# Patient Record
Sex: FEMALE | Race: WHITE | Employment: UNEMPLOYED | URBAN - METROPOLITAN AREA
[De-identification: names, ages, dates, MRNs, and addresses within clinical notes are randomized per-mention and may not be internally consistent; named-entity substitution may affect disease eponyms.]

---

## 2018-02-13 PROBLEM — R65.10 SIRS (SYSTEMIC INFLAMMATORY RESPONSE SYNDROME) (HCC): Status: ACTIVE | Noted: 2018-02-13

## 2018-02-13 PROBLEM — E87.20 METABOLIC ACIDOSIS WITH INCREASED ANION GAP AND ACCUMULATION OF ORGANIC ACIDS: Status: ACTIVE | Noted: 2018-02-13

## 2018-02-13 PROBLEM — E10.10 DKA, TYPE 1, NOT AT GOAL (HCC): Status: ACTIVE | Noted: 2018-02-13

## 2018-02-13 PROBLEM — G40.909 SEIZURE DISORDER (HCC): Status: ACTIVE | Noted: 2018-02-13

## 2018-02-14 PROBLEM — E78.1 HYPERTRIGLYCERIDEMIA: Status: ACTIVE | Noted: 2018-02-14

## 2018-10-22 ENCOUNTER — OFFICE VISIT (OUTPATIENT)
Dept: INTERNAL MEDICINE CLINIC | Age: 28
End: 2018-10-22
Payer: COMMERCIAL

## 2018-10-22 VITALS
BODY MASS INDEX: 23.59 KG/M2 | WEIGHT: 117 LBS | SYSTOLIC BLOOD PRESSURE: 122 MMHG | HEIGHT: 59 IN | DIASTOLIC BLOOD PRESSURE: 64 MMHG

## 2018-10-22 DIAGNOSIS — E10.8 TYPE 1 DIABETES MELLITUS WITH COMPLICATION (HCC): Primary | ICD-10-CM

## 2018-10-22 DIAGNOSIS — F41.9 ANXIETY AND DEPRESSION: ICD-10-CM

## 2018-10-22 DIAGNOSIS — G40.909 SEIZURE DISORDER (HCC): ICD-10-CM

## 2018-10-22 DIAGNOSIS — Z23 NEED FOR INFLUENZA VACCINATION: ICD-10-CM

## 2018-10-22 DIAGNOSIS — F32.A ANXIETY AND DEPRESSION: ICD-10-CM

## 2018-10-22 PROBLEM — E87.20 METABOLIC ACIDOSIS WITH INCREASED ANION GAP AND ACCUMULATION OF ORGANIC ACIDS: Status: RESOLVED | Noted: 2018-02-13 | Resolved: 2018-10-22

## 2018-10-22 PROBLEM — E78.1 HYPERTRIGLYCERIDEMIA: Status: RESOLVED | Noted: 2018-02-14 | Resolved: 2018-10-22

## 2018-10-22 PROBLEM — R65.10 SIRS (SYSTEMIC INFLAMMATORY RESPONSE SYNDROME) (HCC): Status: RESOLVED | Noted: 2018-02-13 | Resolved: 2018-10-22

## 2018-10-22 PROBLEM — E10.10 DKA, TYPE 1, NOT AT GOAL (HCC): Status: RESOLVED | Noted: 2018-02-13 | Resolved: 2018-10-22

## 2018-10-22 PROCEDURE — 99204 OFFICE O/P NEW MOD 45 MIN: CPT | Performed by: INTERNAL MEDICINE

## 2018-10-22 PROCEDURE — G8420 CALC BMI NORM PARAMETERS: HCPCS | Performed by: INTERNAL MEDICINE

## 2018-10-22 PROCEDURE — 90471 IMMUNIZATION ADMIN: CPT | Performed by: INTERNAL MEDICINE

## 2018-10-22 PROCEDURE — 90682 RIV4 VACC RECOMBINANT DNA IM: CPT | Performed by: INTERNAL MEDICINE

## 2018-10-22 PROCEDURE — 1036F TOBACCO NON-USER: CPT | Performed by: INTERNAL MEDICINE

## 2018-10-22 PROCEDURE — G8482 FLU IMMUNIZE ORDER/ADMIN: HCPCS | Performed by: INTERNAL MEDICINE

## 2018-10-22 PROCEDURE — 2022F DILAT RTA XM EVC RTNOPTHY: CPT | Performed by: INTERNAL MEDICINE

## 2018-10-22 PROCEDURE — G8427 DOCREV CUR MEDS BY ELIG CLIN: HCPCS | Performed by: INTERNAL MEDICINE

## 2018-10-22 PROCEDURE — 3046F HEMOGLOBIN A1C LEVEL >9.0%: CPT | Performed by: INTERNAL MEDICINE

## 2018-10-22 RX ORDER — GLUCOSAMINE HCL/CHONDROITIN SU 500-400 MG
CAPSULE ORAL
Qty: 120 STRIP | Refills: 5 | Status: SHIPPED | OUTPATIENT
Start: 2018-10-22 | End: 2018-10-25 | Stop reason: DRUGHIGH

## 2018-10-22 RX ORDER — PHENYTOIN SODIUM 100 MG/1
100 CAPSULE, EXTENDED RELEASE ORAL 3 TIMES DAILY
Qty: 90 CAPSULE | Refills: 5 | Status: SHIPPED | OUTPATIENT
Start: 2018-10-22 | End: 2019-01-29 | Stop reason: SDUPTHER

## 2018-10-22 RX ORDER — PHENYTOIN SODIUM 100 MG/1
CAPSULE, EXTENDED RELEASE ORAL 4 TIMES DAILY
COMMUNITY
End: 2018-10-22 | Stop reason: SDUPTHER

## 2018-10-22 ASSESSMENT — PATIENT HEALTH QUESTIONNAIRE - PHQ9
1. LITTLE INTEREST OR PLEASURE IN DOING THINGS: 0
SUM OF ALL RESPONSES TO PHQ9 QUESTIONS 1 & 2: 0
SUM OF ALL RESPONSES TO PHQ QUESTIONS 1-9: 0
2. FEELING DOWN, DEPRESSED OR HOPELESS: 0
SUM OF ALL RESPONSES TO PHQ QUESTIONS 1-9: 0

## 2018-10-22 ASSESSMENT — ENCOUNTER SYMPTOMS
RESPIRATORY NEGATIVE: 1
GASTROINTESTINAL NEGATIVE: 1

## 2018-10-22 NOTE — ASSESSMENT & PLAN NOTE
Long-standing for several years. She has not multiple medical problems, single mother and working near full time. She's been on Zoloft in the past.  Restart Zoloft 50 mg. May refer to therapy.

## 2018-10-22 NOTE — PROGRESS NOTES
SUBJECTIVE:  Patient ID: Santi Venegas is an 33 y.o. female. HPI: Patient here today for the f/u of chronic problems-- see Problem List and associated comments. New issues or complaints include (alsosee Assessment for more details):  New patient. Single mother. 2 children. Works full time. Long history of diabetes and seizure disorder. Poor control. Formerly on insulin pump but has been off for years. Now gets by with sliding scale coverage but admits she has very poor control. Last known A1c was 13.3. She also has seizures and has been off her Dilantin for several months. Last reported seizure by her mother-in-law was approximately 2 months ago. He does not smoke. No alcohol. She has had a tubal ligation. Review of Systems   Constitutional: Negative for activity change, appetite change and unexpected weight change. HENT: Negative. Eyes: Negative for visual disturbance. Respiratory: Negative. Cardiovascular: Negative. Gastrointestinal: Negative. Genitourinary: Negative. Musculoskeletal: Negative. Skin: Negative for rash and wound. Neurological: Positive for seizures and numbness. Hematological: Negative. Psychiatric/Behavioral: The patient is nervous/anxious. OBJECTIVE:    /64 (Site: Right Upper Arm)   Ht 4' 11\" (1.499 m)   Wt 117 lb (53.1 kg)   BMI 23.63 kg/m²      Physical Exam   Constitutional: She is oriented to person, place, and time. She appears well-developed and well-nourished. No distress. HENT:   Head: Normocephalic and atraumatic. Right Ear: External ear normal.   Left Ear: External ear normal.   Eyes: Pupils are equal, round, and reactive to light. EOM are normal. Right eye exhibits no discharge. Left eye exhibits no discharge. No scleral icterus. Fundi okay   Neck: Normal range of motion. Neck supple. No JVD present. Carotid bruit is not present. No thyromegaly present.    Cardiovascular: Normal rate, regular rhythm and normal heart sounds. Exam reveals no gallop. No murmur heard. Pulses:       Carotid pulses are 2+ on the right side, and 2+ on the left side. Radial pulses are 2+ on the right side, and 2+ on the left side. Pulmonary/Chest: Effort normal and breath sounds normal. No respiratory distress. She has no wheezes. She has no rales. Abdominal: Soft. Bowel sounds are normal. She exhibits no distension, no abdominal bruit and no mass. There is no hepatosplenomegaly. Musculoskeletal: She exhibits no edema. Lymphadenopathy:     She has no cervical adenopathy. Neurological: She is alert and oriented to person, place, and time. She displays no atrophy and no tremor. No cranial nerve deficit. She displays no seizure activity. Coordination and gait normal.   Reflex Scores:       Bicep reflexes are 1+ on the right side and 1+ on the left side. Patellar reflexes are 1+ on the right side and 1+ on the left side. Skin: She is not diaphoretic. No pallor. Psychiatric: She has a normal mood and affect. Her speech is normal and behavior is normal. Judgment and thought content normal. Cognition and memory are normal.       ASSESSMENT:       Encounter Diagnoses   Name Primary?  Type 1 diabetes mellitus with complication (HCC) Yes    Anxiety and depression     Seizure disorder ([facility] [address]     Need for influenza vaccination        Anxiety and depression  Long-standing for several years. She has not multiple medical problems, single mother and working near full time. She's been on Zoloft in the past.  Restart Zoloft 50 mg. May refer to therapy. Seizure disorder [facility])  Seizure disorder probably dating back to at least adolescence. Often confused with either hypoglycemia or hyperglycemia. Eventually diagnosis of seizure. Tonic-clonic. She has not been on Dilantin for at least 6 months. Her last seizure was 2 months ago.   Restart Dilantin 100 mg t.i.d. and may refer to neurology once her metabolic issues are more

## 2018-10-25 ENCOUNTER — OFFICE VISIT (OUTPATIENT)
Dept: ENDOCRINOLOGY | Age: 28
End: 2018-10-25
Payer: COMMERCIAL

## 2018-10-25 VITALS
BODY MASS INDEX: 23.59 KG/M2 | DIASTOLIC BLOOD PRESSURE: 81 MMHG | HEART RATE: 116 BPM | WEIGHT: 117 LBS | HEIGHT: 59 IN | OXYGEN SATURATION: 96 % | SYSTOLIC BLOOD PRESSURE: 109 MMHG

## 2018-10-25 DIAGNOSIS — E55.9 VITAMIN D DEFICIENCY: ICD-10-CM

## 2018-10-25 DIAGNOSIS — E10.8 TYPE 1 DIABETES MELLITUS WITH COMPLICATION (HCC): Primary | ICD-10-CM

## 2018-10-25 DIAGNOSIS — F32.A ANXIETY AND DEPRESSION: ICD-10-CM

## 2018-10-25 DIAGNOSIS — F41.9 ANXIETY AND DEPRESSION: ICD-10-CM

## 2018-10-25 PROCEDURE — 2022F DILAT RTA XM EVC RTNOPTHY: CPT | Performed by: NURSE PRACTITIONER

## 2018-10-25 PROCEDURE — 3046F HEMOGLOBIN A1C LEVEL >9.0%: CPT | Performed by: NURSE PRACTITIONER

## 2018-10-25 PROCEDURE — 99204 OFFICE O/P NEW MOD 45 MIN: CPT | Performed by: NURSE PRACTITIONER

## 2018-10-25 PROCEDURE — G8427 DOCREV CUR MEDS BY ELIG CLIN: HCPCS | Performed by: NURSE PRACTITIONER

## 2018-10-25 PROCEDURE — 1036F TOBACCO NON-USER: CPT | Performed by: NURSE PRACTITIONER

## 2018-10-25 PROCEDURE — G8420 CALC BMI NORM PARAMETERS: HCPCS | Performed by: NURSE PRACTITIONER

## 2018-10-25 PROCEDURE — G8482 FLU IMMUNIZE ORDER/ADMIN: HCPCS | Performed by: NURSE PRACTITIONER

## 2018-10-25 RX ORDER — DIAZEPAM 5 MG/1
5 TABLET ORAL EVERY 6 HOURS PRN
Status: CANCELLED | OUTPATIENT
Start: 2018-10-25

## 2018-10-25 RX ORDER — DIAZEPAM 5 MG/1
5 TABLET ORAL EVERY 6 HOURS PRN
COMMUNITY
End: 2018-10-29 | Stop reason: SDUPTHER

## 2018-10-26 DIAGNOSIS — E10.8 TYPE 1 DIABETES MELLITUS WITH COMPLICATION (HCC): Primary | ICD-10-CM

## 2018-10-28 PROBLEM — E55.9 VITAMIN D DEFICIENCY: Status: ACTIVE | Noted: 2018-10-28

## 2018-10-28 ASSESSMENT — ENCOUNTER SYMPTOMS
EYE PAIN: 0
NAUSEA: 0
COLOR CHANGE: 0
CONSTIPATION: 0
SHORTNESS OF BREATH: 0
DIARRHEA: 0

## 2018-10-29 ENCOUNTER — TELEPHONE (OUTPATIENT)
Dept: INTERNAL MEDICINE CLINIC | Age: 28
End: 2018-10-29

## 2018-10-29 DIAGNOSIS — F32.A ANXIETY AND DEPRESSION: Primary | ICD-10-CM

## 2018-10-29 DIAGNOSIS — F41.9 ANXIETY AND DEPRESSION: Primary | ICD-10-CM

## 2018-10-30 RX ORDER — DIAZEPAM 5 MG/1
5 TABLET ORAL EVERY 6 HOURS PRN
Qty: 30 TABLET | Refills: 0 | OUTPATIENT
Start: 2018-10-30 | End: 2018-11-07 | Stop reason: ALTCHOICE

## 2018-10-30 RX ORDER — DIAZEPAM 5 MG/1
5 TABLET ORAL 2 TIMES DAILY
Qty: 14 TABLET | Refills: 0 | OUTPATIENT
Start: 2018-10-30 | End: 2018-11-07 | Stop reason: DRUGHIGH

## 2018-11-06 ENCOUNTER — OFFICE VISIT (OUTPATIENT)
Dept: PSYCHOLOGY | Age: 28
End: 2018-11-06
Payer: COMMERCIAL

## 2018-11-06 DIAGNOSIS — F40.01 PANIC DISORDER WITH AGORAPHOBIA: ICD-10-CM

## 2018-11-06 PROCEDURE — 90791 PSYCH DIAGNOSTIC EVALUATION: CPT | Performed by: PSYCHOLOGIST

## 2018-11-06 NOTE — PROGRESS NOTES
Behavioral Health Consultation  [address] Marla Seay PsyD  Psychologist  11/6/2018  2:01 PM      Time spent with Patient: 30 minutes  This is patient's first [facility] appointment. Reason for Consult:  Depression, anxiety  Referring Provider: Christiana Kasper MD  [address], [address] [facility] [place]    Pt provided informed consent for the behavioral health program. Discussed with patient model of service to include the limits of confidentiality (i.e. abuse reporting, suicide intervention, etc.) and short-term intervention focused approach. Pt indicated understanding. Feedback given to PCP. S:  Pt reports history of anxiety and panic attacks. Was recently restarted on Zoloft. Used to be on 100 mg. Hasn't been to doctors in two years. Moved here from [place]. Has had a hard time managing her health. Works a lot and has 2 kids - ages 3 and 6. Was on valium in the past for anxiety and panic attacks. Anxiety has gotten worse lately. Works as a manager at [employer]. When around other people or in public gets bad panic attacks. Also has epilepsy. Stress brings on seizures. Was on Zoloft ever since age 14 and Valium started about 2 years ago. Psychiatrist started those. Was in therapy when she was younger - found it helpful but this was before epilepsy was diagnosed. Has panic attacks a lot at work. Several times a day. Feels everyone is looking at her, starts to shake, blurry vision, feels overwhelmed, heart rate increased. When this happens go to the office and tries to take deep breaths.       O:  MSE:    Appearance    alert, cooperative  Sleep disturbance Yes  Fatigue Yes  Loss of pleasure Yes  Impulsive behavior No  Speech    normal rate and normal volume  Mood    Anxious  Affect  Congruent to thought content and mood  Thought Content    intact  Thought Process    linear and goal directed  Associations    logical connections  Insight    Fair  Judgment    Intact  Orientation    oriented to

## 2018-11-06 NOTE — PATIENT INSTRUCTIONS
mind.  Your mind and body are connected. The mind influences the body and the body influences the mind. What you do with your mind when you are trying to relax is very important. The key is to avoid thinking about stressful things. You can think about      Neutral things (e.g., counting, saying a word like calm or relax)   Pleasant things (e.g., imagining a pleasant place)    5. It is recommended that you practice 2 times per day, 10 minutes each time.

## 2018-11-07 ENCOUNTER — TELEPHONE (OUTPATIENT)
Dept: ENDOCRINOLOGY | Age: 28
End: 2018-11-07

## 2018-11-07 DIAGNOSIS — F41.9 ANXIETY AND DEPRESSION: ICD-10-CM

## 2018-11-07 DIAGNOSIS — F32.A ANXIETY AND DEPRESSION: ICD-10-CM

## 2018-11-07 PROBLEM — F40.01 PANIC DISORDER WITH AGORAPHOBIA: Status: ACTIVE | Noted: 2018-11-07

## 2018-11-07 RX ORDER — SERTRALINE HYDROCHLORIDE 100 MG/1
100 TABLET, FILM COATED ORAL DAILY
Qty: 30 TABLET | Refills: 2 | OUTPATIENT
Start: 2018-11-07 | End: 2018-12-11 | Stop reason: SDUPTHER

## 2018-11-07 RX ORDER — DIAZEPAM 5 MG/1
5 TABLET ORAL 2 TIMES DAILY PRN
Qty: 68 TABLET | Refills: 0 | OUTPATIENT
Start: 2018-11-07 | End: 2018-12-07 | Stop reason: SDUPTHER

## 2018-11-07 RX ORDER — DIAZEPAM 5 MG/1
5 TABLET ORAL 2 TIMES DAILY PRN
Qty: 68 TABLET | Refills: 0 | OUTPATIENT
Start: 2018-11-07 | End: 2018-12-07

## 2018-11-07 RX ORDER — SERTRALINE HYDROCHLORIDE 100 MG/1
100 TABLET, FILM COATED ORAL DAILY
Qty: 30 TABLET | Refills: 2 | OUTPATIENT
Start: 2018-11-07

## 2018-11-08 ENCOUNTER — OFFICE VISIT (OUTPATIENT)
Dept: ENDOCRINOLOGY | Age: 28
End: 2018-11-08
Payer: COMMERCIAL

## 2018-11-08 VITALS
HEIGHT: 59 IN | BODY MASS INDEX: 23.91 KG/M2 | OXYGEN SATURATION: 99 % | HEART RATE: 113 BPM | SYSTOLIC BLOOD PRESSURE: 116 MMHG | WEIGHT: 118.6 LBS | DIASTOLIC BLOOD PRESSURE: 84 MMHG

## 2018-11-08 DIAGNOSIS — E10.8 TYPE 1 DIABETES MELLITUS WITH COMPLICATION (HCC): Primary | ICD-10-CM

## 2018-11-08 PROCEDURE — G8482 FLU IMMUNIZE ORDER/ADMIN: HCPCS | Performed by: NURSE PRACTITIONER

## 2018-11-08 PROCEDURE — 2022F DILAT RTA XM EVC RTNOPTHY: CPT | Performed by: NURSE PRACTITIONER

## 2018-11-08 PROCEDURE — 3046F HEMOGLOBIN A1C LEVEL >9.0%: CPT | Performed by: NURSE PRACTITIONER

## 2018-11-08 PROCEDURE — G8427 DOCREV CUR MEDS BY ELIG CLIN: HCPCS | Performed by: NURSE PRACTITIONER

## 2018-11-08 PROCEDURE — 99214 OFFICE O/P EST MOD 30 MIN: CPT | Performed by: NURSE PRACTITIONER

## 2018-11-08 PROCEDURE — G8420 CALC BMI NORM PARAMETERS: HCPCS | Performed by: NURSE PRACTITIONER

## 2018-11-08 PROCEDURE — 1036F TOBACCO NON-USER: CPT | Performed by: NURSE PRACTITIONER

## 2018-11-08 ASSESSMENT — ENCOUNTER SYMPTOMS
SHORTNESS OF BREATH: 0
EYE PAIN: 0
NAUSEA: 0
COLOR CHANGE: 0
CONSTIPATION: 0
DIARRHEA: 0

## 2018-11-08 NOTE — PROGRESS NOTES
CREATININE <0.5 (L) 10/22/2018    GLUCOSE 362 (H) 10/22/2018    CALCIUM 8.9 10/22/2018    PROT 6.8 10/22/2018    LABALBU 3.9 10/22/2018    BILITOT <0.2 10/22/2018    ALKPHOS 95 10/22/2018    AST 68 (H) 10/22/2018    ALT 41 (H) 10/22/2018    LABGLOM >60 10/22/2018    GFRAA >60 10/22/2018    AGRATIO 1.3 10/22/2018    GLOB 2.9 10/22/2018       The ASCVD Risk score (Perla Hartman, et al., 2013) failed to calculate for the following reasons: The 2013 ASCVD risk score is only valid for ages 36 to 78    Past Medical History:   Diagnosis Date    Diabetes mellitus ([facility] [address]     Seizures ([facility] [address]      History reviewed. No pertinent family history. Review of Systems   Constitutional: Negative for activity change, appetite change, diaphoresis, fever and unexpected weight change. HENT: Negative for dental problem. Eyes: Negative for pain and visual disturbance. Respiratory: Negative for shortness of breath. Cardiovascular: Negative for chest pain, palpitations and leg swelling. Gastrointestinal: Negative for constipation, diarrhea and nausea. Endocrine: Negative for cold intolerance, heat intolerance, polydipsia, polyphagia and polyuria. Genitourinary: Negative for frequency and urgency. Musculoskeletal: Negative for arthralgias, joint swelling and myalgias. Skin: Negative for color change and pallor. Neurological: Negative for weakness, numbness and headaches. Psychiatric/Behavioral: Negative for dysphoric mood and sleep disturbance. The patient is not nervous/anxious. Vitals:    11/08/18 1526   BP: 116/84   Site: Left Upper Arm   Position: Sitting   Cuff Size: Medium Adult   Pulse: 113   SpO2: 99%   Weight: 118 lb 9.6 oz (53.8 kg)   Height: 4' 11\" (1.499 m)     Physical Exam   Constitutional: She is oriented to person, place, and time. She appears well-developed and well-nourished. Eyes: Pupils are equal, round, and reactive to light. Neck: Normal range of motion. No thyromegaly present.

## 2018-11-09 ENCOUNTER — TELEPHONE (OUTPATIENT)
Dept: PSYCHOLOGY | Age: 28
End: 2018-11-09

## 2018-11-09 DIAGNOSIS — E10.8 TYPE 1 DIABETES MELLITUS WITH COMPLICATION (HCC): ICD-10-CM

## 2018-11-09 DIAGNOSIS — E55.9 VITAMIN D DEFICIENCY: ICD-10-CM

## 2018-11-09 LAB
GLUCOSE BLD-MCNC: 261 MG/DL (ref 70–99)
VITAMIN D 25-HYDROXY: 10.9 NG/ML

## 2018-11-09 NOTE — ASSESSMENT & PLAN NOTE
Medication          lantus 20 units at night  Adjust lantus: target goal in the fasting AM number is   Increase by 1 unit for every three consecutive  days that fasting blood sugars are > 150  Decrease by 1 unit for any given day that fasting blood sugars are < 100     Novalog  Start at 1: 50 > 115  Carb ratio: 1: 15 grams of carb     Diet  Target for 30 grams of carbs or less per meal.   Ensure atleast equal amounts of proteins and good fats to stabilize sugars  Decrease refined carbs in diet, moderate protein and good fats are ok. Increase fiber via vegetables. Limit fruit intake. Minimize artificial sweeteners.   Ensure good hydration :  Electrolyte replacement : gatorade zero daily     Diet: limit to 45 grams of carbs per meal ; 15-20 grams for snacks     Ensure proper proportion of protein:fats:carbs per meal as discussed/below  · Carbohydrate 30-40%   · Protein 15-30%   · Fat 25-40%  Log book provided : to bring log and meter at next visit    Goal is to be pump eligible at the earliest : 670 G with sensor  Discussed the following issues that impact weight loss for T1Ds -   · Basal and bolusamounts and timing   · Insulin on board helps to decrease BG and help with weight gain   · Nutrient shifting and timing (planning out of meals) :avoid frequent dosing to prevent lows  · Total daily fat and protein intake ; ensure hydration and electrolyte intake  · Total Activity Level  · Sleep quality and duration   · Stress levels

## 2018-11-12 DIAGNOSIS — E55.9 VITAMIN D DEFICIENCY: Primary | ICD-10-CM

## 2018-11-12 RX ORDER — ERGOCALCIFEROL (VITAMIN D2) 1250 MCG
50000 CAPSULE ORAL
Qty: 24 CAPSULE | Refills: 1 | Status: SHIPPED | OUTPATIENT
Start: 2018-11-12 | End: 2019-01-29

## 2018-11-13 ENCOUNTER — OFFICE VISIT (OUTPATIENT)
Dept: PSYCHOLOGY | Age: 28
End: 2018-11-13
Payer: COMMERCIAL

## 2018-11-13 DIAGNOSIS — F33.1 MODERATE EPISODE OF RECURRENT MAJOR DEPRESSIVE DISORDER (HCC): ICD-10-CM

## 2018-11-13 DIAGNOSIS — F41.1 GAD (GENERALIZED ANXIETY DISORDER): ICD-10-CM

## 2018-11-13 DIAGNOSIS — F40.01 PANIC DISORDER WITH AGORAPHOBIA: Primary | ICD-10-CM

## 2018-11-13 LAB — C-PEPTIDE: <0.1 NG/ML (ref 1.1–4.4)

## 2018-11-13 PROCEDURE — 90832 PSYTX W PT 30 MINUTES: CPT | Performed by: PSYCHOLOGIST

## 2018-11-13 ASSESSMENT — PATIENT HEALTH QUESTIONNAIRE - PHQ9
10. IF YOU CHECKED OFF ANY PROBLEMS, HOW DIFFICULT HAVE THESE PROBLEMS MADE IT FOR YOU TO DO YOUR WORK, TAKE CARE OF THINGS AT HOME, OR GET ALONG WITH OTHER PEOPLE: 2
6. FEELING BAD ABOUT YOURSELF - OR THAT YOU ARE A FAILURE OR HAVE LET YOURSELF OR YOUR FAMILY DOWN: 3
5. POOR APPETITE OR OVEREATING: 1
7. TROUBLE CONCENTRATING ON THINGS, SUCH AS READING THE NEWSPAPER OR WATCHING TELEVISION: 3
1. LITTLE INTEREST OR PLEASURE IN DOING THINGS: 2
SUM OF ALL RESPONSES TO PHQ QUESTIONS 1-9: 16
2. FEELING DOWN, DEPRESSED OR HOPELESS: 2
3. TROUBLE FALLING OR STAYING ASLEEP: 2
8. MOVING OR SPEAKING SO SLOWLY THAT OTHER PEOPLE COULD HAVE NOTICED. OR THE OPPOSITE, BEING SO FIGETY OR RESTLESS THAT YOU HAVE BEEN MOVING AROUND A LOT MORE THAN USUAL: 1
SUM OF ALL RESPONSES TO PHQ QUESTIONS 1-9: 16
SUM OF ALL RESPONSES TO PHQ9 QUESTIONS 1 & 2: 4
4. FEELING TIRED OR HAVING LITTLE ENERGY: 2
9. THOUGHTS THAT YOU WOULD BE BETTER OFF DEAD, OR OF HURTING YOURSELF: 0

## 2018-11-13 ASSESSMENT — ANXIETY QUESTIONNAIRES
2. NOT BEING ABLE TO STOP OR CONTROL WORRYING: 2-OVER HALF THE DAYS
GAD7 TOTAL SCORE: 15
1. FEELING NERVOUS, ANXIOUS, OR ON EDGE: 2-OVER HALF THE DAYS
3. WORRYING TOO MUCH ABOUT DIFFERENT THINGS: 3-NEARLY EVERY DAY
4. TROUBLE RELAXING: 2-OVER HALF THE DAYS
5. BEING SO RESTLESS THAT IT IS HARD TO SIT STILL: 1-SEVERAL DAYS
6. BECOMING EASILY ANNOYED OR IRRITABLE: 3-NEARLY EVERY DAY
7. FEELING AFRAID AS IF SOMETHING AWFUL MIGHT HAPPEN: 2-OVER HALF THE DAYS

## 2018-11-13 NOTE — PROGRESS NOTES
Behavioral Health Consultation  [address] Marla Seay PsyD  Psychologist  2018  11:23 AM      Time spent with Patient: 30 minutes  This is patient's second [facility] appointment. Reason for Consult:  Depression, anxiety  Referring Provider: Rafi Glez MD  [address], [address]      Feedback given to PCP. S:  During the last visit pt set goals to 1) practice diaphragmatic breathing 2x/day for 10 min when not anxious to work on skill mastery before skill generalization (download the free gardenia, [id number], to practice if needed)  2) attend NP visit with Dr. Arciniega on  3) return for f/u in one week    Pt reports she is \"doing a little better\" with anxiety but feeling more down. [place] very recently her father has cancer then learned a week later it had spread quickly and he has stage 4 cancer. Not expected to live past [place]. Trying to process this news. Struggling as father and mother are in [place] and trying to get time away from work to spend time with him. Lost her sister this past year too. Feeling badly about this and struggling with feeling guilty that she couldn't help her more - sister  of an OD. Continues to experience panic attacks at work. Work is very busy and around a lot of people. Sometimes cannot step away. By the time she gets off work and returns home, finds she snaps at kids and fiance. Is easily irritable. Tries to take time to decompress by watching tv. Has practiced diaphragmatic breathing at home and finds it helpful. Has also tried at work but finds its harder to do there. Has been taking higher dose of Zoloft and thinks its helping.          O:  MSE:    Appearance    alert, cooperative  Sleep disturbance Yes  Fatigue Yes  Loss of pleasure Yes  Impulsive behavior No  Speech    normal rate and normal volume  Mood    \"sad and anxious\"  Affect  Congruent to thought content and mood  Thought Content    intact  Thought Process    linear

## 2018-11-16 RX ORDER — FLASH GLUCOSE SENSOR
1 KIT MISCELLANEOUS PRN
Qty: 2 EACH | Refills: 5 | Status: SHIPPED | OUTPATIENT
Start: 2018-11-16 | End: 2021-09-13

## 2018-11-21 ENCOUNTER — TELEPHONE (OUTPATIENT)
Dept: ENDOCRINOLOGY | Age: 28
End: 2018-11-21

## 2018-11-26 ENCOUNTER — TELEPHONE (OUTPATIENT)
Dept: ENDOCRINOLOGY | Age: 28
End: 2018-11-26

## 2018-12-04 ENCOUNTER — TELEPHONE (OUTPATIENT)
Dept: ENDOCRINOLOGY | Age: 28
End: 2018-12-04

## 2018-12-07 ENCOUNTER — TELEPHONE (OUTPATIENT)
Dept: INTERNAL MEDICINE CLINIC | Age: 28
End: 2018-12-07

## 2018-12-07 DIAGNOSIS — F32.A ANXIETY AND DEPRESSION: ICD-10-CM

## 2018-12-07 DIAGNOSIS — F41.9 ANXIETY AND DEPRESSION: ICD-10-CM

## 2018-12-07 RX ORDER — DIAZEPAM 5 MG/1
5 TABLET ORAL 2 TIMES DAILY PRN
Qty: 60 TABLET | Refills: 1 | OUTPATIENT
Start: 2018-12-07 | End: 2019-01-15 | Stop reason: SDUPTHER

## 2018-12-10 NOTE — PROGRESS NOTES
h/o AVH, paranoia, cady, PTSD, OCD. context: office visit  severity: moderate to severe  location: AMS / mood disturbance  associated symptoms: see above  modifiers: course of illness, stressors  duration: chronic    History obtained from patient and chart (confirmed by patient today). Past Psychiatric History:    Prior hospitalizations: Denies   Prior diagnoses: MDD, ROME, panic attacks, anorexia   Prior medication trials/reactions to meds: Valium, Zoloft   Outpatient Treatment:  Dr. Seamus Juarez   Suicide Attempts: Denies      Substance Use History:   Nicotine:   History   Smoking Status    Never Smoker   Smokeless Tobacco    Never Used      Alcohol: Very rarely   Illicits: Denies   Caffeine: 6 cans diet soda/day    Rehabs/Complicated W/D: Denies, no DUIs     Past Medical/Surgical History:   Past Medical History:   Diagnosis Date    Diabetes mellitus ([facility] [address]     Seizures ([facility] [address]      Past Surgical History:   Procedure Laterality Date     SECTION      TUBAL LIGATION           PCP: Juliaette Epley, MD      Social/Developmental History:    Marital: Fiancee   Children: 2    Family:    Housing: with [place] and kids   Occupation/Income: manager at [employer]   Education: HS diploma              Cheondoism:    Legal hx: Denies   Abuse hx:   Violence hx:   Access to firearms: No    Family History:    Medical:  No family history on file. Psychiatric: Denies   History of completed suicide: Denies    Allergies: No Known Allergies      Current Medications:   Current Outpatient Prescriptions   Medication Sig Dispense Refill    diazepam (VALIUM) 5 MG tablet Take 1 tablet by mouth 2 times daily as needed for Anxiety for up to 30 days. . 60 tablet 1    insulin aspart (NOVOLOG) 100 UNIT/ML injection vial Inject 80 units daily via Insulin Pump. 3 vial 3    Continuous Blood Gluc Sensor (FREESTYLE NANCY 14 DAY SENSOR) MISC 1 Units by Does not apply route as needed (use one sensor for 14 days) 2 each 5    meaningfully Yes  SI:   no suicidal ideation  HI: Denies HI    Labs:     Lab Results   Component Value Date     10/22/2018    K 4.6 10/22/2018    K 3.9 02/15/2018    CL 96 10/22/2018    CO2 24 10/22/2018    BUN 8 10/22/2018    CREATININE <0.5 10/22/2018    GLUCOSE 261 11/09/2018    CALCIUM 8.9 10/22/2018      Lab Results   Component Value Date    WBC 7.0 10/22/2018    HGB 12.7 10/22/2018    HCT 39.1 10/22/2018    MCV 85.9 10/22/2018     10/22/2018     Lab Results   Component Value Date    COLORU YELLOW 10/22/2018    NITRU Negative 10/22/2018    GLUCOSEU >=1000 10/22/2018    GLUCOSEU >=1000 01/17/2011    KETUA 15 10/22/2018    UROBILINOGEN 0.2 10/22/2018    BILIRUBINUR Negative 10/22/2018    BILIRUBINUR NEGATIVE 01/17/2011     Lab Results   Component Value Date    LABA1C 14.0 10/22/2018     Lab Results   Component Value Date    .1 10/22/2018     Lab Results   Component Value Date    CHOL 221 (H) 10/22/2018    CHOL 138 02/14/2018    CHOL 132 01/18/2011     Lab Results   Component Value Date    TRIG 170 (H) 10/22/2018    TRIG 266 (H) 02/14/2018    TRIG 73 01/18/2011     Lab Results   Component Value Date    HDL 38 (L) 10/22/2018    HDL 19 (L) 02/14/2018    HDL 50 01/18/2011     Lab Results   Component Value Date    LDLCALC 149 (H) 10/22/2018    LDLCALC 66 02/14/2018    LDLCALC 68 01/18/2011     Lab Results   Component Value Date    LABVLDL 34 10/22/2018    LABVLDL 53 02/14/2018    LABVLDL 15 01/18/2011     No results found for: CHOLHDLRATIO  Lab Results   Component Value Date    TSH 0.77 01/18/2011     No results found for: YHEMPYD1I2  No results found for: [id number]  No results found for: FOLATE        Imaging: [facility] 1/17/11 NAICP. [place] I  MDD recurrent moderate, ROME, panic attacks, anorexia nervosa    Axis II: No diagnosis    Axis III       Diagnosis Date    Diabetes mellitus ([facility] [address]     Seizures ([address]       Active Problems:    * No active hospital problems.  *  Resolved Problems:    * No

## 2018-12-11 ENCOUNTER — OFFICE VISIT (OUTPATIENT)
Dept: PSYCHIATRY | Age: 28
End: 2018-12-11
Payer: COMMERCIAL

## 2018-12-11 VITALS
HEART RATE: 129 BPM | BODY MASS INDEX: 23.99 KG/M2 | DIASTOLIC BLOOD PRESSURE: 72 MMHG | HEIGHT: 59 IN | WEIGHT: 119 LBS | SYSTOLIC BLOOD PRESSURE: 110 MMHG

## 2018-12-11 DIAGNOSIS — F33.1 MODERATE EPISODE OF RECURRENT MAJOR DEPRESSIVE DISORDER (HCC): Primary | ICD-10-CM

## 2018-12-11 DIAGNOSIS — F50.00 ANOREXIA NERVOSA: ICD-10-CM

## 2018-12-11 DIAGNOSIS — F40.01 PANIC DISORDER WITH AGORAPHOBIA: ICD-10-CM

## 2018-12-11 DIAGNOSIS — F41.1 GAD (GENERALIZED ANXIETY DISORDER): ICD-10-CM

## 2018-12-11 PROCEDURE — 99204 OFFICE O/P NEW MOD 45 MIN: CPT | Performed by: PSYCHIATRY & NEUROLOGY

## 2018-12-11 RX ORDER — SERTRALINE HYDROCHLORIDE 100 MG/1
150 TABLET, FILM COATED ORAL DAILY
Qty: 45 TABLET | Refills: 1 | Status: SHIPPED | OUTPATIENT
Start: 2018-12-11 | End: 2019-01-15 | Stop reason: SDUPTHER

## 2019-01-15 ENCOUNTER — OFFICE VISIT (OUTPATIENT)
Dept: PSYCHIATRY | Age: 29
End: 2019-01-15
Payer: COMMERCIAL

## 2019-01-15 VITALS
BODY MASS INDEX: 24.39 KG/M2 | WEIGHT: 121 LBS | HEART RATE: 98 BPM | HEIGHT: 59 IN | SYSTOLIC BLOOD PRESSURE: 102 MMHG | DIASTOLIC BLOOD PRESSURE: 68 MMHG

## 2019-01-15 DIAGNOSIS — G40.909 NONINTRACTABLE EPILEPSY WITHOUT STATUS EPILEPTICUS, UNSPECIFIED EPILEPSY TYPE (HCC): ICD-10-CM

## 2019-01-15 DIAGNOSIS — F50.00 ANOREXIA NERVOSA: ICD-10-CM

## 2019-01-15 DIAGNOSIS — F43.21 GRIEF: ICD-10-CM

## 2019-01-15 DIAGNOSIS — F41.1 GAD (GENERALIZED ANXIETY DISORDER): ICD-10-CM

## 2019-01-15 DIAGNOSIS — F40.01 PANIC DISORDER WITH AGORAPHOBIA: ICD-10-CM

## 2019-01-15 DIAGNOSIS — F41.9 ANXIETY AND DEPRESSION: ICD-10-CM

## 2019-01-15 DIAGNOSIS — F33.1 MODERATE EPISODE OF RECURRENT MAJOR DEPRESSIVE DISORDER (HCC): Primary | ICD-10-CM

## 2019-01-15 DIAGNOSIS — F32.A ANXIETY AND DEPRESSION: ICD-10-CM

## 2019-01-15 PROCEDURE — 99214 OFFICE O/P EST MOD 30 MIN: CPT | Performed by: PSYCHIATRY & NEUROLOGY

## 2019-01-15 RX ORDER — DIAZEPAM 5 MG/1
5 TABLET ORAL 2 TIMES DAILY PRN
Qty: 60 TABLET | Refills: 1 | Status: SHIPPED | OUTPATIENT
Start: 2019-01-15 | End: 2019-01-29 | Stop reason: DRUGHIGH

## 2019-01-15 RX ORDER — SERTRALINE HYDROCHLORIDE 100 MG/1
200 TABLET, FILM COATED ORAL DAILY
Qty: 60 TABLET | Refills: 1 | Status: SHIPPED | OUTPATIENT
Start: 2019-01-15 | End: 2019-04-22 | Stop reason: SDUPTHER

## 2019-01-29 ENCOUNTER — OFFICE VISIT (OUTPATIENT)
Dept: INTERNAL MEDICINE CLINIC | Age: 29
End: 2019-01-29
Payer: COMMERCIAL

## 2019-01-29 VITALS
TEMPERATURE: 98 F | SYSTOLIC BLOOD PRESSURE: 90 MMHG | WEIGHT: 124 LBS | DIASTOLIC BLOOD PRESSURE: 60 MMHG | BODY MASS INDEX: 25 KG/M2 | HEIGHT: 59 IN

## 2019-01-29 DIAGNOSIS — F41.1 GAD (GENERALIZED ANXIETY DISORDER): ICD-10-CM

## 2019-01-29 DIAGNOSIS — E10.8 TYPE 1 DIABETES MELLITUS WITH COMPLICATION (HCC): ICD-10-CM

## 2019-01-29 DIAGNOSIS — S01.512A LACERATION OF TONGUE, INITIAL ENCOUNTER: ICD-10-CM

## 2019-01-29 DIAGNOSIS — F32.A ANXIETY AND DEPRESSION: ICD-10-CM

## 2019-01-29 DIAGNOSIS — G40.909 SEIZURE DISORDER (HCC): Primary | ICD-10-CM

## 2019-01-29 DIAGNOSIS — G40.909 SEIZURE DISORDER (HCC): ICD-10-CM

## 2019-01-29 DIAGNOSIS — L03.011 PARONYCHIA OF RIGHT MIDDLE FINGER: ICD-10-CM

## 2019-01-29 DIAGNOSIS — F41.9 ANXIETY AND DEPRESSION: ICD-10-CM

## 2019-01-29 LAB
A/G RATIO: 1.3 (ref 1.1–2.2)
ALBUMIN SERPL-MCNC: 4.3 G/DL (ref 3.4–5)
ALP BLD-CCNC: 147 U/L (ref 40–129)
ALT SERPL-CCNC: 41 U/L (ref 10–40)
ANION GAP SERPL CALCULATED.3IONS-SCNC: 17 MMOL/L (ref 3–16)
AST SERPL-CCNC: 50 U/L (ref 15–37)
BILIRUB SERPL-MCNC: <0.2 MG/DL (ref 0–1)
BUN BLDV-MCNC: 5 MG/DL (ref 7–20)
CALCIUM SERPL-MCNC: 9.7 MG/DL (ref 8.3–10.6)
CHLORIDE BLD-SCNC: 98 MMOL/L (ref 99–110)
CO2: 26 MMOL/L (ref 21–32)
CREAT SERPL-MCNC: <0.5 MG/DL (ref 0.6–1.1)
GFR AFRICAN AMERICAN: >60
GFR NON-AFRICAN AMERICAN: >60
GLOBULIN: 3.3 G/DL
GLUCOSE BLD-MCNC: 56 MG/DL (ref 70–99)
PHENYTOIN DOSE AMOUNT: ABNORMAL
PHENYTOIN LEVEL: 1.5 UG/ML (ref 10–20)
POTASSIUM SERPL-SCNC: 3.8 MMOL/L (ref 3.5–5.1)
SODIUM BLD-SCNC: 141 MMOL/L (ref 136–145)
TOTAL PROTEIN: 7.6 G/DL (ref 6.4–8.2)

## 2019-01-29 PROCEDURE — G8419 CALC BMI OUT NRM PARAM NOF/U: HCPCS | Performed by: INTERNAL MEDICINE

## 2019-01-29 PROCEDURE — G8427 DOCREV CUR MEDS BY ELIG CLIN: HCPCS | Performed by: INTERNAL MEDICINE

## 2019-01-29 PROCEDURE — 1036F TOBACCO NON-USER: CPT | Performed by: INTERNAL MEDICINE

## 2019-01-29 PROCEDURE — 2022F DILAT RTA XM EVC RTNOPTHY: CPT | Performed by: INTERNAL MEDICINE

## 2019-01-29 PROCEDURE — 3046F HEMOGLOBIN A1C LEVEL >9.0%: CPT | Performed by: INTERNAL MEDICINE

## 2019-01-29 PROCEDURE — G8482 FLU IMMUNIZE ORDER/ADMIN: HCPCS | Performed by: INTERNAL MEDICINE

## 2019-01-29 PROCEDURE — 99215 OFFICE O/P EST HI 40 MIN: CPT | Performed by: INTERNAL MEDICINE

## 2019-01-29 RX ORDER — SULFAMETHOXAZOLE AND TRIMETHOPRIM 800; 160 MG/1; MG/1
1 TABLET ORAL 2 TIMES DAILY
Qty: 20 TABLET | Refills: 0 | Status: SHIPPED | OUTPATIENT
Start: 2019-01-29 | End: 2019-06-19

## 2019-01-29 RX ORDER — PHENYTOIN SODIUM 100 MG/1
200 CAPSULE, EXTENDED RELEASE ORAL 2 TIMES DAILY
Qty: 120 CAPSULE | Refills: 5 | Status: SHIPPED | OUTPATIENT
Start: 2019-01-29 | End: 2020-01-20

## 2019-01-29 RX ORDER — DIAZEPAM 10 MG/1
10 TABLET ORAL 2 TIMES DAILY
Qty: 60 TABLET | Refills: 1 | Status: SHIPPED | OUTPATIENT
Start: 2019-01-29 | End: 2019-03-28 | Stop reason: SDUPTHER

## 2019-01-29 ASSESSMENT — ENCOUNTER SYMPTOMS
TROUBLE SWALLOWING: 0
GASTROINTESTINAL NEGATIVE: 1
SHORTNESS OF BREATH: 0

## 2019-01-30 LAB
ESTIMATED AVERAGE GLUCOSE: 292 MG/DL
HBA1C MFR BLD: 11.8 %

## 2019-02-06 ENCOUNTER — TELEPHONE (OUTPATIENT)
Dept: INTERNAL MEDICINE CLINIC | Age: 29
End: 2019-02-06

## 2019-02-12 ENCOUNTER — CLINICAL DOCUMENTATION (OUTPATIENT)
Dept: PSYCHIATRY | Age: 29
End: 2019-02-12

## 2019-02-14 ENCOUNTER — OFFICE VISIT (OUTPATIENT)
Dept: ENDOCRINOLOGY | Age: 29
End: 2019-02-14
Payer: COMMERCIAL

## 2019-02-14 VITALS
BODY MASS INDEX: 25.32 KG/M2 | WEIGHT: 125.6 LBS | HEIGHT: 59 IN | DIASTOLIC BLOOD PRESSURE: 72 MMHG | OXYGEN SATURATION: 97 % | SYSTOLIC BLOOD PRESSURE: 104 MMHG | HEART RATE: 101 BPM

## 2019-02-14 DIAGNOSIS — E10.8 TYPE 1 DIABETES MELLITUS WITH COMPLICATION (HCC): ICD-10-CM

## 2019-02-14 DIAGNOSIS — G40.909 SEIZURE DISORDER (HCC): Primary | ICD-10-CM

## 2019-02-14 DIAGNOSIS — E10.42 DIABETIC POLYNEUROPATHY ASSOCIATED WITH TYPE 1 DIABETES MELLITUS (HCC): ICD-10-CM

## 2019-02-14 DIAGNOSIS — K31.84 GASTROPARESIS: ICD-10-CM

## 2019-02-14 DIAGNOSIS — E55.9 VITAMIN D DEFICIENCY: ICD-10-CM

## 2019-02-14 PROCEDURE — 2022F DILAT RTA XM EVC RTNOPTHY: CPT | Performed by: NURSE PRACTITIONER

## 2019-02-14 PROCEDURE — 3046F HEMOGLOBIN A1C LEVEL >9.0%: CPT | Performed by: NURSE PRACTITIONER

## 2019-02-14 PROCEDURE — 1036F TOBACCO NON-USER: CPT | Performed by: NURSE PRACTITIONER

## 2019-02-14 PROCEDURE — G8427 DOCREV CUR MEDS BY ELIG CLIN: HCPCS | Performed by: NURSE PRACTITIONER

## 2019-02-14 PROCEDURE — G8482 FLU IMMUNIZE ORDER/ADMIN: HCPCS | Performed by: NURSE PRACTITIONER

## 2019-02-14 PROCEDURE — 99214 OFFICE O/P EST MOD 30 MIN: CPT | Performed by: NURSE PRACTITIONER

## 2019-02-14 PROCEDURE — G8419 CALC BMI OUT NRM PARAM NOF/U: HCPCS | Performed by: NURSE PRACTITIONER

## 2019-02-14 RX ORDER — ERGOCALCIFEROL (VITAMIN D2) 1250 MCG
50000 CAPSULE ORAL WEEKLY
Qty: 12 CAPSULE | Refills: 5 | Status: SHIPPED | OUTPATIENT
Start: 2019-02-14 | End: 2020-01-20

## 2019-02-14 RX ORDER — FEEDER CONTAINER WITH PUMP SET
1 EACH MISCELLANEOUS DAILY
Qty: 30 CAN | Refills: 5 | Status: SHIPPED | OUTPATIENT
Start: 2019-02-14 | End: 2020-04-22

## 2019-02-14 ASSESSMENT — ENCOUNTER SYMPTOMS
COLOR CHANGE: 0
SHORTNESS OF BREATH: 0
EYE PAIN: 0
NAUSEA: 0
CONSTIPATION: 0
DIARRHEA: 0

## 2019-03-14 ENCOUNTER — TELEPHONE (OUTPATIENT)
Dept: ENDOCRINOLOGY | Age: 29
End: 2019-03-14

## 2019-03-14 DIAGNOSIS — E10.8 TYPE 1 DIABETES MELLITUS WITH COMPLICATION (HCC): Primary | ICD-10-CM

## 2019-03-27 DIAGNOSIS — E10.8 TYPE 1 DIABETES MELLITUS WITH COMPLICATION (HCC): Primary | ICD-10-CM

## 2019-03-28 DIAGNOSIS — F41.9 ANXIETY AND DEPRESSION: ICD-10-CM

## 2019-03-28 DIAGNOSIS — G40.909 SEIZURE DISORDER (HCC): ICD-10-CM

## 2019-03-28 DIAGNOSIS — F32.A ANXIETY AND DEPRESSION: ICD-10-CM

## 2019-03-28 RX ORDER — DIAZEPAM 10 MG/1
10 TABLET ORAL 2 TIMES DAILY
Qty: 60 TABLET | Refills: 0 | OUTPATIENT
Start: 2019-03-28 | End: 2019-05-09 | Stop reason: SDUPTHER

## 2019-04-19 ENCOUNTER — OFFICE VISIT (OUTPATIENT)
Dept: ENDOCRINOLOGY | Age: 29
End: 2019-04-19
Payer: COMMERCIAL

## 2019-04-19 VITALS
DIASTOLIC BLOOD PRESSURE: 80 MMHG | HEART RATE: 96 BPM | SYSTOLIC BLOOD PRESSURE: 111 MMHG | OXYGEN SATURATION: 100 % | WEIGHT: 133.8 LBS | HEIGHT: 59 IN | BODY MASS INDEX: 26.97 KG/M2

## 2019-04-19 DIAGNOSIS — E55.9 VITAMIN D DEFICIENCY: ICD-10-CM

## 2019-04-19 DIAGNOSIS — E10.8 TYPE 1 DIABETES MELLITUS WITH COMPLICATION (HCC): Primary | ICD-10-CM

## 2019-04-19 DIAGNOSIS — G61.9 INFLAMMATORY NEUROPATHY (HCC): ICD-10-CM

## 2019-04-19 DIAGNOSIS — E10.42 DIABETIC POLYNEUROPATHY ASSOCIATED WITH TYPE 1 DIABETES MELLITUS (HCC): ICD-10-CM

## 2019-04-19 DIAGNOSIS — Z46.81 INSULIN PUMP TITRATION: ICD-10-CM

## 2019-04-19 LAB — HBA1C MFR BLD: 10.7 %

## 2019-04-19 PROCEDURE — G8427 DOCREV CUR MEDS BY ELIG CLIN: HCPCS | Performed by: NURSE PRACTITIONER

## 2019-04-19 PROCEDURE — 2022F DILAT RTA XM EVC RTNOPTHY: CPT | Performed by: NURSE PRACTITIONER

## 2019-04-19 PROCEDURE — 3046F HEMOGLOBIN A1C LEVEL >9.0%: CPT | Performed by: NURSE PRACTITIONER

## 2019-04-19 PROCEDURE — 1036F TOBACCO NON-USER: CPT | Performed by: NURSE PRACTITIONER

## 2019-04-19 PROCEDURE — G8419 CALC BMI OUT NRM PARAM NOF/U: HCPCS | Performed by: NURSE PRACTITIONER

## 2019-04-19 PROCEDURE — 83036 HEMOGLOBIN GLYCOSYLATED A1C: CPT | Performed by: NURSE PRACTITIONER

## 2019-04-19 PROCEDURE — 99214 OFFICE O/P EST MOD 30 MIN: CPT | Performed by: NURSE PRACTITIONER

## 2019-04-19 RX ORDER — GABAPENTIN 100 MG/1
100 CAPSULE ORAL 3 TIMES DAILY
Qty: 90 CAPSULE | Refills: 3 | Status: SHIPPED | OUTPATIENT
Start: 2019-04-19 | End: 2019-09-02 | Stop reason: SDUPTHER

## 2019-04-19 ASSESSMENT — ENCOUNTER SYMPTOMS
SHORTNESS OF BREATH: 0
DIARRHEA: 0
CONSTIPATION: 0
COLOR CHANGE: 0
NAUSEA: 0
EYE PAIN: 0

## 2019-04-19 NOTE — ASSESSMENT & PLAN NOTE
Basal rates (total daily basal  units):   MN  1.00    6:00AM 1.15   6:00PM 1.20   10:00PM 1.00     Bolus Settings   Carb ratio 1:8   Correction Factor 1:30    Target 110-130 ( was 110)   Active insulin time: 3 hours

## 2019-04-19 NOTE — PROGRESS NOTES
PCP: Ernesto Gutiérrez MD      Social/Developmental History:    Marital: Fiancee   Children: 2    Family:    Housing: with rj and kids   Occupation/Income: manager at [employer]   Education: HS diploma              Jain:    Legal hx: Denies   Abuse hx:   Violence hx:   Access to firearms: No    Family History:    Medical:  No family history on file. Psychiatric: Denies   History of completed suicide: Denies    Allergies: No Known Allergies      Current Medications:   Current Outpatient Medications   Medication Sig Dispense Refill    blood glucose test strips (CONTOUR NEXT TEST) strip 1 each by In Vitro route 5 times daily As needed. 200 each 3    gabapentin (NEURONTIN) 100 MG capsule Take 1 capsule by mouth 3 times daily for 30 days. 90 capsule 3    diazepam (VALIUM) 10 MG tablet Take 1 tablet by mouth 2 times daily for 30 days. 60 tablet 0    insulin aspart (NOVOLOG) 100 UNIT/ML injection vial Inject 70 Units into the skin daily 65-70 units daily via insulin pump. 2 vial 3    insulin aspart (NOVOLOG FLEXPEN) 100 UNIT/ML injection pen Inject 12 Units into the skin 3 times daily (before meals) Or per sliding scale 5 pen 5    insulin glargine (LANTUS SOLOSTAR) 100 UNIT/ML injection pen Inject 35 Units into the skin nightly 5 pen 5    blood glucose test strips (FREESTYLE LITE) strip 5 times daily.  150 strip 6    Nutritional Supplements (ENSURE HIGH PROTEIN) LIQD Take 1 Can by mouth daily 30 Can 5    phenytoin (DILANTIN) 100 MG ER capsule Take 2 capsules by mouth 2 times daily 120 capsule 5    sulfamethoxazole-trimethoprim (BACTRIM DS) 800-160 MG per tablet Take 1 tablet by mouth 2 times daily 20 tablet 0    sertraline (ZOLOFT) 100 MG tablet Take 2 tablets by mouth daily 60 tablet 1    Continuous Blood Gluc Sensor (FREESTYLE NANCY 14 DAY SENSOR) MISC 1 Units by Does not apply route as needed (use one sensor for 14 days) 2 each 5    ergocalciferol (DRISDOL) 44501 units capsule Take 1 capsule by mouth once a week 12 capsule 5     No current facility-administered medications for this visit. OBJECTIVE:  Vitals:   Vitals:    04/22/19 0849   BP: 122/68   Pulse: 112     Wt Readings from Last 3 Encounters:   04/22/19 133 lb (60.3 kg)   04/19/19 133 lb 12.8 oz (60.7 kg)   02/14/19 125 lb 9.6 oz (57 kg)     Body mass index is 26.86 kg/m². Waist Circumference: There were no vitals filed for this visit. ROS: Denies trouble with fever, rash, headache, vision changes, chest pain, shortness of breath, nausea, extremity pain, weakness, dysuria.      Mental Status Exam:     Appearance    alert, cooperative  Muscle strength/tone: no atrophy or abnormal movements, anti-gravity  Gait/station: normal, anti-gravity  Speech    spontaneous, normal rate, normal volume and well articulated  Mood    Anxious  Affect    euthymicCongruent to thought content and mood  Thought Content    intact, no delusions voiced  Thought Process    linear, goal directed and coherent   Associations    logical connections  Perceptions: denies AH/VH, does not appear preoccupied with the internal environment  Insight    Fair  Judgment    Intact  Orientation    oriented to person, place, time, and general circumstances  Memory    recent and remote memory intact  Attention/Concentration    intact  Ability to understand instructions Yes  Ability to respond meaningfully Yes  SI:   no suicidal ideation  HI: Denies HI    Labs:     Lab Results   Component Value Date     01/29/2019    K 3.8 01/29/2019    K 3.9 02/15/2018    CL 98 01/29/2019    CO2 26 01/29/2019    BUN 5 01/29/2019    CREATININE <0.5 01/29/2019    GLUCOSE 56 01/29/2019    CALCIUM 9.7 01/29/2019      Lab Results   Component Value Date    WBC 7.0 10/22/2018    HGB 12.7 10/22/2018    HCT 39.1 10/22/2018    MCV 85.9 10/22/2018     10/22/2018     Lab Results   Component Value Date    COLORU YELLOW 10/22/2018    NITRU Negative 10/22/2018    GLUCOSEU >=1000 10/22/2018    GLUCOSEU >=1000 01/17/2011    KETUA 15 10/22/2018    UROBILINOGEN 0.2 10/22/2018    BILIRUBINUR Negative 10/22/2018    BILIRUBINUR NEGATIVE 01/17/2011     Lab Results   Component Value Date    LABA1C 10.7 04/19/2019     Lab Results   Component Value Date    .0 01/29/2019     Lab Results   Component Value Date    CHOL 221 (H) 10/22/2018    CHOL 138 02/14/2018    CHOL 132 01/18/2011     Lab Results   Component Value Date    TRIG 170 (H) 10/22/2018    TRIG 266 (H) 02/14/2018    TRIG 73 01/18/2011     Lab Results   Component Value Date    HDL 38 (L) 10/22/2018    HDL 19 (L) 02/14/2018    HDL 50 01/18/2011     Lab Results   Component Value Date    LDLCALC 149 (H) 10/22/2018    LDLCALC 66 02/14/2018    LDLCALC 68 01/18/2011     Lab Results   Component Value Date    LABVLDL 34 10/22/2018    LABVLDL 53 02/14/2018    LABVLDL 15 01/18/2011     No results found for: CHOLHDLRATIO  Lab Results   Component Value Date    TSH 0.77 01/18/2011     No results found for: PBYLYHM2C4  No results found for: [id number]  No results found for: FOLATE        Imaging: [facility] 1/17/11 NAICP. [place] I  MDD recurrent moderate, ROME, panic attacks, anorexia nervosa, grief    Axis II: No diagnosis    Axis III       Diagnosis Date    Diabetes mellitus ([facility] [address]     Seizures ([facility] [address]       Active Problems:    * No active hospital problems. *  Resolved Problems:    * No resolved hospital problems. *       Axis IV  Problems with primary support group and Problems related to the social environment    ASSESSMENT AND PLAN      1. Safety: NO Imminent risk of danger to/self/others based on the factors considered below. Appropriate for outpatient level of care. Safety plan includes: 911, PES, hotlines, and interventions discussed today.    Risk factors: mood disorder, chronic medical illness  Protective factors: Age >24 and <55, female gender, denies suicidal ideation, does not have lethal plan, does not have access to guns or weapons, patient is leonel for safety, no prior suicide attempts, no family h/o suicide, no substance abuse, patient has social or family support, no active psychosis or cognitive dysfunction, already has outpatient services in place, compliant with recommended medications, and patient is future oriented. 2. Psychiatric  -Encouraged caffeine cessation  -Referral to neurology for seizures. Has  neurologist but wants [facility]  -Continue Zoloft 200mg qAM   -On Valium from neurology/PCP for seizures. She knows I will not refill this.   -Avoid SGA given DM I. Now on insulin pump. She is lowering her a1c, may consider SGA if it gets to normal range.   -Avoid Wellbutrin given anorexia   -Labs: reviewed in Epic, up to date  -Continue therapy with Dr. Diamond Montenegro reviewed, c/w history   -R/b/se/a d/w pt who consents. 3. Medical   -Following with Kain Avila MD    4. Substance   -See above    5. RTC - 2 months    Suzanne Hewitt M.D.   Psychiatrist

## 2019-04-19 NOTE — PROGRESS NOTES
Endocrinology  [place], [place]  Phone: [phone]   FAX: [phone]    Vladimir Pineda is a 26 y.o. female who is following up for management of Diabetes Mellitus Type1. Last A1C:   Lab Results   Component Value Date    LABA1C 10.7 04/19/2019    LABA1C 11.8 01/29/2019    LABA1C 14.0 10/22/2018     Last BP Readings:   BP Readings from Last 3 Encounters:   04/19/19 111/80   02/14/19 104/72   01/29/19 90/60     Last LDL:   Lab Results   Component Value Date    LDLCALC 149 (H) 10/22/2018     Aspirin Use: No    Tobacco History:   Tobacco Use    Smoking status: Never Smoker    Smokeless tobacco: Never Used    Alcohol use: No     Diabetes:  Diabetes type: Type 1  Diagnosed: age 2  Previous endocrinologist/s: Epi, last follow up almost 6 years ago  Previous oral treatments: none  Previous injectable treatments: ongoing with Lantus and humalog; was on a P  Insulin pump started at age: ~ 14, and then at 31 patient not sure ( when in [place]). - Current treatment: Injectable long and short acting. Insulin injection sites: Abdomen  Blood sugar meter: one touch. Blood sugar monitoring: irregular  Hypoglycemia: Occasional - can occur at various times of day , lowest 41 per new log  Hyperglygemia: 381 per log. Does not have glucagon at home -  has been trained to use it in past.  Other symptoms: none  Complications: peripheral neuropathy  Comorbid Conditons: Depression, h/o of \"diabulemia\"  Recently having increased frequency of seizures: reports increased stress, father passed away 2 months ago  Managed by PCP while she waits to get in to see the neurologist    Schedule: Works at : [employer]--> not working as high risk of seizures for 4 months--> now back at working at same place.    Diet: high in carbs, eats a lot of candy \"emotional eating\", does not count carbs well ~ 40-60 per  meal. --> has cut down on deserts etc   Alcohol: Negligible   Exercise: None; occasional walks, reports drop in 40 s at 1-2 occasions       Diabetic Health Maintenance  Is patient on ACE or ARB: none  Last Eye: No recent eye exam since 2016--> has not scheduled  Last Foot: 10/28/2017   Diabetes education: yes    There have been no recent hospital or ED admissions for hypoglycemia, hyperglycemia or DKA    Lab Results   Component Value Date    .0 01/29/2019    .1 10/22/2018    .4 02/13/2018     Current Medication regimen:   Pump model: minimed 12 G started last month and BG has been sig improved  Sensor: dexcom  Pump settings:   Basal rates (total daily basal  units):   MN to MN 1.20     Bolus Settings   Carb ratio 1:8   Correction Factor 1:25   Target 110-110   Active insulin time: 3 hours      Manual bolusing: states when not in auto mode  Over riding pump: no  TDD (average)  Basal: bolus  Sensor: yes. Was on regular insulin before as no insurance. lantus 30 units QHS  novolog  10 units AC TID    Complications:  · Neuropathy tingling and hypersensitive  · Retinopathy no concerns with vision, field of vision  · Nephropathy no recent MACR    Diabetic Health Maintenance   · Last Eye Exam:   · Last Foot exam:   · Has patient seen a dietitian? Yes  · Current Exercise: No structured exercise  · On ACEI or ARB: no  · On statin: no    Hyperlipidemia: Currently is on no medications/statins  Lab Results   Component Value Date    CHOL 221 10/22/2018    CHOL 138 02/14/2018    CHOL 132 01/18/2011     Lab Results   Component Value Date    TRIG 170 10/22/2018    TRIG 266 02/14/2018    TRIG 73 01/18/2011     Lab Results   Component Value Date    HDL 38 10/22/2018    HDL 19 02/14/2018    HDL 50 01/18/2011     Lab Results   Component Value Date    LDLCALC 149 10/22/2018    LDLCALC 66 02/14/2018    LDLCALC 68 01/18/2011     No results found for: LDLDIRECT  No results found for: CHOLHDLRATIO    Vitamin D deficiency: Currently is on no supplementation. Current complaints includefatigue on daily basis.  Last vitamin D level is:  Lab Results   Component Value Date    VITD25 10.9 11/09/2018       Hypertension  Currently on no antihypertensive. Controlled , denies symptoms of dizziness,light headedness. Occasional dependent edema. Tries to Dose not follow a salt restricted diet. Lab Results   Component Value Date     01/29/2019    K 3.8 01/29/2019    CL 98 (L) 01/29/2019    CO2 26 01/29/2019    BUN 5 (L) 01/29/2019    CREATININE <0.5 (L) 01/29/2019    GLUCOSE 56 (L) 01/29/2019    CALCIUM 9.7 01/29/2019    PROT 7.6 01/29/2019    LABALBU 4.3 01/29/2019    BILITOT <0.2 01/29/2019    ALKPHOS 147 (H) 01/29/2019    AST 50 (H) 01/29/2019    ALT 41 (H) 01/29/2019    LABGLOM >60 01/29/2019    GFRAA >60 01/29/2019    AGRATIO 1.3 01/29/2019    GLOB 3.3 01/29/2019       The ASCVD Risk score (Xavier Adhikari., et al., 2013) failed to calculate for the following reasons: The 2013 ASCVD risk score is only valid for ages 36 to 78    Past Medical History:   Diagnosis Date    Diabetes mellitus ([facility] [address]     Seizures ([facility] [address]      History reviewed. No pertinent family history. Review of Systems   Constitutional: Negative for activity change, appetite change, diaphoresis, fever and unexpected weight change. HENT: Negative for dental problem. Eyes: Negative for pain and visual disturbance. Respiratory: Negative for shortness of breath. Cardiovascular: Negative for chest pain, palpitations and leg swelling. Gastrointestinal: Negative for constipation, diarrhea and nausea. Endocrine: Negative for cold intolerance, heat intolerance, polydipsia, polyphagia and polyuria. Genitourinary: Negative for frequency and urgency. Musculoskeletal: Negative for arthralgias, joint swelling and myalgias. Skin: Negative for color change and pallor. Neurological: Negative for weakness, numbness and headaches. Psychiatric/Behavioral: Negative for dysphoric mood and sleep disturbance. The patient is not nervous/anxious.       Vitals: 04/19/19 1134   BP: 111/80   Site: Right Upper Arm   Position: Sitting   Cuff Size: Medium Adult   Pulse: 96   SpO2: 100%   Weight: 133 lb 12.8 oz (60.7 kg)   Height: 4' 11\" (1.499 m)     Physical Exam   Constitutional: She is oriented to person, place, and time. She appears well-developed and well-nourished. Eyes: Pupils are equal, round, and reactive to light. Neck: Normal range of motion. No thyromegaly present. Cardiovascular: Normal rate, regular rhythm and normal heart sounds. Pulmonary/Chest: Effort normal and breath sounds normal.   Abdominal: She exhibits no distension. Musculoskeletal: Normal range of motion. She exhibits no edema. Neurological: She is alert and oriented to person, place, and time. No sensory deficit. Skin: Skin is warm and dry. No skin changes or evidence of trauma. Psychiatric: She has a normal mood and affect. Her behavior is normal. Thought content normal.   Vitals reviewed. Skeletal foot exam: No skin lesions are noted. Skin is normal. Signs of onychomycosis are not noted on theskin. Calluses are not noted. Ingrown toenails are not noted. Toenails are normal. Pulses are +2 DP and +1 PT bilaterally. Capillary refill is <3 seconds. Skeletal structures are intact upon visual examination. No deformityis noted. Sensory: 10 g monofilament is 7/10 on the right and 7/10 on the left, 128 Hz vibration sense is decreased bilaterally. Susy Donohue is a 33year old female with highly uncontrolled Diabetes Mellitus type 1 complicated by Peripheral neuropathy and hypoglycemia associated seizures and associated with depression.  Will return with sensor pro tomorrow to evaluate recurrent hypoglycemia     Plan  Problem List Items Addressed This Visit     Type 1 diabetes mellitus with complication ([facility] [address] - Primary    Relevant Medications    blood glucose test strips (CONTOUR NEXT TEST) strip    gabapentin (NEURONTIN) 100 MG capsule    Other Relevant Orders    POCT glycosylated hemoglobin (Hb A1C) (Completed)    External Referral To Ophthalmology    Vitamin D deficiency    Relevant Orders    Vitamin D 25 Hydroxy    Diabetic polyneuropathy associated with type 1 diabetes mellitus (HCC)     Basal rates (total daily basal  units):   MN  1.00    6:00AM 1.15   6:00PM 1.20   10:00PM 1.00     Bolus Settings   Carb ratio 1:8   Correction Factor 1:30    Target 110-130 ( was 110)   Active insulin time: 3 hours          Relevant Medications    gabapentin (NEURONTIN) 100 MG capsule    Inflammatory neuropathy (HCC)    Relevant Medications    gabapentin (NEURONTIN) 100 MG capsule    Insulin pump titration      Greater than 30 minutes spent directly counseling patient about topics listed above (such as lifestyle modifications, preventative screenings and/or disease related processes). Return in about 1 month (around 5/19/2019).

## 2019-04-22 ENCOUNTER — OFFICE VISIT (OUTPATIENT)
Dept: PSYCHIATRY | Age: 29
End: 2019-04-22
Payer: COMMERCIAL

## 2019-04-22 VITALS
WEIGHT: 133 LBS | SYSTOLIC BLOOD PRESSURE: 122 MMHG | DIASTOLIC BLOOD PRESSURE: 68 MMHG | BODY MASS INDEX: 26.81 KG/M2 | HEART RATE: 112 BPM | HEIGHT: 59 IN

## 2019-04-22 DIAGNOSIS — G40.909 SEIZURE DISORDER (HCC): ICD-10-CM

## 2019-04-22 DIAGNOSIS — F41.1 GAD (GENERALIZED ANXIETY DISORDER): ICD-10-CM

## 2019-04-22 DIAGNOSIS — F40.01 PANIC DISORDER WITH AGORAPHOBIA: ICD-10-CM

## 2019-04-22 DIAGNOSIS — F33.42 RECURRENT MAJOR DEPRESSIVE DISORDER, IN FULL REMISSION (HCC): Primary | ICD-10-CM

## 2019-04-22 DIAGNOSIS — F50.00 ANOREXIA NERVOSA: ICD-10-CM

## 2019-04-22 PROCEDURE — 99214 OFFICE O/P EST MOD 30 MIN: CPT | Performed by: PSYCHIATRY & NEUROLOGY

## 2019-04-22 RX ORDER — SERTRALINE HYDROCHLORIDE 100 MG/1
200 TABLET, FILM COATED ORAL DAILY
Qty: 60 TABLET | Refills: 1 | Status: SHIPPED | OUTPATIENT
Start: 2019-04-22 | End: 2019-07-08 | Stop reason: SDUPTHER

## 2019-05-09 ENCOUNTER — TELEPHONE (OUTPATIENT)
Dept: INTERNAL MEDICINE CLINIC | Age: 29
End: 2019-05-09

## 2019-05-09 DIAGNOSIS — F41.9 ANXIETY AND DEPRESSION: ICD-10-CM

## 2019-05-09 DIAGNOSIS — F32.A ANXIETY AND DEPRESSION: ICD-10-CM

## 2019-05-09 DIAGNOSIS — G40.909 SEIZURE DISORDER (HCC): ICD-10-CM

## 2019-05-09 RX ORDER — DIAZEPAM 10 MG/1
10 TABLET ORAL 2 TIMES DAILY
Qty: 60 TABLET | Refills: 1 | OUTPATIENT
Start: 2019-05-09 | End: 2019-06-08

## 2019-05-09 NOTE — TELEPHONE ENCOUNTER
Valium refill okayed ×1. Did she have her see the neurologist for her seizures? She needs an appointment with me.

## 2019-06-11 DIAGNOSIS — F32.A ANXIETY AND DEPRESSION: ICD-10-CM

## 2019-06-11 DIAGNOSIS — F41.9 ANXIETY AND DEPRESSION: ICD-10-CM

## 2019-06-11 DIAGNOSIS — G40.909 SEIZURE DISORDER (HCC): ICD-10-CM

## 2019-06-11 RX ORDER — DIAZEPAM 10 MG/1
10 TABLET ORAL 2 TIMES DAILY
Qty: 20 TABLET | Refills: 0 | OUTPATIENT
Start: 2019-06-11 | End: 2019-06-19 | Stop reason: SDUPTHER

## 2019-06-11 NOTE — TELEPHONE ENCOUNTER
diazepam (VALIUM) 10 MG tablet- pt requesting a refill      [address], [address] - [place] [phone]      pls advise

## 2019-06-11 NOTE — TELEPHONE ENCOUNTER
Okay Valium for 10 days. If she does not follow-up and she misses 1 more appointment I will dismiss her.

## 2019-06-19 ENCOUNTER — OFFICE VISIT (OUTPATIENT)
Dept: INTERNAL MEDICINE CLINIC | Age: 29
End: 2019-06-19
Payer: COMMERCIAL

## 2019-06-19 VITALS
BODY MASS INDEX: 25.2 KG/M2 | HEIGHT: 59 IN | WEIGHT: 125 LBS | OXYGEN SATURATION: 96 % | SYSTOLIC BLOOD PRESSURE: 110 MMHG | HEART RATE: 100 BPM | DIASTOLIC BLOOD PRESSURE: 78 MMHG

## 2019-06-19 DIAGNOSIS — F32.A ANXIETY AND DEPRESSION: ICD-10-CM

## 2019-06-19 DIAGNOSIS — G40.909 SEIZURE DISORDER (HCC): ICD-10-CM

## 2019-06-19 DIAGNOSIS — F41.9 ANXIETY AND DEPRESSION: ICD-10-CM

## 2019-06-19 DIAGNOSIS — E10.8 TYPE 1 DIABETES MELLITUS WITH COMPLICATION (HCC): ICD-10-CM

## 2019-06-19 PROBLEM — S01.512A LACERATION OF TONGUE: Status: RESOLVED | Noted: 2019-01-29 | Resolved: 2019-06-19

## 2019-06-19 PROBLEM — L03.011 PARONYCHIA OF RIGHT MIDDLE FINGER: Status: RESOLVED | Noted: 2019-01-29 | Resolved: 2019-06-19

## 2019-06-19 PROCEDURE — G8419 CALC BMI OUT NRM PARAM NOF/U: HCPCS | Performed by: INTERNAL MEDICINE

## 2019-06-19 PROCEDURE — 99213 OFFICE O/P EST LOW 20 MIN: CPT | Performed by: INTERNAL MEDICINE

## 2019-06-19 PROCEDURE — 2022F DILAT RTA XM EVC RTNOPTHY: CPT | Performed by: INTERNAL MEDICINE

## 2019-06-19 PROCEDURE — G8427 DOCREV CUR MEDS BY ELIG CLIN: HCPCS | Performed by: INTERNAL MEDICINE

## 2019-06-19 PROCEDURE — 1036F TOBACCO NON-USER: CPT | Performed by: INTERNAL MEDICINE

## 2019-06-19 PROCEDURE — 3046F HEMOGLOBIN A1C LEVEL >9.0%: CPT | Performed by: INTERNAL MEDICINE

## 2019-06-19 RX ORDER — DIAZEPAM 10 MG/1
10 TABLET ORAL 2 TIMES DAILY
Qty: 60 TABLET | Refills: 0 | Status: SHIPPED | OUTPATIENT
Start: 2019-06-19 | End: 2019-09-11 | Stop reason: SDUPTHER

## 2019-06-19 ASSESSMENT — PATIENT HEALTH QUESTIONNAIRE - PHQ9
SUM OF ALL RESPONSES TO PHQ9 QUESTIONS 1 & 2: 0
SUM OF ALL RESPONSES TO PHQ QUESTIONS 1-9: 0
SUM OF ALL RESPONSES TO PHQ QUESTIONS 1-9: 0
1. LITTLE INTEREST OR PLEASURE IN DOING THINGS: 0
1. LITTLE INTEREST OR PLEASURE IN DOING THINGS: 0
SUM OF ALL RESPONSES TO PHQ QUESTIONS 1-9: 0
2. FEELING DOWN, DEPRESSED OR HOPELESS: 0
SUM OF ALL RESPONSES TO PHQ QUESTIONS 1-9: 0
2. FEELING DOWN, DEPRESSED OR HOPELESS: 0
SUM OF ALL RESPONSES TO PHQ9 QUESTIONS 1 & 2: 0

## 2019-06-19 ASSESSMENT — ENCOUNTER SYMPTOMS: RESPIRATORY NEGATIVE: 1

## 2019-06-19 NOTE — ASSESSMENT & PLAN NOTE
No seizures on higher dose Dilantin and Valium. Been seizure-free for several months now. She has been unable to find a neurologist with her insurance. We will have to try to assist.  Monitor report done for her Valium Rx.

## 2019-06-19 NOTE — PROGRESS NOTES
and Valium. Been seizure-free for several months now. She has been unable to find a neurologist with her insurance. We will have to try to assist.  Monitor report done for her Valium Rx. PLAN:See ASSESSMENT for evaluation & PLAN     No orders of the defined types were placed in this encounter. PSH, PMH, SH and FH reviewed and noted. Recent and past labs, tests and consultsalso reviewed. Recent or new meds also reviewed.

## 2019-07-05 ENCOUNTER — TELEPHONE (OUTPATIENT)
Dept: FAMILY MEDICINE CLINIC | Age: 29
End: 2019-07-05

## 2019-07-05 NOTE — TELEPHONE ENCOUNTER
[address] called and stated they need the following prescription filled:     sertraline (ZOLOFT) 100 MG tablet     [facility] [address] [place] [address], [facility] Ursula Lawrence [phone]

## 2019-07-08 RX ORDER — SERTRALINE HYDROCHLORIDE 100 MG/1
200 TABLET, FILM COATED ORAL DAILY
Qty: 60 TABLET | Refills: 1 | Status: SHIPPED | OUTPATIENT
Start: 2019-07-08 | End: 2019-09-10 | Stop reason: SDUPTHER

## 2019-07-21 ENCOUNTER — PATIENT MESSAGE (OUTPATIENT)
Dept: INTERNAL MEDICINE CLINIC | Age: 29
End: 2019-07-21

## 2019-07-22 ENCOUNTER — TELEPHONE (OUTPATIENT)
Dept: INTERNAL MEDICINE CLINIC | Age: 29
End: 2019-07-22

## 2019-07-22 RX ORDER — CIPROFLOXACIN 500 MG/1
500 TABLET, FILM COATED ORAL 2 TIMES DAILY
Qty: 14 TABLET | Refills: 0 | Status: SHIPPED | OUTPATIENT
Start: 2019-07-22 | End: 2019-07-29

## 2019-09-10 ENCOUNTER — TELEPHONE (OUTPATIENT)
Dept: ENDOCRINOLOGY | Age: 29
End: 2019-09-10

## 2019-09-10 ENCOUNTER — TELEPHONE (OUTPATIENT)
Dept: INTERNAL MEDICINE CLINIC | Age: 29
End: 2019-09-10

## 2019-09-10 ENCOUNTER — PATIENT MESSAGE (OUTPATIENT)
Dept: INTERNAL MEDICINE CLINIC | Age: 29
End: 2019-09-10

## 2019-09-10 DIAGNOSIS — G40.909 SEIZURE DISORDER (HCC): Primary | ICD-10-CM

## 2019-09-10 DIAGNOSIS — E10.8 TYPE 1 DIABETES MELLITUS WITH COMPLICATION (HCC): ICD-10-CM

## 2019-09-10 NOTE — TELEPHONE ENCOUNTER
To  [facility] Practice Support Sent  9/10/2019  2:05 PM   Hi, I actually tried calling the first referral Elaine Anna. They do not take my health insurance. .. and the second one I called the number you put in the last message and the said they need a referral from you. And they cant get me in until late November.  I'm running out of my dilatin and I'm out of the valium. .. And I'm starting to have seizures or feeling like I'm going to have one more often now.

## 2019-09-11 DIAGNOSIS — F32.A ANXIETY AND DEPRESSION: ICD-10-CM

## 2019-09-11 DIAGNOSIS — F41.9 ANXIETY AND DEPRESSION: ICD-10-CM

## 2019-09-11 DIAGNOSIS — G40.909 SEIZURE DISORDER (HCC): ICD-10-CM

## 2019-09-11 RX ORDER — DIAZEPAM 10 MG/1
10 TABLET ORAL 2 TIMES DAILY
Qty: 60 TABLET | Refills: 0 | OUTPATIENT
Start: 2019-09-11 | End: 2019-10-17 | Stop reason: SDUPTHER

## 2019-09-13 ENCOUNTER — OFFICE VISIT (OUTPATIENT)
Dept: ENDOCRINOLOGY | Age: 29
End: 2019-09-13
Payer: COMMERCIAL

## 2019-09-13 VITALS
WEIGHT: 118.4 LBS | SYSTOLIC BLOOD PRESSURE: 106 MMHG | HEART RATE: 114 BPM | BODY MASS INDEX: 23.87 KG/M2 | DIASTOLIC BLOOD PRESSURE: 71 MMHG | OXYGEN SATURATION: 99 % | HEIGHT: 59 IN

## 2019-09-13 DIAGNOSIS — E10.8 TYPE 1 DIABETES MELLITUS WITH COMPLICATION (HCC): Primary | ICD-10-CM

## 2019-09-13 DIAGNOSIS — G61.9 INFLAMMATORY NEUROPATHY (HCC): ICD-10-CM

## 2019-09-13 DIAGNOSIS — E10.42 DIABETIC POLYNEUROPATHY ASSOCIATED WITH TYPE 1 DIABETES MELLITUS (HCC): ICD-10-CM

## 2019-09-13 DIAGNOSIS — E55.9 VITAMIN D DEFICIENCY: ICD-10-CM

## 2019-09-13 DIAGNOSIS — Z46.81 INSULIN PUMP TITRATION: ICD-10-CM

## 2019-09-13 LAB — HBA1C MFR BLD: 9.5 %

## 2019-09-13 PROCEDURE — 2022F DILAT RTA XM EVC RTNOPTHY: CPT | Performed by: NURSE PRACTITIONER

## 2019-09-13 PROCEDURE — 99214 OFFICE O/P EST MOD 30 MIN: CPT | Performed by: NURSE PRACTITIONER

## 2019-09-13 PROCEDURE — 3046F HEMOGLOBIN A1C LEVEL >9.0%: CPT | Performed by: NURSE PRACTITIONER

## 2019-09-13 PROCEDURE — G8420 CALC BMI NORM PARAMETERS: HCPCS | Performed by: NURSE PRACTITIONER

## 2019-09-13 PROCEDURE — 83036 HEMOGLOBIN GLYCOSYLATED A1C: CPT | Performed by: NURSE PRACTITIONER

## 2019-09-13 PROCEDURE — 1036F TOBACCO NON-USER: CPT | Performed by: NURSE PRACTITIONER

## 2019-09-13 PROCEDURE — G8427 DOCREV CUR MEDS BY ELIG CLIN: HCPCS | Performed by: NURSE PRACTITIONER

## 2019-09-13 RX ORDER — GABAPENTIN 100 MG/1
100 CAPSULE ORAL 4 TIMES DAILY
Qty: 120 CAPSULE | Refills: 5 | Status: SHIPPED | OUTPATIENT
Start: 2019-09-13 | End: 2020-01-15 | Stop reason: SDUPTHER

## 2019-09-13 ASSESSMENT — ENCOUNTER SYMPTOMS
EYE PAIN: 0
DIARRHEA: 0
NAUSEA: 0
CONSTIPATION: 0
SHORTNESS OF BREATH: 0
COLOR CHANGE: 0

## 2019-09-18 DIAGNOSIS — E10.8 TYPE 1 DIABETES MELLITUS WITH COMPLICATION (HCC): ICD-10-CM

## 2019-10-08 ENCOUNTER — CLINICAL DOCUMENTATION (OUTPATIENT)
Dept: PSYCHIATRY | Age: 29
End: 2019-10-08

## 2019-10-15 ENCOUNTER — TELEPHONE (OUTPATIENT)
Dept: ENDOCRINOLOGY | Age: 29
End: 2019-10-15

## 2019-10-17 ENCOUNTER — TELEPHONE (OUTPATIENT)
Dept: INTERNAL MEDICINE CLINIC | Age: 29
End: 2019-10-17

## 2019-10-17 DIAGNOSIS — F41.9 ANXIETY AND DEPRESSION: ICD-10-CM

## 2019-10-17 DIAGNOSIS — G40.909 SEIZURE DISORDER (HCC): ICD-10-CM

## 2019-10-17 DIAGNOSIS — F32.A ANXIETY AND DEPRESSION: ICD-10-CM

## 2019-10-17 RX ORDER — DIAZEPAM 10 MG/1
10 TABLET ORAL 2 TIMES DAILY
Qty: 60 TABLET | Refills: 1 | OUTPATIENT
Start: 2019-10-17 | End: 2019-11-16

## 2019-10-24 DIAGNOSIS — E10.8 TYPE 1 DIABETES MELLITUS WITH COMPLICATION (HCC): ICD-10-CM

## 2019-10-24 RX ORDER — BLOOD SUGAR DIAGNOSTIC
STRIP MISCELLANEOUS
Qty: 100 EACH | Refills: 7 | Status: SHIPPED | OUTPATIENT
Start: 2019-10-24 | End: 2020-06-02

## 2019-10-29 ENCOUNTER — TELEPHONE (OUTPATIENT)
Dept: ENDOCRINOLOGY | Age: 29
End: 2019-10-29

## 2019-11-08 ENCOUNTER — TELEPHONE (OUTPATIENT)
Dept: INTERNAL MEDICINE CLINIC | Age: 29
End: 2019-11-08

## 2019-11-13 ENCOUNTER — OFFICE VISIT (OUTPATIENT)
Dept: NEUROLOGY | Age: 29
End: 2019-11-13
Payer: COMMERCIAL

## 2019-11-13 ENCOUNTER — TELEPHONE (OUTPATIENT)
Dept: INTERNAL MEDICINE CLINIC | Age: 29
End: 2019-11-13

## 2019-11-13 VITALS
HEIGHT: 59 IN | WEIGHT: 118 LBS | DIASTOLIC BLOOD PRESSURE: 71 MMHG | HEART RATE: 128 BPM | BODY MASS INDEX: 23.79 KG/M2 | SYSTOLIC BLOOD PRESSURE: 103 MMHG

## 2019-11-13 DIAGNOSIS — F40.01 PANIC DISORDER WITH AGORAPHOBIA: ICD-10-CM

## 2019-11-13 DIAGNOSIS — G40.919 INTRACTABLE SEIZURE DISORDER (HCC): Primary | ICD-10-CM

## 2019-11-13 DIAGNOSIS — G40.909 SEIZURE DISORDER (HCC): Primary | ICD-10-CM

## 2019-11-13 DIAGNOSIS — E11.42 DIABETIC PERIPHERAL NEUROPATHY (HCC): ICD-10-CM

## 2019-11-13 PROCEDURE — 99245 OFF/OP CONSLTJ NEW/EST HI 55: CPT | Performed by: PSYCHIATRY & NEUROLOGY

## 2019-11-13 PROCEDURE — 2022F DILAT RTA XM EVC RTNOPTHY: CPT | Performed by: PSYCHIATRY & NEUROLOGY

## 2019-11-13 PROCEDURE — G8484 FLU IMMUNIZE NO ADMIN: HCPCS | Performed by: PSYCHIATRY & NEUROLOGY

## 2019-11-13 PROCEDURE — G8420 CALC BMI NORM PARAMETERS: HCPCS | Performed by: PSYCHIATRY & NEUROLOGY

## 2019-11-13 PROCEDURE — G8427 DOCREV CUR MEDS BY ELIG CLIN: HCPCS | Performed by: PSYCHIATRY & NEUROLOGY

## 2019-11-13 RX ORDER — DIVALPROEX SODIUM 500 MG/1
500 TABLET, EXTENDED RELEASE ORAL DAILY
Qty: 30 TABLET | Refills: 0 | Status: SHIPPED | OUTPATIENT
Start: 2019-11-13 | End: 2019-11-22

## 2019-11-14 ENCOUNTER — TELEPHONE (OUTPATIENT)
Dept: INTERNAL MEDICINE CLINIC | Age: 29
End: 2019-11-14

## 2019-11-22 ENCOUNTER — OFFICE VISIT (OUTPATIENT)
Dept: INTERNAL MEDICINE CLINIC | Age: 29
End: 2019-11-22
Payer: COMMERCIAL

## 2019-11-22 VITALS
HEART RATE: 107 BPM | DIASTOLIC BLOOD PRESSURE: 60 MMHG | HEIGHT: 59 IN | SYSTOLIC BLOOD PRESSURE: 90 MMHG | OXYGEN SATURATION: 97 % | BODY MASS INDEX: 23.59 KG/M2 | WEIGHT: 117 LBS

## 2019-11-22 DIAGNOSIS — G40.909 SEIZURE DISORDER (HCC): Primary | ICD-10-CM

## 2019-11-22 DIAGNOSIS — E10.8 TYPE 1 DIABETES MELLITUS WITH COMPLICATION (HCC): ICD-10-CM

## 2019-11-22 DIAGNOSIS — E11.42 DIABETIC PERIPHERAL NEUROPATHY (HCC): ICD-10-CM

## 2019-11-22 DIAGNOSIS — F41.1 GAD (GENERALIZED ANXIETY DISORDER): ICD-10-CM

## 2019-11-22 PROCEDURE — 99215 OFFICE O/P EST HI 40 MIN: CPT | Performed by: INTERNAL MEDICINE

## 2019-11-22 PROCEDURE — G8427 DOCREV CUR MEDS BY ELIG CLIN: HCPCS | Performed by: INTERNAL MEDICINE

## 2019-11-22 PROCEDURE — 3046F HEMOGLOBIN A1C LEVEL >9.0%: CPT | Performed by: INTERNAL MEDICINE

## 2019-11-22 PROCEDURE — G8484 FLU IMMUNIZE NO ADMIN: HCPCS | Performed by: INTERNAL MEDICINE

## 2019-11-22 PROCEDURE — 1036F TOBACCO NON-USER: CPT | Performed by: INTERNAL MEDICINE

## 2019-11-22 PROCEDURE — 2022F DILAT RTA XM EVC RTNOPTHY: CPT | Performed by: INTERNAL MEDICINE

## 2019-11-22 PROCEDURE — G8420 CALC BMI NORM PARAMETERS: HCPCS | Performed by: INTERNAL MEDICINE

## 2019-11-22 RX ORDER — CLONAZEPAM 2 MG/1
2 TABLET ORAL 2 TIMES DAILY
Qty: 60 TABLET | Refills: 2 | Status: SHIPPED | OUTPATIENT
Start: 2019-11-22 | End: 2020-02-17 | Stop reason: SDUPTHER

## 2019-11-22 ASSESSMENT — PATIENT HEALTH QUESTIONNAIRE - PHQ9
1. LITTLE INTEREST OR PLEASURE IN DOING THINGS: 0
SUM OF ALL RESPONSES TO PHQ QUESTIONS 1-9: 0
SUM OF ALL RESPONSES TO PHQ9 QUESTIONS 1 & 2: 0
SUM OF ALL RESPONSES TO PHQ QUESTIONS 1-9: 0
2. FEELING DOWN, DEPRESSED OR HOPELESS: 0

## 2019-11-22 ASSESSMENT — ENCOUNTER SYMPTOMS
SHORTNESS OF BREATH: 0
TROUBLE SWALLOWING: 0

## 2020-01-15 ENCOUNTER — TELEPHONE (OUTPATIENT)
Dept: INTERNAL MEDICINE CLINIC | Age: 30
End: 2020-01-15

## 2020-01-15 RX ORDER — GABAPENTIN 100 MG/1
CAPSULE ORAL
Qty: 42 CAPSULE | Refills: 0 | OUTPATIENT
Start: 2020-01-15 | End: 2020-01-20 | Stop reason: DRUGHIGH

## 2020-01-20 ENCOUNTER — OFFICE VISIT (OUTPATIENT)
Dept: INTERNAL MEDICINE CLINIC | Age: 30
End: 2020-01-20
Payer: COMMERCIAL

## 2020-01-20 VITALS
HEIGHT: 59 IN | DIASTOLIC BLOOD PRESSURE: 60 MMHG | HEART RATE: 120 BPM | SYSTOLIC BLOOD PRESSURE: 90 MMHG | OXYGEN SATURATION: 98 % | BODY MASS INDEX: 24.8 KG/M2 | WEIGHT: 123 LBS

## 2020-01-20 PROBLEM — S01.552A OPEN WOUND OF TONGUE DUE TO BITE: Status: ACTIVE | Noted: 2020-01-20

## 2020-01-20 PROCEDURE — 1036F TOBACCO NON-USER: CPT | Performed by: INTERNAL MEDICINE

## 2020-01-20 PROCEDURE — 99214 OFFICE O/P EST MOD 30 MIN: CPT | Performed by: INTERNAL MEDICINE

## 2020-01-20 PROCEDURE — G8427 DOCREV CUR MEDS BY ELIG CLIN: HCPCS | Performed by: INTERNAL MEDICINE

## 2020-01-20 PROCEDURE — G8484 FLU IMMUNIZE NO ADMIN: HCPCS | Performed by: INTERNAL MEDICINE

## 2020-01-20 PROCEDURE — G8420 CALC BMI NORM PARAMETERS: HCPCS | Performed by: INTERNAL MEDICINE

## 2020-01-20 PROCEDURE — 3046F HEMOGLOBIN A1C LEVEL >9.0%: CPT | Performed by: INTERNAL MEDICINE

## 2020-01-20 PROCEDURE — 2022F DILAT RTA XM EVC RTNOPTHY: CPT | Performed by: INTERNAL MEDICINE

## 2020-01-20 RX ORDER — GABAPENTIN 300 MG/1
CAPSULE ORAL
Qty: 60 CAPSULE | Refills: 2 | Status: SHIPPED | OUTPATIENT
Start: 2020-01-20 | End: 2020-04-15 | Stop reason: SDUPTHER

## 2020-01-20 RX ORDER — ZONISAMIDE 100 MG/1
200 CAPSULE ORAL
COMMUNITY
Start: 2020-01-30 | End: 2020-02-29

## 2020-01-20 ASSESSMENT — PATIENT HEALTH QUESTIONNAIRE - PHQ9
SUM OF ALL RESPONSES TO PHQ9 QUESTIONS 1 & 2: 0
SUM OF ALL RESPONSES TO PHQ QUESTIONS 1-9: 0
2. FEELING DOWN, DEPRESSED OR HOPELESS: 0
SUM OF ALL RESPONSES TO PHQ QUESTIONS 1-9: 0
1. LITTLE INTEREST OR PLEASURE IN DOING THINGS: 0

## 2020-01-20 ASSESSMENT — ENCOUNTER SYMPTOMS
SHORTNESS OF BREATH: 0
NAUSEA: 1

## 2020-01-20 NOTE — ASSESSMENT & PLAN NOTE
Needs to stay on a consistent dose of gabapentin. Nighttime symptoms worse. Will increase gabapentin to 600 mg nightly. Could start a morning dose if necessary in the future. Stressed importance of staying on a consistent dose due to gabapentin being an antiseizure medication as well.

## 2020-01-20 NOTE — PROGRESS NOTES
Extraocular Movements: Extraocular movements intact. Cardiovascular:      Rate and Rhythm: Normal rate and regular rhythm. Pulmonary:      Effort: Pulmonary effort is normal. No respiratory distress. Skin:     Coloration: Skin is not jaundiced or pale. Neurological:      Mental Status: She is alert. Cranial Nerves: No cranial nerve deficit. Motor: No tremor or seizure activity. Gait: Gait normal.   Psychiatric:         Attention and Perception: Attention normal.         Mood and Affect: Mood normal.         Speech: Speech normal.         Behavior: Behavior normal.         Thought Content: Thought content normal.         Cognition and Memory: Cognition normal.         Judgment: Judgment normal.         ASSESSMENT:       Encounter Diagnoses   Name Primary?  Diabetic peripheral neuropathy (HCC)     Seizure disorder ([address]     Open wound of tongue due to bite     Type 1 diabetes mellitus with complication ([address]        Diabetic peripheral neuropathy ([address]  Needs to stay on a consistent dose of gabapentin. Nighttime symptoms worse. Will increase gabapentin to 600 mg nightly. Could start a morning dose if necessary in the future. Stressed importance of staying on a consistent dose due to gabapentin being an antiseizure medication as well. Seizure disorder ([address]  Status post hospitalization. Now on Zonegran 100 mg and will be increasing to 200 mg. No reported seizures in the last 48 hours of starting Rx. Per neurology. Open wound of tongue due to bite  During seizure last week. If does not heal properly will refer to ENT. Type 1 diabetes mellitus with complication (HCC)  Sugar control slowly improving. Per endocrinology. PLAN:See ASSESSMENT for evaluation & PLAN     No orders of the defined types were placed in this encounter. PSH, PMH, SH and FH reviewed and noted. Recent and past labs, tests and consultsalso reviewed. Recent or new meds also reviewed.

## 2020-01-27 ENCOUNTER — TELEPHONE (OUTPATIENT)
Dept: INTERNAL MEDICINE CLINIC | Age: 30
End: 2020-01-27

## 2020-01-27 NOTE — TELEPHONE ENCOUNTER
Firstly I would not even consider prescribing Adderall until she has been seizure-free for couple of months. Secondly because of the nature of Adderall and its inclusion in the state narcotic prescribing laws she would need to see Dr. Jame Olson for evaluation to document the need for this type Rx.

## 2020-02-14 ENCOUNTER — TELEPHONE (OUTPATIENT)
Dept: INTERNAL MEDICINE CLINIC | Age: 30
End: 2020-02-14

## 2020-02-14 NOTE — TELEPHONE ENCOUNTER
Pt  would like to talk to Dr. Scott Andrew regarding pt neurology appt. Pt thought appt was at 1 but when they got there the office said it was at noon. Pt  states the medication is messing with her diabetes and having side effects from medication.  Pt  would like to discuss this matter with Dr. Scott Andrew

## 2020-02-14 NOTE — TELEPHONE ENCOUNTER
I am not going to change her seizure medication. Not sure why she believes she has DKA but this could be life-threatening-and if she really believes so then she should go to the ER.

## 2020-02-17 ENCOUNTER — TELEPHONE (OUTPATIENT)
Dept: INTERNAL MEDICINE CLINIC | Age: 30
End: 2020-02-17

## 2020-02-17 RX ORDER — CLONAZEPAM 2 MG/1
2 TABLET ORAL 2 TIMES DAILY
Qty: 60 TABLET | Refills: 0 | OUTPATIENT
Start: 2020-02-17 | End: 2020-03-18 | Stop reason: SDUPTHER

## 2020-02-18 ENCOUNTER — TELEPHONE (OUTPATIENT)
Dept: INTERNAL MEDICINE CLINIC | Age: 30
End: 2020-02-18

## 2020-02-18 NOTE — TELEPHONE ENCOUNTER
Patient  called stating that the patient is @ [id number] in the ER but just told them she will be going to ICU. They went there because patient was unresponsive, non-coherent and can not talk to anyone. Please call to advise.

## 2020-02-21 ENCOUNTER — TELEPHONE (OUTPATIENT)
Dept: INTERNAL MEDICINE CLINIC | Age: 30
End: 2020-02-21

## 2020-02-21 NOTE — TELEPHONE ENCOUNTER
We need to call the patient to verify her dose. The dose of her insulin is not noted in her discharge from .

## 2020-03-10 ENCOUNTER — TELEPHONE (OUTPATIENT)
Dept: INTERNAL MEDICINE CLINIC | Age: 30
End: 2020-03-10

## 2020-03-10 ENCOUNTER — CLINICAL DOCUMENTATION (OUTPATIENT)
Dept: PSYCHIATRY | Age: 30
End: 2020-03-10
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
## 2020-03-19 RX ORDER — CLONAZEPAM 2 MG/1
2 TABLET ORAL 2 TIMES DAILY
Qty: 60 TABLET | Refills: 0 | OUTPATIENT
Start: 2020-03-19 | End: 2020-04-15 | Stop reason: SDUPTHER

## 2020-03-20 RX ORDER — CLONAZEPAM 2 MG/1
2 TABLET ORAL 2 TIMES DAILY
Qty: 60 TABLET | Refills: 0 | OUTPATIENT
Start: 2020-03-20 | End: 2021-03-21

## 2020-04-08 ENCOUNTER — TELEPHONE (OUTPATIENT)
Dept: INTERNAL MEDICINE CLINIC | Age: 30
End: 2020-04-08

## 2020-04-08 NOTE — TELEPHONE ENCOUNTER
Pt legs states her legs are in excruciating pain, retaining water, swollen and unable to get into her Diabetic physician until July. Pt states she has been taking Gabapentin but its not helping.

## 2020-04-09 RX ORDER — FUROSEMIDE 20 MG/1
20 TABLET ORAL DAILY
Qty: 30 TABLET | Refills: 3 | OUTPATIENT
Start: 2020-04-09 | End: 2020-04-22

## 2020-04-10 ENCOUNTER — TELEPHONE (OUTPATIENT)
Dept: INTERNAL MEDICINE CLINIC | Age: 30
End: 2020-04-10

## 2020-04-15 ENCOUNTER — TELEPHONE (OUTPATIENT)
Dept: INTERNAL MEDICINE CLINIC | Age: 30
End: 2020-04-15

## 2020-04-15 RX ORDER — CLONAZEPAM 2 MG/1
2 TABLET ORAL 2 TIMES DAILY
Qty: 60 TABLET | Refills: 0 | Status: SHIPPED | OUTPATIENT
Start: 2020-04-15 | End: 2020-05-14 | Stop reason: SDUPTHER

## 2020-04-15 RX ORDER — GABAPENTIN 300 MG/1
CAPSULE ORAL
Qty: 60 CAPSULE | Refills: 2 | Status: SHIPPED | OUTPATIENT
Start: 2020-04-15 | End: 2020-04-22 | Stop reason: SDUPTHER

## 2020-04-15 NOTE — TELEPHONE ENCOUNTER
These are a lot of issues. First of all no one told me what pharmacy to send the prescriptions to. Second of all if her heart rate is over 100 and not slowing down she probably needs to go back to the emergency room. This cannot be handled by phone. Lastly, I am not sure what to do with the insulin not working, or even what this means.

## 2020-04-15 NOTE — TELEPHONE ENCOUNTER
Pt  requesting refill on gabapentin (NEURONTIN) 300 MG capsule       clonazePAM (KLONOPIN) 2 MG tablet       Pt  states pt just got out of hospital for seizures. Pt  states pt heart is beating real fast and been throwing up since she has been home. Pt  states he has nothing to take her temp with but body is warm. Pt  requesting something to be called in for her fast heart beat. Pt states she does not think her Medication   insulin aspart (NOVOLOG) 100 UNIT/ML injection vial [[id number]]   insulin aspart (NOVOLOG) 100 UNIT/ML injection vial is working.       Please be advise

## 2020-04-22 ENCOUNTER — OFFICE VISIT (OUTPATIENT)
Dept: INTERNAL MEDICINE CLINIC | Age: 30
End: 2020-04-22
Payer: COMMERCIAL

## 2020-04-22 VITALS
BODY MASS INDEX: 21.57 KG/M2 | WEIGHT: 107 LBS | HEIGHT: 59 IN | HEART RATE: 96 BPM | DIASTOLIC BLOOD PRESSURE: 80 MMHG | OXYGEN SATURATION: 98 % | SYSTOLIC BLOOD PRESSURE: 118 MMHG | TEMPERATURE: 97.7 F

## 2020-04-22 PROBLEM — Z09 HOSPITAL DISCHARGE FOLLOW-UP: Status: ACTIVE | Noted: 2020-04-22

## 2020-04-22 PROBLEM — Z46.81 INSULIN PUMP TITRATION: Status: RESOLVED | Noted: 2019-04-19 | Resolved: 2020-04-22

## 2020-04-22 PROBLEM — F40.01 PANIC DISORDER WITH AGORAPHOBIA: Status: RESOLVED | Noted: 2018-11-07 | Resolved: 2020-04-22

## 2020-04-22 PROBLEM — S01.552A OPEN WOUND OF TONGUE DUE TO BITE: Status: RESOLVED | Noted: 2020-01-20 | Resolved: 2020-04-22

## 2020-04-22 PROBLEM — F33.1 MODERATE EPISODE OF RECURRENT MAJOR DEPRESSIVE DISORDER (HCC): Status: RESOLVED | Noted: 2018-11-13 | Resolved: 2020-04-22

## 2020-04-22 PROBLEM — G61.9 INFLAMMATORY NEUROPATHY (HCC): Status: RESOLVED | Noted: 2019-04-19 | Resolved: 2020-04-22

## 2020-04-22 PROCEDURE — 99215 OFFICE O/P EST HI 40 MIN: CPT | Performed by: INTERNAL MEDICINE

## 2020-04-22 PROCEDURE — 1111F DSCHRG MED/CURRENT MED MERGE: CPT | Performed by: INTERNAL MEDICINE

## 2020-04-22 RX ORDER — GABAPENTIN 300 MG/1
CAPSULE ORAL
Qty: 150 CAPSULE | Refills: 2 | Status: SHIPPED | OUTPATIENT
Start: 2020-04-22 | End: 2020-06-15 | Stop reason: SDUPTHER

## 2020-04-22 RX ORDER — LEVETIRACETAM 500 MG/1
500 TABLET ORAL 2 TIMES DAILY
Status: ON HOLD | COMMUNITY
End: 2021-11-01 | Stop reason: SDUPTHER

## 2020-04-22 RX ORDER — INSULIN GLARGINE 100 [IU]/ML
35 INJECTION, SOLUTION SUBCUTANEOUS NIGHTLY
Qty: 5 PEN | Refills: 5 | COMMUNITY
Start: 2020-04-22 | End: 2021-09-13 | Stop reason: ALTCHOICE

## 2020-04-22 RX ORDER — SERTRALINE HYDROCHLORIDE 100 MG/1
100 TABLET, FILM COATED ORAL DAILY
Qty: 30 TABLET | Refills: 5 | Status: ON HOLD | OUTPATIENT
Start: 2020-04-22 | End: 2021-10-30

## 2020-04-22 RX ORDER — LAMOTRIGINE 100 MG/1
100 TABLET ORAL DAILY
COMMUNITY
End: 2020-07-19

## 2020-04-22 RX ORDER — FAMOTIDINE 20 MG/1
20 TABLET, FILM COATED ORAL 2 TIMES DAILY
COMMUNITY

## 2020-04-22 ASSESSMENT — ENCOUNTER SYMPTOMS
ABDOMINAL DISTENTION: 0
TROUBLE SWALLOWING: 0
SHORTNESS OF BREATH: 0

## 2020-04-22 NOTE — ASSESSMENT & PLAN NOTE
Worsening neuropathy in her feet, especially the last few months. Increase gabapentin to 300 mg 3 times daily and 600 mg at bedtime. She has diclofenac gel she will also try 3 times a day. Consider compound pharmacy with ketamine.

## 2020-04-22 NOTE — ASSESSMENT & PLAN NOTE
She has run out of Zoloft and has not been able to get Rx for a month. She was on 200 mg. Restart at 100 mg for a month and then increase back to 200 if necessary.   Continue clonazepam.

## 2020-04-22 NOTE — PROGRESS NOTES
SUBJECTIVE:  Patient ID: Megan Doherty is an 31 y.o. female. HPI: Patient here today for the f/u of chronic problems-- see Problem List and associated comments. New issues or complaints include (also see Assessment for more details): Patient here for follow-up after hospitalization with hyperglycemia, sugars over 500, and DKA. She also had another seizure and severely bit her tongue. Her seizures have been doing pretty well until the Easter weekend when she had her first seizure in a couple months. Her medications have been adjusted by neurology and she is now on Keppra and Lamictal.  Her biggest problems continue to be her sugars. She formally was on an insulin pump but for various reasons is no longer doing this. She now is a combination of NovoLog and Lantus. She states over the last week since discharge her sugars have been in the 100s. Her appetite is changed and she has lost considerable weight. Over the last several months her peripheral neuropathy has worsened-she has slowly increase the gabapentin with only minimal benefit. She also tried 1 dose of diclofenac gel which did not help much on her feet. Also her depression is gotten worse. Most of this is reactive to her medical problem. She has been out of Zoloft for 1 month since her psychiatrist has moved. She is hoping to get back on the Rx. Review of Systems   Constitutional: Positive for appetite change and unexpected weight change. Negative for fever. HENT: Positive for mouth sores (Bit her tongue during last seizure). Negative for trouble swallowing. Respiratory: Negative for shortness of breath. Cardiovascular: Negative for chest pain. She tends to get fast heart rate if she goes into DKA   Gastrointestinal: Negative for abdominal distention. Genitourinary: Negative for difficulty urinating. Musculoskeletal: Positive for gait problem (From numb feet). Skin: Negative for wound.    Neurological: Positive for seizures and numbness. Psychiatric/Behavioral: Positive for dysphoric mood. Negative for suicidal ideas. The patient is nervous/anxious. OBJECTIVE:    /80 (Site: Right Upper Arm, Position: Sitting, Cuff Size: Medium Adult)   Pulse 96   Temp 97.7 °F (36.5 °C) (Oral)   Ht 4' 11\" (1.499 m)   Wt 107 lb (48.5 kg)   SpO2 98%   BMI 21.61 kg/m²      Physical Exam  Constitutional:       Appearance: She is not diaphoretic. Comments: Using walker   Eyes:      Extraocular Movements: Extraocular movements intact. Neck:      Musculoskeletal: Neck supple. Cardiovascular:      Rate and Rhythm: Normal rate and regular rhythm. Pulses:           Posterior tibial pulses are 2+ on the right side and 2+ on the left side. Pulmonary:      Effort: Pulmonary effort is normal. No respiratory distress. Abdominal:      General: Bowel sounds are normal.   Musculoskeletal:      Right lower leg: No edema. Left lower leg: No edema. Skin:     Coloration: Skin is not pale. Neurological:      Mental Status: She is alert. Cranial Nerves: No cranial nerve deficit. Motor: No tremor or seizure activity. Psychiatric:         Attention and Perception: Attention normal.         Mood and Affect: Mood is depressed. Speech: Speech normal.         Behavior: Behavior is cooperative. Thought Content: Thought content normal.         Cognition and Memory: Cognition normal.         Judgment: Judgment normal.         ASSESSMENT:       Encounter Diagnoses   Name Primary?  Diabetic peripheral neuropathy ([facility] [address] Yes    Anxiety and depression    [facility] discharge follow-up     ROME (generalized anxiety disorder)     Seizure disorder ([facility] [address]     Type 1 diabetes mellitus with complication [facility])        Hospital discharge follow-up  Hospital records reviewed-hospitalized for poor sugar control with DKA.   See problem list.    Anxiety and depression  She has run out of Zoloft and has not been able

## 2020-04-22 NOTE — ASSESSMENT & PLAN NOTE
Recent DKA. She is now on NovoLog 20 units 3 times daily with meals and 35 units of Lantus in the evening. She will be seeing an endocrinologist in the next few weeks and she hopes to transition back to an insulin pump. She had another episode of DKA and was hospitalized.

## 2020-04-27 ENCOUNTER — TELEPHONE (OUTPATIENT)
Dept: INTERNAL MEDICINE CLINIC | Age: 30
End: 2020-04-27

## 2020-04-27 RX ORDER — CARVEDILOL 25 MG/1
TABLET, FILM COATED ORAL
Qty: 100 EACH | Refills: 3 | Status: SHIPPED | OUTPATIENT
Start: 2020-04-27 | End: 2020-06-01 | Stop reason: SDUPTHER

## 2020-04-27 RX ORDER — CARVEDILOL 25 MG/1
TABLET, FILM COATED ORAL
Qty: 100 EACH | Refills: 3 | Status: SHIPPED | OUTPATIENT
Start: 2020-04-27 | End: 2020-04-27 | Stop reason: SDUPTHER

## 2020-04-28 ENCOUNTER — TELEPHONE (OUTPATIENT)
Dept: ENDOCRINOLOGY | Age: 30
End: 2020-04-28

## 2020-04-28 NOTE — TELEPHONE ENCOUNTER
[address] requested call back regarding the pts test strips. The patient states she tests 10x a day, but the script is written for less testing daily.  They need an updated script for 10x a day

## 2020-05-04 ENCOUNTER — TELEPHONE (OUTPATIENT)
Dept: INTERNAL MEDICINE CLINIC | Age: 30
End: 2020-05-04

## 2020-05-04 NOTE — TELEPHONE ENCOUNTER
Pt spouse stated pt has been feeling nausea for the last week, vomiting, no fevers, pt is a diabetic, requesting a call back. Also when pt was in the hospital she was tested for covid and it was negative. Pt is Epilepsy as well he stated.  pls advise        [address], [facility] - P [phone] - F [phone]

## 2020-05-14 RX ORDER — CLONAZEPAM 2 MG/1
2 TABLET ORAL 2 TIMES DAILY
Qty: 60 TABLET | Refills: 0 | Status: SHIPPED | OUTPATIENT
Start: 2020-05-14 | End: 2020-06-15 | Stop reason: SDUPTHER

## 2020-05-14 NOTE — TELEPHONE ENCOUNTER
clonazePAM (KLONOPIN) 2 MG tablet [[id number]]- in need of a refill pls advise        [address]resa [address], [address] [facility]

## 2020-05-22 PROBLEM — Z09 HOSPITAL DISCHARGE FOLLOW-UP: Status: RESOLVED | Noted: 2020-04-22 | Resolved: 2020-05-22

## 2020-06-02 RX ORDER — CARVEDILOL 25 MG/1
TABLET, FILM COATED ORAL
Qty: 300 EACH | Refills: 3 | Status: SHIPPED | OUTPATIENT
Start: 2020-06-02 | End: 2020-08-03

## 2020-06-08 ENCOUNTER — TELEPHONE (OUTPATIENT)
Dept: INTERNAL MEDICINE CLINIC | Age: 30
End: 2020-06-08

## 2020-06-15 ENCOUNTER — TELEPHONE (OUTPATIENT)
Dept: INTERNAL MEDICINE CLINIC | Age: 30
End: 2020-06-15

## 2020-06-15 RX ORDER — CLONAZEPAM 2 MG/1
2 TABLET ORAL 2 TIMES DAILY
Qty: 60 TABLET | Refills: 0 | Status: SHIPPED | OUTPATIENT
Start: 2020-06-15 | End: 2020-07-13 | Stop reason: SDUPTHER

## 2020-06-15 RX ORDER — GABAPENTIN 300 MG/1
CAPSULE ORAL
Qty: 150 CAPSULE | Refills: 2 | Status: SHIPPED | OUTPATIENT
Start: 2020-06-15 | End: 2020-06-16 | Stop reason: SDUPTHER

## 2020-06-15 NOTE — TELEPHONE ENCOUNTER
The patient is requesting med refill    clonazePAM (KLONOPIN) 2 MG tablet     gabapentin (NEURONTIN) 300 MG capsule. The patient is reporting that her Neurologist Nik Quintanilla is asking for and increase in the gabapentin to take x6 per day.         [address], [address] [facility]

## 2020-06-15 NOTE — TELEPHONE ENCOUNTER
Pt requesting additional pills on gabapentin per Dr Leslie Kaminski pt should take as prescribed if she still requires additional medication she should schedule a VV to discuss

## 2020-06-16 ENCOUNTER — TELEPHONE (OUTPATIENT)
Dept: INTERNAL MEDICINE CLINIC | Age: 30
End: 2020-06-16

## 2020-06-16 RX ORDER — GABAPENTIN 300 MG/1
CAPSULE ORAL
Qty: 180 CAPSULE | Refills: 0 | Status: SHIPPED | OUTPATIENT
Start: 2020-06-16 | End: 2020-07-13 | Stop reason: SDUPTHER

## 2020-06-16 NOTE — TELEPHONE ENCOUNTER
Called Dr. Everardo Alegria office to discuss this situation with them assistant was unavail at the time gave message and number for them to return the call.  Spoke with Cristian

## 2020-06-16 NOTE — TELEPHONE ENCOUNTER
Sulema Yuan called back stated if Dr. Brittany Nogueira wants to refill and take over that is fine because Dr. Marcia Avila is only doing seizures. Dr. Marcia Avila did not increase or mention increase in gabapentin. Dr. Marcia Avila is only treating her for seizures not her neuropathy.  Understood     He is going to call pharmacy to cancel Dr. Marcia Avila script will send all info to Dr. Brittany Nogueira

## 2020-07-13 ENCOUNTER — TELEPHONE (OUTPATIENT)
Dept: INTERNAL MEDICINE CLINIC | Age: 30
End: 2020-07-13

## 2020-07-13 RX ORDER — CLONAZEPAM 2 MG/1
2 TABLET ORAL 2 TIMES DAILY
Qty: 60 TABLET | Refills: 0 | Status: SHIPPED | OUTPATIENT
Start: 2020-07-13 | End: 2020-08-13 | Stop reason: SDUPTHER

## 2020-07-13 RX ORDER — GABAPENTIN 300 MG/1
CAPSULE ORAL
Qty: 180 CAPSULE | Refills: 0 | Status: SHIPPED | OUTPATIENT
Start: 2020-07-13 | End: 2020-08-13 | Stop reason: SDUPTHER

## 2020-07-13 NOTE — TELEPHONE ENCOUNTER
Pt  requesting refills on clonazePAM (KLONOPIN) 2 MG tablet     gabapentin (NEURONTIN) 300 MG capsule     furosemide (LASIX) 20 MG tablet [[id number]]

## 2020-07-19 ENCOUNTER — HOSPITAL ENCOUNTER (EMERGENCY)
Age: 30
Discharge: HOME | End: 2020-07-20
Attending: EMERGENCY MEDICINE
Payer: COMMERCIAL

## 2020-07-19 LAB
A/G RATIO: 1.5 (ref 1.1–2.2)
ALBUMIN SERPL-MCNC: 4.3 G/DL (ref 3.4–5)
ALP BLD-CCNC: 52 U/L (ref 40–129)
ALT SERPL-CCNC: 14 U/L (ref 10–40)
ANION GAP SERPL CALCULATED.3IONS-SCNC: 13 MMOL/L (ref 3–16)
AST SERPL-CCNC: 19 U/L (ref 15–37)
BILIRUB SERPL-MCNC: <0.2 MG/DL (ref 0–1)
BILIRUBIN URINE: NEGATIVE
BLOOD, URINE: ABNORMAL
BUN BLDV-MCNC: 9 MG/DL (ref 7–20)
CALCIUM SERPL-MCNC: 9.3 MG/DL (ref 8.3–10.6)
CHLORIDE BLD-SCNC: 100 MMOL/L (ref 99–110)
CHP ED QC CHECK: NORMAL
CLARITY: ABNORMAL
CO2: 23 MMOL/L (ref 21–32)
COLOR: YELLOW
CREAT SERPL-MCNC: <0.5 MG/DL (ref 0.6–1.1)
EPITHELIAL CELLS, UA: 3 /HPF (ref 0–5)
GFR AFRICAN AMERICAN: >60
GFR NON-AFRICAN AMERICAN: >60
GLOBULIN: 2.8 G/DL
GLUCOSE BLD-MCNC: 105 MG/DL
GLUCOSE BLD-MCNC: 105 MG/DL (ref 70–99)
GLUCOSE BLD-MCNC: 90 MG/DL (ref 70–99)
GLUCOSE URINE: 100 MG/DL
HCG(URINE) PREGNANCY TEST: NEGATIVE
HCT VFR BLD CALC: 32.9 % (ref 36–48)
HEMOGLOBIN: 10.4 G/DL (ref 12–16)
HYALINE CASTS: 1 /LPF (ref 0–8)
KEPPRA DOSE AMT: ABNORMAL
KEPPRA: <2 UG/ML (ref 6–46)
KETONES, URINE: NEGATIVE MG/DL
LEUKOCYTE ESTERASE, URINE: NEGATIVE
MAGNESIUM: 2 MG/DL (ref 1.8–2.4)
MCH RBC QN AUTO: 23.3 PG (ref 26–34)
MCHC RBC AUTO-ENTMCNC: 31.4 G/DL (ref 31–36)
MCV RBC AUTO: 74.2 FL (ref 80–100)
MICROSCOPIC EXAMINATION: YES
NITRITE, URINE: NEGATIVE
PDW BLD-RTO: 18.4 % (ref 12.4–15.4)
PERFORMED ON: ABNORMAL
PH UA: 6 (ref 5–8)
PLATELET # BLD: 298 K/UL (ref 135–450)
PMV BLD AUTO: 8 FL (ref 5–10.5)
POTASSIUM REFLEX MAGNESIUM: 4.2 MMOL/L (ref 3.5–5.1)
PROTEIN UA: NEGATIVE MG/DL
RBC # BLD: 4.44 M/UL (ref 4–5.2)
RBC UA: 1 /HPF (ref 0–4)
SODIUM BLD-SCNC: 136 MMOL/L (ref 136–145)
SPECIFIC GRAVITY UA: 1.02 (ref 1–1.03)
TOTAL PROTEIN: 7.1 G/DL (ref 6.4–8.2)
URINE TYPE: ABNORMAL
UROBILINOGEN, URINE: 0.2 E.U./DL
WBC # BLD: 14.8 K/UL (ref 4–11)
WBC UA: 2 /HPF (ref 0–5)

## 2020-07-19 PROCEDURE — 6360000002 HC RX W HCPCS: Performed by: EMERGENCY MEDICINE

## 2020-07-19 PROCEDURE — 85027 COMPLETE CBC AUTOMATED: CPT

## 2020-07-19 PROCEDURE — 80175 DRUG SCREEN QUAN LAMOTRIGINE: CPT

## 2020-07-19 PROCEDURE — 80177 DRUG SCRN QUAN LEVETIRACETAM: CPT

## 2020-07-19 PROCEDURE — 2580000003 HC RX 258: Performed by: EMERGENCY MEDICINE

## 2020-07-19 PROCEDURE — 96365 THER/PROPH/DIAG IV INF INIT: CPT

## 2020-07-19 PROCEDURE — 96375 TX/PRO/DX INJ NEW DRUG ADDON: CPT

## 2020-07-19 PROCEDURE — 83735 ASSAY OF MAGNESIUM: CPT

## 2020-07-19 PROCEDURE — 6370000000 HC RX 637 (ALT 250 FOR IP): Performed by: EMERGENCY MEDICINE

## 2020-07-19 PROCEDURE — 84703 CHORIONIC GONADOTROPIN ASSAY: CPT

## 2020-07-19 PROCEDURE — 81001 URINALYSIS AUTO W/SCOPE: CPT

## 2020-07-19 PROCEDURE — 99285 EMERGENCY DEPT VISIT HI MDM: CPT

## 2020-07-19 PROCEDURE — 80053 COMPREHEN METABOLIC PANEL: CPT

## 2020-07-19 RX ORDER — LEVETIRACETAM 10 MG/ML
1000 INJECTION INTRAVASCULAR ONCE
Status: COMPLETED | OUTPATIENT
Start: 2020-07-19 | End: 2020-07-19

## 2020-07-19 RX ORDER — ONDANSETRON 2 MG/ML
4 INJECTION INTRAMUSCULAR; INTRAVENOUS ONCE
Status: COMPLETED | OUTPATIENT
Start: 2020-07-19 | End: 2020-07-19

## 2020-07-19 RX ORDER — 0.9 % SODIUM CHLORIDE 0.9 %
1000 INTRAVENOUS SOLUTION INTRAVENOUS ONCE
Status: COMPLETED | OUTPATIENT
Start: 2020-07-19 | End: 2020-07-20

## 2020-07-19 RX ORDER — ACETAMINOPHEN 325 MG/1
650 TABLET ORAL ONCE
Status: COMPLETED | OUTPATIENT
Start: 2020-07-19 | End: 2020-07-19

## 2020-07-19 RX ORDER — LORAZEPAM 2 MG/ML
INJECTION INTRAMUSCULAR
Status: DISCONTINUED
Start: 2020-07-19 | End: 2020-07-20 | Stop reason: HOSPADM

## 2020-07-19 RX ORDER — LORAZEPAM 2 MG/ML
2 INJECTION INTRAMUSCULAR ONCE
Status: COMPLETED | OUTPATIENT
Start: 2020-07-19 | End: 2020-07-19

## 2020-07-19 RX ADMIN — SODIUM CHLORIDE 1000 ML: 9 INJECTION, SOLUTION INTRAVENOUS at 23:18

## 2020-07-19 RX ADMIN — ONDANSETRON 4 MG: 2 INJECTION INTRAMUSCULAR; INTRAVENOUS at 20:24

## 2020-07-19 RX ADMIN — LORAZEPAM 2 MG: 2 INJECTION INTRAMUSCULAR; INTRAVENOUS at 21:35

## 2020-07-19 RX ADMIN — LEVETIRACETAM 1000 MG: 10 INJECTION INTRAVENOUS at 22:08

## 2020-07-19 RX ADMIN — ACETAMINOPHEN 650 MG: 325 TABLET ORAL at 22:18

## 2020-07-19 ASSESSMENT — PAIN SCALES - GENERAL
PAINLEVEL_OUTOF10: 10
PAINLEVEL_OUTOF10: 8

## 2020-07-19 ASSESSMENT — PAIN DESCRIPTION - DESCRIPTORS: DESCRIPTORS: ACHING

## 2020-07-19 ASSESSMENT — PAIN DESCRIPTION - LOCATION: LOCATION: HEAD

## 2020-07-19 NOTE — ED NOTES
Bed: B-07  Expected date:   Expected time:   Means of arrival: [facility]  Comments:  seizure     Terrance Nathan RN  07/19/20 1946

## 2020-07-20 VITALS
DIASTOLIC BLOOD PRESSURE: 68 MMHG | RESPIRATION RATE: 16 BRPM | BODY MASS INDEX: 23.85 KG/M2 | SYSTOLIC BLOOD PRESSURE: 99 MMHG | TEMPERATURE: 98.3 F | HEART RATE: 102 BPM | OXYGEN SATURATION: 95 % | HEIGHT: 59 IN | WEIGHT: 118.3 LBS

## 2020-07-20 NOTE — DISCHARGE INSTR - COC
Continuity of Care Form    Patient Name: Irma Simmons   :  1990  MRN:  [id number]    Admit date:  2020  Discharge date:  ***    Code Status Order: Prior   Advance Directives:     Admitting Physician:  No admitting provider for patient encounter.   PCP: Yash Durant MD    Discharging Nurse: [facility] Unit/Room#: 007/B-07  Discharging Unit Phone Number: ***    Emergency Contact:   Extended Emergency Contact Information  Primary Emergency Contact: [address] Phone: [phone]  Relation: Grandparent  Secondary Emergency Contact: Arturo [address] Phone: [phone]  Mobile Phone: [phone]  Relation: Other    Past Surgical History:  Past Surgical History:   Procedure Laterality Date     SECTION      TUBAL LIGATION         Immunization History:   Immunization History   Administered Date(s) Administered    Influenza, Quadv, Recombinant, IM PF (Flublok 18 yrs and older) 10/22/2018       Active Problems:  Patient Active Problem List   Diagnosis Code    [facility]izure [facility] ([facility] G40.909    Type 1 diabetes mellitus with complication ([facility] O56.4    Anxiety and depression F41.9, F32.9    Vitamin D deficiency E55.9    ROME (generalized anxiety disorder) F41.1    Anorexia nervosa F50.00    Diabetic peripheral neuropathy ([facility] E11.42    Gastroparesis K31.84       Isolation/Infection:   Isolation          No Isolation        Patient Infection Status     None to display          Nurse Assessment:  Last Vital Signs: BP 95/68   Pulse 92   Temp 98.3 °F (36.8 °C) (Oral)   Resp 14   Ht 4' 11\" (1.499 m)   Wt 118 lb 4.8 oz (53.7 kg)   LMP 2020   SpO2 100%   BMI 23.89 kg/m²     Last documented pain score (0-10 scale):    Last Weight:   Wt Readings from Last 1 Encounters:   20 118 lb 4.8 oz (53.7 kg)     Mental Status:  {IP PT MENTAL STATUS:}    IV Access:  [facility] IV ACCESS:956199699}    Nursing Mobility/ADLs:  Walking   {[facility] DME RMXD:734216717}  Transfer  {CHP DME HGEN:973051892}  Bathing  {CHP DME SZXZ:452419764}  Dressing  {CHP DME QLXY:876287320}  Toileting  {CHP DME WKPP:272608989}  Feeding  {CHP DME BASE:724921288}  Med Admin  {CHP DME QUXT:307212959}  Med Delivery   { TAMMY MED Delivery:575196968}    Wound Care Documentation and Therapy:        Elimination:  Continence:   · Bowel: {YES / KL:58582}  · Bladder: {YES / LV:19797}  Urinary Catheter: {Urinary Catheter:218195714}   Colostomy/Ileostomy/Ileal Conduit: {YES / LI:11273}       Date of Last BM: ***  No intake or output data in the 24 hours ending 20  No intake/output data recorded.     Safety Concerns:     [address] [employer] Safety Concerns:113003354}    Impairments/Disabilities:      [address] [employer] Impairments/Disabilities:948460345}    Nutrition Therapy:  Current Nutrition Therapy:   [address] [employer] Diet List:587287851}    Routes of Feeding: {CHP DME Other Feedings:451435891}  Liquids: {Slp liquid thickness:95587}  Daily Fluid Restriction: {CHP DME Yes amt example:637050845}  Last Modified Barium Swallow with Video (Video Swallowing Test): {Done Not Done CQQU:369070583}    Treatments at the Time of Hospital Discharge:   Respiratory Treatments: ***  Oxygen Therapy:  {Therapy; copd oxygen:14379}  Ventilator:    { CC Vent JUOM:389947394}    Rehab Therapies: {THERAPEUTIC INTERVENTION:3750712333}  Weight Bearing Status/Restrictions: [facility] Weight Bearin}  Other Medical Equipment (for information only, NOT a DME order):  {EQUIPMENT:101423469}  Other Treatments: ***    Patient's personal belongings (please select all that are sent with patient):  {CHP DME Belongings:779507375}    RN SIGNATURE:  {Esignature:220045816}    CASE MANAGEMENT/SOCIAL WORK SECTION    Inpatient Status Date: ***    Readmission Risk Assessment Score:  Readmission Risk              Risk of Unplanned Readmission:        0           Discharging to Facility/ Agency   · Name:   · Address:  · Phone:  · Fax:    Dialysis Facility (if applicable)   · Name:  · Address:  · Dialysis Schedule:  · Phone:  · Fax:    / signature: {Esignature:562022567}    PHYSICIAN SECTION    Prognosis: {Prognosis:0348176917}    Condition at Discharge: Xuan Anthony Patient Condition:342262584}    Rehab Potential (if transferring to Rehab): {Prognosis:3233753098}    Recommended Labs or Other Treatments After Discharge: ***    Physician Certification: I certify the above information and transfer of Lisbet Chin  is necessary for the continuing treatment of the diagnosis listed and that she requires {Admit to Appropriate Level of Care:83349} for {GREATER/LESS:019229252} 30 days.      Update Admission H&P: {CHP DME Changes in PNTRI:108220703}    PHYSICIAN SIGNATURE:  {Esignature:718691117}

## 2020-07-20 NOTE — ED PROVIDER NOTES
[address]      Pt Name: Sean Silvestre  MRN: [id number]  [place] 1990  Date of evaluation: 7/19/2020  Provider: Ninoska Daigle MD    CHIEF COMPLAINT       Chief Complaint   Patient presents with    Seizures     x 3 today, hx of seizures last one an hour ago          HISTORY OF PRESENT ILLNESS   (Location/Symptom, Timing/Onset,Context/Setting, Quality, Duration, Modifying Factors, Severity)  Note limiting factors. Sean Silvestre is a 31 y.o. female who presents to the emergency department with reports of 3 seizures this evening. Patient has a history of type 1 diabetes on insulin, as well as epilepsy on Lamictal and Keppra. She reports compliance with her medications, no recent missed doses. Patient states she had 3 seizures this evening, the first 1 being approximately 2 hours ago, the last one being approximately 1 hour prior to arrival to the emergency department. Patient reports that she felt \"off\" throughout the day today, like she may have a seizure. She reports breakthrough seizures every couple of months, the most recently being 2 to 3 months ago. She states she often has 2 seizures in a row. She reports that she did return to her mental status baseline between seizures today. She denies any recent fever or chills. She otherwise has been feeling well denies chest pain, shortness of breath, cough, abdominal pain, vomiting. She does endorse mild nausea currently. Patient reports that she did bite her tongue during 1 of the seizure episodes today. NursingNotes were reviewed. REVIEW OF SYSTEMS    (2-9 systems for level 4, 10 or more for level 5)       Constitutional: No fever or chills. Eye: No visual disturbances. No eye pain. Ear/Nose/Mouth/Throat: No nasal congestion. No sore throat. Respiratory: No cough, No shortness of breath, No sputum production. Cardiovascular: No chest pain.  No palpitations. Gastrointestinal: No abdominal pain. Nausea, no vomiting  Genitourinary: No dysuria. No hematuria. Hematology/Lymphatics: No bleeding or bruising tendency. Immunologic: No malaise. No swollen glands. Musculoskeletal: No back pain. No joint pain. Integumentary: No rash. No abrasions. Neurologic: No headache. No focal numbness or weakness. Seizures. PAST MEDICAL HISTORY     Past Medical History:   Diagnosis Date    Depression     Diabetes mellitus ([facility]     Seizures ([facility]          SURGICALHISTORY       Past Surgical History:   Procedure Laterality Date     SECTION      TUBAL LIGATION           CURRENT MEDICATIONS       Previous Medications    BLOOD GLUCOSE TEST STRIPS (CONTOUR TEST) STRIP    Test 10  times daily as has fluctuating sugars & brittle diabetic    CLONAZEPAM (KLONOPIN) 2 MG TABLET    Take 1 tablet by mouth 2 times daily. CONTINUOUS BLOOD GLUC SENSOR (SmartBIMSTYLE NANCY 14 DAY SENSOR) MISC    1 Units by Does not apply route as needed (use one sensor for 14 days)    FAMOTIDINE (PEPCID) 20 MG TABLET    Take 20 mg by mouth 2 times daily    GABAPENTIN (NEURONTIN) 300 MG CAPSULE    Take 1 Capsule 3 times daily during the day and 3 at bedtime    GLUCAGON 1 MG INJECTION    Inject 1 mg into the muscle See Admin Instructions Follow package directions for low blood sugar. INSULIN ASPART (NOVOLOG) 100 UNIT/ML INJECTION VIAL    INJECT 80 UNITS INTO THE SKIN DAILY VIA INSULIN PUMP    INSULIN GLARGINE (LANTUS SOLOSTAR) 100 UNIT/ML INJECTION PEN    Inject 35 Units into the skin nightly    LEVETIRACETAM (KEPPRA) 500 MG TABLET    Take 500 mg by mouth 2 times daily    SERTRALINE (ZOLOFT) 100 MG TABLET    Take 1 tablet by mouth daily       ALLERGIES     Patient has no known allergies. FAMILY HISTORY     History reviewed. No pertinent family history.        SOCIAL HISTORY       Social History     Socioeconomic History    Marital status: Single     Spouse name: None    Number of children: None    Years of education: None    Highest education level: None   Occupational History    None   Social Needs    Financial resource strain: None    Food insecurity     Worry: None     Inability: None    Transportation needs     Medical: None     Non-medical: None   Tobacco Use    Smoking status: Never Smoker    Smokeless tobacco: Never Used   Substance and Sexual Activity    Alcohol use: No    Drug use: No    Sexual activity: Yes     Partners: Male   Lifestyle    Physical activity     Days per week: None     Minutes per session: None    Stress: None   Relationships    Social connections     Talks on phone: None     Gets together: None     Attends Latter day service: None     Active member of club or organization: None     Attends meetings of clubs or organizations: None     Relationship status: None    Intimate partner violence     Fear of current or ex partner: None     Emotionally abused: None     Physically abused: None     Forced sexual activity: None   Other Topics Concern    None   Social History Narrative    None       SCREENINGS             PHYSICAL EXAM    (up to 7 for level 4, 8 or more for level 5)     ED Triage Vitals [07/19/20 1951]   BP Temp Temp Source Pulse Resp SpO2 Height Weight   95/68 98.3 °F (36.8 °C) Oral 92 14 100 % 4' 11\" (1.499 m) 118 lb 4.8 oz (53.7 kg)       General: Alert and oriented, No acute distress. Eye: Normal conjunctiva. Pupils equal and reactive. HENT: Oral mucosa is moist.    Respiratory: Lungs are clear to auscultation, Respirations are non-labored. Cardiovascular: Normal rate, Regular rhythm. Gastrointestinal: Soft, Non-tender, Non-distended. Musculoskeletal: No swelling. Integumentary: Warm, Dry. Neurologic: Alert, Oriented, No focal defects. Normal speech and cranial nerve exam.  Motor and sensation intact in all extremities. Psychiatric: Cooperative.     DIAGNOSTIC RESULTS     EKG: All EKG's are interpreted by the Emergency Department Physician who either signs or Co-signsthis chart in the absence of a cardiologist.      RADIOLOGY:   Dolph Duel such as CT, Ultrasound and MRI are read by the radiologist. Plain radiographic images are visualized and preliminarily interpreted by the emergency physician with the below findings:      Interpretation per the Radiologist below, if available at the time ofthis note:    No orders to display         ED BEDSIDE ULTRASOUND:   Performed by ED Physician - none    LABS:  Labs Reviewed   URINALYSIS - Abnormal; Notable for the following components:       Result Value    Clarity, UA CLOUDY (*)     Glucose, Ur 100 (*)     Blood, Urine TRACE (*)     All other components within normal limits    Narrative:     Performed at:  [address] [employer]   Phone [phone]   LEVETIRACETAM LEVEL - Abnormal; Notable for the following components:    Levetiracetam Lvl <2.0 (*)     All other components within normal limits    Narrative:     Performed at:  [address] [employer]   Phone [phone]   CBC - Abnormal; Notable for the following components:    WBC 14.8 (*)     Hemoglobin 10.4 (*)     Hematocrit 32.9 (*)     MCV 74.2 (*)     MCH 23.3 (*)     RDW 18.4 (*)     All other components within normal limits    Narrative:     Performed at:  [address] [employer] [address]   Phone [phone]   COMPREHENSIVE METABOLIC PANEL W/ REFLEX TO MG FOR LOW K - Abnormal; Notable for the following components:    CREATININE <0.5 (*)     All other components within normal limits    Narrative:     Performed at:  [address] [employer]   Phone [phone]   POCT GLUCOSE - Abnormal; Notable for the following components:    POC Glucose 105 (*)     All other components within normal limits    Narrative:     Performed at:  [facility]  [address] [place] [address]   Phone [phone]   POCT GLUCOSE - Normal   PREGNANCY, URINE    Narrative:     Performed at:  [facility]  [address] [place] [address]   Phone [phone]   MAGNESIUM    Narrative:     Performed at:  [facility]  [address]  [place] [address]   Phone [phone]   MICROSCOPIC URINALYSIS    Narrative:     Performed at:  [facility]  [address]  [place] [address]   Phone [phone]   LAMOTRIGINE LEVEL       All other labs were within normal range or not returned as of this dictation. EMERGENCY DEPARTMENT COURSE and DIFFERENTIAL DIAGNOSIS/MDM:   Vitals:    Vitals:    07/19/20 2216 07/19/20 2231 07/19/20 2246 07/19/20 2317   BP: 125/86 110/79 93/61 98/73   Pulse: 110 106 105 106   Resp: 22 18  16   Temp:       TempSrc:       SpO2: 99%   99%   Weight:       Height:             Medical decision making: This is a 41-year-old female with history of epilepsy, diabetes, who presents for evaluation after multiple seizures today. On exam, patient is well-appearing, afebrile, with reassuring vital signs. She is initially postictal, but returned to her mental status baseline while in the emergency department. Patient and her  denies any head trauma associated with the seizure episodes today. Patient does have a history of breakthrough seizures every 2 to 3 weeks, but typically does not have this many in a row. Urinalysis obtained, negative for acute infection. Urine pregnancy test is negative. Point-of-care glucose 105. CBC with a mild leukocytosis of 14, likely related to seizure activity today. CMP is within normal limits. magnesium normal.  Levels for patient's antiepileptics including Keppra and Lamictal were sent. Lamictal level is pending, however Keppra level is undetectable. While awaiting laboratory studies results, patient did have a witnessed generalized tonic-clonic seizure in the emergency department which lasted approximately 1 minute and resolved spontaneously. She was treated with 2 mg of Ativan, and following Keppra results, 1 g of IV Keppra. Given frequency of seizures today, discussed with neurology, Dr. Flor Desai who recommends discharge home with close outpatient follow-up and encourage event of the patient to take her Keppra as prescribed. Patient and her  are agreeable with this plan as well. She was monitored in the emergency department till she returned her mental status baseline and then discharged in stable condition. She will return with any new or worsening symptoms. CRITICAL CARE TIME   Total Critical Care time was 32 minutes, excluding separately reportable procedures. There was a high probability of clinically significant/life threatening deterioration in the patient's condition which required my urgent intervention. CONSULTS:  IP CONSULT TO NEUROLOGY    PROCEDURES:  Unless otherwise noted below, none         FINAL IMPRESSION      1.  Breakthrough seizure [facility])          DISPOSITION/PLAN   DISPOSITION Decision To Discharge 07/19/2020 11:48:07 PM      PATIENT REFERRED TO:  Adam Shell MD  [address] [place] [address] Ave.  [phone]    Schedule an appointment as soon as possible for a visit in 2 days      Jennifer nKox MD  [address]  [phone]    Schedule an appointment as soon as possible for a visit in 3 days        DISCHARGE MEDICATIONS:  New Prescriptions    No medications on file          (Please note that portions of this note were completed with a voice recognition program.Efforts were made to edit the dictations but occasionally words are mis-transcribed.)    Lucretia Hopper MD (electronically

## 2020-07-20 NOTE — ED TRIAGE NOTES
Eb Paniagua is a 31 y.o. female came by [facility] for seizures. The patient states that she has had three seizures today. The patient believes they started around 7. The last seizure being around [address]. The patient has a hx of seizures and states that it is not uncommon for her to have break through seizures about every three months but she has never had three of them she usually only has two. The patient is alert and oriented with an open and patent airway with a normal respiratory effort. The patient states she bite her tongue and only complains of nausea. ED provider at bedside. IV access was obtained along with blood work. BG-105. The patient was given seizure pads.

## 2020-07-21 LAB — LAMOTRIGINE LEVEL: <0.9 UG/ML (ref 2.5–15)

## 2020-08-03 ENCOUNTER — OFFICE VISIT (OUTPATIENT)
Dept: INTERNAL MEDICINE CLINIC | Age: 30
End: 2020-08-03
Payer: COMMERCIAL

## 2020-08-03 VITALS
DIASTOLIC BLOOD PRESSURE: 62 MMHG | SYSTOLIC BLOOD PRESSURE: 128 MMHG | HEIGHT: 59 IN | BODY MASS INDEX: 22.62 KG/M2 | WEIGHT: 112.2 LBS | TEMPERATURE: 97.8 F

## 2020-08-03 PROBLEM — T50.905A DRUG-RELATED HAIR LOSS: Status: ACTIVE | Noted: 2020-08-03

## 2020-08-03 PROBLEM — L65.8 DRUG-RELATED HAIR LOSS: Status: ACTIVE | Noted: 2020-08-03

## 2020-08-03 PROCEDURE — 3046F HEMOGLOBIN A1C LEVEL >9.0%: CPT | Performed by: INTERNAL MEDICINE

## 2020-08-03 PROCEDURE — 1036F TOBACCO NON-USER: CPT | Performed by: INTERNAL MEDICINE

## 2020-08-03 PROCEDURE — G8427 DOCREV CUR MEDS BY ELIG CLIN: HCPCS | Performed by: INTERNAL MEDICINE

## 2020-08-03 PROCEDURE — G8420 CALC BMI NORM PARAMETERS: HCPCS | Performed by: INTERNAL MEDICINE

## 2020-08-03 PROCEDURE — 99214 OFFICE O/P EST MOD 30 MIN: CPT | Performed by: INTERNAL MEDICINE

## 2020-08-03 PROCEDURE — 2022F DILAT RTA XM EVC RTNOPTHY: CPT | Performed by: INTERNAL MEDICINE

## 2020-08-03 RX ORDER — GLUCOSAMINE HCL/CHONDROITIN SU 500-400 MG
CAPSULE ORAL
Qty: 200 STRIP | Refills: 11 | Status: SHIPPED | OUTPATIENT
Start: 2020-08-03 | End: 2020-10-14 | Stop reason: SDUPTHER

## 2020-08-03 ASSESSMENT — ENCOUNTER SYMPTOMS
ROS SKIN COMMENTS: SCALP HAIR LOSS
GASTROINTESTINAL NEGATIVE: 1
RESPIRATORY NEGATIVE: 1

## 2020-08-03 NOTE — ASSESSMENT & PLAN NOTE
Per neurology. Recently added Onfi at night. She had a recent bout of intractable seizures several days ago and was seen in the emergency room again. She is doing better now. She is recently had some stuttering after the last episodes of seizures-it is improving- she states neurology told her this is normal and should get better.

## 2020-08-03 NOTE — ASSESSMENT & PLAN NOTE
She believes it is doing Lamictal.  She also has some dandruff. We will try selenium sulfide shampoo, if this does not help consider dermatology opinion, and also consider spironolactone if necessary. However I am reluctant to add another medication at this time.

## 2020-08-03 NOTE — ASSESSMENT & PLAN NOTE
Doing okay. Monitor report done. Continue clonazepam and stressed need to maintain twice daily compliance.

## 2020-08-03 NOTE — PROGRESS NOTES
SUBJECTIVE:  Patient ID: Sergey Monique is an 31 y.o. female. HPI: Patient here today for the f/u of chronic problems-- see Problem List and associated comments. New issues or complaints include (also see Assessment for more details): Here for follow-up. She is seen endocrinology and neurology. Neurology recently added onfi to take at night. She also takes clonazepam earlier in the day. She had a series of seizures several days ago but none in the last 5 days. Her sugars are also better controlled now that she is on an insulin pump. She occasionally has some low sugars but this is not been a problem. She is also having problem with hair loss lately-he believes this is due to Lamictal.    Review of Systems   Constitutional: Negative for fever. Eyes: Negative for visual disturbance. Respiratory: Negative. Cardiovascular: Negative. Gastrointestinal: Negative. Skin:        Scalp hair loss   Neurological: Positive for seizures. Psychiatric/Behavioral: Negative for dysphoric mood and suicidal ideas. The patient is nervous/anxious. OBJECTIVE:    /62 (Site: Right Upper Arm)   Temp 97.8 °F (36.6 °C)   Ht 4' 11\" (1.499 m)   Wt 112 lb 3.2 oz (50.9 kg)   LMP 07/17/2020   BMI 22.66 kg/m²      Physical Exam  Constitutional:       Appearance: Normal appearance. She is not ill-appearing. HENT:      Head:      Comments: Scalp- no bald spots. Eczema/dandruff noted  Cardiovascular:      Rate and Rhythm: Normal rate and regular rhythm. Pulmonary:      Effort: Pulmonary effort is normal. No respiratory distress. Musculoskeletal:      Right lower leg: No edema. Left lower leg: No edema. Skin:     Coloration: Skin is not pale. Neurological:      General: No focal deficit present. Mental Status: She is alert and oriented to person, place, and time. Cranial Nerves: No cranial nerve deficit. Motor: Motor function is intact.  No weakness, tremor, abnormal muscle tone or

## 2020-08-13 ENCOUNTER — TELEPHONE (OUTPATIENT)
Dept: INTERNAL MEDICINE CLINIC | Age: 30
End: 2020-08-13

## 2020-08-13 RX ORDER — GABAPENTIN 300 MG/1
CAPSULE ORAL
Qty: 180 CAPSULE | Refills: 1 | Status: SHIPPED | OUTPATIENT
Start: 2020-08-13 | End: 2020-10-09 | Stop reason: SDUPTHER

## 2020-08-13 RX ORDER — CLONAZEPAM 2 MG/1
2 TABLET ORAL 2 TIMES DAILY
Qty: 60 TABLET | Refills: 1 | Status: SHIPPED | OUTPATIENT
Start: 2020-08-13 | End: 2020-09-11 | Stop reason: SDUPTHER

## 2020-08-13 NOTE — TELEPHONE ENCOUNTER
Patient  called back and advise the patient still needs something for nausea. Please advise.        Theresa Boss31 May), [address]

## 2020-08-13 NOTE — TELEPHONE ENCOUNTER
Alfredos ran  clozepam Last filled 8/6 but Dr. Angela Silverman  Gabapentin 7/15/20  Last seen 8/3/20

## 2020-08-13 NOTE — TELEPHONE ENCOUNTER
Patients  Jorge A Orozco is requesting to have the patients medication clonazePAM (KLONOPIN) 2 MG tablet and gabapentin (NEURONTIN) 300 MG capsule refilled. Patient has been feeling nausea from taking all of the medications, for about 4 or 5 days.  would like to know if there something she can do. Theresa [place] [address] [phone] Patt Tierney [phone]    Please call and advise.

## 2020-08-14 RX ORDER — ONDANSETRON 4 MG/1
4 TABLET, ORALLY DISINTEGRATING ORAL EVERY 8 HOURS PRN
Qty: 20 TABLET | Refills: 1 | Status: SHIPPED | OUTPATIENT
Start: 2020-08-14 | End: 2020-12-16 | Stop reason: SDUPTHER

## 2020-08-27 LAB
AVERAGE GLUCOSE: NORMAL
HBA1C MFR BLD: 8.3 %

## 2020-08-28 NOTE — PROGRESS NOTES
Normal lab results given to ERIK Benitez at [facility].    Gastrointestinal: Negative for constipation, diarrhea and nausea. Endocrine: Negative for cold intolerance, heat intolerance, polydipsia, polyphagia and polyuria. Genitourinary: Negative for frequency and urgency. Musculoskeletal: Negative for arthralgias, joint swelling and myalgias. Skin: Negative for color change and pallor. Neurological: Negative for weakness, numbness and headaches. Psychiatric/Behavioral: Negative for dysphoric mood and sleep disturbance. The patient is not nervous/anxious. Vitals:    09/13/19 0802   BP: 106/71   Site: Left Upper Arm   Position: Sitting   Cuff Size: Medium Adult   Pulse: 114   SpO2: 99%   Weight: 118 lb 6.4 oz (53.7 kg)   Height: 4' 11\" (1.499 m)     Physical Exam   Constitutional: She is oriented to person, place, and time. She appears well-developed and well-nourished. Eyes: Pupils are equal, round, and reactive to light. Neck: Normal range of motion. No thyromegaly present. Cardiovascular: Normal rate, regular rhythm and normal heart sounds. Pulmonary/Chest: Effort normal and breath sounds normal.   Abdominal: She exhibits no distension. Musculoskeletal: Normal range of motion. She exhibits no edema. Neurological: She is alert and oriented to person, place, and time. No sensory deficit. Skin: Skin is warm and dry. No skin changes or evidence of trauma. Psychiatric: She has a normal mood and affect. Her behavior is normal. Thought content normal.   Vitals reviewed. Skeletal foot exam: No skin lesions are noted. Skin is normal. Signs of onychomycosis are not noted on theskin. Calluses are not noted. Ingrown toenails are not noted. Toenails are normal. Pulses are +2 DP and +1 PT bilaterally. Capillary refill is <3 seconds. Skeletal structures are intact upon visual examination. No deformityis noted. Sensory: 10 g monofilament is 7/10 on the right and 7/10 on the left, 128 Hz vibration sense is decreased bilaterally.     Assessment  Rafael Pederson

## 2020-09-10 ENCOUNTER — TELEPHONE (OUTPATIENT)
Dept: INTERNAL MEDICINE CLINIC | Age: 30
End: 2020-09-10

## 2020-09-10 NOTE — TELEPHONE ENCOUNTER
Patient spouse called to request a refill of     clonazePAM Sofy Landin) 2 MG tablet    Theresa [address] [place] [address] [phone] - F [phone]     Patient spouse would also like to know if there is something else the patient can do for her hair loss since the shampoo is not working. Please advise.

## 2020-09-11 RX ORDER — CLONAZEPAM 2 MG/1
2 TABLET ORAL 2 TIMES DAILY
Qty: 60 TABLET | Refills: 1 | Status: SHIPPED | OUTPATIENT
Start: 2020-09-11 | End: 2020-10-09 | Stop reason: SDUPTHER

## 2020-09-11 RX ORDER — CLONAZEPAM 2 MG/1
2 TABLET ORAL 2 TIMES DAILY
Qty: 60 TABLET | Refills: 1 | Status: CANCELLED | OUTPATIENT
Start: 2020-09-11 | End: 2021-09-12

## 2020-09-18 ENCOUNTER — TELEPHONE (OUTPATIENT)
Dept: INTERNAL MEDICINE CLINIC | Age: 30
End: 2020-09-18

## 2020-09-18 NOTE — TELEPHONE ENCOUNTER
There are lots of things that can probably cause hair loss in women. It could even be a side effect of her medication-none of which we are willing to stop. There are no good medications for hair regrowth. For simplicity and safety reasons I suggest she just try over-the-counter Rogaine and see if this helps.

## 2020-10-08 ENCOUNTER — TELEPHONE (OUTPATIENT)
Dept: INTERNAL MEDICINE CLINIC | Age: 30
End: 2020-10-08

## 2020-10-08 NOTE — TELEPHONE ENCOUNTER
Patients  requesting refill for Klonopin 2 mg and Neurontin 300 mg sent to [facility] [address]. Please Advise.

## 2020-10-09 ENCOUNTER — TELEPHONE (OUTPATIENT)
Dept: INTERNAL MEDICINE CLINIC | Age: 30
End: 2020-10-09

## 2020-10-09 RX ORDER — CLONAZEPAM 2 MG/1
2 TABLET ORAL 2 TIMES DAILY
Qty: 60 TABLET | Refills: 2 | Status: SHIPPED | OUTPATIENT
Start: 2020-10-09 | End: 2020-11-13 | Stop reason: SDUPTHER

## 2020-10-09 RX ORDER — GABAPENTIN 300 MG/1
CAPSULE ORAL
Qty: 180 CAPSULE | Refills: 2 | Status: SHIPPED | OUTPATIENT
Start: 2020-10-09 | End: 2020-11-13 | Stop reason: SDUPTHER

## 2020-10-14 ENCOUNTER — TELEPHONE (OUTPATIENT)
Dept: INTERNAL MEDICINE CLINIC | Age: 30
End: 2020-10-14

## 2020-10-14 RX ORDER — GLUCOSAMINE HCL/CHONDROITIN SU 500-400 MG
CAPSULE ORAL
Qty: 300 STRIP | Refills: 3 | Status: SHIPPED | OUTPATIENT
Start: 2020-10-14 | End: 2021-09-13 | Stop reason: ALTCHOICE

## 2020-10-14 NOTE — TELEPHONE ENCOUNTER
Patient requesting refill for Contour Next Test Strips quantity: 300 sent to [facility]. FYI: Patient is currently in the hospital for epilepsy. Please Advise.

## 2020-10-20 ENCOUNTER — TELEPHONE (OUTPATIENT)
Dept: ENDOCRINOLOGY | Age: 30
End: 2020-10-20

## 2020-10-20 NOTE — TELEPHONE ENCOUNTER
[facility] medical said they sent to forms on 10/16/20 and mailed one as well for diabetic supplies they want to know status.

## 2020-10-21 ENCOUNTER — TELEPHONE (OUTPATIENT)
Dept: INTERNAL MEDICINE CLINIC | Age: 30
End: 2020-10-21

## 2020-10-21 NOTE — TELEPHONE ENCOUNTER
Spoke with [facility] representative and let them know we have not seen this patient since 09/2019. She said to disregard the paperwork and she would have patient services reach out to the patient to see who her endocrinologist is now.

## 2020-10-21 NOTE — TELEPHONE ENCOUNTER
Tracey with [facility] called stating she has been unable to get a hold of the patient, so they can not open her home care services. RN, PPT & Ot Services were ordered.      Thanks

## 2020-10-30 ENCOUNTER — CARE COORDINATION (OUTPATIENT)
Dept: CARE COORDINATION | Age: 30
End: 2020-10-30

## 2020-10-30 NOTE — CARE COORDINATION
First attempt to reach the pt by the RN, CHALO. The RNCHALO left a brief message offering assistance through Care Coordination. Introduction letter sent through [address]

## 2020-10-30 NOTE — LETTER
10/30/2020    [address] [place] [address]      Dear Felix Torre,    My name is Larry Torres and I am a registered nurse who partners with Roselyn Coates MD to improve patients' health. Roselyn Coates MD believes you would benefit from working with me. As a member of your health care team, I would work with other providers involved in your care, offer education for your specific health conditions, and connect you with additional resources as needed. I will collaborate with Roselyn Coates MD to support you in following your treatment plan. The additional support I provide is no additional cost to you. My primary focus is to help you achieve specific goals and improve your health. We are committed to walk with you on this journey and look forward to working with you. Please call me to further discuss your healthcare needs. I am available by phone or for appointments at the office. You can reach me at [phone].         In good health,         Larry Torres RN [facility]  Ambulatory Care Manager  Cell # [phone]

## 2020-11-05 ENCOUNTER — CARE COORDINATION (OUTPATIENT)
Dept: CARE COORDINATION | Age: 30
End: 2020-11-05

## 2020-11-05 NOTE — CARE COORDINATION
Second phone attempt to reach the pt by the RNCHALO. The RN, ACM called to offer Care Coordination to the pt.

## 2020-11-12 ENCOUNTER — CARE COORDINATION (OUTPATIENT)
Dept: CARE COORDINATION | Age: 30
End: 2020-11-12

## 2020-12-16 RX ORDER — ONDANSETRON 4 MG/1
4 TABLET, ORALLY DISINTEGRATING ORAL EVERY 8 HOURS PRN
Qty: 20 TABLET | Refills: 1 | Status: SHIPPED | OUTPATIENT
Start: 2020-12-16 | End: 2021-09-13

## 2020-12-16 RX ORDER — CLONAZEPAM 2 MG/1
2 TABLET ORAL 2 TIMES DAILY
Qty: 60 TABLET | Refills: 0 | Status: SHIPPED | OUTPATIENT
Start: 2020-12-16 | End: 2021-01-14 | Stop reason: SDUPTHER

## 2020-12-16 NOTE — TELEPHONE ENCOUNTER
clonazePAM (KLONOPIN) 2 MG tablet [[id number]- pt in need of a refill and would like it to be sent to pharmacy pls advise    ondansetron (ZOFRAN ODT) 4 MG disintegrating tablet- also requesting this to be refill as well    [address] [address] [address] [id number] - F [phone]   [address] [facility] [address]   Phone:  [phone]  Fax:  [phone]      ALSO- pt has Rx's at the [facility] here in [address] and would like to know if those rx's could be transferred to the new pharmacy on file pls advise      + stated they are stuck in [place] because 5 yo  Daughter caught covid so this is why they are requesting it to be sent to [address] [place] [address] in [place]

## 2021-01-14 ENCOUNTER — TELEPHONE (OUTPATIENT)
Dept: INTERNAL MEDICINE CLINIC | Age: 31
End: 2021-01-14

## 2021-01-14 DIAGNOSIS — E11.42 DIABETIC PERIPHERAL NEUROPATHY (HCC): ICD-10-CM

## 2021-01-14 DIAGNOSIS — G40.909 SEIZURE DISORDER (HCC): ICD-10-CM

## 2021-01-14 RX ORDER — CLONAZEPAM 2 MG/1
2 TABLET ORAL 2 TIMES DAILY
Qty: 60 TABLET | Refills: 0 | Status: SHIPPED | OUTPATIENT
Start: 2021-01-14 | End: 2021-02-17 | Stop reason: SDUPTHER

## 2021-01-14 RX ORDER — GABAPENTIN 300 MG/1
CAPSULE ORAL
Qty: 180 CAPSULE | Refills: 0 | Status: CANCELLED | OUTPATIENT
Start: 2021-01-14 | End: 2022-01-15

## 2021-01-14 RX ORDER — CLONAZEPAM 2 MG/1
2 TABLET ORAL 2 TIMES DAILY
Qty: 60 TABLET | Refills: 0 | Status: CANCELLED | OUTPATIENT
Start: 2021-01-14 | End: 2022-01-15

## 2021-01-14 RX ORDER — GABAPENTIN 300 MG/1
CAPSULE ORAL
Qty: 180 CAPSULE | Refills: 0 | Status: SHIPPED | OUTPATIENT
Start: 2021-01-14 | End: 2021-01-15 | Stop reason: SDUPTHER

## 2021-01-14 NOTE — TELEPHONE ENCOUNTER
???????? Pharmacy  She is overdue for visit or vv -- controlled substances. And she cannot run out d/t seizures. Get me a pharmacy. She needs visit. May not get done until tomorrow.

## 2021-01-14 NOTE — TELEPHONE ENCOUNTER
Patients spouse called to request refills of clonazePAM (KLONOPIN) 2 MG tablet and gabapentin (NEURONTIN) 300 MG capsule sent to gabapentin (NEURONTIN) 300 MG capsule. Please advise.

## 2021-01-15 ENCOUNTER — TELEPHONE (OUTPATIENT)
Dept: INTERNAL MEDICINE CLINIC | Age: 31
End: 2021-01-15

## 2021-01-15 DIAGNOSIS — E11.42 DIABETIC PERIPHERAL NEUROPATHY (HCC): ICD-10-CM

## 2021-01-15 RX ORDER — GABAPENTIN 300 MG/1
CAPSULE ORAL
Qty: 180 CAPSULE | Refills: 0 | Status: SHIPPED | OUTPATIENT
Start: 2021-01-15 | End: 2021-02-17 | Stop reason: SDUPTHER

## 2021-01-18 NOTE — TELEPHONE ENCOUNTER
Patient  called and can you please resend the gabapentin (NEURONTIN) 300 MG capsule [[id number]. It looks like it failed when it was sent to the pharmacy.      Please call to advise

## 2021-01-27 ENCOUNTER — TELEPHONE (OUTPATIENT)
Dept: INTERNAL MEDICINE CLINIC | Age: 31
End: 2021-01-27

## 2021-01-27 ENCOUNTER — VIRTUAL VISIT (OUTPATIENT)
Dept: INTERNAL MEDICINE CLINIC | Age: 31
End: 2021-01-27
Payer: COMMERCIAL

## 2021-01-27 DIAGNOSIS — E11.42 DIABETIC PERIPHERAL NEUROPATHY (HCC): ICD-10-CM

## 2021-01-27 DIAGNOSIS — E10.8 TYPE 1 DIABETES MELLITUS WITH COMPLICATION (HCC): ICD-10-CM

## 2021-01-27 DIAGNOSIS — G40.909 SEIZURE DISORDER (HCC): ICD-10-CM

## 2021-01-27 DIAGNOSIS — R63.4 WEIGHT LOSS, UNINTENTIONAL: ICD-10-CM

## 2021-01-27 DIAGNOSIS — R07.9 CHEST PAIN, UNSPECIFIED TYPE: ICD-10-CM

## 2021-01-27 DIAGNOSIS — F32.A ANXIETY AND DEPRESSION: ICD-10-CM

## 2021-01-27 DIAGNOSIS — F41.9 ANXIETY AND DEPRESSION: ICD-10-CM

## 2021-01-27 PROCEDURE — 2022F DILAT RTA XM EVC RTNOPTHY: CPT | Performed by: INTERNAL MEDICINE

## 2021-01-27 PROCEDURE — 3046F HEMOGLOBIN A1C LEVEL >9.0%: CPT | Performed by: INTERNAL MEDICINE

## 2021-01-27 PROCEDURE — G8427 DOCREV CUR MEDS BY ELIG CLIN: HCPCS | Performed by: INTERNAL MEDICINE

## 2021-01-27 PROCEDURE — 99214 OFFICE O/P EST MOD 30 MIN: CPT | Performed by: INTERNAL MEDICINE

## 2021-01-27 ASSESSMENT — ENCOUNTER SYMPTOMS
SHORTNESS OF BREATH: 0
NAUSEA: 0

## 2021-01-27 NOTE — PROGRESS NOTES
Pulmonary:      Effort: Pulmonary effort is normal. No respiratory distress. Skin:     Coloration: Skin is not pale. Neurological:      General: No focal deficit present. Mental Status: She is alert and oriented to person, place, and time. Psychiatric:         Mood and Affect: Mood normal.         Thought Content: Thought content normal.         Judgment: Judgment normal.         ASSESSMENT:       Encounter Diagnoses   Name Primary?  Diabetic peripheral neuropathy (HCC)     Seizure disorder (HCC)     Anxiety and depression     Type 1 diabetes mellitus with complication (HCC)     Weight loss, unintentional     Chest pain, unspecified type        Diabetic peripheral neuropathy ([address]  Continue gabapentin    Seizure disorder ([facility] [address]  Better control? Still having some seizures every couple weeks. She still sees neurology at [facility] and is currently taking only clonazepam and Keppra. It appears that she has been given a prescription for nayzilam by her neurologist at [facility], but she has no knowledge or recollection of getting this medication. I asked her to check with her neurologist.    Nerissa Rangel did speak with her neurologist, Dr. Jayne Mathew at [facility], and the patient did miss her last appointment it is not had follow-up. I informed her that the medication was prescribed but the patient is unaware of ever taking it or getting it. Anxiety and depression  Continue clonazepam.  Monitor report done. There is some discrepancy with getting nasal zolpidem (nayzilam) and the prescription that was apparently sent in for her by her neurologist.  I will try to contact neurology to clear this up. Type 1 diabetes mellitus with complication [facility])  She believes her sugars are doing better. Her last A1c was 8.2. No hypoglycemic episodes. Weight loss, unintentional  Approximately 10 pounds over the last couple of months. Patient's appetite is okay. She needs to follow-up here when back in [facility].     Chest pain Often at rest.  Occasionally associated with some tightness but no diaphoresis or breathing issues. Doubt cardiac but given her diabetes history she needs evaluation. Unfortunately she is in [place] right now. She is to go to the ER if the pain persists or causes any breathing problems. Especially if associated with diaphoresis. Follow-up here when she is back in town. PLAN:See ASSESSMENT for evaluation & PLAN     No orders of the defined types were placed in this encounter.    , PMH, SH and FH reviewed and noted. Recent and past labs, tests and consultsalso reviewed. Recent or new meds also reviewed.

## 2021-01-27 NOTE — TELEPHONE ENCOUNTER
Need last fills and who picked up nayzilam     Called [facility] in [place] pharmacy and they stated pt last fill there of anything was 6/11/20. Everything has been transferred to [facility] in [place]. That phone number is [phone]. Called [facility] in [place] they claimed that it was never filled there only in [place]. Called [place] back and she stated that it was filled on 10/16/20 and 11/10/20 but they are unable to see who picked it up.

## 2021-01-27 NOTE — ASSESSMENT & PLAN NOTE
Often at rest.  Occasionally associated with some tightness but no diaphoresis or breathing issues. Doubt cardiac but given her diabetes history she needs evaluation. Unfortunately she is in [place] right now. She is to go to the ER if the pain persists or causes any breathing problems. Especially if associated with diaphoresis. Follow-up here when she is back in town.

## 2021-01-27 NOTE — ASSESSMENT & PLAN NOTE
Better control? Still having some seizures every couple weeks. She still sees neurology at [facility] and is currently taking only clonazepam and Keppra. It appears that she has been given a prescription for nayzilam by her neurologist at [facility], but she has no knowledge or recollection of getting this medication. I asked her to check with her neurologist.    Hedda Skiff did speak with her neurologist, Dr. Diana Rivas at [facility], and the patient did miss her last appointment it is not had follow-up. I informed her that the medication was prescribed but the patient is unaware of ever taking it or getting it.

## 2021-01-27 NOTE — ASSESSMENT & PLAN NOTE
Continue clonazepam.  Monitor report done. There is some discrepancy with getting nasal zolpidem (nayzilam) and the prescription that was apparently sent in for her by her neurologist.  I will try to contact neurology to clear this up.

## 2021-01-27 NOTE — ASSESSMENT & PLAN NOTE
Approximately 10 pounds over the last couple of months. Patient's appetite is okay. She needs to follow-up here when back in [place].

## 2021-02-17 ENCOUNTER — TELEPHONE (OUTPATIENT)
Dept: INTERNAL MEDICINE CLINIC | Age: 31
End: 2021-02-17

## 2021-02-17 DIAGNOSIS — G40.909 SEIZURE DISORDER (HCC): ICD-10-CM

## 2021-02-17 DIAGNOSIS — E11.42 DIABETIC PERIPHERAL NEUROPATHY (HCC): ICD-10-CM

## 2021-02-17 RX ORDER — GABAPENTIN 300 MG/1
CAPSULE ORAL
Qty: 180 CAPSULE | Refills: 1 | Status: SHIPPED | OUTPATIENT
Start: 2021-02-17 | End: 2021-09-13 | Stop reason: SDUPTHER

## 2021-02-17 RX ORDER — GABAPENTIN 300 MG/1
CAPSULE ORAL
Qty: 180 CAPSULE | Refills: 0 | Status: SHIPPED | OUTPATIENT
Start: 2021-02-17 | End: 2021-02-17 | Stop reason: SDUPTHER

## 2021-02-17 RX ORDER — CLONAZEPAM 2 MG/1
2 TABLET ORAL 2 TIMES DAILY
Qty: 60 TABLET | Refills: 0 | Status: SHIPPED | OUTPATIENT
Start: 2021-02-17 | End: 2021-09-13 | Stop reason: SDUPTHER

## 2021-02-17 RX ORDER — GABAPENTIN 300 MG/1
CAPSULE ORAL
Qty: 180 CAPSULE | Refills: 0 | Status: SHIPPED | OUTPATIENT
Start: 2021-02-17 | End: 2021-09-13 | Stop reason: SDUPTHER

## 2021-02-17 RX ORDER — CLONAZEPAM 2 MG/1
2 TABLET ORAL 2 TIMES DAILY
Qty: 60 TABLET | Refills: 1 | Status: SHIPPED | OUTPATIENT
Start: 2021-02-17 | End: 2021-09-13 | Stop reason: ALTCHOICE

## 2021-02-17 NOTE — TELEPHONE ENCOUNTER
Spoke to the patient's , Bessie Tello and advised him that after this refill, she will not get anymore until she comes in the office for a face to face. Thanks.

## 2021-02-17 NOTE — TELEPHONE ENCOUNTER
Patient living in [place], needs her meds sent to [facility]. Meds sent. Orders Placed This Encounter   Medications    gabapentin (NEURONTIN) 300 MG capsule     Sig: Take 1 Capsule 3 times daily during the day and 3 at bedtime     Dispense:  180 capsule     Refill:  0    clonazePAM (KLONOPIN) 2 MG tablet     Sig: Take 1 tablet by mouth 2 times daily. Dispense:  60 tablet     Refill:  0     Controlled Substance Monitoring:    Acute and Chronic Pain Monitoring:   RX Monitoring 2/17/2021   Attestation -   Periodic Controlled Substance Monitoring No signs of potential drug abuse or diversion identified.

## 2021-02-17 NOTE — TELEPHONE ENCOUNTER
Patients  Cookie Hardy is calling requesting refills for Perry Dills, she is needing the following medications refilled.     -gabapentin (NEURONTIN) 300 MG capsule  -clonazePAM (KLONOPIN) 2 MG tablet    Last OV:1/27/21  Next OV: Not scheduled     Pharmacy is confirmed correct in patients chart.

## 2021-02-18 ENCOUNTER — TELEPHONE (OUTPATIENT)
Dept: INTERNAL MEDICINE CLINIC | Age: 31
End: 2021-02-18

## 2021-02-18 NOTE — TELEPHONE ENCOUNTER
Spoke to MD about this he stated to go ahead and call in a 30 day of gabapentin but pt needs to have a face to face before anymore refills

## 2021-02-18 NOTE — TELEPHONE ENCOUNTER
Needs gabapentin sent to [facility], which I did electronically but they are saying they do not have it. When I called it says the pharmacy is closed. Will send electronically again.      Orders Placed This Encounter   Medications    gabapentin (NEURONTIN) 300 MG capsule     Sig: Take 1 Capsule 3 times daily during the day and 3 at bedtime     Dispense:  180 capsule     Refill:  0

## 2021-02-18 NOTE — TELEPHONE ENCOUNTER
, Eliel Cowan called stating that he called twice yesterday regarding refills on his wife's prescriptions, they were sent to the wrong pharmacy . He spoke to the on call last night, Dr. Joe Tran, who was able to send the prescription, clonazePAM (KLONOPIN) 2 MG tablet [[id number] to the right pharmacy in [place], [place] but he was not able to get the gabapentin (NEURONTIN) 300 MG capsule [[id number] to go through. Please call to advise.

## 2021-09-13 ENCOUNTER — OFFICE VISIT (OUTPATIENT)
Dept: INTERNAL MEDICINE CLINIC | Age: 31
End: 2021-09-13
Payer: COMMERCIAL

## 2021-09-13 VITALS
HEIGHT: 59 IN | WEIGHT: 101 LBS | BODY MASS INDEX: 20.36 KG/M2 | SYSTOLIC BLOOD PRESSURE: 100 MMHG | DIASTOLIC BLOOD PRESSURE: 64 MMHG

## 2021-09-13 DIAGNOSIS — R63.4 WEIGHT LOSS, UNINTENTIONAL: ICD-10-CM

## 2021-09-13 DIAGNOSIS — G40.909 SEIZURE DISORDER (HCC): ICD-10-CM

## 2021-09-13 DIAGNOSIS — R00.0 TACHYCARDIA: ICD-10-CM

## 2021-09-13 DIAGNOSIS — E11.42 DIABETIC PERIPHERAL NEUROPATHY (HCC): ICD-10-CM

## 2021-09-13 DIAGNOSIS — E10.8 TYPE 1 DIABETES MELLITUS WITH COMPLICATION (HCC): Primary | ICD-10-CM

## 2021-09-13 DIAGNOSIS — F41.1 GAD (GENERALIZED ANXIETY DISORDER): ICD-10-CM

## 2021-09-13 PROBLEM — R07.9 CHEST PAIN: Status: RESOLVED | Noted: 2021-01-27 | Resolved: 2021-09-13

## 2021-09-13 PROCEDURE — 99214 OFFICE O/P EST MOD 30 MIN: CPT | Performed by: INTERNAL MEDICINE

## 2021-09-13 PROCEDURE — G8427 DOCREV CUR MEDS BY ELIG CLIN: HCPCS | Performed by: INTERNAL MEDICINE

## 2021-09-13 PROCEDURE — G8420 CALC BMI NORM PARAMETERS: HCPCS | Performed by: INTERNAL MEDICINE

## 2021-09-13 PROCEDURE — 3046F HEMOGLOBIN A1C LEVEL >9.0%: CPT | Performed by: INTERNAL MEDICINE

## 2021-09-13 PROCEDURE — 2022F DILAT RTA XM EVC RTNOPTHY: CPT | Performed by: INTERNAL MEDICINE

## 2021-09-13 PROCEDURE — 1036F TOBACCO NON-USER: CPT | Performed by: INTERNAL MEDICINE

## 2021-09-13 RX ORDER — GABAPENTIN 300 MG/1
CAPSULE ORAL
Qty: 180 CAPSULE | Refills: 1 | Status: SHIPPED | OUTPATIENT
Start: 2021-09-13 | End: 2021-11-08 | Stop reason: SDUPTHER

## 2021-09-13 RX ORDER — CLONAZEPAM 2 MG/1
2 TABLET ORAL 2 TIMES DAILY
Qty: 60 TABLET | Refills: 1 | Status: SHIPPED | OUTPATIENT
Start: 2021-09-13 | End: 2021-11-16 | Stop reason: SDUPTHER

## 2021-09-13 SDOH — ECONOMIC STABILITY: FOOD INSECURITY: WITHIN THE PAST 12 MONTHS, YOU WORRIED THAT YOUR FOOD WOULD RUN OUT BEFORE YOU GOT MONEY TO BUY MORE.: NEVER TRUE

## 2021-09-13 SDOH — ECONOMIC STABILITY: FOOD INSECURITY: WITHIN THE PAST 12 MONTHS, THE FOOD YOU BOUGHT JUST DIDN'T LAST AND YOU DIDN'T HAVE MONEY TO GET MORE.: NEVER TRUE

## 2021-09-13 ASSESSMENT — SOCIAL DETERMINANTS OF HEALTH (SDOH): HOW HARD IS IT FOR YOU TO PAY FOR THE VERY BASICS LIKE FOOD, HOUSING, MEDICAL CARE, AND HEATING?: NOT HARD AT ALL

## 2021-09-13 ASSESSMENT — ENCOUNTER SYMPTOMS
GASTROINTESTINAL NEGATIVE: 1
SHORTNESS OF BREATH: 0

## 2021-09-13 NOTE — ASSESSMENT & PLAN NOTE
restarting gabapentinI have asked her to start only a 2 a day and work-up slowly by 1/day to her usual dose.

## 2021-09-13 NOTE — ASSESSMENT & PLAN NOTE
she has lost considerable weight over the last several months. She is actually down 10 pounds from her previous visit here. Considerations include poor diabetes control, poor nutritional support, and a history of anorexia. Depending upon labs and response to treatment she may need reestablish with psychiatric care if necessary.

## 2021-09-13 NOTE — ASSESSMENT & PLAN NOTE
she is off of her insulin pump. Using NovoLog with a sliding scale. We will continue to do this for now. I recommend she go back to endocrinologyI also recommend Dr. Falcon Molly if she can get into see him. Labs today including A1c.

## 2021-09-13 NOTE — ASSESSMENT & PLAN NOTE
she has moved back to [place]. She still is taking her Keppra but she has not been taking clonazepam for a month. She has had a few seizures over the last week. Her seizures have been generally refractory except when she has been on maximal medication over the years. Continue Keppra and restart her clonazepam.  Follow-up in 2 weeks. She previously was seen by several neurologists here in the [place] area.

## 2021-09-13 NOTE — ASSESSMENT & PLAN NOTE
placing her back on her clonazepam.  This had a dual action of also significantly helping with her seizures.

## 2021-09-13 NOTE — PROGRESS NOTES
SUBJECTIVE:  Patient ID: Komal Molina is an 27 y.o. female. HPI: Patient here today for the f/u of chronic problems-- see Problem List and associated comments. New issues or complaints include (also see Assessment for more details): Patient is here after living in [place] for several months. For family reasons that did not work out there and she has been back in [place] for approximately a month. She is still taking some of her medication but not all of it. She is no longer on the insulin pump since she has not seen an endocrinologist.  She has been using NovoLog sliding scale several times during the day. She is also having some seizures again, and she has been off clonazepam for approximately a month. She has had a poor appetite and noted weight loss. She did receive one Covid vaccination several months ago. She believes it was the maternal.  I advised her to get her second vaccination here in [place]. Review of Systems   Constitutional: Positive for unexpected weight change. Negative for fever. Respiratory: Negative for shortness of breath. Cardiovascular: Negative for chest pain and palpitations. Gastrointestinal: Negative. Genitourinary: Negative for decreased urine volume and dysuria. Neurological: Positive for seizures. Psychiatric/Behavioral: Negative for dysphoric mood. The patient is nervous/anxious. OBJECTIVE:    /64 (Site: Right Upper Arm)   Ht 4' 11\" (1.499 m)   Wt 101 lb (45.8 kg)   BMI 20.40 kg/m²      Physical Exam  Constitutional:       General: She is not in acute distress. Appearance: She is not toxic-appearing or diaphoretic. HENT:      Head: Normocephalic and atraumatic. Mouth/Throat:      Mouth: Mucous membranes are moist.      Pharynx: No oropharyngeal exudate or posterior oropharyngeal erythema. Eyes:      Extraocular Movements: Extraocular movements intact. Cardiovascular:      Rate and Rhythm: Regular rhythm.  Tachycardia present. Heart sounds: No murmur heard. Pulmonary:      Effort: Pulmonary effort is normal. No respiratory distress. Breath sounds: Normal breath sounds. Abdominal:      General: Bowel sounds are normal.      Palpations: Abdomen is soft. Tenderness: There is no abdominal tenderness. Musculoskeletal:      Cervical back: Normal range of motion and neck supple. Right lower leg: No edema. Left lower leg: No edema. Comments: Soft tissue lipomatous-like mass over left tricep   Lymphadenopathy:      Cervical: No cervical adenopathy. Skin:     Coloration: Skin is not jaundiced or pale. Neurological:      General: No focal deficit present. Mental Status: She is oriented to person, place, and time. Psychiatric:         Mood and Affect: Mood is anxious. Mood is not depressed. Speech: Speech normal.         Behavior: Behavior is not aggressive, withdrawn or hyperactive. Thought Content: Thought content is not delusional.         Cognition and Memory: Cognition normal.         ASSESSMENT:       Encounter Diagnoses   Name Primary?  Type 1 diabetes mellitus with complication (HCC) Yes    Diabetic peripheral neuropathy (HCC)     Seizure disorder (HCC)     Weight loss, unintentional     ROME (generalized anxiety disorder)     Tachycardia        Seizure disorder ([address]    she has moved back to [place]. She still is taking her Keppra but she has not been taking clonazepam for a month. She has had a few seizures over the last week. Her seizures have been generally refractory except when she has been on maximal medication over the years. Continue Keppra and restart her clonazepam.  Follow-up in 2 weeks. She previously was seen by several neurologists here in the [place] area. Type 1 diabetes mellitus with complication (HCC)    she is off of her insulin pump. Using NovoLog with a sliding scale. We will continue to do this for now.   I recommend she go back to endocrinologyI also recommend Dr. Levy Patience if she can get into see him. Labs today including A1c. Weight loss, unintentional    she has lost considerable weight over the last several months. She is actually down 10 pounds from her previous visit here. Considerations include poor diabetes control, poor nutritional support, and a history of anorexia. Depending upon labs and response to treatment she may need reestablish with psychiatric care if necessary. ROME (generalized anxiety disorder)    placing her back on her clonazepam.  This had a dual action of also significantly helping with her seizures. Tachycardia    heart rate of 120-130, regular. Patient states that her heart rate is usually normal but she is extremely anxious upon coming to the visit. Discussed referring her to the ER. At this point we will check some labs and have her come back in 1 week. Diabetic peripheral neuropathy ([address]    restarting gabapentinI have asked her to start only a 2 a day and work-up slowly by 1/day to her usual dose. PLAN:See ASSESSMENT for evaluation & PLAN     Orders Placed This Encounter   Procedures    CBC Auto Differential     Standing Status:   Future     Number of Occurrences:   1     Standing Expiration Date:   9/13/2022    Comprehensive Metabolic Panel     Standing Status:   Future     Number of Occurrences:   1     Standing Expiration Date:   9/13/2022    Hemoglobin A1C     Standing Status:   Future     Number of Occurrences:   1     Standing Expiration Date:   9/13/2022    TSH WITH REFLEX TO FT4     Standing Status:   Future     Number of Occurrences:   1     Standing Expiration Date:   9/13/2022    Lipid Panel     Standing Status:   Future     Number of Occurrences:   1     Standing Expiration Date:   9/13/2022     Order Specific Question:   Is Patient Fasting?/# of Hours     Answer:   yes - 8 hours     , PMH, SH and FH reviewed and noted.   Recent and past labs, tests and consultsalso reviewed. Recent or new meds also reviewed.

## 2021-09-22 ENCOUNTER — OFFICE VISIT (OUTPATIENT)
Dept: INTERNAL MEDICINE CLINIC | Age: 31
End: 2021-09-22
Payer: COMMERCIAL

## 2021-09-22 VITALS
DIASTOLIC BLOOD PRESSURE: 58 MMHG | SYSTOLIC BLOOD PRESSURE: 99 MMHG | BODY MASS INDEX: 21.17 KG/M2 | HEART RATE: 90 BPM | HEIGHT: 59 IN | WEIGHT: 105 LBS

## 2021-09-22 DIAGNOSIS — F41.9 ANXIETY AND DEPRESSION: ICD-10-CM

## 2021-09-22 DIAGNOSIS — R74.01 ELEVATED TRANSAMINASE LEVEL: ICD-10-CM

## 2021-09-22 DIAGNOSIS — R00.0 TACHYCARDIA: ICD-10-CM

## 2021-09-22 DIAGNOSIS — E10.8 TYPE 1 DIABETES MELLITUS WITH COMPLICATION (HCC): Primary | ICD-10-CM

## 2021-09-22 DIAGNOSIS — F41.1 GAD (GENERALIZED ANXIETY DISORDER): ICD-10-CM

## 2021-09-22 DIAGNOSIS — G40.909 SEIZURE DISORDER (HCC): ICD-10-CM

## 2021-09-22 DIAGNOSIS — R63.4 WEIGHT LOSS, UNINTENTIONAL: ICD-10-CM

## 2021-09-22 DIAGNOSIS — D72.825 BANDEMIA: ICD-10-CM

## 2021-09-22 DIAGNOSIS — F32.A ANXIETY AND DEPRESSION: ICD-10-CM

## 2021-09-22 PROBLEM — T50.905A DRUG-RELATED HAIR LOSS: Status: RESOLVED | Noted: 2020-08-03 | Resolved: 2021-09-22

## 2021-09-22 PROBLEM — L65.8 DRUG-RELATED HAIR LOSS: Status: RESOLVED | Noted: 2020-08-03 | Resolved: 2021-09-22

## 2021-09-22 PROBLEM — E55.9 VITAMIN D DEFICIENCY: Status: RESOLVED | Noted: 2018-10-28 | Resolved: 2021-09-22

## 2021-09-22 PROCEDURE — 2022F DILAT RTA XM EVC RTNOPTHY: CPT | Performed by: INTERNAL MEDICINE

## 2021-09-22 PROCEDURE — 1036F TOBACCO NON-USER: CPT | Performed by: INTERNAL MEDICINE

## 2021-09-22 PROCEDURE — 3046F HEMOGLOBIN A1C LEVEL >9.0%: CPT | Performed by: INTERNAL MEDICINE

## 2021-09-22 PROCEDURE — G8420 CALC BMI NORM PARAMETERS: HCPCS | Performed by: INTERNAL MEDICINE

## 2021-09-22 PROCEDURE — 99214 OFFICE O/P EST MOD 30 MIN: CPT | Performed by: INTERNAL MEDICINE

## 2021-09-22 PROCEDURE — G8427 DOCREV CUR MEDS BY ELIG CLIN: HCPCS | Performed by: INTERNAL MEDICINE

## 2021-09-22 RX ORDER — ATORVASTATIN CALCIUM 20 MG/1
20 TABLET, FILM COATED ORAL DAILY
Qty: 90 TABLET | Refills: 3 | Status: ON HOLD | OUTPATIENT
Start: 2021-09-22 | End: 2021-11-23

## 2021-09-22 ASSESSMENT — ENCOUNTER SYMPTOMS: SHORTNESS OF BREATH: 0

## 2021-09-22 NOTE — ASSESSMENT & PLAN NOTE
doing much better now that she has back in [place]. Seizures are improved. Sugars doing better. Been much better back on clonazepam.  Continue Zoloft.

## 2021-09-22 NOTE — ASSESSMENT & PLAN NOTE
sugars are now averaging in the upper 180s. She is back on her Dexcom 6. Samples today. She needs to follow-up with endocrinology.

## 2021-09-22 NOTE — ASSESSMENT & PLAN NOTE
mild transaminase elevation. Etiology unknown but it could be from her uncontrolled diabetes and medication. She is clinically doing better-we will recheck labs in 6 weeks in follow-up. If still elevated she may need further evaluation laboratory wise.

## 2021-09-22 NOTE — PROGRESS NOTES
focal deficit present. Mental Status: She is alert and oriented to person, place, and time. Motor: No tremor, abnormal muscle tone or seizure activity. Coordination: Coordination is intact. Psychiatric:         Mood and Affect: Mood normal.         Thought Content: Thought content normal.         Judgment: Judgment normal.         ASSESSMENT:       Encounter Diagnoses   Name Primary?  Type 1 diabetes mellitus with complication (HCC) Yes    Elevated transaminase level     Bandemia     Seizure disorder (HCC)     Anxiety and depression     ROME (generalized anxiety disorder)     Weight loss, unintentional     Tachycardia        Seizure disorder (HCC)    much improved. Continue Keppra and clonazepam.    Type 1 diabetes mellitus with complication (HCC)    sugars are now averaging in the upper 180s. She is back on her Dexcom 6. Samples today. She needs to follow-up with endocrinology. Anxiety and depression    doing much better now that she has back in [place]. Seizures are improved. Sugars doing better. Been much better back on clonazepam.  Continue Zoloft. ROME (generalized anxiety disorder)    improved. See anxiety and depression    Weight loss, unintentional    she has regained some weight-approximately 4 pounds in the last couple weeks. Appetite better. Better sugar control and clonazepam have helped. Tachycardia    significantly improved. Rehydrated. Back on her medications including clonazepam.    Elevated transaminase level    mild transaminase elevation. Etiology unknown but it could be from her uncontrolled diabetes and medication. She is clinically doing better-we will recheck labs in 6 weeks in follow-up. If still elevated she may need further evaluation laboratory wise. Bandemia    elevated white count. No obvious source of any infection. Recheck in 6 weeks.         PLAN:See ASSESSMENT for evaluation & PLAN     Orders Placed This Encounter   Procedures  CBC Auto Differential     Standing Status:   Future     Standing Expiration Date:   9/22/2022    Comprehensive Metabolic Panel     Standing Status:   Future     Standing Expiration Date:   9/22/2022    Hemoglobin A1C     Standing Status:   Future     Standing Expiration Date:   9/22/2022     , PMH, SH and FH reviewed and noted. Recent and past labs, tests and consultsalso reviewed. Recent or new meds also reviewed.

## 2021-09-22 NOTE — ASSESSMENT & PLAN NOTE
she has regained some weight-approximately 4 pounds in the last couple weeks. Appetite better. Better sugar control and clonazepam have helped.

## 2021-09-27 ENCOUNTER — TELEPHONE (OUTPATIENT)
Dept: INTERNAL MEDICINE CLINIC | Age: 31
End: 2021-09-27

## 2021-09-27 NOTE — TELEPHONE ENCOUNTER
stated that she has been working and by the time she gets home her ankles and feet are swollen.  She just started working 4 days ago and on her feet all day

## 2021-09-27 NOTE — TELEPHONE ENCOUNTER
She is to frail and thin to be given diuretics without a diagnosis of heart failure, which she does not have. There could be several other reasons for her swelling, and I do not believe diuretics are the answer. She may want to try support hose instead.

## 2021-09-30 ENCOUNTER — TELEPHONE (OUTPATIENT)
Dept: INTERNAL MEDICINE CLINIC | Age: 31
End: 2021-09-30

## 2021-10-27 ENCOUNTER — OFFICE VISIT (OUTPATIENT)
Dept: INTERNAL MEDICINE CLINIC | Age: 31
End: 2021-10-27
Payer: COMMERCIAL

## 2021-10-27 VITALS
BODY MASS INDEX: 20.12 KG/M2 | DIASTOLIC BLOOD PRESSURE: 58 MMHG | WEIGHT: 99.6 LBS | SYSTOLIC BLOOD PRESSURE: 94 MMHG | OXYGEN SATURATION: 99 % | HEART RATE: 118 BPM

## 2021-10-27 DIAGNOSIS — E10.8 TYPE 1 DIABETES MELLITUS WITH COMPLICATION (HCC): ICD-10-CM

## 2021-10-27 DIAGNOSIS — D72.825 BANDEMIA: ICD-10-CM

## 2021-10-27 DIAGNOSIS — R74.01 ELEVATED TRANSAMINASE LEVEL: ICD-10-CM

## 2021-10-27 DIAGNOSIS — G40.909 SEIZURE DISORDER (HCC): ICD-10-CM

## 2021-10-27 DIAGNOSIS — R00.0 TACHYCARDIA: ICD-10-CM

## 2021-10-27 PROCEDURE — 3046F HEMOGLOBIN A1C LEVEL >9.0%: CPT | Performed by: INTERNAL MEDICINE

## 2021-10-27 PROCEDURE — 2022F DILAT RTA XM EVC RTNOPTHY: CPT | Performed by: INTERNAL MEDICINE

## 2021-10-27 PROCEDURE — G8484 FLU IMMUNIZE NO ADMIN: HCPCS | Performed by: INTERNAL MEDICINE

## 2021-10-27 PROCEDURE — G8420 CALC BMI NORM PARAMETERS: HCPCS | Performed by: INTERNAL MEDICINE

## 2021-10-27 PROCEDURE — 1036F TOBACCO NON-USER: CPT | Performed by: INTERNAL MEDICINE

## 2021-10-27 PROCEDURE — 99214 OFFICE O/P EST MOD 30 MIN: CPT | Performed by: INTERNAL MEDICINE

## 2021-10-27 PROCEDURE — G8427 DOCREV CUR MEDS BY ELIG CLIN: HCPCS | Performed by: INTERNAL MEDICINE

## 2021-10-27 RX ORDER — NADOLOL 20 MG/1
20 TABLET ORAL DAILY
Qty: 30 TABLET | Refills: 5 | Status: SHIPPED | OUTPATIENT
Start: 2021-10-27 | End: 2022-05-24

## 2021-10-27 ASSESSMENT — ENCOUNTER SYMPTOMS: SHORTNESS OF BREATH: 0

## 2021-10-27 NOTE — PROGRESS NOTES
mellitus with complication (HCC)     Tachycardia     Bandemia        Seizure disorder ([facility] [address]    2 seizures recentlyin her sleep. Seizures in a long time. She endorses compliance with her therapy. She believes these were due to stress both at work and at home. Type 1 diabetes mellitus with complication [facility])    she believes her sugars are doing better. Gave Dexcom samples. She will see endocrinology in approximately 3 weeks. Check labs and A1c today. Tachycardia    still tachycardic. Gave prescription for nadolol 20 mgshe will only take one half daily for now until she follows up with her new PCP. Bandemia    recheck CBC. If it continues to be elevated, would then consider hematology consult. PLAN:See ASSESSMENT for evaluation & PLAN     No orders of the defined types were placed in this encounter.    , PMH, SH and FH reviewed and noted. Recent and past labs, tests and consultsalso reviewed. Recent or new meds also reviewed.

## 2021-10-27 NOTE — ASSESSMENT & PLAN NOTE
2 seizures recentlyin her sleep. Seizures in a long time. She endorses compliance with her therapy. She believes these were due to stress both at work and at home.

## 2021-10-27 NOTE — ASSESSMENT & PLAN NOTE
still tachycardic. Gave prescription for nadolol 20 mgshe will only take one half daily for now until she follows up with her new PCP.

## 2021-10-27 NOTE — ASSESSMENT & PLAN NOTE
she believes her sugars are doing better. Gave Dexcom samples. She will see endocrinology in approximately 3 weeks. Check labs and A1c today.

## 2021-10-28 DIAGNOSIS — R74.01 ELEVATED TRANSAMINASE LEVEL: Primary | ICD-10-CM

## 2021-10-28 LAB
A/G RATIO: 1.4 (ref 1.1–2.2)
ALBUMIN SERPL-MCNC: 4.1 G/DL (ref 3.4–5)
ALP BLD-CCNC: 115 U/L (ref 40–129)
ALT SERPL-CCNC: 124 U/L (ref 10–40)
ANION GAP SERPL CALCULATED.3IONS-SCNC: 17 MMOL/L (ref 3–16)
AST SERPL-CCNC: 176 U/L (ref 15–37)
BASOPHILS ABSOLUTE: 0.1 K/UL (ref 0–0.2)
BASOPHILS RELATIVE PERCENT: 1.4 %
BILIRUB SERPL-MCNC: 0.3 MG/DL (ref 0–1)
BUN BLDV-MCNC: 8 MG/DL (ref 7–20)
CALCIUM SERPL-MCNC: 9.1 MG/DL (ref 8.3–10.6)
CHLORIDE BLD-SCNC: 95 MMOL/L (ref 99–110)
CO2: 20 MMOL/L (ref 21–32)
CREAT SERPL-MCNC: <0.5 MG/DL (ref 0.6–1.1)
EOSINOPHILS ABSOLUTE: 0 K/UL (ref 0–0.6)
EOSINOPHILS RELATIVE PERCENT: 0.8 %
ESTIMATED AVERAGE GLUCOSE: 280.5 MG/DL
GFR AFRICAN AMERICAN: >60
GFR NON-AFRICAN AMERICAN: >60
GLOBULIN: 2.9 G/DL
GLUCOSE BLD-MCNC: 427 MG/DL (ref 70–99)
HBA1C MFR BLD: 11.4 %
HCT VFR BLD CALC: 41 % (ref 36–48)
HEMOGLOBIN: 13.3 G/DL (ref 12–16)
LYMPHOCYTES ABSOLUTE: 1.5 K/UL (ref 1–5.1)
LYMPHOCYTES RELATIVE PERCENT: 35.5 %
MCH RBC QN AUTO: 29.1 PG (ref 26–34)
MCHC RBC AUTO-ENTMCNC: 32.5 G/DL (ref 31–36)
MCV RBC AUTO: 89.5 FL (ref 80–100)
MONOCYTES ABSOLUTE: 0.4 K/UL (ref 0–1.3)
MONOCYTES RELATIVE PERCENT: 10.1 %
NEUTROPHILS ABSOLUTE: 2.2 K/UL (ref 1.7–7.7)
NEUTROPHILS RELATIVE PERCENT: 52.2 %
PDW BLD-RTO: 14.2 % (ref 12.4–15.4)
PLATELET # BLD: 209 K/UL (ref 135–450)
PMV BLD AUTO: 10.1 FL (ref 5–10.5)
POTASSIUM SERPL-SCNC: 4.5 MMOL/L (ref 3.5–5.1)
RBC # BLD: 4.58 M/UL (ref 4–5.2)
SODIUM BLD-SCNC: 132 MMOL/L (ref 136–145)
TOTAL PROTEIN: 7 G/DL (ref 6.4–8.2)
WBC # BLD: 4.1 K/UL (ref 4–11)

## 2021-10-30 ENCOUNTER — HOSPITAL ENCOUNTER (INPATIENT)
Age: 31
LOS: 2 days | Discharge: HOME | DRG: 420 | End: 2021-11-01
Attending: STUDENT IN AN ORGANIZED HEALTH CARE EDUCATION/TRAINING PROGRAM | Admitting: INTERNAL MEDICINE
Payer: COMMERCIAL

## 2021-10-30 DIAGNOSIS — E10.10 DIABETIC KETOACIDOSIS WITHOUT COMA ASSOCIATED WITH TYPE 1 DIABETES MELLITUS (HCC): Primary | ICD-10-CM

## 2021-10-30 DIAGNOSIS — R56.9 SEIZURE (HCC): ICD-10-CM

## 2021-10-30 LAB
ANION GAP SERPL CALCULATED.3IONS-SCNC: 11 MMOL/L (ref 3–16)
ANION GAP SERPL CALCULATED.3IONS-SCNC: 16 MMOL/L (ref 3–16)
ANION GAP SERPL CALCULATED.3IONS-SCNC: 19 MMOL/L (ref 3–16)
ANION GAP SERPL CALCULATED.3IONS-SCNC: 24 MMOL/L (ref 3–16)
ANION GAP SERPL CALCULATED.3IONS-SCNC: 28 MMOL/L (ref 3–16)
ANION GAP SERPL CALCULATED.3IONS-SCNC: 9 MMOL/L (ref 3–16)
BACTERIA: ABNORMAL /HPF
BASE EXCESS VENOUS: -3.4 MMOL/L
BASOPHILS ABSOLUTE: 0 K/UL (ref 0–0.2)
BASOPHILS RELATIVE PERCENT: 1 %
BETA-HYDROXYBUTYRATE: 2.58 MMOL/L (ref 0–0.27)
BILIRUBIN URINE: NEGATIVE
BLOOD, URINE: NEGATIVE
BUN BLDV-MCNC: 4 MG/DL (ref 7–20)
BUN BLDV-MCNC: 4 MG/DL (ref 7–20)
BUN BLDV-MCNC: 5 MG/DL (ref 7–20)
BUN BLDV-MCNC: 5 MG/DL (ref 7–20)
BUN BLDV-MCNC: 6 MG/DL (ref 7–20)
BUN BLDV-MCNC: 7 MG/DL (ref 7–20)
CALCIUM SERPL-MCNC: 8 MG/DL (ref 8.3–10.6)
CALCIUM SERPL-MCNC: 8.2 MG/DL (ref 8.3–10.6)
CALCIUM SERPL-MCNC: 8.4 MG/DL (ref 8.3–10.6)
CALCIUM SERPL-MCNC: 8.4 MG/DL (ref 8.3–10.6)
CALCIUM SERPL-MCNC: 8.8 MG/DL (ref 8.3–10.6)
CALCIUM SERPL-MCNC: 8.8 MG/DL (ref 8.3–10.6)
CARBOXYHEMOGLOBIN: 1.3 %
CHLORIDE BLD-SCNC: 100 MMOL/L (ref 99–110)
CHLORIDE BLD-SCNC: 101 MMOL/L (ref 99–110)
CHLORIDE BLD-SCNC: 103 MMOL/L (ref 99–110)
CHLORIDE BLD-SCNC: 105 MMOL/L (ref 99–110)
CHLORIDE BLD-SCNC: 84 MMOL/L (ref 99–110)
CHLORIDE BLD-SCNC: 93 MMOL/L (ref 99–110)
CLARITY: ABNORMAL
CO2: 12 MMOL/L (ref 21–32)
CO2: 16 MMOL/L (ref 21–32)
CO2: 16 MMOL/L (ref 21–32)
CO2: 18 MMOL/L (ref 21–32)
CO2: 21 MMOL/L (ref 21–32)
CO2: 24 MMOL/L (ref 21–32)
COLOR: YELLOW
COMMENT UA: ABNORMAL
CREAT SERPL-MCNC: 0.6 MG/DL (ref 0.6–1.1)
CREAT SERPL-MCNC: 0.8 MG/DL (ref 0.6–1.1)
CREAT SERPL-MCNC: 0.9 MG/DL (ref 0.6–1.1)
CREAT SERPL-MCNC: <0.5 MG/DL (ref 0.6–1.1)
EOSINOPHILS ABSOLUTE: 0 K/UL (ref 0–0.6)
EOSINOPHILS RELATIVE PERCENT: 0.5 %
EPITHELIAL CELLS, UA: 36 /HPF (ref 0–5)
GFR AFRICAN AMERICAN: >60
GFR NON-AFRICAN AMERICAN: >60
GLUCOSE BLD-MCNC: 1020 MG/DL (ref 70–99)
GLUCOSE BLD-MCNC: 119 MG/DL (ref 70–99)
GLUCOSE BLD-MCNC: 123 MG/DL (ref 70–99)
GLUCOSE BLD-MCNC: 144 MG/DL (ref 70–99)
GLUCOSE BLD-MCNC: 145 MG/DL (ref 70–99)
GLUCOSE BLD-MCNC: 148 MG/DL (ref 70–99)
GLUCOSE BLD-MCNC: 151 MG/DL (ref 70–99)
GLUCOSE BLD-MCNC: 154 MG/DL (ref 70–99)
GLUCOSE BLD-MCNC: 155 MG/DL (ref 70–99)
GLUCOSE BLD-MCNC: 210 MG/DL (ref 70–99)
GLUCOSE BLD-MCNC: 212 MG/DL (ref 70–99)
GLUCOSE BLD-MCNC: 214 MG/DL (ref 70–99)
GLUCOSE BLD-MCNC: 234 MG/DL (ref 70–99)
GLUCOSE BLD-MCNC: 256 MG/DL (ref 70–99)
GLUCOSE BLD-MCNC: 257 MG/DL (ref 70–99)
GLUCOSE BLD-MCNC: 260 MG/DL (ref 70–99)
GLUCOSE BLD-MCNC: 276 MG/DL (ref 70–99)
GLUCOSE BLD-MCNC: 283 MG/DL (ref 70–99)
GLUCOSE BLD-MCNC: 331 MG/DL (ref 70–99)
GLUCOSE BLD-MCNC: 372 MG/DL (ref 70–99)
GLUCOSE BLD-MCNC: 378 MG/DL (ref 70–99)
GLUCOSE BLD-MCNC: 436 MG/DL (ref 70–99)
GLUCOSE BLD-MCNC: 450 MG/DL (ref 70–99)
GLUCOSE BLD-MCNC: 541 MG/DL (ref 70–99)
GLUCOSE BLD-MCNC: 796 MG/DL (ref 70–99)
GLUCOSE BLD-MCNC: 87 MG/DL (ref 70–99)
GLUCOSE BLD-MCNC: 89 MG/DL (ref 70–99)
GLUCOSE BLD-MCNC: 92 MG/DL (ref 70–99)
GLUCOSE BLD-MCNC: >600 MG/DL (ref 70–99)
GLUCOSE BLD-MCNC: >600 MG/DL (ref 70–99)
GLUCOSE URINE: >=1000 MG/DL
HCG QUALITATIVE: NEGATIVE
HCO3 VENOUS: 23 MMOL/L (ref 23–29)
HCT VFR BLD CALC: 41.9 % (ref 36–48)
HEMOGLOBIN: 12.8 G/DL (ref 12–16)
HYALINE CASTS: 1 /LPF (ref 0–8)
KEPPRA DOSE AMT: ABNORMAL
KEPPRA: <2 UG/ML (ref 6–46)
KETONES, URINE: 40 MG/DL
LACTIC ACID: 6.2 MMOL/L (ref 0.4–2)
LEUKOCYTE ESTERASE, URINE: NEGATIVE
LYMPHOCYTES ABSOLUTE: 1.3 K/UL (ref 1–5.1)
LYMPHOCYTES RELATIVE PERCENT: 27.7 %
MAGNESIUM: 1.7 MG/DL (ref 1.8–2.4)
MAGNESIUM: 1.8 MG/DL (ref 1.8–2.4)
MAGNESIUM: 1.9 MG/DL (ref 1.8–2.4)
MCH RBC QN AUTO: 28.5 PG (ref 26–34)
MCHC RBC AUTO-ENTMCNC: 30.5 G/DL (ref 31–36)
MCV RBC AUTO: 93.6 FL (ref 80–100)
METHEMOGLOBIN VENOUS: 0.6 %
MICROSCOPIC EXAMINATION: YES
MONOCYTES ABSOLUTE: 0.5 K/UL (ref 0–1.3)
MONOCYTES RELATIVE PERCENT: 9.6 %
NEUTROPHILS ABSOLUTE: 3 K/UL (ref 1.7–7.7)
NEUTROPHILS RELATIVE PERCENT: 61.2 %
NITRITE, URINE: NEGATIVE
O2 SAT, VEN: 45 %
O2 THERAPY: ABNORMAL
PCO2, VEN: 44.2 MMHG (ref 40–50)
PDW BLD-RTO: 14.7 % (ref 12.4–15.4)
PERFORMED ON: ABNORMAL
PERFORMED ON: NORMAL
PERFORMED ON: NORMAL
PH UA: 5.5 (ref 5–8)
PH VENOUS: 7.32 (ref 7.35–7.45)
PHOSPHORUS: 2.3 MG/DL (ref 2.5–4.9)
PHOSPHORUS: 2.3 MG/DL (ref 2.5–4.9)
PHOSPHORUS: 2.7 MG/DL (ref 2.5–4.9)
PHOSPHORUS: 2.9 MG/DL (ref 2.5–4.9)
PHOSPHORUS: 3.2 MG/DL (ref 2.5–4.9)
PLATELET # BLD: 224 K/UL (ref 135–450)
PMV BLD AUTO: 10 FL (ref 5–10.5)
PO2, VEN: <30 MMHG
POTASSIUM REFLEX MAGNESIUM: 5.7 MMOL/L (ref 3.5–5.1)
POTASSIUM SERPL-SCNC: 3.7 MMOL/L (ref 3.5–5.1)
POTASSIUM SERPL-SCNC: 3.9 MMOL/L (ref 3.5–5.1)
POTASSIUM SERPL-SCNC: 4.4 MMOL/L (ref 3.5–5.1)
POTASSIUM SERPL-SCNC: 4.6 MMOL/L (ref 3.5–5.1)
POTASSIUM SERPL-SCNC: 4.7 MMOL/L (ref 3.5–5.1)
PROTEIN UA: NEGATIVE MG/DL
RBC # BLD: 4.48 M/UL (ref 4–5.2)
RBC UA: ABNORMAL /HPF (ref 0–4)
SODIUM BLD-SCNC: 124 MMOL/L (ref 136–145)
SODIUM BLD-SCNC: 133 MMOL/L (ref 136–145)
SODIUM BLD-SCNC: 135 MMOL/L (ref 136–145)
SODIUM BLD-SCNC: 138 MMOL/L (ref 136–145)
SPECIFIC GRAVITY UA: 1.03 (ref 1–1.03)
TCO2 CALC VENOUS: 24 MMOL/L
URINE REFLEX TO CULTURE: YES
URINE TYPE: ABNORMAL
UROBILINOGEN, URINE: 0.2 E.U./DL
WBC # BLD: 4.9 K/UL (ref 4–11)
WBC UA: 30 /HPF (ref 0–5)
YEAST: PRESENT /HPF

## 2021-10-30 PROCEDURE — 87086 URINE CULTURE/COLONY COUNT: CPT

## 2021-10-30 PROCEDURE — 87077 CULTURE AEROBIC IDENTIFY: CPT

## 2021-10-30 PROCEDURE — 80177 DRUG SCRN QUAN LEVETIRACETAM: CPT

## 2021-10-30 PROCEDURE — 83605 ASSAY OF LACTIC ACID: CPT

## 2021-10-30 PROCEDURE — 6370000000 HC RX 637 (ALT 250 FOR IP): Performed by: INTERNAL MEDICINE

## 2021-10-30 PROCEDURE — 2580000003 HC RX 258: Performed by: STUDENT IN AN ORGANIZED HEALTH CARE EDUCATION/TRAINING PROGRAM

## 2021-10-30 PROCEDURE — 82947 ASSAY GLUCOSE BLOOD QUANT: CPT

## 2021-10-30 PROCEDURE — 2500000003 HC RX 250 WO HCPCS: Performed by: STUDENT IN AN ORGANIZED HEALTH CARE EDUCATION/TRAINING PROGRAM

## 2021-10-30 PROCEDURE — 96365 THER/PROPH/DIAG IV INF INIT: CPT

## 2021-10-30 PROCEDURE — 82803 BLOOD GASES ANY COMBINATION: CPT

## 2021-10-30 PROCEDURE — 36415 COLL VENOUS BLD VENIPUNCTURE: CPT

## 2021-10-30 PROCEDURE — 6370000000 HC RX 637 (ALT 250 FOR IP): Performed by: STUDENT IN AN ORGANIZED HEALTH CARE EDUCATION/TRAINING PROGRAM

## 2021-10-30 PROCEDURE — 84703 CHORIONIC GONADOTROPIN ASSAY: CPT

## 2021-10-30 PROCEDURE — 2000000000 HC ICU R&B

## 2021-10-30 PROCEDURE — 94761 N-INVAS EAR/PLS OXIMETRY MLT: CPT

## 2021-10-30 PROCEDURE — 6360000002 HC RX W HCPCS: Performed by: STUDENT IN AN ORGANIZED HEALTH CARE EDUCATION/TRAINING PROGRAM

## 2021-10-30 PROCEDURE — 84100 ASSAY OF PHOSPHORUS: CPT

## 2021-10-30 PROCEDURE — 80048 BASIC METABOLIC PNL TOTAL CA: CPT

## 2021-10-30 PROCEDURE — 99285 EMERGENCY DEPT VISIT HI MDM: CPT

## 2021-10-30 PROCEDURE — 81001 URINALYSIS AUTO W/SCOPE: CPT

## 2021-10-30 PROCEDURE — 6360000002 HC RX W HCPCS: Performed by: INTERNAL MEDICINE

## 2021-10-30 PROCEDURE — 96366 THER/PROPH/DIAG IV INF ADDON: CPT

## 2021-10-30 PROCEDURE — 96367 TX/PROPH/DG ADDL SEQ IV INF: CPT

## 2021-10-30 PROCEDURE — 83735 ASSAY OF MAGNESIUM: CPT

## 2021-10-30 PROCEDURE — 85025 COMPLETE CBC W/AUTO DIFF WBC: CPT

## 2021-10-30 PROCEDURE — 82010 KETONE BODYS QUAN: CPT

## 2021-10-30 PROCEDURE — 2580000003 HC RX 258: Performed by: INTERNAL MEDICINE

## 2021-10-30 RX ORDER — SODIUM CHLORIDE, SODIUM LACTATE, POTASSIUM CHLORIDE, AND CALCIUM CHLORIDE .6; .31; .03; .02 G/100ML; G/100ML; G/100ML; G/100ML
1000 INJECTION, SOLUTION INTRAVENOUS ONCE
Status: COMPLETED | OUTPATIENT
Start: 2021-10-30 | End: 2021-10-30

## 2021-10-30 RX ORDER — SODIUM CHLORIDE, SODIUM LACTATE, POTASSIUM CHLORIDE, CALCIUM CHLORIDE 600; 310; 30; 20 MG/100ML; MG/100ML; MG/100ML; MG/100ML
INJECTION, SOLUTION INTRAVENOUS CONTINUOUS
Status: DISCONTINUED | OUTPATIENT
Start: 2021-10-30 | End: 2021-10-30

## 2021-10-30 RX ORDER — LEVETIRACETAM 500 MG/1
500 TABLET ORAL 2 TIMES DAILY
Status: DISCONTINUED | OUTPATIENT
Start: 2021-10-30 | End: 2021-10-30

## 2021-10-30 RX ORDER — NICOTINE POLACRILEX 4 MG
15 LOZENGE BUCCAL PRN
Status: DISCONTINUED | OUTPATIENT
Start: 2021-10-30 | End: 2021-11-01 | Stop reason: HOSPADM

## 2021-10-30 RX ORDER — FLUCONAZOLE 100 MG/1
200 TABLET ORAL ONCE
Status: COMPLETED | OUTPATIENT
Start: 2021-10-30 | End: 2021-10-30

## 2021-10-30 RX ORDER — GABAPENTIN 300 MG/1
300 CAPSULE ORAL 3 TIMES DAILY
Status: DISCONTINUED | OUTPATIENT
Start: 2021-10-30 | End: 2021-10-30

## 2021-10-30 RX ORDER — CLONAZEPAM 0.5 MG/1
2 TABLET ORAL ONCE
Status: DISCONTINUED | OUTPATIENT
Start: 2021-10-30 | End: 2021-10-31

## 2021-10-30 RX ORDER — DEXTROSE MONOHYDRATE 50 MG/ML
100 INJECTION, SOLUTION INTRAVENOUS PRN
Status: DISCONTINUED | OUTPATIENT
Start: 2021-10-30 | End: 2021-11-01 | Stop reason: HOSPADM

## 2021-10-30 RX ORDER — SERTRALINE HYDROCHLORIDE 100 MG/1
100 TABLET, FILM COATED ORAL DAILY
Status: DISCONTINUED | OUTPATIENT
Start: 2021-10-30 | End: 2021-10-30

## 2021-10-30 RX ORDER — LORAZEPAM 2 MG/ML
1 INJECTION INTRAMUSCULAR EVERY 6 HOURS PRN
Status: DISCONTINUED | OUTPATIENT
Start: 2021-10-30 | End: 2021-11-01 | Stop reason: HOSPADM

## 2021-10-30 RX ORDER — NADOLOL 20 MG/1
10 TABLET ORAL DAILY
Status: DISCONTINUED | OUTPATIENT
Start: 2021-10-31 | End: 2021-11-01 | Stop reason: HOSPADM

## 2021-10-30 RX ORDER — POTASSIUM CHLORIDE 7.45 MG/ML
10 INJECTION INTRAVENOUS PRN
Status: DISCONTINUED | OUTPATIENT
Start: 2021-10-30 | End: 2021-11-01 | Stop reason: HOSPADM

## 2021-10-30 RX ORDER — DEXTROSE, SODIUM CHLORIDE, AND POTASSIUM CHLORIDE 5; .45; .3 G/100ML; G/100ML; G/100ML
INJECTION INTRAVENOUS CONTINUOUS
Status: DISCONTINUED | OUTPATIENT
Start: 2021-10-30 | End: 2021-10-31

## 2021-10-30 RX ORDER — POTASSIUM CHLORIDE AND SODIUM CHLORIDE 900; 300 MG/100ML; MG/100ML
INJECTION, SOLUTION INTRAVENOUS CONTINUOUS
Status: DISCONTINUED | OUTPATIENT
Start: 2021-10-30 | End: 2021-10-30

## 2021-10-30 RX ORDER — CLONAZEPAM 1 MG/1
2 TABLET ORAL 2 TIMES DAILY
Status: DISCONTINUED | OUTPATIENT
Start: 2021-10-30 | End: 2021-11-01 | Stop reason: HOSPADM

## 2021-10-30 RX ORDER — LEVETIRACETAM 500 MG/1
1000 TABLET ORAL 2 TIMES DAILY
Status: DISCONTINUED | OUTPATIENT
Start: 2021-10-30 | End: 2021-11-01 | Stop reason: HOSPADM

## 2021-10-30 RX ORDER — ATORVASTATIN CALCIUM 20 MG/1
20 TABLET, FILM COATED ORAL DAILY
Status: DISCONTINUED | OUTPATIENT
Start: 2021-10-30 | End: 2021-11-01 | Stop reason: HOSPADM

## 2021-10-30 RX ORDER — GABAPENTIN 100 MG/1
100 CAPSULE ORAL 3 TIMES DAILY
Status: DISCONTINUED | OUTPATIENT
Start: 2021-10-30 | End: 2021-11-01 | Stop reason: HOSPADM

## 2021-10-30 RX ORDER — NADOLOL 40 MG/1
20 TABLET ORAL DAILY
Status: DISCONTINUED | OUTPATIENT
Start: 2021-10-30 | End: 2021-10-30

## 2021-10-30 RX ORDER — SODIUM CHLORIDE 9 MG/ML
INJECTION, SOLUTION INTRAVENOUS CONTINUOUS
Status: DISCONTINUED | OUTPATIENT
Start: 2021-10-30 | End: 2021-10-31

## 2021-10-30 RX ORDER — MAGNESIUM SULFATE 1 G/100ML
1000 INJECTION INTRAVENOUS PRN
Status: DISCONTINUED | OUTPATIENT
Start: 2021-10-30 | End: 2021-11-01 | Stop reason: HOSPADM

## 2021-10-30 RX ORDER — DEXTROSE MONOHYDRATE 25 G/50ML
12.5 INJECTION, SOLUTION INTRAVENOUS PRN
Status: DISCONTINUED | OUTPATIENT
Start: 2021-10-30 | End: 2021-11-01 | Stop reason: HOSPADM

## 2021-10-30 RX ORDER — LEVETIRACETAM 10 MG/ML
1000 INJECTION INTRAVASCULAR ONCE
Status: COMPLETED | OUTPATIENT
Start: 2021-10-30 | End: 2021-10-30

## 2021-10-30 RX ORDER — DEXTROSE AND SODIUM CHLORIDE 5; .45 G/100ML; G/100ML
INJECTION, SOLUTION INTRAVENOUS CONTINUOUS PRN
Status: DISCONTINUED | OUTPATIENT
Start: 2021-10-30 | End: 2021-11-01 | Stop reason: HOSPADM

## 2021-10-30 RX ADMIN — POTASSIUM PHOSPHATE, MONOBASIC POTASSIUM PHOSPHATE, DIBASIC 10 MMOL: 224; 236 INJECTION, SOLUTION, CONCENTRATE INTRAVENOUS at 23:21

## 2021-10-30 RX ADMIN — CLONAZEPAM 2 MG: 1 TABLET ORAL at 20:50

## 2021-10-30 RX ADMIN — ATORVASTATIN CALCIUM 20 MG: 20 TABLET, FILM COATED ORAL at 09:47

## 2021-10-30 RX ADMIN — SODIUM CHLORIDE, POTASSIUM CHLORIDE, SODIUM LACTATE AND CALCIUM CHLORIDE 1000 ML: 600; 310; 30; 20 INJECTION, SOLUTION INTRAVENOUS at 02:14

## 2021-10-30 RX ADMIN — POTASSIUM CHLORIDE, DEXTROSE MONOHYDRATE AND SODIUM CHLORIDE: 300; 5; 450 INJECTION, SOLUTION INTRAVENOUS at 18:16

## 2021-10-30 RX ADMIN — SODIUM CHLORIDE: 9 INJECTION, SOLUTION INTRAVENOUS at 05:20

## 2021-10-30 RX ADMIN — ENOXAPARIN SODIUM 40 MG: 100 INJECTION SUBCUTANEOUS at 09:50

## 2021-10-30 RX ADMIN — POTASSIUM CHLORIDE AND SODIUM CHLORIDE: 900; 300 INJECTION, SOLUTION INTRAVENOUS at 09:32

## 2021-10-30 RX ADMIN — POTASSIUM CHLORIDE 10 MEQ: 10 INJECTION, SOLUTION INTRAVENOUS at 06:18

## 2021-10-30 RX ADMIN — POTASSIUM CHLORIDE 10 MEQ: 10 INJECTION, SOLUTION INTRAVENOUS at 11:13

## 2021-10-30 RX ADMIN — INSULIN HUMAN 0.14 UNITS/KG/HR: 1 INJECTION, SOLUTION INTRAVENOUS at 02:13

## 2021-10-30 RX ADMIN — POTASSIUM CHLORIDE 10 MEQ: 10 INJECTION, SOLUTION INTRAVENOUS at 14:04

## 2021-10-30 RX ADMIN — LEVETIRACETAM 500 MG: 500 TABLET, FILM COATED ORAL at 09:54

## 2021-10-30 RX ADMIN — SODIUM CHLORIDE, POTASSIUM CHLORIDE, SODIUM LACTATE AND CALCIUM CHLORIDE: 600; 310; 30; 20 INJECTION, SOLUTION INTRAVENOUS at 04:25

## 2021-10-30 RX ADMIN — GABAPENTIN 100 MG: 100 CAPSULE ORAL at 20:50

## 2021-10-30 RX ADMIN — DEXTROSE AND SODIUM CHLORIDE: 5; 450 INJECTION, SOLUTION INTRAVENOUS at 10:43

## 2021-10-30 RX ADMIN — GABAPENTIN 100 MG: 100 CAPSULE ORAL at 14:58

## 2021-10-30 RX ADMIN — SERTRALINE 100 MG: 100 TABLET, FILM COATED ORAL at 11:10

## 2021-10-30 RX ADMIN — POTASSIUM CHLORIDE, DEXTROSE MONOHYDRATE AND SODIUM CHLORIDE: 300; 5; 450 INJECTION, SOLUTION INTRAVENOUS at 11:07

## 2021-10-30 RX ADMIN — POTASSIUM CHLORIDE 10 MEQ: 10 INJECTION, SOLUTION INTRAVENOUS at 08:17

## 2021-10-30 RX ADMIN — LEVETIRACETAM 1000 MG: 500 TABLET, FILM COATED ORAL at 20:50

## 2021-10-30 RX ADMIN — CLONAZEPAM 2 MG: 1 TABLET ORAL at 11:10

## 2021-10-30 RX ADMIN — INSULIN HUMAN 0.1 UNITS/HR: 1 INJECTION, SOLUTION INTRAVENOUS at 05:26

## 2021-10-30 RX ADMIN — FLUCONAZOLE 200 MG: 100 TABLET ORAL at 03:04

## 2021-10-30 RX ADMIN — GABAPENTIN 300 MG: 300 CAPSULE ORAL at 09:50

## 2021-10-30 RX ADMIN — INSULIN HUMAN 0.1 UNITS/HR: 1 INJECTION, SOLUTION INTRAVENOUS at 06:13

## 2021-10-30 RX ADMIN — POTASSIUM CHLORIDE 10 MEQ: 10 INJECTION, SOLUTION INTRAVENOUS at 18:19

## 2021-10-30 RX ADMIN — LEVETIRACETAM 1000 MG: 10 INJECTION INTRAVENOUS at 01:16

## 2021-10-30 ASSESSMENT — PAIN SCALES - GENERAL
PAINLEVEL_OUTOF10: 0

## 2021-10-30 NOTE — PROGRESS NOTES
Medication Reconciliation    List of medications patient is currently taking is complete. Source of information: 1. Conversation with patient                                      2. EPIC records      Allergies  Patient has no known allergies. Notes regarding home medications:   1. Patient no longer taking sertraline due to side effects. Removed from list.  2. Patient taking gabapentin 100 mg TID then 300 mg QHS  3. Nadolol dose changed to 10 mg daily.

## 2021-10-30 NOTE — PROGRESS NOTES
4 Eyes Skin Assessment     NAME:  Mayi Mack  YOB: 1990  MEDICAL RECORD NUMBER:  [id number]    The patient is being assess for  Admission    I agree that 2 RN's have performed a thorough Head to Toe Skin Assessment on the patient. ALL assessment sites listed below have been assessed. Areas assessed by both nurses:    Head, Face, Ears, Shoulders, Back, Chest, Arms, Elbows, Hands, Sacrum. Buttock, Coccyx, Ischium and Legs. Feet and Heels        Does the Patient have a Wound?  No noted wound(s)       Luis Daniel Prevention initiated:  Yes   Wound Care Orders initiated:  NA    Pressure Injury (Stage 3,4, Unstageable, DTI, NWPT, and Complex wounds) if present place consult order under [de-identified] NA    New and Established Ostomies if present place consult order under : NA      Nurse 1 eSignature: {Esignature:[id number]}    **SHARE this note so that the co-signing nurse is able to place an eSignature**    Nurse 2 eSignature: Electronically signed by Louise Harp RN on 10/30/21 at 6:27 AM EDT

## 2021-10-30 NOTE — PLAN OF CARE
Problem: Nutrition  Goal: Optimal nutrition therapy  10/30/2021 0959 by Corry Ann RD, LD  Outcome: Ongoing     Problem: Serum Glucose Level - Abnormal:  Goal: Ability to maintain appropriate glucose levels has stabilized  Description: Ability to maintain appropriate glucose levels has stabilized  Outcome: Ongoing  Note: Insulin gtt infusing throughout this shift. GAP trending appropriately. Pt tolerating DKA resuscitation well. Intervention: Monitor blood glucose measurement  Note: BG checked at least every hour.

## 2021-10-30 NOTE — ED NOTES
Took POCT glucose, it was too high for the machine to read. This RN maura a green top to check glucose levels.       Tracie Barbosa RN  10/30/21 6890

## 2021-10-30 NOTE — PROGRESS NOTES
4 Eyes Skin Assessment     NAME:  Amish Nick  YOB: 1990  MEDICAL RECORD NUMBER:  [id number]    The patient is being assess for  Shift Handoff    I agree that 2 RN's have performed a thorough Head to Toe Skin Assessment on the patient. ALL assessment sites listed below have been assessed. Areas assessed by both nurses:    Head, Face, Ears, Shoulders, Back, Chest, Arms, Elbows, Hands, Sacrum. Buttock, Coccyx, Ischium and Legs. Feet and Heels        Does the Patient have a Wound?  No noted wound(s)       Luis Daniel Prevention initiated:  Yes   Wound Care Orders initiated:  NA    Pressure Injury (Stage 3,4, Unstageable, DTI, NWPT, and Complex wounds) if present place consult order under [de-identified] NA    New and Established Ostomies if present place consult order under : NA      Nurse 1 eSignature: Electronically signed by Shankar Martinez RN on 10/30/21 at 7:00 AM EDT    **SHARE this note so that the co-signing nurse is able to place an eSignature**    Nurse 2 eSignature: Electronically signed by Haywood Gowers, RN on 10/30/21 at 9:46 AM EDT

## 2021-10-30 NOTE — PROGRESS NOTES
4 Eyes Skin Assessment     NAME:  Yael Cheema  YOB: 1990  MEDICAL RECORD NUMBER:  [id number]    The patient is being assess for  Shift Handoff    I agree that 2 RN's have performed a thorough Head to Toe Skin Assessment on the patient. ALL assessment sites listed below have been assessed. Areas assessed by both nurses:    Head, Face, Ears, Shoulders, Back, Chest, Arms, Elbows, Hands, Sacrum. Buttock, Coccyx, Ischium and Legs. Feet and Heels        Does the Patient have a Wound?  No noted wound(s)       Luis Daniel Prevention initiated:  Yes   Wound Care Orders initiated:  No    Pressure Injury (Stage 3,4, Unstageable, DTI, NWPT, and Complex wounds) if present place consult order under [de-identified] No    New and Established Ostomies if present place consult order under : No      Nurse 1 eSignature: Electronically signed by Ligia Shabazz RN on 10/30/21 at 7:27 PM EDT    **SHARE this note so that the co-signing nurse is able to place an eSignature**    Nurse 2 eSignature: Electronically signed by Deja Harmon RN on 10/30/21 at 8:58 PM EDT

## 2021-10-30 NOTE — PROGRESS NOTES
Comprehensive Nutrition Assessment    Type and Reason for Visit:  Initial, Positive Nutrition Screen    Nutrition Recommendations/Plan:   Will continue to monitor for needs when po resumes   Will monitor nutritional adequacy, nutrition-related labs, weights, BMs, and clinical progress     Nutrition Assessment:  + Screen for MST 2. Pt admitted for seizures, DKA. Currently NPO. Pt denied any intake issues and weight actually trending up per our records. Will monitor for supplement needs when diet advances. Malnutrition Assessment:  Malnutrition Status:  No malnutrition      Estimated Daily Nutrient Needs:  Energy (kcal):  1935-8233 kcal (25-30 kcal/kg ABW); Weight Used for Energy Requirements:  Current     Protein (g):  50-60 gm (1-1.2 gm/kg ABW); Weight Used for Protein Requirements:  Current        Fluid (ml/day):   ; Method Used for Fluid Requirements:  1 ml/kcal      Nutrition Related Findings:  No edema      Wounds:  None       Current Nutrition Therapies:    Diet NPO    Anthropometric Measures:  · Height: 4' 11\" (149.9 cm)  · Current Body Weight: 110 lb (49.9 kg)   · Admission Body Weight: 111 lb (50.3 kg)    · Usual Body Weight: 100 lb (45.4 kg)     · Ideal Body Weight: 95 lbs; % Ideal Body Weight 115.8 %   · BMI: 22.2  · Adjusted Body Weight:  ; No Adjustment   · BMI Categories: Normal Weight (BMI 18.5-24. 9)       Nutrition Diagnosis:   · Inadequate oral intake related to inadequate protein-energy intake as evidenced by NPO or clear liquid status due to medical condition      Nutrition Interventions:   Food and/or Nutrient Delivery:  Start Oral Diet (when appropriate)  Nutrition Education/Counseling:  No recommendation at this time   Coordination of Nutrition Care:  Continue to monitor while inpatient    Goals:   Tolerate diet and consume >50% of meals when po resumes       Nutrition Monitoring and Evaluation:   Behavioral-Environmental Outcomes:  None Identified   Food/Nutrient Intake Outcomes:  Food and Nutrient Intake, Supplement Intake  Physical Signs/Symptoms Outcomes:  Biochemical Data, Nausea or Vomiting, Nutrition Focused Physical Findings, Skin, Weight, Hemodynamic Status     Discharge Planning:    No discharge needs at this time     Electronically signed by Jaylen Najera RD, LD on 10/30/21 at 9:58 AM EDT    Contact: [phone]

## 2021-10-30 NOTE — LETTER
[address]  Phone: [phone]             November 1, 2021    Patient: Deni Bonilla   YOB: 1990   Date of Visit: 10/30/2021       To Whom It May Concern:    Dewayne Degroot was seen and treated in our facility  beginning 10/30/2021 until 11/1/2021. She may return to work on 11/8/2021.       Sincerely,     MD Wes Shaffer RN         Signature:__________________________________
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
## 2021-10-30 NOTE — PROGRESS NOTES
0530: Received report from [id number] [facility]. Report given at bedside. Pt ambulated from stretcher to ICU bed. Assessment and screening completed. 4 eyes assessment completed and documented. Seizure precautions initiated. Pt's blood glucose was taken on glucometer - 378mg/dL. Insulin drip titrated per DKA protocol. 7538: Blood glucose taken - 276md/dL. Insulin gtt rate kept the same because if titrated per order, drip would have been turned off. Hospitalist notified and okay with keeping the rate the same. Will determine if pt should stay on DKA protocol based off of the next BMP results. 0700: Shift handoff with Matt NEELY.

## 2021-10-30 NOTE — PROGRESS NOTES
Patient seen and examined . HPI:  79-year-old female with known  history of tonic-clonic seizures, currently on Keppra and Klonopin,  presents to the hospital because her  was found her seizing in  bed and she had two more seizures on the way. Was found to be in DKA. Assessment :  - DKA. - uncontrolled seizure disorder. Plan :  - continue insulin infusion as per protocol.   - continue to monitor and his electrolytes. -Patient was asked to bring her insulin pump to be resumed once patient is ready for transition.  -Anion gap not closed, will hope for transition at dinnertime.  -Consult neurology as patient had 2 breakthrough seizures earlier this week aside from the one before admission despite medication compliance.

## 2021-10-30 NOTE — ED NOTES
Bedside report given to CHIN Mendez. Pt transported to unit with this RN.       [id number] Delma Fabian, RN  10/30/21 4605

## 2021-10-30 NOTE — H&P
dioxide  16, anion gap 24, glucose 1020, BUN 5, creatinine 0.8. CBC showed a  white count of 4.9. The patient's venous blood gas showed pH of 7.32.    CONSULTATIONS REQUESTED:  Currently none. ASSESSMENT:  1.  Diabetic ketoacidosis in a patient with uncontrolled type 1  diabetes. 2.  Tonic-clonic seizure. 3.  Depression with anxiety. PLAN OF CARE:  The patient is admitted to Internal Medicine Service to  the intensive care unit. DKA protocol initiated with infusion of  insulin and frequent measurements of electrolytes. The patient's  chemistries may partly be related to the tonic-clonic seizures  especially with lactic acid elevation and anticipate the patient's  metabolic abnormalities correcting rapidly with IV hydration and IV  insulin to a point where patient I do not think will need to remain in  the ICU for a long period of time at all. Antiseizure medications will  be continued. CODE STATUS:  Full. EXPECTED LENGTH OF STAY:  More than two midnights based on the plan of  care above. TOTAL CRITICAL CARE TIME:  35 minutes. RISK:  Very high due to the patient's presentation with the seizure and  chemistries suggestive of early diabetic ketoacidosis. DISPOSITION:  Admit to Internal Medicine Service.         Katarina Tran MD    D: 10/30/2021 5:11:54       T: 10/30/2021 6:42:14     [email or web address]  Job#: [id number]     Doc#: [id number]    CC:

## 2021-10-30 NOTE — PROGRESS NOTES
1045: Verified PO med admin with Dr Licha Rodriguez related to BP. Okay to give klonopin and zoloft at this time. Hold Nadolol. 1046: PRN D5 and 0.45 given with Nacl with 40 of K until D5 and 0.45 with 40 of K is delivered from pharmacy. Dr Tessa Clinton aware and okay with concurrent use of PRN Potassium replacement. . Multiplier is calling for tripling of insulin gtt. Per orders I will not triple current rate. Will monitor BG closely as hypoglycemia will increase pt risk for seizure. 1226: , will defferr titration of insulin gtt at this time to observe as rate tripled thirty minutes prior to this BG collect. 1416: Tish Hussein at bedside and updated on pt condition all questions answered at this time. 1510: Dr Melita Pearce update on pt seizure hx and increased seizures in past month. 1542: Neurology recommendations including increased Keppra dosage, Routine MRI of the head, and routine EEG Monday shared with the attending, Dr Karmen Kinney: RN handoff with Nicole Anderson RN. Pt resting comfortably in bed. Insulin gtt verified.      Electronically signed by Jennifer Day RN on 10/30/2021 at 7:28 PM

## 2021-10-30 NOTE — PLAN OF CARE
Problem: Nutrition  Goal: Optimal nutrition therapy  Outcome: Ongoing     Nutrition Problem #1: Inadequate oral intake  Intervention: Food and/or Nutrient Delivery: Start Oral Diet (when appropriate)  Nutritional Goals:  Tolerate diet and consume >50% of meals when po resumes

## 2021-10-31 LAB
ANION GAP SERPL CALCULATED.3IONS-SCNC: 12 MMOL/L (ref 3–16)
BUN BLDV-MCNC: 3 MG/DL (ref 7–20)
CALCIUM SERPL-MCNC: 8.1 MG/DL (ref 8.3–10.6)
CHLORIDE BLD-SCNC: 104 MMOL/L (ref 99–110)
CO2: 20 MMOL/L (ref 21–32)
CREAT SERPL-MCNC: <0.5 MG/DL (ref 0.6–1.1)
GFR AFRICAN AMERICAN: >60
GFR NON-AFRICAN AMERICAN: >60
GLUCOSE BLD-MCNC: 112 MG/DL (ref 70–99)
GLUCOSE BLD-MCNC: 160 MG/DL (ref 70–99)
GLUCOSE BLD-MCNC: 165 MG/DL (ref 70–99)
GLUCOSE BLD-MCNC: 208 MG/DL (ref 70–99)
GLUCOSE BLD-MCNC: 254 MG/DL (ref 70–99)
GLUCOSE BLD-MCNC: 52 MG/DL (ref 70–99)
GLUCOSE BLD-MCNC: 71 MG/DL (ref 70–99)
MAGNESIUM: 1.7 MG/DL (ref 1.8–2.4)
MAGNESIUM: 1.7 MG/DL (ref 1.8–2.4)
PERFORMED ON: ABNORMAL
PERFORMED ON: NORMAL
PHOSPHORUS: 2.4 MG/DL (ref 2.5–4.9)
PHOSPHORUS: 3.4 MG/DL (ref 2.5–4.9)
POTASSIUM SERPL-SCNC: 4.5 MMOL/L (ref 3.5–5.1)
SODIUM BLD-SCNC: 136 MMOL/L (ref 136–145)
URINE CULTURE, ROUTINE: NORMAL

## 2021-10-31 PROCEDURE — 2500000003 HC RX 250 WO HCPCS: Performed by: STUDENT IN AN ORGANIZED HEALTH CARE EDUCATION/TRAINING PROGRAM

## 2021-10-31 PROCEDURE — 83735 ASSAY OF MAGNESIUM: CPT

## 2021-10-31 PROCEDURE — 80048 BASIC METABOLIC PNL TOTAL CA: CPT

## 2021-10-31 PROCEDURE — 6370000000 HC RX 637 (ALT 250 FOR IP): Performed by: INTERNAL MEDICINE

## 2021-10-31 PROCEDURE — 6370000000 HC RX 637 (ALT 250 FOR IP): Performed by: STUDENT IN AN ORGANIZED HEALTH CARE EDUCATION/TRAINING PROGRAM

## 2021-10-31 PROCEDURE — 1200000000 HC SEMI PRIVATE

## 2021-10-31 PROCEDURE — 6360000002 HC RX W HCPCS: Performed by: INTERNAL MEDICINE

## 2021-10-31 PROCEDURE — 94761 N-INVAS EAR/PLS OXIMETRY MLT: CPT

## 2021-10-31 PROCEDURE — 36415 COLL VENOUS BLD VENIPUNCTURE: CPT

## 2021-10-31 PROCEDURE — 84100 ASSAY OF PHOSPHORUS: CPT

## 2021-10-31 RX ORDER — INSULIN GLARGINE 100 [IU]/ML
12 INJECTION, SOLUTION SUBCUTANEOUS ONCE
Status: DISCONTINUED | OUTPATIENT
Start: 2021-10-31 | End: 2021-11-01 | Stop reason: HOSPADM

## 2021-10-31 RX ORDER — INSULIN GLARGINE 100 [IU]/ML
12 INJECTION, SOLUTION SUBCUTANEOUS NIGHTLY
Status: DISCONTINUED | OUTPATIENT
Start: 2021-10-31 | End: 2021-11-01 | Stop reason: HOSPADM

## 2021-10-31 RX ADMIN — INSULIN GLARGINE 12 UNITS: 100 INJECTION, SOLUTION SUBCUTANEOUS at 21:08

## 2021-10-31 RX ADMIN — CLONAZEPAM 2 MG: 1 TABLET ORAL at 09:13

## 2021-10-31 RX ADMIN — LEVETIRACETAM 1000 MG: 500 TABLET, FILM COATED ORAL at 20:59

## 2021-10-31 RX ADMIN — NADOLOL 10 MG: 20 TABLET ORAL at 09:28

## 2021-10-31 RX ADMIN — ATORVASTATIN CALCIUM 20 MG: 20 TABLET, FILM COATED ORAL at 09:13

## 2021-10-31 RX ADMIN — POTASSIUM CHLORIDE, DEXTROSE MONOHYDRATE AND SODIUM CHLORIDE: 300; 5; 450 INJECTION, SOLUTION INTRAVENOUS at 01:31

## 2021-10-31 RX ADMIN — INSULIN LISPRO 2 UNITS: 100 INJECTION, SOLUTION INTRAVENOUS; SUBCUTANEOUS at 21:07

## 2021-10-31 RX ADMIN — INSULIN LISPRO 6 UNITS: 100 INJECTION, SOLUTION INTRAVENOUS; SUBCUTANEOUS at 17:32

## 2021-10-31 RX ADMIN — INSULIN LISPRO 4 UNITS: 100 INJECTION, SOLUTION INTRAVENOUS; SUBCUTANEOUS at 09:13

## 2021-10-31 RX ADMIN — ENOXAPARIN SODIUM 40 MG: 100 INJECTION SUBCUTANEOUS at 09:13

## 2021-10-31 RX ADMIN — LEVETIRACETAM 1000 MG: 500 TABLET, FILM COATED ORAL at 09:14

## 2021-10-31 RX ADMIN — GABAPENTIN 100 MG: 100 CAPSULE ORAL at 21:01

## 2021-10-31 RX ADMIN — GABAPENTIN 100 MG: 100 CAPSULE ORAL at 09:13

## 2021-10-31 RX ADMIN — INSULIN GLARGINE 12 UNITS: 100 INJECTION, SOLUTION SUBCUTANEOUS at 01:52

## 2021-10-31 RX ADMIN — GABAPENTIN 100 MG: 100 CAPSULE ORAL at 14:05

## 2021-10-31 RX ADMIN — CLONAZEPAM 2 MG: 1 TABLET ORAL at 21:00

## 2021-10-31 ASSESSMENT — PAIN SCALES - GENERAL
PAINLEVEL_OUTOF10: 0

## 2021-10-31 NOTE — PROGRESS NOTES
Afternoon BG check at 52. Pt given 240 ml of apple juice and edinson crackers with peanut butter. BG re-check 15 minutes later was 71. Pt was given a regular sprite. Dr. Kimberlee Martinez [place] [facility] informed of above and will look into it. 2 AM BG check added on per protocol. Pt remains quite in bed with no needs at this time. Report given to 4W RN for pt to transfer to [address] [facility] to resume care. All questions answered.        Electronically signed by Severiano Iverson RN on 10/31/2021 at 12:54 PM

## 2021-10-31 NOTE — PLAN OF CARE
Problem: Nutrition  Goal: Optimal nutrition therapy  Outcome: Ongoing     Problem: Skin Integrity:  Goal: Absence of new skin breakdown  Description: Absence of new skin breakdown  Outcome: Ongoing     Problem: Falls - Risk of:  Goal: Absence of physical injury  Description: Absence of physical injury  Outcome: Ongoing

## 2021-10-31 NOTE — PROGRESS NOTES
4 Eyes Skin Assessment     NAME:  Amish Nick  YOB: 1990  MEDICAL RECORD NUMBER:  [id number]    The patient is being assess for  Transfer to New Unit    I agree that 2 RN's have performed a thorough Head to Toe Skin Assessment on the patient. ALL assessment sites listed below have been assessed. Areas assessed by both nurses:    Head, Face, Ears, Shoulders, Back, Chest, Arms, Elbows, Hands, Sacrum. Buttock, Coccyx, Ischium and Legs. Feet and Heels        Does the Patient have a Wound?  No noted wound(s)       Luis Daniel Prevention initiated:  No   Wound Care Orders initiated:  No    Pressure Injury (Stage 3,4, Unstageable, DTI, NWPT, and Complex wounds) if present place consult order under [de-identified] No    New and Established Ostomies if present place consult order under : No      Nurse 1 eSignature: Electronically signed by Nba Curran RN on 10/31/21 at 3:09 PM EDT    **SHARE this note so that the co-signing nurse is able to place an eSignature**    Nurse 2 eSignature: Electronically signed by Nora Kaur RN on 10/31/21 at 3:09 PM EDT

## 2021-10-31 NOTE — PROGRESS NOTES
Hospitalist Progress Note      PCP: Yousif Live MD    Date of Admission: 10/30/2021    Chief Complaint: seizure. Hospital Course:    58-year-old female with known  history of tonic-clonic seizures, currently on Keppra and Klonopin,  presents to the hospital because her  was found her seizing in  bed and she had two more seizures on the way. Admitted for management of DKA. Subjective:   Denies any new complaints this morning. Reports extreme fatigue. Medications:  Reviewed    Infusion Medications    dextrose      dextrose 5 % and 0.45 % NaCl Stopped (10/30/21 1109)     Scheduled Medications    insulin glargine  12 Units SubCUTAneous Nightly    insulin lispro  0-12 Units SubCUTAneous TID WC    insulin lispro  0-6 Units SubCUTAneous Nightly    insulin lispro  4 Units SubCUTAneous TID WC    insulin glargine  12 Units SubCUTAneous Once    atorvastatin  20 mg Oral Daily    enoxaparin  40 mg SubCUTAneous Daily    clonazePAM  2 mg Oral BID    gabapentin  100 mg Oral TID    nadolol  10 mg Oral Daily    levETIRAcetam  1,000 mg Oral BID     PRN Meds: glucose, dextrose, glucagon (rDNA), dextrose, dextrose, potassium chloride, magnesium sulfate, dextrose 5 % and 0.45 % NaCl, LORazepam, sodium phosphate IVPB **OR** sodium phosphate IVPB **OR** sodium phosphate IVPB      Intake/Output Summary (Last 24 hours) at 10/31/2021 1411  Last data filed at 10/31/2021 1347  Gross per 24 hour   Intake 5575 ml   Output 700 ml   Net 4875 ml       Physical Exam Performed:    BP 91/62   Pulse 88   Temp 97.4 °F (36.3 °C) (Oral)   Resp 16   Ht 4' 11\" (1.499 m)   Wt 110 lb 7.2 oz (50.1 kg)   LMP 10/18/2021   SpO2 99%   BMI 22.31 kg/m²     General appearance: No apparent distress, appears stated age and cooperative. HEENT: Pupils equal, round, and reactive to light. Conjunctivae/corneas clear. Neck: Supple, with full range of motion. No jugular venous distention. Trachea midline.   Respiratory: Normal respiratory effort. Clear to auscultation, bilaterally without Rales/Wheezes/Rhonchi. Cardiovascular: Regular rate and rhythm with normal S1/S2 without murmurs, rubs or gallops. Abdomen: Soft, non-tender, non-distended with normal bowel sounds. Musculoskeletal: No clubbing, cyanosis or edema bilaterally. Full range of motion without deformity. Skin: Skin color, texture, turgor normal.  No rashes or lesions. Neurologic:  Neurovascularly intact without any focal sensory/motor deficits. Cranial nerves: II-XII intact, grossly non-focal.  Psychiatric: Alert and oriented, thought content appropriate, normal insight  Capillary Refill: Brisk,3 seconds, normal   Peripheral Pulses: +2 palpable, equal bilaterally       Labs:   Recent Labs     10/30/21  0105   WBC 4.9   HGB 12.8   HCT 41.9        Recent Labs     10/30/21  1638 10/30/21  1638 10/30/21  2035 10/31/21  0008 10/31/21  0826     --  135* 136  --    K 4.4  --  4.7 4.5  --      --  103 104  --    CO2 24  --  21 20*  --    BUN 4*  --  4* 3*  --    CREATININE <0.5*  --  <0.5* <0.5*  --    CALCIUM 8.4  --  8.0* 8.1*  --    PHOS 2.3*   < > 2.3* 2.4* 3.4    < > = values in this interval not displayed. No results for input(s): AST, ALT, BILIDIR, BILITOT, ALKPHOS in the last 72 hours. No results for input(s): INR in the last 72 hours. No results for input(s): Prentis Revels in the last 72 hours. Urinalysis:      Lab Results   Component Value Date    NITRU Negative 10/30/2021    WBCUA 30 10/30/2021    BACTERIA 2+ 10/30/2021    RBCUA None seen 10/30/2021    BLOODU Negative 10/30/2021    SPECGRAV 1.027 10/30/2021    GLUCOSEU >=1000 10/30/2021    GLUCOSEU >=1000 01/17/2011       Radiology:  MRI BRAIN W WO CONTRAST    (Results Pending)           Assessment/Plan:    Active Hospital Problems    Diagnosis     DKA, type 1, not at goal [facility]) [E10.10]      DKA. Type 1 DM.    Patient presented to ED following a seizure, was found to be in DKA with an anion gap, was on insulin infusion, gap closed, Bg improved. Patient transitioned yesterday evening. Plan:  - continue glargine 12 units. - sliding scale insulin. - 4 units mealtime, will hold for hypoglycemia. - patient can go back on her home insulin pump after MRI ( patient does not have pump with her). uncontrolled seizure disorder. Multiple seizures over the last week. Seen by neurology, was on Keppra 500 mg twice daily as well as Klonopin 2 mg twice daily. Some about medication compliance as per PCP note. Plan :  -Continue on Keppra 1000 mg twice daily.  -EEG. - Appreciate neurology input    Depression with anxiety  Continue sertraline. tachycardia ( chronic). Given nadolol by PCP. Will resume. DVT Prophylaxis: lovenox  Diet: ADULT DIET; Regular; 4 carb choices (60 gm/meal)  Code Status: Full Code    PT/OT Eval Status: ambulatory . Dispo - likely tomorrow ( after MRI and EEG).      Apollo Parrish MD

## 2021-10-31 NOTE — CONSULTS
added in 2020. At the end of 2020, it appears that Onfi dose was increased to 10 mg HS. In 2020, patient's father passed away. Patient traveled to [place] for that reason where she had multiple seizures and was admitted to a hospital in [place]. She tells me that when she was discharged from that hospital, she was only prescribed Keppra 500 mg BID. She notes that she continued to have seizure events about once a month since that time. She tells me that her and her  recently moved back to [place] about a month ago. She stayed in [place] following the death of her father until about a month ago. She is on Keppra  500 mg BID for seizure management. She states that she has been compliant with her medication. When I discussed the patient's Keppra level with her, she tells me that she has been sick recently and going to the bathroom a lot. She attributes her low level to this. She again tells me that she is compliant with her Keppra and does not miss doses. She states that last Tuesday she had 2 seizures in her sleep. She does not recall the events from yesterday or what brought her to the hospital. She is aware that she had a seizure. No further seizure events since admission. Glucose is under much better control currently. Seizure seminology: Describes generalized convulsions and staring events. Denies biting her tongue with most recent event, however she tells me that she has bitten her tongue during past events. She did bite her lip with recent event however. Also tells me that she has had urinary incontinence with past events, but is unsure if she did with most recent event.          Medical History:  Past Medical History:   Diagnosis Date    Depression     Diabetes mellitus ([facility]     Seizures ([facility]      Past Surgical History:   Procedure Laterality Date     SECTION      TUBAL LIGATION       Medications Prior to Admission: nadolol (CORGARD) 20 MG tablet, Take 1 tablet by mouth daily (Patient taking differently: Take 10 mg by mouth daily )  insulin aspart (NOVOLOG) 100 UNIT/ML injection vial, INJECT 80 UNITS INTO THE SKIN DAILY VIA INSULIN PUMP or as directed per sliding scale if pump not available. gabapentin (NEURONTIN) 300 MG capsule, Take 1 Capsule 3 times daily during the day and 3 at bedtime  clonazePAM (KLONOPIN) 2 MG tablet, Take 1 tablet by mouth 2 times daily. levETIRAcetam (KEPPRA) 500 MG tablet, Take 500 mg by mouth 2 times daily  famotidine (PEPCID) 20 MG tablet, Take 20 mg by mouth 2 times daily  atorvastatin (LIPITOR) 20 MG tablet, Take 1 tablet by mouth daily (Patient not taking: Reported on 10/27/2021)  [DISCONTINUED] sertraline (ZOLOFT) 100 MG tablet, Take 1 tablet by mouth daily  glucagon 1 MG injection, Inject 1 mg into the muscle See Admin Instructions Follow package directions for low blood sugar. No Known Allergies  History reviewed. No pertinent family history. Social History     Tobacco Use   Smoking Status Never Smoker   Smokeless Tobacco Never Used     Social History     Substance and Sexual Activity   Drug Use No     Social History     Substance and Sexual Activity   Alcohol Use No       ROS:  Constitutional- No weight loss or fevers  Eyes- No diplopia. No photophobia. Ears/nose/throat- No dysphagia. No Dysarthria  Cardiovascular- No palpitations. No chest pain  Respiratory- No dyspnea. No Cough  Gastrointestinal- No Abdominal pain. No Vomiting. Genitourinary- No incontinence. No urinary retention  Musculoskeletal- No myalgia. No arthralgia  Skin- No rash. No easy bruising. Psychiatric- No depression. No anxiety  Endocrine- + diabetes. No thyroid issues. Hematologic- No bleeding difficulty. No fatigue  Neurologic- No weakness. No Headache. Exam:  Blood pressure 87/66, pulse 77, temperature 98.2 °F (36.8 °C), temperature source Oral, resp.  rate 13, height 4' 11\" (1.499 m), weight 110 lb 7.2 oz (50.1 kg), last menstrual period 10/18/2021, SpO2 97 %, not currently breastfeeding. Constitutional    Vital signs: BP, HR, and RR reviewed   General Alert, no distress, well-nourished  Eyes: Unable to visualize the fundi  Cardiovascular: pulses symmetric in all 4 extremities. No peripheral edema. Psychiatric: cooperative with examination, no  psychotic behavior noted. Neurologic  Mental status: Eyes open spontaneously   orientation to person, place, month, year   General fund of knowledge grossly intact   Memory Poor   Attention intact as able to attend well to the exam, able to read the clock   Language fluent in conversation   Comprehension intact; follows simple commands  Cranial nerves:   CN2: Visual Fields full w/o extinction on confrontational testing  CN 3,4,6: extraocular muscles intact, PERRL  CN5: facial sensation symmetric   CN7:face symmetric without dysarthria  CN8: hearing grossly intact  CN9: palate elevated symmetrically  CN11: trap full strength on shoulder shrug  CN12: tongue midline with protrusion  Strength: No  drift. Good strength in all 4 extremities   Deep tendon reflexes: 2+ BUEs, areflexic BLEs  Sensory: light touch intact in all 4 extremities. No sensory extinction on double simultaneous stimulation  Cerebellar/coordination: finger nose finger normal without ataxia  Tone: normal in all 4 extremities  Gait: Deferred for safety       Labs  BUN: 3  Creatinine: <0.5  Glucose: 1020 --> 165  AST: 176  ALT: 124  A1C: 11.4  WBC: 4.9    Keppra level: < 2.0     UA:   Ref. Range 10/30/2021 01:50   Nitrite, Urine Latest Ref Range: Negative  Negative   Leukocyte Esterase, Urine Latest Ref Range: Negative  Negative         Past serum studies:   Ref. Range 7/19/2020 20:11   Lamotrigine Lvl Latest Ref Range: 2.5 - 15.0 ug/mL <0.9 (L)   Levetiracetam Latest Ref Range: 6.0 - 46.0 ug/mL <2.0 (L)        Ref. Range 2/13/2018 20:43   Levetiracetam Latest Ref Range: 6.0 - 46.0 ug/mL <2.0 (L)        Ref.  Range 11/8/2017 08:46   Levetiracetam Latest Ref Range: 6.0 - 46.0 ug/mL <2.0 (L)       Studies  No neuro imaging to review    Impression:  1. Breakthrough seizure  2. DKA  3. Epilepsy  4. Type 1 diabetes  5. Medication noncompliance, possible       Gerson Rajan is a 27 y.o. female with a history of type 1 diabetes and epilepsy who presented with 3 breakthrough seizures the day of admission in the setting of DKA. Keppra level was checked in the ED and was < 2.0 and I do suspect that medication compliance may be contributing. She has had multiple subtherapeutic levels over the past few years as well. Clinically improved and now very close back to her baseline. Recommendations:  - Recommend CT head given she has not had any neuroimaging this admission  - Agree with increasing Keppra dose to 1000 mg BID   - Good glucose control. She follows with Endocrinology as an outpatient   - Did discuss the importance of medication compliance in regards to her epilepsy as well as diabetes  - Discussed with pt that she cannot engage in any activity where loss of consciousness would be dangerous to herself or others (e.g. driving, climbing, swimming alone, etc.) until approved by her physician (seizure-free for at least 3-6 months). She expressed understanding. She does not drive. - She needs to follow up with her established Epileptologist, Dr. Melly turk following discharge .  Patient expressed understanding       A copy of this note was provided for MD Nahum Teran [address] Neurology  [phone]

## 2021-10-31 NOTE — PROGRESS NOTES
Review of 2030 labs reveal anion gap closed now x 2, CO2 WDL. Perfect serve message sent to Dr Sandra Louise to notify. Awaiting reply. @0140 New orders received to administer Lantus and stop insulin drip in 1 hour, then downgrade to Med/Surg. @1553 Lantus given. @0300 Insulin gtt & D5 & 1/2 NS w KCL discontinued per orders.

## 2021-10-31 NOTE — PROGRESS NOTES
Patient transferred to room 4129 from ICU with seizures. VS stable (see flowsheet). Patient's breathing regular and unlabored, denies pain. Oriented to room, call light, TV, phone, patient rights and responsibilities. Bed in lowest position and locked, exit alarm in place. Non-slip socks on. ID bracelet on and correct per pt verbally reporting name and date of birth. Call light within reach. Needed items within reach.

## 2021-11-01 ENCOUNTER — APPOINTMENT (OUTPATIENT)
Dept: MRI IMAGING | Age: 31
DRG: 420 | End: 2021-11-01
Payer: COMMERCIAL

## 2021-11-01 VITALS
TEMPERATURE: 98 F | BODY MASS INDEX: 22.27 KG/M2 | WEIGHT: 110.45 LBS | DIASTOLIC BLOOD PRESSURE: 70 MMHG | RESPIRATION RATE: 16 BRPM | HEART RATE: 77 BPM | SYSTOLIC BLOOD PRESSURE: 102 MMHG | HEIGHT: 59 IN | OXYGEN SATURATION: 96 %

## 2021-11-01 LAB
ANION GAP SERPL CALCULATED.3IONS-SCNC: 12 MMOL/L (ref 3–16)
BASOPHILS ABSOLUTE: 0.1 K/UL (ref 0–0.2)
BASOPHILS RELATIVE PERCENT: 1.2 %
BUN BLDV-MCNC: 3 MG/DL (ref 7–20)
CALCIUM SERPL-MCNC: 8.4 MG/DL (ref 8.3–10.6)
CHLORIDE BLD-SCNC: 102 MMOL/L (ref 99–110)
CO2: 27 MMOL/L (ref 21–32)
CREAT SERPL-MCNC: <0.5 MG/DL (ref 0.6–1.1)
EOSINOPHILS ABSOLUTE: 0.1 K/UL (ref 0–0.6)
EOSINOPHILS RELATIVE PERCENT: 1.5 %
GFR AFRICAN AMERICAN: >60
GFR NON-AFRICAN AMERICAN: >60
GLUCOSE BLD-MCNC: 103 MG/DL (ref 70–99)
GLUCOSE BLD-MCNC: 118 MG/DL (ref 70–99)
GLUCOSE BLD-MCNC: 91 MG/DL (ref 70–99)
HCT VFR BLD CALC: 33 % (ref 36–48)
HEMOGLOBIN: 10.9 G/DL (ref 12–16)
LYMPHOCYTES ABSOLUTE: 2.6 K/UL (ref 1–5.1)
LYMPHOCYTES RELATIVE PERCENT: 61.3 %
MCH RBC QN AUTO: 28.8 PG (ref 26–34)
MCHC RBC AUTO-ENTMCNC: 32.9 G/DL (ref 31–36)
MCV RBC AUTO: 87.5 FL (ref 80–100)
MONOCYTES ABSOLUTE: 0.4 K/UL (ref 0–1.3)
MONOCYTES RELATIVE PERCENT: 10.6 %
NEUTROPHILS ABSOLUTE: 1.1 K/UL (ref 1.7–7.7)
NEUTROPHILS RELATIVE PERCENT: 25.4 %
PDW BLD-RTO: 13.8 % (ref 12.4–15.4)
PERFORMED ON: ABNORMAL
PERFORMED ON: NORMAL
PLATELET # BLD: 199 K/UL (ref 135–450)
PMV BLD AUTO: 9.4 FL (ref 5–10.5)
POTASSIUM SERPL-SCNC: 3.9 MMOL/L (ref 3.5–5.1)
RBC # BLD: 3.78 M/UL (ref 4–5.2)
SODIUM BLD-SCNC: 141 MMOL/L (ref 136–145)
WBC # BLD: 4.2 K/UL (ref 4–11)

## 2021-11-01 PROCEDURE — 36415 COLL VENOUS BLD VENIPUNCTURE: CPT

## 2021-11-01 PROCEDURE — 6370000000 HC RX 637 (ALT 250 FOR IP): Performed by: STUDENT IN AN ORGANIZED HEALTH CARE EDUCATION/TRAINING PROGRAM

## 2021-11-01 PROCEDURE — 6360000004 HC RX CONTRAST MEDICATION: Performed by: INTERNAL MEDICINE

## 2021-11-01 PROCEDURE — 70553 MRI BRAIN STEM W/O & W/DYE: CPT

## 2021-11-01 PROCEDURE — 95819 EEG AWAKE AND ASLEEP: CPT

## 2021-11-01 PROCEDURE — A9577 INJ MULTIHANCE: HCPCS | Performed by: INTERNAL MEDICINE

## 2021-11-01 PROCEDURE — 6360000002 HC RX W HCPCS: Performed by: INTERNAL MEDICINE

## 2021-11-01 PROCEDURE — 6370000000 HC RX 637 (ALT 250 FOR IP): Performed by: INTERNAL MEDICINE

## 2021-11-01 PROCEDURE — 85025 COMPLETE CBC W/AUTO DIFF WBC: CPT

## 2021-11-01 PROCEDURE — 80048 BASIC METABOLIC PNL TOTAL CA: CPT

## 2021-11-01 RX ORDER — LEVETIRACETAM 500 MG/1
1000 TABLET ORAL 2 TIMES DAILY
Qty: 60 TABLET | Refills: 3 | Status: SHIPPED | OUTPATIENT
Start: 2021-11-01 | End: 2022-01-25 | Stop reason: SDUPTHER

## 2021-11-01 RX ORDER — LANCETS 30 GAUGE
1 EACH MISCELLANEOUS 3 TIMES DAILY
Qty: 100 EACH | Refills: 3 | Status: SHIPPED | OUTPATIENT
Start: 2021-11-01 | End: 2022-01-25 | Stop reason: ALTCHOICE

## 2021-11-01 RX ORDER — INSULIN LISPRO 100 [IU]/ML
2-12 INJECTION, SOLUTION INTRAVENOUS; SUBCUTANEOUS
Qty: 10.8 ML | Refills: 0 | Status: ON HOLD | OUTPATIENT
Start: 2021-11-01 | End: 2021-11-26 | Stop reason: SDUPTHER

## 2021-11-01 RX ORDER — PEN NEEDLE, DIABETIC 30 GX3/16"
NEEDLE, DISPOSABLE MISCELLANEOUS
Qty: 30 EACH | Refills: 1 | Status: SHIPPED | OUTPATIENT
Start: 2021-11-01 | End: 2022-01-25 | Stop reason: ALTCHOICE

## 2021-11-01 RX ORDER — BLOOD-GLUCOSE METER
1 KIT MISCELLANEOUS 4 TIMES DAILY
Qty: 1 KIT | Refills: 0 | Status: SHIPPED | OUTPATIENT
Start: 2021-11-01 | End: 2022-01-25 | Stop reason: ALTCHOICE

## 2021-11-01 RX ORDER — INSULIN GLARGINE 100 [IU]/ML
12 INJECTION, SOLUTION SUBCUTANEOUS NIGHTLY
Qty: 5 PEN | Refills: 0 | Status: ON HOLD | OUTPATIENT
Start: 2021-11-01 | End: 2021-11-26 | Stop reason: HOSPADM

## 2021-11-01 RX ORDER — GLUCOSAMINE HCL/CHONDROITIN SU 500-400 MG
CAPSULE ORAL
Qty: 50 STRIP | Refills: 2 | Status: SHIPPED | OUTPATIENT
Start: 2021-11-01 | End: 2021-11-16 | Stop reason: SDUPTHER

## 2021-11-01 RX ORDER — INSULIN LISPRO 100 [IU]/ML
1-5 INJECTION, SOLUTION INTRAVENOUS; SUBCUTANEOUS
Qty: 4.5 ML | Refills: 0 | Status: SHIPPED | OUTPATIENT
Start: 2021-11-01 | End: 2021-11-01 | Stop reason: SDUPTHER

## 2021-11-01 RX ORDER — INSULIN GLARGINE 100 [IU]/ML
12 INJECTION, SOLUTION SUBCUTANEOUS NIGHTLY
Qty: 5 PEN | Refills: 0 | Status: SHIPPED | OUTPATIENT
Start: 2021-11-01 | End: 2021-11-01 | Stop reason: SDUPTHER

## 2021-11-01 RX ADMIN — ATORVASTATIN CALCIUM 20 MG: 20 TABLET, FILM COATED ORAL at 09:29

## 2021-11-01 RX ADMIN — GABAPENTIN 100 MG: 100 CAPSULE ORAL at 13:22

## 2021-11-01 RX ADMIN — GADOBENATE DIMEGLUMINE 9 ML: 529 INJECTION, SOLUTION INTRAVENOUS at 18:17

## 2021-11-01 RX ADMIN — ENOXAPARIN SODIUM 40 MG: 100 INJECTION SUBCUTANEOUS at 09:28

## 2021-11-01 RX ADMIN — LEVETIRACETAM 1000 MG: 500 TABLET, FILM COATED ORAL at 09:28

## 2021-11-01 RX ADMIN — CLONAZEPAM 2 MG: 1 TABLET ORAL at 09:29

## 2021-11-01 RX ADMIN — GABAPENTIN 100 MG: 100 CAPSULE ORAL at 09:29

## 2021-11-01 RX ADMIN — NADOLOL 10 MG: 20 TABLET ORAL at 09:29

## 2021-11-01 NOTE — CARE COORDINATION
[facility]  Diabetes Education   Progress Note       NAME:  José [facility] RECORD NUMBER:  [id number]  AGE: 27 y.o. GENDER: female  : 1990  TODAY'S DATE:  2021    Subjective   Reason for Diabetic Education Evaluation and Assessment: insulin pump, DKA    Elias Wahl states that she could not believe that 24 hours without her Omni Pod pump would make are \"so sick\". She discribes rationing her insulin supplies and using samples. She is vague about the status of insulin pump supplies and glucose monitoring supplies. She reports that her former endocrinologist has retired. She is receptive to follow up with  or [facility] ([facility]). Visit Type: evaluation      Tanya Ornelas is a 27 y.o. female referred by:   [] Physician  [] Nursing  [x] Chart Review   [] Other:     PAST MEDICAL HISTORY        Diagnosis Date    Depression     Diabetes mellitus ([address]     Seizures ([facility] [address]        PAST SURGICAL HISTORY    Past Surgical History:   Procedure Laterality Date     SECTION      TUBAL LIGATION         FAMILY HISTORY    History reviewed. No pertinent family history.     SOCIAL HISTORY    Social History     Tobacco Use    Smoking status: Never Smoker    Smokeless tobacco: Never Used   Vaping Use    Vaping Use: Never used   Substance Use Topics    Alcohol use: No    Drug use: No       ALLERGIES    No Known Allergies    MEDICATIONS     insulin glargine  12 Units SubCUTAneous Nightly    insulin lispro  0-12 Units SubCUTAneous TID WC    insulin lispro  0-6 Units SubCUTAneous Nightly    [Held by provider] insulin lispro  4 Units SubCUTAneous TID WC    insulin glargine  12 Units SubCUTAneous Once    atorvastatin  20 mg Oral Daily    enoxaparin  40 mg SubCUTAneous Daily    clonazePAM  2 mg Oral BID    gabapentin  100 mg Oral TID    nadolol  10 mg Oral Daily    levETIRAcetam  1,000 mg Oral BID       Objective        Patient Active Problem List   Diagnosis Code    Seizure disorder ([facility] G40.909    Type 1 diabetes mellitus with complication (HCC) Q82.2    Anxiety and depression F41.9, F32. A    ROME (generalized anxiety disorder) F41.1    Anorexia nervosa F50.00    Diabetic peripheral neuropathy (HCC) E11.42    Gastroparesis K31.84    Weight loss, unintentional R63.4    Tachycardia R00.0    Elevated transaminase level R74.01    Bandemia D72.825    DKA, type 1, not at goal ([facility] E10.10        BP 94/64   Pulse 86   Temp 97.8 °F (36.6 °C) (Oral)   Resp 16   Ht 4' 11\" (1.499 m)   Wt 110 lb 7.2 oz (50.1 kg)   LMP 10/18/2021   SpO2 96%   BMI 22.31 kg/m²     HgBA1c:    Lab Results   Component Value Date    LABA1C 11.4 10/27/2021       Recent Labs     10/31/21  1640 10/31/21  1940 11/01/21  0752 11/01/21  1221   POCGLU 254* 208* 103* 91       BUN/Creatinine:    Lab Results   Component Value Date    BUN 3 11/01/2021    CREATININE <0.5 11/01/2021       Assessment        Diabetes Management and Education    Does the patient have a Primary Care Physician? Yes, Tawny Jansen MD  Prior endocrinology support with Dr. Kristal Saucedo at [facility] but she believes he has retired. Does the patient require new medication instruction? Yes Pervious experience with Omni pod pump and insulin pens. Reviewed Lantus and Humalog/Novolog insulin therapy concepts. Person responsible for administration of Insulin/Medication:        [x] Self     [] Caregiver       [] Spouse       [] Other Family Member   []  Other    Level of patient/caregiver understanding able to:       [x] Verbalized Understanding   [] Demonstrated Understanding       [] Teach Back       [] Needs Reinforcement     []  Other:        Does the patient/caregiver monitor Blood Glucoses? No: Using Dexcom samples. Does not have glucometer/strips/lancets. Reviewed glycemic control targets, testing frequency and when to call PCP.      Level of patient/caregiver understanding able to:        [x] Verbalized Understanding   [] Demonstrated Understanding       [] Teach Back       [] Needs Reinforcement     []  Other:        Does the patient/caregiver follow a Meal Plan? Yes - eats mostly low carb. Does the patient/caregiver understand S/S of Hypoglycemia? Yes  Reviewed symptoms, prevention and treatment. Level of patient/caregiver understanding able to:      [x] Verbalized Understanding   [] Demonstrated Understanding       [] Teach Back       [] Needs Reinforcement     []  Other:                    Does the patient/caregiver understand S/S of Hyperglycemia? Yes    Reviewed symptoms, prevention and treatment. Discussed impact of missed insulin. Level of patient/caregiver understanding able to:        [x] Verbalized Understanding   [] Demonstrated Understanding       [] Teach Back       [] Needs Reinforcement     []  Other:           Plan        Ongoing diabetes education and blood glucose monitoring. Recommend meds to beds. Recommend basal bolus insulin strategy until pump supplies can be secured through endocrinology.                                              Discharge Plan:  Discharge needs: insulin pens and 4mm pen needles and glucometer/strips/lancets  Placement for patient upon discharge: independent living     Endocrinology follow up scheduled:    Nov4 Follow up with MARI Espinosa CNP  Thursday Nov 4, 2021  Specialty: Nurse Practitioner  at 12:00 for , Diabetes follow up       Teaching Time Diabetes Education:  30 minutes     Electronically signed by Anastacio Arrieta on 11/1/2021 at 12:43 PM

## 2021-11-01 NOTE — PLAN OF CARE
Problem: Nutrition  Goal: Optimal nutrition therapy  10/31/2021 2345 by Ryan Pedro RN  Outcome: Ongoing  10/31/2021 1504 by Waleska Funes RN  Outcome: Ongoing  10/31/2021 1007 by Savanah Nevarez RN  Outcome: Ongoing   Nutrition encouraged. Problem: Serum Glucose Level - Abnormal:  Goal: Ability to maintain appropriate glucose levels has stabilized  Description: Ability to maintain appropriate glucose levels has stabilized  10/31/2021 2345 by Ryan Pedro RN  Outcome: Ongoing  10/31/2021 1504 by Waleska Funes RN  Outcome: Ongoing  10/31/2021 1007 by Savanah Nevarez RN  Outcome: Ongoing   Monitored before meals and at bedtime. Coverage given per orders.     Problem: Skin Integrity:  Goal: Will show no infection signs and symptoms  Description: Will show no infection signs and symptoms  10/31/2021 2345 by Ryan Pedro RN  Outcome: Ongoing  10/31/2021 1504 by Waleska Funes RN  Outcome: Ongoing  10/31/2021 1007 by Savanah Nevarez RN  Outcome: Ongoing  Goal: Absence of new skin breakdown  Description: Absence of new skin breakdown  10/31/2021 2345 by Ryan Pedro RN  Outcome: Ongoing  10/31/2021 1504 by Waleska Funes RN  Outcome: Ongoing  10/31/2021 1007 by Savanah Nevarez RN  Outcome: Ongoing

## 2021-11-01 NOTE — DISCHARGE SUMMARY
Hospital Medicine Discharge Summary    Patient ID: Azalia Mcdonough      Patient's PCP: Caitlyn Baldwin MD    Admit Date: 10/30/2021     Discharge Date: 11/1/2021      Admitting Physician: Vashti Lin MD     Discharge Physician: Tom Lamar MD     Discharge Diagnoses: Active Hospital Problems    Diagnosis     DKA, type 1, not at goal [facility]) [E10.10]        The patient was seen and examined on day of discharge and this discharge summary is in conjunction with any daily progress note from day of discharge. Hospital Course:   79-year-old female with known  history of tonic-clonic seizures, currently on Keppra and Klonopin,  presents to the hospital because her  was found her seizing in  bed and she had two more seizures on the way.     Admitted for management of DKA.        DKA. Type 1 DM. Patient presented to ED following a seizure, was found to be in DKA with an anion gap, was on insulin infusion, gap closed, Bg improved. Transitioned to subcut insulin. Will discharge on subcut insulin for now, patient is scheduled to see her endocrinologist later this week.       uncontrolled seizure disorder. Multiple seizures over the last week. Seen by neurology, was on Keppra 500 mg twice daily as well as Klonopin 2 mg twice daily. Some about medication compliance as per PCP note. Needs follow up with neurology as outpatient.      Depression with anxiety  Continue sertraline.      tachycardia ( chronic). Given nadolol by PCP. Will resume.            Physical Exam Performed:     /70   Pulse 77   Temp 98 °F (36.7 °C) (Oral)   Resp 16   Ht 4' 11\" (1.499 m)   Wt 110 lb 7.2 oz (50.1 kg)   LMP 10/18/2021   SpO2 96%   BMI 22.31 kg/m²       General appearance:  No apparent distress, appears stated age and cooperative. HEENT:  Normal cephalic, atraumatic without obvious deformity. Pupils equal, round, and reactive to light. Extra ocular muscles intact. Conjunctivae/corneas clear. Neck: Supple, with full range of motion. No jugular venous distention. Trachea midline. Respiratory:  Normal respiratory effort. Clear to auscultation, bilaterally without Rales/Wheezes/Rhonchi. Cardiovascular:  Regular rate and rhythm with normal S1/S2 without murmurs, rubs or gallops. Abdomen: Soft, non-tender, non-distended with normal bowel sounds. Musculoskeletal:  No clubbing, cyanosis or edema bilaterally. Full range of motion without deformity. Skin: Skin color, texture, turgor normal.  No rashes or lesions. Neurologic:  Neurovascularly intact without any focal sensory/motor deficits. Cranial nerves: II-XII intact, grossly non-focal.  Psychiatric:  Alert and oriented, thought content appropriate, normal insight  Capillary Refill: Brisk,< 3 seconds   Peripheral Pulses: +2 palpable, equal bilaterally       Labs: For convenience and continuity at follow-up the following most recent labs are provided:      CBC:    Lab Results   Component Value Date    WBC 4.2 11/01/2021    HGB 10.9 11/01/2021    HCT 33.0 11/01/2021     11/01/2021       Renal:    Lab Results   Component Value Date     11/01/2021    K 3.9 11/01/2021    K 5.7 10/30/2021     11/01/2021    CO2 27 11/01/2021    BUN 3 11/01/2021    CREATININE <0.5 11/01/2021    CALCIUM 8.4 11/01/2021    PHOS 3.4 10/31/2021         Significant Diagnostic Studies    Radiology:   MRI BRAIN W WO CONTRAST   Final Result   Unremarkable MRI of the brain with and without contrast.                Consults:     IP CONSULT TO NEUROLOGY    Disposition:  Home.       Condition at Discharge: Stable    Discharge Instructions/Follow-up:    - patient will be perscribed glargin and lispro insulin until she sees her endocrinologist and ensure pump supplies/.     Code Status:  Prior     Activity: activity as tolerated    Diet: diabetic diet      Discharge Medications:     Discharge Medication List as of 11/1/2021  5:14 PM Details   Insulin Pen Needle (PEN NEEDLES) 32G X 4 MM [facility] to make formulary changes, Disp-30 each, R-1Normal      glucose monitoring (FREESTYLE FREEDOM) kit 4 TIMES DAILY Starting Mon 11/1/2021, Disp-1 kit, R-0, NormalPharmacy to make formulary changes      blood glucose monitor strips Test 4 times a day & as needed for symptoms of irregular blood glucose. Dispense sufficient amount for indicated testing frequency plus additional to accommodate PRN testing needs. , Disp-50 strip, R-2, Normal      Lancets MISC 3 TIMES DAILY Starting Mon 11/1/2021, Disp-100 each, R-3, Normal              Details   levETIRAcetam (KEPPRA) 500 MG tablet Take 2 tablets by mouth 2 times daily, Disp-60 tablet, R-3Normal      insulin glargine (LANTUS SOLOSTAR) 100 UNIT/ML injection pen Inject 12 Units into the skin nightly, Disp-5 pen, R-0Normal      insulin lispro, 1 Unit Dial, (HUMALOG KWIKPEN) 100 UNIT/ML SOPN Inject 2-12 Units into the skin 3 times daily (before meals) **Medium Dose Corrective Algorithm**  Glucose: Dose:  If <139             No Insulin  140-199 2 Units  200-249 4 Units  250-299 6 Units  300-349 8 Units  350-400 10 Units  Above 400       12 Un its, Disp-10.8 mL, R-0Print              Details   nadolol (CORGARD) 20 MG tablet Take 1 tablet by mouth daily, Disp-30 tablet, R-5Print      atorvastatin (LIPITOR) 20 MG tablet Take 1 tablet by mouth daily, Disp-90 tablet, R-3Print      insulin aspart (NOVOLOG) 100 UNIT/ML injection vial INJECT 80 UNITS INTO THE SKIN DAILY VIA INSULIN PUMP or as directed per sliding scale if pump not available., Disp-30 mL, R-5Print      gabapentin (NEURONTIN) 300 MG capsule Take 1 Capsule 3 times daily during the day and 3 at bedtime, Disp-180 capsule, R-1Print      clonazePAM (KLONOPIN) 2 MG tablet Take 1 tablet by mouth 2 times daily. , Disp-60 tablet, R-1Print      famotidine (PEPCID) 20 MG tablet Take 20 mg by mouth 2 times dailyHistorical Med      glucagon 1 MG injection Inject 1 mg into the muscle See Admin Instructions Follow package directions for low blood sugar., Disp-1 kit, R-3Normal             Time Spent on discharge is more than 30 minutes in the examination, evaluation, counseling and review of medications and discharge plan. Signed:    Kalyani Ribera MD   11/2/2021      Thank you Milady Landon MD for the opportunity to be involved in this patient's care. If you have any questions or concerns please feel free to contact me at [phone].

## 2021-11-01 NOTE — CARE COORDINATION
INITIAL CASE MANAGEMENT ASSESSMENT    Reviewed chart, met with patient to assess possible discharge needs. Explained Case Management role/services. Living Situation: Patient lives with her  and 2 daughters in an apartment with 6 steps down to enter. ADLs: Independent     DME: Insulin Pump and Dexcom Continuous Glucose Monitor    PT/OT Recs: None     Active Services: None     Transportation: Non-/ will transport     Medications: [facility] on [address]/No barriers    PCP: Vince Man MD      HD/PD: N/A    PLAN/COMMENTS: Plan is to return to home with family. SW/CM provided contact information for patient or family to call with any questions. SW/CM will follow and assist as needed.   Electronically signed by Carmita Dumont RN on 11/1/2021 at 2:24 PM

## 2021-11-01 NOTE — PROCEDURES
discharges. Beta activity was also present. CLINICAL INTERPRETATION:  Los and poly spike and wave discharges are  typically seen in the setting of generalized epilepsy. No seizures were  recorded. Beta activity is also nonspecific and typically seen in the  setting of medication effect with benzodiazepines and/or barbiturates. No seizures were recorded. If seizures remain a clinical concern, then  prolonged EEG monitoring may be of further benefit. Clinical  correlation is advised.         Kelsey Mercado DO    D: 11/01/2021 18:20:22       T: 11/01/2021 18:22:53     [email or web address]  Job#: [id number]     Doc#: [id number]    CC:

## 2021-11-01 NOTE — PLAN OF CARE
Problem: Skin Integrity:  Goal: Will show no infection signs and symptoms  Description: Will show no infection signs and symptoms  11/1/2021 1225 by Dagoberto Moss RN  Outcome: Ongoing  Note: Skin assessment per shift. Pt educated on turning and repositioning every two hours. Pt agreeable. Pillow support offered. Problem: Falls - Risk of:  Goal: Will remain free from falls  Description: Will remain free from falls  11/1/2021 1225 by Dagoberto Moss RN  Outcome: Ongoing  Note: Fall precautions in place. Bed in lowest position, wheels locked, call light in reach, non slip socks on, alarm armed. Pt educated on using call light for assistance when needing to get up. Pt agreeable.

## 2021-11-01 NOTE — CARE COORDINATION
DISCHARGE SUMMARY     DATE OF DISCHARGE: 11/1/21    DISCHARGE DESTINATION: Home    HOME CARE: No    HEMODIALYSIS: No    TRANSPORTATION: Private Car    NEW DME ORDERED: no    COMMENTS: Discharge to home with family.  will transport.   Electronically signed by Horacio Rojo RN on 11/1/2021 at 2:26 PM

## 2021-11-01 NOTE — PROGRESS NOTES
Discharge instruction went over with pt and spouse, all questions answered. IV flushed and discontinued, no complications. New medications and side effects went over with pt, stated understanding. Medications given to pt by retail pharmacy. Pt refused wheelchair.  Electronically signed by Janae Montenegro RN on 11/1/2021 at 6:51 PM

## 2021-11-02 NOTE — PROGRESS NOTES
CLINICAL PHARMACY NOTE: MEDS TO BEDS    Total # of Prescriptions Filled: 7   The following medications were delivered to the patient:  ·    blood glucose test strips     glucose monitoring kit     Lancets [facility]     Lantus SoloStar 100 UNIT/ML injection pen     levETIRAcetam 500 MG tablet     Pen Needles 32G X 4 MM Misc     insulin lispro (1 Unit Dial) 100 UNIT/ML Sopn      Additional Documentation:

## 2021-11-08 ENCOUNTER — TELEPHONE (OUTPATIENT)
Dept: INTERNAL MEDICINE CLINIC | Age: 31
End: 2021-11-08

## 2021-11-08 DIAGNOSIS — E11.42 DIABETIC PERIPHERAL NEUROPATHY (HCC): ICD-10-CM

## 2021-11-08 RX ORDER — GABAPENTIN 300 MG/1
CAPSULE ORAL
Qty: 180 CAPSULE | Refills: 1 | Status: SHIPPED | OUTPATIENT
Start: 2021-11-08 | End: 2022-01-12 | Stop reason: SDUPTHER

## 2021-11-08 NOTE — TELEPHONE ENCOUNTER
Went into [facility] had 3 seizures was in ICU at [facility]. Pt just wanted MD to be aware. Her blood sugar was high at 1025 was not in a coma or anything though. Not sure if the insulin was working or not.

## 2021-11-08 NOTE — TELEPHONE ENCOUNTER
----- Message from Kenyetta Locke Dr sent at 11/8/2021 10:03 AM EST -----  Subject: Refill Request    QUESTIONS  Name of Medication? clonazePAM (KLONOPIN) 2 MG tablet  Patient-reported dosage and instructions? 2 a day? How many days do you have left? 0  Preferred Pharmacy? [address] [place] [address]  Pharmacy phone number (if available)? [phone]  ---------------------------------------------------------------------------  --------------,  Name of Medication? gabapentin (NEURONTIN) 300 MG capsule  Patient-reported dosage and instructions? 4 times a day? How many days do you have left? 0  Preferred Pharmacy? [facility] phone number (if available)? [phone]  ---------------------------------------------------------------------------  --------------,  Name of Medication? Other - Ketone Strips  Patient-reported dosage and instructions? to pee on and check ketones  How many days do you have left? 0  Preferred Pharmacy? [facility]  Pharmacy phone number (if available)? [phone]  Additional Information for Provider? New Med Request? Pt inquiring about   getting some Ketone stirps, that she can pee on and check her Ketones. ---------------------------------------------------------------------------  --------------  Christie ALTMAN  What is the best way for the office to contact you? OK to leave message on   voicemail  Preferred Call Back Phone Number?  [phone]

## 2021-11-10 ENCOUNTER — TELEPHONE (OUTPATIENT)
Dept: INTERNAL MEDICINE CLINIC | Age: 31
End: 2021-11-10

## 2021-11-10 NOTE — TELEPHONE ENCOUNTER
----- Message from Mio Mccoy sent at 11/10/2021  3:56 PM EST -----  Subject: Message to Provider    QUESTIONS  Information for Provider? Dr. Michael Marcial pt Calling to advise Dr. Michael Marcial that   she was in ICU last week for insulin not working for diabetes and had   three seizures, and then yesterday patient had two seizures. While in   hospital patient's medication dosage changed and she would like a return   call regarding all the seizures within the last two weeks. ---------------------------------------------------------------------------  --------------  Bridgette ALTMAN  What is the best way for the office to contact you? OK to leave message on   voicemail  Preferred Call Back Phone Number? [phone]  ---------------------------------------------------------------------------  --------------  SCRIPT ANSWERS  Relationship to Patient?  Self

## 2021-11-10 NOTE — TELEPHONE ENCOUNTER
Her poorly controlled sugars could very well be contributing to her seizures. She needs to follow-up with not only endocrinology, but also neurology. She needs to follow-up with endocrinology very soon, because I think this is the most important thing to controlling her seizures at this time.

## 2021-11-12 DIAGNOSIS — G40.909 SEIZURE DISORDER (HCC): ICD-10-CM

## 2021-11-15 ENCOUNTER — TELEPHONE (OUTPATIENT)
Dept: INTERNAL MEDICINE CLINIC | Age: 31
End: 2021-11-15

## 2021-11-15 DIAGNOSIS — G40.909 SEIZURE DISORDER (HCC): ICD-10-CM

## 2021-11-15 RX ORDER — CLONAZEPAM 2 MG/1
TABLET ORAL
Qty: 60 TABLET | Refills: 0 | OUTPATIENT
Start: 2021-11-15

## 2021-11-15 NOTE — TELEPHONE ENCOUNTER
----- Message from Familia Madrigal sent at 11/15/2021  1:32 PM EST -----  Subject: Refill Request    QUESTIONS  Name of Medication? clonazePAM (KLONOPIN) 2 MG tablet  Patient-reported dosage and instructions? Take 1 tablet by mouth 2 times   daily. How many days do you have left? 0  Preferred Pharmacy? [address] phone number (if available)? [phone]  ---------------------------------------------------------------------------  --------------,  Name of Medication? insulin aspart (NOVOLOG) 100 UNIT/ML injection vial  Patient-reported dosage and instructions? INJECT 80 UNITS INTO THE SKIN   DAILY VIA INSULIN PUMP or as directed per sliding scale if pump not   available. How many days do you have left? 0  Preferred Pharmacy? [address] phone number (if available)? [phone]  ---------------------------------------------------------------------------  --------------,  Name of Medication? blood glucose monitor strips  Patient-reported dosage and instructions? Test 4 times a day & as needed   for symptoms of irregular blood glucose. Dispense sufficient amount for   indicated testing frequency plus additional to accommodate PRN testing   needs. How many days do you have left? 0  Preferred Pharmacy? [address] phone number (if available)? [phone]  ---------------------------------------------------------------------------  --------------  Abdi ALTMAN  What is the best way for the office to contact you? OK to leave message on   voicemail  Preferred Call Back Phone Number?  [phone]

## 2021-11-16 RX ORDER — CLONAZEPAM 2 MG/1
2 TABLET ORAL 2 TIMES DAILY
Qty: 60 TABLET | Refills: 1 | Status: SHIPPED | OUTPATIENT
Start: 2021-11-16 | End: 2022-01-11 | Stop reason: SDUPTHER

## 2021-11-16 RX ORDER — GLUCOSAMINE HCL/CHONDROITIN SU 500-400 MG
CAPSULE ORAL
Qty: 50 STRIP | Refills: 2 | Status: ON HOLD | OUTPATIENT
Start: 2021-11-16 | End: 2021-11-26 | Stop reason: HOSPADM

## 2021-11-16 NOTE — TELEPHONE ENCOUNTER
I did send it, ideally would have seen her for the refill but I can't get her in earlier and she did just see Dr. Michael Marcial.  She has to come to that appointment for further refills of the clonazepam however

## 2021-11-20 ENCOUNTER — HOSPITAL ENCOUNTER (INPATIENT)
Age: 31
LOS: 5 days | Discharge: HOME | DRG: 420 | End: 2021-11-26
Attending: EMERGENCY MEDICINE | Admitting: STUDENT IN AN ORGANIZED HEALTH CARE EDUCATION/TRAINING PROGRAM
Payer: COMMERCIAL

## 2021-11-20 DIAGNOSIS — E08.10 DIABETIC KETOACIDOSIS WITHOUT COMA ASSOCIATED WITH DIABETES MELLITUS DUE TO UNDERLYING CONDITION (HCC): ICD-10-CM

## 2021-11-20 DIAGNOSIS — E10.8 TYPE 1 DIABETES MELLITUS WITH COMPLICATION (HCC): ICD-10-CM

## 2021-11-20 DIAGNOSIS — R56.9 SEIZURE (HCC): Primary | ICD-10-CM

## 2021-11-20 PROCEDURE — 99284 EMERGENCY DEPT VISIT MOD MDM: CPT

## 2021-11-21 ENCOUNTER — APPOINTMENT (OUTPATIENT)
Dept: GENERAL RADIOLOGY | Age: 31
DRG: 420 | End: 2021-11-21
Payer: COMMERCIAL

## 2021-11-21 LAB
ALBUMIN SERPL-MCNC: 4.1 G/DL (ref 3.4–5)
ALP BLD-CCNC: 116 U/L (ref 40–129)
ALT SERPL-CCNC: 82 U/L (ref 10–40)
ANION GAP SERPL CALCULATED.3IONS-SCNC: 10 MMOL/L (ref 3–16)
ANION GAP SERPL CALCULATED.3IONS-SCNC: 16 MMOL/L (ref 3–16)
ANION GAP SERPL CALCULATED.3IONS-SCNC: 23 MMOL/L (ref 3–16)
ANION GAP SERPL CALCULATED.3IONS-SCNC: 24 MMOL/L (ref 3–16)
AST SERPL-CCNC: 102 U/L (ref 15–37)
BACTERIA: ABNORMAL /HPF
BASE EXCESS VENOUS: -6.2 MMOL/L
BASOPHILS ABSOLUTE: 0 K/UL (ref 0–0.2)
BASOPHILS ABSOLUTE: 0.1 K/UL (ref 0–0.2)
BASOPHILS RELATIVE PERCENT: 0.6 %
BASOPHILS RELATIVE PERCENT: 1.1 %
BETA-HYDROXYBUTYRATE: 1.41 MMOL/L (ref 0–0.27)
BILIRUB SERPL-MCNC: 0.5 MG/DL (ref 0–1)
BILIRUBIN DIRECT: <0.2 MG/DL (ref 0–0.3)
BILIRUBIN URINE: NEGATIVE
BILIRUBIN, INDIRECT: ABNORMAL MG/DL (ref 0–1)
BLOOD, URINE: ABNORMAL
BUN BLDV-MCNC: 6 MG/DL (ref 7–20)
BUN BLDV-MCNC: 6 MG/DL (ref 7–20)
BUN BLDV-MCNC: 8 MG/DL (ref 7–20)
BUN BLDV-MCNC: 8 MG/DL (ref 7–20)
CALCIUM SERPL-MCNC: 7.7 MG/DL (ref 8.3–10.6)
CALCIUM SERPL-MCNC: 8 MG/DL (ref 8.3–10.6)
CALCIUM SERPL-MCNC: 8 MG/DL (ref 8.3–10.6)
CALCIUM SERPL-MCNC: 8.8 MG/DL (ref 8.3–10.6)
CARBOXYHEMOGLOBIN: 1.2 %
CHLORIDE BLD-SCNC: 102 MMOL/L (ref 99–110)
CHLORIDE BLD-SCNC: 103 MMOL/L (ref 99–110)
CHLORIDE BLD-SCNC: 88 MMOL/L (ref 99–110)
CHLORIDE BLD-SCNC: 99 MMOL/L (ref 99–110)
CHP ED QC CHECK: YES
CLARITY: ABNORMAL
CO2: 14 MMOL/L (ref 21–32)
CO2: 17 MMOL/L (ref 21–32)
CO2: 17 MMOL/L (ref 21–32)
CO2: 24 MMOL/L (ref 21–32)
COLOR: YELLOW
CREAT SERPL-MCNC: 0.6 MG/DL (ref 0.6–1.1)
CREAT SERPL-MCNC: 0.8 MG/DL (ref 0.6–1.1)
CREAT SERPL-MCNC: <0.5 MG/DL (ref 0.6–1.1)
CREAT SERPL-MCNC: <0.5 MG/DL (ref 0.6–1.1)
EOSINOPHILS ABSOLUTE: 0 K/UL (ref 0–0.6)
EOSINOPHILS ABSOLUTE: 0 K/UL (ref 0–0.6)
EOSINOPHILS RELATIVE PERCENT: 0.6 %
EOSINOPHILS RELATIVE PERCENT: 1 %
EPITHELIAL CELLS, UA: 25 /HPF (ref 0–5)
GFR AFRICAN AMERICAN: >60
GFR NON-AFRICAN AMERICAN: >60
GLUCOSE BLD-MCNC: 105 MG/DL (ref 70–99)
GLUCOSE BLD-MCNC: 120 MG/DL (ref 70–99)
GLUCOSE BLD-MCNC: 121 MG/DL (ref 70–99)
GLUCOSE BLD-MCNC: 139 MG/DL (ref 70–99)
GLUCOSE BLD-MCNC: 156 MG/DL (ref 70–99)
GLUCOSE BLD-MCNC: 158 MG/DL (ref 70–99)
GLUCOSE BLD-MCNC: 200 MG/DL (ref 70–99)
GLUCOSE BLD-MCNC: 254 MG/DL (ref 70–99)
GLUCOSE BLD-MCNC: 288 MG/DL (ref 70–99)
GLUCOSE BLD-MCNC: 307 MG/DL (ref 70–99)
GLUCOSE BLD-MCNC: 319 MG/DL
GLUCOSE BLD-MCNC: 319 MG/DL (ref 70–99)
GLUCOSE BLD-MCNC: 343 MG/DL (ref 70–99)
GLUCOSE BLD-MCNC: 365 MG/DL (ref 70–99)
GLUCOSE BLD-MCNC: 587 MG/DL
GLUCOSE BLD-MCNC: 587 MG/DL (ref 70–99)
GLUCOSE BLD-MCNC: 600 MG/DL
GLUCOSE BLD-MCNC: 72 MG/DL (ref 70–99)
GLUCOSE BLD-MCNC: 79 MG/DL (ref 70–99)
GLUCOSE BLD-MCNC: 960 MG/DL (ref 70–99)
GLUCOSE BLD-MCNC: >600 MG/DL (ref 70–99)
GLUCOSE URINE: >=1000 MG/DL
HCG QUALITATIVE: NEGATIVE
HCO3 VENOUS: 21 MMOL/L (ref 23–29)
HCT VFR BLD CALC: 31.9 % (ref 36–48)
HCT VFR BLD CALC: 38 % (ref 36–48)
HEMOGLOBIN: 10 G/DL (ref 12–16)
HEMOGLOBIN: 11.4 G/DL (ref 12–16)
HYALINE CASTS: 3 /LPF (ref 0–8)
KEPPRA DOSE AMT: ABNORMAL
KEPPRA: <2 UG/ML (ref 6–46)
KETONES, URINE: 15 MG/DL
LEUKOCYTE ESTERASE, URINE: NEGATIVE
LYMPHOCYTES ABSOLUTE: 1.5 K/UL (ref 1–5.1)
LYMPHOCYTES ABSOLUTE: 2 K/UL (ref 1–5.1)
LYMPHOCYTES RELATIVE PERCENT: 30.9 %
LYMPHOCYTES RELATIVE PERCENT: 32 %
MAGNESIUM: 1.7 MG/DL (ref 1.8–2.4)
MAGNESIUM: 1.9 MG/DL (ref 1.8–2.4)
MAGNESIUM: 2 MG/DL (ref 1.8–2.4)
MCH RBC QN AUTO: 28 PG (ref 26–34)
MCH RBC QN AUTO: 28.3 PG (ref 26–34)
MCHC RBC AUTO-ENTMCNC: 29.9 G/DL (ref 31–36)
MCHC RBC AUTO-ENTMCNC: 31.3 G/DL (ref 31–36)
MCV RBC AUTO: 90.4 FL (ref 80–100)
MCV RBC AUTO: 93.7 FL (ref 80–100)
METHEMOGLOBIN VENOUS: 0.9 %
MICROSCOPIC EXAMINATION: YES
MONOCYTES ABSOLUTE: 0.3 K/UL (ref 0–1.3)
MONOCYTES ABSOLUTE: 0.6 K/UL (ref 0–1.3)
MONOCYTES RELATIVE PERCENT: 6.8 %
MONOCYTES RELATIVE PERCENT: 9.8 %
NEUTROPHILS ABSOLUTE: 2.7 K/UL (ref 1.7–7.7)
NEUTROPHILS ABSOLUTE: 3.8 K/UL (ref 1.7–7.7)
NEUTROPHILS RELATIVE PERCENT: 58.1 %
NEUTROPHILS RELATIVE PERCENT: 59.1 %
NITRITE, URINE: NEGATIVE
O2 SAT, VEN: 53 %
O2 THERAPY: ABNORMAL
PCO2, VEN: 49.1 MMHG (ref 40–50)
PDW BLD-RTO: 14 % (ref 12.4–15.4)
PDW BLD-RTO: 14.5 % (ref 12.4–15.4)
PERFORMED ON: ABNORMAL
PERFORMED ON: NORMAL
PERFORMED ON: NORMAL
PH UA: 5.5 (ref 5–8)
PH VENOUS: 7.24 (ref 7.35–7.45)
PHOSPHORUS: 2.4 MG/DL (ref 2.5–4.9)
PHOSPHORUS: 2.8 MG/DL (ref 2.5–4.9)
PHOSPHORUS: 4.1 MG/DL (ref 2.5–4.9)
PLATELET # BLD: 220 K/UL (ref 135–450)
PLATELET # BLD: 229 K/UL (ref 135–450)
PMV BLD AUTO: 10.2 FL (ref 5–10.5)
PMV BLD AUTO: 9.4 FL (ref 5–10.5)
PO2, VEN: 34 MMHG
POTASSIUM REFLEX MAGNESIUM: 5.5 MMOL/L (ref 3.5–5.1)
POTASSIUM SERPL-SCNC: 3.3 MMOL/L (ref 3.5–5.1)
POTASSIUM SERPL-SCNC: 3.8 MMOL/L (ref 3.5–5.1)
POTASSIUM SERPL-SCNC: 4 MMOL/L (ref 3.5–5.1)
PROTEIN UA: NEGATIVE MG/DL
RBC # BLD: 3.53 M/UL (ref 4–5.2)
RBC # BLD: 4.05 M/UL (ref 4–5.2)
RBC UA: 1 /HPF (ref 0–4)
SODIUM BLD-SCNC: 129 MMOL/L (ref 136–145)
SODIUM BLD-SCNC: 132 MMOL/L (ref 136–145)
SODIUM BLD-SCNC: 137 MMOL/L (ref 136–145)
SODIUM BLD-SCNC: 139 MMOL/L (ref 136–145)
SPECIFIC GRAVITY UA: <=1.005 (ref 1–1.03)
TCO2 CALC VENOUS: 23 MMOL/L
TOTAL PROTEIN: 6.8 G/DL (ref 6.4–8.2)
URINE REFLEX TO CULTURE: YES
URINE TYPE: ABNORMAL
UROBILINOGEN, URINE: 0.2 E.U./DL
WBC # BLD: 4.6 K/UL (ref 4–11)
WBC # BLD: 6.5 K/UL (ref 4–11)
WBC UA: 14 /HPF (ref 0–5)

## 2021-11-21 PROCEDURE — 82803 BLOOD GASES ANY COMBINATION: CPT

## 2021-11-21 PROCEDURE — 82010 KETONE BODYS QUAN: CPT

## 2021-11-21 PROCEDURE — 6370000000 HC RX 637 (ALT 250 FOR IP): Performed by: STUDENT IN AN ORGANIZED HEALTH CARE EDUCATION/TRAINING PROGRAM

## 2021-11-21 PROCEDURE — 80048 BASIC METABOLIC PNL TOTAL CA: CPT

## 2021-11-21 PROCEDURE — 6360000002 HC RX W HCPCS: Performed by: STUDENT IN AN ORGANIZED HEALTH CARE EDUCATION/TRAINING PROGRAM

## 2021-11-21 PROCEDURE — 6370000000 HC RX 637 (ALT 250 FOR IP): Performed by: HOSPITALIST

## 2021-11-21 PROCEDURE — 1200000000 HC SEMI PRIVATE

## 2021-11-21 PROCEDURE — 6360000002 HC RX W HCPCS: Performed by: HOSPITALIST

## 2021-11-21 PROCEDURE — 80076 HEPATIC FUNCTION PANEL: CPT

## 2021-11-21 PROCEDURE — 2580000003 HC RX 258: Performed by: EMERGENCY MEDICINE

## 2021-11-21 PROCEDURE — 80177 DRUG SCRN QUAN LEVETIRACETAM: CPT

## 2021-11-21 PROCEDURE — 2580000003 HC RX 258: Performed by: STUDENT IN AN ORGANIZED HEALTH CARE EDUCATION/TRAINING PROGRAM

## 2021-11-21 PROCEDURE — 6360000002 HC RX W HCPCS: Performed by: EMERGENCY MEDICINE

## 2021-11-21 PROCEDURE — 6370000000 HC RX 637 (ALT 250 FOR IP): Performed by: EMERGENCY MEDICINE

## 2021-11-21 PROCEDURE — 83735 ASSAY OF MAGNESIUM: CPT

## 2021-11-21 PROCEDURE — 84100 ASSAY OF PHOSPHORUS: CPT

## 2021-11-21 PROCEDURE — 85025 COMPLETE CBC W/AUTO DIFF WBC: CPT

## 2021-11-21 PROCEDURE — 81001 URINALYSIS AUTO W/SCOPE: CPT

## 2021-11-21 PROCEDURE — 71045 X-RAY EXAM CHEST 1 VIEW: CPT

## 2021-11-21 PROCEDURE — 84703 CHORIONIC GONADOTROPIN ASSAY: CPT

## 2021-11-21 PROCEDURE — 87086 URINE CULTURE/COLONY COUNT: CPT

## 2021-11-21 PROCEDURE — 36415 COLL VENOUS BLD VENIPUNCTURE: CPT

## 2021-11-21 RX ORDER — GABAPENTIN 300 MG/1
900 CAPSULE ORAL NIGHTLY
Status: DISCONTINUED | OUTPATIENT
Start: 2021-11-21 | End: 2021-11-26 | Stop reason: HOSPADM

## 2021-11-21 RX ORDER — POTASSIUM CHLORIDE 7.45 MG/ML
10 INJECTION INTRAVENOUS PRN
Status: DISCONTINUED | OUTPATIENT
Start: 2021-11-21 | End: 2021-11-22

## 2021-11-21 RX ORDER — MAGNESIUM SULFATE 1 G/100ML
1000 INJECTION INTRAVENOUS PRN
Status: DISCONTINUED | OUTPATIENT
Start: 2021-11-21 | End: 2021-11-22

## 2021-11-21 RX ORDER — NICOTINE POLACRILEX 4 MG
15 LOZENGE BUCCAL PRN
Status: DISCONTINUED | OUTPATIENT
Start: 2021-11-21 | End: 2021-11-22 | Stop reason: SDUPTHER

## 2021-11-21 RX ORDER — SODIUM CHLORIDE 9 MG/ML
INJECTION, SOLUTION INTRAVENOUS CONTINUOUS
Status: ACTIVE | OUTPATIENT
Start: 2021-11-21 | End: 2021-11-21

## 2021-11-21 RX ORDER — 0.9 % SODIUM CHLORIDE 0.9 %
1000 INTRAVENOUS SOLUTION INTRAVENOUS ONCE
Status: COMPLETED | OUTPATIENT
Start: 2021-11-21 | End: 2021-11-21

## 2021-11-21 RX ORDER — DEXTROSE MONOHYDRATE 50 MG/ML
100 INJECTION, SOLUTION INTRAVENOUS PRN
Status: DISCONTINUED | OUTPATIENT
Start: 2021-11-21 | End: 2021-11-22 | Stop reason: SDUPTHER

## 2021-11-21 RX ORDER — DEXTROSE AND SODIUM CHLORIDE 5; .45 G/100ML; G/100ML
INJECTION, SOLUTION INTRAVENOUS CONTINUOUS PRN
Status: ACTIVE | OUTPATIENT
Start: 2021-11-21 | End: 2021-11-21

## 2021-11-21 RX ORDER — CLONAZEPAM 0.5 MG/1
2 TABLET ORAL 2 TIMES DAILY
Status: DISCONTINUED | OUTPATIENT
Start: 2021-11-21 | End: 2021-11-26 | Stop reason: HOSPADM

## 2021-11-21 RX ORDER — DEXTROSE MONOHYDRATE 25 G/50ML
12.5 INJECTION, SOLUTION INTRAVENOUS PRN
Status: DISCONTINUED | OUTPATIENT
Start: 2021-11-21 | End: 2021-11-22 | Stop reason: SDUPTHER

## 2021-11-21 RX ORDER — CLONAZEPAM 0.5 MG/1
2 TABLET ORAL ONCE
Status: COMPLETED | OUTPATIENT
Start: 2021-11-21 | End: 2021-11-21

## 2021-11-21 RX ORDER — LEVETIRACETAM 500 MG/1
1000 TABLET ORAL 2 TIMES DAILY
Status: DISCONTINUED | OUTPATIENT
Start: 2021-11-21 | End: 2021-11-26 | Stop reason: HOSPADM

## 2021-11-21 RX ORDER — LORAZEPAM 2 MG/ML
2 INJECTION INTRAMUSCULAR EVERY 4 HOURS PRN
Status: DISCONTINUED | OUTPATIENT
Start: 2021-11-21 | End: 2021-11-26 | Stop reason: HOSPADM

## 2021-11-21 RX ORDER — INSULIN GLARGINE 100 [IU]/ML
15 INJECTION, SOLUTION SUBCUTANEOUS DAILY
Status: DISCONTINUED | OUTPATIENT
Start: 2021-11-21 | End: 2021-11-23

## 2021-11-21 RX ORDER — LEVETIRACETAM 10 MG/ML
1000 INJECTION INTRAVASCULAR ONCE
Status: COMPLETED | OUTPATIENT
Start: 2021-11-21 | End: 2021-11-21

## 2021-11-21 RX ORDER — GABAPENTIN 300 MG/1
300 CAPSULE ORAL 3 TIMES DAILY
Status: DISCONTINUED | OUTPATIENT
Start: 2021-11-21 | End: 2021-11-22 | Stop reason: DRUGHIGH

## 2021-11-21 RX ORDER — ONDANSETRON 2 MG/ML
4 INJECTION INTRAMUSCULAR; INTRAVENOUS EVERY 6 HOURS PRN
Status: DISCONTINUED | OUTPATIENT
Start: 2021-11-21 | End: 2021-11-26 | Stop reason: HOSPADM

## 2021-11-21 RX ORDER — FAMOTIDINE 20 MG/1
20 TABLET, FILM COATED ORAL 2 TIMES DAILY
Status: DISCONTINUED | OUTPATIENT
Start: 2021-11-21 | End: 2021-11-26 | Stop reason: HOSPADM

## 2021-11-21 RX ORDER — DEXTROSE MONOHYDRATE 25 G/50ML
12.5 INJECTION, SOLUTION INTRAVENOUS PRN
Status: DISCONTINUED | OUTPATIENT
Start: 2021-11-21 | End: 2021-11-22

## 2021-11-21 RX ADMIN — INSULIN GLARGINE 15 UNITS: 100 INJECTION, SOLUTION SUBCUTANEOUS at 15:17

## 2021-11-21 RX ADMIN — LEVETIRACETAM 1000 MG: 500 TABLET, FILM COATED ORAL at 20:15

## 2021-11-21 RX ADMIN — ONDANSETRON 4 MG: 2 INJECTION INTRAMUSCULAR; INTRAVENOUS at 12:29

## 2021-11-21 RX ADMIN — DEXTROSE AND SODIUM CHLORIDE: 5; 450 INJECTION, SOLUTION INTRAVENOUS at 08:45

## 2021-11-21 RX ADMIN — LEVETIRACETAM 1000 MG: 10 INJECTION INTRAVENOUS at 00:17

## 2021-11-21 RX ADMIN — LEVETIRACETAM 1000 MG: 500 TABLET, FILM COATED ORAL at 11:01

## 2021-11-21 RX ADMIN — MAGNESIUM SULFATE HEPTAHYDRATE 1000 MG: 1 INJECTION, SOLUTION INTRAVENOUS at 15:21

## 2021-11-21 RX ADMIN — POTASSIUM CHLORIDE 10 MEQ: 7.46 INJECTION, SOLUTION INTRAVENOUS at 13:44

## 2021-11-21 RX ADMIN — SODIUM CHLORIDE: 9 INJECTION, SOLUTION INTRAVENOUS at 05:10

## 2021-11-21 RX ADMIN — FAMOTIDINE 20 MG: 20 TABLET ORAL at 20:18

## 2021-11-21 RX ADMIN — ONDANSETRON 4 MG: 2 INJECTION INTRAMUSCULAR; INTRAVENOUS at 20:20

## 2021-11-21 RX ADMIN — CLONAZEPAM 2 MG: 0.5 TABLET ORAL at 11:03

## 2021-11-21 RX ADMIN — POTASSIUM CHLORIDE 10 MEQ: 7.46 INJECTION, SOLUTION INTRAVENOUS at 12:56

## 2021-11-21 RX ADMIN — POTASSIUM CHLORIDE 10 MEQ: 7.46 INJECTION, SOLUTION INTRAVENOUS at 15:19

## 2021-11-21 RX ADMIN — INSULIN LISPRO 5 UNITS: 100 INJECTION, SOLUTION INTRAVENOUS; SUBCUTANEOUS at 20:27

## 2021-11-21 RX ADMIN — GABAPENTIN 300 MG: 300 CAPSULE ORAL at 16:47

## 2021-11-21 RX ADMIN — FAMOTIDINE 20 MG: 20 TABLET ORAL at 11:01

## 2021-11-21 RX ADMIN — GABAPENTIN 900 MG: 300 CAPSULE ORAL at 20:18

## 2021-11-21 RX ADMIN — SODIUM CHLORIDE 1000 ML: 9 INJECTION, SOLUTION INTRAVENOUS at 03:52

## 2021-11-21 RX ADMIN — SODIUM CHLORIDE 0.05 UNITS/KG/HR: 9 INJECTION, SOLUTION INTRAVENOUS at 03:41

## 2021-11-21 RX ADMIN — SODIUM CHLORIDE 0.03 UNITS/KG/HR: 9 INJECTION, SOLUTION INTRAVENOUS at 04:58

## 2021-11-21 RX ADMIN — ENOXAPARIN SODIUM 40 MG: 100 INJECTION SUBCUTANEOUS at 11:01

## 2021-11-21 RX ADMIN — SODIUM CHLORIDE 0.1 UNITS/KG/HR: 9 INJECTION, SOLUTION INTRAVENOUS at 02:18

## 2021-11-21 RX ADMIN — CLONAZEPAM 2 MG: 0.5 TABLET ORAL at 20:14

## 2021-11-21 RX ADMIN — GABAPENTIN 300 MG: 300 CAPSULE ORAL at 11:01

## 2021-11-21 RX ADMIN — SODIUM CHLORIDE 1000 ML: 9 INJECTION, SOLUTION INTRAVENOUS at 02:44

## 2021-11-21 RX ADMIN — CLONAZEPAM 2 MG: 0.5 TABLET ORAL at 00:17

## 2021-11-21 RX ADMIN — SODIUM CHLORIDE 0.04 UNITS/KG/HR: 9 INJECTION, SOLUTION INTRAVENOUS at 07:14

## 2021-11-21 ASSESSMENT — PAIN SCALES - GENERAL
PAINLEVEL_OUTOF10: 0

## 2021-11-21 ASSESSMENT — ENCOUNTER SYMPTOMS
ABDOMINAL PAIN: 0
EYE ITCHING: 0
CONSTIPATION: 0
COUGH: 0
VOMITING: 0
COLOR CHANGE: 0
EYE DISCHARGE: 0
SHORTNESS OF BREATH: 0

## 2021-11-21 NOTE — CONSULTS
Neurology Consult Note    Dr Vince Jolly MD asked me to see Roxie Ceja in consultation   for evaluation of seizure. HPI:  28 yo woman with known seizure disorder (started after her first pregnancy), has had six seizures in the last two weeks, since her discharge from [facility] on Nov 1 after an admission for DKA. In the interim, glucose has not been optimally controlled. DKA has been identified at admission and treatment has been initiated. Pt has been on Keppra, and reports compliance. She also takes clonazepam, apparently for panic attacks; it's not entirely clear if seizure prevention was part of the indication. She has seen an epilepsy specialist at [facility]), and for several months was in [place] tending to her ailing dad. She has started nursing school. Reports she still has some trouble concentrating, not at baseline yet. PMHx:  DM (type I). Epilepsy. FHx:     Strong family hx of DM type I. No epilepsy. SocHx:  Never smoker. No EtOH. Meds: Reviewed, including . .. Keppra   Clonazepam   Gabapentin     Allergies:   No Known Allergies    Review of Systems:  Constitutional: no fever  Musculoskeletal: none reported  Behavioral: hx depression and anxiety, see above. HEENT: some blurring and doubling of vision. No trouble with hearing, speech, or swallow. CV/Resp: does report some CP and SOB  GI: reports nausea. : denies dysuria  Heme: no unusual bruising or bleeding. Endocrine: DM as noted  Integument: reports she is a slow healer from cuts    Vitals:  BP 99/69   Pulse 73   Temp 98 °F (36.7 °C) (Axillary)   Resp 16   Ht 4' 11\" (1.499 m)   Wt 115 lb 9.6 oz (52.4 kg)   LMP 11/19/2021 (Approximate)   SpO2 100%   BMI 23.35 kg/m²     Exam:    Constitutional: lying in ICU bed in no acute distress. HEENT: no retinal abnormalities identified. Cardiovascular: RRR, normal pulses, no edema. Musculoskeletal: no abnormalities identified.     Mental status: awake, alert, and

## 2021-11-21 NOTE — PROGRESS NOTES
Late entry due to patient care:    0915-Patient admitted to CVU, 1311 for seizures and Type 1 DKA. Insulin gtt infusing @ 2.34 units/hr and Dextrose 5 % and 0.45% Sodium Chloride infusing @ 150 mL/hr. Bedside handoff completed with ED RN. Pt alert and oriented x4. Denies pain. C/o nausea. No Zofran ordered. This RN will contact attending MD.    0948-Blood glucose 72. Insulin gtt stopped per protocol. Will continue to check BG q1h.    1027-Blood glucose 79. Insulin gtt remains off.    1108-Blood glucose 121. Insulin gtt remains off. Will restart Insulin gtt when BG is within range of 150-200 mg/dL.     Electronically signed by Betty Sands RN on 11/21/2021 at 11:40 AM

## 2021-11-21 NOTE — ED NOTES
Bed: B-  Expected date: 11/20/21  Expected time: 11:45 PM  Means of arrival: [facility]  Comments:  seizure     Jack Spivey RN  11/20/21 [id number]

## 2021-11-21 NOTE — ED PROVIDER NOTES
[address] [facility]      Pt Name: Lenore Amador  MRN: [id number]  [place] 1990  Date of evaluation: 11/20/2021  Provider: Laura Chiang MD    CHIEF COMPLAINT       Chief Complaint   Patient presents with    Seizures       Roverto Izaguirre is a 27 y.o. female who presents to the emergency department with seizure. Patient presents with 1 seizure today. Unknown how long it lasted. History of DKA. States when she gets seizures is because she is in DKA. Noncompliant with insulin and seizure medication. States she feels tired and weak. 10 out of 10 muscle aches. Worse with movement. Better with rest.  Constant in nature. No other associated symptoms. Nursing Notes were reviewed. Including nursing noted for FM, Surgical History, Past Medical History, Social History, vitals, and allergies; agree with all. REVIEW OF SYSTEMS       Review of Systems   Constitutional: Positive for activity change and appetite change. Negative for diaphoresis and unexpected weight change. HENT: Negative for congestion and dental problem. Eyes: Negative for discharge and itching. Respiratory: Negative for cough and shortness of breath. Cardiovascular: Negative for chest pain and leg swelling. Gastrointestinal: Negative for abdominal pain, constipation and vomiting. Endocrine: Negative for cold intolerance and heat intolerance. Genitourinary: Negative for vaginal bleeding, vaginal discharge and vaginal pain. Musculoskeletal: Positive for myalgias. Negative for neck pain and neck stiffness. Skin: Negative for color change and pallor. Neurological: Positive for seizures. Negative for tremors and weakness. Psychiatric/Behavioral: Negative for agitation and behavioral problems. Except as noted above the remainder of the review of systems was reviewed and negative.      PAST MEDICAL HISTORY     Past Medical History:   Diagnosis Date    Depression     Diabetes mellitus ([facility]     Seizures ([facility] [address]        SURGICAL HISTORY       Past Surgical History:   Procedure Laterality Date     SECTION      TUBAL LIGATION         CURRENT MEDICATIONS       Previous Medications    ATORVASTATIN (LIPITOR) 20 MG TABLET    Take 1 tablet by mouth daily    BLOOD GLUCOSE MONITOR STRIPS    Test 4 times a day & as needed for symptoms of irregular blood glucose. Dispense sufficient amount for indicated testing frequency plus additional to accommodate PRN testing needs. CLONAZEPAM (KLONOPIN) 2 MG TABLET    Take 1 tablet by mouth 2 times daily. FAMOTIDINE (PEPCID) 20 MG TABLET    Take 20 mg by mouth 2 times daily    GABAPENTIN (NEURONTIN) 300 MG CAPSULE    Take 1 Capsule 3 times daily during the day and 3 at bedtime    GLUCAGON 1 MG INJECTION    Inject 1 mg into the muscle See Admin Instructions Follow package directions for low blood sugar. GLUCOSE MONITORING (FREESTYLE FREEDOM) KIT    1 kit by Does not apply route 4 times daily Pharmacy to make formulary changes    INSULIN ASPART (NOVOLOG) 100 UNIT/ML INJECTION VIAL    INJECT 80 UNITS INTO THE SKIN DAILY VIA INSULIN PUMP or as directed per sliding scale if pump not available.     INSULIN GLARGINE (LANTUS SOLOSTAR) 100 UNIT/ML INJECTION PEN    Inject 12 Units into the skin nightly    INSULIN LISPRO, 1 UNIT DIAL, (HUMALOG KWIKPEN) 100 UNIT/ML SOPN    Inject 2-12 Units into the skin 3 times daily (before meals) **Medium Dose Corrective Algorithm**  Glucose: Dose:  If <139             No Insulin  140-199 2 Units  200-249 4 Units  250-299 6 Units  300-349 8 Units  350-400 10 Units  Above 400       12 Units    INSULIN PEN NEEDLE (PEN NEEDLES) 32G X 4 MM [facility]    Pharmacy to make formulary changes    LANCETS MISC    1 each by Does not apply route 3 times daily    LEVETIRACETAM (KEPPRA) 500 MG TABLET    Take 2 tablets by mouth 2 times daily    NADOLOL (CORGARD) 20 MG TABLET    Take 1 tablet by mouth daily       ALLERGIES     Patient has no known allergies. FAMILY HISTORY      No family history on file. SOCIAL HISTORY       Social History     Socioeconomic History    Marital status: Single     Spouse name: Not on file    Number of children: Not on file    Years of education: Not on file    Highest education level: Not on file   Occupational History    Not on file   Tobacco Use    Smoking status: Never Smoker    Smokeless tobacco: Never Used   Vaping Use    Vaping Use: Never used   Substance and Sexual Activity    Alcohol use: No    Drug use: No    Sexual activity: Yes     Partners: Male   Other Topics Concern    Not on file   Social History Narrative    Not on file     Social Determinants of Health     Financial Resource Strain: Low Risk     Difficulty of Paying Living Expenses: Not hard at all   Food Insecurity: No Food Insecurity    Worried About [address] in the Last Year: Never true    [address] [employer] in the Last Year: Never true   Transportation Needs:     Lack of Transportation (Medical): Not on file    Lack of Transportation (Non-Medical):  Not on file   Physical Activity:     Days of Exercise per Week: Not on file    Minutes of Exercise per Session: Not on file   Stress:     Feeling of Stress : Not on file   Social Connections:     Frequency of Communication with Friends and Family: Not on file    Frequency of Social Gatherings with Friends and Family: Not on file    Attends Roman [employer] Services: Not on file    Active Member of Clubs or Organizations: Not on file    Attends Club or Organization Meetings: Not on file    Marital Status: Not on file   Intimate Partner Violence:     Fear of Current or Ex-Partner: Not on file    Emotionally Abused: Not on file    Physically Abused: Not on file    Sexually Abused: Not on file   Housing Stability:     Unable to Pay for Housing in the Last Year: Not on file    Number of [place] in the Last Year: Not on file    Unstable Housing in the Last Year: Not on file       PHYSICAL EXAM       ED Triage Vitals [11/21/21 0001]   BP Temp Temp Source Pulse Resp SpO2 Height Weight   (!) 131/92 98 °F (36.7 °C) Axillary 100 16 99 % 4' 11\" (1.499 m) 115 lb 9.6 oz (52.4 kg)       Physical Exam  Vitals and nursing note reviewed. Constitutional:       General: She is not in acute distress. Appearance: She is well-developed. She is ill-appearing. She is not toxic-appearing or diaphoretic. HENT:      Head: Normocephalic and atraumatic. Right Ear: External ear normal.      Left Ear: External ear normal.   Eyes:      General:         Right eye: No discharge. Left eye: No discharge. Conjunctiva/sclera: Conjunctivae normal.      Pupils: Pupils are equal, round, and reactive to light. Cardiovascular:      Rate and Rhythm: Regular rhythm. Tachycardia present. Heart sounds: No murmur heard. Pulmonary:      Effort: Pulmonary effort is normal. No respiratory distress. Breath sounds: Normal breath sounds. No wheezing or rales. Abdominal:      General: Bowel sounds are normal. There is no distension. Palpations: Abdomen is soft. There is no mass. Tenderness: There is no abdominal tenderness. There is no guarding or rebound. Genitourinary:     Comments: Deferred  Musculoskeletal:         General: No deformity. Normal range of motion. Cervical back: Normal range of motion and neck supple. Skin:     General: Skin is warm. Findings: No erythema or rash. Neurological:      Mental Status: She is alert and oriented to person, place, and time. She is not disoriented. Cranial Nerves: No cranial nerve deficit. Motor: No atrophy or abnormal muscle tone. Coordination: Coordination normal.   Psychiatric:         Behavior: Behavior normal.         Thought Content:  Thought content normal.         DIAGNOSTIC RESULTS     RADIOLOGY:   Non-plain film images such as CT, Ultrasoundand MRI are read by the radiologist. Caprice Simmonds radiographic images are visualized and preliminarily interpreted by the emergency physician with the below findings:    Chest x-ray shows no acute process    ED BEDSIDE ULTRASOUND:   Performed by ED Physician - none    LABS:  Labs Reviewed   CBC WITH AUTO DIFFERENTIAL - Abnormal; Notable for the following components:       Result Value    Hemoglobin 11.4 (*)     MCHC 29.9 (*)     All other components within normal limits    Narrative:     Performed at:  [facility]  [address] [facility] [address] [address]   Phone [phone]   BASIC METABOLIC PANEL W/ REFLEX TO MG FOR LOW K - Abnormal; Notable for the following components:    Sodium 129 (*)     Potassium reflex Magnesium 5.5 (*)     Chloride 88 (*)     CO2 17 (*)     Anion Gap 24 (*)     Glucose 960 (*)     All other components within normal limits    Narrative:     Collette Pillow tel. [phone],  Chemistry results called to and read back by Kenneth Gomez RN, 11/21/2021  01:29, by [facility]  Performed at:  [facility]  [address] [facility] [address] [address]   Phone [phone]   BETA-HYDROXYBUTYRATE - Abnormal; Notable for the following components:    Beta-Hydroxybutyrate 1.41 (*)     All other components within normal limits    Narrative:     Collette Pillow tel. [phone],  Chemistry results called to and read back by Kenneth Gomez RN, 11/21/2021  01:29, by [facility]  Performed at:  [facility]  [address] [address] [address]   Phone [phone]   BLOOD GAS, VENOUS - Abnormal; Notable for the following components:    pH, Drew 7.244 (*)     HCO3, Venous 21 (*)     All other components within normal limits    Narrative:     Performed at:  [facility]  [address] [facility] [address] [address]   Phone [phone]   LEVETIRACETAM LEVEL - Abnormal; Notable for the following components:    Levetiracetam Lvl <2.0 (*)     All other components within normal limits    Narrative:     Performed at:  [address] [facility]   Phone [phone]   HEPATIC FUNCTION PANEL - Abnormal; Notable for the following components:    ALT 82 (*)      (*)     All other components within normal limits    Narrative:     Performed at:  [address] [facility]   Phone [phone]   URINE RT REFLEX TO CULTURE - Abnormal; Notable for the following components:    Clarity, UA SL CLOUDY (*)     Glucose, Ur >=1000 (*)     Ketones, Urine 15 (*)     Blood, Urine MODERATE (*)     All other components within normal limits    Narrative:     Performed at:  [address] [facility]   Phone [phone]   MICROSCOPIC URINALYSIS - Abnormal; Notable for the following components:    Bacteria, UA 1+ (*)     WBC, UA 14 (*)     Epithelial Cells, UA 25 (*)     All other components within normal limits    Narrative:     Performed at:  [address] [facility]   Phone [phone]   BASIC METABOLIC PANEL - Abnormal; Notable for the following components:    CO2 14 (*)     Anion Gap 23 (*)     Glucose 307 (*)     Calcium 8.0 (*)     All other components within normal limits    Narrative:     Collette Pillow tel. [phone],  Chemistry results called to and read back by Kenneth Gomez RN, 11/21/2021  06:00, by [facility]  Performed at:  [address] [employer] [address]   Phone [phone]   CBC WITH AUTO DIFFERENTIAL - Abnormal; Notable for the following components:    RBC 3.53 (*)     Hemoglobin 10.0 (*)     Hematocrit 31.9 (*)     All other components within normal limits    Narrative:     Performed at:  [facility]  [address] [place], [place] [address]   Phone [phone]   POCT GLUCOSE - Abnormal; Notable for the following components:    POC Glucose >600 (*)     All other components within normal limits    Narrative:     Performed at:  [facility]  [address] [place], [place] [address]   Phone [phone]   POCT GLUCOSE - Abnormal; Notable for the following components:    POC Glucose 587 (*)     All other components within normal limits    Narrative:     Performed at:  [facility]  [address] [place], [place] [address]   Phone [phone]   POCT GLUCOSE - Abnormal; Notable for the following components:    POC Glucose 319 (*)     All other components within normal limits    Narrative:     Performed at:  [facility]  [address] [place], [place] [address]   Phone [phone]   POCT GLUCOSE - Normal   POCT GLUCOSE - Normal   POCT GLUCOSE - Normal   CULTURE, URINE   HCG, SERUM, QUALITATIVE    Narrative:     Performed at:  [facility]  [address] [place], [place] [address]   Phone [phone]   MAGNESIUM    Narrative:     Performed at:  [facility]  [address] [facility] [address]   Phone [phone]   PHOSPHORUS    Narrative:     Performed at:  [facility]  [address] [facility] [address]   Phone [phone]   BASIC METABOLIC PANEL   BASIC METABOLIC PANEL   MAGNESIUM   MAGNESIUM   PHOSPHORUS   PHOSPHORUS   BASIC METABOLIC PANEL   MAGNESIUM   PHOSPHORUS   POCT GLUCOSE   POCT GLUCOSE   POCT GLUCOSE   POCT GLUCOSE   POCT GLUCOSE   POCT GLUCOSE   POCT GLUCOSE   POCT GLUCOSE   POCT GLUCOSE   POCT GLUCOSE   POCT GLUCOSE   POCT GLUCOSE   POCT GLUCOSE   POCT GLUCOSE   POCT GLUCOSE   POCT GLUCOSE   POCT GLUCOSE   POCT GLUCOSE   POCT

## 2021-11-21 NOTE — PROGRESS NOTES
1128-Lab draw:   Ref. Range 11/21/2021 11:28   Sodium Latest Ref Range: 136 - 145 mmol/L 137   Potassium Latest Ref Range: 3.5 - 5.1 mmol/L 4.0   Chloride Latest Ref Range: 99 - 110 mmol/L 103   CO2 Latest Ref Range: 21 - 32 mmol/L 24   BUN Latest Ref Range: 7 - 20 mg/dL 6 (L)   Creatinine Latest Ref Range: 0.6 - 1.1 mg/dL <0.5 (L)   Anion Gap Latest Ref Range: 3 - 16  10   GFR Non- Latest Ref Range: >60  >60   GFR  Latest Ref Range: >60  >60   Magnesium Latest Ref Range: 1.80 - 2.40 mg/dL 2.00   Glucose Latest Ref Range: 70 - 99 mg/dL 156 (H)   Calcium Latest Ref Range: 8.3 - 10.6 mg/dL 8.0 (L)   Phosphorus Latest Ref Range: 2.5 - 4.9 mg/dL 2.8     1357-Lab draw:   Ref. Range 11/21/2021 13:57   Sodium Latest Ref Range: 136 - 145 mmol/L 132 (L)   Potassium Latest Ref Range: 3.5 - 5.1 mmol/L 3.3 (L)   Chloride Latest Ref Range: 99 - 110 mmol/L 99   CO2 Latest Ref Range: 21 - 32 mmol/L 17 (L)   BUN Latest Ref Range: 7 - 20 mg/dL 6 (L)   Creatinine Latest Ref Range: 0.6 - 1.1 mg/dL <0.5 (L)   Anion Gap Latest Ref Range: 3 - 16  16   GFR Non- Latest Ref Range: >60  >60   GFR  Latest Ref Range: >60  >60   Magnesium Latest Ref Range: 1.80 - 2.40 mg/dL 1.70 (L)   Glucose Latest Ref Range: 70 - 99 mg/dL 158 (H)   Calcium Latest Ref Range: 8.3 - 10.6 mg/dL 7.7 (L)   Phosphorus Latest Ref Range: 2.5 - 4.9 mg/dL 2.4 (L)       3648-Dr. Londono sent message via [employer] informing of consecutive lab results and requesting to transition to sliding scale Insulin. 8175-Call received from Dr. Yimi Valdivia. RN informed him of pt's home dose of Novolog and Lantus. See orders.

## 2021-11-21 NOTE — H&P
Hospital Medicine History & Physical      PCP: Santa Guido MD    Date of Admission: 2021    Date of Service: Pt seen/examined on 2021 and Admitted to Inpatient     Chief Complaint: Altered mental status, seizure      History Of Present Illness: The patient is a 27 y.o. female with past medical history of type 1 diabetes on basal bolus insulin at this time but was previously on a continuous insulin pump with sensor, depression, seizure disorder on Keppra who presents to [facility] with new acute onset seizure while at home, she has had recurrent seizures since being recently discharged on 2021 and was apparently still using her basal bolus insulin according to her report. There is some concern for compliance but she insists that she has been using her medications appropriately but that her sugar still remain high in the 200s at times. She has had at least 6 seizures for the last 2 weeks since being discharged. She notes that she is compliant with her Keppra. She has not seen her endocrinologist for further guidance on insulin regimen changes but that she was supposed to do so. She denies any other recent symptoms of fever, chills, dizziness, syncope, chest pain, shortness of breath, dysuria, blood in urine/stool/sputum, no recent diarrhea but has had some nausea. Past Medical History:        Diagnosis Date    Depression     Diabetes mellitus ([address]     Seizures ([facility] [address]        Past Surgical History:        Procedure Laterality Date     SECTION      TUBAL LIGATION         Medications Prior to Admission:    Prior to Admission medications    Medication Sig Start Date End Date Taking? Authorizing Provider   clonazePAM (KLONOPIN) 2 MG tablet Take 1 tablet by mouth 2 times daily.  21  Caridad Foote MD   insulin aspart (NOVOLOG) 100 UNIT/ML injection vial INJECT 80 UNITS INTO THE SKIN DAILY VIA INSULIN PUMP or as directed per sliding scale if pump not available. 11/16/21   Cici Dumont MD   blood glucose monitor strips Test 4 times a day & as needed for symptoms of irregular blood glucose. Dispense sufficient amount for indicated testing frequency plus additional to accommodate PRN testing needs.  11/16/21   Cici Dumont MD   gabapentin (NEURONTIN) 300 MG capsule Take 1 Capsule 3 times daily during the day and 3 at bedtime 11/8/21 11/9/22  Lizet Crisostomo MD   Insulin Pen Needle (PEN NEEDLES) 32G X 4  [address] to make formulary changes 11/1/21   Juan R Wright MD   glucose monitoring (FREESTYLE FREEDOM) kit 1 kit by Does not apply route 4 times daily Pharmacy to make formulary changes 11/1/21   Juan R Wright MD   Lancets MISC 1 each by Does not apply route 3 times daily 11/1/21   Juan R Wright MD   levETIRAcetam (KEPPRA) 500 MG tablet Take 2 tablets by mouth 2 times daily 11/1/21   Juan R Wright MD   insulin glargine (LANTUS SOLOSTAR) 100 UNIT/ML injection pen Inject 12 Units into the skin nightly 11/1/21   Juan R Wright MD   insulin lispro, 1 Unit Dial, (HUMALOG KWIKPEN) 100 UNIT/ML SOPN Inject 2-12 Units into the skin 3 times daily (before meals) **Medium Dose Corrective Algorithm**  Glucose: Dose:  If <139             No Insulin  140-199 2 Units  200-249 4 Units  250-299 6 Units  300-349 8 Units  350-400 10 Units  Above 400       12 Units 11/1/21 12/1/21  Juan R Wright MD   nadolol (CORGARD) 20 MG tablet Take 1 tablet by mouth daily  Patient taking differently: Take 10 mg by mouth daily  10/27/21   Lizet Crisostomo MD   atorvastatin (LIPITOR) 20 MG tablet Take 1 tablet by mouth daily  Patient not taking: Reported on 10/27/2021 9/22/21   Lizet Crisostomo MD   famotidine (PEPCID) 20 MG tablet Take 20 mg by mouth 2 times daily    Historical Provider, MD   glucagon 1 MG injection Inject 1 mg into the muscle See Admin Instructions Follow package directions for low blood sugar. 9/13/19   Beatris Guajardo, APRN - CNP       Allergies:  Patient has no known allergies. Social History:  The patient currently lives home    TOBACCO:   reports that she has never smoked. She has never used smokeless tobacco.  ETOH:   reports no history of alcohol use. Family History:  Reviewed in detail and negative for DM, Early CAD, Cancer, CVA. Positive as follows:    No family history on file. REVIEW OF SYSTEMS:   as noted in the HPI. All other systems reviewed and negative. PHYSICAL EXAM:    BP (!) 127/93   Pulse 80   Temp 98 °F (36.7 °C) (Axillary)   Resp 16   Ht 4' 11\" (1.499 m)   Wt 115 lb 9.6 oz (52.4 kg)   LMP 11/19/2021 (Approximate)   SpO2 97%   BMI 23.35 kg/m²     General appearance: Thin appearing, chronically ill-appearing, no acute respiratory distress, alert and oriented x4, thin appearing  HEENT Normal cephalic, atraumatic without obvious deformity. Pupils equal, round, and reactive to light. Extra ocular muscles intact. Conjunctivae/corneas clear. Mildly dry mucous membrane  Neck: Supple, no JVD  Lungs: Clear to all lobes bilaterally  Heart: Regular rate and rhythm with Normal S1/S2 without murmurs, rubs or gallops, point of maximum impulse non-displaced  Abdomen: Soft, nontender, nondistended, active bowel sounds  Extremities: No edema  Skin: No rash  Neurologic: Grossly intact neurologically, strength 5 out of 5 to all extremities  Mental status: Alert, oriented, thought content appropriate.   Capillary Refill: Acceptable  < 3 seconds  Peripheral Pulses: +3 Easily felt, not easily obliterated with pressure      Chest x-ray: No acute process    CBC   Recent Labs     11/21/21  0011 11/21/21  0504   WBC 4.6 6.5   HGB 11.4* 10.0*   HCT 38.0 31.9*    229      RENAL  Recent Labs     11/21/21  0011 11/21/21  0504   * 139   K 5.5* 3.8   CL 88* 102   CO2 17* 14*   PHOS  --  4.1   BUN 8 8   CREATININE 0.8 0.6     LFT'S  Recent Labs     11/21/21  0011   *   ALT 82*   BILIDIR <0.2   BILITOT 0.5   ALKPHOS 116     COAG  No results for input(s): INR in the last 72 hours. CARDIAC ENZYMES  No results for input(s): CKTOTAL, CKMB, CKMBINDEX, TROPONINI in the last 72 hours.     U/A:    Lab Results   Component Value Date    COLORU Yellow 11/21/2021    WBCUA 14 11/21/2021    RBCUA 1 11/21/2021    BACTERIA 1+ 11/21/2021    CLARITYU SL CLOUDY 11/21/2021    SPECGRAV <=1.005 11/21/2021    LEUKOCYTESUR Negative 11/21/2021    BLOODU MODERATE 11/21/2021    GLUCOSEU >=1000 11/21/2021    GLUCOSEU >=1000 01/17/2011       ABG    Lab Results   Component Value Date    QQI0KZF 18.3 05/12/2010    BEART -5.7 05/12/2010    L2SYHFEL 98.0 05/12/2010    PHART 7.366 05/12/2010    THGBART 11.2 05/12/2010    DFI5SJD 32.8 05/12/2010    PO2ART 104 05/12/2010           Active Hospital Problems    Diagnosis Date Noted    DKA, type 1, not at goal [facility]) [E10.10] 10/30/2021    Gastroparesis [K31.84] 02/14/2019    Type 1 diabetes mellitus with complication ([facility] [Z04.8] 10/22/2018    Seizure disorder ([facility] [G40.909] 02/13/2018         PHYSICIANS CERTIFICATION:    I certify that Amish Nick is expected to be hospitalized for greater than 2 midnights based on the following assessment and plan:      ASSESSMENT/PLAN:  · DKA  · Seizure disorder  · Type 1 diabetes    Plan:  · Start patient on insulin IV infusion as well as IV fluids with every 4 hours BMP/magnesium/phosphorus checks and every 1 hour Accu-Cheks  · Restart patient's home medications including her home Keppra and Klonopin and gabapentin, no other medication changes at this time according to patient  · Neurology consulted for worsening seizure disorder even while on Keppra    DVT Prophylaxis: Lovenox  Diet: Diet NPO Exceptions are: Ice Chips, Sips of Water with Meds  Code Status: Full Code  PT/OT Eval Status: Ambulatory    Dispo -pending clinical course       Rahat Briggs, DO    Thank you Laura Patel MD for the opportunity to be involved in this patient's care. If you have any questions or concerns please feel free to contact me at [phone].

## 2021-11-22 ENCOUNTER — TELEPHONE (OUTPATIENT)
Dept: INTERNAL MEDICINE CLINIC | Age: 31
End: 2021-11-22

## 2021-11-22 LAB
ANION GAP SERPL CALCULATED.3IONS-SCNC: 16 MMOL/L (ref 3–16)
BASOPHILS ABSOLUTE: 0.1 K/UL (ref 0–0.2)
BASOPHILS RELATIVE PERCENT: 1.3 %
BUN BLDV-MCNC: 3 MG/DL (ref 7–20)
CALCIUM SERPL-MCNC: 8.5 MG/DL (ref 8.3–10.6)
CHLORIDE BLD-SCNC: 104 MMOL/L (ref 99–110)
CO2: 20 MMOL/L (ref 21–32)
CREAT SERPL-MCNC: 0.5 MG/DL (ref 0.6–1.1)
EOSINOPHILS ABSOLUTE: 0.3 K/UL (ref 0–0.6)
EOSINOPHILS RELATIVE PERCENT: 4.3 %
GFR AFRICAN AMERICAN: >60
GFR NON-AFRICAN AMERICAN: >60
GLUCOSE BLD-MCNC: 105 MG/DL (ref 70–99)
GLUCOSE BLD-MCNC: 113 MG/DL (ref 70–99)
GLUCOSE BLD-MCNC: 138 MG/DL (ref 70–99)
GLUCOSE BLD-MCNC: 159 MG/DL (ref 70–99)
GLUCOSE BLD-MCNC: 54 MG/DL (ref 70–99)
GLUCOSE BLD-MCNC: 56 MG/DL (ref 70–99)
GLUCOSE BLD-MCNC: 58 MG/DL (ref 70–99)
GLUCOSE BLD-MCNC: 64 MG/DL (ref 70–99)
GLUCOSE BLD-MCNC: 66 MG/DL (ref 70–99)
GLUCOSE BLD-MCNC: 72 MG/DL (ref 70–99)
GLUCOSE BLD-MCNC: 72 MG/DL (ref 70–99)
GLUCOSE BLD-MCNC: 82 MG/DL (ref 70–99)
GLUCOSE BLD-MCNC: 99 MG/DL (ref 70–99)
GLUCOSE BLD-MCNC: 99 MG/DL (ref 70–99)
HCT VFR BLD CALC: 34.9 % (ref 36–48)
HEMOGLOBIN: 11.2 G/DL (ref 12–16)
LYMPHOCYTES ABSOLUTE: 2.4 K/UL (ref 1–5.1)
LYMPHOCYTES RELATIVE PERCENT: 39 %
MAGNESIUM: 2 MG/DL (ref 1.8–2.4)
MCH RBC QN AUTO: 28.5 PG (ref 26–34)
MCHC RBC AUTO-ENTMCNC: 32.1 G/DL (ref 31–36)
MCV RBC AUTO: 88.9 FL (ref 80–100)
MONOCYTES ABSOLUTE: 0.5 K/UL (ref 0–1.3)
MONOCYTES RELATIVE PERCENT: 8.3 %
NEUTROPHILS ABSOLUTE: 3 K/UL (ref 1.7–7.7)
NEUTROPHILS RELATIVE PERCENT: 47.1 %
PDW BLD-RTO: 14.3 % (ref 12.4–15.4)
PERFORMED ON: ABNORMAL
PERFORMED ON: NORMAL
PHOSPHORUS: 3.6 MG/DL (ref 2.5–4.9)
PLATELET # BLD: 200 K/UL (ref 135–450)
PMV BLD AUTO: 9.7 FL (ref 5–10.5)
POTASSIUM SERPL-SCNC: 3.8 MMOL/L (ref 3.5–5.1)
RBC # BLD: 3.93 M/UL (ref 4–5.2)
SODIUM BLD-SCNC: 140 MMOL/L (ref 136–145)
URINE CULTURE, ROUTINE: NORMAL
WBC # BLD: 6.3 K/UL (ref 4–11)

## 2021-11-22 PROCEDURE — 94761 N-INVAS EAR/PLS OXIMETRY MLT: CPT

## 2021-11-22 PROCEDURE — 83735 ASSAY OF MAGNESIUM: CPT

## 2021-11-22 PROCEDURE — 80048 BASIC METABOLIC PNL TOTAL CA: CPT

## 2021-11-22 PROCEDURE — 84100 ASSAY OF PHOSPHORUS: CPT

## 2021-11-22 PROCEDURE — 1200000000 HC SEMI PRIVATE

## 2021-11-22 PROCEDURE — 2580000003 HC RX 258: Performed by: NURSE PRACTITIONER

## 2021-11-22 PROCEDURE — 6360000002 HC RX W HCPCS: Performed by: STUDENT IN AN ORGANIZED HEALTH CARE EDUCATION/TRAINING PROGRAM

## 2021-11-22 PROCEDURE — 2500000003 HC RX 250 WO HCPCS: Performed by: STUDENT IN AN ORGANIZED HEALTH CARE EDUCATION/TRAINING PROGRAM

## 2021-11-22 PROCEDURE — 36415 COLL VENOUS BLD VENIPUNCTURE: CPT

## 2021-11-22 PROCEDURE — 6370000000 HC RX 637 (ALT 250 FOR IP): Performed by: HOSPITALIST

## 2021-11-22 PROCEDURE — 85025 COMPLETE CBC W/AUTO DIFF WBC: CPT

## 2021-11-22 PROCEDURE — 6370000000 HC RX 637 (ALT 250 FOR IP): Performed by: STUDENT IN AN ORGANIZED HEALTH CARE EDUCATION/TRAINING PROGRAM

## 2021-11-22 PROCEDURE — 2500000003 HC RX 250 WO HCPCS: Performed by: NURSE PRACTITIONER

## 2021-11-22 RX ORDER — DEXTROSE MONOHYDRATE 25 G/50ML
12.5 INJECTION, SOLUTION INTRAVENOUS PRN
Status: DISCONTINUED | OUTPATIENT
Start: 2021-11-22 | End: 2021-11-26 | Stop reason: HOSPADM

## 2021-11-22 RX ORDER — DEXTROSE MONOHYDRATE 50 MG/ML
100 INJECTION, SOLUTION INTRAVENOUS CONTINUOUS
Status: DISCONTINUED | OUTPATIENT
Start: 2021-11-22 | End: 2021-11-23

## 2021-11-22 RX ORDER — NICOTINE POLACRILEX 4 MG
15 LOZENGE BUCCAL PRN
Status: DISCONTINUED | OUTPATIENT
Start: 2021-11-22 | End: 2021-11-26 | Stop reason: HOSPADM

## 2021-11-22 RX ORDER — DEXTROSE MONOHYDRATE 50 MG/ML
100 INJECTION, SOLUTION INTRAVENOUS PRN
Status: DISCONTINUED | OUTPATIENT
Start: 2021-11-22 | End: 2021-11-22

## 2021-11-22 RX ORDER — GABAPENTIN 300 MG/1
300 CAPSULE ORAL 2 TIMES DAILY
Status: DISCONTINUED | OUTPATIENT
Start: 2021-11-23 | End: 2021-11-26 | Stop reason: HOSPADM

## 2021-11-22 RX ADMIN — FAMOTIDINE 20 MG: 20 TABLET ORAL at 08:54

## 2021-11-22 RX ADMIN — INSULIN LISPRO 2 UNITS: 100 INJECTION, SOLUTION INTRAVENOUS; SUBCUTANEOUS at 08:54

## 2021-11-22 RX ADMIN — CLONAZEPAM 2 MG: 0.5 TABLET ORAL at 20:34

## 2021-11-22 RX ADMIN — ENOXAPARIN SODIUM 40 MG: 100 INJECTION SUBCUTANEOUS at 08:52

## 2021-11-22 RX ADMIN — FAMOTIDINE 20 MG: 20 TABLET ORAL at 20:34

## 2021-11-22 RX ADMIN — GABAPENTIN 900 MG: 300 CAPSULE ORAL at 20:34

## 2021-11-22 RX ADMIN — DEXTROSE MONOHYDRATE 12.5 G: 25 INJECTION, SOLUTION INTRAVENOUS at 23:41

## 2021-11-22 RX ADMIN — GABAPENTIN 300 MG: 300 CAPSULE ORAL at 14:07

## 2021-11-22 RX ADMIN — CLONAZEPAM 2 MG: 0.5 TABLET ORAL at 08:53

## 2021-11-22 RX ADMIN — LEVETIRACETAM 1000 MG: 500 TABLET, FILM COATED ORAL at 08:53

## 2021-11-22 RX ADMIN — INSULIN GLARGINE 15 UNITS: 100 INJECTION, SOLUTION SUBCUTANEOUS at 08:58

## 2021-11-22 RX ADMIN — DEXTROSE MONOHYDRATE 12.5 G: 25 INJECTION, SOLUTION INTRAVENOUS at 21:47

## 2021-11-22 RX ADMIN — DEXTROSE MONOHYDRATE 100 ML/HR: 50 INJECTION, SOLUTION INTRAVENOUS at 22:43

## 2021-11-22 RX ADMIN — GABAPENTIN 300 MG: 300 CAPSULE ORAL at 08:53

## 2021-11-22 RX ADMIN — LEVETIRACETAM 1000 MG: 500 TABLET, FILM COATED ORAL at 20:34

## 2021-11-22 RX ADMIN — INSULIN LISPRO 3 UNITS: 100 INJECTION, SOLUTION INTRAVENOUS; SUBCUTANEOUS at 14:08

## 2021-11-22 ASSESSMENT — PAIN SCALES - GENERAL
PAINLEVEL_OUTOF10: 0
PAINLEVEL_OUTOF10: 0

## 2021-11-22 NOTE — TELEPHONE ENCOUNTER
Patient called in wanting to inform Dr. Clemencia Parson that 2 days ago she had a seizure and was admitted to the hospital. They tested her sugar and it was 875. They don't think her insulin is working. Patient is seeing diabetic doctor and will discuss getting on the omnipod. Patient is currently in the ICU. Everything is starting to go back to normal as far as her blood work. Pls advise.

## 2021-11-22 NOTE — CARE COORDINATION
Discharge Planning:  SW attempted to meet with pt. Pt was meeting with the diabetic educator. SW will attempt to meet with pt later as time permits.    Frank Prince  [phone]  Electronically signed by Bib Reina on 11/22/2021 at 4:15 PM

## 2021-11-22 NOTE — PROGRESS NOTES
Hospitalist Progress Note  11/22/2021 8:58 AM    PCP: Elen Elias MD    [phone]     Date of Admission: 11/20/2021                                                                                                                     HOSPITAL COURSE    Patient demographics:  The patient  Verena Rodriguez is a 27 y.o. female     Significant past medical history:   Patient Active Problem List   Diagnosis    Seizure disorder ([place] [address].)    Type 1 diabetes mellitus with complication ([place] [address].)    Anxiety and depression    ROME (generalized anxiety disorder)    Anorexia nervosa    Diabetic peripheral neuropathy ([place] [address].)    Gastroparesis    Weight loss, unintentional    Tachycardia    Elevated transaminase level    Bandemia    DKA, type 1, not at goal [facility])         Presenting symptoms:  Altered mental status, seizure    Diagnostic workup:      CONSULTS DURING ADMISSION :   IP CONSULT TO NEUROLOGY      Patient was diagnosed with:        Treatment while inpatient:  Pt presented to [facility] with new acute onset seizure while at home                                                                                       ----------------------------------------------------------      SUBJECTIVE COMPLAINTS- follow up for seizure     Diet: ADULT DIET; Regular; 4 carb choices (60 gm/meal)      OBJECTIVE:   Patient Active Problem List   Diagnosis    Seizure disorder ([facility]    Type 1 diabetes mellitus with complication (HCC)    Anxiety and depression    ROME (generalized anxiety disorder)    Anorexia nervosa    Diabetic peripheral neuropathy (HCC)    Gastroparesis    Weight loss, unintentional    Tachycardia    Elevated transaminase level    Bandemia    DKA, type 1, not at goal [facility])       Allergies  Patient has no known allergies.     Medications    Scheduled Meds:   clonazePAM  2 mg Oral BID    famotidine  20 mg Oral BID    gabapentin  300 mg Oral TID    levETIRAcetam  1,000 mg Oral BID    enoxaparin  40 mg 14* 24 17* 20*   PHOS 4.1 2.8 2.4* 3.6   BUN 8 6* 6* 3*   CREATININE 0.6 <0.5* <0.5* 0.5*       LIVER PROFILE:   Recent Labs     11/21/21  0011   ALKPHOS 116   *   ALT 82*   BILIDIR <0.2   BILITOT 0.5     No results for input(s): AMYLASE in the last 72 hours. No results for input(s): LIPASE in the last 72 hours. UA:  Recent Labs     11/21/21  0011   WBCUA 14*   RBCUA 1       TROPONIN: No results for input(s): CKTOTAL, TROPONINI in the last 72 hours. Lab Results   Component Value Date/Time    URRFLXCULT Yes 11/21/2021 12:11 AM       No results for input(s): TSHREFLEX in the last 72 hours. No components found for: [id number]  POC GLUCOSE:    Recent Labs     11/21/21  1439 11/21/21  1620 11/21/21 2024 11/22/21  0037 11/22/21  0820   POCGLU 120* 105* 365* 113* 159*     No results for input(s): LABA1C in the last 72 hours. Lab Results   Component Value Date    LABA1C 11.4 10/27/2021         ASSESSMENT AND PLAN    DKA E13.10  CO2 is more than 20  An ion gap is less than 16  DC insulin drip protocol  Start patient on home dose of Lantus insulin  Med sliding scale  Lispro as prandial insulin  Continue IV fluids  Resume diet with clear liquids and advance as tolerated      Seizure disorder  Continue on Keppra Klonopin and gabapentin  Neurology consult is appreciated  Patient needs to establish compliance  Patient will follow up neurology at [facility]             Code Status: Full Code        Dispo -likely home in a.m. The patient and / or the family were informed of the results of any tests, a time was given to answer questions, a plan was proposed and they agreed with plan. Kirstin Ontiveros MD    This note was transcribed using [id number] [employer]. Please disregard any translational errors.

## 2021-11-22 NOTE — TELEPHONE ENCOUNTER
Patient asked to have the [place] sent to [facility] [phone] [address] [facility], [address] [place] [address]      Please call to advise

## 2021-11-22 NOTE — PROGRESS NOTES
Patients BS was 54. This RN gave the patient gram crackers, peanut butter, and apple juice. Patients BS was re-checked an hour later and was 56. Pateint ate more gram crackers, peanut butter, and apple juice. Waiting on dietary to bring up another meal try for the patient and a regular pepsi. Patient was told by this RN that IV dextrose was going to be given but patient declined and stated once she gets the pepsi and more food in her it will bring her up. Patient educated on importance of getting her BS up and was told to call out if anything changes and this RN will give her the IV dextrose. Patient states she just feels tired and a little out of it. Will continue to monitor and assess patient and her BS levels.   Electronically signed by Sendy Holloway RN on 11/22/2021 at 6:51 PM

## 2021-11-22 NOTE — PROGRESS NOTES
Progress Note    Updates  Feels OK this morning. Says she has a bit of double vision which is not uncommon after her seizures. No further episodes.       Active Ambulatory Problems     Diagnosis Date Noted    Seizure disorder ([facility] 02/13/2018    Type 1 diabetes mellitus with complication ([facility] 21/76/9152    Anxiety and depression 10/22/2018    ROME (generalized anxiety disorder) 11/13/2018    Anorexia nervosa 12/11/2018    Diabetic peripheral neuropathy ([facility] 02/14/2019    Gastroparesis 02/14/2019    Weight loss, unintentional 01/27/2021    Tachycardia 09/13/2021    Elevated transaminase level 09/22/2021    Bandemia 09/22/2021    DKA, type 1, not at goal [facility]) 10/30/2021     Past Medical History:   Diagnosis Date    Depression     Diabetes mellitus (HCC)     Seizures (HCC)      Current Facility-Administered Medications:     glucose (GLUTOSE) 40 % oral gel 15 g, 15 g, Oral, PRN, Anastacia Check, MD    dextrose 50 % IV solution, 12.5 g, IntraVENous, PRN, Anatsacia Check, MD    glucagon (rDNA) injection 1 mg, 1 mg, IntraMUSCular, PRN, Anastacia Check, MD    dextrose 5 % solution, 100 mL/hr, IntraVENous, PRN, Anastacia Magdaleno MD    clonazePAM Neri Granger) tablet 2 mg, 2 mg, Oral, BID, Rahat M Brigitte, DO, 2 mg at 11/22/21 0853    famotidine (PEPCID) tablet 20 mg, 20 mg, Oral, BID, Rahat M Brigitte, DO, 20 mg at 11/22/21 0854    gabapentin (NEURONTIN) capsule 300 mg, 300 mg, Oral, TID, Rahat M Brigitte, DO, 300 mg at 11/22/21 0853    levETIRAcetam (KEPPRA) tablet 1,000 mg, 1,000 mg, Oral, BID, Rahat M Brigitte, DO, 1,000 mg at 11/22/21 0853    dextrose 50 % IV solution, 12.5 g, IntraVENous, PRN, Rahat M Brigitte, DO    potassium chloride 10 mEq/100 mL IVPB (Peripheral Line), 10 mEq, IntraVENous, PRN, Rahat M Brigitte, DO, Last Rate: 100 mL/hr at 11/21/21 1521, Rate Verify at 11/21/21 1521    magnesium sulfate 1000 mg in dextrose 5% 100 mL IVPB, 1,000 mg, IntraVENous, PRN, Rahat Briggs DO, Stopped at 11/21/21 1621    sodium phosphate 10 mmol in dextrose 5 % 250 mL IVPB, 10 mmol, IntraVENous, PRN **OR** sodium phosphate 15 mmol in dextrose 5 % 250 mL IVPB, 15 mmol, IntraVENous, PRN **OR** sodium phosphate 20 mmol in dextrose 5 % 500 mL IVPB, 20 mmol, IntraVENous, PRN, Rahat M Brigitte, DO    enoxaparin (LOVENOX) injection 40 mg, 40 mg, SubCUTAneous, Daily, Rahat  Brigitte, DO, 40 mg at 11/22/21 0852    LORazepam (ATIVAN) injection 2 mg, 2 mg, IntraVENous, Q4H PRN, Rahat  Brigitte, DO    gabapentin (NEURONTIN) capsule 900 mg, 900 mg, Oral, Nightly, Rahat  Brigitte, DO, 900 mg at 11/21/21 2018    ondansetron (ZOFRAN) injection 4 mg, 4 mg, IntraVENous, Q6H PRN, Alana Kuo MD, 4 mg at 11/21/21 2020    insulin glargine (LANTUS) injection vial 15 Units, 15 Units, SubCUTAneous, Daily, Alana Kuo MD, 15 Units at 11/22/21 0858    insulin lispro (HUMALOG) injection vial 0-12 Units, 0-12 Units, SubCUTAneous, TID WC, Alana Kuo MD, 2 Units at 11/22/21 0854    insulin lispro (HUMALOG) injection vial 0-6 Units, 0-6 Units, SubCUTAneous, Nightly, Alana Kuo MD, 5 Units at 11/21/21 2027    Exam  Blood pressure 95/71, pulse 80, temperature 97.8 °F (36.6 °C), temperature source Oral, resp. rate 12, height 4' 11\" (1.499 m), weight 112 lb 10.5 oz (51.1 kg), last menstrual period 11/19/2021, SpO2 99 %, not currently breastfeeding. Constitutional    Vital signs: BP, HR, and RR reviewed   General alert, no distress  Eyes: unable to visualize the fundi  Cardiovascular: no peripheral edema. Psychiatric: cooperative with examination, no psychotic behavior noted. Neurologic  Mental status:   orientation to person, place, time. Attention intact as able to attend well to the exam     Language fluent in conversation   Comprehension intact; follows simple commands  Cranial nerves:   CN2: visual fields full   CN 3,4,6: extraocular muscles intact.   Pupils are equal, round, reactive bilaterally. CN7: face symmetric without dysarthria  CN8: hearing grossly intact  CN12: tongue midline with protrusion  Strength: good strength in all 4 extremities   Sensory: light touch intact in all 4 extremities. Cerebellar/coordination: finger nose finger normal without ataxia  Tone: normal in all 4 extremities  Gait: deferred at this time for safety. ROS  Constitutional- No weight loss or fevers  Eyes- + diplopia. No photophobia. Ears/nose/throat- No dysphagia. No Dysarthria  Cardiovascular- No palpitations. No chest pain  Respiratory- No dyspnea. No Cough  Gastrointestinal- No Abdominal pain. No Vomiting. Genitourinary- No incontinence. No urinary retention  Musculoskeletal- No myalgia. No arthralgia  Skin- No rash. No easy bruising. Psychiatric- No depression. No anxiety  Endocrine- No diabetes. No thyroid issues. Hematologic- No bleeding difficulty. No fatigue  Neurologic- No weakness. No Headache. Labs  Keppra < 2.0    Glucose 960 -> 159  Na 129 -> 140  K 3.8  Mg 2.00  BUN 3  Cr 0.5    WBC 6.3K  Hg 11.2  Platelets 735    UA 14 WBCs, 1+ bacteria. HCG negative    Studies  MRI brain w/wo 11/1/21, independently reviewed  Unremarkable. EEG 11/1/21  Los and poly spike wave discharges. Beta activity. Impression  1. Breakthrough seizure(s) in the setting of hyperglycemia/DKA and AED non compliance. Keppra level was undetectable. She has had no further episodes. 2.  DKA. 3.  Non compliance. Recommendations  1. Continue Keppra, clonazepam, and gabapentin. No changes to home doses. 2.  Compliance is important. 3.  Management of DKA ongoing. 4.  Follow up w/ neurologist at [facility] after discharge. Will sign off. Please call back w/ any additional questions or concerns. Thank you.      Daria Beck NP  [address] [place] [address] [address] Neurology    A copy of this note was provided for Dr Nidia Joya MD

## 2021-11-22 NOTE — CARE COORDINATION
[facility]  Diabetes Education   Progress Note       NAME:  José Brasher [place] RECORD NUMBER:  [id number]  AGE: 27 y.o. GENDER: female  : 1990  TODAY'S DATE:  2021    Subjective   Reason for Diabetic Education Evaluation and Assessment: DKKENNY Torres describes difficulty in following Lantus/Humalog injection therapy. She is interested in resuming her previous diabetes management plan with a Omni pod pump and Dexcom CGM. She does not have an endocrinologist at this time. She missed a scheduled endocrinology appointment at [facility] for . Visit Type: evaluation      Chong Melo is a 27 y.o. female referred by:     [] Physician  [] Nursing  [x] Chart Review   [] Other:     PAST MEDICAL HISTORY        Diagnosis Date    Depression     Diabetes mellitus ([facility]     Seizures ([facility]        PAST SURGICAL HISTORY    Past Surgical History:   Procedure Laterality Date     SECTION      TUBAL LIGATION         FAMILY HISTORY    No family history on file. SOCIAL HISTORY    Social History     Tobacco Use    Smoking status: Never Smoker    Smokeless tobacco: Never Used   Vaping Use    Vaping Use: Never used   Substance Use Topics    Alcohol use: No    Drug use: No       ALLERGIES    No Known Allergies    MEDICATIONS     clonazePAM  2 mg Oral BID    famotidine  20 mg Oral BID    gabapentin  300 mg Oral TID    levETIRAcetam  1,000 mg Oral BID    enoxaparin  40 mg SubCUTAneous Daily    gabapentin  900 mg Oral Nightly    insulin glargine  15 Units SubCUTAneous Daily    insulin lispro  0-12 Units SubCUTAneous TID WC    insulin lispro  0-6 Units SubCUTAneous Nightly       Objective        Patient Active Problem List   Diagnosis Code    Seizure disorder ([facility] G40.909    Type 1 diabetes mellitus with complication (HCC) Y48.6    Anxiety and depression F41.9, F32. A    ROME (generalized anxiety disorder) F41.1    Anorexia nervosa F50.00    [facility] E11.42    Gastroparesis K31.84    Weight loss, unintentional R63.4    Tachycardia R00.0    Elevated transaminase level R74.01    Bandemia D72.825    DKA, type 1, not at goal ([facility] E10.10        /80   Pulse 75   Temp 97.4 °F (36.3 °C) (Oral)   Resp 16   Ht 4' 11\" (1.499 m)   Wt 112 lb 10.5 oz (51.1 kg)   LMP 11/19/2021 (Approximate)   SpO2 100%   BMI 22.75 kg/m²     HgBA1c:    Lab Results   Component Value Date    LABA1C 11.4 10/27/2021       Recent Labs     11/21/21  1620 11/21/21 2024 11/22/21  0037 11/22/21  0820   POCGLU 105* 365* 113* 159*       BUN/Creatinine:    Lab Results   Component Value Date    BUN 3 11/22/2021    CREATININE 0.5 11/22/2021       Assessment        Diabetes Management and Education    Does the patient have a Primary Care Physician? Yes     Does the patient require new medication instruction? Yes. Prefers to use Omni pod insulin pump. Willing to continue on Lantus/Humalog until pump supplies can be secured. Reinforced basal and meal time insulin concepts. Person responsible for administration of Insulin/Medication:        [x] Self     [] Caregiver       [] Spouse       [] Other Family Member   []  Other    Level of patient/caregiver understanding able to:       [x] Verbalized Understanding   [] Demonstrated Understanding       [] Teach Back       [] Needs Reinforcement     []  Other:        Does the patient/caregiver monitor Blood Glucoses? Yes  Reviewed glycemic control targets, testing frequency and when to call PCP. Level of patient/caregiver understanding able to:        [x] Verbalized Understanding   [] Demonstrated Understanding       [] Teach Back       [] Needs Reinforcement     []  Other:      Does the patient/caregiver follow a Meal Plan? Yes      Does the patient/caregiver understand S/S of Hypoglycemia? Yes  Reviewed symptoms, prevention and treatment.      Level of patient/caregiver understanding able to:      [x] Verbalized Understanding   [] Demonstrated Understanding       [] Teach Back       [] Needs Reinforcement     []  Other:                    Does the patient/caregiver understand S/S of Hyperglycemia? No:     Reviewed symptoms, prevention and treatment. Level of patient/caregiver understanding able to:        [x] Verbalized Understanding   [] Demonstrated Understanding       [] Teach Back       [] Needs Reinforcement     []  Other:           Plan        Ongoing diabetes education and blood glucose monitoring. Recommend meds to beds. Social Service Consult Made:  Yes - may need assistance with transportation to endo appointment. Nov30 Follow up with Dr. Pal Christie Nov 30, 2021  Specialty: Internal Medicine  at 9:30 for , Diabetes follow up [address] [facility]   Endocrinology   [address] [place] [address]  [phone]                                              Discharge Plan:  Discharge needs: insulin pens and 4mm pen needles and glucometer/strips/lancets and glucagon.     Placement for patient upon discharge: independent living        Teaching Time Diabetes Education:  40 minutes     Electronically signed by Kenna Matute on 11/22/2021 at 12:10 PM

## 2021-11-22 NOTE — TELEPHONE ENCOUNTER
Pt is calling into the office the request a refill in the following medication. Pt would like them to be sent to the pharmacy. [facility]    [address]    Please advise.

## 2021-11-22 NOTE — PLAN OF CARE
Problem: Falls - Risk of:  Goal: Will remain free from falls  Description: Will remain free from falls  11/22/2021 1216 by Dayana Trivedi RN  Outcome: Ongoing  11/22/2021 1213 by Dayana Trivedi RN  Note: Fall risk assessment completed. Fall precautions in place. Call light within reach. Pt educated on calling for assistance before getting up. Walkway free of clutter. Will continue to monitor. 11/22/2021 0843 by Faisal Carvajal RN  Outcome: Met This Shift  Goal: Absence of physical injury  Description: Absence of physical injury  11/22/2021 1216 by Dayana Trivedi RN  Outcome: Ongoing  11/22/2021 1213 by Dayana Trivedi RN  Note: Patient will remain free from physical injury. Safety precautions are in place. Will continue to monitor and assess. 11/22/2021 0843 by Faisal Carvajal RN  Outcome: Met This Shift     Problem: Discharge Planning:  Goal: Discharged to appropriate level of care  Description: Discharged to appropriate level of care  11/22/2021 1216 by Dayana Trivedi RN  Outcome: Ongoing  11/22/2021 1213 by Dayana Trivedi RN  Note: Patient will be discharged to appropriate level of care. Will monitor and assess. Problem: Fluid Volume - Imbalance:  Goal: Will remain free of signs and symptoms of dehydration  Description: Will remain free of signs and symptoms of dehydration  11/22/2021 1216 by Dayana Trivedi RN  Outcome: Ongoing  11/22/2021 1213 by Dayana Trivedi RN  Note: Patient will remain free of signs and symptoms of dehydration. Will monitor and assess. Goal: Absence of imbalanced fluid volume signs and symptoms  Description: Absence of imbalanced fluid volume signs and symptoms  11/22/2021 1216 by Dayana Trivedi RN  Outcome: Ongoing  11/22/2021 1213 by Dayana Trivedi RN  Note: Patient will be free from imbalanced fluid volume signs and symptoms. Will monitor and assess.      Problem: Serum Glucose Level - Abnormal:  Goal: Ability to maintain appropriate glucose levels will improve  Description: Ability to maintain appropriate glucose levels will improve  11/22/2021 1216 by Lillian Diaz RN  Outcome: Ongoing  11/22/2021 1213 by Lillian Diaz RN  Note: Patients ability to maintain appropriate glucose levels will improve. Will monitor and assess. Problem: Injury - Acid Base Imbalance:  Goal: Acid-base balance  Description: Acid-base balance  11/22/2021 1216 by Lillian Diaz RN  Outcome: Ongoing  11/22/2021 1213 by Lillian Diaz RN  Note: Patient will have acid-base balance. Will monitor and assess. Problem: Coping:  Goal: Ability to cope will improve  Description: Ability to cope will improve  Outcome: Ongoing  Note: Patients ability to cope will improve. Will monitor and assess. Goal: Ability to identify appropriate support needs will improve  Description: Ability to identify appropriate support needs will improve  Outcome: Ongoing  Note: Patients ability to identify appropriate support needs will improve. Will monitor and assess. Problem: Health Behavior:  Goal: Ability to manage health-related needs will improve  Description: Ability to manage health-related needs will improve  Outcome: Ongoing  Note: Patiens ability to manage health-related needs will improve. Will monitor and assess. Problem: Physical Regulation:  Goal: Signs of adequate cerebral perfusion will increase  Description: Signs of adequate cerebral perfusion will increase  Outcome: Ongoing  Note: Signs of adequate cerebral perfusion will increase. Will monitor and assess. Goal: Ability to maintain a stable neurologic state will improve  Description: Ability to maintain a stable neurologic state will improve  Outcome: Ongoing  Note: Patients ability to maintain a stable neurological state will improve. Will monitor and assess.      Problem: Safety:  Goal: Ability to remain free from injury will improve  Description: Ability to remain free from injury will improve  Outcome: Ongoing  Note: Patient will remain free from physical injury. Safety precautions are in place. Will continue to monitor and assess.

## 2021-11-22 NOTE — PROGRESS NOTES
Patient transported to 3W room 3115 from CVICU. Report given by Servando Horan and no further questions or concerns at this time. Will continue to monitor and assess.   Electronically signed by Ruth Donaldson RN on 11/22/2021 at 11:54 AM

## 2021-11-22 NOTE — PROGRESS NOTES
Comprehensive Nutrition Assessment    Type and Reason for Visit:  Initial, Positive Nutrition Screen    Nutrition Recommendations/Plan:   - Monitor meal intakes for possible need of ONS    Nutrition Assessment:  Positive nutrition screen. Pt admitted with DKA and seizure activity. Moved up to floor room this AM. Wt hx stable over the past two months per EMR. Currently on 4 carb diet although unable to assess intakes at this time. Will monitor intakes for possible need of ONS. Malnutrition Assessment:  Malnutrition Status:  No malnutrition    Context:  Chronic Illness       Estimated Daily Nutrient Needs:  Energy (kcal):  0725-3643 (25-30kcal/51kg); Weight Used for Energy Requirements:  Current     Protein (g):  61-71 (1.2-1.4g/51kg); Weight Used for Protein Requirements:  Current        Fluid (ml/day): 1 ml/kcal      Nutrition Related Findings:  +BM 11/20. No edema. Glucose 159. Wounds:  None       Current Nutrition Therapies:    ADULT DIET; Regular; 4 carb choices (60 gm/meal)    Anthropometric Measures:  · Height: 4' 11\" (149.9 cm)  · Current Body Weight: 112 lb (50.8 kg)   · Admission Body Weight: 115 lb (52.2 kg)      · Ideal Body Weight: 95 lbs; % Ideal Body Weight 117.9 %   · BMI: 22.6  · BMI Categories: Normal Weight (BMI 18.5-24. 9)       Nutrition Diagnosis:   No nutrition diagnosis at this time     Nutrition Interventions:   Food and/or Nutrient Delivery:  Continue Current Diet  Nutrition Education/Counseling:  Education not indicated   Coordination of Nutrition Care:  Continue to monitor while inpatient    Goals:  PO intake greater than 50%       Nutrition Monitoring and Evaluation:   Behavioral-Environmental Outcomes:  None Identified   Food/Nutrient Intake Outcomes:  Food and Nutrient Intake  Physical Signs/Symptoms Outcomes:  Biochemical Data, Weight, Meal Time Behavior     Discharge Planning:    Continue current diet     Electronically signed by Michael Man RD, LD on 11/22/21 at 12:37 PM EST    Contact: [phone]

## 2021-11-23 LAB
ANION GAP SERPL CALCULATED.3IONS-SCNC: 13 MMOL/L (ref 3–16)
BASOPHILS ABSOLUTE: 0.1 K/UL (ref 0–0.2)
BASOPHILS RELATIVE PERCENT: 0.8 %
BUN BLDV-MCNC: 6 MG/DL (ref 7–20)
CALCIUM SERPL-MCNC: 8.1 MG/DL (ref 8.3–10.6)
CHLORIDE BLD-SCNC: 103 MMOL/L (ref 99–110)
CO2: 22 MMOL/L (ref 21–32)
CREAT SERPL-MCNC: <0.5 MG/DL (ref 0.6–1.1)
EOSINOPHILS ABSOLUTE: 0.3 K/UL (ref 0–0.6)
EOSINOPHILS RELATIVE PERCENT: 4.5 %
GFR AFRICAN AMERICAN: >60
GFR NON-AFRICAN AMERICAN: >60
GLUCOSE BLD-MCNC: 100 MG/DL (ref 70–99)
GLUCOSE BLD-MCNC: 102 MG/DL (ref 70–99)
GLUCOSE BLD-MCNC: 113 MG/DL (ref 70–99)
GLUCOSE BLD-MCNC: 114 MG/DL (ref 70–99)
GLUCOSE BLD-MCNC: 114 MG/DL (ref 70–99)
GLUCOSE BLD-MCNC: 115 MG/DL (ref 70–99)
GLUCOSE BLD-MCNC: 121 MG/DL (ref 70–99)
GLUCOSE BLD-MCNC: 122 MG/DL (ref 70–99)
GLUCOSE BLD-MCNC: 126 MG/DL (ref 70–99)
GLUCOSE BLD-MCNC: 126 MG/DL (ref 70–99)
GLUCOSE BLD-MCNC: 140 MG/DL (ref 70–99)
GLUCOSE BLD-MCNC: 44 MG/DL (ref 70–99)
GLUCOSE BLD-MCNC: 48 MG/DL (ref 70–99)
GLUCOSE BLD-MCNC: 52 MG/DL (ref 70–99)
GLUCOSE BLD-MCNC: 54 MG/DL (ref 70–99)
GLUCOSE BLD-MCNC: 59 MG/DL (ref 70–99)
GLUCOSE BLD-MCNC: 59 MG/DL (ref 70–99)
GLUCOSE BLD-MCNC: 60 MG/DL (ref 70–99)
GLUCOSE BLD-MCNC: 60 MG/DL (ref 70–99)
GLUCOSE BLD-MCNC: 62 MG/DL (ref 70–99)
GLUCOSE BLD-MCNC: 66 MG/DL (ref 70–99)
GLUCOSE BLD-MCNC: 67 MG/DL (ref 70–99)
GLUCOSE BLD-MCNC: 67 MG/DL (ref 70–99)
GLUCOSE BLD-MCNC: 70 MG/DL (ref 70–99)
GLUCOSE BLD-MCNC: 71 MG/DL (ref 70–99)
GLUCOSE BLD-MCNC: 72 MG/DL (ref 70–99)
GLUCOSE BLD-MCNC: 77 MG/DL (ref 70–99)
GLUCOSE BLD-MCNC: 79 MG/DL (ref 70–99)
GLUCOSE BLD-MCNC: 79 MG/DL (ref 70–99)
GLUCOSE BLD-MCNC: 84 MG/DL (ref 70–99)
HCT VFR BLD CALC: 30.8 % (ref 36–48)
HEMOGLOBIN: 10 G/DL (ref 12–16)
LYMPHOCYTES ABSOLUTE: 2.8 K/UL (ref 1–5.1)
LYMPHOCYTES RELATIVE PERCENT: 41.7 %
MAGNESIUM: 1.7 MG/DL (ref 1.8–2.4)
MCH RBC QN AUTO: 28.2 PG (ref 26–34)
MCHC RBC AUTO-ENTMCNC: 32.4 G/DL (ref 31–36)
MCV RBC AUTO: 86.9 FL (ref 80–100)
MONOCYTES ABSOLUTE: 0.6 K/UL (ref 0–1.3)
MONOCYTES RELATIVE PERCENT: 9.4 %
NEUTROPHILS ABSOLUTE: 3 K/UL (ref 1.7–7.7)
NEUTROPHILS RELATIVE PERCENT: 43.6 %
PDW BLD-RTO: 14.1 % (ref 12.4–15.4)
PERFORMED ON: ABNORMAL
PERFORMED ON: NORMAL
PHOSPHORUS: 3.7 MG/DL (ref 2.5–4.9)
PLATELET # BLD: 221 K/UL (ref 135–450)
PMV BLD AUTO: 9.6 FL (ref 5–10.5)
POTASSIUM SERPL-SCNC: 3.7 MMOL/L (ref 3.5–5.1)
RBC # BLD: 3.55 M/UL (ref 4–5.2)
SODIUM BLD-SCNC: 138 MMOL/L (ref 136–145)
WBC # BLD: 6.8 K/UL (ref 4–11)

## 2021-11-23 PROCEDURE — 6370000000 HC RX 637 (ALT 250 FOR IP): Performed by: STUDENT IN AN ORGANIZED HEALTH CARE EDUCATION/TRAINING PROGRAM

## 2021-11-23 PROCEDURE — 6370000000 HC RX 637 (ALT 250 FOR IP): Performed by: HOSPITALIST

## 2021-11-23 PROCEDURE — 2580000003 HC RX 258: Performed by: HOSPITALIST

## 2021-11-23 PROCEDURE — 2500000003 HC RX 250 WO HCPCS: Performed by: NURSE PRACTITIONER

## 2021-11-23 PROCEDURE — 2000000000 HC ICU R&B

## 2021-11-23 PROCEDURE — 2580000003 HC RX 258: Performed by: NURSE PRACTITIONER

## 2021-11-23 PROCEDURE — 85025 COMPLETE CBC W/AUTO DIFF WBC: CPT

## 2021-11-23 PROCEDURE — 84100 ASSAY OF PHOSPHORUS: CPT

## 2021-11-23 PROCEDURE — 83735 ASSAY OF MAGNESIUM: CPT

## 2021-11-23 PROCEDURE — 6360000002 HC RX W HCPCS: Performed by: STUDENT IN AN ORGANIZED HEALTH CARE EDUCATION/TRAINING PROGRAM

## 2021-11-23 PROCEDURE — 36415 COLL VENOUS BLD VENIPUNCTURE: CPT

## 2021-11-23 PROCEDURE — 80048 BASIC METABOLIC PNL TOTAL CA: CPT

## 2021-11-23 PROCEDURE — 94761 N-INVAS EAR/PLS OXIMETRY MLT: CPT

## 2021-11-23 RX ORDER — AMOXICILLIN AND CLAVULANATE POTASSIUM 500; 125 MG/1; MG/1
1 TABLET, FILM COATED ORAL EVERY 8 HOURS SCHEDULED
Status: DISCONTINUED | OUTPATIENT
Start: 2021-11-23 | End: 2021-11-26 | Stop reason: HOSPADM

## 2021-11-23 RX ORDER — DEXTROSE MONOHYDRATE 100 MG/ML
INJECTION, SOLUTION INTRAVENOUS CONTINUOUS
Status: DISCONTINUED | OUTPATIENT
Start: 2021-11-23 | End: 2021-11-26 | Stop reason: HOSPADM

## 2021-11-23 RX ORDER — DEXTROSE MONOHYDRATE 50 MG/ML
INJECTION, SOLUTION INTRAVENOUS CONTINUOUS
Status: DISCONTINUED | OUTPATIENT
Start: 2021-11-23 | End: 2021-11-23

## 2021-11-23 RX ORDER — INSULIN GLARGINE 100 [IU]/ML
12 INJECTION, SOLUTION SUBCUTANEOUS DAILY
Status: DISCONTINUED | OUTPATIENT
Start: 2021-11-23 | End: 2021-11-23

## 2021-11-23 RX ADMIN — ENOXAPARIN SODIUM 40 MG: 100 INJECTION SUBCUTANEOUS at 08:43

## 2021-11-23 RX ADMIN — FAMOTIDINE 20 MG: 20 TABLET ORAL at 20:06

## 2021-11-23 RX ADMIN — DEXTROSE MONOHYDRATE: 100 INJECTION, SOLUTION INTRAVENOUS at 18:55

## 2021-11-23 RX ADMIN — DEXTROSE MONOHYDRATE 12.5 G: 25 INJECTION, SOLUTION INTRAVENOUS at 08:44

## 2021-11-23 RX ADMIN — DEXTROSE MONOHYDRATE: 50 INJECTION, SOLUTION INTRAVENOUS at 01:56

## 2021-11-23 RX ADMIN — DEXTROSE MONOHYDRATE 12.5 G: 25 INJECTION, SOLUTION INTRAVENOUS at 13:28

## 2021-11-23 RX ADMIN — GABAPENTIN 300 MG: 300 CAPSULE ORAL at 14:17

## 2021-11-23 RX ADMIN — GABAPENTIN 900 MG: 300 CAPSULE ORAL at 20:07

## 2021-11-23 RX ADMIN — CLONAZEPAM 2 MG: 0.5 TABLET ORAL at 20:06

## 2021-11-23 RX ADMIN — LEVETIRACETAM 1000 MG: 500 TABLET, FILM COATED ORAL at 20:07

## 2021-11-23 RX ADMIN — DEXTROSE MONOHYDRATE 12.5 G: 25 INJECTION, SOLUTION INTRAVENOUS at 10:20

## 2021-11-23 RX ADMIN — DEXTROSE MONOHYDRATE 12.5 G: 25 INJECTION, SOLUTION INTRAVENOUS at 06:25

## 2021-11-23 RX ADMIN — AMOXICILLIN AND CLAVULANATE POTASSIUM 1 TABLET: 500; 125 TABLET, FILM COATED ORAL at 23:05

## 2021-11-23 RX ADMIN — DEXTROSE MONOHYDRATE: 50 INJECTION, SOLUTION INTRAVENOUS at 10:22

## 2021-11-23 RX ADMIN — DEXTROSE MONOHYDRATE 12.5 G: 25 INJECTION, SOLUTION INTRAVENOUS at 23:04

## 2021-11-23 RX ADMIN — DEXTROSE MONOHYDRATE 12.5 G: 25 INJECTION, SOLUTION INTRAVENOUS at 12:17

## 2021-11-23 RX ADMIN — DEXTROSE MONOHYDRATE 12.5 G: 25 INJECTION, SOLUTION INTRAVENOUS at 11:27

## 2021-11-23 RX ADMIN — DEXTROSE MONOHYDRATE 12.5 G: 25 INJECTION, SOLUTION INTRAVENOUS at 01:37

## 2021-11-23 RX ADMIN — FAMOTIDINE 20 MG: 20 TABLET ORAL at 08:44

## 2021-11-23 RX ADMIN — DEXTROSE MONOHYDRATE 12.5 G: 25 INJECTION, SOLUTION INTRAVENOUS at 14:24

## 2021-11-23 RX ADMIN — DEXTROSE MONOHYDRATE 12.5 G: 25 INJECTION, SOLUTION INTRAVENOUS at 10:58

## 2021-11-23 RX ADMIN — CLONAZEPAM 2 MG: 0.5 TABLET ORAL at 08:44

## 2021-11-23 RX ADMIN — DEXTROSE MONOHYDRATE 12.5 G: 25 INJECTION, SOLUTION INTRAVENOUS at 07:04

## 2021-11-23 RX ADMIN — DEXTROSE MONOHYDRATE 12.5 G: 25 INJECTION, SOLUTION INTRAVENOUS at 05:34

## 2021-11-23 RX ADMIN — DEXTROSE MONOHYDRATE 12.5 G: 25 INJECTION, SOLUTION INTRAVENOUS at 03:32

## 2021-11-23 RX ADMIN — DEXTROSE MONOHYDRATE: 100 INJECTION, SOLUTION INTRAVENOUS at 12:21

## 2021-11-23 RX ADMIN — LEVETIRACETAM 1000 MG: 500 TABLET, FILM COATED ORAL at 08:44

## 2021-11-23 RX ADMIN — GABAPENTIN 300 MG: 300 CAPSULE ORAL at 08:44

## 2021-11-23 ASSESSMENT — PAIN SCALES - GENERAL
PAINLEVEL_OUTOF10: 0
PAINLEVEL_OUTOF10: 0

## 2021-11-23 ASSESSMENT — PAIN SCALES - WONG BAKER: WONGBAKER_NUMERICALRESPONSE: 0

## 2021-11-23 NOTE — PROGRESS NOTES
TRANSFER - OUT REPORT:    Verbal report given to ICU RN on Litzy West being transferred to ICU for change in patient condition (pt unable to maintain blood glucose level)       Report consisted of patient's Situation, Background, Assessment and   Recommendations(SBAR). Information from the following report(s) Nurse Handoff Report was reviewed with the receiving nurse. Lines:   Peripheral IV 11/21/21 Proximal; Right; Anterior Forearm (Active)   Site Assessment Clean; Dry; Intact 11/23/21 1409   Line Status Normal saline locked 11/23/21 1409   Line Care Connections checked and tightened 11/21/21 1400   Alcohol Cap Used Yes 11/23/21 0200   Dressing Status Clean; Dry; Intact 11/23/21 1409       Peripheral IV 11/23/21 Left Forearm (Active)   Site Assessment Clean; Dry; Intact 11/23/21 1409   Line Status Infusing 11/23/21 1409   Dressing Status Clean; Dry; Intact 11/23/21 1409        Opportunity for questions and clarification was provided.      Patient transported with:  Monitor and Registered Nurse and , cell phone, wallet and clothing.  Pt updated on plan of care    Electronically signed by Sabina Almanzar RN on 11/23/2021 at 3:00 PM

## 2021-11-23 NOTE — CARE COORDINATION
11/23/21 0942   Readmission Assessment   Number of Days since last admission? 8-30 days   Previous Disposition Home with Family   Who is being Interviewed Patient   What was the patient's/caregiver's perception as to why they think they needed to return back to the hospital? Unable to obtain medications   Did you visit your Primary Care Physician after you left the hospital, before you returned this time? No   Why weren't you able to visit your PCP? Did not have an appointment; Other (Comment)  (Her PCP Dr Nahid Castrejon recently retired. She now sees Dr Madison Valenzuela. Waiting for new RX's for diabetes medication)   Did you see a specialist, such as Cardiac, Pulmonary, Orthopedic Physician, etc. after you left the hospital? No   Who advised the patient to return to the hospital? Self-referral   Does the patient report anything that got in the way of taking their medications? No   In our efforts to provide the best possible care to you and others like you, can you think of anything that we could have done to help you after you left the hospital the first time, so that you might not have needed to return so soon? Teach back instructions regarding management of illness;  Other (Comment)  (assistance with transportation to see speciaist)   Electronically signed by Helen Farrell RN on 11/23/2021 at 9:44 AM

## 2021-11-23 NOTE — CARE COORDINATION
[facility]  Hypoglycemia Event and Prevention Plan      NAME: José Pacheco RECORD NUMBER:  [id number]  AGE: 27 y.o. GENDER: female  : 1990  EPISODE DATE:  2021     Data     Recent Labs     21  0327 21  0354 21  0457 21  0620 21  0703 21  0721   POCGLU 70 114* 71 66* 67* 100*       Medications  Scheduled Medications:   [Held by provider] insulin lispro  3 Units SubCUTAneous TID WC    insulin lispro  0-6 Units SubCUTAneous TID WC    insulin lispro  0-3 Units SubCUTAneous Nightly    gabapentin  300 mg Oral BID    clonazePAM  2 mg Oral BID    famotidine  20 mg Oral BID    levETIRAcetam  1,000 mg Oral BID    enoxaparin  40 mg SubCUTAneous Daily    gabapentin  900 mg Oral Nightly    insulin glargine  15 Units SubCUTAneous Daily       Diet  Current diet/supplement order: ADULT DIET; Regular; 4 carb choices (60 gm/meal)     Recorded PO: PO Meals Eaten (%): 26 - 50% last meal in flowsheets      Action      Physician Notified of event: Yes   Autumn Jacobo MD    Recommend stopping the D5 when BG > 120 and then restart at Lantus 12 units.     Responce     Achieved POCT Blood Glucose greater than 70 mg/dl: Yes      Medication plan updated: Yes        Electronically signed by Bethanie Grayson RN on 2021 at 8:17 AM

## 2021-11-23 NOTE — PLAN OF CARE
Problem: Falls - Risk of:  Goal: Will remain free from falls  Description: Will remain free from falls  11/22/2021 2008 by Elieser Wong RN  Outcome: Ongoing  Note: Fall risk assessment completed. Fall precautions in place. Call light within reach. Pt educated on calling for assistance before getting up. Walkway free of clutter. Will continue to monitor. 11/22/2021 1216 by Yogesh Harris RN  Outcome: Ongoing  11/22/2021 1213 by Yogesh Harris RN  Note: Fall risk assessment completed. Fall precautions in place. Call light within reach. Pt educated on calling for assistance before getting up. Walkway free of clutter. Will continue to monitor. 11/22/2021 0843 by Charlotte Skelton RN  Outcome: Met This Shift  Goal: Absence of physical injury  Description: Absence of physical injury  11/22/2021 2008 by Elieser Wong RN  Outcome: Ongoing  Note: Pt is free of injury. No injury noted. Fall precautions in place. Call light within reach. Will monitor. 11/22/2021 1216 by Yogesh Harris RN  Outcome: Ongoing  11/22/2021 1213 by Yogesh Harris RN  Note: Patient will remain free from physical injury. Safety precautions are in place. Will continue to monitor and assess. 11/22/2021 0843 by Charlotte Skelton RN  Outcome: Met This Shift     Problem: Discharge Planning:  Goal: Discharged to appropriate level of care  Description: Discharged to appropriate level of care  11/22/2021 2008 by Elieser Wong RN  Outcome: Ongoing  Note: Patient will be discharged to appropriate level of care  11/22/2021 1216 by Yogesh Harris RN  Outcome: Ongoing  11/22/2021 1213 by Yogesh Harris RN  Note: Patient will be discharged to appropriate level of care. Will monitor and assess.      Problem: Fluid Volume - Imbalance:  Goal: Will remain free of signs and symptoms of dehydration  Description: Will remain free of signs and symptoms of dehydration  11/22/2021 2008 by Elieser Wong RN  Outcome: Ongoing  Note: Will continue to monitor for S/S of dehydration    11/22/2021 1216 by Sherrie Holly RN  Outcome: Ongoing  11/22/2021 1213 by Sherrie Holly RN  Note: Patient will remain free of signs and symptoms of dehydration. Will monitor and assess. Goal: Absence of imbalanced fluid volume signs and symptoms  Description: Absence of imbalanced fluid volume signs and symptoms  11/22/2021 2008 by Aleida Rosario RN  Outcome: Ongoing  Note: Will continue to monitor patients fluids and electrolytes    11/22/2021 1216 by Sherrie Holly RN  Outcome: Ongoing  11/22/2021 1213 by Sherrie Holly RN  Note: Patient will be free from imbalanced fluid volume signs and symptoms. Will monitor and assess. Problem: Serum Glucose Level - Abnormal:  Goal: Ability to maintain appropriate glucose levels will improve  Description: Ability to maintain appropriate glucose levels will improve  11/22/2021 2008 by Aleida Rosario RN  Outcome: Ongoing  Note: Will continue to monitor patients BS    11/22/2021 1216 by Sherrie Holly RN  Outcome: Ongoing  11/22/2021 1213 by Sherrie Holly RN  Note: Patients ability to maintain appropriate glucose levels will improve. Will monitor and assess. Problem: Injury - Acid Base Imbalance:  Goal: Acid-base balance  Description: Acid-base balance  11/22/2021 2008 by Aleida Rosario RN  Outcome: Ongoing  11/22/2021 1216 by Sherrie Holly RN  Outcome: Ongoing  11/22/2021 1213 by Sherrie Holly RN  Note: Patient will have acid-base balance. Will monitor and assess. Problem: Coping:  Goal: Ability to cope will improve  Description: Ability to cope will improve  11/22/2021 2008 by Aleida Rosario RN  Outcome: Ongoing  Note: Patients ability to cope with disease process will improve    11/22/2021 1216 by Sherrie Holly RN  Outcome: Ongoing  Note: Patients ability to cope will improve. Will monitor and assess.   Goal: Ability to identify appropriate support needs will improve  Description: Ability to identify appropriate support needs will improve  11/22/2021 2008 by Tao Garcia RN  Outcome: Ongoing  11/22/2021 1216 by Xena Gross RN  Outcome: Ongoing  Note: Patients ability to identify appropriate support needs will improve. Will monitor and assess. Problem: Health Behavior:  Goal: Ability to manage health-related needs will improve  Description: Ability to manage health-related needs will improve  11/22/2021 2008 by Tao Garcia RN  Outcome: Ongoing  11/22/2021 1216 by Xena Gross RN  Outcome: Ongoing  Note: Patiens ability to manage health-related needs will improve. Will monitor and assess. Problem: Physical Regulation:  Goal: Signs of adequate cerebral perfusion will increase  Description: Signs of adequate cerebral perfusion will increase  11/22/2021 2008 by Tao Garcia RN  Outcome: Ongoing  11/22/2021 1216 by Xena Gross RN  Outcome: Ongoing  Note: Signs of adequate cerebral perfusion will increase. Will monitor and assess. Goal: Ability to maintain a stable neurologic state will improve  Description: Ability to maintain a stable neurologic state will improve  11/22/2021 2008 by Tao Garcia RN  Outcome: Ongoing  Note: Will continue to monitor patients neuro status    11/22/2021 1216 by Xena Gross RN  Outcome: Ongoing  Note: Patients ability to maintain a stable neurological state will improve. Will monitor and assess. Problem: Safety:  Goal: Ability to remain free from injury will improve  Description: Ability to remain free from injury will improve  11/22/2021 2008 by Tao Garcia RN  Outcome: Ongoing  Note: Pt is free of injury. No injury noted. Fall precautions in place. Call light within reach. Will monitor. 11/22/2021 1216 by Xena Gross RN  Outcome: Ongoing  Note: Patient will remain free from physical injury. Safety precautions are in place.  Will continue to monitor and assess.       Problem: Nutrition  Goal: Optimal nutrition therapy  11/22/2021 2008 by Román Arreola RN  Outcome: Ongoing  Note: Will continue to monitor I&O.    11/22/2021 1239 by Thad Orozco RD, LD  Outcome: Ongoing   Electronically signed by Román Arreola RN on 11/22/2021 at 8:10 PM

## 2021-11-23 NOTE — PROGRESS NOTES
Late entry due to patient care   1450- patient transferred from PCU to ICU pt alert and oriented. Pt being closely monitored for hypoglycemia. Report received from Magdaleno Andres.  Will monitor patient

## 2021-11-23 NOTE — CARE COORDINATION
INITIAL CASE MANAGEMENT ASSESSMENT     Reviewed chart, met with patient to assess possible discharge needs. Explained Case Management role/services.      Living Situation: Patient lives with her  and 2 daughters in an apartment with 6 steps down to enter.     ADLs: Independent     DME: Insulin Pump and Dexcom Continuous Glucose Monitor     PT/OT Recs: None     Active Services: None     Transportation: Non-/ will transport     Medications: [facility] on [address]/No barriers     PCP: Fidel Rachel MD      HD/PD: N/A     PLAN/COMMENTS: Plan is to return to home with family. Pt stated she has been having trouble with transportation to appointments. Advised she can contact [facility] for transportation. [phone]. Will add the [facility] transportation number to the discharge instructions. Pt has an endocrinology appointment arranged by Rachel Lo, the Diabetes educator on Nov. 30. Encouraged the pt to call the transportation line ASAP to make the reservation.     Electronically signed by Henrry Jacobo RN on 11/23/2021 at 9:36 AM  Case Management  [phone]

## 2021-11-23 NOTE — PROGRESS NOTES
Patients BS was 58, oral juice and glucose not affective. NP Bebeto Lent ok to give IV D50 once again and to start on D5% at 100 ml/hr. Patients BS up to 105 15 minutes after the dextrose was given. One hour later RN rechecked patient and the BS had dropped to 84. Patient was once again given snacks (sprite, turkey sandwich, peanut butter and crackers) rechecked a half hour later and it had dropped more to 70. At this time D50% was given again since oral was not working, patient came up to 78. New orders placed by Bebeto Lopest NP to increase patients D5% fluids to 200 ml/hr and check patients BS again at 0245 to figure out placement of patient. BS dropped once again from 79-->72 with the fluids running. New orders placed for a transfer to PCU.   Electronically signed by Dana Chavez RN on 11/23/2021 at 3:18 AM

## 2021-11-23 NOTE — PROGRESS NOTES
Patients BS has been running very low, was 81 at 2000 and now 66 at 2130. Patient is tolerating PO and has been eating and drinking to get BS up, without success. Message sent to Jam Chakraborty NP. BS up to 71 after juice, ok to give dextrose and start patient on IV d5 until 2am and recheck at that time. Will continue to monitor.      Electronically signed by Kavita Caldwell RN on 11/22/2021 at 9:24 PM

## 2021-11-23 NOTE — PROGRESS NOTES
Reassessment of pt completed. Pt has intermittent symptoms of hypoglycemia and continues to be hypoglycemic for this RN. This RN came onto shift at 0700 and patient has received 7 half amps of dextrose 50% IVP and has been on continuous dextrose all shift. This RN notified Dr. Luzmaria Freeman of these findings and continuous dextrose drip was increased from 5% to 10%, see mar. Pt has been eating all meals and snacking in between meals on edinson crackers, regular soda and peanut butter. This RN notified Dr. Luzmaria Freeman that patient is not able to maintain a blood glucose reading. Last blood glucose was 48. Pt given half an amp of dextrose 50% IVP. New orders to transfer to ICU.      Electronically signed by Jr Banegas RN on 11/23/2021 at 1:42 PM

## 2021-11-23 NOTE — PROGRESS NOTES
Patient is resting in bed. Alert and oriented X4. Patient denies pain at this time. IV in place. Assessment complete. All patient needs are met at this time. Fall precautions are in place. Call light is in reach. Will continue to monitor.   Electronically signed by Melly Garrido RN on 11/22/2021 at 8:11 PM

## 2021-11-23 NOTE — PROGRESS NOTES
Hospitalist Progress Note  11/23/2021 9:00 AM    PCP: No primary care provider on file. [phone]     Date of Admission: 11/20/2021                                                                                                                     HOSPITAL COURSE    Patient demographics:  The patient  Cl Ray is a 27 y.o. female     Significant past medical history:   Patient Active Problem List   Diagnosis    Seizure disorder ([facility]    Type 1 diabetes mellitus with complication ([place] [address].)    Anxiety and depression    ROME (generalized anxiety disorder)    Anorexia nervosa    Diabetic peripheral neuropathy ([facility]    Gastroparesis    Weight loss, unintentional    Tachycardia    Elevated transaminase level    Bandemia    DKA, type 1, not at goal [facility])         Presenting symptoms:  Altered mental status, seizure    Diagnostic workup:      CONSULTS DURING ADMISSION :   IP CONSULT TO NEUROLOGY      Patient was diagnosed with:        Treatment while inpatient:  Pt presented to [facility] with new acute onset seizure while at home                                                                                       ----------------------------------------------------------      SUBJECTIVE COMPLAINTS- follow up for seizure     Diet: ADULT DIET; Regular; 4 carb choices (60 gm/meal)      OBJECTIVE:   Patient Active Problem List   Diagnosis    Seizure disorder ([facility]    Type 1 diabetes mellitus with complication (HCC)    Anxiety and depression    ROME (generalized anxiety disorder)    Anorexia nervosa    Diabetic peripheral neuropathy (HCC)    Gastroparesis    Weight loss, unintentional    Tachycardia    Elevated transaminase level    Bandemia    DKA, type 1, not at goal [facility])       Allergies  Patient has no known allergies.     Medications    Scheduled Meds:   insulin glargine  12 Units SubCUTAneous Daily    [Held by provider] insulin lispro  3 Units SubCUTAneous TID     insulin lispro 0-6 Units SubCUTAneous TID     insulin lispro  0-3 Units SubCUTAneous Nightly    gabapentin  300 mg Oral BID    clonazePAM  2 mg Oral BID    famotidine  20 mg Oral BID    levETIRAcetam  1,000 mg Oral BID    enoxaparin  40 mg SubCUTAneous Daily    gabapentin  900 mg Oral Nightly     Continuous Infusions:   dextrose 200 mL/hr at 11/23/21 0156     PRN Meds:  glucose, dextrose, glucagon (rDNA), LORazepam, ondansetron    Vitals   Vitals /wt   Patient Vitals for the past 8 hrs:   BP Temp Temp src Pulse Resp SpO2 Weight   11/23/21 0858 -- -- -- -- -- 96 % --   11/23/21 0742 98/67 98.2 °F (36.8 °C) Axillary 81 17 95 % --   11/23/21 0338 117/79 98.2 °F (36.8 °C) Oral 84 16 97 % 113 lb 1.5 oz (51.3 kg)        72HR INTAKE/OUTPUT:      Intake/Output Summary (Last 24 hours) at 11/23/2021 0900  Last data filed at 11/22/2021 2243  Gross per 24 hour   Intake 840 ml   Output --   Net 840 ml       Exam:    Gen:   Alert and oriented ×3   Eyes: PERRL. No sclera icterus. No conjunctival injection. ENT: No discharge. Pharynx clear. External appearance of ears and nose normal.  Neck: Trachea midline. No obvious mass. Resp: No accessory muscle use. No crackles. No wheezes. No rhonchi. CV: Regular rate. Regular rhythm. No murmur or rub. No edema. GI: Non-tender. Non-distended. No hernia. Skin: Warm, dry, normal texture and turgor. Lymph: No cervical LAD. No supraclavicular LAD. M/S: / Ext. No cyanosis. No clubbing. No joint deformity. Neuro: CN 2-12 are intact,  no neurologic deficits noted. PT/INR: No results for input(s): PROTIME, INR in the last 72 hours. APTT: No results for input(s): APTT in the last 72 hours.     CBC:   Recent Labs     11/21/21  0011 11/21/21  0504 11/22/21  0446 11/23/21  0419   WBC 4.6 6.5 6.3 6.8   HGB 11.4* 10.0* 11.2* 10.0*   HCT 38.0 31.9* 34.9* 30.8*   MCV 93.7 90.4 88.9 86.9    229 200 221       BMP:   Recent Labs     11/21/21  1128 11/21/21  1357 11/22/21  0448 11/23/21  0419    132* 140 138   K 4.0 3.3* 3.8 3.7    99 104 103   CO2 24 17* 20* 22   PHOS 2.8 2.4* 3.6 3.7   BUN 6* 6* 3* 6*   CREATININE <0.5* <0.5* 0.5* <0.5*       LIVER PROFILE:   Recent Labs     11/21/21  0011   ALKPHOS 116   *   ALT 82*   BILIDIR <0.2   BILITOT 0.5     No results for input(s): AMYLASE in the last 72 hours. No results for input(s): LIPASE in the last 72 hours. UA:  Recent Labs     11/21/21  0011   WBCUA 14*   RBCUA 1       TROPONIN: No results for input(s): CKTOTAL, TROPONINI in the last 72 hours. Lab Results   Component Value Date/Time    URRFLXCULT Yes 11/21/2021 12:11 AM       No results for input(s): TSHREFLEX in the last 72 hours. No components found for: [id number]  POC GLUCOSE:    Recent Labs     11/23/21  0620 11/23/21  0703 11/23/21  0721 11/23/21  0818 11/23/21  0842   POCGLU 66* 67* 100* 62* 67*     No results for input(s): LABA1C in the last 72 hours. Lab Results   Component Value Date    LABA1C 11.4 10/27/2021         ASSESSMENT AND PLAN    DKA E13.10  And DKA improved      Patient is having episodes of hypoglycemia  Continue on D10 IV fluids  Monitor patient in ICU    Seizure disorder  Continue on Keppra Klonopin and gabapentin  Neurology consult is appreciated  Patient needs to establish compliance  Patient will follow up neurology at [facility]             Code Status: Full Code        Dispo -will DC patient once blood sugar is stable        The patient and / or the family were informed of the results of any tests, a time was given to answer questions, a plan was proposed and they agreed with plan. Brannon Traore MD    This note was transcribed using [id number] [employer]. Please disregard any translational errors.

## 2021-11-23 NOTE — PROGRESS NOTES
4 Eyes Skin Assessment     NAME:  Oral Major  YOB: 1990  MEDICAL RECORD NUMBER:  [id number]    The patient is being assess for  Transfer to New Unit    I agree that 2 RN's have performed a thorough Head to Toe Skin Assessment on the patient. ALL assessment sites listed below have been assessed. Areas assessed by both nurses:    Head, Face, Ears, Shoulders, Back, Chest, Arms, Elbows, Hands, Sacrum. Buttock, Coccyx, Ischium and Legs. Feet and Heels        Does the Patient have a Wound?  No noted wound(s)       Luis Daniel Prevention initiated:  NA   Wound Care Orders initiated:  NA    Pressure Injury (Stage 3,4, Unstageable, DTI, NWPT, and Complex wounds) if present place consult order under [de-identified] NA    New and Established Ostomies if present place consult order under : NA      Nurse 1 eSignature: Electronically signed by Ligia Edward RN on 11/23/21 at 4:28 AM EST    **SHARE this note so that the co-signing nurse is able to place an eSignature**    Nurse 2 eSignature: Electronically signed by Jose Chilel RN on 11/23/21 at 4:31 AM EST

## 2021-11-23 NOTE — PROGRESS NOTES
Spoke with Eder Gill patients mother [phone] to give updates on patient . She wants to know if she is able to come and stay with patient and I let her know that she is welcome to stay with her. She is going to make arrangements and come from [place] to be with her daughter.

## 2021-11-24 LAB
ANION GAP SERPL CALCULATED.3IONS-SCNC: 10 MMOL/L (ref 3–16)
BASOPHILS ABSOLUTE: 0.1 K/UL (ref 0–0.2)
BASOPHILS RELATIVE PERCENT: 1.1 %
BUN BLDV-MCNC: 4 MG/DL (ref 7–20)
CALCIUM SERPL-MCNC: 8.3 MG/DL (ref 8.3–10.6)
CHLORIDE BLD-SCNC: 106 MMOL/L (ref 99–110)
CO2: 25 MMOL/L (ref 21–32)
CREAT SERPL-MCNC: <0.5 MG/DL (ref 0.6–1.1)
EOSINOPHILS ABSOLUTE: 0.2 K/UL (ref 0–0.6)
EOSINOPHILS RELATIVE PERCENT: 3.8 %
GFR AFRICAN AMERICAN: >60
GFR NON-AFRICAN AMERICAN: >60
GLUCOSE BLD-MCNC: 102 MG/DL (ref 70–99)
GLUCOSE BLD-MCNC: 124 MG/DL (ref 70–99)
GLUCOSE BLD-MCNC: 125 MG/DL (ref 70–99)
GLUCOSE BLD-MCNC: 133 MG/DL (ref 70–99)
GLUCOSE BLD-MCNC: 137 MG/DL (ref 70–99)
GLUCOSE BLD-MCNC: 151 MG/DL (ref 70–99)
GLUCOSE BLD-MCNC: 153 MG/DL (ref 70–99)
GLUCOSE BLD-MCNC: 194 MG/DL (ref 70–99)
GLUCOSE BLD-MCNC: 40 MG/DL (ref 70–99)
GLUCOSE BLD-MCNC: 46 MG/DL (ref 70–99)
GLUCOSE BLD-MCNC: 46 MG/DL (ref 70–99)
GLUCOSE BLD-MCNC: 50 MG/DL (ref 70–99)
GLUCOSE BLD-MCNC: 52 MG/DL (ref 70–99)
GLUCOSE BLD-MCNC: 55 MG/DL (ref 70–99)
GLUCOSE BLD-MCNC: 57 MG/DL (ref 70–99)
GLUCOSE BLD-MCNC: 58 MG/DL (ref 70–99)
GLUCOSE BLD-MCNC: 70 MG/DL (ref 70–99)
GLUCOSE BLD-MCNC: 73 MG/DL (ref 70–99)
GLUCOSE BLD-MCNC: 75 MG/DL (ref 70–99)
GLUCOSE BLD-MCNC: 76 MG/DL (ref 70–99)
GLUCOSE BLD-MCNC: 76 MG/DL (ref 70–99)
GLUCOSE BLD-MCNC: 80 MG/DL (ref 70–99)
GLUCOSE BLD-MCNC: 84 MG/DL (ref 70–99)
GLUCOSE BLD-MCNC: 85 MG/DL (ref 70–99)
GLUCOSE BLD-MCNC: 98 MG/DL (ref 70–99)
HCT VFR BLD CALC: 30.6 % (ref 36–48)
HEMOGLOBIN: 9.9 G/DL (ref 12–16)
LYMPHOCYTES ABSOLUTE: 2.8 K/UL (ref 1–5.1)
LYMPHOCYTES RELATIVE PERCENT: 48.6 %
MAGNESIUM: 1.8 MG/DL (ref 1.8–2.4)
MCH RBC QN AUTO: 28.2 PG (ref 26–34)
MCHC RBC AUTO-ENTMCNC: 32.4 G/DL (ref 31–36)
MCV RBC AUTO: 86.9 FL (ref 80–100)
MONOCYTES ABSOLUTE: 0.6 K/UL (ref 0–1.3)
MONOCYTES RELATIVE PERCENT: 10.3 %
NEUTROPHILS ABSOLUTE: 2.1 K/UL (ref 1.7–7.7)
NEUTROPHILS RELATIVE PERCENT: 36.2 %
PDW BLD-RTO: 14.1 % (ref 12.4–15.4)
PERFORMED ON: ABNORMAL
PERFORMED ON: NORMAL
PHOSPHORUS: 3.4 MG/DL (ref 2.5–4.9)
PLATELET # BLD: 249 K/UL (ref 135–450)
PMV BLD AUTO: 9.5 FL (ref 5–10.5)
POTASSIUM SERPL-SCNC: 3.6 MMOL/L (ref 3.5–5.1)
RBC # BLD: 3.52 M/UL (ref 4–5.2)
SODIUM BLD-SCNC: 141 MMOL/L (ref 136–145)
WBC # BLD: 5.8 K/UL (ref 4–11)

## 2021-11-24 PROCEDURE — 94761 N-INVAS EAR/PLS OXIMETRY MLT: CPT

## 2021-11-24 PROCEDURE — 6370000000 HC RX 637 (ALT 250 FOR IP): Performed by: STUDENT IN AN ORGANIZED HEALTH CARE EDUCATION/TRAINING PROGRAM

## 2021-11-24 PROCEDURE — 84100 ASSAY OF PHOSPHORUS: CPT

## 2021-11-24 PROCEDURE — 80048 BASIC METABOLIC PNL TOTAL CA: CPT

## 2021-11-24 PROCEDURE — 6360000002 HC RX W HCPCS: Performed by: STUDENT IN AN ORGANIZED HEALTH CARE EDUCATION/TRAINING PROGRAM

## 2021-11-24 PROCEDURE — 2580000003 HC RX 258: Performed by: INTERNAL MEDICINE

## 2021-11-24 PROCEDURE — 2500000003 HC RX 250 WO HCPCS: Performed by: NURSE PRACTITIONER

## 2021-11-24 PROCEDURE — 2100000000 HC CCU R&B

## 2021-11-24 PROCEDURE — 6370000000 HC RX 637 (ALT 250 FOR IP): Performed by: HOSPITALIST

## 2021-11-24 PROCEDURE — 36415 COLL VENOUS BLD VENIPUNCTURE: CPT

## 2021-11-24 PROCEDURE — 85025 COMPLETE CBC W/AUTO DIFF WBC: CPT

## 2021-11-24 PROCEDURE — 83735 ASSAY OF MAGNESIUM: CPT

## 2021-11-24 RX ORDER — BLOOD-GLUCOSE,RECEIVER,CONT
EACH MISCELLANEOUS
Qty: 1 EACH | Refills: 5 | Status: SHIPPED | OUTPATIENT
Start: 2021-11-24 | End: 2021-11-26 | Stop reason: HOSPADM

## 2021-11-24 RX ORDER — INSULIN PUMP CONTROLLER
EACH MISCELLANEOUS
Qty: 1 KIT | Refills: 0 | Status: SHIPPED | OUTPATIENT
Start: 2021-11-24 | End: 2022-01-25 | Stop reason: ALTCHOICE

## 2021-11-24 RX ORDER — BLOOD-GLUCOSE SENSOR
EACH MISCELLANEOUS
Qty: 2 EACH | Refills: 5 | Status: SHIPPED | OUTPATIENT
Start: 2021-11-24 | End: 2021-11-26 | Stop reason: HOSPADM

## 2021-11-24 RX ORDER — BLOOD-GLUCOSE TRANSMITTER
EACH MISCELLANEOUS
Qty: 1 EACH | Refills: 5 | Status: SHIPPED | OUTPATIENT
Start: 2021-11-24 | End: 2021-11-26 | Stop reason: HOSPADM

## 2021-11-24 RX ORDER — INSULIN PUMP CONTROLLER
EACH MISCELLANEOUS
Qty: 15 EACH | Refills: 0 | Status: SHIPPED | OUTPATIENT
Start: 2021-11-24 | End: 2022-01-25 | Stop reason: ALTCHOICE

## 2021-11-24 RX ADMIN — DEXTROSE MONOHYDRATE 12.5 G: 25 INJECTION, SOLUTION INTRAVENOUS at 19:52

## 2021-11-24 RX ADMIN — DEXTROSE MONOHYDRATE 12.5 G: 25 INJECTION, SOLUTION INTRAVENOUS at 04:49

## 2021-11-24 RX ADMIN — GABAPENTIN 300 MG: 300 CAPSULE ORAL at 13:54

## 2021-11-24 RX ADMIN — LEVETIRACETAM 1000 MG: 500 TABLET, FILM COATED ORAL at 20:04

## 2021-11-24 RX ADMIN — INSULIN LISPRO 3 UNITS: 100 INJECTION, SOLUTION INTRAVENOUS; SUBCUTANEOUS at 17:28

## 2021-11-24 RX ADMIN — AMOXICILLIN AND CLAVULANATE POTASSIUM 1 TABLET: 500; 125 TABLET, FILM COATED ORAL at 21:04

## 2021-11-24 RX ADMIN — CLONAZEPAM 2 MG: 0.5 TABLET ORAL at 08:46

## 2021-11-24 RX ADMIN — AMOXICILLIN AND CLAVULANATE POTASSIUM 1 TABLET: 500; 125 TABLET, FILM COATED ORAL at 13:48

## 2021-11-24 RX ADMIN — ENOXAPARIN SODIUM 40 MG: 100 INJECTION SUBCUTANEOUS at 08:47

## 2021-11-24 RX ADMIN — AMOXICILLIN AND CLAVULANATE POTASSIUM 1 TABLET: 500; 125 TABLET, FILM COATED ORAL at 10:58

## 2021-11-24 RX ADMIN — INSULIN LISPRO 1 UNITS: 100 INJECTION, SOLUTION INTRAVENOUS; SUBCUTANEOUS at 17:26

## 2021-11-24 RX ADMIN — DEXTROSE MONOHYDRATE 12.5 G: 25 INJECTION, SOLUTION INTRAVENOUS at 18:58

## 2021-11-24 RX ADMIN — LEVETIRACETAM 1000 MG: 500 TABLET, FILM COATED ORAL at 08:47

## 2021-11-24 RX ADMIN — FAMOTIDINE 20 MG: 20 TABLET ORAL at 20:05

## 2021-11-24 RX ADMIN — GABAPENTIN 900 MG: 300 CAPSULE ORAL at 20:04

## 2021-11-24 RX ADMIN — FAMOTIDINE 20 MG: 20 TABLET ORAL at 08:46

## 2021-11-24 RX ADMIN — DEXTROSE MONOHYDRATE 12.5 G: 25 INJECTION, SOLUTION INTRAVENOUS at 13:30

## 2021-11-24 RX ADMIN — CLONAZEPAM 2 MG: 0.5 TABLET ORAL at 20:04

## 2021-11-24 RX ADMIN — GABAPENTIN 300 MG: 300 CAPSULE ORAL at 08:47

## 2021-11-24 RX ADMIN — DEXTROSE MONOHYDRATE 12.5 G: 25 INJECTION, SOLUTION INTRAVENOUS at 21:09

## 2021-11-24 RX ADMIN — DEXTROSE MONOHYDRATE 12.5 G: 25 INJECTION, SOLUTION INTRAVENOUS at 08:35

## 2021-11-24 RX ADMIN — DEXTROSE MONOHYDRATE: 100 INJECTION, SOLUTION INTRAVENOUS at 20:47

## 2021-11-24 ASSESSMENT — PAIN SCALES - GENERAL
PAINLEVEL_OUTOF10: 0

## 2021-11-24 NOTE — PROGRESS NOTES
@0700 Patient resting in bed. Blood glucose 124. @1786 Assessed patient. IV is infiltrated and patient verbalized feeling dizzy. Blood glucose 57. PIV x 2 started. Patient given apple juice and 1/2 amp D50. Patient ordered breakfast. Recheck blood glucose 125.     @1030 Patient's breakfast arrived but incorrect. Blood glucose 85. Patient ate crackers and reordered food which did not come until after noon. @1333 Dr. Charlton Oxford at bedside. Request to hold D10 around 1500 and assess blood glucose. MD says there is potential for patient to go home this evening if blood glucose stabilizes. Long-acting insulin was discontinued. @8407 Patient felt weak. Blood glucose 50. Given apple juice and 1/2 amp D50. Recheck 137.     [id number] Patient off monitors to shower. IVs wrapped. Mother in room assisting. D10 infusing. @1530 Blood glucose 70. Patient given apple juice and ordered dinner. @1820 Blood glucose 52 after eating 80+ carbohydrates for dinner. Patient given edinson crackers and peanut butter.

## 2021-11-24 NOTE — PROGRESS NOTES
Hospitalist Progress Note  11/24/2021 9:24 AM    PCP: No primary care provider on file. [phone]     Date of Admission: 11/20/2021                                                                                                                     HOSPITAL COURSE    Patient demographics:  The patient  Amish Nick is a 27 y.o. female     Significant past medical history:   Patient Active Problem List   Diagnosis    Seizure disorder ([place] [address].)    Type 1 diabetes mellitus with complication ([place] [address].)    Anxiety and depression    ROME (generalized anxiety disorder)    Anorexia nervosa    Diabetic peripheral neuropathy ([place] [address].)    Gastroparesis    Weight loss, unintentional    Tachycardia    Elevated transaminase level    Bandemia    DKA, type 1, not at goal [facility])         Presenting symptoms:  Altered mental status, seizure    Diagnostic workup:      CONSULTS DURING ADMISSION :   IP CONSULT TO NEUROLOGY  IP CONSULT TO DIABETES EDUCATOR      Patient was diagnosed with:        Treatment while inpatient:  Pt presented to [facility] with new acute onset seizure while at home                                                                                       ----------------------------------------------------------      SUBJECTIVE COMPLAINTS- follow up for seizure     Diet: ADULT DIET; Regular; 4 carb choices (60 gm/meal)      OBJECTIVE:   Patient Active Problem List   Diagnosis    Seizure disorder ([facility]    Type 1 diabetes mellitus with complication (HCC)    Anxiety and depression    ROME (generalized anxiety disorder)    Anorexia nervosa    Diabetic peripheral neuropathy (HCC)    Gastroparesis    Weight loss, unintentional    Tachycardia    Elevated transaminase level    Bandemia    DKA, type 1, not at goal [facility])       Allergies  Patient has no known allergies.     Medications    Scheduled Meds:   amoxicillin-clavulanate  1 tablet Oral 3 times per day    insulin lispro  3 Units SubCUTAneous TID     insulin lispro  0-6 Units SubCUTAneous TID     insulin lispro  0-3 Units SubCUTAneous Nightly    gabapentin  300 mg Oral BID    clonazePAM  2 mg Oral BID    famotidine  20 mg Oral BID    levETIRAcetam  1,000 mg Oral BID    enoxaparin  40 mg SubCUTAneous Daily    gabapentin  900 mg Oral Nightly     Continuous Infusions:   dextrose 150 mL/hr at 11/24/21 0030     PRN Meds:  glucose, dextrose, glucagon (rDNA), LORazepam, ondansetron    Vitals   Vitals /wt   Patient Vitals for the past 8 hrs:   BP Pulse Resp SpO2   11/24/21 0800 (!) 100/57 88 16 --   11/24/21 0700 118/79 84 16 --   11/24/21 0600 118/80 83 15 100 %   11/24/21 0500 115/78 117 14 100 %   11/24/21 0400 104/66 112 26 98 %   11/24/21 0300 117/86 95 21 99 %   11/24/21 0200 107/70 95 16 100 %        72HR INTAKE/OUTPUT:      Intake/Output Summary (Last 24 hours) at 11/24/2021 0924  Last data filed at 11/23/2021 1905  Gross per 24 hour   Intake 5127 ml   Output 700 ml   Net 4427 ml       Exam:    Gen:   Alert and oriented ×3   Eyes: PERRL. No sclera icterus. No conjunctival injection. ENT: No discharge. Pharynx clear. External appearance of ears and nose normal.  Neck: Trachea midline. No obvious mass. Resp: No accessory muscle use. No crackles. No wheezes. No rhonchi. CV: Regular rate. Regular rhythm. No murmur or rub. No edema. GI: Non-tender. Non-distended. No hernia. Skin: Warm, dry, normal texture and turgor. Lymph: No cervical LAD. No supraclavicular LAD. M/S: / Ext. No cyanosis. No clubbing. No joint deformity. Neuro: CN 2-12 are intact,  no neurologic deficits noted. PT/INR: No results for input(s): PROTIME, INR in the last 72 hours. APTT: No results for input(s): APTT in the last 72 hours.     CBC:   Recent Labs     11/22/21  0446 11/23/21  0419 11/24/21  0446   WBC 6.3 6.8 5.8   HGB 11.2* 10.0* 9.9*   HCT 34.9* 30.8* 30.6*   MCV 88.9 86.9 86.9    221 249       BMP:   Recent Labs     11/21/21  1146 11/22/21  0446 11/23/21  0419 11/24/21  0446   * 140 138 141   K 3.3* 3.8 3.7 3.6   CL 99 104 103 106   CO2 17* 20* 22 25   PHOS 2.4* 3.6 3.7 3.4   BUN 6* 3* 6* 4*   CREATININE <0.5* 0.5* <0.5* <0.5*       LIVER PROFILE:   No results for input(s): ALKPHOS, AST, ALT, ALB, BILIDIR, BILITOT, ALKPHOS in the last 72 hours. No results for input(s): AMYLASE in the last 72 hours. No results for input(s): LIPASE in the last 72 hours. UA:  No results for input(s): NITRITE, LABCAST, WBCUA, RBCUA, MUCUS in the last 72 hours. TROPONIN: No results for input(s): Anastasiia Kras in the last 72 hours. Lab Results   Component Value Date/Time    URRFLXCULT Yes 11/21/2021 12:11 AM       No results for input(s): TSHREFLEX in the last 72 hours. No components found for: [id number]  POC GLUCOSE:    Recent Labs     11/24/21  0508 11/24/21  0548 11/24/21  0702 11/24/21  0805 11/24/21  0838   POCGLU 84 98 124* 57* 125*     No results for input(s): LABA1C in the last 72 hours. Lab Results   Component Value Date    LABA1C 11.4 10/27/2021         ASSESSMENT AND PLAN    DKA E13.10  DKA improved      Patient is having episodes of hypoglycemia  Continue on D10 IV fluids at 150 cc/h  Monitor patient in ICU    Seizure disorder  Continue on Keppra Klonopin and gabapentin  Neurology consult is appreciated  Patient needs to establish compliance  Patient will follow up neurology at [facility]             Code Status: Full Code        Dispo -will DC patient once blood sugar is stable        The patient and / or the family were informed of the results of any tests, a time was given to answer questions, a plan was proposed and they agreed with plan. Reji Kwan MD    This note was transcribed using [id number] [address] [facility]. Please disregard any translational errors.

## 2021-11-24 NOTE — PLAN OF CARE
Problem: Falls - Risk of:  Goal: Will remain free from falls  Description: Will remain free from falls  Outcome: Ongoing  Goal: Absence of physical injury  Description: Absence of physical injury  Outcome: Ongoing     Problem: Discharge Planning:  Goal: Discharged to appropriate level of care  Description: Discharged to appropriate level of care  Outcome: Ongoing     Problem: Fluid Volume - Imbalance:  Goal: Will remain free of signs and symptoms of dehydration  Description: Will remain free of signs and symptoms of dehydration  Outcome: Ongoing  Goal: Absence of imbalanced fluid volume signs and symptoms  Description: Absence of imbalanced fluid volume signs and symptoms  Outcome: Ongoing     Problem: Serum Glucose Level - Abnormal:  Goal: Ability to maintain appropriate glucose levels will improve  Description: Ability to maintain appropriate glucose levels will improve  Outcome: Ongoing     Problem: Injury - Acid Base Imbalance:  Goal: Acid-base balance  Description: Acid-base balance  Outcome: Ongoing     Problem: Coping:  Goal: Ability to cope will improve  Description: Ability to cope will improve  Outcome: Ongoing  Goal: Ability to identify appropriate support needs will improve  Description: Ability to identify appropriate support needs will improve  Outcome: Ongoing     Problem: Health Behavior:  Goal: Ability to manage health-related needs will improve  Description: Ability to manage health-related needs will improve  Outcome: Ongoing     Problem: Physical Regulation:  Goal: Signs of adequate cerebral perfusion will increase  Description: Signs of adequate cerebral perfusion will increase  Outcome: Ongoing  Goal: Ability to maintain a stable neurologic state will improve  Description: Ability to maintain a stable neurologic state will improve  Outcome: Ongoing     Problem: Safety:  Goal: Ability to remain free from injury will improve  Description: Ability to remain free from injury will improve  Outcome: Ongoing     Problem: Nutrition  Goal: Optimal nutrition therapy  Outcome: Ongoing

## 2021-11-25 LAB
ANION GAP SERPL CALCULATED.3IONS-SCNC: 10 MMOL/L (ref 3–16)
BASOPHILS ABSOLUTE: 0 K/UL (ref 0–0.2)
BASOPHILS RELATIVE PERCENT: 0.8 %
BUN BLDV-MCNC: 7 MG/DL (ref 7–20)
CALCIUM SERPL-MCNC: 8.3 MG/DL (ref 8.3–10.6)
CHLORIDE BLD-SCNC: 107 MMOL/L (ref 99–110)
CO2: 23 MMOL/L (ref 21–32)
CREAT SERPL-MCNC: <0.5 MG/DL (ref 0.6–1.1)
EOSINOPHILS ABSOLUTE: 0.2 K/UL (ref 0–0.6)
EOSINOPHILS RELATIVE PERCENT: 3.7 %
GFR AFRICAN AMERICAN: >60
GFR NON-AFRICAN AMERICAN: >60
GLUCOSE BLD-MCNC: 120 MG/DL (ref 70–99)
GLUCOSE BLD-MCNC: 122 MG/DL (ref 70–99)
GLUCOSE BLD-MCNC: 163 MG/DL (ref 70–99)
GLUCOSE BLD-MCNC: 168 MG/DL (ref 70–99)
GLUCOSE BLD-MCNC: 197 MG/DL (ref 70–99)
GLUCOSE BLD-MCNC: 329 MG/DL (ref 70–99)
GLUCOSE BLD-MCNC: 338 MG/DL (ref 70–99)
GLUCOSE BLD-MCNC: 38 MG/DL (ref 70–99)
GLUCOSE BLD-MCNC: 382 MG/DL (ref 70–99)
GLUCOSE BLD-MCNC: 46 MG/DL (ref 70–99)
GLUCOSE BLD-MCNC: 48 MG/DL (ref 70–99)
GLUCOSE BLD-MCNC: 62 MG/DL (ref 70–99)
GLUCOSE BLD-MCNC: 78 MG/DL (ref 70–99)
GLUCOSE BLD-MCNC: 83 MG/DL (ref 70–99)
GLUCOSE BLD-MCNC: 86 MG/DL (ref 70–99)
HCT VFR BLD CALC: 29.7 % (ref 36–48)
HEMOGLOBIN: 9.5 G/DL (ref 12–16)
LYMPHOCYTES ABSOLUTE: 2.2 K/UL (ref 1–5.1)
LYMPHOCYTES RELATIVE PERCENT: 36.8 %
MAGNESIUM: 1.7 MG/DL (ref 1.8–2.4)
MCH RBC QN AUTO: 28.3 PG (ref 26–34)
MCHC RBC AUTO-ENTMCNC: 31.9 G/DL (ref 31–36)
MCV RBC AUTO: 88.6 FL (ref 80–100)
MONOCYTES ABSOLUTE: 0.7 K/UL (ref 0–1.3)
MONOCYTES RELATIVE PERCENT: 12.2 %
NEUTROPHILS ABSOLUTE: 2.8 K/UL (ref 1.7–7.7)
NEUTROPHILS RELATIVE PERCENT: 46.5 %
PDW BLD-RTO: 14.7 % (ref 12.4–15.4)
PERFORMED ON: ABNORMAL
PERFORMED ON: NORMAL
PHOSPHORUS: 3.5 MG/DL (ref 2.5–4.9)
PLATELET # BLD: 191 K/UL (ref 135–450)
PMV BLD AUTO: 9.8 FL (ref 5–10.5)
POTASSIUM SERPL-SCNC: 3.6 MMOL/L (ref 3.5–5.1)
RBC # BLD: 3.35 M/UL (ref 4–5.2)
SODIUM BLD-SCNC: 140 MMOL/L (ref 136–145)
WBC # BLD: 6.1 K/UL (ref 4–11)

## 2021-11-25 PROCEDURE — 6370000000 HC RX 637 (ALT 250 FOR IP): Performed by: STUDENT IN AN ORGANIZED HEALTH CARE EDUCATION/TRAINING PROGRAM

## 2021-11-25 PROCEDURE — 85025 COMPLETE CBC W/AUTO DIFF WBC: CPT

## 2021-11-25 PROCEDURE — 6360000002 HC RX W HCPCS: Performed by: STUDENT IN AN ORGANIZED HEALTH CARE EDUCATION/TRAINING PROGRAM

## 2021-11-25 PROCEDURE — 6370000000 HC RX 637 (ALT 250 FOR IP)

## 2021-11-25 PROCEDURE — 80048 BASIC METABOLIC PNL TOTAL CA: CPT

## 2021-11-25 PROCEDURE — 6360000002 HC RX W HCPCS: Performed by: HOSPITALIST

## 2021-11-25 PROCEDURE — 6370000000 HC RX 637 (ALT 250 FOR IP): Performed by: HOSPITALIST

## 2021-11-25 PROCEDURE — 1200000000 HC SEMI PRIVATE

## 2021-11-25 PROCEDURE — 2500000003 HC RX 250 WO HCPCS: Performed by: NURSE PRACTITIONER

## 2021-11-25 PROCEDURE — 94761 N-INVAS EAR/PLS OXIMETRY MLT: CPT

## 2021-11-25 PROCEDURE — 2580000003 HC RX 258: Performed by: INTERNAL MEDICINE

## 2021-11-25 PROCEDURE — 36415 COLL VENOUS BLD VENIPUNCTURE: CPT

## 2021-11-25 PROCEDURE — 84100 ASSAY OF PHOSPHORUS: CPT

## 2021-11-25 PROCEDURE — 83735 ASSAY OF MAGNESIUM: CPT

## 2021-11-25 RX ORDER — IBUPROFEN 400 MG/1
400 TABLET ORAL EVERY 6 HOURS PRN
Status: DISCONTINUED | OUTPATIENT
Start: 2021-11-25 | End: 2021-11-26 | Stop reason: HOSPADM

## 2021-11-25 RX ORDER — ACETAMINOPHEN 325 MG/1
TABLET ORAL
Status: COMPLETED
Start: 2021-11-25 | End: 2021-11-25

## 2021-11-25 RX ORDER — ACETAMINOPHEN 325 MG/1
650 TABLET ORAL EVERY 4 HOURS PRN
Status: DISCONTINUED | OUTPATIENT
Start: 2021-11-25 | End: 2021-11-26 | Stop reason: HOSPADM

## 2021-11-25 RX ADMIN — IBUPROFEN 400 MG: 400 TABLET, FILM COATED ORAL at 18:11

## 2021-11-25 RX ADMIN — DEXTROSE MONOHYDRATE 12.5 G: 25 INJECTION, SOLUTION INTRAVENOUS at 20:52

## 2021-11-25 RX ADMIN — AMOXICILLIN AND CLAVULANATE POTASSIUM 1 TABLET: 500; 125 TABLET, FILM COATED ORAL at 21:53

## 2021-11-25 RX ADMIN — GABAPENTIN 900 MG: 300 CAPSULE ORAL at 21:14

## 2021-11-25 RX ADMIN — DEXTROSE MONOHYDRATE: 100 INJECTION, SOLUTION INTRAVENOUS at 21:01

## 2021-11-25 RX ADMIN — INSULIN LISPRO 3 UNITS: 100 INJECTION, SOLUTION INTRAVENOUS; SUBCUTANEOUS at 18:30

## 2021-11-25 RX ADMIN — FAMOTIDINE 20 MG: 20 TABLET ORAL at 21:14

## 2021-11-25 RX ADMIN — ACETAMINOPHEN 650 MG: 325 TABLET ORAL at 09:15

## 2021-11-25 RX ADMIN — AMOXICILLIN AND CLAVULANATE POTASSIUM 1 TABLET: 500; 125 TABLET, FILM COATED ORAL at 15:54

## 2021-11-25 RX ADMIN — INSULIN LISPRO 3 UNITS: 100 INJECTION, SOLUTION INTRAVENOUS; SUBCUTANEOUS at 12:41

## 2021-11-25 RX ADMIN — ONDANSETRON 4 MG: 2 INJECTION INTRAMUSCULAR; INTRAVENOUS at 09:07

## 2021-11-25 RX ADMIN — GABAPENTIN 300 MG: 300 CAPSULE ORAL at 09:07

## 2021-11-25 RX ADMIN — DEXTROSE MONOHYDRATE 12.5 G: 25 INJECTION, SOLUTION INTRAVENOUS at 05:56

## 2021-11-25 RX ADMIN — LEVETIRACETAM 1000 MG: 500 TABLET, FILM COATED ORAL at 09:07

## 2021-11-25 RX ADMIN — CLONAZEPAM 2 MG: 0.5 TABLET ORAL at 21:14

## 2021-11-25 RX ADMIN — INSULIN LISPRO 4 UNITS: 100 INJECTION, SOLUTION INTRAVENOUS; SUBCUTANEOUS at 18:30

## 2021-11-25 RX ADMIN — ENOXAPARIN SODIUM 40 MG: 100 INJECTION SUBCUTANEOUS at 10:42

## 2021-11-25 RX ADMIN — DEXTROSE MONOHYDRATE 12.5 G: 25 INJECTION, SOLUTION INTRAVENOUS at 01:56

## 2021-11-25 RX ADMIN — AMOXICILLIN AND CLAVULANATE POTASSIUM 1 TABLET: 500; 125 TABLET, FILM COATED ORAL at 05:55

## 2021-11-25 RX ADMIN — INSULIN LISPRO 4 UNITS: 100 INJECTION, SOLUTION INTRAVENOUS; SUBCUTANEOUS at 12:41

## 2021-11-25 RX ADMIN — GABAPENTIN 300 MG: 300 CAPSULE ORAL at 15:54

## 2021-11-25 RX ADMIN — LEVETIRACETAM 1000 MG: 500 TABLET, FILM COATED ORAL at 21:14

## 2021-11-25 RX ADMIN — ONDANSETRON 4 MG: 2 INJECTION INTRAMUSCULAR; INTRAVENOUS at 21:19

## 2021-11-25 RX ADMIN — CLONAZEPAM 2 MG: 0.5 TABLET ORAL at 09:07

## 2021-11-25 RX ADMIN — DEXTROSE MONOHYDRATE 12.5 G: 25 INJECTION, SOLUTION INTRAVENOUS at 08:53

## 2021-11-25 RX ADMIN — FAMOTIDINE 20 MG: 20 TABLET ORAL at 09:07

## 2021-11-25 ASSESSMENT — PAIN DESCRIPTION - PAIN TYPE
TYPE: ACUTE PAIN

## 2021-11-25 ASSESSMENT — PAIN SCALES - GENERAL
PAINLEVEL_OUTOF10: 0
PAINLEVEL_OUTOF10: 0
PAINLEVEL_OUTOF10: 4
PAINLEVEL_OUTOF10: 8
PAINLEVEL_OUTOF10: 7

## 2021-11-25 ASSESSMENT — PAIN DESCRIPTION - LOCATION
LOCATION: HEAD

## 2021-11-25 NOTE — PROGRESS NOTES
Report given to Carlos Holland RN on [address] [place].  Patient eating at present time. Will transfer post eating.

## 2021-11-25 NOTE — PLAN OF CARE
Problem: Falls - Risk of:  Goal: Will remain free from falls  Description: Will remain free from falls  Outcome: Ongoing     Problem: Falls - Risk of:  Goal: Absence of physical injury  Description: Absence of physical injury  Outcome: Ongoing     Problem: Fluid Volume - Imbalance:  Goal: Will remain free of signs and symptoms of dehydration  Description: Will remain free of signs and symptoms of dehydration  Outcome: Ongoing     Problem: Serum Glucose Level - Abnormal:  Goal: Ability to maintain appropriate glucose levels will improve  Description: Ability to maintain appropriate glucose levels will improve  Outcome: Ongoing

## 2021-11-25 NOTE — PROGRESS NOTES
Late entries due patient care: At 12, report was received from [place] [id number]. At 9641, request was sent to Dr. Mickey Jackson for prn tylenol. Order was received. At 0584, message was sent to Dr. Mickey Jackson to request ibuprofen for headache for the patient, due to tylenol not controlling pain, and to ask if physician thinks patient is appropriate for a PICC line.

## 2021-11-25 NOTE — PROGRESS NOTES
Hospitalist Progress Note  11/25/2021 11:05 AM    PCP: No primary care provider on file. [phone]     Date of Admission: 11/20/2021                                                                                                                     HOSPITAL COURSE    Patient demographics:  The patient  Hodan Mata is a 27 y.o. female     Significant past medical history:   Patient Active Problem List   Diagnosis    Seizure disorder ([facility]    Type 1 diabetes mellitus with complication ([place] [address]    Anxiety and depression    ROME (generalized anxiety disorder)    Anorexia nervosa    Diabetic peripheral neuropathy ([place] [address]    Gastroparesis    Weight loss, unintentional    Tachycardia    Elevated transaminase level    Bandemia    DKA, type 1, not at goal [facility])         Presenting symptoms:  Altered mental status, seizure    Diagnostic workup:      CONSULTS DURING ADMISSION :   IP CONSULT TO NEUROLOGY  IP CONSULT TO DIABETES EDUCATOR      Patient was diagnosed with:        Treatment while inpatient:  Pt presented to [facility] with new acute onset seizure while at home                                                                                       ----------------------------------------------------------      SUBJECTIVE COMPLAINTS- follow up for seizure     Diet: ADULT DIET; Regular; 4 carb choices (60 gm/meal)      OBJECTIVE:   Patient Active Problem List   Diagnosis    Seizure disorder ([facility]    Type 1 diabetes mellitus with complication (HCC)    Anxiety and depression    ROME (generalized anxiety disorder)    Anorexia nervosa    Diabetic peripheral neuropathy (HCC)    Gastroparesis    Weight loss, unintentional    Tachycardia    Elevated transaminase level    Bandemia    DKA, type 1, not at goal [facility])       Allergies  Patient has no known allergies.     Medications    Scheduled Meds:   amoxicillin-clavulanate  1 tablet Oral 3 times per day    insulin lispro  3 Units SubCUTAneous TID WC    insulin lispro  0-6 Units SubCUTAneous TID WC    insulin lispro  0-3 Units SubCUTAneous Nightly    gabapentin  300 mg Oral BID    clonazePAM  2 mg Oral BID    famotidine  20 mg Oral BID    levETIRAcetam  1,000 mg Oral BID    enoxaparin  40 mg SubCUTAneous Daily    gabapentin  900 mg Oral Nightly     Continuous Infusions:   dextrose 150 mL/hr at 11/24/21 2047     PRN Meds:  acetaminophen, glucose, dextrose, glucagon (rDNA), LORazepam, ondansetron    Vitals   Vitals /wt   Patient Vitals for the past 8 hrs:   BP Temp Temp src Pulse Resp SpO2 Weight   11/25/21 1100 109/69 -- -- 85 17 98 % --   11/25/21 0859 109/81 -- -- 98 14 100 % --   11/25/21 0813 -- -- -- -- 15 99 % --   11/25/21 0800 -- -- -- 93 14 99 % --   11/25/21 0700 110/74 -- -- 77 17 100 % --   11/25/21 0400 111/73 97.6 °F (36.4 °C) Oral 76 17 97 % 112 lb 14 oz (51.2 kg)        72HR INTAKE/OUTPUT:      Intake/Output Summary (Last 24 hours) at 11/25/2021 1105  Last data filed at 11/24/2021 1329  Gross per 24 hour   Intake 100 ml   Output --   Net 100 ml       Exam:    Gen:   Alert and oriented ×3   Eyes: PERRL. No sclera icterus. No conjunctival injection. ENT: No discharge. Pharynx clear. External appearance of ears and nose normal.  Neck: Trachea midline. No obvious mass. Resp: No accessory muscle use. No crackles. No wheezes. No rhonchi. CV: Regular rate. Regular rhythm. No murmur or rub. No edema. GI: Non-tender. Non-distended. No hernia. Skin: Warm, dry, normal texture and turgor. Lymph: No cervical LAD. No supraclavicular LAD. M/S: / Ext. No cyanosis. No clubbing. No joint deformity. Neuro: CN 2-12 are intact,  no neurologic deficits noted. PT/INR: No results for input(s): PROTIME, INR in the last 72 hours. APTT: No results for input(s): APTT in the last 72 hours.     CBC:   Recent Labs     11/23/21  0419 11/24/21  0446 11/25/21  0440   WBC 6.8 5.8 6.1   HGB 10.0* 9.9* 9.5*   HCT 30.8* 30.6* 29.7*   MCV 86.9 86.9 88.6    249 191       BMP:   Recent Labs     11/23/21  0419 11/24/21  0446 11/25/21  0440    141 140   K 3.7 3.6 3.6    106 107   CO2 22 25 23   PHOS 3.7 3.4 3.5   BUN 6* 4* 7   CREATININE <0.5* <0.5* <0.5*       LIVER PROFILE:   No results for input(s): ALKPHOS, AST, ALT, ALB, BILIDIR, BILITOT, ALKPHOS in the last 72 hours. No results for input(s): AMYLASE in the last 72 hours. No results for input(s): LIPASE in the last 72 hours. UA:  No results for input(s): NITRITE, LABCAST, WBCUA, RBCUA, MUCUS in the last 72 hours. TROPONIN: No results for input(s): Roscoe Jakes in the last 72 hours. Lab Results   Component Value Date/Time    URRFLXCULT Yes 11/21/2021 12:11 AM       No results for input(s): TSHREFLEX in the last 72 hours. No components found for: [id number]  POC GLUCOSE:    Recent Labs     11/25/21  0154 11/25/21  0404 11/25/21  0652 11/25/21  0851 11/25/21  1042   POCGLU 62* 78 163* 48* 197*     No results for input(s): LABA1C in the last 72 hours. Lab Results   Component Value Date    LABA1C 11.4 10/27/2021         ASSESSMENT AND PLAN    DKA E13.10  DKA improved      Patient is having episodes of hypoglycemia  Hypoglycemia is better  Discontinue D10 infusion  Start patient on insulin sliding scale        Seizure disorder  Continue on Keppra Klonopin and gabapentin  Neurology consult is appreciated  Patient needs to establish compliance  Patient will follow up neurology at [facility]             Code Status: Full Code        [place] in a.m. Patient needs supplies which will be available tomorrow      The patient and / or the family were informed of the results of any tests, a time was given to answer questions, a plan was proposed and they agreed with plan. Swathi Rodriguez MD    This note was transcribed using [id number] [facility]. Please disregard any translational errors.

## 2021-11-25 NOTE — PLAN OF CARE
Problem: Falls - Risk of:  Goal: Will remain free from falls  Description: Will remain free from falls  Outcome: Ongoing     Problem: Fluid Volume - Imbalance:  Goal: Will remain free of signs and symptoms of dehydration  Description: Will remain free of signs and symptoms of dehydration  Outcome: Ongoing     Problem: Serum Glucose Level - Abnormal:  Goal: Ability to maintain appropriate glucose levels will improve  Description: Ability to maintain appropriate glucose levels will improve  Outcome: Ongoing     Problem: Pain:  Goal: Pain level will decrease  Description: Pain level will decrease  Outcome: Ongoing

## 2021-11-26 VITALS
RESPIRATION RATE: 16 BRPM | BODY MASS INDEX: 22.76 KG/M2 | TEMPERATURE: 97.9 F | SYSTOLIC BLOOD PRESSURE: 103 MMHG | OXYGEN SATURATION: 98 % | HEART RATE: 106 BPM | HEIGHT: 59 IN | DIASTOLIC BLOOD PRESSURE: 69 MMHG | WEIGHT: 112.88 LBS

## 2021-11-26 LAB
ANION GAP SERPL CALCULATED.3IONS-SCNC: 11 MMOL/L (ref 3–16)
BASOPHILS ABSOLUTE: 0.1 K/UL (ref 0–0.2)
BASOPHILS RELATIVE PERCENT: 0.8 %
BUN BLDV-MCNC: 10 MG/DL (ref 7–20)
CALCIUM SERPL-MCNC: 8.3 MG/DL (ref 8.3–10.6)
CHLORIDE BLD-SCNC: 97 MMOL/L (ref 99–110)
CO2: 22 MMOL/L (ref 21–32)
CREAT SERPL-MCNC: <0.5 MG/DL (ref 0.6–1.1)
EOSINOPHILS ABSOLUTE: 0.2 K/UL (ref 0–0.6)
EOSINOPHILS RELATIVE PERCENT: 3.3 %
GFR AFRICAN AMERICAN: >60
GFR NON-AFRICAN AMERICAN: >60
GLUCOSE BLD-MCNC: 300 MG/DL (ref 70–99)
GLUCOSE BLD-MCNC: 323 MG/DL (ref 70–99)
GLUCOSE BLD-MCNC: 333 MG/DL (ref 70–99)
GLUCOSE BLD-MCNC: 360 MG/DL (ref 70–99)
GLUCOSE BLD-MCNC: 403 MG/DL (ref 70–99)
GLUCOSE BLD-MCNC: 448 MG/DL (ref 70–99)
GLUCOSE BLD-MCNC: 53 MG/DL (ref 70–99)
GLUCOSE BLD-MCNC: 65 MG/DL (ref 70–99)
GLUCOSE BLD-MCNC: 94 MG/DL (ref 70–99)
HCT VFR BLD CALC: 33.4 % (ref 36–48)
HEMOGLOBIN: 10.2 G/DL (ref 12–16)
LYMPHOCYTES ABSOLUTE: 2.4 K/UL (ref 1–5.1)
LYMPHOCYTES RELATIVE PERCENT: 33.4 %
MAGNESIUM: 1.9 MG/DL (ref 1.8–2.4)
MCH RBC QN AUTO: 28.1 PG (ref 26–34)
MCHC RBC AUTO-ENTMCNC: 30.6 G/DL (ref 31–36)
MCV RBC AUTO: 91.9 FL (ref 80–100)
MONOCYTES ABSOLUTE: 0.7 K/UL (ref 0–1.3)
MONOCYTES RELATIVE PERCENT: 9.7 %
NEUTROPHILS ABSOLUTE: 3.8 K/UL (ref 1.7–7.7)
NEUTROPHILS RELATIVE PERCENT: 52.8 %
PDW BLD-RTO: 15.4 % (ref 12.4–15.4)
PERFORMED ON: ABNORMAL
PERFORMED ON: NORMAL
PHOSPHORUS: 4.4 MG/DL (ref 2.5–4.9)
PLATELET # BLD: 227 K/UL (ref 135–450)
PMV BLD AUTO: 10.1 FL (ref 5–10.5)
POTASSIUM SERPL-SCNC: 4.5 MMOL/L (ref 3.5–5.1)
RBC # BLD: 3.63 M/UL (ref 4–5.2)
SODIUM BLD-SCNC: 130 MMOL/L (ref 136–145)
WBC # BLD: 7.3 K/UL (ref 4–11)

## 2021-11-26 PROCEDURE — 6370000000 HC RX 637 (ALT 250 FOR IP): Performed by: STUDENT IN AN ORGANIZED HEALTH CARE EDUCATION/TRAINING PROGRAM

## 2021-11-26 PROCEDURE — 6360000002 HC RX W HCPCS: Performed by: HOSPITALIST

## 2021-11-26 PROCEDURE — 36415 COLL VENOUS BLD VENIPUNCTURE: CPT

## 2021-11-26 PROCEDURE — 94761 N-INVAS EAR/PLS OXIMETRY MLT: CPT

## 2021-11-26 PROCEDURE — 6370000000 HC RX 637 (ALT 250 FOR IP): Performed by: HOSPITALIST

## 2021-11-26 PROCEDURE — 85025 COMPLETE CBC W/AUTO DIFF WBC: CPT

## 2021-11-26 PROCEDURE — 2580000003 HC RX 258: Performed by: INTERNAL MEDICINE

## 2021-11-26 PROCEDURE — 6360000002 HC RX W HCPCS: Performed by: STUDENT IN AN ORGANIZED HEALTH CARE EDUCATION/TRAINING PROGRAM

## 2021-11-26 PROCEDURE — 80048 BASIC METABOLIC PNL TOTAL CA: CPT

## 2021-11-26 PROCEDURE — 83735 ASSAY OF MAGNESIUM: CPT

## 2021-11-26 PROCEDURE — 84100 ASSAY OF PHOSPHORUS: CPT

## 2021-11-26 RX ORDER — INSULIN LISPRO 100 [IU]/ML
5 INJECTION, SOLUTION INTRAVENOUS; SUBCUTANEOUS
Qty: 5 PEN | Refills: 0 | Status: SHIPPED | OUTPATIENT
Start: 2021-11-26 | End: 2021-11-26 | Stop reason: SDUPTHER

## 2021-11-26 RX ORDER — BLOOD-GLUCOSE METER
1 KIT MISCELLANEOUS DAILY
Qty: 1 KIT | Refills: 0 | Status: SHIPPED | OUTPATIENT
Start: 2021-11-26 | End: 2022-01-25 | Stop reason: ALTCHOICE

## 2021-11-26 RX ORDER — INSULIN LISPRO 100 [IU]/ML
2-12 INJECTION, SOLUTION INTRAVENOUS; SUBCUTANEOUS
Qty: 10.8 ML | Refills: 0 | Status: SHIPPED | OUTPATIENT
Start: 2021-11-26 | End: 2021-11-26 | Stop reason: SDUPTHER

## 2021-11-26 RX ORDER — BLOOD-GLUCOSE METER
KIT MISCELLANEOUS
Qty: 100 STRIP | Refills: 0 | Status: SHIPPED | OUTPATIENT
Start: 2021-11-26 | End: 2022-01-13 | Stop reason: ALTCHOICE

## 2021-11-26 RX ORDER — INSULIN LISPRO 100 [IU]/ML
5 INJECTION, SOLUTION INTRAVENOUS; SUBCUTANEOUS
Qty: 5 PEN | Refills: 0 | Status: SHIPPED | OUTPATIENT
Start: 2021-11-26 | End: 2022-01-25 | Stop reason: ALTCHOICE

## 2021-11-26 RX ORDER — BLOOD-GLUCOSE METER
1 KIT MISCELLANEOUS DAILY
Qty: 1 KIT | Refills: 0 | Status: SHIPPED | OUTPATIENT
Start: 2021-11-26 | End: 2021-11-26 | Stop reason: SDUPTHER

## 2021-11-26 RX ORDER — INSULIN LISPRO 100 [IU]/ML
INJECTION, SOLUTION INTRAVENOUS; SUBCUTANEOUS
Qty: 5 PEN | Refills: 0 | Status: SHIPPED | OUTPATIENT
Start: 2021-11-26 | End: 2021-11-26 | Stop reason: HOSPADM

## 2021-11-26 RX ORDER — INSULIN LISPRO 100 [IU]/ML
2-12 INJECTION, SOLUTION INTRAVENOUS; SUBCUTANEOUS
Qty: 10.8 ML | Refills: 0 | Status: SHIPPED | OUTPATIENT
Start: 2021-11-26 | End: 2022-01-12 | Stop reason: SDUPTHER

## 2021-11-26 RX ORDER — BLOOD-GLUCOSE METER
KIT MISCELLANEOUS
Qty: 100 STRIP | Refills: 0 | Status: SHIPPED | OUTPATIENT
Start: 2021-11-26 | End: 2021-11-26 | Stop reason: SDUPTHER

## 2021-11-26 RX ADMIN — GABAPENTIN 300 MG: 300 CAPSULE ORAL at 15:03

## 2021-11-26 RX ADMIN — DEXTROSE MONOHYDRATE: 100 INJECTION, SOLUTION INTRAVENOUS at 03:40

## 2021-11-26 RX ADMIN — AMOXICILLIN AND CLAVULANATE POTASSIUM 1 TABLET: 500; 125 TABLET, FILM COATED ORAL at 15:04

## 2021-11-26 RX ADMIN — AMOXICILLIN AND CLAVULANATE POTASSIUM 1 TABLET: 500; 125 TABLET, FILM COATED ORAL at 05:30

## 2021-11-26 RX ADMIN — LEVETIRACETAM 1000 MG: 500 TABLET, FILM COATED ORAL at 08:37

## 2021-11-26 RX ADMIN — CLONAZEPAM 2 MG: 0.5 TABLET ORAL at 08:37

## 2021-11-26 RX ADMIN — ONDANSETRON 4 MG: 2 INJECTION INTRAMUSCULAR; INTRAVENOUS at 11:46

## 2021-11-26 RX ADMIN — INSULIN LISPRO 3 UNITS: 100 INJECTION, SOLUTION INTRAVENOUS; SUBCUTANEOUS at 17:22

## 2021-11-26 RX ADMIN — ACETAMINOPHEN 650 MG: 325 TABLET ORAL at 02:14

## 2021-11-26 RX ADMIN — INSULIN LISPRO 3 UNITS: 100 INJECTION, SOLUTION INTRAVENOUS; SUBCUTANEOUS at 08:38

## 2021-11-26 RX ADMIN — GABAPENTIN 300 MG: 300 CAPSULE ORAL at 08:37

## 2021-11-26 RX ADMIN — INSULIN LISPRO 4 UNITS: 100 INJECTION, SOLUTION INTRAVENOUS; SUBCUTANEOUS at 17:22

## 2021-11-26 RX ADMIN — ENOXAPARIN SODIUM 40 MG: 100 INJECTION SUBCUTANEOUS at 08:38

## 2021-11-26 RX ADMIN — INSULIN LISPRO 6 UNITS: 100 INJECTION, SOLUTION INTRAVENOUS; SUBCUTANEOUS at 08:38

## 2021-11-26 RX ADMIN — ONDANSETRON 4 MG: 2 INJECTION INTRAMUSCULAR; INTRAVENOUS at 17:01

## 2021-11-26 RX ADMIN — FAMOTIDINE 20 MG: 20 TABLET ORAL at 08:37

## 2021-11-26 ASSESSMENT — PAIN SCALES - GENERAL
PAINLEVEL_OUTOF10: 0
PAINLEVEL_OUTOF10: 6

## 2021-11-26 ASSESSMENT — PAIN SCALES - WONG BAKER: WONGBAKER_NUMERICALRESPONSE: 0

## 2021-11-26 NOTE — PLAN OF CARE
Problem: Falls - Risk of:  Goal: Will remain free from falls  Description: Will remain free from falls  11/26/2021 0743 by Kandee Landau, RN  Outcome: Ongoing  Note: Fall risk assessment completed. Fall precautions in place. Bed in lowest position, wheels locked, bed/chair exit alarm in place, call light within reach, and non skid footwear on. Walkway free of clutter. Pt alert and oriented and able to make needs known. Pt educated to use call light when needing to get up, and pt utilizes call light to make needs known. Will continue to monitor. Problem: Falls - Risk of:  Goal: Absence of physical injury  Description: Absence of physical injury  11/26/2021 0743 by Kandee Landau, RN  Outcome: Ongoing  Note: Pt is free of injury. No injury noted. Fall precautions in place. Call light within reach. Will monitor. Problem: Discharge Planning:  Goal: Discharged to appropriate level of care  Description: Discharged to appropriate level of care  11/26/2021 0743 by Kandee Landau, RN  Outcome: Ongoing  Note: Pt will be discharged to an appropriate level of care. Problem: Fluid Volume - Imbalance:  Goal: Will remain free of signs and symptoms of dehydration  Description: Will remain free of signs and symptoms of dehydration  11/26/2021 0743 by Kandee Landau, RN  Outcome: Ongoing  Note: Pt will remain free from signs and symptoms of dehydration. Problem: Fluid Volume - Imbalance:  Goal: Absence of imbalanced fluid volume signs and symptoms  Description: Absence of imbalanced fluid volume signs and symptoms  11/26/2021 0743 by Kandee Landau, RN  Outcome: Ongoing  Note: Pt will have absence of imbalanced fluid volume signs and symptoms.       Problem: Health Behavior:  Goal: Ability to manage health-related needs will improve  Description: Ability to manage health-related needs will improve  11/26/2021 0743 by Kandee Landau, RN  Outcome: Ongoing  Note: Pt ability to [facility] related needs will improve. Problem: Safety:  Goal: Ability to remain free from injury will improve  Description: Ability to remain free from injury will improve  11/26/2021 0743 by Kandee Landau, RN  Outcome: Ongoing  Note: Pt is free of injury. No injury noted. Fall precautions in place. Call light within reach. Will monitor. Problem: Nutrition  Goal: Optimal nutrition therapy  11/26/2021 0743 by Kandee Landau, RN  Outcome: Ongoing  Note: Patient's nutrition is improving, patient had % of breakfast. Will cont to monitor and reassess. Problem: Pain:  Goal: Pain level will decrease  Description: Pain level will decrease  11/26/2021 0743 by Kandee Landau, RN  Outcome: Ongoing  Note: Pt assessed for pain. Pt in pain and assessed with 0-10 pain rating scale. Pt given prescribed analgesic for pain. (See eMar) Pt satisfied with pain relief thus far. Will reassess and continue to monitor. Problem: Pain:  Goal: Control of acute pain  Description: Control of acute pain  11/26/2021 0743 by Kandee Landau, RN  Outcome: Ongoing  Note: Pt assessed for pain. Pt in pain and assessed with 0-10 pain rating scale. Pt given prescribed analgesic for pain. (See eMar) Pt satisfied with pain relief thus far. Will reassess and continue to monitor. Problem: Pain:  Goal: Control of chronic pain  Description: Control of chronic pain  11/26/2021 0743 by Kandee Landau, RN  Outcome: Ongoing  Note: Pt assessed for pain. Pt in pain and assessed with 0-10 pain rating scale. Pt given prescribed analgesic for pain. (See eMar) Pt satisfied with pain relief thus far. Will reassess and continue to monitor.

## 2021-11-26 NOTE — PROGRESS NOTES
Patient alert and oriented with a blood glucose of 38 at 2040. States she knows when her blood sugar starts to drop because she starts sweating. Continuous D10 infusion restarted, D50 IV push administered and patient given crackers and peanut butter. Trudy Kang NP notified. Blood glucose taken again at 2135, back up to 122. Will continue to monitor patient's blood glucose.

## 2021-11-26 NOTE — PROGRESS NOTES
Patient was transferred by this RN to room 3102. Was in no apparent state of distress at transfer.   Left with all of her belongings

## 2021-11-26 NOTE — PROGRESS NOTES
Pt's blood sugar levels today are as follows:  0800: 448  1200: 53  1600: 323    MD aware of this and is still ok with discharge.  Electronically signed by Edwin Luque RN on 11/26/2021 at 4:12 PM

## 2021-11-26 NOTE — PROGRESS NOTES
4 Eyes Skin Assessment     NAME:  Etelvina Barba  YOB: 1990  MEDICAL RECORD NUMBER:  [id number]    The patient is being assess for  Transfer to New Unit    I agree that 2 RN's have performed a thorough Head to Toe Skin Assessment on the patient. ALL assessment sites listed below have been assessed. Areas assessed by both nurses:    Head, Face, Ears, Shoulders, Back, Chest, Arms, Elbows, Hands, Sacrum. Buttock, Coccyx, Ischium and Legs. Feet and Heels        Does the Patient have a Wound?  No noted wound(s)       Luis Daniel Prevention initiated:  Yes   Wound Care Orders initiated:  NA    Pressure Injury (Stage 3,4, Unstageable, DTI, NWPT, and Complex wounds) if present place consult order under [de-identified] No    New and Established Ostomies if present place consult order under : NA      Nurse 1 eSignature: Electronically signed by Dimitrios Parekh RN on 11/26/21 at 4:02 AM EST    **SHARE this note so that the co-signing nurse is able to place an eSignature**    Nurse 2 eSignature: Electronically signed by Edris Osgood, RN on 11/26/21 at 6:39 AM EST

## 2021-11-26 NOTE — PROGRESS NOTES
Patient alert and oriented, discharged to home with documented belongings. IV removed with no complications. Transported out of [facility] by wheelchair. Reviewed discharge, follow up, and medication instructions with patient and family member, patient and family member verbalized understanding.  Electronically signed by Quynh Avila RN on 11/26/2021 at 6:29 PM

## 2021-11-26 NOTE — PROGRESS NOTES
Pt alert and oriented, states that she has a headache and felt like her blood sugar was high. Checked blood sugar is 448. Will cont to monitor and reassess.  Electronically signed by Jolene Ferguson RN on 11/26/2021 at 8:31 AM

## 2021-11-26 NOTE — PROGRESS NOTES
Patient's blood sugar is 333. Dr. Genoveva Villagomez notified. Continuous D10 infusion stopped for now per his request. Will continue to monitor patient's BS.

## 2021-11-27 NOTE — DISCHARGE SUMMARY
Hospital Medicine Discharge Summary      Patient ID: Vy Jesus , [id number]     Patient's PCP: No primary care provider on file. Admit Date: 11/20/2021     Discharge Date: 11/26/2021      Admitting Physician: Erik Glez DO    Discharge Physician: Brannon Traore MD     Discharge Diagnoses: Active Hospital Problems    Diagnosis Date Noted    DKA, type 1, not at goal [facility]) [E10.10] 10/30/2021    Gastroparesis [K31.84] 02/14/2019    Type 1 diabetes mellitus with complication ([facility] [D61.6] 10/22/2018    Seizure disorder ([facility] [G40.909] 02/13/2018         The patient was seen and examined on the day of discharge and this discharge summary is in conjunction with any daily progress note from day of discharge. HOSPITAL COURSE    Patient demographics:  The patient  Vy Jesus is a 27 y.o. female      Significant past medical history:       Patient Active Problem List   Diagnosis    Seizure disorder ([facility]    Type 1 diabetes mellitus with complication ([facility]    Anxiety and depression    ROME (generalized anxiety disorder)    Anorexia nervosa    Diabetic peripheral neuropathy (HCC)    Gastroparesis    Weight loss, unintentional    Tachycardia    Elevated transaminase level    Bandemia    DKA, type 1, not at goal [facility])            Presenting symptoms:  Altered mental status, seizure     Diagnostic workup:   MRI BRAIN W WO CONTRAST       CONSULTS DURING ADMISSION :   IP CONSULT TO NEUROLOGY  IP CONSULT TO DIABETES EDUCATOR        Patient was diagnosed with:   DKA   hypoglycemia   Seizure disorder      Treatment while inpatient:  Pt presented to [facility] with new acute onset seizure while at home  Patient also has history of for type 1 diabetes mellitus and patient was diagnosed with DKA at the time of admission. Patient's DKA was managed with DKA protocol with insulin drip.   Patient's hospital course was also uncomplicated with an episode of hypoglycemia when D10 IV fluids were administered which improved hypoglycemia. Patient was continued on Keppra Klonopin and and gabapentin. No further seizure activity was noted during hospitalization. Neurology was consulted and they recommended that patient should follow-up with her primary neurologist at [facility]. Discharge Condition:  stable     Discharged to:  Home     Activity:   as tolerated: Follow Up: Follow-up with PCP in 1-2 weeks                  Labs: For convenience and continuity at follow-up the following most recent labs are provided:      CBC:   Lab Results   Component Value Date    WBC 7.3 11/26/2021    HGB 10.2 11/26/2021    HCT 33.4 11/26/2021     11/26/2021       RENAL:   Lab Results   Component Value Date     11/26/2021    K 4.5 11/26/2021    K 5.5 11/21/2021    CL 97 11/26/2021    CO2 22 11/26/2021    BUN 10 11/26/2021    CREATININE <0.5 11/26/2021           Discharge Medications:   @DISCHARGEMEDSLIST(<NOROUTINE> error)@       Time Spent on discharge is more than 30 min in the examination, evaluation, counseling and review of medications and discharge plan. Signed:  Faisal Herring MD   11/26/2021      Thank you No primary care provider on file. for the opportunity to be involved in this patient's care. If you have any questions or concerns please feel free to contact me at [phone]. This note was transcribed using [id number] [employer]. Please disregard any translational errors.

## 2021-12-03 ENCOUNTER — TELEPHONE (OUTPATIENT)
Dept: INTERNAL MEDICINE CLINIC | Age: 31
End: 2021-12-03

## 2021-12-03 NOTE — TELEPHONE ENCOUNTER
Santa called in needing   CMN Progress Notes from most recent office visit    Downloads from her [facility]    [phone] Fax#    Please advise and call

## 2021-12-09 ENCOUNTER — TELEPHONE (OUTPATIENT)
Dept: INTERNAL MEDICINE CLINIC | Age: 31
End: 2021-12-09

## 2021-12-09 NOTE — TELEPHONE ENCOUNTER
Santa called regarding paper work for Mrs. Darlene Soria she is missing CMN forms and Download Forms     Please fax to [phone]    Please call to advise

## 2021-12-16 NOTE — TELEPHONE ENCOUNTER
Santa called. They faxed a CMN form to us twice (1st time 11/29, 2nd time 12/3 and 3rd time on 12/09). Would you please verify that we have received the form and give them an estimated time when it will be completed.     Santa's # [phone]    Please call and advise

## 2021-12-28 ENCOUNTER — TELEPHONE (OUTPATIENT)
Dept: INTERNAL MEDICINE CLINIC | Age: 31
End: 2021-12-28

## 2021-12-28 NOTE — TELEPHONE ENCOUNTER
Santa called. They faxed a CMN form to us twice (1st time 11/29, 2nd time 12/3 and 3rd time on 12/09).      Would you please verify that we have received the form and give them an estimated time when it will be completed.     Santa's # [phone]     Please call and advise

## 2022-01-05 ENCOUNTER — TELEPHONE (OUTPATIENT)
Dept: INTERNAL MEDICINE CLINIC | Age: 32
End: 2022-01-05

## 2022-01-05 NOTE — TELEPHONE ENCOUNTER
Santa called. Casper Resendiz faxed a CMN form to us twice (1st time 11/29, 2nd time 12/3 and 3rd time on 12/09).      Would you please verify that we have received the form and give them an estimated time when it will be completed.     Santa's # [phone]     Please call and advise

## 2022-01-10 DIAGNOSIS — E11.42 DIABETIC PERIPHERAL NEUROPATHY (HCC): ICD-10-CM

## 2022-01-10 DIAGNOSIS — G40.909 SEIZURE DISORDER (HCC): ICD-10-CM

## 2022-01-10 NOTE — TELEPHONE ENCOUNTER
Patient needs a med refill on gabapentin (NEURONTIN) 300 MG capsule  clonazePAM (KLONOPIN) 2 MG tablet  insulin lispro, 1 Unit Dial, (HUMALOG KWIKPEN) 100 UNIT/ML SOPN ()    Rely on  prime test strips     [facility] [phone] - F [phone]    Please call to advise

## 2022-01-12 RX ORDER — GABAPENTIN 300 MG/1
CAPSULE ORAL
Qty: 180 CAPSULE | Refills: 1 | Status: SHIPPED | OUTPATIENT
Start: 2022-01-12 | End: 2022-01-25 | Stop reason: SDUPTHER

## 2022-01-12 RX ORDER — INSULIN LISPRO 100 [IU]/ML
2-12 INJECTION, SOLUTION INTRAVENOUS; SUBCUTANEOUS
Qty: 10.8 ML | Refills: 0 | Status: SHIPPED | OUTPATIENT
Start: 2022-01-12 | End: 2022-01-13 | Stop reason: SDUPTHER

## 2022-01-12 RX ORDER — CLONAZEPAM 2 MG/1
2 TABLET ORAL 2 TIMES DAILY
Qty: 60 TABLET | Refills: 0 | Status: SHIPPED | OUTPATIENT
Start: 2022-01-12 | End: 2022-01-25 | Stop reason: SDUPTHER

## 2022-01-13 RX ORDER — INSULIN LISPRO 100 [IU]/ML
2-12 INJECTION, SOLUTION INTRAVENOUS; SUBCUTANEOUS
Qty: 5 PEN | Refills: 0 | OUTPATIENT
Start: 2022-01-13 | End: 2022-01-25 | Stop reason: ALTCHOICE

## 2022-01-13 NOTE — TELEPHONE ENCOUNTER
Terry Lind states script for Humalog not received at the pharmacy and needs quantity to state 5 pens which pt usually gets, please send to pharmacy.

## 2022-01-13 NOTE — TELEPHONE ENCOUNTER
Patient received the box however she only got one box and she test at least 6 times daily    blood glucose test strips (ASCENSIA AUTODISC VI;ONE TOUCH ULTRA TEST VI) strip             [facility] [place]), [place] - [address] [phone] [place] [phone]   Georges Rivasessa Marylene Richard [place]) [address]   Phone:  [phone]  Fax:  [phone]    Please advise and call

## 2022-01-13 NOTE — TELEPHONE ENCOUNTER
Patient's  called, he is following up on refill request for:    insulin lispro, 1 Unit Dial, (HUMALOG KWIKPEN) 100 UNIT/ML SOPN    He said he went to the pharmacy expecting to  five prescriptions and they only had two - the gabapentin and test strips    Please call him back, he is really concerned about his wife getting her insulin.   [phone]

## 2022-01-18 DIAGNOSIS — E10.8 TYPE 1 DIABETES MELLITUS WITH COMPLICATION (HCC): ICD-10-CM

## 2022-01-18 NOTE — TELEPHONE ENCOUNTER
Please refill    glucagon 1 MG injection     H[facility] [address] ROZ CERNA [address], [address] [facility]   [address] [facility], [address] [place], [place] [address]   Phone:  [phone]  Fax:  [phone]    Please advise and call

## 2022-01-25 ENCOUNTER — OFFICE VISIT (OUTPATIENT)
Dept: INTERNAL MEDICINE CLINIC | Age: 32
End: 2022-01-25
Payer: COMMERCIAL

## 2022-01-25 VITALS
SYSTOLIC BLOOD PRESSURE: 124 MMHG | WEIGHT: 109 LBS | DIASTOLIC BLOOD PRESSURE: 64 MMHG | BODY MASS INDEX: 21.97 KG/M2 | HEIGHT: 59 IN

## 2022-01-25 DIAGNOSIS — G40.909 SEIZURE DISORDER (HCC): ICD-10-CM

## 2022-01-25 DIAGNOSIS — E11.42 DIABETIC PERIPHERAL NEUROPATHY (HCC): ICD-10-CM

## 2022-01-25 DIAGNOSIS — R00.0 TACHYCARDIA: ICD-10-CM

## 2022-01-25 DIAGNOSIS — E10.8 TYPE 1 DIABETES MELLITUS WITH COMPLICATION (HCC): Primary | ICD-10-CM

## 2022-01-25 LAB
A/G RATIO: 1.2 (ref 1.1–2.2)
ALBUMIN SERPL-MCNC: 4.4 G/DL (ref 3.4–5)
ALP BLD-CCNC: 145 U/L (ref 40–129)
ALT SERPL-CCNC: 141 U/L (ref 10–40)
ANION GAP SERPL CALCULATED.3IONS-SCNC: 22 MMOL/L (ref 3–16)
AST SERPL-CCNC: 232 U/L (ref 15–37)
BASOPHILS ABSOLUTE: 0.1 K/UL (ref 0–0.2)
BASOPHILS RELATIVE PERCENT: 1.1 %
BILIRUB SERPL-MCNC: <0.2 MG/DL (ref 0–1)
BUN BLDV-MCNC: 9 MG/DL (ref 7–20)
CALCIUM SERPL-MCNC: 9.7 MG/DL (ref 8.3–10.6)
CHLORIDE BLD-SCNC: 93 MMOL/L (ref 99–110)
CO2: 21 MMOL/L (ref 21–32)
CREAT SERPL-MCNC: 0.6 MG/DL (ref 0.6–1.1)
EOSINOPHILS ABSOLUTE: 0.3 K/UL (ref 0–0.6)
EOSINOPHILS RELATIVE PERCENT: 2.8 %
GFR AFRICAN AMERICAN: >60
GFR NON-AFRICAN AMERICAN: >60
GLUCOSE BLD-MCNC: 154 MG/DL (ref 70–99)
HCT VFR BLD CALC: 38.7 % (ref 36–48)
HEMOGLOBIN: 12.4 G/DL (ref 12–16)
LYMPHOCYTES ABSOLUTE: 3 K/UL (ref 1–5.1)
LYMPHOCYTES RELATIVE PERCENT: 33.6 %
MCH RBC QN AUTO: 27.3 PG (ref 26–34)
MCHC RBC AUTO-ENTMCNC: 32.2 G/DL (ref 31–36)
MCV RBC AUTO: 85 FL (ref 80–100)
MONOCYTES ABSOLUTE: 0.7 K/UL (ref 0–1.3)
MONOCYTES RELATIVE PERCENT: 7.7 %
NEUTROPHILS ABSOLUTE: 4.9 K/UL (ref 1.7–7.7)
NEUTROPHILS RELATIVE PERCENT: 54.8 %
PDW BLD-RTO: 15.5 % (ref 12.4–15.4)
PLATELET # BLD: 439 K/UL (ref 135–450)
PMV BLD AUTO: 8.6 FL (ref 5–10.5)
POTASSIUM SERPL-SCNC: 4.1 MMOL/L (ref 3.5–5.1)
RBC # BLD: 4.55 M/UL (ref 4–5.2)
SODIUM BLD-SCNC: 136 MMOL/L (ref 136–145)
TOTAL PROTEIN: 8 G/DL (ref 6.4–8.2)
WBC # BLD: 9 K/UL (ref 4–11)

## 2022-01-25 PROCEDURE — G8420 CALC BMI NORM PARAMETERS: HCPCS | Performed by: INTERNAL MEDICINE

## 2022-01-25 PROCEDURE — 3046F HEMOGLOBIN A1C LEVEL >9.0%: CPT | Performed by: INTERNAL MEDICINE

## 2022-01-25 PROCEDURE — G8427 DOCREV CUR MEDS BY ELIG CLIN: HCPCS | Performed by: INTERNAL MEDICINE

## 2022-01-25 PROCEDURE — 2022F DILAT RTA XM EVC RTNOPTHY: CPT | Performed by: INTERNAL MEDICINE

## 2022-01-25 PROCEDURE — 1036F TOBACCO NON-USER: CPT | Performed by: INTERNAL MEDICINE

## 2022-01-25 PROCEDURE — G8484 FLU IMMUNIZE NO ADMIN: HCPCS | Performed by: INTERNAL MEDICINE

## 2022-01-25 PROCEDURE — 99214 OFFICE O/P EST MOD 30 MIN: CPT | Performed by: INTERNAL MEDICINE

## 2022-01-25 RX ORDER — ONDANSETRON 4 MG/1
4 TABLET, FILM COATED ORAL 3 TIMES DAILY PRN
Qty: 30 TABLET | Refills: 1 | Status: SHIPPED | OUTPATIENT
Start: 2022-01-25 | End: 2022-07-08 | Stop reason: SDUPTHER

## 2022-01-25 RX ORDER — LEVETIRACETAM 500 MG/1
1000 TABLET ORAL 2 TIMES DAILY
Qty: 60 TABLET | Refills: 5 | Status: SHIPPED | OUTPATIENT
Start: 2022-01-25 | End: 2022-05-06 | Stop reason: SDUPTHER

## 2022-01-25 RX ORDER — BLOOD-GLUCOSE TRANSMITTER
EACH MISCELLANEOUS
Qty: 1 EACH | Refills: 3 | Status: SHIPPED | OUTPATIENT
Start: 2022-01-25 | End: 2022-06-15

## 2022-01-25 RX ORDER — GABAPENTIN 300 MG/1
CAPSULE ORAL
Qty: 210 CAPSULE | Refills: 1 | Status: SHIPPED | OUTPATIENT
Start: 2022-01-25 | End: 2022-04-08

## 2022-01-25 RX ORDER — BLOOD-GLUCOSE SENSOR
EACH MISCELLANEOUS
Qty: 10 EACH | Refills: 5 | Status: SHIPPED | OUTPATIENT
Start: 2022-01-25 | End: 2022-06-09 | Stop reason: SDUPTHER

## 2022-01-25 RX ORDER — INSULIN ASPART 100 [IU]/ML
INJECTION, SOLUTION INTRAVENOUS; SUBCUTANEOUS
Qty: 10 PEN | Refills: 3 | Status: ON HOLD | OUTPATIENT
Start: 2022-01-25 | End: 2022-05-27 | Stop reason: SDUPTHER

## 2022-01-25 RX ORDER — CLONAZEPAM 2 MG/1
2 TABLET ORAL 2 TIMES DAILY
Qty: 60 TABLET | Refills: 2 | Status: SHIPPED | OUTPATIENT
Start: 2022-02-11 | End: 2022-05-06 | Stop reason: SDUPTHER

## 2022-01-25 RX ORDER — IBUPROFEN 800 MG/1
800 TABLET ORAL 3 TIMES DAILY PRN
Qty: 90 TABLET | Refills: 1 | Status: SHIPPED | OUTPATIENT
Start: 2022-01-25 | End: 2022-07-08 | Stop reason: SDUPTHER

## 2022-01-25 RX ORDER — INSULIN PUMP CONTROLLER
EACH MISCELLANEOUS
Qty: 10 EACH | Refills: 3 | Status: SHIPPED | OUTPATIENT
Start: 2022-01-25 | End: 2022-07-08 | Stop reason: SDUPTHER

## 2022-01-25 ASSESSMENT — PATIENT HEALTH QUESTIONNAIRE - PHQ9
SUM OF ALL RESPONSES TO PHQ QUESTIONS 1-9: 0
SUM OF ALL RESPONSES TO PHQ9 QUESTIONS 1 & 2: 0
1. LITTLE INTEREST OR PLEASURE IN DOING THINGS: 0
10. IF YOU CHECKED OFF ANY PROBLEMS, HOW DIFFICULT HAVE THESE PROBLEMS MADE IT FOR YOU TO DO YOUR WORK, TAKE CARE OF THINGS AT HOME, OR GET ALONG WITH OTHER PEOPLE: 0
3. TROUBLE FALLING OR STAYING ASLEEP: 0
4. FEELING TIRED OR HAVING LITTLE ENERGY: 0
2. FEELING DOWN, DEPRESSED OR HOPELESS: 0
SUM OF ALL RESPONSES TO PHQ QUESTIONS 1-9: 0
5. POOR APPETITE OR OVEREATING: 0
8. MOVING OR SPEAKING SO SLOWLY THAT OTHER PEOPLE COULD HAVE NOTICED. OR THE OPPOSITE, BEING SO FIGETY OR RESTLESS THAT YOU HAVE BEEN MOVING AROUND A LOT MORE THAN USUAL: 0
SUM OF ALL RESPONSES TO PHQ QUESTIONS 1-9: 0
9. THOUGHTS THAT YOU WOULD BE BETTER OFF DEAD, OR OF HURTING YOURSELF: 0
6. FEELING BAD ABOUT YOURSELF - OR THAT YOU ARE A FAILURE OR HAVE LET YOURSELF OR YOUR FAMILY DOWN: 0
SUM OF ALL RESPONSES TO PHQ QUESTIONS 1-9: 0
7. TROUBLE CONCENTRATING ON THINGS, SUCH AS READING THE NEWSPAPER OR WATCHING TELEVISION: 0

## 2022-01-26 ENCOUNTER — TELEPHONE (OUTPATIENT)
Dept: INTERNAL MEDICINE CLINIC | Age: 32
End: 2022-01-26

## 2022-01-26 DIAGNOSIS — R74.8 ELEVATED LIVER ENZYMES: Primary | ICD-10-CM

## 2022-01-26 DIAGNOSIS — R79.89 ELEVATED LFTS: Primary | ICD-10-CM

## 2022-01-26 LAB
ESTIMATED AVERAGE GLUCOSE: 271.9 MG/DL
HBA1C MFR BLD: 11.1 %

## 2022-01-26 NOTE — TELEPHONE ENCOUNTER
Lab could only do Iron and TIBC [[id number]   They want to know if they should just add that one on because they cannot do other 5 labs that were ordered    Please call and advise

## 2022-01-26 NOTE — TELEPHONE ENCOUNTER
Pt will need to go get additional labs completed , also needs liver US    Left voicemail to call back

## 2022-02-09 ENCOUNTER — TELEPHONE (OUTPATIENT)
Dept: INTERNAL MEDICINE CLINIC | Age: 32
End: 2022-02-09

## 2022-02-09 NOTE — TELEPHONE ENCOUNTER
[employer] called into the office to notify staff that the CMN would be faxed over today and would jodi it re-faxed. Please advise.

## 2022-02-10 ENCOUNTER — TELEPHONE (OUTPATIENT)
Dept: INTERNAL MEDICINE CLINIC | Age: 32
End: 2022-02-10

## 2022-02-10 RX ORDER — FLUCONAZOLE 150 MG/1
150 TABLET ORAL
Qty: 2 TABLET | Refills: 0 | Status: SHIPPED | OUTPATIENT
Start: 2022-02-10 | End: 2022-02-16

## 2022-02-10 NOTE — TELEPHONE ENCOUNTER
Patients  called, would like to know if she can get a prescription for Diflucan. She has taken the Diflucan before and it works. I called Samia Sachin to get symptoms:  Vagina is inflammed and itches, white discharge. She has diabetes and she thinks it has something to do with that. When she drinks a sugary drink, she gets a film in her mouth. She recently had three seizers where she bit her tongue and lip.     Send to:  [facility] [place] [address]  [phone] Kaley Neal [phone]    Please call and advise

## 2022-02-10 NOTE — TELEPHONE ENCOUNTER
They called in inquiring about   This patients DexCom papers to see if we received them>    Dex Com      Fax# [phone]

## 2022-02-15 ENCOUNTER — TELEPHONE (OUTPATIENT)
Dept: INTERNAL MEDICINE CLINIC | Age: 32
End: 2022-02-15

## 2022-02-15 DIAGNOSIS — E10.10 DKA, TYPE 1, NOT AT GOAL (HCC): Primary | ICD-10-CM

## 2022-02-15 DIAGNOSIS — E10.8 TYPE 1 DIABETES MELLITUS WITH COMPLICATION (HCC): ICD-10-CM

## 2022-02-15 RX ORDER — GLUCOSAMINE HCL/CHONDROITIN SU 500-400 MG
CAPSULE ORAL
Qty: 400 STRIP | Refills: 3 | Status: ON HOLD | OUTPATIENT
Start: 2022-02-15 | End: 2022-05-27 | Stop reason: HOSPADM

## 2022-02-15 NOTE — TELEPHONE ENCOUNTER
insulin aspart (NOVOLOG) 100 UNIT/ML injection vial     Test strips needed as well         This is no longer covered by insurance    Continuous Blood Gluc Sensor ([address]) [facility]       They need something else           [address] [phone] - f [phone]          Please advise and call

## 2022-02-17 ENCOUNTER — TELEPHONE (OUTPATIENT)
Dept: INTERNAL MEDICINE CLINIC | Age: 32
End: 2022-02-17

## 2022-02-18 ENCOUNTER — TELEPHONE (OUTPATIENT)
Dept: INTERNAL MEDICINE CLINIC | Age: 32
End: 2022-02-18

## 2022-02-18 NOTE — TELEPHONE ENCOUNTER
----- Message from [facility] sent at 2/18/2022  3:59 PM EST -----  Subject: Message to Provider    QUESTIONS  Information for Provider? patient needs prior authorization for Continuous   Blood Gluc Sensor (DEXCOM G6 SENSOR) MIS. . please send fax to   [phone]  ---------------------------------------------------------------------------  --------------  CALL BACK INFO  What is the best way for the office to contact you? Do not leave any   message, patient will call back for answer  Preferred Call Back Phone Number? [phone]  ---------------------------------------------------------------------------  --------------  SCRIPT ANSWERS  Relationship to Patient?  Third Party  Representative Name? Harlan Baez

## 2022-03-17 ENCOUNTER — TELEPHONE (OUTPATIENT)
Dept: INTERNAL MEDICINE CLINIC | Age: 32
End: 2022-03-17

## 2022-03-17 DIAGNOSIS — E10.8 TYPE 1 DIABETES MELLITUS WITH COMPLICATION (HCC): ICD-10-CM

## 2022-03-17 NOTE — TELEPHONE ENCOUNTER
Patient called in stating that the pharmacy will not give her a full month supply for her Dexcom and was told to call office. Wanted her get more samples from the office. Pls call and advise.

## 2022-03-18 RX ORDER — BLOOD-GLUCOSE SENSOR
EACH MISCELLANEOUS
Qty: 3 EACH | Refills: 0 | OUTPATIENT
Start: 2022-03-18

## 2022-03-18 NOTE — TELEPHONE ENCOUNTER
I spoke with the patient about this. I called pharm she is approved to get a full 30 supply. It will be ready for her to .

## 2022-04-07 DIAGNOSIS — E11.42 DIABETIC PERIPHERAL NEUROPATHY (HCC): ICD-10-CM

## 2022-04-08 RX ORDER — GABAPENTIN 300 MG/1
CAPSULE ORAL
Qty: 210 CAPSULE | Refills: 1 | Status: SHIPPED | OUTPATIENT
Start: 2022-04-08 | End: 2022-06-06

## 2022-04-08 RX ORDER — GABAPENTIN 300 MG/1
CAPSULE ORAL
Qty: 180 CAPSULE | Refills: 0 | OUTPATIENT
Start: 2022-04-08

## 2022-04-13 DIAGNOSIS — G40.909 SEIZURE DISORDER (HCC): ICD-10-CM

## 2022-04-14 RX ORDER — CLONAZEPAM 2 MG/1
TABLET ORAL
Qty: 60 TABLET | Refills: 0 | OUTPATIENT
Start: 2022-04-14

## 2022-04-19 ENCOUNTER — TELEPHONE (OUTPATIENT)
Dept: INTERNAL MEDICINE CLINIC | Age: 32
End: 2022-04-19

## 2022-04-19 RX ORDER — SULFAMETHOXAZOLE AND TRIMETHOPRIM 800; 160 MG/1; MG/1
1 TABLET ORAL 2 TIMES DAILY
Qty: 6 TABLET | Refills: 0 | Status: SHIPPED | OUTPATIENT
Start: 2022-04-19 | End: 2022-04-22

## 2022-04-19 NOTE — TELEPHONE ENCOUNTER
Pt thinks she has a UTI. .. says has the urge to use the restroom all the time, has an odor, itching and burning, nausea      Please call and advise

## 2022-04-19 NOTE — TELEPHONE ENCOUNTER
Pt has had two seizure within the last 3 days. Pt is up to 10 seizure since her last visit with  3 months ago. Pt is taking her epilepsy medication. Please advise.

## 2022-04-22 ENCOUNTER — HOSPITAL ENCOUNTER (OUTPATIENT)
Dept: ULTRASOUND IMAGING | Age: 32
Discharge: HOME | End: 2022-04-22
Payer: COMMERCIAL

## 2022-04-22 DIAGNOSIS — R74.8 ELEVATED LIVER ENZYMES: ICD-10-CM

## 2022-04-22 PROCEDURE — 76705 ECHO EXAM OF ABDOMEN: CPT

## 2022-04-27 DIAGNOSIS — R74.8 ELEVATED LIVER ENZYMES: Primary | ICD-10-CM

## 2022-05-06 ENCOUNTER — TELEPHONE (OUTPATIENT)
Dept: INTERNAL MEDICINE CLINIC | Age: 32
End: 2022-05-06

## 2022-05-06 DIAGNOSIS — G40.909 SEIZURE DISORDER (HCC): ICD-10-CM

## 2022-05-06 DIAGNOSIS — E10.8 TYPE 1 DIABETES MELLITUS WITH COMPLICATION (HCC): Primary | ICD-10-CM

## 2022-05-06 RX ORDER — CLONAZEPAM 2 MG/1
2 TABLET ORAL 2 TIMES DAILY
Qty: 60 TABLET | Refills: 2 | Status: SHIPPED | OUTPATIENT
Start: 2022-05-06 | End: 2022-08-12

## 2022-05-06 RX ORDER — LEVETIRACETAM 500 MG/1
1000 TABLET ORAL 2 TIMES DAILY
Qty: 120 TABLET | Refills: 5 | Status: SHIPPED | OUTPATIENT
Start: 2022-05-06 | End: 2022-09-05 | Stop reason: SDUPTHER

## 2022-05-06 NOTE — TELEPHONE ENCOUNTER
Is she not taking the keppra as well? Does she need referral back to same office or different referral altogether?

## 2022-05-06 NOTE — TELEPHONE ENCOUNTER
Spoke to pt gave referral information   Also gave her the ultrasound results since we had not been successful reaching her. She has not had regular blood work in a while and she stated she keeps having DK so she thinks her sugar is very high.  Do you want to add other labs as well to what's already order

## 2022-05-06 NOTE — TELEPHONE ENCOUNTER
Sent the clonazepam  Can you verify what seizure medication and dose she is taking, and whether she has an appointment scheduled with neurology?

## 2022-05-06 NOTE — TELEPHONE ENCOUNTER
Pt called and states 2 Seizures in the car and 2 seizures at home x 2 days and pt did not go to the ER and felt ok. Needs a refill on Klonopin 2 mg twice a day. Would like an appointment? Pharmacy verified.

## 2022-05-24 ENCOUNTER — HOSPITAL ENCOUNTER (INPATIENT)
Age: 32
LOS: 4 days | Discharge: HOME | DRG: 420 | End: 2022-05-28
Attending: INTERNAL MEDICINE | Admitting: INTERNAL MEDICINE
Payer: COMMERCIAL

## 2022-05-24 ENCOUNTER — APPOINTMENT (OUTPATIENT)
Dept: GENERAL RADIOLOGY | Age: 32
DRG: 420 | End: 2022-05-24
Payer: COMMERCIAL

## 2022-05-24 ENCOUNTER — TELEPHONE (OUTPATIENT)
Dept: INTERNAL MEDICINE CLINIC | Age: 32
End: 2022-05-24

## 2022-05-24 DIAGNOSIS — E10.8 TYPE 1 DIABETES MELLITUS WITH COMPLICATION (HCC): ICD-10-CM

## 2022-05-24 DIAGNOSIS — E10.10 TYPE 1 DIABETES MELLITUS WITH KETOACIDOSIS WITHOUT COMA (HCC): Primary | ICD-10-CM

## 2022-05-24 DIAGNOSIS — Z91.14 NONADHERENCE TO MEDICATION: ICD-10-CM

## 2022-05-24 DIAGNOSIS — R74.8 ELEVATED LIVER ENZYMES: ICD-10-CM

## 2022-05-24 DIAGNOSIS — R79.89 ELEVATED LFTS: ICD-10-CM

## 2022-05-24 PROBLEM — E11.10 DKA, TYPE 2, NOT AT GOAL (HCC): Status: ACTIVE | Noted: 2022-05-24

## 2022-05-24 LAB
A/G RATIO: 1 (ref 1.1–2.2)
ALBUMIN SERPL-MCNC: 3.8 G/DL (ref 3.4–5)
ALP BLD-CCNC: 189 U/L (ref 40–129)
ALT SERPL-CCNC: 99 U/L (ref 10–40)
ANION GAP SERPL CALCULATED.3IONS-SCNC: 23 MMOL/L (ref 3–16)
AST SERPL-CCNC: 197 U/L (ref 15–37)
BACTERIA: ABNORMAL /HPF
BASE EXCESS VENOUS: -7.3 MMOL/L
BASE EXCESS VENOUS: -9.3 MMOL/L
BASOPHILS ABSOLUTE: 0.1 K/UL (ref 0–0.2)
BASOPHILS ABSOLUTE: 0.1 K/UL (ref 0–0.2)
BASOPHILS RELATIVE PERCENT: 0.8 %
BASOPHILS RELATIVE PERCENT: 0.9 %
BETA-HYDROXYBUTYRATE: 4.18 MMOL/L (ref 0–0.27)
BILIRUB SERPL-MCNC: 0.7 MG/DL (ref 0–1)
BILIRUBIN URINE: NEGATIVE
BLOOD, URINE: ABNORMAL
BUN BLDV-MCNC: 5 MG/DL (ref 7–20)
CALCIUM SERPL-MCNC: 8.5 MG/DL (ref 8.3–10.6)
CARBOXYHEMOGLOBIN: 1 %
CARBOXYHEMOGLOBIN: 1.1 %
CHLORIDE BLD-SCNC: 76 MMOL/L (ref 99–110)
CLARITY: CLEAR
CO2: 15 MMOL/L (ref 21–32)
COLOR: YELLOW
CREAT SERPL-MCNC: 0.7 MG/DL (ref 0.6–1.1)
EOSINOPHILS ABSOLUTE: 0 K/UL (ref 0–0.6)
EOSINOPHILS ABSOLUTE: 0.1 K/UL (ref 0–0.6)
EOSINOPHILS RELATIVE PERCENT: 0.5 %
EOSINOPHILS RELATIVE PERCENT: 1.5 %
EPITHELIAL CELLS, UA: ABNORMAL /HPF (ref 0–5)
GFR AFRICAN AMERICAN: >60
GFR NON-AFRICAN AMERICAN: >60
GLUCOSE BLD-MCNC: 1331 MG/DL (ref 70–99)
GLUCOSE BLD-MCNC: 1349 MG/DL (ref 70–99)
GLUCOSE BLD-MCNC: >600 MG/DL (ref 70–99)
GLUCOSE BLD-MCNC: >600 MG/DL (ref 70–99)
GLUCOSE URINE: >=1000 MG/DL
HCG QUALITATIVE: NEGATIVE
HCO3 VENOUS: 18 MMOL/L (ref 23–29)
HCO3 VENOUS: 20 MMOL/L (ref 23–29)
HCT VFR BLD CALC: 41.1 % (ref 36–48)
HCT VFR BLD CALC: 42.7 % (ref 36–48)
HEMOGLOBIN: 12.3 G/DL (ref 12–16)
HEMOGLOBIN: 12.8 G/DL (ref 12–16)
KEPPRA DOSE AMT: ABNORMAL
KEPPRA: <2 UG/ML (ref 6–46)
KETONES, URINE: 15 MG/DL
LEUKOCYTE ESTERASE, URINE: NEGATIVE
LYMPHOCYTES ABSOLUTE: 2.3 K/UL (ref 1–5.1)
LYMPHOCYTES ABSOLUTE: 2.6 K/UL (ref 1–5.1)
LYMPHOCYTES RELATIVE PERCENT: 26.3 %
LYMPHOCYTES RELATIVE PERCENT: 31.9 %
MCH RBC QN AUTO: 28.6 PG (ref 26–34)
MCH RBC QN AUTO: 28.8 PG (ref 26–34)
MCHC RBC AUTO-ENTMCNC: 28.7 G/DL (ref 31–36)
MCHC RBC AUTO-ENTMCNC: 31.2 G/DL (ref 31–36)
MCV RBC AUTO: 100.5 FL (ref 80–100)
MCV RBC AUTO: 91.9 FL (ref 80–100)
METHEMOGLOBIN VENOUS: 0.5 %
METHEMOGLOBIN VENOUS: 0.6 %
MICROSCOPIC EXAMINATION: YES
MONOCYTES ABSOLUTE: 0.5 K/UL (ref 0–1.3)
MONOCYTES ABSOLUTE: 0.6 K/UL (ref 0–1.3)
MONOCYTES RELATIVE PERCENT: 6.2 %
MONOCYTES RELATIVE PERCENT: 6.8 %
MUCUS: ABNORMAL /LPF
NEUTROPHILS ABSOLUTE: 4.9 K/UL (ref 1.7–7.7)
NEUTROPHILS ABSOLUTE: 5.8 K/UL (ref 1.7–7.7)
NEUTROPHILS RELATIVE PERCENT: 58.9 %
NEUTROPHILS RELATIVE PERCENT: 66.2 %
NITRITE, URINE: NEGATIVE
O2 SAT, VEN: 26 %
O2 SAT, VEN: 37 %
O2 THERAPY: ABNORMAL
O2 THERAPY: ABNORMAL
PCO2, VEN: 45.5 MMHG (ref 40–50)
PCO2, VEN: 48.6 MMHG (ref 40–50)
PDW BLD-RTO: 16 % (ref 12.4–15.4)
PDW BLD-RTO: 16.3 % (ref 12.4–15.4)
PERFORMED ON: ABNORMAL
PERFORMED ON: ABNORMAL
PH UA: 5.5 (ref 5–8)
PH VENOUS: 7.22 (ref 7.35–7.45)
PH VENOUS: 7.23 (ref 7.35–7.45)
PHENYTOIN DOSE AMOUNT: ABNORMAL
PHENYTOIN LEVEL: <0.8 UG/ML (ref 10–20)
PLATELET # BLD: 384 K/UL (ref 135–450)
PLATELET # BLD: 412 K/UL (ref 135–450)
PMV BLD AUTO: 8.7 FL (ref 5–10.5)
PMV BLD AUTO: 9.2 FL (ref 5–10.5)
PO2, VEN: <30 MMHG
PO2, VEN: <30 MMHG
POTASSIUM REFLEX MAGNESIUM: 4.8 MMOL/L (ref 3.5–5.1)
PROTEIN UA: NEGATIVE MG/DL
RBC # BLD: 4.25 M/UL (ref 4–5.2)
RBC # BLD: 4.47 M/UL (ref 4–5.2)
RBC UA: ABNORMAL /HPF (ref 0–4)
SODIUM BLD-SCNC: 114 MMOL/L (ref 136–145)
SPECIFIC GRAVITY UA: 1.03 (ref 1–1.03)
TCO2 CALC VENOUS: 20 MMOL/L
TCO2 CALC VENOUS: 22 MMOL/L
TOTAL PROTEIN: 7.5 G/DL (ref 6.4–8.2)
URINE REFLEX TO CULTURE: ABNORMAL
URINE TYPE: ABNORMAL
UROBILINOGEN, URINE: 0.2 E.U./DL
WBC # BLD: 8.3 K/UL (ref 4–11)
WBC # BLD: 8.8 K/UL (ref 4–11)
WBC UA: ABNORMAL /HPF (ref 0–5)

## 2022-05-24 PROCEDURE — 84703 CHORIONIC GONADOTROPIN ASSAY: CPT

## 2022-05-24 PROCEDURE — 93005 ELECTROCARDIOGRAM TRACING: CPT | Performed by: NURSE PRACTITIONER

## 2022-05-24 PROCEDURE — 81001 URINALYSIS AUTO W/SCOPE: CPT

## 2022-05-24 PROCEDURE — 6360000002 HC RX W HCPCS: Performed by: NURSE PRACTITIONER

## 2022-05-24 PROCEDURE — 82947 ASSAY GLUCOSE BLOOD QUANT: CPT

## 2022-05-24 PROCEDURE — 6370000000 HC RX 637 (ALT 250 FOR IP): Performed by: INTERNAL MEDICINE

## 2022-05-24 PROCEDURE — 96374 THER/PROPH/DIAG INJ IV PUSH: CPT

## 2022-05-24 PROCEDURE — 99285 EMERGENCY DEPT VISIT HI MDM: CPT

## 2022-05-24 PROCEDURE — 2580000003 HC RX 258: Performed by: NURSE PRACTITIONER

## 2022-05-24 PROCEDURE — 80053 COMPREHEN METABOLIC PANEL: CPT

## 2022-05-24 PROCEDURE — 80177 DRUG SCRN QUAN LEVETIRACETAM: CPT

## 2022-05-24 PROCEDURE — 6370000000 HC RX 637 (ALT 250 FOR IP): Performed by: NURSE PRACTITIONER

## 2022-05-24 PROCEDURE — 82010 KETONE BODYS QUAN: CPT

## 2022-05-24 PROCEDURE — 71045 X-RAY EXAM CHEST 1 VIEW: CPT

## 2022-05-24 PROCEDURE — 36415 COLL VENOUS BLD VENIPUNCTURE: CPT

## 2022-05-24 PROCEDURE — 80185 ASSAY OF PHENYTOIN TOTAL: CPT

## 2022-05-24 PROCEDURE — 2580000003 HC RX 258: Performed by: INTERNAL MEDICINE

## 2022-05-24 PROCEDURE — 85025 COMPLETE CBC W/AUTO DIFF WBC: CPT

## 2022-05-24 PROCEDURE — 2000000000 HC ICU R&B

## 2022-05-24 PROCEDURE — 82803 BLOOD GASES ANY COMBINATION: CPT

## 2022-05-24 RX ORDER — SODIUM CHLORIDE 450 MG/100ML
INJECTION, SOLUTION INTRAVENOUS CONTINUOUS
Status: DISCONTINUED | OUTPATIENT
Start: 2022-05-25 | End: 2022-05-28

## 2022-05-24 RX ORDER — FAMOTIDINE 20 MG/1
20 TABLET, FILM COATED ORAL 2 TIMES DAILY
Status: DISCONTINUED | OUTPATIENT
Start: 2022-05-24 | End: 2022-05-28 | Stop reason: HOSPADM

## 2022-05-24 RX ORDER — SODIUM CHLORIDE 9 MG/ML
25 INJECTION, SOLUTION INTRAVENOUS PRN
Status: DISCONTINUED | OUTPATIENT
Start: 2022-05-24 | End: 2022-05-28 | Stop reason: HOSPADM

## 2022-05-24 RX ORDER — POTASSIUM CHLORIDE 7.45 MG/ML
10 INJECTION INTRAVENOUS PRN
Status: DISCONTINUED | OUTPATIENT
Start: 2022-05-24 | End: 2022-05-25

## 2022-05-24 RX ORDER — DEXTROSE AND SODIUM CHLORIDE 5; .45 G/100ML; G/100ML
INJECTION, SOLUTION INTRAVENOUS CONTINUOUS PRN
Status: DISCONTINUED | OUTPATIENT
Start: 2022-05-24 | End: 2022-05-28 | Stop reason: HOSPADM

## 2022-05-24 RX ORDER — LEVETIRACETAM 10 MG/ML
1000 INJECTION INTRAVASCULAR ONCE
Status: COMPLETED | OUTPATIENT
Start: 2022-05-24 | End: 2022-05-24

## 2022-05-24 RX ORDER — CLONAZEPAM 1 MG/1
2 TABLET ORAL 2 TIMES DAILY
Status: DISCONTINUED | OUTPATIENT
Start: 2022-05-24 | End: 2022-05-28 | Stop reason: HOSPADM

## 2022-05-24 RX ORDER — LEVETIRACETAM 500 MG/1
1000 TABLET ORAL 2 TIMES DAILY
Status: DISCONTINUED | OUTPATIENT
Start: 2022-05-24 | End: 2022-05-28 | Stop reason: HOSPADM

## 2022-05-24 RX ORDER — 0.9 % SODIUM CHLORIDE 0.9 %
15 INTRAVENOUS SOLUTION INTRAVENOUS ONCE
Status: COMPLETED | OUTPATIENT
Start: 2022-05-24 | End: 2022-05-25

## 2022-05-24 RX ORDER — GABAPENTIN 400 MG/1
1200 CAPSULE ORAL NIGHTLY
Status: DISCONTINUED | OUTPATIENT
Start: 2022-05-24 | End: 2022-05-28 | Stop reason: HOSPADM

## 2022-05-24 RX ORDER — ACETAMINOPHEN 325 MG/1
650 TABLET ORAL EVERY 4 HOURS PRN
Status: DISCONTINUED | OUTPATIENT
Start: 2022-05-24 | End: 2022-05-28 | Stop reason: HOSPADM

## 2022-05-24 RX ORDER — POLYETHYLENE GLYCOL 3350 17 G/17G
17 POWDER, FOR SOLUTION ORAL DAILY PRN
Status: DISCONTINUED | OUTPATIENT
Start: 2022-05-24 | End: 2022-05-28 | Stop reason: HOSPADM

## 2022-05-24 RX ORDER — LIDOCAINE HYDROCHLORIDE 10 MG/ML
5 INJECTION, SOLUTION EPIDURAL; INFILTRATION; INTRACAUDAL; PERINEURAL ONCE
Status: DISCONTINUED | OUTPATIENT
Start: 2022-05-24 | End: 2022-05-28 | Stop reason: HOSPADM

## 2022-05-24 RX ORDER — 0.9 % SODIUM CHLORIDE 0.9 %
2000 INTRAVENOUS SOLUTION INTRAVENOUS ONCE
Status: COMPLETED | OUTPATIENT
Start: 2022-05-24 | End: 2022-05-24

## 2022-05-24 RX ORDER — NADOLOL 20 MG/1
10 TABLET ORAL DAILY
Status: DISCONTINUED | OUTPATIENT
Start: 2022-05-25 | End: 2022-05-28 | Stop reason: HOSPADM

## 2022-05-24 RX ORDER — GABAPENTIN 300 MG/1
300 CAPSULE ORAL 3 TIMES DAILY
Status: DISCONTINUED | OUTPATIENT
Start: 2022-05-25 | End: 2022-05-28 | Stop reason: HOSPADM

## 2022-05-24 RX ORDER — ONDANSETRON 2 MG/ML
4 INJECTION INTRAMUSCULAR; INTRAVENOUS ONCE
Status: COMPLETED | OUTPATIENT
Start: 2022-05-24 | End: 2022-05-24

## 2022-05-24 RX ORDER — DEXTROSE MONOHYDRATE 50 MG/ML
100 INJECTION, SOLUTION INTRAVENOUS PRN
Status: DISCONTINUED | OUTPATIENT
Start: 2022-05-24 | End: 2022-05-24

## 2022-05-24 RX ORDER — ENOXAPARIN SODIUM 100 MG/ML
30 INJECTION SUBCUTANEOUS DAILY
Status: DISCONTINUED | OUTPATIENT
Start: 2022-05-25 | End: 2022-05-28 | Stop reason: HOSPADM

## 2022-05-24 RX ORDER — SODIUM CHLORIDE 0.9 % (FLUSH) 0.9 %
5-40 SYRINGE (ML) INJECTION EVERY 12 HOURS SCHEDULED
Status: DISCONTINUED | OUTPATIENT
Start: 2022-05-24 | End: 2022-05-28 | Stop reason: HOSPADM

## 2022-05-24 RX ORDER — MAGNESIUM SULFATE 1 G/100ML
1000 INJECTION INTRAVENOUS PRN
Status: DISCONTINUED | OUTPATIENT
Start: 2022-05-24 | End: 2022-05-28 | Stop reason: HOSPADM

## 2022-05-24 RX ORDER — SODIUM CHLORIDE 0.9 % (FLUSH) 0.9 %
5-40 SYRINGE (ML) INJECTION PRN
Status: DISCONTINUED | OUTPATIENT
Start: 2022-05-24 | End: 2022-05-28 | Stop reason: HOSPADM

## 2022-05-24 RX ADMIN — SODIUM CHLORIDE 0.15 UNITS/KG/HR: 9 INJECTION, SOLUTION INTRAVENOUS at 22:46

## 2022-05-24 RX ADMIN — GABAPENTIN 1200 MG: 400 CAPSULE ORAL at 23:11

## 2022-05-24 RX ADMIN — SODIUM CHLORIDE 2000 ML: 9 INJECTION, SOLUTION INTRAVENOUS at 20:45

## 2022-05-24 RX ADMIN — LEVETIRACETAM 1000 MG: 500 TABLET, FILM COATED ORAL at 23:11

## 2022-05-24 RX ADMIN — CLONAZEPAM 2 MG: 1 TABLET ORAL at 23:11

## 2022-05-24 RX ADMIN — SODIUM CHLORIDE 0.1 UNITS/KG/HR: 9 INJECTION, SOLUTION INTRAVENOUS at 20:48

## 2022-05-24 RX ADMIN — ONDANSETRON 4 MG: 2 INJECTION INTRAMUSCULAR; INTRAVENOUS at 20:49

## 2022-05-24 RX ADMIN — SODIUM CHLORIDE 720 ML: 9 INJECTION, SOLUTION INTRAVENOUS at 23:34

## 2022-05-24 RX ADMIN — LEVETIRACETAM 1000 MG: 10 INJECTION INTRAVENOUS at 21:39

## 2022-05-24 RX ADMIN — FAMOTIDINE 20 MG: 20 TABLET ORAL at 23:11

## 2022-05-24 RX ADMIN — SODIUM CHLORIDE: 4.5 INJECTION, SOLUTION INTRAVENOUS at 22:57

## 2022-05-24 RX ADMIN — SODIUM CHLORIDE 0.1 UNITS/KG/HR: 9 INJECTION, SOLUTION INTRAVENOUS at 22:00

## 2022-05-24 ASSESSMENT — ENCOUNTER SYMPTOMS
EYE PAIN: 0
VOMITING: 1
BACK PAIN: 0
CHEST TIGHTNESS: 0
DIARRHEA: 1
WHEEZING: 0
SHORTNESS OF BREATH: 0
RESPIRATORY NEGATIVE: 1
ABDOMINAL PAIN: 0
SORE THROAT: 0
NAUSEA: 1
COUGH: 0

## 2022-05-24 ASSESSMENT — PAIN - FUNCTIONAL ASSESSMENT: PAIN_FUNCTIONAL_ASSESSMENT: NONE - DENIES PAIN

## 2022-05-24 NOTE — TELEPHONE ENCOUNTER
Terrie Rios from [address] [place] [address],[address] call:    Spouse states pt Blood sugar is above 600 and pt is having trouble breathing, headaches, nausea, sweating and     Per Dr. Fer Stewart needs to go to the ER.      Spouse verbalized understanding and will go to [facility].

## 2022-05-24 NOTE — ED NOTES
accu check reading [facility] CALLIE Carlton RN notified          Benjamin Greene  05/24/22 1910

## 2022-05-25 ENCOUNTER — TELEPHONE (OUTPATIENT)
Dept: INTERNAL MEDICINE CLINIC | Age: 32
End: 2022-05-25

## 2022-05-25 PROBLEM — R11.2 NAUSEA AND VOMITING: Status: ACTIVE | Noted: 2022-05-25

## 2022-05-25 PROBLEM — R19.7 DIARRHEA: Status: ACTIVE | Noted: 2022-05-25

## 2022-05-25 LAB
A/G RATIO: 1.1 (ref 1.1–2.2)
ALBUMIN SERPL-MCNC: 4.2 G/DL (ref 3.4–5)
ALP BLD-CCNC: 209 U/L (ref 40–129)
ALT SERPL-CCNC: 104 U/L (ref 10–40)
ANION GAP SERPL CALCULATED.3IONS-SCNC: 11 MMOL/L (ref 3–16)
ANION GAP SERPL CALCULATED.3IONS-SCNC: 12 MMOL/L (ref 3–16)
ANION GAP SERPL CALCULATED.3IONS-SCNC: 15 MMOL/L (ref 3–16)
ANION GAP SERPL CALCULATED.3IONS-SCNC: 18 MMOL/L (ref 3–16)
ANION GAP SERPL CALCULATED.3IONS-SCNC: 23 MMOL/L (ref 3–16)
ANION GAP SERPL CALCULATED.3IONS-SCNC: 27 MMOL/L (ref 3–16)
AST SERPL-CCNC: 223 U/L (ref 15–37)
BILIRUB SERPL-MCNC: 0.5 MG/DL (ref 0–1)
BUN BLDV-MCNC: 2 MG/DL (ref 7–20)
BUN BLDV-MCNC: 2 MG/DL (ref 7–20)
BUN BLDV-MCNC: 3 MG/DL (ref 7–20)
BUN BLDV-MCNC: 3 MG/DL (ref 7–20)
BUN BLDV-MCNC: 4 MG/DL (ref 7–20)
BUN BLDV-MCNC: 6 MG/DL (ref 7–20)
CALCIUM SERPL-MCNC: 7.9 MG/DL (ref 8.3–10.6)
CALCIUM SERPL-MCNC: 8.1 MG/DL (ref 8.3–10.6)
CALCIUM SERPL-MCNC: 8.1 MG/DL (ref 8.3–10.6)
CALCIUM SERPL-MCNC: 8.3 MG/DL (ref 8.3–10.6)
CALCIUM SERPL-MCNC: 8.4 MG/DL (ref 8.3–10.6)
CALCIUM SERPL-MCNC: 9.6 MG/DL (ref 8.3–10.6)
CHLORIDE BLD-SCNC: 102 MMOL/L (ref 99–110)
CHLORIDE BLD-SCNC: 102 MMOL/L (ref 99–110)
CHLORIDE BLD-SCNC: 103 MMOL/L (ref 99–110)
CHLORIDE BLD-SCNC: 86 MMOL/L (ref 99–110)
CHLORIDE BLD-SCNC: 96 MMOL/L (ref 99–110)
CHLORIDE BLD-SCNC: 98 MMOL/L (ref 99–110)
CO2: 13 MMOL/L (ref 21–32)
CO2: 18 MMOL/L (ref 21–32)
CO2: 19 MMOL/L (ref 21–32)
CO2: 20 MMOL/L (ref 21–32)
CO2: 22 MMOL/L (ref 21–32)
CO2: 23 MMOL/L (ref 21–32)
CREAT SERPL-MCNC: 0.6 MG/DL (ref 0.6–1.1)
CREAT SERPL-MCNC: 0.7 MG/DL (ref 0.6–1.1)
CREAT SERPL-MCNC: <0.5 MG/DL (ref 0.6–1.1)
EKG ATRIAL RATE: 93 BPM
EKG DIAGNOSIS: NORMAL
EKG P AXIS: 66 DEGREES
EKG P-R INTERVAL: 140 MS
EKG Q-T INTERVAL: 364 MS
EKG QRS DURATION: 70 MS
EKG QTC CALCULATION (BAZETT): 452 MS
EKG R AXIS: 77 DEGREES
EKG T AXIS: 40 DEGREES
EKG VENTRICULAR RATE: 93 BPM
ESTIMATED AVERAGE GLUCOSE: 343.6 MG/DL
GAMMA GLUTAMYL TRANSFERASE: 200 U/L (ref 5–36)
GFR AFRICAN AMERICAN: >60
GFR NON-AFRICAN AMERICAN: >60
GLUCOSE BLD-MCNC: 106 MG/DL (ref 70–99)
GLUCOSE BLD-MCNC: 107 MG/DL (ref 70–99)
GLUCOSE BLD-MCNC: 111 MG/DL (ref 70–99)
GLUCOSE BLD-MCNC: 114 MG/DL (ref 70–99)
GLUCOSE BLD-MCNC: 115 MG/DL (ref 70–99)
GLUCOSE BLD-MCNC: 118 MG/DL (ref 70–99)
GLUCOSE BLD-MCNC: 127 MG/DL (ref 70–99)
GLUCOSE BLD-MCNC: 140 MG/DL (ref 70–99)
GLUCOSE BLD-MCNC: 171 MG/DL (ref 70–99)
GLUCOSE BLD-MCNC: 203 MG/DL (ref 70–99)
GLUCOSE BLD-MCNC: 209 MG/DL (ref 70–99)
GLUCOSE BLD-MCNC: 214 MG/DL (ref 70–99)
GLUCOSE BLD-MCNC: 226 MG/DL (ref 70–99)
GLUCOSE BLD-MCNC: 270 MG/DL (ref 70–99)
GLUCOSE BLD-MCNC: 270 MG/DL (ref 70–99)
GLUCOSE BLD-MCNC: 303 MG/DL (ref 70–99)
GLUCOSE BLD-MCNC: 305 MG/DL (ref 70–99)
GLUCOSE BLD-MCNC: 307 MG/DL (ref 70–99)
GLUCOSE BLD-MCNC: 40 MG/DL (ref 70–99)
GLUCOSE BLD-MCNC: 45 MG/DL (ref 70–99)
GLUCOSE BLD-MCNC: 48 MG/DL (ref 70–99)
GLUCOSE BLD-MCNC: 51 MG/DL (ref 70–99)
GLUCOSE BLD-MCNC: 52 MG/DL (ref 70–99)
GLUCOSE BLD-MCNC: 53 MG/DL (ref 70–99)
GLUCOSE BLD-MCNC: 55 MG/DL (ref 70–99)
GLUCOSE BLD-MCNC: 56 MG/DL (ref 70–99)
GLUCOSE BLD-MCNC: 57 MG/DL (ref 70–99)
GLUCOSE BLD-MCNC: 60 MG/DL (ref 70–99)
GLUCOSE BLD-MCNC: 64 MG/DL (ref 70–99)
GLUCOSE BLD-MCNC: 66 MG/DL (ref 70–99)
GLUCOSE BLD-MCNC: 67 MG/DL (ref 70–99)
GLUCOSE BLD-MCNC: 68 MG/DL (ref 70–99)
GLUCOSE BLD-MCNC: 77 MG/DL (ref 70–99)
GLUCOSE BLD-MCNC: 78 MG/DL (ref 70–99)
GLUCOSE BLD-MCNC: 79 MG/DL (ref 70–99)
GLUCOSE BLD-MCNC: 85 MG/DL (ref 70–99)
GLUCOSE BLD-MCNC: 86 MG/DL (ref 70–99)
GLUCOSE BLD-MCNC: 881 MG/DL (ref 70–99)
GLUCOSE BLD-MCNC: 90 MG/DL (ref 70–99)
HBA1C MFR BLD: 13.6 %
HBV SURFACE AB TITR SER: 4.09 MIU/ML
HEPATITIS B SURFACE ANTIGEN INTERPRETATION: NORMAL
IGA: 553 MG/DL (ref 70–400)
IRON SATURATION: 20 % (ref 15–50)
IRON: 66 UG/DL (ref 37–145)
MAGNESIUM: 1.7 MG/DL (ref 1.8–2.4)
MAGNESIUM: 1.8 MG/DL (ref 1.8–2.4)
MAGNESIUM: 1.9 MG/DL (ref 1.8–2.4)
MAGNESIUM: 1.9 MG/DL (ref 1.8–2.4)
MAGNESIUM: 2 MG/DL (ref 1.8–2.4)
PERFORMED ON: ABNORMAL
PERFORMED ON: NORMAL
PHOSPHORUS: 2.3 MG/DL (ref 2.5–4.9)
PHOSPHORUS: 2.4 MG/DL (ref 2.5–4.9)
PHOSPHORUS: 2.6 MG/DL (ref 2.5–4.9)
PHOSPHORUS: 3 MG/DL (ref 2.5–4.9)
PHOSPHORUS: 3.4 MG/DL (ref 2.5–4.9)
POTASSIUM SERPL-SCNC: 2.8 MMOL/L (ref 3.5–5.1)
POTASSIUM SERPL-SCNC: 2.9 MMOL/L (ref 3.5–5.1)
POTASSIUM SERPL-SCNC: 3.2 MMOL/L (ref 3.5–5.1)
POTASSIUM SERPL-SCNC: 3.8 MMOL/L (ref 3.5–5.1)
POTASSIUM SERPL-SCNC: 4.1 MMOL/L (ref 3.5–5.1)
POTASSIUM SERPL-SCNC: 5.2 MMOL/L (ref 3.5–5.1)
SODIUM BLD-SCNC: 131 MMOL/L (ref 136–145)
SODIUM BLD-SCNC: 132 MMOL/L (ref 136–145)
SODIUM BLD-SCNC: 135 MMOL/L (ref 136–145)
SODIUM BLD-SCNC: 136 MMOL/L (ref 136–145)
SODIUM BLD-SCNC: 137 MMOL/L (ref 136–145)
SODIUM BLD-SCNC: 137 MMOL/L (ref 136–145)
TISSUE TRANSGLUTAMINASE IGA: <0.5 U/ML (ref 0–14)
TOTAL IRON BINDING CAPACITY: 331 UG/DL (ref 260–445)
TOTAL PROTEIN: 8 G/DL (ref 6.4–8.2)
TSH REFLEX: 2.78 UIU/ML (ref 0.27–4.2)

## 2022-05-25 PROCEDURE — 84100 ASSAY OF PHOSPHORUS: CPT

## 2022-05-25 PROCEDURE — 2500000003 HC RX 250 WO HCPCS: Performed by: INTERNAL MEDICINE

## 2022-05-25 PROCEDURE — 6370000000 HC RX 637 (ALT 250 FOR IP): Performed by: INTERNAL MEDICINE

## 2022-05-25 PROCEDURE — 02HV33Z INSERTION OF INFUSION DEVICE INTO SUPERIOR VENA CAVA, PERCUTANEOUS APPROACH: ICD-10-PCS | Performed by: FAMILY MEDICINE

## 2022-05-25 PROCEDURE — 36569 INSJ PICC 5 YR+ W/O IMAGING: CPT

## 2022-05-25 PROCEDURE — 2580000003 HC RX 258: Performed by: FAMILY MEDICINE

## 2022-05-25 PROCEDURE — 6370000000 HC RX 637 (ALT 250 FOR IP): Performed by: FAMILY MEDICINE

## 2022-05-25 PROCEDURE — 36592 COLLECT BLOOD FROM PICC: CPT

## 2022-05-25 PROCEDURE — 93010 ELECTROCARDIOGRAM REPORT: CPT | Performed by: INTERNAL MEDICINE

## 2022-05-25 PROCEDURE — 6360000002 HC RX W HCPCS: Performed by: INTERNAL MEDICINE

## 2022-05-25 PROCEDURE — 80048 BASIC METABOLIC PNL TOTAL CA: CPT

## 2022-05-25 PROCEDURE — 83735 ASSAY OF MAGNESIUM: CPT

## 2022-05-25 PROCEDURE — 82947 ASSAY GLUCOSE BLOOD QUANT: CPT

## 2022-05-25 PROCEDURE — 2580000003 HC RX 258: Performed by: INTERNAL MEDICINE

## 2022-05-25 PROCEDURE — 1200000000 HC SEMI PRIVATE

## 2022-05-25 PROCEDURE — 94760 N-INVAS EAR/PLS OXIMETRY 1: CPT

## 2022-05-25 PROCEDURE — 36415 COLL VENOUS BLD VENIPUNCTURE: CPT

## 2022-05-25 PROCEDURE — 76937 US GUIDE VASCULAR ACCESS: CPT

## 2022-05-25 RX ORDER — INSULIN LISPRO 100 [IU]/ML
3 INJECTION, SOLUTION INTRAVENOUS; SUBCUTANEOUS
Status: DISCONTINUED | OUTPATIENT
Start: 2022-05-26 | End: 2022-05-27

## 2022-05-25 RX ORDER — ONDANSETRON 2 MG/ML
4 INJECTION INTRAMUSCULAR; INTRAVENOUS EVERY 4 HOURS PRN
Status: DISCONTINUED | OUTPATIENT
Start: 2022-05-25 | End: 2022-05-28 | Stop reason: HOSPADM

## 2022-05-25 RX ORDER — INSULIN LISPRO 100 [IU]/ML
0-6 INJECTION, SOLUTION INTRAVENOUS; SUBCUTANEOUS
Status: DISCONTINUED | OUTPATIENT
Start: 2022-05-25 | End: 2022-05-27

## 2022-05-25 RX ORDER — LOPERAMIDE HYDROCHLORIDE 2 MG/1
2 CAPSULE ORAL 4 TIMES DAILY PRN
Status: DISCONTINUED | OUTPATIENT
Start: 2022-05-25 | End: 2022-05-28 | Stop reason: HOSPADM

## 2022-05-25 RX ORDER — POTASSIUM CHLORIDE 29.8 MG/ML
20 INJECTION INTRAVENOUS ONCE
Status: DISCONTINUED | OUTPATIENT
Start: 2022-05-25 | End: 2022-05-25

## 2022-05-25 RX ORDER — INSULIN LISPRO 100 [IU]/ML
0-18 INJECTION, SOLUTION INTRAVENOUS; SUBCUTANEOUS
Status: DISCONTINUED | OUTPATIENT
Start: 2022-05-25 | End: 2022-05-25

## 2022-05-25 RX ORDER — INSULIN LISPRO 100 [IU]/ML
0-3 INJECTION, SOLUTION INTRAVENOUS; SUBCUTANEOUS NIGHTLY
Status: DISCONTINUED | OUTPATIENT
Start: 2022-05-25 | End: 2022-05-27

## 2022-05-25 RX ORDER — DEXTROSE MONOHYDRATE 50 MG/ML
100 INJECTION, SOLUTION INTRAVENOUS PRN
Status: DISCONTINUED | OUTPATIENT
Start: 2022-05-25 | End: 2022-05-28 | Stop reason: HOSPADM

## 2022-05-25 RX ORDER — INSULIN GLARGINE 100 [IU]/ML
10 INJECTION, SOLUTION SUBCUTANEOUS
Status: DISCONTINUED | OUTPATIENT
Start: 2022-05-25 | End: 2022-05-27

## 2022-05-25 RX ORDER — INSULIN LISPRO 100 [IU]/ML
0-9 INJECTION, SOLUTION INTRAVENOUS; SUBCUTANEOUS NIGHTLY
Status: DISCONTINUED | OUTPATIENT
Start: 2022-05-25 | End: 2022-05-25

## 2022-05-25 RX ORDER — POTASSIUM CHLORIDE 29.8 MG/ML
20 INJECTION INTRAVENOUS PRN
Status: DISCONTINUED | OUTPATIENT
Start: 2022-05-25 | End: 2022-05-28 | Stop reason: HOSPADM

## 2022-05-25 RX ADMIN — GABAPENTIN 1200 MG: 400 CAPSULE ORAL at 21:15

## 2022-05-25 RX ADMIN — DEXTROSE MONOHYDRATE 125 ML: 100 INJECTION, SOLUTION INTRAVENOUS at 19:46

## 2022-05-25 RX ADMIN — SODIUM PHOSPHATE, MONOBASIC, MONOHYDRATE 10 MMOL: 276; 142 INJECTION, SOLUTION INTRAVENOUS at 02:03

## 2022-05-25 RX ADMIN — DEXTROSE MONOHYDRATE 250 ML: 100 INJECTION, SOLUTION INTRAVENOUS at 21:08

## 2022-05-25 RX ADMIN — ONDANSETRON 4 MG: 2 INJECTION INTRAMUSCULAR; INTRAVENOUS at 22:59

## 2022-05-25 RX ADMIN — ENOXAPARIN SODIUM 30 MG: 100 INJECTION SUBCUTANEOUS at 09:07

## 2022-05-25 RX ADMIN — CLONAZEPAM 2 MG: 1 TABLET ORAL at 21:15

## 2022-05-25 RX ADMIN — DEXTROSE MONOHYDRATE 125 ML: 100 INJECTION, SOLUTION INTRAVENOUS at 22:55

## 2022-05-25 RX ADMIN — DEXTROSE MONOHYDRATE 500 ML: 50 INJECTION, SOLUTION INTRAVENOUS at 16:26

## 2022-05-25 RX ADMIN — POTASSIUM CHLORIDE 10 MEQ: 7.46 INJECTION, SOLUTION INTRAVENOUS at 06:51

## 2022-05-25 RX ADMIN — GABAPENTIN 300 MG: 300 CAPSULE ORAL at 08:53

## 2022-05-25 RX ADMIN — POTASSIUM CHLORIDE 20 MEQ: 29.8 INJECTION, SOLUTION INTRAVENOUS at 12:17

## 2022-05-25 RX ADMIN — SODIUM CHLORIDE, PRESERVATIVE FREE 10 ML: 5 INJECTION INTRAVENOUS at 22:57

## 2022-05-25 RX ADMIN — LOPERAMIDE HYDROCHLORIDE 2 MG: 2 CAPSULE ORAL at 17:53

## 2022-05-25 RX ADMIN — NADOLOL 10 MG: 40 TABLET ORAL at 08:53

## 2022-05-25 RX ADMIN — SODIUM CHLORIDE, PRESERVATIVE FREE 10 ML: 5 INJECTION INTRAVENOUS at 08:53

## 2022-05-25 RX ADMIN — SODIUM CHLORIDE 6.3 UNITS/HR: 9 INJECTION, SOLUTION INTRAVENOUS at 07:34

## 2022-05-25 RX ADMIN — FAMOTIDINE 20 MG: 20 TABLET ORAL at 08:53

## 2022-05-25 RX ADMIN — LEVETIRACETAM 1000 MG: 500 TABLET, FILM COATED ORAL at 21:15

## 2022-05-25 RX ADMIN — POTASSIUM CHLORIDE 10 MEQ: 7.46 INJECTION, SOLUTION INTRAVENOUS at 05:36

## 2022-05-25 RX ADMIN — POTASSIUM CHLORIDE 10 MEQ: 7.46 INJECTION, SOLUTION INTRAVENOUS at 03:07

## 2022-05-25 RX ADMIN — POTASSIUM CHLORIDE 10 MEQ: 7.46 INJECTION, SOLUTION INTRAVENOUS at 04:14

## 2022-05-25 RX ADMIN — Medication 16 G: at 14:58

## 2022-05-25 RX ADMIN — SODIUM PHOSPHATE, MONOBASIC, MONOHYDRATE 10 MMOL: 276; 142 INJECTION, SOLUTION INTRAVENOUS at 06:09

## 2022-05-25 RX ADMIN — CLONAZEPAM 2 MG: 1 TABLET ORAL at 09:07

## 2022-05-25 RX ADMIN — LEVETIRACETAM 1000 MG: 500 TABLET, FILM COATED ORAL at 08:52

## 2022-05-25 RX ADMIN — POTASSIUM CHLORIDE 10 MEQ: 7.46 INJECTION, SOLUTION INTRAVENOUS at 02:00

## 2022-05-25 RX ADMIN — DEXTROSE MONOHYDRATE 125 ML: 100 INJECTION, SOLUTION INTRAVENOUS at 16:07

## 2022-05-25 RX ADMIN — INSULIN GLARGINE 10 UNITS: 100 INJECTION, SOLUTION SUBCUTANEOUS at 08:33

## 2022-05-25 RX ADMIN — POTASSIUM CHLORIDE 20 MEQ: 29.8 INJECTION, SOLUTION INTRAVENOUS at 13:43

## 2022-05-25 RX ADMIN — GABAPENTIN 300 MG: 300 CAPSULE ORAL at 13:29

## 2022-05-25 RX ADMIN — DEXTROSE AND SODIUM CHLORIDE: 5; 450 INJECTION, SOLUTION INTRAVENOUS at 01:48

## 2022-05-25 RX ADMIN — ONDANSETRON 4 MG: 2 INJECTION INTRAMUSCULAR; INTRAVENOUS at 01:56

## 2022-05-25 RX ADMIN — ONDANSETRON 4 MG: 2 INJECTION INTRAMUSCULAR; INTRAVENOUS at 11:51

## 2022-05-25 RX ADMIN — SODIUM CHLORIDE, PRESERVATIVE FREE 10 ML: 5 INJECTION INTRAVENOUS at 03:07

## 2022-05-25 RX ADMIN — GABAPENTIN 300 MG: 300 CAPSULE ORAL at 17:28

## 2022-05-25 RX ADMIN — DEXTROSE MONOHYDRATE 100 ML/HR: 50 INJECTION, SOLUTION INTRAVENOUS at 21:11

## 2022-05-25 RX ADMIN — FAMOTIDINE 20 MG: 20 TABLET ORAL at 21:15

## 2022-05-25 RX ADMIN — ONDANSETRON 4 MG: 2 INJECTION INTRAMUSCULAR; INTRAVENOUS at 17:53

## 2022-05-25 ASSESSMENT — PAIN SCALES - GENERAL
PAINLEVEL_OUTOF10: 0

## 2022-05-25 NOTE — ED PROVIDER NOTES
[facility] 2 ICU  EMERGENCY DEPARTMENT ENCOUNTER        Pt Name: Rick Kiran  MRN: [id number]  [place] 1990  Date of evaluation: 5/24/2022  Provider: MARI Hope - SAMMY  PCP: Lorenza Lance MD  Note Started: 9:55 PM EDT       MAGDALENA. I have evaluated this patient. My supervising physician was available for consultation. CHIEF COMPLAINT       Chief Complaint   Patient presents with    Hyperglycemia     pt states her glucose has been over 600 for a couple of days       HISTORY OF PRESENT ILLNESS   (Location, Timing/Onset, Context/Setting, Quality, Duration, Modifying Factors, Severity, Associated Signs and Symptoms)  Note limiting factors. Chief Complaint: My blood sugars been reading high at home    Rick Kiran is a 34 y.o. female who presents to the emergency department with complaints of hyperglycemia. She is a type I diabetic. She states that her pump and Accu-Chek have been acting up. For the past 3 days she was taking her glucose via fingerstick and she states that it has been reading \"high. \"  She is having some intermittent vomiting and diarrhea. No abdominal pain. No fevers or chills that she knows of. No chest pain or shortness of breath. Came to the emergency department for further evaluation and treatment. Nursing Notes were all reviewed and agreed with or any disagreements were addressed in the HPI. REVIEW OF SYSTEMS    (2-9 systems for level 4, 10 or more for level 5)     Review of Systems   Constitutional: Positive for fatigue. Negative for chills and fever. HENT: Negative for congestion and sore throat. Eyes: Negative for pain and visual disturbance. Respiratory: Negative. Negative for cough, chest tightness, shortness of breath and wheezing. Cardiovascular: Negative. Negative for chest pain, palpitations and leg swelling. Gastrointestinal: Positive for diarrhea, nausea and vomiting. Negative for abdominal pain.    Endocrine:        Blood sugars have been reading \"high\"   Genitourinary: Negative for dysuria and hematuria. Musculoskeletal: Negative for back pain, myalgias, neck pain and neck stiffness. Skin: Negative for rash and wound. Neurological: Positive for seizures. Negative for dizziness and light-headedness. Has had more frequent seizure activity recently, has worsened over the past month. Is not compliant with Keppra. All other systems reviewed and are negative. Positives and Pertinent negatives as per HPI. Except as noted above in the ROS, all other systems were reviewed and negative. PAST MEDICAL HISTORY     Past Medical History:   Diagnosis Date    Depression     Diabetes mellitus ([facility] [address]     Seizures ([facility] [address]          SURGICAL HISTORY     Past Surgical History:   Procedure Laterality Date     SECTION      TUBAL LIGATION           CURRENTMEDICATIONS       Current Discharge Medication List      CONTINUE these medications which have NOT CHANGED    Details   clonazePAM (KLONOPIN) 2 MG tablet Take 1 tablet by mouth 2 times daily. Qty: 60 tablet, Refills: 2    Associated Diagnoses: Seizure disorder (HCC)      levETIRAcetam (KEPPRA) 500 MG tablet Take 2 tablets by mouth 2 times daily  Qty: 120 tablet, Refills: 5      gabapentin (NEURONTIN) 300 MG capsule TAKE 1 CAPSULE BY MOUTH THREE TIMES DAILY DURING  THE  DAY  AND  4  CAPSULES  AT  BEDTIME  Qty: 210 capsule, Refills: 1    Associated Diagnoses: Diabetic peripheral neuropathy (HCC)      insulin aspart (NOVOLOG) 100 UNIT/ML injection vial Inject 100 Units into the skin daily In conjunction with omnipod  Qty: 30 mL, Refills: 5    Associated Diagnoses: Type 1 diabetes mellitus with complication (HCC)      blood glucose monitor strips Test 4-6 times a day & as needed for symptoms of irregular blood glucose. Dispense sufficient amount for indicated testing frequency plus additional to accommodate PRN testing needs.   Qty: 400 strip, Refills: 3    Comments: Brand per SOCIAL HISTORY       Social History     Tobacco Use    Smoking status: Never Smoker    Smokeless tobacco: Never Used   Vaping Use    Vaping Use: Never used   Substance Use Topics    Alcohol use: No    Drug use: No       SCREENINGS             PHYSICAL EXAM    (up to 7 for level 4, 8 or more for level 5)     ED Triage Vitals [05/24/22 1907]   BP Temp Temp Source Heart Rate Resp SpO2 Height Weight   130/87 97 °F (36.1 °C) Tympanic (!) 128 16 100 % 4' 10\" (1.473 m) 105 lb 13.1 oz (48 kg)       Physical Exam  Vitals and nursing note reviewed. Constitutional:       General: She is not in acute distress. Appearance: Normal appearance. She is not ill-appearing, toxic-appearing or diaphoretic. HENT:      Head: Normocephalic and atraumatic. No raccoon eyes, Rutherford's sign, right periorbital erythema or left periorbital erythema. Right Ear: Hearing and external ear normal.      Left Ear: Hearing and external ear normal.      Nose: Nose normal. No laceration, nasal tenderness, mucosal edema, congestion or rhinorrhea. Right Nostril: No epistaxis. Left Nostril: No epistaxis. Mouth/Throat:      Lips: Pink. No lesions. Mouth: Mucous membranes are moist.      Tongue: No lesions. Tongue does not deviate from midline. Pharynx: Oropharynx is clear. Uvula midline. No pharyngeal swelling, oropharyngeal exudate, posterior oropharyngeal erythema or uvula swelling. Tonsils: No tonsillar exudate or tonsillar abscesses. Eyes:      General: Lids are normal.         Right eye: No discharge. Left eye: No discharge. Extraocular Movements: Extraocular movements intact. Neck:      Trachea: Phonation normal. No abnormal tracheal secretions or tracheal deviation. Comments: No meningismus   Cardiovascular:      Rate and Rhythm: Regular rhythm. Tachycardia present. Pulses: Normal pulses. Heart sounds: Normal heart sounds. No murmur heard. No friction rub. No gallop. Pulmonary:      Effort: Pulmonary effort is normal. No respiratory distress. Breath sounds: Normal breath sounds. No stridor. No wheezing, rhonchi or rales. Chest:      Chest wall: No tenderness. Abdominal:      General: Bowel sounds are normal. There is no distension. Palpations: Abdomen is soft. There is no mass. Tenderness: There is no abdominal tenderness. There is no right CVA tenderness, left CVA tenderness, guarding or rebound. Hernia: No hernia is present. Musculoskeletal:         General: Normal range of motion. Cervical back: Full passive range of motion without pain, normal range of motion and neck supple. No rigidity. No spinous process tenderness or muscular tenderness. Lymphadenopathy:      Cervical: No cervical adenopathy. Skin:     General: Skin is warm and dry. Capillary Refill: Capillary refill takes less than 2 seconds. Neurological:      General: No focal deficit present. Mental Status: She is alert and oriented to person, place, and time. GCS: GCS eye subscore is 4. GCS verbal subscore is 5. GCS motor subscore is 6. Cranial Nerves: Cranial nerves are intact. Sensory: Sensation is intact. No sensory deficit. Motor: Motor function is intact. Coordination: Romberg sign negative. Gait: Gait is intact.    Psychiatric:         Mood and Affect: Mood normal.         Behavior: Behavior normal.         DIAGNOSTIC RESULTS   LABS:    Labs Reviewed   CBC WITH AUTO DIFFERENTIAL - Abnormal; Notable for the following components:       Result Value    .5 (*)     MCHC 28.7 (*)     RDW 16.3 (*)     All other components within normal limits   COMPREHENSIVE METABOLIC PANEL W/ REFLEX TO MG FOR LOW K - Abnormal; Notable for the following components:    Sodium 114 (*)     Chloride 76 (*)     CO2 15 (*)     Anion Gap 23 (*)     Glucose 1,349 (*)     BUN 5 (*)     Albumin/Globulin Ratio 1.0 (*)     Alkaline Phosphatase 189 (*)     ALT 99 (*)      (*)     All other components within normal limits    Narrative:     Andrew Smith tel. [phone],  Chemistry results called to and read back by Kaelyn Dixon RN, 05/24/2022 20:15, by  Joe Lara   BETA-HYDROXYBUTYRATE - Abnormal; Notable for the following components:    Beta-Hydroxybutyrate 4.18 (*)     All other components within normal limits   BLOOD GAS, VENOUS - Abnormal; Notable for the following components:    pH, Drew 7.231 (*)     HCO3, Venous 20 (*)     All other components within normal limits   URINALYSIS WITH REFLEX TO CULTURE - Abnormal; Notable for the following components:    Glucose, Ur >=1000 (*)     Ketones, Urine 15 (*)     Blood, Urine SMALL (*)     All other components within normal limits   MICROSCOPIC URINALYSIS - Abnormal; Notable for the following components:    Mucus, UA Rare (*)     RBC, UA 5-10 (*)     Bacteria, UA Rare (*)     All other components within normal limits   POCT GLUCOSE - Abnormal; Notable for the following components:    POC Glucose >600 (*)     All other components within normal limits   POCT GLUCOSE - Abnormal; Notable for the following components:    POC Glucose >600 (*)     All other components within normal limits   HCG, SERUM, QUALITATIVE   LEVETIRACETAM LEVEL    Narrative:     called ed for another lav for keprt   PHENYTOIN LEVEL, TOTAL   GLUCOSE, RANDOM   BLOOD GAS, VENOUS   POCT GLUCOSE   POCT GLUCOSE   POCT GLUCOSE   POCT GLUCOSE   POCT GLUCOSE   POCT GLUCOSE   POCT GLUCOSE   POCT GLUCOSE   POCT GLUCOSE   POCT GLUCOSE   POCT GLUCOSE   POCT GLUCOSE   POCT GLUCOSE   POCT GLUCOSE       When ordered only abnormal lab results are displayed. All other labs were within normal range or not returned as of this dictation. EKG: When ordered, EKG's are interpreted by the Emergency Department Physician in the absence of a cardiologist.  Please see their note for interpretation of EKG.     RADIOLOGY:   Non-plain film images such as CT, Ultrasound and MRI are read by the radiologistHamzah Quintana radiographic images are visualized and preliminarily interpreted by the ED Provider with the below findings:        Interpretation per the Radiologist below, if available at the time of this note:    XR CHEST PORTABLE   Final Result   No acute cardiopulmonary disease. XR CHEST PORTABLE    Result Date: 5/24/2022  EXAMINATION: ONE XRAY VIEW OF THE CHEST 5/24/2022 8:16 pm COMPARISON: 11/21/2021 HISTORY: ORDERING SYSTEM PROVIDED HISTORY: tachycardia TECHNOLOGIST PROVIDED HISTORY: Reason for exam:->tachycardia Reason for Exam: tachycardia FINDINGS: A single frontal view of the chest demonstrates no acute skeletal abnormality. The heart size and mediastinal contours are within normal limits. The lungs are clear, without evidence of acute airspace consolidation, pneumothorax, or pleural effusion. No acute cardiopulmonary disease. PROCEDURES   Unless otherwise noted below, none     Procedures    CRITICAL CARE TIME   CRITICAL CARE NOTE:  There was a high probability of clinically significant life-threatening deterioration of the patient's condition requiring my urgent intervention. Total critical care time was at least 20 minutes. This includes vital sign monitoring, pulse oximetry monitoring, telemetry monitoring, clinical response to the IV medications, reviewing the nursing notes, consultation time, dictation/documentation time, and interpretation of the labwork. This excludes any separately billable procedures performed.        CONSULTS:  None      EMERGENCY DEPARTMENT COURSE and DIFFERENTIAL DIAGNOSIS/MDM:   Vitals:    Vitals:    05/24/22 1907 05/24/22 2200   BP: 130/87 97/74   Pulse: (!) 128 98   Resp: 16 20   Temp: 97 °F (36.1 °C)    TempSrc: Tympanic    SpO2: 100% 100%   Weight: 105 lb 13.1 oz (48 kg)    Height: 4' 10\" (1.473 m)        Patient was given the following medications:  Medications   glucose chewable tablet 16 g (has no administration in time range) dextrose bolus 10% 125 mL (has no administration in time range)     Or   dextrose bolus 10% 250 mL (has no administration in time range)   glucagon (rDNA) injection 1 mg (has no administration in time range)   dextrose 5 % solution (has no administration in time range)   insulin regular (HUMULIN R;NOVOLIN R) 100 Units in sodium chloride 0.9 % 100 mL infusion (0.1 Units/kg/hr × 48 kg IntraVENous New Bag 5/24/22 2048)   0.9 % sodium chloride bolus (2,000 mLs IntraVENous New Bag 5/24/22 2045)   ondansetron (ZOFRAN) injection 4 mg (4 mg IntraVENous Given 5/24/22 2049)   levETIRAcetam (KEPPRA) 1000 mg/100 mL IVPB (1,000 mg IntraVENous New Bag 5/24/22 2139)           MDM: See HPI and above for full presentation and physical exam.  Differential diagnoses include DKA, HHS, dehydration, PRANEETH, infection, hyperglycemia, nonadherence to AEDs, other    Patient's increase in seizure-like activity is likely explained by her nonadherence to her Keppra. We will give her Keppra load here and send her Dilantin and Keppra levels to evaluate how compliant she is with these medications. Sugar on arrival greater than 600. Urine does show greater than equal 2000 glucose with 15 ketones. No evidence for UTI. No leukocytosis or anemia. hCG qualitative negative. Plain films as read by the radiologist as above. Her glucose is 1349 with a gap of 23 and a CO2 of 15. Sodium on metabolic panel is 283 but she has a corrected sodium of 134. No PRANEETH. VBG is acidotic with a pH of 7.231 and a bicarb of 20. Beta hydroxybutyrate significantly elevated at 4.18. Patient does meet DKA criteria. We will start her on a insulin drip and have her admitted to the ICU. I therefore consulted the hospitalist who agreed to admit patient and write orders for admission    FINAL IMPRESSION      1. Type 1 diabetes mellitus with ketoacidosis without coma ([facility] [address]    2.  Nonadherence to medication          DISPOSITION/PLAN   DISPOSITION Admitted 05/24/2022 08:49:12 PM      PATIENT REFERRED TO:  No follow-up provider specified.     DISCHARGE MEDICATIONS:  Current Discharge Medication List          DISCONTINUED MEDICATIONS:  Current Discharge Medication List      STOP taking these medications       nadolol (CORGARD) 20 MG tablet Comments:   Reason for Stopping:                      (Please note that portions of this note were completed with a voice recognition program.  Efforts were made to edit the dictations but occasionally words are mis-transcribed.)    MARI James CNP (electronically signed)            MARI James CNP  05/24/22 1049

## 2022-05-25 NOTE — ED NOTES
ED SBAR report provider to Isidoro Cockayne, [place]. Patient to be transported to Room 2112 via stretcher by RN. Patient transported with bedside cardiac monitor and with IV medications infusing. IV site clean, dry, and intact. MEWS score and pain assessed as 4/10 and documented. Patient's score on the THOMAS Fall scale reviewed with receiving RN. Updated patient and family on plan of care.        Ilan Green RN  05/24/22 0045

## 2022-05-25 NOTE — PROGRESS NOTES
Arrived to place PICC line in patient with Saida Melton RN at bedside, pre procedure and allergies reviewed. No issues accessing basilic vein, pt tolerated well, blood return and flushed well, tip verified with 3cg technology. Pt left in stable condition and bed braked and in lowest position. Pt call light within reach. Handoff to RN.

## 2022-05-25 NOTE — PROGRESS NOTES
Hospitalist Progress Note    CC: DKA, type 2, not at goal [facility])      Admit date: 5/24/2022  Days in hospital:  1    Subjective/interval history: Pt S/E. Pt feels better today. She states her insulin pump isn't working, but her hba1c is 13. O2 status: room air    ROS:   Pertinent items are noted in HPI. Objective:    /70   Pulse 89   Temp 97.5 °F (36.4 °C) (Oral)   Resp 12   Ht 4' 10\" (1.473 m)   Wt 110 lb 3.7 oz (50 kg)   SpO2 100%   BMI 23.04 kg/m²     Gen: alert, NAD  HEENT: NC/AT, moist mucous membranes, no oropharyngeal erythema or exudate  Neck: supple, trachea midline, no anterior cervical or SC LAD  Heart: Normal s1/s2, RRR, no murmurs, gallops, or rubs. Lungs: clear bilaterally, no wheezing, no rales, no rhonchi, no use of accessory muscles  Abd: bowel sounds present, soft, nontender, nondistended, no masses  Extrem: No clubbing, cyanosis, no edema  Skin: no rashes or lesions  Psych: A & O x3, affect appropriate  Neuro: grossly intact, moves all four extremities spontaneously.   Cap refill: +2 sec    Medications:  Scheduled Meds:   nadolol  10 mg Oral Daily    levETIRAcetam  1,000 mg Oral BID    gabapentin  300 mg Oral TID    And    gabapentin  1,200 mg Oral Nightly    famotidine  20 mg Oral BID    clonazePAM  2 mg Oral BID    enoxaparin  30 mg SubCUTAneous Daily    lidocaine 1 % injection  5 mL IntraDERmal Once    sodium chloride flush  5-40 mL IntraVENous 2 times per day       PRN Meds:  ondansetron, potassium chloride, dextrose bolus **OR** dextrose bolus, magnesium sulfate, sodium phosphate IVPB **OR** sodium phosphate IVPB **OR** sodium phosphate IVPB, polyethylene glycol, acetaminophen, dextrose 5 % and 0.45 % NaCl, sodium chloride flush, sodium chloride    IV:   sodium chloride Stopped (05/25/22 0146)    dextrose 5 % and 0.45 % NaCl 150 mL/hr at 05/25/22 0734    insulin 0.52 Units/hr (05/25/22 0802)    sodium chloride           Intake/Output Summary (Last 24 hours) at 5/25/2022 0813  Last data filed at 5/25/2022 0734  Gross per 24 hour   Intake 4190.43 ml   Output    Net 4190.43 ml       Results:  CBC:   Recent Labs     05/24/22  1449 05/24/22  1916   WBC 8.3 8.8   HGB 12.8 12.3   HCT 41.1 42.7   MCV 91.9 100.5*    412     BMP:   Recent Labs     05/25/22  0008 05/25/22  0254 05/25/22  0654   * 135* 137   K 3.2* 2.9* 4.1   CL 96* 98* 102   CO2 13* 19* 23   PHOS 2.3* 2.4* 3.4   BUN 4* 3* 3*   CREATININE 0.6 <0.5* <0.5*     Mag: No results for input(s): MAG in the last 72 hours. Phos:   Lab Results   Component Value Date    PHOS 3.4 05/25/2022     No results found for: GLU    LIVER PROFILE:   Recent Labs     05/24/22  1449 05/24/22  1916   * 197*   * 99*   BILITOT 0.5 0.7   ALKPHOS 209* 189*     PT/INR: No results for input(s): PROTIME, INR in the last 72 hours. APTT: No results for input(s): APTT in the last 72 hours. UA:  Recent Labs     05/24/22 2041   COLORU Yellow   PHUR 5.5   WBCUA 3-5   RBCUA 5-10*   MUCUS Rare*   BACTERIA Rare*   CLARITYU Clear   SPECGRAV 1.028   LEUKOCYTESUR Negative   UROBILINOGEN 0.2   BILIRUBINUR Negative   BLOODU SMALL*   GLUCOSEU >=1000*       Invalid input(s): ABG  Lab Results   Component Value Date    CALCIUM 8.3 05/25/2022    PHOS 3.4 05/25/2022       Assessment:    Principal Problem:    DKA, type 2, not at goal [facility])  Active Problems:    Nausea and vomiting    Diarrhea    Seizure disorder (HCC)    ROME (generalized anxiety disorder)    Elevated liver function tests  Resolved Problems:    * No resolved hospital problems. [facility] course:  34y.o. year-old female with a history of diabetes mellitus, generalized anxiety disorder, a seizure disorder and elevated liver function tests. She presents to the emergency room for evaluation following a 3-day history of high glucose levels. She states that she has an insulin pump and it has not been working properly.  She denies any abdominal pain.  She has had nausea, vomiting and diarrhea. In the emergency room she was found to have DKA. Furthermore, she has not been taking her antiepileptic medications. She was given a Keppra load in the emergency room as she has reported recent seizure activity. Plan:  DKA   - BHB 4.18, glucose 1349, AG 23 on admission, now gap is closed and bg in the 100s. - DKA protocol with insulin gtt initially change dto sq insulin today.    - IVF    - lytes will be monitored regularly and will be replaced PRN  - diabetic educator consulted    agma  - improved with correction as above    Pseudohyponatremia  - improving with bg correction    Chronic conditions - continue home meds unless otherwise stated  Seizures    Code status:  full  DVT prophylaxis: [x] Lovenox  [] SQ Heparin  [] SCDs because of  [] warfarin/oral direct thrombin inhibitor [] Encourage ambulation      Disposition:  [] Home [] Rehab [] Psych [] SNF  [] LTAC  [] Transfer to ICU  [] Transfer to PCU [] Other: in pt, dc tomorrow      Electronically signed by Alejandra Arreola DO on 5/25/2022 at 8:13 AM

## 2022-05-25 NOTE — PROGRESS NOTES
7131: PerfectServe to Dr. Minda Yung him of BG drop from 1,331 to 305 within 4 hours. Awaiting response. 00:50: Perfectserve to Dr. Rosina Gutierrez requesting a PRN zofran order for pt nausea. 01:19: Perfectserve to Dr. Obdulio Barr him that insulin gtt has been paused and pts blood sugar again decreasing to 214 at this time. See new orders for zofran and told to switch to DKA multiplier at this time.

## 2022-05-25 NOTE — PROGRESS NOTES
2200- Pt arrived to ICU room 2112 via stretcher accompanied by Mary Pearson, CHIN. Pt escorted to pt bathroom and then to ICU bed. ICU continuous monitoring applied. Pt currently has NS saline bolus and Insulin gtt infusing in PIV. Currently awaiting lab results to titrate insulin gtt as POC glucometer readings only reading HI. Last pt blood glucose in the 1300s. Dr. Ilya covarrubias served about possible picc placement. See new orders.

## 2022-05-25 NOTE — PLAN OF CARE
Problem: Discharge Planning  Goal: Discharge to home or other facility with appropriate resources  Outcome: Progressing  Flowsheets (Taken 5/24/2022 2200)  Discharge to home or other facility with appropriate resources: Identify barriers to discharge with patient and caregiver     Problem: Safety - Adult  Goal: Free from fall injury  Outcome: Progressing     Problem: ABCDS Injury Assessment  Goal: Absence of physical injury  Outcome: Progressing

## 2022-05-25 NOTE — PROGRESS NOTES
Pharmacy Medication Reconciliation Note     List of medications Faye Manzanares is currently taking is complete. Source of information:   1. Conversation with patient and spouse at bedside  2. EMR    Notes regarding home medications:   1. Patient reports not taking any oral meds PTA in the ED--only 25 units of bolus insulin   2. Reports omnipods have not worked in a week and that she has not received that insulin she usually does. Reports injecting 25 units aspart QID with little BG control. 3. Nadolol dcd by patient reporting it did not help  4.  Patient requesting nightly clonazepam--ED RN aware     Patient denies taking any OTC or herbal medications    Silvio Ovalle, Pharmacy Intern  5/24/2022  9:39 PM

## 2022-05-25 NOTE — PROGRESS NOTES
[facility]  Diabetes Education   Progress Note       NAME:  José Brasher Wasta RECORD NUMBER:  [id number]  AGE: 34 y.o. GENDER: female  : 1990  TODAY'S DATE:  2022    Subjective   Reason for Diabetic Education Evaluation and Assessment: DKA    Thad Alves reports feeling better this morning. She was able to eat breakfast.      Thad Alves describes a malfunction with her Omni pod insulin pump and Dexcom CGM. She is attempted to use Novolog only to control her blood sugars. She does not have Omni pod devices or Dexcom CGM with her. Refill availability of supplies is uncertain. She does not know her pump settings.       Visit Type: evaluation      Komal Molina is a 34 y.o. female referred by:     [x] Physician  [] Nursing  [] Chart Review   [] Other:     PAST MEDICAL HISTORY        Diagnosis Date    Depression     Diabetes mellitus ([address]     Seizures [address] [facility]        PAST SURGICAL HISTORY    Past Surgical History:   Procedure Laterality Date     SECTION      TUBAL LIGATION         FAMILY HISTORY    Family History   Problem Relation Age of Onset    Asthma Mother     Allergies Mother         sun allergy    Diabetes Type 1  Father     Diabetes Type 1  Maternal Grandfather     Diabetes Type 1  Cousin     Diabetes Type 1  Cousin     Diabetes Type 1  Maternal Uncle     Diabetes Type 1  Maternal Uncle     Diabetes Type 1  Maternal Aunt        SOCIAL HISTORY    Social History     Tobacco Use    Smoking status: Never Smoker    Smokeless tobacco: Never Used   Vaping Use    Vaping Use: Never used   Substance Use Topics    Alcohol use: No    Drug use: No       ALLERGIES    No Known Allergies    MEDICATIONS     insulin glargine  10 Units SubCUTAneous QAM AC    insulin lispro  0-6 Units SubCUTAneous TID WC    insulin lispro  0-3 Units SubCUTAneous Nightly    [START ON 2022] insulin lispro  3 Units SubCUTAneous TID WC    nadolol  10 mg Oral Daily    levETIRAcetam  1,000 mg Oral BID    gabapentin  300 mg Oral TID    And    gabapentin  1,200 mg Oral Nightly    famotidine  20 mg Oral BID    clonazePAM  2 mg Oral BID    enoxaparin  30 mg SubCUTAneous Daily    lidocaine 1 % injection  5 mL IntraDERmal Once    sodium chloride flush  5-40 mL IntraVENous 2 times per day       Objective        Patient Active Problem List   Diagnosis Code    Seizure disorder ([facility] [address] G40.909    Type 1 diabetes mellitus with complication ([facility] [address] P10.5    Anxiety and depression F41.9, F32. A    ROME (generalized anxiety disorder) F41.1    Anorexia nervosa F50.00    Diabetic peripheral neuropathy (HCC) E11.42    Gastroparesis K31.84    Weight loss, unintentional R63.4    Tachycardia R00.0    Elevated transaminase level R74.01    Bandemia D72.825    DKA, type 1, not at goal [facility]) E10.10    Elevated liver function tests R79.89    DKA, type 2, not at goal [facility]) E11.10    Nausea and vomiting R11.2    Diarrhea R19.7        BP (!) 87/59   Pulse 90   Temp 97.5 °F (36.4 °C) (Oral)   Resp 13   Ht 4' 10\" (1.473 m)   Wt 110 lb 3.7 oz (50 kg)   SpO2 100%   BMI 23.04 kg/m²     HgBA1c:    Lab Results   Component Value Date    LABA1C 13.6 05/24/2022       Recent Labs     05/25/22  0859 05/25/22  1009 05/25/22  1109 05/25/22  1112   POCGLU 77 111* 56* 60*       BUN/Creatinine:    Lab Results   Component Value Date    BUN 3 05/25/2022    CREATININE <0.5 05/25/2022       Assessment        Diabetes Management and Education    Does the patient have a Primary Care Physician? Yes, Hulon Callow, MD       Does the patient require new medication instruction? Yes. Transition back to Omni pod pump maybe delayed until early June when supplies are available. Prefers insulin pens if not using insulin patch pump. Discussed alternative insulin strategies when the pump is not available or operating correctly. Reviewed basal, meal time and correction concepts.       Person responsible for administration of Insulin/Medication:        [x] Self     [] Caregiver       [] Spouse       [] Other Family Member   []  Other      Level of patient/caregiver understanding able to:       [] Verbalized Understanding   [] Demonstrated Understanding       [x] Teach Back       [x] Needs Reinforcement     []  Other:        Does the patient/caregiver monitor Blood Glucoses? No: Dexcom not with her. Does not have back up fingerstick glucose monitoring. Reviewed glycemic control targets, testing frequency and when to call PCP. Level of patient/caregiver understanding able to:        [] Verbalized Understanding   [] Demonstrated Understanding       [] Teach Back       [x] Needs Reinforcement     []  Other:         Does the patient/caregiver follow a Meal Plan? No: She is able to identify common carbs. Reviewed importance of eating three meals per day. Level of patient/caregiver understanding able to:       [] Verbalized Understanding   [] Demonstrated Understanding       [] Teach Back       [x] Needs Reinforcement     []  Other:      Does the patient/caregiver understand S/S of Hypoglycemia? No: Did not report any symptoms when POCT was performed with 60 result. Reviewed symptoms, prevention and treatment. Level of patient/caregiver understanding able to:       [] Verbalized Understanding   [] Demonstrated Understanding       [] Teach Back       [] Needs Reinforcement     [x]  Other:  Agrees to notify staff if she feels any symptoms. Does the patient/caregiver understand S/S of Hyperglycemia? No:     Reviewed symptoms, prevention and treatment. Level of patient/caregiver understanding able to:        [] Verbalized Understanding   [] Demonstrated Understanding       [] Teach Back       [x] Needs Reinforcement     []  Other:           Plan        Ongoing diabetes education and blood glucose monitoring. POCT added at 0200. Recommend reducing correction scale to low. Discussed with Taylor Chance, DO     Recommend multiple daily injections of insulin with Basal insulin pens and Novolog pens until able to transition back to patch pump. Patch pump maybe able to be resumed 23 hours from last basal insulin dose. Will need fingerstick glucose monitoring supplies.       Discharge Plan:  Discharge needs: insulin pens and 4mm pen needles and glucometer/strips/lancets  Placement for patient upon discharge: independent living        Teaching Time Diabetes Education:  30 minutes     Electronically signed by Nate Massey on 5/25/2022 at 11:19 AM

## 2022-05-25 NOTE — H&P
Hospital Medicine  History and Physical    PCP: Urvashi Christopher MD  Patient Name: Jessica Del Toro    Date of Service: Pt seen/examined on 22 and admitted to Inpatient with expected LOS greater than two midnights due to medical therapy    CHIEF COMPLAINT:  Pt c/o hyperglycemia. She states that her glucose has been over 600 for the last few days. HISTORY OF PRESENT ILLNESS: Pt is an 34y.o. year-old female with a history of diabetes mellitus, generalized anxiety disorder, a seizure disorder and elevated liver function tests. She presents to the emergency room for evaluation following a 3-day history of high glucose levels. She states that she has an insulin pump and it has not been working properly. When she checks her fingerstick blood glucose, the reading states \"high. \"  She denies any abdominal pain. She has had nausea, vomiting and diarrhea. In the emergency room she was found to have DKA. Furthermore, she has not been taking her antiepileptic medications. She was given a Keppra load in the emergency room as she has reported recent seizure activity. She is being admitted for further evaluation and treatment. Associated signs and symptoms do not include fever or chills, abdominal pain, hemoptysis, hematochezia, constipation or urinary symptoms. Past Medical History:        Diagnosis Date    Depression     Diabetes mellitus ([address]     Seizures ([address]        Past Surgical History:        Procedure Laterality Date     SECTION      TUBAL LIGATION         Allergies:  Patient has no known allergies. Medications Prior to Admission:    Prior to Admission medications    Medication Sig Start Date End Date Taking? Authorizing Provider   clonazePAM (KLONOPIN) 2 MG tablet Take 1 tablet by mouth 2 times daily.  22  Urvashi Christopher MD   levETIRAcetam (KEPPRA) 500 MG tablet Take 2 tablets by mouth 2 times daily 22   Urvashi Christopher MD   gabapentin (NEURONTIN) 300 MG capsule TAKE 1 CAPSULE BY MOUTH THREE TIMES DAILY DURING  THE  DAY  AND  4  CAPSULES  AT  BEDTIME 4/8/22 10/8/22  Justina Epley, MD   insulin aspart (NOVOLOG) 100 UNIT/ML injection vial Inject 100 Units into the skin daily In conjunction with omnipod 2/15/22   Justina Epley, MD   blood glucose monitor strips Test 4-6 times a day & as needed for symptoms of irregular blood glucose. Dispense sufficient amount for indicated testing frequency plus additional to accommodate PRN testing needs.  2/15/22   Justina Epley, MD   Continuous Blood Gluc Sensor (DEXCOM G6 SENSOR) MISC Apply as directed for blood sugar monitoring 1/25/22   Justina Epley, MD   Continuous Blood Gluc Transmit (DEXCOM G6 TRANSMITTER) MISC Use as directed for blood sugar monitoring 1/25/22   Justina Epley, MD   Insulin Disposable Pump (OMNIPOD DASH 5 PACK PODS) MISC Continuous pump - max dose 60 units 1/25/22   Justina Epley, MD   insulin aspart (NOVOLOG FLEXPEN) 100 UNIT/ML injection pen Inject 20 units with meals/snacks up to 5 times per day 1/25/22   Justina Epley, MD   ondansetron (ZOFRAN) 4 MG tablet Take 1 tablet by mouth 3 times daily as needed for Nausea or Vomiting 1/25/22   Justina Epley, MD   ibuprofen (ADVIL;MOTRIN) 800 MG tablet Take 1 tablet by mouth 3 times daily as needed for Pain 1/25/22   Justina Epley, MD   glucagon, rDNA, 1 MG injection Inject 1 mg into the muscle as needed for Low blood sugar (Blood glucose less than 70 mg/dL and patient NOT ALERT or NPO and does not have IV access.) 11/26/21 11/21/22  David Cantu MD   famotidine (PEPCID) 20 MG tablet Take 20 mg by mouth 2 times daily    Historical Provider, MD       Family History:       Problem Relation Age of Onset    Asthma Mother     Allergies Mother         sun allergy    Diabetes Type 1  Father     Diabetes Type 1  Maternal Grandfather     Diabetes Type 1  Cousin     Diabetes Type 1  Cousin     Diabetes Type 1  Maternal Uncle     Diabetes Type 1  Maternal Uncle     Diabetes Type 1  Maternal Aunt Social History:   TOBACCO:   reports that she has never smoked. She has never used smokeless tobacco.  ETOH:   reports no history of alcohol use. OCCUPATION:      REVIEW OF SYSTEMS:  A full review of systems was performed and is negative except for that which appears in the HPI    Physical Exam:    Vitals: BP (!) 83/57   Pulse 91   Temp 97.6 °F (36.4 °C) (Oral)   Resp 10   Ht 4' 10\" (1.473 m)   Wt 110 lb 3.7 oz (50 kg)   SpO2 98%   BMI 23.04 kg/m²   General appearance: Ill but not toxic appearing 34y.o. year-old female who is alert, appears stated age and is cooperative  HEENT: Head: Normocephalic, no lesions, without obvious abnormality. Eye: Normal external eye, conjunctiva, lids cornea, PEERL. Ears: Normal external ears. Non-tender. Nose: Normal external nose, mucus membranes and septum. Pharynx: Dental Hygiene adequate. Normal buccal mucosa. Normal pharynx. Neck: no adenopathy, no carotid bruit, no JVD, supple, symmetrical, trachea midline and thyroid not enlarged, symmetric, no tenderness/mass/nodules  Lungs: clear to auscultation bilaterally and no use of accessory muscles  Heart: regular rate and rhythm, S1, S2 normal, no murmur, click, rub or gallop and normal apical impulse  Abdomen: soft, non-tender; bowel sounds normal; no masses, no organomegaly  Extremities: extremities atraumatic, no cyanosis or edema and Homans sign is negative, no sign of DVT. Capillary Refill: Acceptable < 3 seconds   Peripheral Pulses: +3 easily felt, not easily obliterated with pressures   Skin: Skin color, texture, turgor normal. No rashes or lesions on exposed skin  Neurologic: Neurovascularly intact without any focal sensory/motor deficits. Cranial nerves: II-XII intact, grossly non-focal. Gait was not tested.   Mental Status: Alert and oriented, thought content appropriate, normal insight        CBC:   Recent Labs     05/24/22  1449 05/24/22  1916   WBC 8.3 8.8   HGB 12.8 12.3    412     BMP: Recent Labs     05/24/22  1916 05/24/22 2041 05/25/22  0008   *  --  132*   K 4.8  --  3.2*   CL 76*  --  96*   CO2 15*  --  13*   BUN 5*  --  4*   CREATININE 0.7  --  0.6   GLUCOSE 1,349* 1,331* 307*  305*     Troponin: No results for input(s): TROPONINI in the last 72 hours. PT/INR:  No results found for: PTINR  U/A:    Lab Results   Component Value Date    LEUKOCYTESUR Negative 05/24/2022    RBCUA 5-10 05/24/2022    SPECGRAV 1.028 05/24/2022    UROBILINOGEN 0.2 05/24/2022    BILIRUBINUR Negative 05/24/2022    BILIRUBINUR NEGATIVE 01/17/2011    BLOODU SMALL 05/24/2022    GLUCOSEU >=1000 05/24/2022    GLUCOSEU >=1000 01/17/2011    PROTEINU Negative 05/24/2022         RAD:   I have independently reviewed and interpreted the imaging studies below and based my recommendations to the patient on those findings. XR CHEST PORTABLE    Result Date: 5/24/2022  EXAMINATION: ONE XRAY VIEW OF THE CHEST 5/24/2022 8:16 pm COMPARISON: 11/21/2021 HISTORY: ORDERING SYSTEM PROVIDED HISTORY: tachycardia TECHNOLOGIST PROVIDED HISTORY: Reason for exam:->tachycardia Reason for Exam: tachycardia FINDINGS: A single frontal view of the chest demonstrates no acute skeletal abnormality. The heart size and mediastinal contours are within normal limits. The lungs are clear, without evidence of acute airspace consolidation, pneumothorax, or pleural effusion. No acute cardiopulmonary disease. Assessment:   Principal Problem:    DKA, type 2, not at goal [facility])  Active Problems:    Nausea and vomiting    Diarrhea    Seizure disorder (HCC)    ROME (generalized anxiety disorder)    Elevated liver function tests  Resolved Problems:    * No resolved hospital problems. *      Plan:       DKA, type 2, not at goal [facility]) - Pt will be admitted to the ICU and will be placed on the DKA protocol. She will be on an insulin gtt initially, and glucose will be monitored closely.  She will be given IV Fluids per protocol and electrolytes will be monitored regularly and will be replaced as needed. Seizure disorder [facility]) -patient reports that she has had recent seizures. She has been noncompliant with her antiepileptic medications. Because of this she was given a Keppra load in the emergency room. We will continue her antiepileptic medications and monitor. Non-intractable vomiting with nausea - Will provide symptomatic treatment with Zofran and/or Phenergan as needed, maintenance of fluids and electrolytes. Diarrhea - likely functional diarrhea secondary to DKA. Monitor for improvement with treatment of DKA. ROME (generalized anxiety disorder) -continue home anxiolytics. Elevated liver function tests - these appear to be chronically elevated and are being evaluated as an outpatient. Due to the high probability of clinically significant life threating deterioration of the patient's condition that required my urgent intervention, a total critical care time 55 minutes was used. This includes but not limited to examining patient, collaborating with other physicians, monitoring vital signs, telemetry, continuous pulse oximety, and clinical response to IV medications; documentation time, review and interpretation of laboratory and radiological data, review of nursing notes and old record review. This time excludes any time that may have been spent performing procedures for life threatening organ failure. Code Status: Full Code  Diet: Diet NPO  DVT Prophylaxis: Lovenox     (Advanced care planning has been discussed with patient and/or responsible family member and is reflected in the code status.  Further orders associated with this have been entered if appropriate)    Disposition: Anticipate that patient will remain in the hospital for 2 or more midnights depending on further evaluation and clinical course    Please note that over 50 minutes was spent in evaluating the patient, review of records and results, discussion with staff/family, etc.      Cherie Sharma MD

## 2022-05-25 NOTE — PROGRESS NOTES
Patient's glucose was 60 and patient felt sleepy, dizzy and had difficulty remembering things. She also complained of blurry vision. 8oz of apple juice given.

## 2022-05-25 NOTE — PROGRESS NOTES
Stat blood glucose lab ordered for 2300. Called lab at 12:13 am to verify order and ask about collecting lab. Lab verified they collected the lab at 00:08 and to wait upon results.

## 2022-05-25 NOTE — ED NOTES
ACCU check monitor reading HI.  Blood sent to lab for glucose reading for insulin titration     Maribeth Ferrera RN  05/24/22 [id number]

## 2022-05-25 NOTE — PLAN OF CARE
Problem: Discharge Planning  Goal: Discharge to home or other facility with appropriate resources  5/25/2022 1440 by Juan Max RN  Outcome: Progressing  Flowsheets (Taken 5/25/2022 0744)  Discharge to home or other facility with appropriate resources: Identify barriers to discharge with patient and caregiver    Problem: ABCDS Injury Assessment  Goal: Absence of physical injury  5/25/2022 1440 by Juan Max RN  Outcome: Progressing  Flowsheets (Taken 5/25/2022 1422)  Absence of Physical Injury: Implement safety measures based on patient assessment  5/25/2022 0218 by Mike Lyn RN  Outcome: Progressing     Problem: Pain  Goal: Verbalizes/displays adequate comfort level or baseline comfort level  Outcome: Progressing  Flowsheets (Taken 5/25/2022 1144)  Verbalizes/displays adequate comfort level or baseline comfort level:   Assess pain using appropriate pain scale   Encourage patient to monitor pain and request assistance     Problem: Neurosensory - Adult  Goal: Achieves stable or improved neurological status  Outcome: Progressing  Goal: Absence of seizures  Outcome: Progressing  Goal: Remains free of injury related to seizures activity  Outcome: Progressing  Goal: Achieves maximal functionality and self care  Outcome: Progressing     Problem: Cardiovascular - Adult  Goal: Maintains optimal cardiac output and hemodynamic stability  Outcome: Progressing  Goal: Absence of cardiac dysrhythmias or at baseline  Outcome: Progressing     Problem: Skin/Tissue Integrity - Adult  Goal: Skin integrity remains intact  Outcome: Progressing  Goal: Incisions, wounds, or drain sites healing without S/S of infection  Outcome: Progressing  Goal: Oral mucous membranes remain intact  Outcome: Progressing     Problem: Gastrointestinal - Adult  Goal: Minimal or absence of nausea and vomiting  Outcome: Progressing  Goal: Maintains or returns to baseline bowel function  Outcome: Progressing  Goal: Maintains adequate nutritional intake  Outcome: Progressing  Goal: Establish and maintain optimal ostomy function  Outcome: Progressing     Problem: Genitourinary - Adult  Goal: Absence of urinary retention  Outcome: Progressing  Goal: Urinary catheter remains patent  Outcome: Progressing     Problem: Infection - Adult  Goal: Absence of infection at discharge  Outcome: Progressing  Goal: Absence of infection during hospitalization  Outcome: Progressing  Goal: Absence of fever/infection during anticipated neutropenic period  Outcome: Progressing     Problem: Metabolic/Fluid and Electrolytes - Adult  Goal: Electrolytes maintained within normal limits  Outcome: Progressing  Goal: Hemodynamic stability and optimal renal function maintained  Outcome: Progressing  Goal: Glucose maintained within prescribed range  Outcome: Progressing     Problem: Hematologic - Adult  Goal: Maintains hematologic stability  Outcome: Progressing     Problem: Safety - Adult  Goal: Free from fall injury  5/25/2022 1440 by Stan Moreno RN  Outcome: Progressing  Flowsheets (Taken 5/25/2022 1422)  Free From Fall Injury:   Instruct family/caregiver on patient safety   Based on caregiver fall risk screen, instruct family/caregiver to ask for assistance with transferring infant if caregiver noted to have fall risk factors

## 2022-05-26 LAB
ANION GAP SERPL CALCULATED.3IONS-SCNC: 19 MMOL/L (ref 3–16)
BACTERIA: ABNORMAL /HPF
BACTERIA: ABNORMAL /HPF
BILIRUBIN URINE: NEGATIVE
BILIRUBIN URINE: NEGATIVE
BLOOD, URINE: ABNORMAL
BLOOD, URINE: ABNORMAL
BUN BLDV-MCNC: 3 MG/DL (ref 7–20)
CALCIUM SERPL-MCNC: 8.7 MG/DL (ref 8.3–10.6)
CHLORIDE BLD-SCNC: 105 MMOL/L (ref 99–110)
CLARITY: ABNORMAL
CLARITY: ABNORMAL
CO2: 17 MMOL/L (ref 21–32)
COLOR: YELLOW
COLOR: YELLOW
COMMENT UA: ABNORMAL
CORTISOL - AM: 19.5 UG/DL (ref 4.3–22.4)
CREAT SERPL-MCNC: 0.6 MG/DL (ref 0.6–1.1)
EPITHELIAL CELLS, UA: 13 /HPF (ref 0–5)
EPITHELIAL CELLS, UA: 6 /HPF (ref 0–5)
GFR AFRICAN AMERICAN: >60
GFR NON-AFRICAN AMERICAN: >60
GLUCOSE BLD-MCNC: 116 MG/DL (ref 70–99)
GLUCOSE BLD-MCNC: 122 MG/DL (ref 70–99)
GLUCOSE BLD-MCNC: 134 MG/DL (ref 70–99)
GLUCOSE BLD-MCNC: 202 MG/DL (ref 70–99)
GLUCOSE BLD-MCNC: 215 MG/DL (ref 70–99)
GLUCOSE BLD-MCNC: 354 MG/DL (ref 70–99)
GLUCOSE BLD-MCNC: 38 MG/DL (ref 70–99)
GLUCOSE BLD-MCNC: 42 MG/DL (ref 70–99)
GLUCOSE BLD-MCNC: 53 MG/DL (ref 70–99)
GLUCOSE BLD-MCNC: 81 MG/DL (ref 70–99)
GLUCOSE BLD-MCNC: 83 MG/DL (ref 70–99)
GLUCOSE BLD-MCNC: 89 MG/DL (ref 70–99)
GLUCOSE BLD-MCNC: 97 MG/DL (ref 70–99)
GLUCOSE URINE: 250 MG/DL
GLUCOSE URINE: >=1000 MG/DL
HEPATITIS B CORE TOTAL ANTIBODY: NEGATIVE
HEPATITIS C VIRUS AB BY CIA INDEX: 0.13 IV
HEPATITIS C VIRUS AB BY CIA INTERPRETATION: NEGATIVE
HYALINE CASTS: 1 /LPF (ref 0–8)
HYALINE CASTS: 2 /LPF (ref 0–8)
KETONES, URINE: NEGATIVE MG/DL
KETONES, URINE: NEGATIVE MG/DL
LEUKOCYTE ESTERASE, URINE: ABNORMAL
LEUKOCYTE ESTERASE, URINE: ABNORMAL
MAGNESIUM: 1.8 MG/DL (ref 1.8–2.4)
MICROSCOPIC EXAMINATION: YES
MICROSCOPIC EXAMINATION: YES
NITRITE, URINE: NEGATIVE
NITRITE, URINE: NEGATIVE
PERFORMED ON: ABNORMAL
PERFORMED ON: NORMAL
PH UA: 5.5 (ref 5–8)
PH UA: 5.5 (ref 5–8)
PHOSPHORUS: 3.6 MG/DL (ref 2.5–4.9)
POTASSIUM SERPL-SCNC: 3.6 MMOL/L (ref 3.5–5.1)
PROTEIN UA: ABNORMAL MG/DL
PROTEIN UA: NEGATIVE MG/DL
RBC UA: 5 /HPF (ref 0–4)
RBC UA: ABNORMAL /HPF (ref 0–4)
SODIUM BLD-SCNC: 141 MMOL/L (ref 136–145)
SPECIFIC GRAVITY UA: 1.02 (ref 1–1.03)
SPECIFIC GRAVITY UA: 1.02 (ref 1–1.03)
URINE TYPE: ABNORMAL
URINE TYPE: ABNORMAL
UROBILINOGEN, URINE: 0.2 E.U./DL
UROBILINOGEN, URINE: 1 E.U./DL
WBC UA: 140 /HPF (ref 0–5)
WBC UA: 17 /HPF (ref 0–5)

## 2022-05-26 PROCEDURE — 81001 URINALYSIS AUTO W/SCOPE: CPT

## 2022-05-26 PROCEDURE — 82533 TOTAL CORTISOL: CPT

## 2022-05-26 PROCEDURE — 1200000000 HC SEMI PRIVATE

## 2022-05-26 PROCEDURE — 87086 URINE CULTURE/COLONY COUNT: CPT

## 2022-05-26 PROCEDURE — 6370000000 HC RX 637 (ALT 250 FOR IP): Performed by: INTERNAL MEDICINE

## 2022-05-26 PROCEDURE — 6360000002 HC RX W HCPCS: Performed by: INTERNAL MEDICINE

## 2022-05-26 PROCEDURE — 87077 CULTURE AEROBIC IDENTIFY: CPT

## 2022-05-26 PROCEDURE — 6370000000 HC RX 637 (ALT 250 FOR IP): Performed by: FAMILY MEDICINE

## 2022-05-26 PROCEDURE — 83735 ASSAY OF MAGNESIUM: CPT

## 2022-05-26 PROCEDURE — 2580000003 HC RX 258: Performed by: INTERNAL MEDICINE

## 2022-05-26 PROCEDURE — 80048 BASIC METABOLIC PNL TOTAL CA: CPT

## 2022-05-26 PROCEDURE — 87186 SC STD MICRODIL/AGAR DIL: CPT

## 2022-05-26 PROCEDURE — 84100 ASSAY OF PHOSPHORUS: CPT

## 2022-05-26 RX ORDER — INSULIN LISPRO 100 [IU]/ML
0-3 INJECTION, SOLUTION INTRAVENOUS; SUBCUTANEOUS EVERY 24 HOURS
Status: DISCONTINUED | OUTPATIENT
Start: 2022-05-27 | End: 2022-05-28 | Stop reason: HOSPADM

## 2022-05-26 RX ADMIN — FAMOTIDINE 20 MG: 20 TABLET ORAL at 21:46

## 2022-05-26 RX ADMIN — ENOXAPARIN SODIUM 30 MG: 100 INJECTION SUBCUTANEOUS at 08:07

## 2022-05-26 RX ADMIN — SODIUM CHLORIDE, PRESERVATIVE FREE 10 ML: 5 INJECTION INTRAVENOUS at 09:35

## 2022-05-26 RX ADMIN — ACETAMINOPHEN 650 MG: 325 TABLET ORAL at 05:00

## 2022-05-26 RX ADMIN — LEVETIRACETAM 1000 MG: 500 TABLET, FILM COATED ORAL at 08:16

## 2022-05-26 RX ADMIN — INSULIN LISPRO 2 UNITS: 100 INJECTION, SOLUTION INTRAVENOUS; SUBCUTANEOUS at 11:53

## 2022-05-26 RX ADMIN — FAMOTIDINE 20 MG: 20 TABLET ORAL at 08:07

## 2022-05-26 RX ADMIN — GABAPENTIN 300 MG: 300 CAPSULE ORAL at 15:22

## 2022-05-26 RX ADMIN — NADOLOL 10 MG: 40 TABLET ORAL at 09:36

## 2022-05-26 RX ADMIN — GABAPENTIN 300 MG: 300 CAPSULE ORAL at 08:09

## 2022-05-26 RX ADMIN — INSULIN LISPRO 2 UNITS: 100 INJECTION, SOLUTION INTRAVENOUS; SUBCUTANEOUS at 18:31

## 2022-05-26 RX ADMIN — CLONAZEPAM 2 MG: 1 TABLET ORAL at 08:09

## 2022-05-26 RX ADMIN — INSULIN LISPRO 3 UNITS: 100 INJECTION, SOLUTION INTRAVENOUS; SUBCUTANEOUS at 21:48

## 2022-05-26 RX ADMIN — ONDANSETRON 4 MG: 2 INJECTION INTRAMUSCULAR; INTRAVENOUS at 18:02

## 2022-05-26 RX ADMIN — LEVETIRACETAM 1000 MG: 500 TABLET, FILM COATED ORAL at 21:46

## 2022-05-26 RX ADMIN — SODIUM CHLORIDE, PRESERVATIVE FREE 10 ML: 5 INJECTION INTRAVENOUS at 21:48

## 2022-05-26 RX ADMIN — GABAPENTIN 300 MG: 300 CAPSULE ORAL at 18:38

## 2022-05-26 RX ADMIN — CLONAZEPAM 2 MG: 1 TABLET ORAL at 21:45

## 2022-05-26 RX ADMIN — ONDANSETRON 4 MG: 2 INJECTION INTRAMUSCULAR; INTRAVENOUS at 08:22

## 2022-05-26 RX ADMIN — GABAPENTIN 1200 MG: 400 CAPSULE ORAL at 21:46

## 2022-05-26 ASSESSMENT — PAIN DESCRIPTION - LOCATION: LOCATION: HEAD

## 2022-05-26 ASSESSMENT — PAIN SCALES - GENERAL: PAINLEVEL_OUTOF10: 6

## 2022-05-26 NOTE — PROGRESS NOTES
Pt alert and oriented x4. Transferred from the ICU to room 4265 via wheel chair. Able to ambulate independently with a steady gait. Respirations easy and unlabored on room air. Vital signs stable. Oriented to room and call light. Will continue to monitor.

## 2022-05-26 NOTE — PROGRESS NOTES
[facility]  Diabetes Education   Progress Note       NAME:  José Pacheco RECORD NUMBER:  [id number]  AGE: 34 y.o. GENDER: female  : 1990  TODAY'S DATE:  2022    Subjective   Reason for Diabetic Education Evaluation and Assessment: glucose variability    [place] reports having emesis after this mornings breakfast.      Patch pump is not on her and she states she does not have access to the Omni pod pump or insulin with her.       Visit Type: follow-up      Marek Smith is a 34 y.o. female referred by:     [x] Physician  [] Nursing   [] Chart Review   [] Other:     PAST MEDICAL HISTORY        Diagnosis Date    Depression     [facility]     Seizures ([facility]        PAST SURGICAL HISTORY    Past Surgical History:   Procedure Laterality Date     SECTION      TUBAL LIGATION         FAMILY HISTORY    Family History   Problem Relation Age of Onset    Asthma Mother     Allergies Mother         sun allergy    Diabetes Type 1  Father     Diabetes Type 1  Maternal Grandfather     Diabetes Type 1  Cousin     Diabetes Type 1  Cousin     Diabetes Type 1  Maternal Uncle     Diabetes Type 1  Maternal Uncle     Diabetes Type 1  Maternal Aunt        SOCIAL HISTORY    Social History     Tobacco Use    Smoking status: Never Smoker    Smokeless tobacco: Never Used   Vaping Use    Vaping Use: Never used   Substance Use Topics    Alcohol use: No    Drug use: No       ALLERGIES    No Known Allergies    MEDICATIONS     dextrose 5%  500 mL IntraVENous Once    [START ON 2022] insulin lispro  0-3 Units SubCUTAneous Q24H    [Held by provider] insulin glargine  10 Units SubCUTAneous QAM AC    insulin lispro  0-6 Units SubCUTAneous TID WC    insulin lispro  0-3 Units SubCUTAneous Nightly    [Held by provider] insulin lispro  3 Units SubCUTAneous TID WC    nadolol  10 mg Oral Daily    levETIRAcetam  1,000 mg Oral BID    gabapentin  300 mg Oral TID And    gabapentin  1,200 mg Oral Nightly    famotidine  20 mg Oral BID    clonazePAM  2 mg Oral BID    enoxaparin  30 mg SubCUTAneous Daily    lidocaine 1 % injection  5 mL IntraDERmal Once    sodium chloride flush  5-40 mL IntraVENous 2 times per day       Objective        Patient Active Problem List   Diagnosis Code    [facility] G40.909    Type 1 diabetes mellitus with complication ([facility] N28.8    Anxiety and depression F41.9, F32. A    ROME (generalized anxiety disorder) F41.1    Anorexia nervosa F50.00    Diabetic peripheral neuropathy (HCC) E11.42    Gastroparesis K31.84    Weight loss, unintentional R63.4    Tachycardia R00.0    Elevated transaminase level R74.01    Bandemia D72.825    DKA, type 1, not at goal [facility]) E10.10    Elevated liver function tests R79.89    DKA, type 2, not at goal [facility]) E11.10    Nausea and vomiting R11.2    Diarrhea R19.7        /82   Pulse 95   Temp 97.7 °F (36.5 °C) (Oral)   Resp 16   Ht 4' 10\" (1.473 m)   Wt 113 lb 15.7 oz (51.7 kg)   SpO2 100%   BMI 23.82 kg/m²     HgBA1c:    Lab Results   Component Value Date    LABA1C 13.6 05/24/2022       Recent Labs     05/26/22  0647 05/26/22  0728 05/26/22  0746 05/26/22  0834   POCGLU 53* 81 83 97       BUN/Creatinine:    Lab Results   Component Value Date    BUN 3 05/26/2022    CREATININE 0.6 05/26/2022       Assessment        Diabetes Management and Education    Does the patient have a Primary Care Physician? Yes, Kyle Mcfarland MD       Does the patient require new medication instruction? TBD - Insulin currently on hold due to hypoglycemia. Does the patient/caregiver monitor Blood Glucoses? No: Previous experience with Dexcom but lacks confidence in accuracy. She does not have back up fingerstick glucometer. Does the patient/caregiver follow a Meal Plan? No: no appetite. She is able to identify common carbs. Does the patient/caregiver understand S/S of Hypoglycemia?   No: Reviewed symptoms, prevention and treatment. Level of patient/caregiver understanding able to:       [x] Verbalized Understanding   [] Demonstrated Understanding       [] Teach Back       [] Needs Reinforcement     [x]  Other:  Agrees to notify staff if she has any symptoms. Does the patient/caregiver understand S/S of Hyperglycemia? No: A1c significantly above target. Reviewed symptoms, prevention and treatment. Level of patient/caregiver understanding able to:        [] Verbalized Understanding   [] Demonstrated Understanding       [] Teach Back       [x] Needs Reinforcement     []  Other:           Plan        Ongoing diabetes education and blood glucose monitoring.                                             Discharge Plan:  Discharge needs: insulin pens and 4mm pen needles and glucometer/strips/lancets       Teaching Time Diabetes Education:  20 minutes     Electronically signed by Cici Esteves on 5/26/2022 at 11:17 AM

## 2022-05-26 NOTE — PROGRESS NOTES
[facility]  Hypoglycemia Event and Prevention Plan      NAME: José Pacheco RECORD NUMBER:  [id number]  AGE: 34 y.o. GENDER: female  : 1990  EPISODE DATE:  2022     Data     Recent Labs     22  0407 22  6798 22  0622 22  0054 22  0728 22  0746   POCGLU 122* 38* 42* 53* 81 83       Medications  Scheduled Medications:   [Held by provider] insulin glargine  10 Units SubCUTAneous QAM AC    insulin lispro  0-6 Units SubCUTAneous TID WC    insulin lispro  0-3 Units SubCUTAneous Nightly    [Held by provider] insulin lispro  3 Units SubCUTAneous TID WC    nadolol  10 mg Oral Daily    levETIRAcetam  1,000 mg Oral BID    gabapentin  300 mg Oral TID    And    gabapentin  1,200 mg Oral Nightly    famotidine  20 mg Oral BID    clonazePAM  2 mg Oral BID    enoxaparin  30 mg SubCUTAneous Daily    lidocaine 1 % injection  5 mL IntraDERmal Once    sodium chloride flush  5-40 mL IntraVENous 2 times per day       Diet  Current diet/supplement order: ADULT DIET; Regular; 4 carb choices (60 gm/meal)     Recorded PO: PO Meals Eaten (%): 76 - 100% last meal in flowsheets      Action      Total subq insulin dose yesterday = 10 units.       Physician Notified of event: Yes   DO Hannah Dave     Achieved POCT Blood Glucose greater than 70 mg/dl: Yes      Medication plan updated: Yes      Electronically signed by Al Nathan RN on 2022 at 8:36 AM

## 2022-05-26 NOTE — PROGRESS NOTES
Hospitalist Progress Note    CC: DKA, type 2, not at goal [facility])      Admit date: 5/24/2022  Days in hospital:  2    Subjective/interval history: Pt S/E. Pt had hypoglycemia since yesterday. She has only gotten 10 units of lantus yesterday am. Her pump is not on. She denies giving herself insulin. Has been eating a moderate amount. O2 status: room air    ROS:   Pertinent items are noted in HPI. Objective:    BP 97/66   Pulse 87   Temp 97.8 °F (36.6 °C) (Oral)   Resp 16   Ht 4' 10\" (1.473 m)   Wt 113 lb 15.7 oz (51.7 kg)   SpO2 100%   BMI 23.82 kg/m²     Gen: alert, NAD  HEENT: NC/AT, moist mucous membranes  Neck: supple, trachea midline  Heart: Normal s1/s2, RRR, no murmurs, gallops, or rubs. Lungs: clear bilaterally, no wheezing, no rales, no rhonchi, no use of accessory muscles  Abd: bowel sounds present, soft, nontender, nondistended, no masses  Extrem: No clubbing, cyanosis, no edema  Skin: no rashes or lesions  Psych: A & O x3, affect appropriate  Neuro: grossly intact, moves all four extremities spontaneously.  No focal deficits  Cap refill: +2 sec    Medications:  Scheduled Meds:   dextrose 5%  500 mL IntraVENous Once    [START ON 5/27/2022] insulin lispro  0-3 Units SubCUTAneous Q24H    [Held by provider] insulin glargine  10 Units SubCUTAneous QAM AC    insulin lispro  0-6 Units SubCUTAneous TID WC    insulin lispro  0-3 Units SubCUTAneous Nightly    [Held by provider] insulin lispro  3 Units SubCUTAneous TID WC    nadolol  10 mg Oral Daily    levETIRAcetam  1,000 mg Oral BID    gabapentin  300 mg Oral TID    And    gabapentin  1,200 mg Oral Nightly    famotidine  20 mg Oral BID    clonazePAM  2 mg Oral BID    enoxaparin  30 mg SubCUTAneous Daily    lidocaine 1 % injection  5 mL IntraDERmal Once    sodium chloride flush  5-40 mL IntraVENous 2 times per day       PRN Meds:  ondansetron, potassium chloride, glucose, dextrose bolus **OR** dextrose bolus, glucagon (rDNA), dextrose, loperamide, dextrose bolus **OR** dextrose bolus, magnesium sulfate, sodium phosphate IVPB **OR** sodium phosphate IVPB **OR** sodium phosphate IVPB, polyethylene glycol, acetaminophen, dextrose 5 % and 0.45 % NaCl, sodium chloride flush, sodium chloride    IV:   dextrose 100 mL/hr (05/25/22 2308)    sodium chloride Stopped (05/25/22 0146)    dextrose 5 % and 0.45 % NaCl Stopped (05/25/22 1023)    sodium chloride           Intake/Output Summary (Last 24 hours) at 5/26/2022 1437  Last data filed at 5/26/2022 0730  Gross per 24 hour   Intake 1858.29 ml   Output 200 ml   Net 1658.29 ml       Results:  CBC:   Recent Labs     05/24/22  1449 05/24/22 1916   WBC 8.3 8.8   HGB 12.8 12.3   HCT 41.1 42.7   MCV 91.9 100.5*    412     BMP:   Recent Labs     05/25/22  1110 05/25/22  1558 05/26/22  0436    136 141   K 2.8* 3.8 3.6    103 105   CO2 20* 22 17*   PHOS 3.0 2.6 3.6   BUN 2* 2* 3*   CREATININE <0.5* <0.5* 0.6     Mag: No results for input(s): MAG in the last 72 hours. Phos:   Lab Results   Component Value Date    PHOS 3.6 05/26/2022     No results found for: GLU    LIVER PROFILE:   Recent Labs     05/24/22  1449 05/24/22 1916   * 197*   * 99*   BILITOT 0.5 0.7   ALKPHOS 209* 189*     PT/INR: No results for input(s): PROTIME, INR in the last 72 hours. APTT: No results for input(s): APTT in the last 72 hours. UA:  Recent Labs     05/24/22 2041 05/24/22  2041 05/26/22  0700   COLORU Yellow   < > Yellow   PHUR 5.5   < > 5.5   WBCUA 3-5   < > 140*   RBCUA 5-10*   < > 5*   MUCUS Rare*  --   --    BACTERIA Rare*   < > 4+*   CLARITYU Clear   < > TURBID*   SPECGRAV 1.028   < > 1.016   LEUKOCYTESUR Negative   < > MODERATE*   UROBILINOGEN 0.2   < > 1.0   BILIRUBINUR Negative   < > Negative   BLOODU SMALL*   < > LARGE*   GLUCOSEU >=1000*   < > 250*    < > = values in this interval not displayed.        Invalid input(s): ABG  Lab Results   Component Value Date    CALCIUM 8.7 05/26/2022    PHOS 3.6 05/26/2022       Assessment:    Principal Problem:    DKA, type 2, not at goal [facility])  Active Problems:    Nausea and vomiting    Diarrhea    Seizure disorder (HCC)    ROME (generalized anxiety disorder)    Elevated liver function tests  Resolved Problems:    * No resolved hospital problems. [facility] course:  34y.o. year-old female with a history of diabetes mellitus, generalized anxiety disorder, a seizure disorder and elevated liver function tests. She presents to the emergency room for evaluation following a 3-day history of high glucose levels. She states that she has an insulin pump and it has not been working properly. She denies any abdominal pain. She has had nausea, vomiting and diarrhea. In the emergency room she was found to have DKA. Furthermore, she has not been taking her antiepileptic medications. She was given a Keppra load in the emergency room as she has reported recent seizure activity. Plan:  DKA   - DKA protocol with insulin gtt initially changed to sq insulin yesterday.    - place a tele sitter in the room to for surveillance in case of of medication administration byt the Pt. keep only on low dose ssi for today, increase if her bg comes up  - IVF     - lytes will be monitored regularly and will be replaced PRN  - diabetic educator consulted    agma  - improved with correction as above     Pseudohyponatremia  - improving with bg correction    Chronic conditions - continue home meds unless otherwise stated  Seizures    Code status:  full  DVT prophylaxis: [x] Lovenox  [] SQ Heparin  [] SCDs because of  [] warfarin/oral direct thrombin inhibitor [] Encourage ambulation      Disposition:  [] Home [] Rehab [] Psych [] SNF  [] LTAC  [] Transfer to ICU  [] Transfer to PCU [] Other: in pt, dc tomorrow      Electronically signed by Reyes Zepeda DO on 5/26/2022 at 2:37 PM

## 2022-05-26 NOTE — CARE COORDINATION
INITIAL CASE MANAGEMENT ASSESSMENT    Reviewed chart, met with patient to assess possible discharge needs. Explained Case Management role/services. Living Situation: verified address, lives in a basement apartment with her  & 2 kids, [address]    ADLs: independent     DME: Insulin Pump & continuous glucose monitor but pump is not working correctly because her sugars have been elevated     Active Services: CM through [employer]: does not drive, spouse can take her home at [employer]     Medications: no barriers, [facility], uses [employer] on [address]    PCP: Emily Vicente MD    PLAN/COMMENTS: denies any needs at [facility], plans to return home with family, spouse will pick her up    CM provided contact information for patient or family to call with any questions. CM will follow and assist as needed.     Marii Key RN, BSN,   [phone]    Electronically signed by Marii Key RN on 5/26/2022 at 9:23 AM

## 2022-05-26 NOTE — PROGRESS NOTES
Results for Rhoda Apley (MRN [id number]) as of 5/26/2022 07:58   Ref. Range 5/26/2022 06:07 5/26/2022 06:22 5/26/2022 06:47 5/26/2022 07:28   POC Glucose Latest Ref Range: 70 - 99 mg/dl 38 (LL) 42 (LL) 53 (L) 81     Pts blood sugar low on arrival to the floor. Pt remains alert and oriented x4. Complains of slight blurred vision and \"feeling shaky. \" Refused to take the chewable glucose tablets. Pt requests to try regular [employer], edinson crackers and peanut butter instead. Frequent blood sugar checks performed. Pts blood sugar is currently in the 80's. Breakfast ordered with edinson crackers and peanut butter at the bedside as needed. Events reported to day shift RN.

## 2022-05-26 NOTE — PLAN OF CARE
Problem: Discharge Planning  Goal: Discharge to home or other facility with appropriate resources  5/26/2022 1517 by Kalyn Bojorquez RN  Outcome: Progressing     Problem: Safety - Adult  Goal: Free from fall injury  5/26/2022 1517 by Kalyn Bojorquez RN  Outcome: Progressing     Problem: ABCDS Injury Assessment  Goal: Absence of physical injury  Outcome: Progressing     Problem: Pain  Goal: Verbalizes/displays adequate comfort level or baseline comfort level  5/26/2022 1517 by Kalyn Bojorquez RN  Outcome: Progressing    Problem: Metabolic/Fluid and Electrolytes - Adult  Goal: Glucose maintained within prescribed range  Outcome: Progressing     Problem: Metabolic/Fluid and Electrolytes - Adult  Goal: Glucose maintained within prescribed range  Outcome: Progressing        Problem: Neurosensory - Adult  Goal: Absence of seizures  5/26/2022 1517 by Kalyn Bojorquez RN  Outcome: Progressing     Problem: Neurosensory - Adult  Goal: Remains free of injury related to seizures activity  5/26/2022 1517 by Kalyn Bojorquez RN  Outcome: Progressing        Problem: Cardiovascular - Adult  Goal: Maintains optimal cardiac output and hemodynamic stability  5/26/2022 1517 by Kalyn Bojorquez RN  Outcome: Progressing     Problem: Cardiovascular - Adult  Goal: Absence of cardiac dysrhythmias or at baseline  5/26/2022 1517 by Kalyn Bojorquez RN  Outcome: Progressing     Problem: Skin/Tissue Integrity - Adult  Goal: Skin integrity remains intact  5/26/2022 1517 by Kalyn Bojorquez RN  Outcome: Progressing

## 2022-05-26 NOTE — PROGRESS NOTES
Patient noted with emesis and c/o nausea after dinner, emesis stomach continents appears to contain food particles. PRN zofran administered.

## 2022-05-27 LAB
ANION GAP SERPL CALCULATED.3IONS-SCNC: 12 MMOL/L (ref 3–16)
BUN BLDV-MCNC: 6 MG/DL (ref 7–20)
CALCIUM SERPL-MCNC: 8.5 MG/DL (ref 8.3–10.6)
CHLORIDE BLD-SCNC: 99 MMOL/L (ref 99–110)
CO2: 24 MMOL/L (ref 21–32)
CREAT SERPL-MCNC: <0.5 MG/DL (ref 0.6–1.1)
GFR AFRICAN AMERICAN: >60
GFR NON-AFRICAN AMERICAN: >60
GLUCOSE BLD-MCNC: 130 MG/DL (ref 70–99)
GLUCOSE BLD-MCNC: 154 MG/DL (ref 70–99)
GLUCOSE BLD-MCNC: 172 MG/DL (ref 70–99)
GLUCOSE BLD-MCNC: 202 MG/DL (ref 70–99)
GLUCOSE BLD-MCNC: 37 MG/DL (ref 70–99)
GLUCOSE BLD-MCNC: 399 MG/DL (ref 70–99)
GLUCOSE BLD-MCNC: 415 MG/DL (ref 70–99)
GLUCOSE BLD-MCNC: 43 MG/DL (ref 70–99)
GLUCOSE BLD-MCNC: 51 MG/DL (ref 70–99)
GLUCOSE BLD-MCNC: 60 MG/DL (ref 70–99)
GLUCOSE BLD-MCNC: 72 MG/DL (ref 70–99)
MAGNESIUM: 1.9 MG/DL (ref 1.8–2.4)
MITOCHONDRIAL M2 AB, IGG: <0.5 U/ML (ref 0–4)
PERFORMED ON: ABNORMAL
PERFORMED ON: NORMAL
PHOSPHORUS: 3.5 MG/DL (ref 2.5–4.9)
POTASSIUM SERPL-SCNC: 4.2 MMOL/L (ref 3.5–5.1)
SODIUM BLD-SCNC: 135 MMOL/L (ref 136–145)

## 2022-05-27 PROCEDURE — 80048 BASIC METABOLIC PNL TOTAL CA: CPT

## 2022-05-27 PROCEDURE — 84100 ASSAY OF PHOSPHORUS: CPT

## 2022-05-27 PROCEDURE — 83735 ASSAY OF MAGNESIUM: CPT

## 2022-05-27 PROCEDURE — 1200000000 HC SEMI PRIVATE

## 2022-05-27 PROCEDURE — 6360000002 HC RX W HCPCS: Performed by: INTERNAL MEDICINE

## 2022-05-27 PROCEDURE — 6370000000 HC RX 637 (ALT 250 FOR IP): Performed by: FAMILY MEDICINE

## 2022-05-27 PROCEDURE — 2580000003 HC RX 258: Performed by: FAMILY MEDICINE

## 2022-05-27 PROCEDURE — 94760 N-INVAS EAR/PLS OXIMETRY 1: CPT

## 2022-05-27 PROCEDURE — 6370000000 HC RX 637 (ALT 250 FOR IP): Performed by: INTERNAL MEDICINE

## 2022-05-27 PROCEDURE — 2580000003 HC RX 258: Performed by: INTERNAL MEDICINE

## 2022-05-27 RX ORDER — INSULIN LISPRO 100 [IU]/ML
0-12 INJECTION, SOLUTION INTRAVENOUS; SUBCUTANEOUS ONCE
Status: DISCONTINUED | OUTPATIENT
Start: 2022-05-27 | End: 2022-05-27

## 2022-05-27 RX ORDER — INSULIN LISPRO 100 [IU]/ML
0-3 INJECTION, SOLUTION INTRAVENOUS; SUBCUTANEOUS NIGHTLY
Status: DISCONTINUED | OUTPATIENT
Start: 2022-05-27 | End: 2022-05-27

## 2022-05-27 RX ORDER — INSULIN LISPRO 100 [IU]/ML
0-6 INJECTION, SOLUTION INTRAVENOUS; SUBCUTANEOUS
Status: DISCONTINUED | OUTPATIENT
Start: 2022-05-27 | End: 2022-05-27

## 2022-05-27 RX ORDER — INSULIN ASPART 100 [IU]/ML
INJECTION, SOLUTION INTRAVENOUS; SUBCUTANEOUS
Qty: 10 PEN | Refills: 3 | Status: SHIPPED | OUTPATIENT
Start: 2022-05-27 | End: 2022-06-09 | Stop reason: SDUPTHER

## 2022-05-27 RX ORDER — INSULIN GLARGINE 100 [IU]/ML
7 INJECTION, SOLUTION SUBCUTANEOUS
Status: DISCONTINUED | OUTPATIENT
Start: 2022-05-27 | End: 2022-05-27

## 2022-05-27 RX ORDER — INSULIN LISPRO 100 [IU]/ML
0-12 INJECTION, SOLUTION INTRAVENOUS; SUBCUTANEOUS
Status: DISCONTINUED | OUTPATIENT
Start: 2022-05-27 | End: 2022-05-27

## 2022-05-27 RX ORDER — METOCLOPRAMIDE 10 MG/1
10 TABLET ORAL
Status: DISCONTINUED | OUTPATIENT
Start: 2022-05-27 | End: 2022-05-28 | Stop reason: HOSPADM

## 2022-05-27 RX ORDER — SERTRALINE HYDROCHLORIDE 25 MG/1
TABLET, FILM COATED ORAL
Qty: 60 TABLET | Refills: 2 | Status: SHIPPED | OUTPATIENT
Start: 2022-05-27 | End: 2022-06-09 | Stop reason: SDUPTHER

## 2022-05-27 RX ORDER — CALCIUM CITRATE/VITAMIN D3 200MG-6.25
TABLET ORAL
Qty: 100 EACH | Refills: 3 | Status: SHIPPED | OUTPATIENT
Start: 2022-05-27 | End: 2022-06-09 | Stop reason: SDUPTHER

## 2022-05-27 RX ORDER — INSULIN LISPRO 100 [IU]/ML
3 INJECTION, SOLUTION INTRAVENOUS; SUBCUTANEOUS
Status: DISCONTINUED | OUTPATIENT
Start: 2022-05-27 | End: 2022-05-27

## 2022-05-27 RX ORDER — LANCETS 30 GAUGE
EACH MISCELLANEOUS
Qty: 300 EACH | Refills: 1 | Status: SHIPPED | OUTPATIENT
Start: 2022-05-27

## 2022-05-27 RX ORDER — INSULIN LISPRO 100 [IU]/ML
3 INJECTION, SOLUTION INTRAVENOUS; SUBCUTANEOUS ONCE
Status: DISCONTINUED | OUTPATIENT
Start: 2022-05-27 | End: 2022-05-27

## 2022-05-27 RX ORDER — INSULIN LISPRO 100 [IU]/ML
0-3 INJECTION, SOLUTION INTRAVENOUS; SUBCUTANEOUS
Status: DISCONTINUED | OUTPATIENT
Start: 2022-05-27 | End: 2022-05-28 | Stop reason: HOSPADM

## 2022-05-27 RX ORDER — INSULIN ASPART 100 [IU]/ML
3 INJECTION, SOLUTION INTRAVENOUS; SUBCUTANEOUS
Qty: 5 PEN | Refills: 3 | Status: SHIPPED | OUTPATIENT
Start: 2022-05-27 | End: 2022-06-09

## 2022-05-27 RX ORDER — INSULIN LISPRO 100 [IU]/ML
0-6 INJECTION, SOLUTION INTRAVENOUS; SUBCUTANEOUS NIGHTLY
Status: DISCONTINUED | OUTPATIENT
Start: 2022-05-27 | End: 2022-05-27 | Stop reason: SDUPTHER

## 2022-05-27 RX ADMIN — INSULIN GLARGINE 7 UNITS: 100 INJECTION, SOLUTION SUBCUTANEOUS at 10:42

## 2022-05-27 RX ADMIN — GABAPENTIN 300 MG: 300 CAPSULE ORAL at 12:15

## 2022-05-27 RX ADMIN — LEVETIRACETAM 1000 MG: 500 TABLET, FILM COATED ORAL at 21:36

## 2022-05-27 RX ADMIN — Medication 16 G: at 13:53

## 2022-05-27 RX ADMIN — METOCLOPRAMIDE 10 MG: 10 TABLET ORAL at 21:35

## 2022-05-27 RX ADMIN — CLONAZEPAM 2 MG: 1 TABLET ORAL at 21:35

## 2022-05-27 RX ADMIN — CLONAZEPAM 2 MG: 1 TABLET ORAL at 08:25

## 2022-05-27 RX ADMIN — GABAPENTIN 300 MG: 300 CAPSULE ORAL at 17:37

## 2022-05-27 RX ADMIN — LEVETIRACETAM 1000 MG: 500 TABLET, FILM COATED ORAL at 08:25

## 2022-05-27 RX ADMIN — NADOLOL 10 MG: 40 TABLET ORAL at 09:10

## 2022-05-27 RX ADMIN — INSULIN LISPRO 2 UNITS: 100 INJECTION, SOLUTION INTRAVENOUS; SUBCUTANEOUS at 12:17

## 2022-05-27 RX ADMIN — GABAPENTIN 1200 MG: 400 CAPSULE ORAL at 21:35

## 2022-05-27 RX ADMIN — FAMOTIDINE 20 MG: 20 TABLET ORAL at 21:36

## 2022-05-27 RX ADMIN — INSULIN LISPRO 6 UNITS: 100 INJECTION, SOLUTION INTRAVENOUS; SUBCUTANEOUS at 08:29

## 2022-05-27 RX ADMIN — DEXTROSE MONOHYDRATE 100 ML/HR: 50 INJECTION, SOLUTION INTRAVENOUS at 14:20

## 2022-05-27 RX ADMIN — FAMOTIDINE 20 MG: 20 TABLET ORAL at 08:26

## 2022-05-27 RX ADMIN — METOCLOPRAMIDE 10 MG: 10 TABLET ORAL at 17:36

## 2022-05-27 RX ADMIN — SODIUM CHLORIDE, PRESERVATIVE FREE 10 ML: 5 INJECTION INTRAVENOUS at 08:28

## 2022-05-27 RX ADMIN — ENOXAPARIN SODIUM 30 MG: 100 INJECTION SUBCUTANEOUS at 08:26

## 2022-05-27 RX ADMIN — INSULIN LISPRO 1 UNITS: 100 INJECTION, SOLUTION INTRAVENOUS; SUBCUTANEOUS at 21:35

## 2022-05-27 RX ADMIN — SODIUM CHLORIDE, PRESERVATIVE FREE 10 ML: 5 INJECTION INTRAVENOUS at 21:36

## 2022-05-27 RX ADMIN — GABAPENTIN 300 MG: 300 CAPSULE ORAL at 08:26

## 2022-05-27 RX ADMIN — METOCLOPRAMIDE 10 MG: 10 TABLET ORAL at 10:42

## 2022-05-27 ASSESSMENT — PAIN SCALES - GENERAL
PAINLEVEL_OUTOF10: 0
PAINLEVEL_OUTOF10: 0

## 2022-05-27 NOTE — PROGRESS NOTES
[facility]  Diabetes Education   Progress Note       NAME:  José Brasher Pickett RECORD NUMBER:  [id number]  AGE: 34 y.o. GENDER: female  : 1990  TODAY'S DATE:  2022    Subjective   Reason for Diabetic Education Evaluation and Assessment: general diabetes support    Guille Willoughby is tearful. She describes her \"anxiety\" as very high. She expresses concerns about applying for disability, diabetes supplies and going home. Her Dexcom and patch pump are not available. She expresses concern that they are not functioning properly.       Visit Type: follow-up      Faye Manzanares is a 34 y.o. female referred by:     [x] Physician  [] Nursing  [] Chart Review   [] Other:     PAST MEDICAL HISTORY        Diagnosis Date    Depression     Diabetes mellitus ([address]     Seizures ([facility]        PAST SURGICAL HISTORY    Past Surgical History:   Procedure Laterality Date     SECTION      TUBAL LIGATION         FAMILY HISTORY    Family History   Problem Relation Age of Onset    Asthma Mother     Allergies Mother         sun allergy    Diabetes Type 1  Father     Diabetes Type 1  Maternal Grandfather     Diabetes Type 1  Cousin     Diabetes Type 1  Cousin     Diabetes Type 1  Maternal Uncle     Diabetes Type 1  Maternal Uncle     Diabetes Type 1  Maternal Aunt        SOCIAL HISTORY    Social History     Tobacco Use    Smoking status: Never Smoker    Smokeless tobacco: Never Used   Vaping Use    Vaping Use: Never used   Substance Use Topics    Alcohol use: No    Drug use: No       ALLERGIES    No Known Allergies    MEDICATIONS     insulin glargine  7 Units SubCUTAneous QAM AC    insulin lispro  0-12 Units SubCUTAneous TID WC    insulin lispro  0-6 Units SubCUTAneous Nightly    insulin lispro  3 Units SubCUTAneous TID WC    metoclopramide  10 mg Oral 4x Daily AC & HS    insulin lispro  0-12 Units SubCUTAneous Once    insulin lispro  3 Units SubCUTAneous Once    dextrose 5%  500 mL IntraVENous Once    insulin lispro  0-3 Units SubCUTAneous Q24H    [Held by provider] insulin glargine  10 Units SubCUTAneous QAM AC    nadolol  10 mg Oral Daily    levETIRAcetam  1,000 mg Oral BID    gabapentin  300 mg Oral TID    And    gabapentin  1,200 mg Oral Nightly    famotidine  20 mg Oral BID    clonazePAM  2 mg Oral BID    enoxaparin  30 mg SubCUTAneous Daily    lidocaine 1 % injection  5 mL IntraDERmal Once    sodium chloride flush  5-40 mL IntraVENous 2 times per day       Objective        Patient Active Problem List   Diagnosis Code    Seizure disorder ([facility] G40.909    Type 1 diabetes mellitus with complication (HCC) V31.3    Anxiety and depression F41.9, F32. A    ROME (generalized anxiety disorder) F41.1    Anorexia nervosa F50.00    Diabetic peripheral neuropathy (HCC) E11.42    Gastroparesis K31.84    Weight loss, unintentional R63.4    Tachycardia R00.0    Elevated transaminase level R74.01    Bandemia D72.825    DKA, type 1, not at goal [facility]) E10.10    Elevated liver function tests R79.89    DKA, type 2, not at goal [facility]) E11.10    Nausea and vomiting R11.2    Diarrhea R19.7        /72   Pulse 80   Temp 98.3 °F (36.8 °C) (Oral)   Resp 16   Ht 4' 10\" (1.473 m)   Wt 112 lb 14 oz (51.2 kg)   SpO2 98%   BMI 23.59 kg/m²     HgBA1c:    Lab Results   Component Value Date    LABA1C 13.6 05/24/2022       Recent Labs     05/26/22  1148 05/26/22 2031 05/27/22  0119 05/27/22  0736   POCGLU 215* 354* 154* 415*       BUN/Creatinine:    Lab Results   Component Value Date    BUN 6 05/27/2022    CREATININE <0.5 05/27/2022       Assessment        Diabetes Management and Education    Does the patient have a Primary Care Physician? Yes, Urvashi Christopher MD       Does the patient require new medication instruction? Yes  - Prior use of insulin pens. Verified correct use of insulin pens with demonstration with demo pen.     Reviewed basal and mealtime/correction insulin concepts. Patch pump should only be resumed 23 - 24 hours from last Lantus dose. Person responsible for administration of Insulin/Medication:        [x] Self     [] Caregiver       [] Spouse       [x] Other Family Member   []  Other    Insulin Instruction:  insulin pen  Injection Site:   [x] location    [x] rotation     Level of patient/caregiver understanding able to:      [x] Verbalized Understanding   [x] Demonstrated Understanding       [x] Teach Back       [] Needs Reinforcement     []  Other:        Does the patient/caregiver monitor Blood Glucoses? Yes. Reports that her CGM is not working. Equipment not available. Expresses concerns about accuracy. Recommend finger stick BG monitoring. Recommend minimal testing before meals and bedtime. Reviewed glycemic control targets, testing frequency and when to call PCP. Level of patient/caregiver understanding able to:        [x] Verbalized Understanding   [] Demonstrated Understanding       [] Teach Back       [] Needs Reinforcement     []  Other:      Does the patient/caregiver follow a Meal Plan? No: Described herself as very good at carb counting. Identifies common carbs and portion sizes. Reviewed importance of eating three meals per day and consistent carb intake. Level of patient/caregiver understanding able to:       [x] Verbalized Understanding   [] Demonstrated Understanding       [] Teach Back       [] Needs Reinforcement     []  Other:      Does the patient/caregiver understand S/S of Hypoglycemia? Yes - describes some hypoglycemia unawareness. Reviewed symptoms, prevention and treatment. Level of patient/caregiver understanding able to:        [x] Verbalized Understanding   [] Demonstrated Understanding       [] Teach Back       [] Needs Reinforcement     []  Other:                    Does the patient/caregiver understand S/S of Hyperglycemia? Yes    Reviewed symptoms, prevention and treatment.     Level of patient/caregiver understanding able to:        [x] Verbalized Understanding   [] Demonstrated Understanding       [] Teach Back       [] Needs Reinforcement     []  Other:           Plan        Ongoing diabetes education and blood glucose monitoring. Recommend basal and prandial insulin at discharge since not able to verify patch pump availability and working order. Recommend BG targets 100 - 180 with hypoglycemia unawareness and higher A1c trends. PCP can lower targets at follow up. Recommend fingerstick BG monitoring. She can resume CGM monitoring with verification of accuracy with BG monitor.                                     Discharge Plan:  Discharge needs: insulin pens and 4mm pen needles and glucometer/strips/lancets  Placement for patient upon discharge: independent living        Teaching Time Diabetes Education:  40 minutes     Electronically signed by Vivian Phillips on 5/27/2022 at 11:14 AM

## 2022-05-27 NOTE — PLAN OF CARE
Problem: Discharge Planning  Goal: Discharge to home or other facility with appropriate resources  5/27/2022 0053 by Jose Luis Rojas RN  Outcome: Progressing  5/26/2022 1517 by Willam Cordova RN  Outcome: Progressing  5/26/2022 1515 by Willam Cordova RN  Outcome: Progressing   Continuing to work with patient and health care team on discharge plan. Discharge instructions and medication management will be reviewed prior to discharge. Problem: Safety - Adult  Goal: Free from fall injury  5/27/2022 0053 by Jose Luis Rojas RN  Outcome: Progressing  5/26/2022 1517 by Willam Cordova RN  Outcome: Progressing  5/26/2022 1515 by Willam Cordova RN  Outcome: Progressing   Pt free from falls this shift. Fall precautions in place at all times. Call light always within reach. Pt able and agreeable to contact for safety appropriately. Problem: ABCDS Injury Assessment  Goal: Absence of physical injury  5/27/2022 0053 by Jose Luis Rojas RN  Outcome: Progressing  5/26/2022 1515 by Willam Cordova RN  Outcome: Progressing     Problem: Pain  Goal: Verbalizes/displays adequate comfort level or baseline comfort level  5/27/2022 0053 by Jose Luis Rojas RN  Outcome: Progressing  5/26/2022 1517 by Willam Cordova RN  Outcome: Progressing  5/26/2022 1515 by Willam Cordova RN  Outcome: Progressing   Pt able to express presence/absence of pain and rate pain appropriately using numerical scale. Pain/discomfort being managed with PRN analgesics per MD orders (see MAR). Pain assessed every shift and after interventions.     Problem: Neurosensory - Adult  Goal: Achieves stable or improved neurological status  5/27/2022 0053 by Jose Luis Rojas RN  Outcome: Progressing  5/26/2022 1517 by Willam Cordova RN  Outcome: Progressing  5/26/2022 1515 by Willam Cordova RN  Outcome: Progressing  Goal: Absence of seizures  5/27/2022 0053 by Jose Luis Rojas RN  Outcome: Progressing  5/26/2022 1517 by Willam Cordova RN  Outcome: Progressing  5/26/2022 1515 by Cruz Arambula RN  Outcome: Progressing  Goal: Remains free of injury related to seizures activity  5/27/2022 0053 by Aquilino Beard RN  Outcome: Progressing  5/26/2022 1517 by Cruz Arambula RN  Outcome: Progressing  5/26/2022 1515 by Cruz Arambula RN  Outcome: Progressing  Goal: Achieves maximal functionality and self care  5/27/2022 0053 by Aquilino Beard RN  Outcome: Progressing  5/26/2022 1517 by Cruz Arambula RN  Outcome: Progressing  5/26/2022 1515 by Cruz Arambula RN  Outcome: Progressing   Monitored for seizure activity.     Problem: Cardiovascular - Adult  Goal: Maintains optimal cardiac output and hemodynamic stability  5/27/2022 0053 by Aquilino Beard RN  Outcome: Progressing  5/26/2022 1517 by Cruz Arambula RN  Outcome: Progressing  5/26/2022 1515 by Cruz Arambula RN  Outcome: Progressing  Goal: Absence of cardiac dysrhythmias or at baseline  5/27/2022 0053 by Aquilino Beard RN  Outcome: Progressing  5/26/2022 1517 by Cruz Arambula RN  Outcome: Progressing  5/26/2022 1515 by Cruz Arambula RN  Outcome: Progressing     Problem: Skin/Tissue Integrity - Adult  Goal: Skin integrity remains intact  5/27/2022 0053 by Aquilino Beard RN  Outcome: Progressing  5/26/2022 1517 by Cruz Arambula RN  Outcome: Progressing  5/26/2022 1515 by Cruz Arambula RN  Outcome: Progressing  Goal: Incisions, wounds, or drain sites healing without S/S of infection  5/27/2022 0053 by Aquilino Beard RN  Outcome: Progressing  5/26/2022 1515 by Cruz Arambula RN  Outcome: Progressing  Goal: Oral mucous membranes remain intact  5/27/2022 0053 by Aquilino Beard RN  Outcome: Progressing  5/26/2022 1515 by Cruz Arambula RN  Outcome: Progressing     Problem: Gastrointestinal - Adult  Goal: Minimal or absence of nausea and vomiting  5/27/2022 0053 by Aquilino Beard RN  Outcome: Progressing  5/26/2022 1515 by Cruz Arambula RN  Outcome: Progressing  Goal: Maintains or returns to baseline bowel function  5/27/2022 0053 by Geoffrey Jorge RN  Outcome: Progressing  5/26/2022 1515 by Geoffrey Vicente RN  Outcome: Progressing  Goal: Maintains adequate nutritional intake  5/27/2022 0053 by Geoffrey Jorge RN  Outcome: Progressing  5/26/2022 1515 by Geoffrey Vicente RN  Outcome: Progressing  Goal: Establish and maintain optimal ostomy function  5/27/2022 0053 by Geoffrey Jorge RN  Outcome: Progressing  5/26/2022 1515 by Geoffrey Vicente RN  Outcome: Progressing     Problem: Genitourinary - Adult  Goal: Absence of urinary retention  5/27/2022 0053 by Geoffrey Jorge RN  Outcome: Progressing  5/26/2022 1515 by Geoffrey Vicente RN  Outcome: Progressing  Goal: Urinary catheter remains patent  5/27/2022 0053 by Geoffrey Jorge RN  Outcome: Progressing  5/26/2022 1515 by Geoffrey Vicente RN  Outcome: Progressing     Problem: Infection - Adult  Goal: Absence of infection at discharge  5/27/2022 0053 by Geoffrey Jorge RN  Outcome: Progressing  5/26/2022 1515 by Geoffrey Vicente RN  Outcome: Progressing  Goal: Absence of infection during hospitalization  5/27/2022 0053 by Geoffrey Jorge RN  Outcome: Progressing  5/26/2022 1515 by Geoffrey Vicente RN  Outcome: Progressing  Goal: Absence of fever/infection during anticipated neutropenic period  5/27/2022 0053 by Geoffrey Jorge RN  Outcome: Progressing  5/26/2022 1515 by Geoffrey Vicente RN  Outcome: Progressing     Problem: Metabolic/Fluid and Electrolytes - Adult  Goal: Electrolytes maintained within normal limits  5/27/2022 0053 by Geoffrey Jorge RN  Outcome: Progressing  5/26/2022 1515 by Geoffrey Vicente RN  Outcome: Progressing  Goal: Hemodynamic stability and optimal renal function maintained  5/27/2022 0053 by Geoffrey Jorge RN  Outcome: Progressing  5/26/2022 1515 by Geoffrey Vicente RN  Outcome: Progressing  Goal: Glucose maintained within prescribed range  5/27/2022 0053 by Mardella Pattee, RN  Outcome: Progressing  5/26/2022 1515 by Geoffrey Vicente, RN  Outcome: Progressing     Problem: Hematologic - Adult  Goal: Maintains hematologic stability  5/27/2022 0053 by Mikey Goldstein RN  Outcome: Progressing  5/26/2022 1515 by Sudha Morales RN  Outcome: Progressing     Problem: Chronic Conditions and Co-morbidities  Goal: Patient's chronic conditions and co-morbidity symptoms are monitored and maintained or improved  5/27/2022 0053 by Mikey Goldstein RN  Outcome: Progressing  5/26/2022 1517 by Sudha Morales RN  Outcome: Progressing  5/26/2022 1515 by Sudha Morales RN  Outcome: Progressing

## 2022-05-27 NOTE — PROGRESS NOTES
[facility]  Hyperglycemia Event      NAME: José Pacheco RECORD NUMBER:  [id number]  AGE: 34 y.o. GENDER: female  : 1990  EPISODE DATE:  2022     Data     Recent Labs     22  8606 22  1123 22  1148 22  0119 22  0736   POCGLU 97 202* 215* 354* 154* 415*       Medications  Scheduled Medications:   dextrose 5%  500 mL IntraVENous Once    insulin lispro  0-3 Units SubCUTAneous Q24H    [Held by provider] insulin glargine  10 Units SubCUTAneous QAM AC    insulin lispro  0-6 Units SubCUTAneous TID WC    insulin lispro  0-3 Units SubCUTAneous Nightly    [Held by provider] insulin lispro  3 Units SubCUTAneous TID WC    nadolol  10 mg Oral Daily    levETIRAcetam  1,000 mg Oral BID    gabapentin  300 mg Oral TID    And    gabapentin  1,200 mg Oral Nightly    famotidine  20 mg Oral BID    clonazePAM  2 mg Oral BID    enoxaparin  30 mg SubCUTAneous Daily    lidocaine 1 % injection  5 mL IntraDERmal Once    sodium chloride flush  5-40 mL IntraVENous 2 times per day       Diet  Current diet/supplement order: ADULT DIET; Regular; 4 carb choices (60 gm/meal)     Recorded PO: PO Meals Eaten (%): 76 - 100% last meal in flowsheets      Action      Physician Notified of event: Yes   Taylor Chance DO    Total dose of insulin yesterday = 7 units. Recommend restarting Lantus.       Responce     Medication plan updated: Yes        Electronically signed by Tre Regan RN on 2022 at 8:19 AM

## 2022-05-27 NOTE — PROGRESS NOTES
Pt bs 37. Pt given adelina mist with sugar packet. Pt refuse IV D5. BS rechecked BS 42, pt given 16g glucose per MAR. MD messaged.

## 2022-05-27 NOTE — PROGRESS NOTES
[facility]  Hypoglycemia Event and Prevention Plan      NAME: José Pacheco RECORD NUMBER:  [id number]  AGE: 34 y.o. GENDER: female  : 1990  EPISODE DATE:  2022     Data     Recent Labs     22  0736 22  1135 22  1339 22  1352 22  1401 22  1418   POCGLU 415* 202* 37* 43* 51* 60*       Medications  Scheduled Medications:   insulin glargine  7 Units SubCUTAneous QAM AC    insulin lispro  3 Units SubCUTAneous TID WC    metoclopramide  10 mg Oral 4x Daily AC & HS    insulin lispro  3 Units SubCUTAneous Once    insulin lispro  0-6 Units SubCUTAneous TID WC    insulin lispro  0-3 Units SubCUTAneous Nightly    dextrose 5%  500 mL IntraVENous Once    insulin lispro  0-3 Units SubCUTAneous Q24H    nadolol  10 mg Oral Daily    levETIRAcetam  1,000 mg Oral BID    gabapentin  300 mg Oral TID    And    gabapentin  1,200 mg Oral Nightly    famotidine  20 mg Oral BID    clonazePAM  2 mg Oral BID    enoxaparin  30 mg SubCUTAneous Daily    lidocaine 1 % injection  5 mL IntraDERmal Once    sodium chloride flush  5-40 mL IntraVENous 2 times per day       Diet  Current diet/supplement order: ADULT DIET;  Regular; 4 carb choices (60 gm/meal)     Recorded PO: PO Meals Eaten (%): 76 - 100% last meal in flowsheets      Action       Physician Notified of event: Yes   Taylor Chance, DO    Responce       Medication plan updated: Yes        Electronically signed by Barbi David RN on 2022 at 2:42 PM

## 2022-05-27 NOTE — CARE COORDINATION
CASE MANAGEMENT DISCHARGE SUMMARY:    DISCHARGE DATE: 05/27/22    DISCHARGED TO HOME     TRANSPORTATION: spouse             TIME: after 1900     Electronically signed by Jakob Montgomery RN on 5/27/2022 at 12:29 PM

## 2022-05-27 NOTE — FLOWSHEET NOTE
Pt was awake most of night. Very tearful and emotional. 0200 . UAL with steady gait. Uses call light as needed.

## 2022-05-27 NOTE — DISCHARGE SUMMARY
Hospital Medicine Discharge Summary    Patient: Rick Kiran     Gender: female  : 1990   Age: 34 y.o. MRN: [id number]    Code Status: Full Code     Primary Care Provider: Lorenza Lance MD    Admit Date: 2022   Discharge Date:   2022    Admitting Physician: Yana Newton MD  Discharge Physician: Joanna Moeller DO     Discharge Diagnoses: Active Hospital Problems    Diagnosis Date Noted    Nausea and vomiting [R11.2] 2022     Priority: Medium    Diarrhea [R19.7] 2022     Priority: Medium    DKA, type 2, not at goal [facility]) [E11.10] 2022     Priority: Medium    Elevated liver function tests [R79.89] 2022    ROME (generalized anxiety disorder) [F41.1] 2018    Seizure disorder ([facility] [address] [U37.069] 2018       Hospital Course:   34y.o. year-old female with a history of diabetes mellitus, generalized anxiety disorder, a seizure disorder and elevated liver function tests.  She presents to the emergency room for evaluation following a 3-day history of high glucose levels.  She states that she has an insulin pump and it has not been working properly. She denies any abdominal pain.  She has had nausea, vomiting and diarrhea.  In the emergency room she was found to have DKA.  Furthermore, she has not been taking her antiepileptic medications.  She was given a Keppra load in the emergency room as she has reported recent seizure activity.      Work up completed, and improved with treatment as below. was discharged today in stable condition. DKA - resolved  - DKA protocol with insulin gtt initially changed to sq insulin yesterday. - place a tele sitter in the room to for surveillance in case of of medication administration byt the Pt.  I have a strong concern for exogenous insulin administration by the pt  - diabetic educator consulted     agma  - improved with correction as above     Pseudohyponatremia  - improved with bg correction     Chronic conditions - continue home meds unless otherwise stated  Seizures    Disposition:  Home    Exam:     /79   Pulse 99   Temp 97.8 °F (36.6 °C) (Oral)   Resp 18   Ht 4' 10\" (1.473 m)   Wt 112 lb 14 oz (51.2 kg)   SpO2 96%   BMI 23.59 kg/m²     General appearance:  No apparent distress, appears stated age and cooperative. HEENT:  Normal cephalic, atraumatic without obvious deformity. Pupils equal, round, and reactive to light. Extra ocular muscles intact. Conjunctivae/corneas clear. Neck: Supple, with full range of motion. No jugular venous distention. Trachea midline. Respiratory:  Normal respiratory effort. Clear to auscultation, bilaterally without Rales/Wheezes/Rhonchi. Cardiovascular:  Regular rate and rhythm with normal S1/S2 without murmurs, rubs or gallops. Abdomen: Soft, non-tender, non-distended with normal bowel sounds. Musculoskeletal:  No clubbing, cyanosis or edema bilaterally. Full range of motion without deformity. Skin: Skin color, texture, turgor normal.  No rashes or lesions. Neurologic:  Neurovascularly intact without any focal sensory/motor deficits. Cranial nerves: II-XII intact, grossly non-focal.  Psychiatric:  Alert and oriented, thought content appropriate, normal insight    Consults:     IP CONSULT TO DIABETES EDUCATOR  IP CONSULT TO SOCIAL WORK    Labs:  For convenience and continuity at follow-up the following most recent labs are provided:    Lab Results   Component Value Date    WBC 8.8 05/24/2022    HGB 12.3 05/24/2022    HCT 42.7 05/24/2022    .5 05/24/2022     05/24/2022     05/27/2022    K 4.2 05/27/2022    K 4.8 05/24/2022    CL 99 05/27/2022    CO2 24 05/27/2022    BUN 6 05/27/2022    CREATININE <0.5 05/27/2022    CALCIUM 8.5 05/27/2022    PHOS 3.5 05/27/2022    ALKPHOS 189 05/24/2022    ALT 99 05/24/2022     05/24/2022    BILITOT 0.7 05/24/2022    BILIDIR <0.2 11/21/2021    LABALBU 3.8 05/24/2022    LDLCALC 103 09/13/2021    TRIG 92 09/13/2021 Lab Results   Component Value Date    INR 0.94 01/19/2011       Radiology:  XR CHEST PORTABLE   Final Result   No acute cardiopulmonary disease. Discharge Medications:   Current Discharge Medication List      START taking these medications    Details   Blood Glucose Monitoring Suppl (TRUE METRIX METER) w/Device KIT Check blood sugar 4 times daily, tidac  Qty: 1 kit, Refills: 0    Associated Diagnoses: Type 1 diabetes mellitus with ketoacidosis without coma ([facility] [address] Type 1 diabetes mellitus with complication (HCC)      Lancets MISC Check blood sugar 4 times daily, tidac  Qty: 300 each, Refills: 1    Associated Diagnoses: Type 1 diabetes mellitus with complication ([facility] [address] Type 1 diabetes mellitus with ketoacidosis without coma ([facility] [address]      ! ! insulin aspart (NOVOLOG FLEXPEN) 100 UNIT/ML injection pen Inject 3 Units into the skin 3 times daily (before meals)  Qty: 5 pen, Refills: 3      sertraline (ZOLOFT) 25 MG tablet Take 25 mg po daily for 10 days, then take 50 mg po daily  Qty: 60 tablet, Refills: 2       !! - Potential duplicate medications found. Please discuss with provider. Current Discharge Medication List      CONTINUE these medications which have CHANGED    Details   blood glucose test strips (TRUE METRIX BLOOD GLUCOSE TEST) strip Check blood sugar 4 times daily, tidac  Qty: 100 each, Refills: 3    Associated Diagnoses: Type 1 diabetes mellitus with ketoacidosis without coma ([facility] [address] Type 1 diabetes mellitus with complication (HCC)      ! ! insulin aspart (NOVOLOG FLEXPEN) 100 UNIT/ML injection pen Inject 20 units with meals/snacks up to 5 times per day  Qty: 10 pen, Refills: 3    Associated Diagnoses: Type 1 diabetes mellitus with complication ([facility]       ! ! - Potential duplicate medications found. Please discuss with provider.         Current Discharge Medication List      CONTINUE these medications which have NOT CHANGED    Details   clonazePAM (KLONOPIN) 2 MG tablet Take 1 tablet by mouth 2 times daily.   Qty: 60 tablet, Refills: 2    Associated Diagnoses: Seizure disorder (HCC)      levETIRAcetam (KEPPRA) 500 MG tablet Take 2 tablets by mouth 2 times daily  Qty: 120 tablet, Refills: 5      gabapentin (NEURONTIN) 300 MG capsule TAKE 1 CAPSULE BY MOUTH THREE TIMES DAILY DURING  THE  DAY  AND  4  CAPSULES  AT  BEDTIME  Qty: 210 capsule, Refills: 1    Associated Diagnoses: Diabetic peripheral neuropathy (HCC)      insulin aspart (NOVOLOG) 100 UNIT/ML injection vial Inject 100 Units into the skin daily In conjunction with omnipod  Qty: 30 mL, Refills: 5    Associated Diagnoses: Type 1 diabetes mellitus with complication ([facility])      Continuous Blood Gluc Sensor (DEXCOM G6 SENSOR) MISC Apply as directed for blood sugar monitoring  Qty: 10 each, Refills: 5    Associated Diagnoses: Type 1 diabetes mellitus with complication (formerly [facility])      Continuous Blood Gluc Transmit (DEXCOM G6 TRANSMITTER) MISC Use as directed for blood sugar monitoring  Qty: 1 each, Refills: 3    Associated Diagnoses: Type 1 diabetes mellitus with complication (formerly [facility])      Insulin Disposable Pump (OMNIPOD DASH 5 PACK PODS) MISC Continuous pump - max dose 60 units  Qty: 10 each, Refills: 3    Associated Diagnoses: Type 1 diabetes mellitus with complication (HCC)      ondansetron (ZOFRAN) 4 MG tablet Take 1 tablet by mouth 3 times daily as needed for Nausea or Vomiting  Qty: 30 tablet, Refills: 1      ibuprofen (ADVIL;MOTRIN) 800 MG tablet Take 1 tablet by mouth 3 times daily as needed for Pain  Qty: 90 tablet, Refills: 1      glucagon, rDNA, 1 MG injection Inject 1 mg into the muscle as needed for Low blood sugar (Blood glucose less than 70 mg/dL and patient NOT ALERT or NPO and does not have IV access.)  Qty: 10 each, Refills: 0      famotidine (PEPCID) 20 MG tablet Take 20 mg by mouth 2 times daily           Current Discharge Medication List      STOP taking these medications       nadolol (CORGARD) 20 MG tablet Comments:   Reason for Stopping: Follow-up appointments:  one week    Provider Follow-up:    pcp    Condition at Discharge:  Stable    The patient was seen and examined on day of discharge and this discharge summary is in conjunction with any daily progress note from day of discharge. Time Spent on discharge is 45 minutes  in the examination, evaluation, counseling and review of medications and discharge plan. Signed:    Margarita Vinson DO   5/27/2022      Thank you Klaudia Gross MD for the opportunity to be involved in this patient's care. If you have any questions or concerns please feel free to contact me at [phone].

## 2022-05-28 VITALS
DIASTOLIC BLOOD PRESSURE: 65 MMHG | WEIGHT: 111.77 LBS | RESPIRATION RATE: 16 BRPM | HEART RATE: 81 BPM | HEIGHT: 58 IN | SYSTOLIC BLOOD PRESSURE: 102 MMHG | TEMPERATURE: 97.5 F | OXYGEN SATURATION: 99 % | BODY MASS INDEX: 23.46 KG/M2

## 2022-05-28 LAB
ANION GAP SERPL CALCULATED.3IONS-SCNC: 12 MMOL/L (ref 3–16)
BUN BLDV-MCNC: 9 MG/DL (ref 7–20)
CALCIUM SERPL-MCNC: 8.4 MG/DL (ref 8.3–10.6)
CHLORIDE BLD-SCNC: 96 MMOL/L (ref 99–110)
CO2: 25 MMOL/L (ref 21–32)
CREAT SERPL-MCNC: <0.5 MG/DL (ref 0.6–1.1)
GFR AFRICAN AMERICAN: >60
GFR NON-AFRICAN AMERICAN: >60
GLUCOSE BLD-MCNC: 212 MG/DL (ref 70–99)
GLUCOSE BLD-MCNC: 218 MG/DL (ref 70–99)
GLUCOSE BLD-MCNC: 329 MG/DL (ref 70–99)
GLUCOSE BLD-MCNC: 331 MG/DL (ref 70–99)
GLUCOSE BLD-MCNC: 342 MG/DL (ref 70–99)
GLUCOSE BLD-MCNC: 346 MG/DL (ref 70–99)
MAGNESIUM: 1.7 MG/DL (ref 1.8–2.4)
PERFORMED ON: ABNORMAL
PHOSPHORUS: 3.6 MG/DL (ref 2.5–4.9)
POTASSIUM SERPL-SCNC: 4 MMOL/L (ref 3.5–5.1)
SODIUM BLD-SCNC: 133 MMOL/L (ref 136–145)

## 2022-05-28 PROCEDURE — 80048 BASIC METABOLIC PNL TOTAL CA: CPT

## 2022-05-28 PROCEDURE — 2580000003 HC RX 258: Performed by: INTERNAL MEDICINE

## 2022-05-28 PROCEDURE — 94760 N-INVAS EAR/PLS OXIMETRY 1: CPT

## 2022-05-28 PROCEDURE — 83735 ASSAY OF MAGNESIUM: CPT

## 2022-05-28 PROCEDURE — 6360000002 HC RX W HCPCS: Performed by: FAMILY MEDICINE

## 2022-05-28 PROCEDURE — 6370000000 HC RX 637 (ALT 250 FOR IP): Performed by: FAMILY MEDICINE

## 2022-05-28 PROCEDURE — 6360000002 HC RX W HCPCS: Performed by: INTERNAL MEDICINE

## 2022-05-28 PROCEDURE — 84100 ASSAY OF PHOSPHORUS: CPT

## 2022-05-28 PROCEDURE — 6370000000 HC RX 637 (ALT 250 FOR IP): Performed by: INTERNAL MEDICINE

## 2022-05-28 RX ORDER — MAGNESIUM SULFATE IN WATER 40 MG/ML
2000 INJECTION, SOLUTION INTRAVENOUS ONCE
Status: COMPLETED | OUTPATIENT
Start: 2022-05-28 | End: 2022-05-28

## 2022-05-28 RX ADMIN — INSULIN LISPRO 1 UNITS: 100 INJECTION, SOLUTION INTRAVENOUS; SUBCUTANEOUS at 12:08

## 2022-05-28 RX ADMIN — GABAPENTIN 300 MG: 300 CAPSULE ORAL at 16:50

## 2022-05-28 RX ADMIN — GABAPENTIN 300 MG: 300 CAPSULE ORAL at 12:13

## 2022-05-28 RX ADMIN — ENOXAPARIN SODIUM 30 MG: 100 INJECTION SUBCUTANEOUS at 08:26

## 2022-05-28 RX ADMIN — MAGNESIUM SULFATE HEPTAHYDRATE 2000 MG: 40 INJECTION, SOLUTION INTRAVENOUS at 08:29

## 2022-05-28 RX ADMIN — METOCLOPRAMIDE 10 MG: 10 TABLET ORAL at 06:05

## 2022-05-28 RX ADMIN — GABAPENTIN 300 MG: 300 CAPSULE ORAL at 08:26

## 2022-05-28 RX ADMIN — METOCLOPRAMIDE 10 MG: 10 TABLET ORAL at 16:50

## 2022-05-28 RX ADMIN — INSULIN LISPRO 2 UNITS: 100 INJECTION, SOLUTION INTRAVENOUS; SUBCUTANEOUS at 16:56

## 2022-05-28 RX ADMIN — CLONAZEPAM 2 MG: 1 TABLET ORAL at 08:26

## 2022-05-28 RX ADMIN — METOCLOPRAMIDE 10 MG: 10 TABLET ORAL at 12:13

## 2022-05-28 RX ADMIN — INSULIN LISPRO 2 UNITS: 100 INJECTION, SOLUTION INTRAVENOUS; SUBCUTANEOUS at 08:29

## 2022-05-28 RX ADMIN — INSULIN LISPRO 2 UNITS: 100 INJECTION, SOLUTION INTRAVENOUS; SUBCUTANEOUS at 02:59

## 2022-05-28 RX ADMIN — LEVETIRACETAM 1000 MG: 500 TABLET, FILM COATED ORAL at 08:25

## 2022-05-28 RX ADMIN — FAMOTIDINE 20 MG: 20 TABLET ORAL at 08:26

## 2022-05-28 RX ADMIN — SODIUM CHLORIDE, PRESERVATIVE FREE 10 ML: 5 INJECTION INTRAVENOUS at 08:56

## 2022-05-28 ASSESSMENT — PAIN SCALES - GENERAL
PAINLEVEL_OUTOF10: 0

## 2022-05-28 NOTE — PLAN OF CARE
Problem: Discharge Planning  Goal: Discharge to home or other facility with appropriate resources  Outcome: Progressing  Flowsheets (Taken 5/28/2022 0039)  Discharge to home or other facility with appropriate resources:   Identify barriers to discharge with patient and caregiver   Arrange for needed discharge resources and transportation as appropriate   Identify discharge learning needs (meds, wound care, etc)     Problem: Safety - Adult  Goal: Free from fall injury  Outcome: Progressing  Flowsheets (Taken 5/28/2022 0436)  Free From Fall Injury:   Instruct family/caregiver on patient safety   Based on caregiver fall risk screen, instruct family/caregiver to ask for assistance with transferring infant if caregiver noted to have fall risk factors     Problem: ABCDS Injury Assessment  Goal: Absence of physical injury  Outcome: Progressing     Problem: Pain  Goal: Verbalizes/displays adequate comfort level or baseline comfort level  Outcome: Progressing     Problem: Neurosensory - Adult  Goal: Achieves stable or improved neurological status  Outcome: Progressing  Flowsheets (Taken 5/28/2022 0039)  Achieves stable or improved neurological status:   Assess for and report changes in neurological status   Maintain blood pressure and fluid volume within ordered parameters to optimize cerebral perfusion and minimize risk of hemorrhage   Monitor temperature, glucose, and sodium. Initiate appropriate interventions as ordered  Goal: Absence of seizures  Outcome: Progressing  Flowsheets (Taken 5/28/2022 0039)  Absence of seizures:   Monitor for seizure activity.   If seizure occurs, document type and location of movements and any associated apnea   If seizure occurs, turn head to side and suction secretions as needed   Administer anticonvulsants as ordered   Support airway/breathing, administer oxygen as needed   Diagnostic studies as ordered  Goal: Remains free of injury related to seizures activity  Outcome: Progressing  Flowsheets (Taken 5/28/2022 0039)  Remains free of injury related to seizure activity:   Maintain airway, patient safety  and administer oxygen as ordered   Monitor patient for seizure activity, document and report duration and description of seizure to Licensed Independent Practitioner   If seizure occurs, turn patient to side and suction secretions as needed   Reorient patient post seizure   Seizure pads on all 4 side rails   Instruct patient/family to notify RN of any seizure activity   Instruct patient/family to call for assistance with activity based on assessment  Goal: Achieves maximal functionality and self care  Outcome: Progressing  Flowsheets (Taken 5/28/2022 0039)  Achieves maximal functionality and self care:   Monitor swallowing and airway patency with patient fatigue and changes in neurological status   Encourage and assist patient to increase activity and self care with guidance from physical therapy/occupational therapy   Encourage visually impaired, hearing impaired and aphasic patients to use assistive/communication devices     Problem: Cardiovascular - Adult  Goal: Maintains optimal cardiac output and hemodynamic stability  Outcome: Progressing  Flowsheets (Taken 5/28/2022 0039)  Maintains optimal cardiac output and hemodynamic stability:   Monitor blood pressure and heart rate   Monitor urine output and notify Licensed Independent Practitioner for values outside of normal range   Assess for signs of decreased cardiac output   Administer fluid and/or volume expanders as ordered  Goal: Absence of cardiac dysrhythmias or at baseline  Outcome: Progressing  Flowsheets (Taken 5/28/2022 0039)  Absence of cardiac dysrhythmias or at baseline:   Monitor cardiac rate and rhythm   Assess for signs of decreased cardiac output   Administer antiarrhythmia medication and electrolyte replacement as ordered     Problem: Skin/Tissue Integrity - Adult  Goal: Skin integrity remains intact  Outcome: Progressing  Flowsheets  Taken 5/28/2022 0436  Skin Integrity Remains Intact: Monitor for areas of redness and/or skin breakdown  Taken 5/28/2022 0039  Skin Integrity Remains Intact: Monitor for areas of redness and/or skin breakdown  Goal: Incisions, wounds, or drain sites healing without S/S of infection  Outcome: Progressing  Flowsheets  Taken 5/28/2022 0436  Incisions, Wounds, or Drain Sites Healing Without Sign and Symptoms of Infection:   TWICE DAILY: Assess and document skin integrity   ADMISSION and DAILY: Assess and document risk factors for pressure ulcer development  Taken 5/28/2022 0039  Incisions, Wounds, or Drain Sites Healing Without Sign and Symptoms of Infection: ADMISSION and DAILY: Assess and document risk factors for pressure ulcer development  Goal: Oral mucous membranes remain intact  Outcome: Progressing  Flowsheets  Taken 5/28/2022 0436  Oral Mucous Membranes Remain Intact:   Assess oral mucosa and hygiene practices   Implement preventative oral hygiene regimen   Implement oral medicated treatments as ordered  Taken 5/28/2022 0039  Oral Mucous Membranes Remain Intact: Assess oral mucosa and hygiene practices     Problem: Gastrointestinal - Adult  Goal: Minimal or absence of nausea and vomiting  Outcome: Progressing  Flowsheets (Taken 5/28/2022 0039)  Minimal or absence of nausea and vomiting:   Administer ordered antiemetic medications as needed   Provide nonpharmacologic comfort measures as appropriate  Goal: Maintains or returns to baseline bowel function  Outcome: Progressing  Flowsheets (Taken 5/28/2022 0039)  Maintains or returns to baseline bowel function:   Assess bowel function   Encourage oral fluids to ensure adequate hydration   Administer ordered medications as needed   Encourage mobilization and activity  Goal: Maintains adequate nutritional intake  Outcome: Progressing  Flowsheets (Taken 5/28/2022 0039)  Maintains adequate nutritional intake: Monitor intake and output, weight and lab values  Goal: Establish and maintain optimal ostomy function  Outcome: Progressing     Problem: Genitourinary - Adult  Goal: Absence of urinary retention  Outcome: Progressing  Flowsheets (Taken 5/28/2022 0039)  Absence of urinary retention:   Assess patients ability to void and empty bladder   Monitor intake/output and perform bladder scan as needed  Goal: Urinary catheter remains patent  Outcome: Progressing     Problem: Infection - Adult  Goal: Absence of infection at discharge  Outcome: Progressing  Flowsheets (Taken 5/28/2022 0039)  Absence of infection at discharge:   Assess and monitor for signs and symptoms of infection   Monitor lab/diagnostic results   Monitor all insertion sites i.e., indwelling lines, tubes and drains   Administer medications as ordered   Instruct and encourage patient and family to use good hand hygiene technique  Goal: Absence of infection during hospitalization  Outcome: Progressing  Flowsheets (Taken 5/28/2022 0039)  Absence of infection during hospitalization:   Assess and monitor for signs and symptoms of infection   Monitor lab/diagnostic results   Monitor all insertion sites i.e., indwelling lines, tubes and drains   Administer medications as ordered   Instruct and encourage patient and family to use good hand hygiene technique  Goal: Absence of fever/infection during anticipated neutropenic period  Outcome: Progressing     Problem: Metabolic/Fluid and Electrolytes - Adult  Goal: Electrolytes maintained within normal limits  Outcome: Progressing  Flowsheets (Taken 5/28/2022 0039)  Electrolytes maintained within normal limits:   Monitor labs and assess patient for signs and symptoms of electrolyte imbalances   Administer electrolyte replacement as ordered   Monitor response to electrolyte replacements, including repeat lab results as appropriate  Goal: Hemodynamic stability and optimal renal function maintained  Outcome: Progressing  Flowsheets (Taken 5/28/2022 3473)  Hemodynamic stability and optimal renal function maintained:   Monitor labs and assess for signs and symptoms of volume excess or deficit   Monitor intake, output and patient weight   Monitor urine specific gravity, serum osmolarity and serum sodium as indicated or ordered   Monitor response to interventions for patient's volume status, including labs, urine output, blood pressure (other measures as available)   Encourage oral intake as appropriate  Goal: Glucose maintained within prescribed range  Outcome: Progressing  Flowsheets (Taken 5/28/2022 0039)  Glucose maintained within prescribed range:   Monitor blood glucose as ordered   Assess for signs and symptoms of hyperglycemia and hypoglycemia   Administer ordered medications to maintain glucose within target range   Assess barriers to adequate nutritional intake and initiate nutrition consult as needed   Instruct patient on self management of diabetes and initiate consult as needed     Problem: Hematologic - Adult  Goal: Maintains hematologic stability  Outcome: Progressing  Flowsheets (Taken 5/28/2022 0039)  Maintains hematologic stability:   Assess for signs and symptoms of bleeding or hemorrhage   Monitor labs for bleeding or clotting disorders   Administer blood products/factors as ordered     Problem: Chronic Conditions and Co-morbidities  Goal: Patient's chronic conditions and co-morbidity symptoms are monitored and maintained or improved  Outcome: Progressing  Flowsheets (Taken 5/28/2022 0039)  Care Plan - Patient's Chronic Conditions and Co-Morbidity Symptoms are Monitored and Maintained or Improved:   Monitor and assess patient's chronic conditions and comorbid symptoms for stability, deterioration, or improvement   Collaborate with multidisciplinary team to address chronic and comorbid conditions and prevent exacerbation or deterioration   Update acute care plan with appropriate goals if chronic or comorbid symptoms are exacerbated and prevent overall improvement and discharge

## 2022-05-28 NOTE — CARE COORDINATION
Spoke with the patient in the room regarding SSI. She is interested in filing for disability. Provided her with the information for [employer] and [employer] to assist in filing. She states her  will pick her up when he gets off work at 6 pm today. She denies further D/C needs. Meds are in inpatient pharmacy.     CASE MANAGEMENT DISCHARGE SUMMARY:    DISCHARGE DATE: 05/28/2022    DISCHARGED TO HOME     TRANSPORTATION: spouse             TIME: after 6 pm              Andres Kelley RN, BSN, Case Management  [phone]  Electronically signed by Andres Kelley RN on 5/28/2022 at 10:37 AM

## 2022-05-28 NOTE — DISCHARGE SUMMARY
Hospital Medicine Discharge Summary    Patient: Laura Berumen     Gender: female  : 1990   Age: 34 y.o. MRN: [id number]    Code Status: Full Code     Primary Care Provider: Maxwell Martinez MD    Admit Date: 2022   Discharge Date:   2022    DC summary was completed 2022 but dc was cancelled due to hypoglycemia. Sugars stable today and ready for dc. Pt was S/E again today prior to DC. Admitting Physician: Desiree Clark MD  Discharge Physician: Suzanne Blake DO     Discharge Diagnoses: Active Hospital Problems    Diagnosis Date Noted    Nausea and vomiting [R11.2] 2022     Priority: Medium    Diarrhea [R19.7] 2022     Priority: Medium    DKA, type 2, not at goal [facility]) [E11.10] 2022     Priority: Medium    Elevated liver function tests [R79.89] 2022    ROME (generalized anxiety disorder) [F41.1] 2018    Seizure disorder ([facility] [address] [C90.965] 2018       Hospital Course:   34y.o. year-old female with a history of diabetes mellitus, generalized anxiety disorder, a seizure disorder and elevated liver function tests.  She presented to the emergency room for evaluation following a 3-day history of high glucose levels.  She states that she has an insulin pump and it has not been working properly. She has had nausea, vomiting and diarrhea.  In the emergency room she was found to have DKA.  Furthermore, she has not been taking her antiepileptic medications.  She was given a Keppra load in the emergency room as she has reported recent seizure activity.      Work up completed, and improved with treatment as below. was discharged today in stable condition. DKA - resolved  DM1  - DKA protocol with insulin gtt initially changed to sq insulin   - place a tele sitter in the room to for surveillance in case of of medication administration by the Pt. I have a strong concern for exogenous insulin administration.  We did not witness her giving herself insulin and she continues to say the lantus drops her bg. However her hba1c was 13.6; lantus 7 units should not drop her bg this low. As of this am her bg has been stable. She did not receive lantus this am.   - gamma gt > 200   - diabetic educator consulted     agma  - improved with correction as above     Pseudohyponatremia  - improved with bg correction     Chronic conditions - continue home meds unless otherwise stated  Seizures - cont keppra. Pt states she has this at home    Disposition:  Home    Exam:     /70   Pulse 86   Temp 97.7 °F (36.5 °C) (Oral)   Resp 16   Ht 4' 10\" (1.473 m)   Wt 111 lb 12.4 oz (50.7 kg)   SpO2 99%   BMI 23.36 kg/m²     General appearance:  No apparent distress, appears more comfortable today  HEENT:  Normal cephalic, atraumatic without obvious deformity. Pupils equal, round, and reactive to light. Extra ocular muscles intact. Conjunctivae/corneas clear. Neck: Supple, with full range of motion. No jugular venous distention. Trachea midline. Respiratory:  Normal respiratory effort. Clear to auscultation, bilaterally without Rales/Wheezes/Rhonchi. Cardiovascular:  Regular rate and rhythm with normal S1/S2 without murmurs, rubs or gallops. Abdomen: Soft, non-tender, non-distended with normal bowel sounds. Musculoskeletal:  No clubbing, cyanosis or edema bilaterally. Full range of motion without deformity. Skin: Skin color, texture, turgor normal.  No rashes or lesions. Neurologic:  Neurovascularly intact without any focal sensory/motor deficits. Cranial nerves: II-XII intact, grossly non-focal.  Psychiatric:  Alert and oriented, thought content appropriate, normal insight    Consults:     IP CONSULT TO DIABETES EDUCATOR  IP CONSULT TO SOCIAL WORK    Labs:  For convenience and continuity at follow-up the following most recent labs are provided:    Lab Results   Component Value Date    WBC 8.8 05/24/2022    HGB 12.3 05/24/2022    HCT 42.7 05/24/2022    .5 05/24/2022    PLT 412 05/24/2022     05/28/2022    K 4.0 05/28/2022    K 4.8 05/24/2022    CL 96 05/28/2022    CO2 25 05/28/2022    BUN 9 05/28/2022    CREATININE <0.5 05/28/2022    CALCIUM 8.4 05/28/2022    PHOS 3.6 05/28/2022    ALKPHOS 189 05/24/2022    ALT 99 05/24/2022     05/24/2022    BILITOT 0.7 05/24/2022    BILIDIR <0.2 11/21/2021    LABALBU 3.8 05/24/2022    LDLCALC 103 09/13/2021    TRIG 92 09/13/2021     Lab Results   Component Value Date    INR 0.94 01/19/2011       Radiology:  XR CHEST PORTABLE   Final Result   No acute cardiopulmonary disease. Discharge Medications:   Current Discharge Medication List      START taking these medications    Details   Blood Glucose Monitoring Suppl (TRUE METRIX METER) w/Device KIT Check blood sugar 4 times daily, tidac  Qty: 1 kit, Refills: 0    Associated Diagnoses: Type 1 diabetes mellitus with ketoacidosis without coma ([address] Type 1 diabetes mellitus with complication (HCC)      Lancets MISC Check blood sugar 4 times daily, tidac  Qty: 300 each, Refills: 1    Associated Diagnoses: Type 1 diabetes mellitus with complication ([address] Type 1 diabetes mellitus with ketoacidosis without coma ([address]      ! ! insulin aspart (NOVOLOG FLEXPEN) 100 UNIT/ML injection pen Inject 3 Units into the skin 3 times daily (before meals)  Qty: 5 pen, Refills: 3      sertraline (ZOLOFT) 25 MG tablet Take 25 mg po daily for 10 days, then take 50 mg po daily  Qty: 60 tablet, Refills: 2       !! - Potential duplicate medications found. Please discuss with provider. Current Discharge Medication List      CONTINUE these medications which have CHANGED    Details   blood glucose test strips (TRUE METRIX BLOOD GLUCOSE TEST) strip Check blood sugar 4 times daily, tidac  Qty: 100 each, Refills: 3    Associated Diagnoses: Type 1 diabetes mellitus with ketoacidosis without coma ([address] Type 1 diabetes mellitus with complication (HCC)      ! ! insulin aspart (NOVOLOG FLEXPEN) 100 UNIT/ML injection pen Inject 20 units with meals/snacks up to 5 times per day  Qty: 10 pen, Refills: 3    Associated Diagnoses: Type 1 diabetes mellitus with complication ([facility]       ! ! - Potential duplicate medications found. Please discuss with provider. Current Discharge Medication List      CONTINUE these medications which have NOT CHANGED    Details   clonazePAM (KLONOPIN) 2 MG tablet Take 1 tablet by mouth 2 times daily.   Qty: 60 tablet, Refills: 2    Associated Diagnoses: Seizure disorder (HCC)      levETIRAcetam (KEPPRA) 500 MG tablet Take 2 tablets by mouth 2 times daily  Qty: 120 tablet, Refills: 5      gabapentin (NEURONTIN) 300 MG capsule TAKE 1 CAPSULE BY MOUTH THREE TIMES DAILY DURING  THE  DAY  AND  4  CAPSULES  AT  BEDTIME  Qty: 210 capsule, Refills: 1    Associated Diagnoses: Diabetic peripheral neuropathy (HCC)      insulin aspart (NOVOLOG) 100 UNIT/ML injection vial Inject 100 Units into the skin daily In conjunction with omnipod  Qty: 30 mL, Refills: 5    Associated Diagnoses: Type 1 diabetes mellitus with complication (HCC)      Continuous Blood Gluc Sensor (DEXCOM G6 SENSOR) MISC Apply as directed for blood sugar monitoring  Qty: 10 each, Refills: 5    Associated Diagnoses: Type 1 diabetes mellitus with complication (HCC)      Continuous Blood Gluc Transmit (DEXCOM G6 TRANSMITTER) MISC Use as directed for blood sugar monitoring  Qty: 1 each, Refills: 3    Associated Diagnoses: Type 1 diabetes mellitus with complication (HCC)      Insulin Disposable Pump (OMNIPOD DASH 5 PACK PODS) MISC Continuous pump - max dose 60 units  Qty: 10 each, Refills: 3    Associated Diagnoses: Type 1 diabetes mellitus with complication (HCC)      ondansetron (ZOFRAN) 4 MG tablet Take 1 tablet by mouth 3 times daily as needed for Nausea or Vomiting  Qty: 30 tablet, Refills: 1      ibuprofen (ADVIL;MOTRIN) 800 MG tablet Take 1 tablet by mouth 3 times daily as needed for Pain  Qty: 90 tablet, Refills: 1      glucagon, rDNA, 1 MG injection Inject 1 mg into the muscle as needed for Low blood sugar (Blood glucose less than 70 mg/dL and patient NOT ALERT or NPO and does not have IV access.)  Qty: 10 each, Refills: 0      famotidine (PEPCID) 20 MG tablet Take 20 mg by mouth 2 times daily           Current Discharge Medication List      STOP taking these medications       nadolol (CORGARD) 20 MG tablet Comments:   Reason for Stopping: Follow-up appointments:  one week    Provider Follow-up:    pcp    Condition at Discharge:  Stable    The patient was seen and examined on day of discharge and this discharge summary is in conjunction with any daily progress note from day of discharge. Time Spent on discharge is 45 minutes  in the examination, evaluation, counseling and review of medications and discharge plan. Signed:    Adelaide Thorne DO   5/28/2022      Thank you Rachel Lynch MD for the opportunity to be involved in this patient's care. If you have any questions or concerns please feel free to contact me at [phone].

## 2022-05-28 NOTE — DISCHARGE INSTR - DIET

## 2022-05-28 NOTE — PROGRESS NOTES
Written and verbal DC instructions reviewed with pt. Pt states understanding. All questions answered. Pt received meds from pharmacy. PICC removed without complications. Dressing clean dry and intact.  Pt educated to lay flat for 30 minutes post PICC removal.     Electronically signed by Damien Yin RN on 5/28/2022 at 5:06 PM

## 2022-05-29 LAB
F-ACTIN AB IGG: 4 UNITS (ref 0–19)
ORGANISM: ABNORMAL
URINE CULTURE, ROUTINE: ABNORMAL

## 2022-06-01 ENCOUNTER — TELEPHONE (OUTPATIENT)
Dept: INTERNAL MEDICINE CLINIC | Age: 32
End: 2022-06-01

## 2022-06-01 NOTE — TELEPHONE ENCOUNTER
----- Message from Toi Russell sent at 6/1/2022  2:11 PM EDT -----  Subject: Message to Provider    QUESTIONS  Information for Provider? Patient asking to have the paperwork for her    for [facility] for her type 1 diabetes and epilepsy faxed to   [phone]. Patient's  is Waldemar Carroll and her phone number   is [phone]  ---------------------------------------------------------------------------  --------------  CALL BACK INFO  What is the best way for the office to contact you? OK to leave message on   voicemail  Preferred Call Back Phone Number? [phone]  ---------------------------------------------------------------------------  --------------  SCRIPT ANSWERS  Relationship to Patient? Other  Representative Name? jae  Is the Representative on the appropriate HIPAA document in Epic?  Yes

## 2022-06-05 DIAGNOSIS — E11.42 DIABETIC PERIPHERAL NEUROPATHY (HCC): ICD-10-CM

## 2022-06-06 ENCOUNTER — TELEPHONE (OUTPATIENT)
Dept: INTERNAL MEDICINE CLINIC | Age: 32
End: 2022-06-06

## 2022-06-06 RX ORDER — GABAPENTIN 300 MG/1
CAPSULE ORAL
Qty: 210 CAPSULE | Refills: 1 | OUTPATIENT
Start: 2022-06-06 | End: 2022-12-05

## 2022-06-06 RX ORDER — GABAPENTIN 300 MG/1
CAPSULE ORAL
Qty: 210 CAPSULE | Refills: 1 | Status: SHIPPED | OUTPATIENT
Start: 2022-06-06 | End: 2022-07-19

## 2022-06-06 NOTE — TELEPHONE ENCOUNTER
----- Message from Chance Bermudez sent at 6/6/2022  1:51 PM EDT -----  Subject: Refill Request    QUESTIONS  Name of Medication? gabapentin (NEURONTIN) 300 MG capsule  Patient-reported dosage and instructions? 300 mg  How many days do you have left? 0  Preferred Pharmacy? [address] [id number]  Pharmacy phone number (if available)? [phone]  ---------------------------------------------------------------------------  --------------  Ksenia ALTMAN  What is the best way for the office to contact you? OK to leave message on   voicemail  Preferred Call Back Phone Number? [phone]  ---------------------------------------------------------------------------  --------------  SCRIPT ANSWERS  Relationship to Patient? Other  Representative Name? Caprice Contreras  Is the [place] on the appropriate HIPAA document in Epic?  Yes

## 2022-06-09 ENCOUNTER — OFFICE VISIT (OUTPATIENT)
Dept: INTERNAL MEDICINE CLINIC | Age: 32
End: 2022-06-09
Payer: COMMERCIAL

## 2022-06-09 VITALS
HEART RATE: 120 BPM | WEIGHT: 102.4 LBS | OXYGEN SATURATION: 99 % | TEMPERATURE: 97.5 F | BODY MASS INDEX: 21.5 KG/M2 | HEIGHT: 58 IN | SYSTOLIC BLOOD PRESSURE: 110 MMHG | DIASTOLIC BLOOD PRESSURE: 70 MMHG

## 2022-06-09 DIAGNOSIS — E10.8 TYPE 1 DIABETES MELLITUS WITH COMPLICATION (HCC): Primary | ICD-10-CM

## 2022-06-09 DIAGNOSIS — F41.1 GAD (GENERALIZED ANXIETY DISORDER): ICD-10-CM

## 2022-06-09 DIAGNOSIS — R74.8 ELEVATED LIVER ENZYMES: ICD-10-CM

## 2022-06-09 DIAGNOSIS — G40.909 SEIZURE DISORDER (HCC): ICD-10-CM

## 2022-06-09 PROBLEM — E11.10 DKA, TYPE 2, NOT AT GOAL (HCC): Status: RESOLVED | Noted: 2022-05-24 | Resolved: 2022-06-09

## 2022-06-09 PROBLEM — E10.10 DKA, TYPE 1, NOT AT GOAL (HCC): Status: RESOLVED | Noted: 2021-10-30 | Resolved: 2022-06-09

## 2022-06-09 PROCEDURE — 99214 OFFICE O/P EST MOD 30 MIN: CPT | Performed by: INTERNAL MEDICINE

## 2022-06-09 PROCEDURE — 1036F TOBACCO NON-USER: CPT | Performed by: INTERNAL MEDICINE

## 2022-06-09 PROCEDURE — G8427 DOCREV CUR MEDS BY ELIG CLIN: HCPCS | Performed by: INTERNAL MEDICINE

## 2022-06-09 PROCEDURE — 1111F DSCHRG MED/CURRENT MED MERGE: CPT | Performed by: INTERNAL MEDICINE

## 2022-06-09 PROCEDURE — 2022F DILAT RTA XM EVC RTNOPTHY: CPT | Performed by: INTERNAL MEDICINE

## 2022-06-09 PROCEDURE — 3046F HEMOGLOBIN A1C LEVEL >9.0%: CPT | Performed by: INTERNAL MEDICINE

## 2022-06-09 PROCEDURE — G8420 CALC BMI NORM PARAMETERS: HCPCS | Performed by: INTERNAL MEDICINE

## 2022-06-09 RX ORDER — INSULIN ASPART 100 [IU]/ML
INJECTION, SOLUTION INTRAVENOUS; SUBCUTANEOUS
Qty: 10 PEN | Refills: 3 | Status: SHIPPED | OUTPATIENT
Start: 2022-06-09 | End: 2022-09-05 | Stop reason: SDUPTHER

## 2022-06-09 RX ORDER — CALCIUM CITRATE/VITAMIN D3 200MG-6.25
TABLET ORAL
Qty: 400 EACH | Refills: 3 | Status: SHIPPED | OUTPATIENT
Start: 2022-06-09 | End: 2022-07-15 | Stop reason: SDUPTHER

## 2022-06-09 RX ORDER — BLOOD-GLUCOSE SENSOR
EACH MISCELLANEOUS
Qty: 10 EACH | Refills: 5 | Status: SHIPPED | OUTPATIENT
Start: 2022-06-09 | End: 2022-08-25 | Stop reason: SDUPTHER

## 2022-06-09 RX ORDER — FLUCONAZOLE 150 MG/1
150 TABLET ORAL
Qty: 2 TABLET | Refills: 0 | Status: SHIPPED | OUTPATIENT
Start: 2022-06-09 | End: 2022-06-15

## 2022-06-09 RX ORDER — INSULIN ASPART 100 [IU]/ML
INJECTION, SOLUTION INTRAVENOUS; SUBCUTANEOUS
Qty: 30 ML | Refills: 5 | Status: SHIPPED | OUTPATIENT
Start: 2022-06-09 | End: 2022-09-05 | Stop reason: SDUPTHER

## 2022-06-09 ASSESSMENT — PATIENT HEALTH QUESTIONNAIRE - PHQ9
SUM OF ALL RESPONSES TO PHQ QUESTIONS 1-9: 0
SUM OF ALL RESPONSES TO PHQ QUESTIONS 1-9: 0
2. FEELING DOWN, DEPRESSED OR HOPELESS: 0
SUM OF ALL RESPONSES TO PHQ QUESTIONS 1-9: 0
SUM OF ALL RESPONSES TO PHQ9 QUESTIONS 1 & 2: 0
SUM OF ALL RESPONSES TO PHQ QUESTIONS 1-9: 0
1. LITTLE INTEREST OR PLEASURE IN DOING THINGS: 0

## 2022-06-09 NOTE — PROGRESS NOTES
Tae Enrique (:  1990) is a 34 y.o. female,Established patient, here for evaluation of the following chief complaint(s):  Follow-Up from Hospital (also need refill,seizure last night, elevated -[phone])         ASSESSMENT/PLAN:  1. Type 1 diabetes mellitus with complication (HCC)  - uncontrolled  - will resume omnipod once it is delivered  - does still need to establish with an endocrinologist - working on this  - recheck in 3 months  -     Continuous Blood Gluc Sensor ([address]) [address]; Apply as directed for blood sugar monitoring, Disp-10 each, R-5Normal  -     insulin aspart (NOVOLOG FLEXPEN) 100 UNIT/ML injection pen; Inject 20 units with meals/snacks up to 5 times per day, Disp-10 pen, R-3Normal  -     Comprehensive Metabolic Panel; Future  -     Lipid Panel; Future  -     CBC with Auto Differential; Future  -     Hemoglobin A1C; Future  -     blood glucose test strips (TRUE METRIX BLOOD GLUCOSE TEST) strip; Check blood sugar 4-6 times daily and as needed for symptoms of irregular glucose, Disp-400 each, R-3Normal  2. Seizure disorder ([address]   - not well controlled, continue levetiracetam 1000 mg twice daily, clonazepam 2mg twice daily. Still needs to see neurology  3. ROME (generalized anxiety disorder)   - continue clonazepam as above  4. Elevated liver enzymes   - initial workup unremarkable - suspect all fatty liver, as was seen on the ultrasound. Was a bit better on most recent check, will reassess in 3 months. May need GI referral, will hold off for now while working on the diabetes    Return in about 3 months (around 2022) for DM follow up. Subjective   SUBJECTIVE/OBJECTIVE:  HPI    Sugar has been running high - she does not have an omnipod right now, it is supposed to be delivered on the . The last one malfunctioned, which is how she went into DKA. She is currently using just novolog and sugars have been very labile.  Her care manager at [address] [place] [address] is helping to find an endocrinologist.    She is taking the keppra and clonazepam, however still having breakthrough seizures. Still needs to establish with a neurologist again, there is a 3 month wait for a new patient visit. Review of Systems       Objective   Physical Exam  Vitals reviewed. Constitutional:       General: She is not in acute distress. Appearance: Normal appearance. She is well-developed. HENT:      Head: Normocephalic and atraumatic. Cardiovascular:      Rate and Rhythm: Normal rate and regular rhythm. Heart sounds: Normal heart sounds. Pulmonary:      Effort: Pulmonary effort is normal. No respiratory distress. Breath sounds: Normal breath sounds. Skin:     General: Skin is warm and dry. Neurological:      Mental Status: She is alert. Psychiatric:         Mood and Affect: Mood is anxious. Behavior: Behavior normal.         Thought Content: Thought content normal.         Judgment: Judgment normal.                  An electronic signature was used to authenticate this note.     --Eugene Coronado MD

## 2022-06-13 DIAGNOSIS — E10.8 TYPE 1 DIABETES MELLITUS WITH COMPLICATION (HCC): ICD-10-CM

## 2022-06-15 RX ORDER — BLOOD-GLUCOSE TRANSMITTER
EACH MISCELLANEOUS
Qty: 1 EACH | Refills: 1 | Status: SHIPPED | OUTPATIENT
Start: 2022-06-15 | End: 2022-07-08 | Stop reason: SDUPTHER

## 2022-07-08 DIAGNOSIS — E10.8 TYPE 1 DIABETES MELLITUS WITH COMPLICATION (HCC): ICD-10-CM

## 2022-07-12 RX ORDER — IBUPROFEN 800 MG/1
800 TABLET ORAL 3 TIMES DAILY PRN
Qty: 90 TABLET | Refills: 1 | Status: SHIPPED | OUTPATIENT
Start: 2022-07-12 | End: 2022-10-10

## 2022-07-12 RX ORDER — ONDANSETRON 4 MG/1
4 TABLET, FILM COATED ORAL 3 TIMES DAILY PRN
Qty: 30 TABLET | Refills: 1 | Status: SHIPPED | OUTPATIENT
Start: 2022-07-12 | End: 2022-09-06

## 2022-07-12 RX ORDER — BLOOD-GLUCOSE TRANSMITTER
EACH MISCELLANEOUS
Qty: 1 EACH | Refills: 1 | Status: SHIPPED | OUTPATIENT
Start: 2022-07-12 | End: 2022-07-19

## 2022-07-12 RX ORDER — INSULIN PUMP CONTROLLER
EACH MISCELLANEOUS
Qty: 10 EACH | Refills: 3 | Status: SHIPPED | OUTPATIENT
Start: 2022-07-12 | End: 2022-07-15 | Stop reason: SDUPTHER

## 2022-07-15 DIAGNOSIS — E10.8 TYPE 1 DIABETES MELLITUS WITH COMPLICATION (HCC): ICD-10-CM

## 2022-07-16 DIAGNOSIS — E10.8 TYPE 1 DIABETES MELLITUS WITH COMPLICATION (HCC): ICD-10-CM

## 2022-07-16 DIAGNOSIS — E11.42 DIABETIC PERIPHERAL NEUROPATHY (HCC): ICD-10-CM

## 2022-07-18 RX ORDER — INSULIN PUMP CONTROLLER
EACH MISCELLANEOUS
Qty: 10 EACH | Refills: 3 | Status: SHIPPED | OUTPATIENT
Start: 2022-07-18 | End: 2022-08-25 | Stop reason: SDUPTHER

## 2022-07-18 RX ORDER — CALCIUM CITRATE/VITAMIN D3 200MG-6.25
TABLET ORAL
Qty: 400 EACH | Refills: 3 | Status: SHIPPED | OUTPATIENT
Start: 2022-07-18 | End: 2022-08-25 | Stop reason: SDUPTHER

## 2022-07-19 RX ORDER — GABAPENTIN 300 MG/1
CAPSULE ORAL
Qty: 210 CAPSULE | Refills: 3 | Status: SHIPPED | OUTPATIENT
Start: 2022-07-19 | End: 2022-09-05 | Stop reason: SDUPTHER

## 2022-07-19 RX ORDER — BLOOD SUGAR DIAGNOSTIC
STRIP MISCELLANEOUS
Qty: 200 EACH | Refills: 0 | OUTPATIENT
Start: 2022-07-19

## 2022-07-19 RX ORDER — BLOOD-GLUCOSE TRANSMITTER
EACH MISCELLANEOUS
Qty: 1 EACH | Refills: 0 | Status: SHIPPED | OUTPATIENT
Start: 2022-07-19 | End: 2022-08-25 | Stop reason: SDUPTHER

## 2022-07-29 ENCOUNTER — APPOINTMENT (OUTPATIENT)
Dept: GENERAL RADIOLOGY | Age: 32
DRG: 710 | End: 2022-07-29
Payer: COMMERCIAL

## 2022-07-29 ENCOUNTER — APPOINTMENT (OUTPATIENT)
Dept: CT IMAGING | Age: 32
DRG: 710 | End: 2022-07-29
Payer: COMMERCIAL

## 2022-07-29 ENCOUNTER — HOSPITAL ENCOUNTER (INPATIENT)
Age: 32
LOS: 5 days | Discharge: HOME | DRG: 710 | End: 2022-08-03
Attending: EMERGENCY MEDICINE | Admitting: STUDENT IN AN ORGANIZED HEALTH CARE EDUCATION/TRAINING PROGRAM
Payer: COMMERCIAL

## 2022-07-29 DIAGNOSIS — R78.81 MRSA BACTEREMIA: ICD-10-CM

## 2022-07-29 DIAGNOSIS — E10.10 DIABETIC KETOACIDOSIS WITHOUT COMA ASSOCIATED WITH TYPE 1 DIABETES MELLITUS (HCC): Primary | ICD-10-CM

## 2022-07-29 DIAGNOSIS — B95.62 MRSA BACTEREMIA: ICD-10-CM

## 2022-07-29 DIAGNOSIS — E10.10 DKA, TYPE 1, NOT AT GOAL (HCC): ICD-10-CM

## 2022-07-29 PROBLEM — A41.9 SEPSIS (HCC): Status: ACTIVE | Noted: 2022-07-29

## 2022-07-29 PROBLEM — D69.6 THROMBOCYTOPENIA (HCC): Status: ACTIVE | Noted: 2022-07-29

## 2022-07-29 PROBLEM — D72.829 LEUKOCYTOSIS: Status: ACTIVE | Noted: 2022-07-29

## 2022-07-29 PROBLEM — L02.91 ABSCESS: Status: ACTIVE | Noted: 2022-07-29

## 2022-07-29 LAB
A/G RATIO: 1 (ref 1.1–2.2)
ALBUMIN SERPL-MCNC: 4.6 G/DL (ref 3.4–5)
ALP BLD-CCNC: 214 U/L (ref 40–129)
ALT SERPL-CCNC: 30 U/L (ref 10–40)
AMPHETAMINE SCREEN, URINE: NORMAL
ANION GAP SERPL CALCULATED.3IONS-SCNC: 37 MMOL/L (ref 3–16)
AST SERPL-CCNC: 45 U/L (ref 15–37)
BACTERIA: NORMAL /HPF
BARBITURATE SCREEN URINE: NORMAL
BASE EXCESS VENOUS: -29.5 MMOL/L
BENZODIAZEPINE SCREEN, URINE: NORMAL
BETA-HYDROXYBUTYRATE: >8 MMOL/L (ref 0–0.27)
BILIRUB SERPL-MCNC: <0.2 MG/DL (ref 0–1)
BILIRUBIN URINE: NEGATIVE
BLOOD, URINE: NEGATIVE
BUN BLDV-MCNC: 16 MG/DL (ref 7–20)
C-REACTIVE PROTEIN: 34.2 MG/L (ref 0–5.1)
CALCIUM SERPL-MCNC: 9.7 MG/DL (ref 8.3–10.6)
CANNABINOID SCREEN URINE: NORMAL
CARBOXYHEMOGLOBIN: 0.9 %
CHLORIDE BLD-SCNC: 97 MMOL/L (ref 99–110)
CLARITY: CLEAR
CO2: 3 MMOL/L (ref 21–32)
COCAINE METABOLITE SCREEN URINE: NORMAL
COLOR: YELLOW
CREAT SERPL-MCNC: 1.1 MG/DL (ref 0.6–1.1)
EPITHELIAL CELLS, UA: 0 /HPF (ref 0–5)
GFR AFRICAN AMERICAN: >60
GFR NON-AFRICAN AMERICAN: 57
GLUCOSE BLD-MCNC: 1254 MG/DL (ref 70–99)
GLUCOSE BLD-MCNC: >600 MG/DL (ref 70–99)
GLUCOSE BLD-MCNC: >600 MG/DL (ref 70–99)
GLUCOSE URINE: >=1000 MG/DL
HCG QUALITATIVE: NEGATIVE
HCO3 VENOUS: 3 MMOL/L (ref 23–29)
HYALINE CASTS: 2 /LPF (ref 0–8)
KEPPRA DOSE AMT: ABNORMAL
KEPPRA: <2 UG/ML (ref 6–46)
KETONES, URINE: >=160 MG/DL
LACTATE DEHYDROGENASE: 499 U/L (ref 100–190)
LACTIC ACID, SEPSIS: 3.6 MMOL/L (ref 0.4–1.9)
LACTIC ACID, SEPSIS: 5.3 MMOL/L (ref 0.4–1.9)
LEUKOCYTE ESTERASE, URINE: NEGATIVE
LIPASE: 7 U/L (ref 13–60)
Lab: NORMAL
METHADONE SCREEN, URINE: NORMAL
METHEMOGLOBIN VENOUS: 0.8 %
MICROSCOPIC EXAMINATION: YES
NITRITE, URINE: NEGATIVE
O2 SAT, VEN: 63 %
O2 THERAPY: ABNORMAL
OPIATE SCREEN URINE: NORMAL
OXYCODONE URINE: NORMAL
PCO2, VEN: 20.9 MMHG (ref 40–50)
PERFORMED ON: ABNORMAL
PERFORMED ON: ABNORMAL
PH UA: 5
PH UA: 5 (ref 5–8)
PH VENOUS: 6.82 (ref 7.35–7.45)
PHENCYCLIDINE SCREEN URINE: NORMAL
PO2, VEN: 56 MMHG
POTASSIUM REFLEX MAGNESIUM: 6.5 MMOL/L (ref 3.5–5.1)
PROPOXYPHENE SCREEN: NORMAL
PROTEIN UA: ABNORMAL MG/DL
RAPID INFLUENZA  B AGN: NEGATIVE
RAPID INFLUENZA A AGN: NEGATIVE
RBC UA: 0 /HPF (ref 0–4)
SARS-COV-2, NAAT: NOT DETECTED
SEDIMENTATION RATE, ERYTHROCYTE: 122 MM/HR (ref 0–20)
SODIUM BLD-SCNC: 137 MMOL/L (ref 136–145)
SPECIFIC GRAVITY UA: 1.02 (ref 1–1.03)
TCO2 CALC VENOUS: 4 MMOL/L
TOTAL PROTEIN: 9.1 G/DL (ref 6.4–8.2)
TSH REFLEX FT4: 1.07 UIU/ML (ref 0.27–4.2)
URINE REFLEX TO CULTURE: ABNORMAL
URINE TYPE: ABNORMAL
UROBILINOGEN, URINE: 0.2 E.U./DL
WBC UA: 0 /HPF (ref 0–5)

## 2022-07-29 PROCEDURE — 82010 KETONE BODYS QUAN: CPT

## 2022-07-29 PROCEDURE — 85652 RBC SED RATE AUTOMATED: CPT

## 2022-07-29 PROCEDURE — 96374 THER/PROPH/DIAG INJ IV PUSH: CPT

## 2022-07-29 PROCEDURE — 83690 ASSAY OF LIPASE: CPT

## 2022-07-29 PROCEDURE — 80053 COMPREHEN METABOLIC PANEL: CPT

## 2022-07-29 PROCEDURE — 84703 CHORIONIC GONADOTROPIN ASSAY: CPT

## 2022-07-29 PROCEDURE — 83735 ASSAY OF MAGNESIUM: CPT

## 2022-07-29 PROCEDURE — 2580000003 HC RX 258: Performed by: EMERGENCY MEDICINE

## 2022-07-29 PROCEDURE — 2580000003 HC RX 258: Performed by: GENERAL ACUTE CARE HOSPITAL

## 2022-07-29 PROCEDURE — 82803 BLOOD GASES ANY COMBINATION: CPT

## 2022-07-29 PROCEDURE — 87635 SARS-COV-2 COVID-19 AMP PRB: CPT

## 2022-07-29 PROCEDURE — 6370000000 HC RX 637 (ALT 250 FOR IP): Performed by: STUDENT IN AN ORGANIZED HEALTH CARE EDUCATION/TRAINING PROGRAM

## 2022-07-29 PROCEDURE — 71045 X-RAY EXAM CHEST 1 VIEW: CPT

## 2022-07-29 PROCEDURE — 6360000002 HC RX W HCPCS: Performed by: GENERAL ACUTE CARE HOSPITAL

## 2022-07-29 PROCEDURE — 80177 DRUG SCRN QUAN LEVETIRACETAM: CPT

## 2022-07-29 PROCEDURE — 87186 SC STD MICRODIL/AGAR DIL: CPT

## 2022-07-29 PROCEDURE — 2000000000 HC ICU R&B

## 2022-07-29 PROCEDURE — 93005 ELECTROCARDIOGRAM TRACING: CPT | Performed by: EMERGENCY MEDICINE

## 2022-07-29 PROCEDURE — 83605 ASSAY OF LACTIC ACID: CPT

## 2022-07-29 PROCEDURE — 2500000003 HC RX 250 WO HCPCS: Performed by: GENERAL ACUTE CARE HOSPITAL

## 2022-07-29 PROCEDURE — 87804 INFLUENZA ASSAY W/OPTIC: CPT

## 2022-07-29 PROCEDURE — 2580000003 HC RX 258: Performed by: STUDENT IN AN ORGANIZED HEALTH CARE EDUCATION/TRAINING PROGRAM

## 2022-07-29 PROCEDURE — 87205 SMEAR GRAM STAIN: CPT

## 2022-07-29 PROCEDURE — 81001 URINALYSIS AUTO W/SCOPE: CPT

## 2022-07-29 PROCEDURE — 99285 EMERGENCY DEPT VISIT HI MDM: CPT

## 2022-07-29 PROCEDURE — 71250 CT THORAX DX C-: CPT

## 2022-07-29 PROCEDURE — 83615 LACTATE (LD) (LDH) ENZYME: CPT

## 2022-07-29 PROCEDURE — 86403 PARTICLE AGGLUT ANTBDY SCRN: CPT

## 2022-07-29 PROCEDURE — 87070 CULTURE OTHR SPECIMN AEROBIC: CPT

## 2022-07-29 PROCEDURE — 02HV33Z INSERTION OF INFUSION DEVICE INTO SUPERIOR VENA CAVA, PERCUTANEOUS APPROACH: ICD-10-PCS | Performed by: EMERGENCY MEDICINE

## 2022-07-29 PROCEDURE — 84443 ASSAY THYROID STIM HORMONE: CPT

## 2022-07-29 PROCEDURE — 36415 COLL VENOUS BLD VENIPUNCTURE: CPT

## 2022-07-29 PROCEDURE — 80307 DRUG TEST PRSMV CHEM ANLYZR: CPT

## 2022-07-29 PROCEDURE — 87150 DNA/RNA AMPLIFIED PROBE: CPT

## 2022-07-29 PROCEDURE — 6360000002 HC RX W HCPCS: Performed by: STUDENT IN AN ORGANIZED HEALTH CARE EDUCATION/TRAINING PROGRAM

## 2022-07-29 PROCEDURE — 72125 CT NECK SPINE W/O DYE: CPT

## 2022-07-29 PROCEDURE — 36556 INSERT NON-TUNNEL CV CATH: CPT

## 2022-07-29 PROCEDURE — 2500000003 HC RX 250 WO HCPCS: Performed by: STUDENT IN AN ORGANIZED HEALTH CARE EDUCATION/TRAINING PROGRAM

## 2022-07-29 PROCEDURE — 87040 BLOOD CULTURE FOR BACTERIA: CPT

## 2022-07-29 PROCEDURE — 86140 C-REACTIVE PROTEIN: CPT

## 2022-07-29 PROCEDURE — 51702 INSERT TEMP BLADDER CATH: CPT

## 2022-07-29 PROCEDURE — 85025 COMPLETE CBC W/AUTO DIFF WBC: CPT

## 2022-07-29 PROCEDURE — 84100 ASSAY OF PHOSPHORUS: CPT

## 2022-07-29 PROCEDURE — 87077 CULTURE AEROBIC IDENTIFY: CPT

## 2022-07-29 PROCEDURE — 0J9B0ZZ DRAINAGE OF PERINEUM SUBCUTANEOUS TISSUE AND FASCIA, OPEN APPROACH: ICD-10-PCS | Performed by: STUDENT IN AN ORGANIZED HEALTH CARE EDUCATION/TRAINING PROGRAM

## 2022-07-29 PROCEDURE — 70450 CT HEAD/BRAIN W/O DYE: CPT

## 2022-07-29 RX ORDER — 0.9 % SODIUM CHLORIDE 0.9 %
1000 INTRAVENOUS SOLUTION INTRAVENOUS ONCE
Status: COMPLETED | OUTPATIENT
Start: 2022-07-29 | End: 2022-07-30

## 2022-07-29 RX ORDER — METRONIDAZOLE 500 MG/100ML
500 INJECTION, SOLUTION INTRAVENOUS EVERY 8 HOURS
Status: DISCONTINUED | OUTPATIENT
Start: 2022-07-29 | End: 2022-07-31

## 2022-07-29 RX ORDER — LIDOCAINE HYDROCHLORIDE 20 MG/ML
5 INJECTION, SOLUTION INFILTRATION; PERINEURAL ONCE
Status: COMPLETED | OUTPATIENT
Start: 2022-07-29 | End: 2022-07-29

## 2022-07-29 RX ORDER — LEVETIRACETAM 10 MG/ML
1000 INJECTION INTRAVASCULAR ONCE
Status: COMPLETED | OUTPATIENT
Start: 2022-07-29 | End: 2022-07-29

## 2022-07-29 RX ORDER — 0.9 % SODIUM CHLORIDE 0.9 %
2000 INTRAVENOUS SOLUTION INTRAVENOUS ONCE
Status: COMPLETED | OUTPATIENT
Start: 2022-07-29 | End: 2022-07-29

## 2022-07-29 RX ORDER — DEXTROSE MONOHYDRATE 50 MG/ML
100 INJECTION, SOLUTION INTRAVENOUS PRN
Status: DISCONTINUED | OUTPATIENT
Start: 2022-07-29 | End: 2022-08-03 | Stop reason: HOSPADM

## 2022-07-29 RX ORDER — CALCIUM GLUCONATE 20 MG/ML
1000 INJECTION, SOLUTION INTRAVENOUS ONCE
Status: COMPLETED | OUTPATIENT
Start: 2022-07-29 | End: 2022-07-29

## 2022-07-29 RX ORDER — ONDANSETRON 2 MG/ML
4 INJECTION INTRAMUSCULAR; INTRAVENOUS ONCE
Status: DISCONTINUED | OUTPATIENT
Start: 2022-07-29 | End: 2022-08-03 | Stop reason: HOSPADM

## 2022-07-29 RX ORDER — 0.9 % SODIUM CHLORIDE 0.9 %
1000 INTRAVENOUS SOLUTION INTRAVENOUS ONCE
Status: COMPLETED | OUTPATIENT
Start: 2022-07-29 | End: 2022-07-29

## 2022-07-29 RX ADMIN — SODIUM BICARBONATE 50 MEQ: 84 INJECTION INTRAVENOUS at 21:29

## 2022-07-29 RX ADMIN — LEVETIRACETAM 1000 MG: 10 INJECTION, SOLUTION INTRAVENOUS at 20:07

## 2022-07-29 RX ADMIN — SODIUM CHLORIDE 1000 ML: 9 INJECTION, SOLUTION INTRAVENOUS at 20:46

## 2022-07-29 RX ADMIN — CEFEPIME 2000 MG: 2 INJECTION, POWDER, FOR SOLUTION INTRAVENOUS at 21:32

## 2022-07-29 RX ADMIN — SODIUM CHLORIDE 0.1 UNITS/KG/HR: 9 INJECTION, SOLUTION INTRAVENOUS at 21:38

## 2022-07-29 RX ADMIN — Medication 5 MCG/MIN: at 21:56

## 2022-07-29 RX ADMIN — SODIUM CHLORIDE 1000 ML: 9 INJECTION, SOLUTION INTRAVENOUS at 22:30

## 2022-07-29 RX ADMIN — SODIUM CHLORIDE 2000 ML: 9 INJECTION, SOLUTION INTRAVENOUS at 20:09

## 2022-07-29 RX ADMIN — LIDOCAINE HYDROCHLORIDE 5 ML: 20 INJECTION, SOLUTION INFILTRATION; PERINEURAL at 22:00

## 2022-07-29 RX ADMIN — SODIUM BICARBONATE: 84 INJECTION, SOLUTION INTRAVENOUS at 23:07

## 2022-07-29 RX ADMIN — CALCIUM GLUCONATE 1000 MG: 20 INJECTION, SOLUTION INTRAVENOUS at 21:41

## 2022-07-30 ENCOUNTER — APPOINTMENT (OUTPATIENT)
Dept: CT IMAGING | Age: 32
DRG: 710 | End: 2022-07-30
Payer: COMMERCIAL

## 2022-07-30 ENCOUNTER — APPOINTMENT (OUTPATIENT)
Dept: MRI IMAGING | Age: 32
DRG: 710 | End: 2022-07-30
Payer: COMMERCIAL

## 2022-07-30 PROBLEM — R56.9 SEIZURE (HCC): Status: ACTIVE | Noted: 2022-07-30

## 2022-07-30 PROBLEM — R41.82 AMS (ALTERED MENTAL STATUS): Status: ACTIVE | Noted: 2022-07-30

## 2022-07-30 PROBLEM — R79.89 ELEVATED PLATELET COUNT: Status: ACTIVE | Noted: 2022-07-30

## 2022-07-30 LAB
ANION GAP SERPL CALCULATED.3IONS-SCNC: 11 MMOL/L (ref 3–16)
ANION GAP SERPL CALCULATED.3IONS-SCNC: 13 MMOL/L (ref 3–16)
ANION GAP SERPL CALCULATED.3IONS-SCNC: 15 MMOL/L (ref 3–16)
ANION GAP SERPL CALCULATED.3IONS-SCNC: 17 MMOL/L (ref 3–16)
ANION GAP SERPL CALCULATED.3IONS-SCNC: 26 MMOL/L (ref 3–16)
ANION GAP SERPL CALCULATED.3IONS-SCNC: 31 MMOL/L (ref 3–16)
BANDED NEUTROPHILS RELATIVE PERCENT: 10 % (ref 0–7)
BASOPHILS ABSOLUTE: 0 K/UL (ref 0–0.2)
BASOPHILS RELATIVE PERCENT: 0 %
BUN BLDV-MCNC: 10 MG/DL (ref 7–20)
BUN BLDV-MCNC: 11 MG/DL (ref 7–20)
BUN BLDV-MCNC: 14 MG/DL (ref 7–20)
BUN BLDV-MCNC: 18 MG/DL (ref 7–20)
BUN BLDV-MCNC: 7 MG/DL (ref 7–20)
BUN BLDV-MCNC: 8 MG/DL (ref 7–20)
CALCIUM SERPL-MCNC: 7.3 MG/DL (ref 8.3–10.6)
CALCIUM SERPL-MCNC: 7.5 MG/DL (ref 8.3–10.6)
CALCIUM SERPL-MCNC: 7.8 MG/DL (ref 8.3–10.6)
CALCIUM SERPL-MCNC: 7.9 MG/DL (ref 8.3–10.6)
CALCIUM SERPL-MCNC: 8.3 MG/DL (ref 8.3–10.6)
CALCIUM SERPL-MCNC: 8.4 MG/DL (ref 8.3–10.6)
CHLORIDE BLD-SCNC: 112 MMOL/L (ref 99–110)
CHLORIDE BLD-SCNC: 115 MMOL/L (ref 99–110)
CHLORIDE BLD-SCNC: 116 MMOL/L (ref 99–110)
CHLORIDE BLD-SCNC: 117 MMOL/L (ref 99–110)
CHLORIDE BLD-SCNC: 118 MMOL/L (ref 99–110)
CHLORIDE BLD-SCNC: 120 MMOL/L (ref 99–110)
CO2: 17 MMOL/L (ref 21–32)
CO2: 18 MMOL/L (ref 21–32)
CO2: 20 MMOL/L (ref 21–32)
CO2: 21 MMOL/L (ref 21–32)
CO2: 3 MMOL/L (ref 21–32)
CO2: 9 MMOL/L (ref 21–32)
CREAT SERPL-MCNC: 0.5 MG/DL (ref 0.6–1.1)
CREAT SERPL-MCNC: 0.6 MG/DL (ref 0.6–1.1)
CREAT SERPL-MCNC: 0.7 MG/DL (ref 0.6–1.1)
CREAT SERPL-MCNC: 0.8 MG/DL (ref 0.6–1.1)
CREAT SERPL-MCNC: 1 MG/DL (ref 0.6–1.1)
CREAT SERPL-MCNC: <0.5 MG/DL (ref 0.6–1.1)
EKG ATRIAL RATE: 137 BPM
EKG DIAGNOSIS: NORMAL
EKG P AXIS: 79 DEGREES
EKG P-R INTERVAL: 146 MS
EKG Q-T INTERVAL: 280 MS
EKG QRS DURATION: 70 MS
EKG QTC CALCULATION (BAZETT): 422 MS
EKG R AXIS: 94 DEGREES
EKG T AXIS: 62 DEGREES
EKG VENTRICULAR RATE: 137 BPM
EOSINOPHILS ABSOLUTE: 0 K/UL (ref 0–0.6)
EOSINOPHILS RELATIVE PERCENT: 0 %
GFR AFRICAN AMERICAN: >60
GFR NON-AFRICAN AMERICAN: >60
GLUCOSE BLD-MCNC: 1024 MG/DL (ref 70–99)
GLUCOSE BLD-MCNC: 131 MG/DL (ref 70–99)
GLUCOSE BLD-MCNC: 144 MG/DL (ref 70–99)
GLUCOSE BLD-MCNC: 157 MG/DL (ref 70–99)
GLUCOSE BLD-MCNC: 176 MG/DL (ref 70–99)
GLUCOSE BLD-MCNC: 182 MG/DL (ref 70–99)
GLUCOSE BLD-MCNC: 189 MG/DL (ref 70–99)
GLUCOSE BLD-MCNC: 192 MG/DL (ref 70–99)
GLUCOSE BLD-MCNC: 200 MG/DL (ref 70–99)
GLUCOSE BLD-MCNC: 216 MG/DL (ref 70–99)
GLUCOSE BLD-MCNC: 231 MG/DL (ref 70–99)
GLUCOSE BLD-MCNC: 232 MG/DL (ref 70–99)
GLUCOSE BLD-MCNC: 233 MG/DL (ref 70–99)
GLUCOSE BLD-MCNC: 246 MG/DL (ref 70–99)
GLUCOSE BLD-MCNC: 250 MG/DL (ref 70–99)
GLUCOSE BLD-MCNC: 267 MG/DL (ref 70–99)
GLUCOSE BLD-MCNC: 271 MG/DL (ref 70–99)
GLUCOSE BLD-MCNC: 312 MG/DL (ref 70–99)
GLUCOSE BLD-MCNC: 348 MG/DL (ref 70–99)
GLUCOSE BLD-MCNC: 442 MG/DL (ref 70–99)
GLUCOSE BLD-MCNC: 456 MG/DL (ref 70–99)
GLUCOSE BLD-MCNC: 469 MG/DL (ref 70–99)
GLUCOSE BLD-MCNC: 491 MG/DL (ref 70–99)
GLUCOSE BLD-MCNC: 766 MG/DL (ref 70–99)
GLUCOSE BLD-MCNC: >600 MG/DL (ref 70–99)
GLUCOSE BLD-MCNC: >600 MG/DL (ref 70–99)
HCT VFR BLD CALC: 26.7 % (ref 36–48)
HEMOGLOBIN: 8.1 G/DL (ref 12–16)
LACTIC ACID, SEPSIS: 7 MMOL/L (ref 0.4–1.9)
LACTIC ACID: 1.4 MMOL/L (ref 0.4–2)
LACTIC ACID: 1.5 MMOL/L (ref 0.4–2)
LACTIC ACID: 2.1 MMOL/L (ref 0.4–2)
LACTIC ACID: 3 MMOL/L (ref 0.4–2)
LYMPHOCYTES ABSOLUTE: 1.8 K/UL (ref 1–5.1)
LYMPHOCYTES RELATIVE PERCENT: 4 %
MAGNESIUM: 1.6 MG/DL (ref 1.8–2.4)
MAGNESIUM: 1.7 MG/DL (ref 1.8–2.4)
MAGNESIUM: 2 MG/DL (ref 1.8–2.4)
MAGNESIUM: 2.2 MG/DL (ref 1.8–2.4)
MAGNESIUM: 2.3 MG/DL (ref 1.8–2.4)
MAGNESIUM: 2.5 MG/DL (ref 1.8–2.4)
MCH RBC QN AUTO: 25 PG (ref 26–34)
MCHC RBC AUTO-ENTMCNC: 30.4 G/DL (ref 31–36)
MCV RBC AUTO: 82.3 FL (ref 80–100)
METAMYELOCYTES RELATIVE PERCENT: 1 %
MONOCYTES ABSOLUTE: 0.9 K/UL (ref 0–1.3)
MONOCYTES RELATIVE PERCENT: 2 %
MYELOCYTE PERCENT: 3 %
NEUTROPHILS ABSOLUTE: 41.7 K/UL (ref 1.7–7.7)
NEUTROPHILS RELATIVE PERCENT: 80 %
PDW BLD-RTO: 15.3 % (ref 12.4–15.4)
PERFORMED ON: ABNORMAL
PHOSPHORUS: 0.8 MG/DL (ref 2.5–4.9)
PHOSPHORUS: 0.9 MG/DL (ref 2.5–4.9)
PHOSPHORUS: 2.1 MG/DL (ref 2.5–4.9)
PHOSPHORUS: 2.3 MG/DL (ref 2.5–4.9)
PHOSPHORUS: 2.3 MG/DL (ref 2.5–4.9)
PHOSPHORUS: 5 MG/DL (ref 2.5–4.9)
PLATELET # BLD: 697 K/UL (ref 135–450)
PMV BLD AUTO: 7.2 FL (ref 5–10.5)
POTASSIUM SERPL-SCNC: 3.3 MMOL/L (ref 3.5–5.1)
POTASSIUM SERPL-SCNC: 3.4 MMOL/L (ref 3.5–5.1)
POTASSIUM SERPL-SCNC: 3.5 MMOL/L (ref 3.5–5.1)
POTASSIUM SERPL-SCNC: 3.7 MMOL/L (ref 3.5–5.1)
POTASSIUM SERPL-SCNC: 3.9 MMOL/L (ref 3.5–5.1)
POTASSIUM SERPL-SCNC: 5.1 MMOL/L (ref 3.5–5.1)
RBC # BLD: 3.24 M/UL (ref 4–5.2)
SODIUM BLD-SCNC: 146 MMOL/L (ref 136–145)
SODIUM BLD-SCNC: 147 MMOL/L (ref 136–145)
SODIUM BLD-SCNC: 148 MMOL/L (ref 136–145)
SODIUM BLD-SCNC: 151 MMOL/L (ref 136–145)
SODIUM BLD-SCNC: 153 MMOL/L (ref 136–145)
SODIUM BLD-SCNC: 154 MMOL/L (ref 136–145)
TOXIC GRANULATION: PRESENT
VACUOLATED NEUTROPHILS: PRESENT
WBC # BLD: 44.4 K/UL (ref 4–11)

## 2022-07-30 PROCEDURE — 84100 ASSAY OF PHOSPHORUS: CPT

## 2022-07-30 PROCEDURE — 2500000003 HC RX 250 WO HCPCS: Performed by: FAMILY MEDICINE

## 2022-07-30 PROCEDURE — 93010 ELECTROCARDIOGRAM REPORT: CPT | Performed by: INTERNAL MEDICINE

## 2022-07-30 PROCEDURE — 2580000003 HC RX 258: Performed by: INTERNAL MEDICINE

## 2022-07-30 PROCEDURE — 6360000004 HC RX CONTRAST MEDICATION: Performed by: HOSPITALIST

## 2022-07-30 PROCEDURE — 36592 COLLECT BLOOD FROM PICC: CPT

## 2022-07-30 PROCEDURE — 2580000003 HC RX 258: Performed by: STUDENT IN AN ORGANIZED HEALTH CARE EDUCATION/TRAINING PROGRAM

## 2022-07-30 PROCEDURE — 85025 COMPLETE CBC W/AUTO DIFF WBC: CPT

## 2022-07-30 PROCEDURE — 72148 MRI LUMBAR SPINE W/O DYE: CPT

## 2022-07-30 PROCEDURE — 83605 ASSAY OF LACTIC ACID: CPT

## 2022-07-30 PROCEDURE — 70450 CT HEAD/BRAIN W/O DYE: CPT

## 2022-07-30 PROCEDURE — 70551 MRI BRAIN STEM W/O DYE: CPT

## 2022-07-30 PROCEDURE — 94760 N-INVAS EAR/PLS OXIMETRY 1: CPT

## 2022-07-30 PROCEDURE — 2000000000 HC ICU R&B

## 2022-07-30 PROCEDURE — 6360000002 HC RX W HCPCS: Performed by: STUDENT IN AN ORGANIZED HEALTH CARE EDUCATION/TRAINING PROGRAM

## 2022-07-30 PROCEDURE — 70498 CT ANGIOGRAPHY NECK: CPT

## 2022-07-30 PROCEDURE — 72141 MRI NECK SPINE W/O DYE: CPT

## 2022-07-30 PROCEDURE — 36415 COLL VENOUS BLD VENIPUNCTURE: CPT

## 2022-07-30 PROCEDURE — 99223 1ST HOSP IP/OBS HIGH 75: CPT | Performed by: INTERNAL MEDICINE

## 2022-07-30 PROCEDURE — 80048 BASIC METABOLIC PNL TOTAL CA: CPT

## 2022-07-30 PROCEDURE — 83735 ASSAY OF MAGNESIUM: CPT

## 2022-07-30 PROCEDURE — 2500000003 HC RX 250 WO HCPCS: Performed by: INTERNAL MEDICINE

## 2022-07-30 PROCEDURE — 6370000000 HC RX 637 (ALT 250 FOR IP): Performed by: STUDENT IN AN ORGANIZED HEALTH CARE EDUCATION/TRAINING PROGRAM

## 2022-07-30 PROCEDURE — 72146 MRI CHEST SPINE W/O DYE: CPT

## 2022-07-30 PROCEDURE — 6360000002 HC RX W HCPCS: Performed by: FAMILY MEDICINE

## 2022-07-30 PROCEDURE — 82947 ASSAY GLUCOSE BLOOD QUANT: CPT

## 2022-07-30 PROCEDURE — 2500000003 HC RX 250 WO HCPCS: Performed by: STUDENT IN AN ORGANIZED HEALTH CARE EDUCATION/TRAINING PROGRAM

## 2022-07-30 RX ORDER — SODIUM CHLORIDE 450 MG/100ML
INJECTION, SOLUTION INTRAVENOUS CONTINUOUS
Status: DISCONTINUED | OUTPATIENT
Start: 2022-07-30 | End: 2022-07-31

## 2022-07-30 RX ORDER — LEVETIRACETAM 10 MG/ML
1000 INJECTION INTRAVASCULAR EVERY 12 HOURS
Status: DISCONTINUED | OUTPATIENT
Start: 2022-07-30 | End: 2022-08-03 | Stop reason: HOSPADM

## 2022-07-30 RX ORDER — POTASSIUM CHLORIDE 7.45 MG/ML
10 INJECTION INTRAVENOUS PRN
Status: DISCONTINUED | OUTPATIENT
Start: 2022-07-30 | End: 2022-07-30

## 2022-07-30 RX ORDER — DIAZEPAM 5 MG/ML
2.5 INJECTION, SOLUTION INTRAMUSCULAR; INTRAVENOUS EVERY 12 HOURS PRN
Status: DISCONTINUED | OUTPATIENT
Start: 2022-07-30 | End: 2022-08-03 | Stop reason: HOSPADM

## 2022-07-30 RX ORDER — MIDAZOLAM HYDROCHLORIDE 1 MG/ML
2 INJECTION INTRAMUSCULAR; INTRAVENOUS EVERY 4 HOURS PRN
Status: DISCONTINUED | OUTPATIENT
Start: 2022-07-30 | End: 2022-08-03 | Stop reason: HOSPADM

## 2022-07-30 RX ORDER — MAGNESIUM SULFATE 1 G/100ML
1000 INJECTION INTRAVENOUS PRN
Status: DISCONTINUED | OUTPATIENT
Start: 2022-07-30 | End: 2022-08-03 | Stop reason: HOSPADM

## 2022-07-30 RX ORDER — HEPARIN SODIUM 5000 [USP'U]/ML
5000 INJECTION, SOLUTION INTRAVENOUS; SUBCUTANEOUS EVERY 8 HOURS SCHEDULED
Status: DISCONTINUED | OUTPATIENT
Start: 2022-07-30 | End: 2022-08-03 | Stop reason: HOSPADM

## 2022-07-30 RX ORDER — DEXTROSE AND SODIUM CHLORIDE 5; .45 G/100ML; G/100ML
INJECTION, SOLUTION INTRAVENOUS CONTINUOUS PRN
Status: DISCONTINUED | OUTPATIENT
Start: 2022-07-30 | End: 2022-07-31

## 2022-07-30 RX ORDER — POTASSIUM CHLORIDE 29.8 MG/ML
20 INJECTION INTRAVENOUS PRN
Status: DISCONTINUED | OUTPATIENT
Start: 2022-07-30 | End: 2022-07-31

## 2022-07-30 RX ORDER — CALCIUM GLUCONATE 20 MG/ML
1000 INJECTION, SOLUTION INTRAVENOUS ONCE
Status: COMPLETED | OUTPATIENT
Start: 2022-07-30 | End: 2022-07-30

## 2022-07-30 RX ORDER — MAGNESIUM SULFATE 1 G/100ML
1000 INJECTION INTRAVENOUS ONCE
Status: COMPLETED | OUTPATIENT
Start: 2022-07-30 | End: 2022-07-30

## 2022-07-30 RX ORDER — CEPHALEXIN 500 MG/1
500 CAPSULE ORAL 2 TIMES DAILY
Status: ON HOLD | COMMUNITY
Start: 2022-07-24 | End: 2022-08-03 | Stop reason: ALTCHOICE

## 2022-07-30 RX ADMIN — POTASSIUM CHLORIDE 10 MEQ: 7.46 INJECTION, SOLUTION INTRAVENOUS at 06:30

## 2022-07-30 RX ADMIN — SODIUM CHLORIDE 0.04 UNITS/KG/HR: 9 INJECTION, SOLUTION INTRAVENOUS at 21:20

## 2022-07-30 RX ADMIN — LEVETIRACETAM 1000 MG: 10 INJECTION, SOLUTION INTRAVENOUS at 18:23

## 2022-07-30 RX ADMIN — METRONIDAZOLE 500 MG: 500 INJECTION, SOLUTION INTRAVENOUS at 17:04

## 2022-07-30 RX ADMIN — POTASSIUM CHLORIDE 20 MEQ: 29.8 INJECTION, SOLUTION INTRAVENOUS at 12:13

## 2022-07-30 RX ADMIN — HEPARIN SODIUM 5000 UNITS: 5000 INJECTION INTRAVENOUS; SUBCUTANEOUS at 17:05

## 2022-07-30 RX ADMIN — DEXTROSE AND SODIUM CHLORIDE: 5; 450 INJECTION, SOLUTION INTRAVENOUS at 17:07

## 2022-07-30 RX ADMIN — DEXTROSE AND SODIUM CHLORIDE: 5; 450 INJECTION, SOLUTION INTRAVENOUS at 23:58

## 2022-07-30 RX ADMIN — POTASSIUM CHLORIDE 10 MEQ: 7.46 INJECTION, SOLUTION INTRAVENOUS at 08:12

## 2022-07-30 RX ADMIN — HEPARIN SODIUM 5000 UNITS: 5000 INJECTION INTRAVENOUS; SUBCUTANEOUS at 22:06

## 2022-07-30 RX ADMIN — SODIUM CHLORIDE: 4.5 INJECTION, SOLUTION INTRAVENOUS at 06:16

## 2022-07-30 RX ADMIN — VANCOMYCIN HYDROCHLORIDE 750 MG: 750 INJECTION, POWDER, LYOPHILIZED, FOR SOLUTION INTRAVENOUS at 01:33

## 2022-07-30 RX ADMIN — MAGNESIUM SULFATE HEPTAHYDRATE 1000 MG: 1 INJECTION, SOLUTION INTRAVENOUS at 19:38

## 2022-07-30 RX ADMIN — POTASSIUM CHLORIDE 20 MEQ: 29.8 INJECTION, SOLUTION INTRAVENOUS at 13:33

## 2022-07-30 RX ADMIN — SODIUM PHOSPHATE, MONOBASIC, MONOHYDRATE 10 MMOL: 276; 142 INJECTION, SOLUTION INTRAVENOUS at 19:36

## 2022-07-30 RX ADMIN — HEPARIN SODIUM 5000 UNITS: 5000 INJECTION INTRAVENOUS; SUBCUTANEOUS at 06:26

## 2022-07-30 RX ADMIN — METRONIDAZOLE 500 MG: 500 INJECTION, SOLUTION INTRAVENOUS at 08:29

## 2022-07-30 RX ADMIN — POTASSIUM CHLORIDE 20 MEQ: 29.8 INJECTION, SOLUTION INTRAVENOUS at 16:58

## 2022-07-30 RX ADMIN — MAGNESIUM SULFATE HEPTAHYDRATE 1000 MG: 1 INJECTION, SOLUTION INTRAVENOUS at 20:43

## 2022-07-30 RX ADMIN — DEXTROSE AND SODIUM CHLORIDE: 5; 450 INJECTION, SOLUTION INTRAVENOUS at 08:57

## 2022-07-30 RX ADMIN — SODIUM PHOSPHATE, MONOBASIC, MONOHYDRATE 10 MMOL: 276; 142 INJECTION, SOLUTION INTRAVENOUS at 15:14

## 2022-07-30 RX ADMIN — METRONIDAZOLE 500 MG: 500 INJECTION, SOLUTION INTRAVENOUS at 00:17

## 2022-07-30 RX ADMIN — SODIUM PHOSPHATE, MONOBASIC, MONOHYDRATE AND SODIUM PHOSPHATE, DIBASIC, ANHYDROUS 20 MMOL: 276; 142 INJECTION, SOLUTION INTRAVENOUS at 08:52

## 2022-07-30 RX ADMIN — POTASSIUM CHLORIDE 20 MEQ: 29.8 INJECTION, SOLUTION INTRAVENOUS at 20:14

## 2022-07-30 RX ADMIN — POTASSIUM CHLORIDE 20 MEQ: 29.8 INJECTION, SOLUTION INTRAVENOUS at 18:45

## 2022-07-30 RX ADMIN — MAGNESIUM SULFATE HEPTAHYDRATE 1000 MG: 1 INJECTION, SOLUTION INTRAVENOUS at 18:19

## 2022-07-30 RX ADMIN — CEFEPIME 2000 MG: 2 INJECTION, POWDER, FOR SOLUTION INTRAVENOUS at 22:06

## 2022-07-30 RX ADMIN — POTASSIUM CHLORIDE 20 MEQ: 29.8 INJECTION, SOLUTION INTRAVENOUS at 10:47

## 2022-07-30 RX ADMIN — CEFEPIME 2000 MG: 2 INJECTION, POWDER, FOR SOLUTION INTRAVENOUS at 10:55

## 2022-07-30 RX ADMIN — SODIUM BICARBONATE: 84 INJECTION, SOLUTION INTRAVENOUS at 08:54

## 2022-07-30 RX ADMIN — CALCIUM GLUCONATE 1000 MG: 20 INJECTION, SOLUTION INTRAVENOUS at 17:03

## 2022-07-30 RX ADMIN — VANCOMYCIN HYDROCHLORIDE 750 MG: 750 INJECTION, POWDER, LYOPHILIZED, FOR SOLUTION INTRAVENOUS at 13:36

## 2022-07-30 RX ADMIN — LEVETIRACETAM 1000 MG: 10 INJECTION, SOLUTION INTRAVENOUS at 06:20

## 2022-07-30 RX ADMIN — IOPAMIDOL 75 ML: 755 INJECTION, SOLUTION INTRAVENOUS at 14:57

## 2022-07-30 RX ADMIN — POTASSIUM PHOSPHATE, MONOBASIC POTASSIUM PHOSPHATE, DIBASIC 30 MMOL: 224; 236 INJECTION, SOLUTION, CONCENTRATE INTRAVENOUS at 21:01

## 2022-07-30 NOTE — ED NOTES
This nurse entered room to check pt's blood sugar. POCT read HI and this nurse was about to draw green top to send down for accurate blood sugar reading when charge RN told this nurse to take the pt upstairs without drawing accurate blood sugar. ICU RN notified.             Deja Roman RN  07/29/22 2143

## 2022-07-30 NOTE — PLAN OF CARE
Problem: Safety - Medical Restraint  Goal: Remains free of injury from restraints (Restraint for Interference with Medical Device)  Description: INTERVENTIONS:  1. Determine that other, less restrictive measures have been tried or would not be effective before applying the restraint  2. Evaluate the patient's condition at the time of restraint application  3. Inform patient/family regarding the reason for restraint  4.  Q2H: Monitor safety, psychosocial status, comfort, nutrition and hydration  Outcome: Completed  Flowsheets  Taken 7/30/2022 0000  Remains free of injury from restraints (restraint for interference with medical device):   Determine that other, less restrictive measures have been tried or would not be effective before applying the restraint   Evaluate the patient's condition at the time of restraint application   Inform patient/family regarding the reason for restraint   Every 2 hours: Monitor safety, psychosocial status, comfort, nutrition and hydration  Taken 7/29/2022 2300  Remains free of injury from restraints (restraint for interference with medical device):   Determine that other, less restrictive measures have been tried or would not be effective before applying the restraint   Evaluate the patient's condition at the time of restraint application   Inform patient/family regarding the reason for restraint   Every 2 hours: Monitor safety, psychosocial status, comfort, nutrition and hydration

## 2022-07-30 NOTE — PLAN OF CARE
Problem: Discharge Planning  Goal: Discharge to home or other facility with appropriate resources  Outcome: Progressing  Flowsheets (Taken 7/29/2022 2300)  Discharge to home or other facility with appropriate resources: Identify barriers to discharge with patient and caregiver     Problem: Pain  Goal: Verbalizes/displays adequate comfort level or baseline comfort level  Outcome: Progressing     Problem: Skin/Tissue Integrity  Goal: Absence of new skin breakdown  Description: 1. Monitor for areas of redness and/or skin breakdown  2. Assess vascular access sites hourly  3. Every 4-6 hours minimum:  Change oxygen saturation probe site  4. Every 4-6 hours:  If on nasal continuous positive airway pressure, respiratory therapy assess nares and determine need for appliance change or resting period.   Outcome: Progressing     Problem: Safety - Adult  Goal: Free from fall injury  Outcome: Progressing     Problem: ABCDS Injury Assessment  Goal: Absence of physical injury  Outcome: Progressing

## 2022-07-30 NOTE — ED NOTES
ED SBAR report provider to Ade Espino, [address]. Patient to be transported to Room 2115 via stretcher by RN. Patient transported with bedside cardiac monitor and with IV medications infusing. IV site clean, dry, and intact. MEWS score and pain assessed as 0/10 and documented. Patient's score on the THOMAS Fall scale reviewed with receiving RN. Updated patient on plan of care.        Herminio Coates RN  07/29/22 9668

## 2022-07-30 NOTE — H&P
Hospital Medicine History & Physical      PCP: Hazel Silva MD    Date of Admission: 2022    Date of Service: Pt seen/examined on 2022 and admitted to inpatient    Chief Complaint: Altered mental status, seizure      History Of Present Illness: The patient is a 34 y.o. female with past medical history of seizure disorder and type 2 diabetes who presents to [facility] brought via EMS from home where it was supposedly noted the patient was found unresponsive by her partner and notably had some blood around her lips and was suspected to have a seizure and was postictal.  Unable to obtain any further review of systems from patient and no one at bedside for further information as well. She has a notable history of previous DKA admission. She also has a history of seizure disorder. She is currently still postictal but is Kussmaul respiration noted. ED provider did place central line in the ED. She was found to have significant lactic acidosis as well as significant leukocytosis and elevated platelet count of uncertain etiology. Suspected DKA is noted but also cannot rule out infectious process as well as some concern for possible malignancy but difficult to determine at this time. Patient is still altered and cannot give any further history and is not able to participate in exam.    Past Medical History:        Diagnosis Date    Depression     Diabetes mellitus ([facility]     Seizures ([facility]        Past Surgical History:        Procedure Laterality Date     SECTION      TUBAL LIGATION         Medications Prior to Admission:    Prior to Admission medications    Medication Sig Start Date End Date Taking?  Authorizing Provider   gabapentin (NEURONTIN) 300 MG capsule TAKE 1 CAPSULE BY MOUTH THREE TIMES DAILY DRUNG THE DAY AND 4 CAPSULES BY MOUTH EVERY DAY AT BEDTIME 7/19/22 8/19/22  Augustine Pace MD   Continuous Blood Gluc Transmit (DEXCOM G6 TRANSMITTER) MISC USE AS DIRECTED 7/19/22   Augustine Pace MD   blood glucose test strips (TRUE METRIX BLOOD GLUCOSE TEST) strip Check blood sugar 4-6 times daily and as needed for symptoms of irregular glucose 7/18/22   Augustine Pace MD   Insulin Disposable Pump (OMNIPOD DASH PODS, GEN 4,) MISC Continuous pump - max dose 60 units 7/18/22   Augustine Pace MD   ondansetron (ZOFRAN) 4 MG tablet Take 1 tablet by mouth 3 times daily as needed for Nausea or Vomiting 7/12/22   Augustine Pace MD   ibuprofen (ADVIL;MOTRIN) 800 MG tablet Take 1 tablet by mouth 3 times daily as needed for Pain 7/12/22   Augustine Pace MD   Continuous Blood Gluc Sensor (DEXCOM G6 SENSOR) MISC Apply as directed for blood sugar monitoring 6/9/22   Augustine Pace MD   sertraline (ZOLOFT) 50 MG tablet Take 1 tablet by mouth daily 6/9/22   Augustine Pace MD   insulin aspart (NOVOLOG FLEXPEN) 100 UNIT/ML injection pen Inject 20 units with meals/snacks up to 5 times per day 6/9/22   Augustine Pace MD   insulin aspart (NOVOLOG) 100 UNIT/ML injection vial Inject 100 units into the skin daily in conjunction with omnipod 6/9/22   Augustine Pace MD   Blood Glucose Monitoring Suppl (TRUE METRIX METER) w/Device KIT Check blood sugar 4 times daily, tidac 5/27/22   Taylor Chance DO   Lancets MISC Check blood sugar 4 times daily, tidac 5/27/22   Taylor Chance DO   clonazePAM (KLONOPIN) 2 MG tablet Take 1 tablet by mouth 2 times daily.  5/6/22 5/7/23  Augustine Pace MD   levETIRAcetam (KEPPRA) 500 MG tablet Take 2 tablets by mouth 2 times daily 5/6/22   Augustine Pace MD   insulin aspart (NOVOLOG) 100 UNIT/ML injection vial Inject 100 Units into the skin daily In conjunction with omnipod 2/15/22   Augustine Pace MD   glucagon, rDNA, 1 MG injection Inject 1 mg into the muscle as needed for Low blood sugar (Blood glucose less than 70 mg/dL and patient NOT ALERT or NPO and does not have IV access.) 11/26/21 11/21/22  Caio Boyd MD   famotidine (PEPCID) 20 MG tablet Take 20 mg by mouth 2 times daily    Historical Provider, MD       Allergies:  Patient has no known allergies. Social History:  The patient currently lives home    TOBACCO:   reports that she has never smoked. She has never used smokeless tobacco.  ETOH:   reports no history of alcohol use. Family History:  Reviewed in detail and negative for DM, Early CAD, Cancer, CVA. Positive as follows:        Problem Relation Age of Onset    Asthma Mother     Allergies Mother         sun allergy    Diabetes Type 1  Father     Diabetes Type 1  Maternal Grandfather     Diabetes Type 1  Cousin     Diabetes Type 1  Cousin     Diabetes Type 1  Maternal Uncle     Diabetes Type 1  Maternal Uncle     Diabetes Type 1  Maternal Aunt        REVIEW OF SYSTEMS:   Unable to obtain     PHYSICAL EXAM:    BP 98/60   Pulse (!) 127   Temp 97.4 °F (36.3 °C) (Axillary)   Resp 21   Ht 4' 10\" (1.473 m)   Wt 100 lb 15.5 oz (45.8 kg)   SpO2 99%   BMI 21.10 kg/m²     General appearance: Kussmaul respirations, maintaining oxygen saturations, altered, chronically and acutely ill-appearing  HEENT Normal cephalic, atraumatic without obvious deformity.   PERRLA noted, unable to assess EOMI, very dry mucous membranes, anicteric sclera  Neck: Supple, no JVD  Lungs: Seems to be clear breath sounds bilaterally, no crackles or wheezing  Heart: Tachycardic, regular rhythm, unable to appreciate murmurs  Abdomen: Soft, nontender, nondistended, active bowel sounds  Extremities: No edema  Skin: Noticed the suprapubic regions on the left pubis there is a circumferential area of induration that seems suggestive of an abscess and there is erythema as well  Neurologic: Unable to evaluate  Mental status: Unable to evaluate  Capillary Refill: Acceptable  < 3 seconds  Peripheral Pulses: +3 Easily felt, not easily obliterated with pressure      07/29/22 2235  XR CHEST PORTABLE Performed: 07/29/22 2122  Final        Impression: Right IJ catheter identified in satisfactory position. Idalia Warren No pneumothorax.       07/29/22 2106  XR CHEST PORTABLE   Performed: 07/29/22 2015  Final        Impression: No acute process. 07/29/22 2016  CT HEAD WO CONTRAST   Performed: 07/29/22 1952  Final        Impression: No acute intracranial abnormality. 07/29/22 2005  CT CERVICAL SPINE WO CONTRAST   Performed: 07/29/22 1951  Final        Impression: No acute abnormality of the cervical spine. CT chest abdomen and pelvis without contrast:  1. No evidence of acute cardiopulmonary disease   2. Marked hepatomegaly, with a sagittal length of 24.8 cm   3. Stoddard catheter within the urinary bladder   4. Skin laceration, left groin region   5. Nonobstructive bowel gas pattern       CBC   Recent Labs     07/29/22 1944 07/30/22  0430   WBC 37.7* 44.4*   HGB 10.3* 8.1*   HCT 42.3 26.7*   PLT 1,008* 697*      RENAL  Recent Labs     07/29/22 1944 07/29/22  2317 07/30/22  0430    146* 153*   K 6.5* 5.1 3.9   CL 97* 112* 118*   CO2 3* 3* 9*   PHOS  --  5.0*  --    BUN 16 18 14   CREATININE 1.1 1.0 0.7     LFT'S  Recent Labs     07/29/22 1944   AST 45*   ALT 30   BILITOT <0.2   ALKPHOS 214*     COAG  No results for input(s): INR in the last 72 hours. CARDIAC ENZYMES  No results for input(s): CKTOTAL, CKMB, CKMBINDEX, TROPONINI in the last 72 hours.     U/A:    Lab Results   Component Value Date/Time    COLORU Yellow 07/29/2022 10:21 PM    WBCUA 0 07/29/2022 10:21 PM    RBCUA 0 07/29/2022 10:21 PM    MUCUS Rare 05/24/2022 08:41 PM    BACTERIA None Seen 07/29/2022 10:21 PM    CLARITYU Clear 07/29/2022 10:21 PM    SPECGRAV 1.025 07/29/2022 10:21 PM    LEUKOCYTESUR Negative 07/29/2022 10:21 PM    BLOODU Negative 07/29/2022 10:21 PM    GLUCOSEU >=1000 07/29/2022 10:21 PM    GLUCOSEU >=1000 01/17/2011 09:15 AM       ABG    Lab Results   Component Value Date/Time    ZND4PKJ 18.3 05/12/2010 08:45 PM BEART -5.7 05/12/2010 08:45 PM    M5TLXUVE 98.0 05/12/2010 08:45 PM    PHART 7.366 05/12/2010 08:45 PM    THGBART 11.2 05/12/2010 08:45 PM    MPT3GSN 32.8 05/12/2010 08:45 PM    PO2ART 104 05/12/2010 08:45 PM           Active Hospital Problems    Diagnosis Date Noted    Elevated platelet count [L82.71] 07/30/2022     Priority: Medium    Seizure ([facility] [R56.9] 07/30/2022     Priority: Medium    AMS (altered mental status) [R41.82] 07/30/2022     Priority: Medium    DKA, type 1, not at goal [facility]) [E10.10] 07/29/2022     Priority: Medium    Sepsis ([facility] [address] [A41.9] 07/29/2022     Priority: Medium    Leukocytosis [D72.829] 07/29/2022     Priority: Medium    Abscess [L02.91] 07/29/2022     Priority: Medium   Metabolic acidosis  Acute kidney injury  Hyponatremia      PHYSICIANS CERTIFICATION:    I certify that Verena Rodriguez is expected to be hospitalized for greater than 2 midnights based on the following assessment and plan:      ASSESSMENT/PLAN:  Sepsis  DKA  Altered mental status  Acute kidney injury  Hypernatremia  Abscess  Leukocytosis  Metabolic acidosis  Elevated platelet count  Seizure    Plan:  Patient maintaining airway, altered but not obtunded, central line was placed in the ED for multiple medications and lab draws and possibly pressors  Levophed on standby for any hypotension  Given 2 A of bicarbonate solution, started on bicarbonate IV infusion with 150 mill equivalents of bicarb in sterile water  Patient was given IV fluid hydration with 4 L of normal saline, start patient on half-normal saline per protocol, IV insulin infusion per protocol  Start patient on cefepime and vancomycin and Flagyl for sepsis syndrome, only suspicious source of infection is suprapubic abscess  Keppra IV twice daily started for seizure prophylaxis, Versed as needed for seizures  General surgery consult for suprapubic abscess  Keeping patient n.p.o.   Assume full CODE STATUS at this time as no family member at bedside for further info and patient is currently not alert and oriented        DVT Prophylaxis: Heparin  Diet: Diet NPO  Code Status: Full Code  PT/OT Eval Status: Ambulatory    Dispo -pending clinical course       Rahat Briggs DO    Thank you Maureen Lance MD for the opportunity to be involved in this patient's care. If you have any questions or concerns please feel free to contact me at [phone].

## 2022-07-30 NOTE — PROGRESS NOTES
Hospitalist Progress Note      PCP: Sandeep Benito MD    Date of Admission: 7/29/2022    Chief Complaint: Weakness    Hospital Course:      Subjective: Weakness of extremities upper and lower and, unable to talk. Was only able to wiggle toes and mild handgrip but unable to lift extremities. With phosphorus repletion, was able to start moving extremities mildly against gravity      Medications:  Reviewed    Infusion Medications    sodium chloride Stopped (07/30/22 0856)    dextrose 5 % and 0.45 % NaCl Stopped (07/30/22 1522)    dextrose      norepinephrine Stopped (07/30/22 0636)    insulin Stopped (07/30/22 1522)     Scheduled Medications    levETIRAcetam  1,000 mg IntraVENous Q12H    heparin (porcine)  5,000 Units SubCUTAneous 3 times per day    magnesium sulfate  1,000 mg IntraVENous Once    calcium gluconate-NaCl  1,000 mg IntraVENous Once    ondansetron  4 mg IntraVENous Once    cefepime  2,000 mg IntraVENous Q12H    metroNIDAZOLE  500 mg IntraVENous Q8H    vancomycin (VANCOCIN) intermittent dosing (placeholder)   Other RX Placeholder    vancomycin  750 mg IntraVENous Q12H     PRN Meds: dextrose bolus **OR** dextrose bolus, magnesium sulfate, sodium phosphate IVPB **OR** sodium phosphate IVPB **OR** sodium phosphate IVPB, dextrose 5 % and 0.45 % NaCl, midazolam, potassium chloride, diazePAM, glucose, dextrose bolus **OR** dextrose bolus, glucagon (rDNA), dextrose      Intake/Output Summary (Last 24 hours) at 7/30/2022 1644  Last data filed at 7/30/2022 1608  Gross per 24 hour   Intake 7273.48 ml   Output 2835 ml   Net 4438.48 ml       Exam:    /67   Pulse (!) 125   Temp 98.3 °F (36.8 °C) (Oral)   Resp 18   Ht 4' 10\" (1.473 m)   Wt 100 lb 15.5 oz (45.8 kg)   SpO2 99%   BMI 21.10 kg/m²     General appearance: No apparent distress, appears stated age and cooperative. HEENT: Pupils equal, round, and reactive to light. Conjunctivae/corneas clear. Neck: Supple, with full range of motion.  No jugular venous distention. Trachea midline. Respiratory:  Normal respiratory effort. Clear to auscultation, bilaterally without Rales/Wheezes/Rhonchi. Cardiovascular: Regular rate and rhythm with normal S1/S2 without murmurs, rubs or gallops. Abdomen: Soft, non-tender, non-distended with normal bowel sounds. Musculoskeletal: No clubbing, cyanosis or edema bilaterally. Skin: Skin color, texture, turgor normal.  No rashes or lesions. Neurologic: Only nodding head, will not say any words to me. Initially on exam had 1 out of 5 strength except for wiggling toes and weak handgrip. After phosphorus replacement, did have some mild extremity elevation against gravity. Psychiatric: Alert and oriented, thought content appropriate, normal insight  Capillary Refill: Brisk,< 3 seconds   Peripheral Pulses: +2 palpable, equal bilaterally       Labs:   Recent Labs     07/29/22 1944 07/30/22  0430   WBC 37.7* 44.4*   HGB 10.3* 8.1*   HCT 42.3 26.7*   PLT 1,008* 697*     Recent Labs     07/30/22  0430 07/30/22  0825 07/30/22  1216   * 154* 151*   K 3.9 3.5 3.3*   * 120* 117*   CO2 9* 17* 21   BUN 14 11 10   CREATININE 0.7 0.8 0.6   CALCIUM 8.4 8.3 7.5*   PHOS 0.8* 0.9* 2.3*     Recent Labs     07/29/22 1944   AST 45*   ALT 30   BILITOT <0.2   ALKPHOS 214*     No results for input(s): INR in the last 72 hours. No results for input(s): Adonica Hoose in the last 72 hours. Urinalysis:      Lab Results   Component Value Date/Time    NITRU Negative 07/29/2022 10:21 PM    WBCUA 0 07/29/2022 10:21 PM    BACTERIA None Seen 07/29/2022 10:21 PM    RBCUA 0 07/29/2022 10:21 PM    BLOODU Negative 07/29/2022 10:21 PM    SPECGRAV 1.025 07/29/2022 10:21 PM    GLUCOSEU >=1000 07/29/2022 10:21 PM    GLUCOSEU >=1000 01/17/2011 09:15 AM       Radiology:  CTA HEAD NECK W CONTRAST   Final Result   1. No acute intracranial abnormality. 2. Unremarkable CTA of the head and neck.          CT HEAD WO CONTRAST   Final Result 1. No acute intracranial abnormality. 2. Unremarkable CTA of the head and neck. CT CHEST ABDOMEN PELVIS WO CONTRAST   Final Result   1. No evidence of acute cardiopulmonary disease   2. Marked hepatomegaly, with a sagittal length of 24.8 cm   3. Stoddard catheter within the urinary bladder   4. Skin laceration, left groin region   5. Nonobstructive bowel gas pattern         XR CHEST PORTABLE   Final Result   Right IJ catheter identified in satisfactory position. Trula Blew No pneumothorax. XR CHEST PORTABLE   Final Result   No acute process. CT HEAD WO CONTRAST   Final Result   No acute intracranial abnormality. CT CERVICAL SPINE WO CONTRAST   Final Result   No acute abnormality of the cervical spine.          MRI BRAIN WO CONTRAST    (Results Pending)   MRI CERVICAL SPINE WO CONTRAST    (Results Pending)   MRI THORACIC SPINE WO CONTRAST    (Results Pending)   MRI LUMBAR SPINE WO CONTRAST    (Results Pending)           Assessment/Plan:    Active Hospital Problems    Diagnosis Date Noted    Elevated platelet count [M72.20] 07/30/2022     Priority: Medium    Seizure ([facility] [address] [R56.9] 07/30/2022     Priority: Medium    AMS (altered mental status) [R41.82] 07/30/2022     Priority: Medium    DKA, type 1, not at goal [facility]) [E10.10] 07/29/2022     Priority: Medium    Sepsis ([facility] [address] [A41.9] 07/29/2022     Priority: Medium    Leukocytosis [D72.829] 07/29/2022     Priority: Medium    Abscess [L02.91] 07/29/2022     Priority: Medium     DKA  Lactic acidosis/metabolic acidosis  -Continue DKA insulin drip and electrolyte repletion per protocol    Diffuse weakness  -Upper and lower extremity weakness profound and throughout  -Difficulty talking  -Likely due to depletion of phosphorus, however need to rule out central etiology with MRI    Suspected seizure: Prior to presentation  -Likely due to DKA  -Continue Keppra  -IV Valium ordered as needed to prevent withdrawal from home clonazepam    Inguinal area abscess  -Status post drainage and packing in the ED  -Continue cefepime Flagyl vancomycin    Hypokalemia, hypomagnesemia, hypophosphatemia, hypocalcium  -Replete IV as needed    DVT Prophylaxis: Lovenox  Diet: Diet NPO  Code Status: Full Code    PT/OT Eval Status: Pending    Dispo -ICU    Lesvia Casarez MD

## 2022-07-30 NOTE — PROGRESS NOTES
RN spoke with Dr. Johnathon Franklin regarding patient's neuro status, mentation, and ROM. Also reviewed patient's electrolytes with MD. See new orders for neurology and nephrology consults and MRI and CT scans. RN called patient's , Fredi Recio, and provided update. MRI checklist completed. Plan for MRI at 1430.     Electronically signed by Sofi Tovar RN on 7/30/2022 at 12:46 PM

## 2022-07-30 NOTE — CONSULTS
Nephrology Consult Note                                                                                                                                                                                                                                                                                                                                                               Office : [phone]     Fax :[phone]              Patient's Name: Garcia Saleem  2023    Reason for Consult: electrolyte imbalance   Requesting Physician:  Madyson Aceves MD      Chief Complaint:  DKA      History of Present Ilness: The patient is a 34 y.o. female with past medical history of seizure disorder and type 2 diabetes who presents to [facility] brought via EMS from home where it was supposedly noted the patient was found unresponsive by her partner and notably had some blood around her lips and was suspected to have a seizure and was postictal.  Unable to obtain any further review of systems from patient and no one at bedside for further information as well. She has a notable history of previous DKA admission    Past Medical History:   Diagnosis Date    Depression     Diabetes mellitus ([address]     Seizures ([facility] [address]        Past Surgical History:   Procedure Laterality Date     SECTION      TUBAL LIGATION         Family History   Problem Relation Age of Onset    Asthma Mother     Allergies Mother         sun allergy    Diabetes Type 1  Father     Diabetes Type 1  Maternal Grandfather     Diabetes Type 1  Cousin     Diabetes Type 1  Cousin     Diabetes Type 1  Maternal Uncle     Diabetes Type 1  Maternal Uncle     Diabetes Type 1  Maternal Aunt         reports that she has never smoked. She has never used smokeless tobacco. She reports that she does not drink alcohol and does not use drugs. Allergies:  Patient has no known allergies.     Current Medications:    levETIRAcetam (KEPPRA) 1000 mg/100 mL IVPB, Q12H  dextrose bolus 10% 125 mL, PRN   Or  dextrose bolus 10% 250 mL, PRN  magnesium sulfate 1000 mg in dextrose 5% 100 mL IVPB, PRN  sodium phosphate 10 mmol in sodium chloride 0.9 % 250 mL IVPB, PRN   Or  sodium phosphate 15 mmol in dextrose 5 % 250 mL IVPB, PRN   Or  sodium phosphate 20 mmol in dextrose 5 % 500 mL IVPB, PRN  0.45 % sodium chloride infusion, Continuous  dextrose 5 % and 0.45 % sodium chloride infusion, Continuous PRN  heparin (porcine) injection 5,000 Units, 3 times per day  midazolam (VERSED) injection 2 mg, Q4H PRN  potassium chloride 20 mEq/50 mL IVPB (Central Line), PRN  diazePAM (VALIUM) injection 2.5 mg, Q12H PRN  ondansetron (ZOFRAN) injection 4 mg, Once  glucose chewable tablet 16 g, PRN  dextrose bolus 10% 125 mL, PRN   Or  dextrose bolus 10% 250 mL, PRN  glucagon (rDNA) injection 1 mg, PRN  dextrose 5 % solution, PRN  cefepime (MAXIPIME) 2000 mg IVPB minibag, Q12H  metronidazole (FLAGYL) 500 mg in 0.9% NaCl 100 mL IVPB premix, Q8H  norepinephrine (LEVOPHED) 16 mg in dextrose 5% 250 mL infusion, Continuous  insulin regular (HUMULIN R;NOVOLIN R) 100 Units in sodium chloride 0.9 % 100 mL infusion, Continuous  vancomycin (VANCOCIN) intermittent dosing (placeholder), RX Placeholder  vancomycin (VANCOCIN) 750 mg in sodium chloride 0.9 % 250 mL IVPB, Q12H        Review of Systems:   Lethargic       Physical exam:     Vitals:  /62   Pulse (!) 127   Temp 99.2 °F (37.3 °C) (Oral)   Resp 19   Ht 4' 10\" (1.473 m)   Wt 100 lb 15.5 oz (45.8 kg)   SpO2 98%   BMI 21.10 kg/m²   Constitutional:  lethargic   Skin: no rash, turgor wnl  Heent:  eomi, mmm  Neck: no bruits or jvd noted  Cardiovascular:  S1, S2 without m/r/g  Respiratory: CTA B without w/r/r  Abdomen:  +bs, soft, nt, nd  Ext: + lower extremity edema  Psychiatric: mood and affect appropriate  Musculoskeletal:  Rom, muscular strength weakness     Data:   Labs:  CBC:   Recent Labs     07/29/22  1944 07/30/22  0430   WBC 37.7* 44.4* evaluation. MRI BRAIN WO CONTRAST   Final Result   Unremarkable MRI of the brain without contrast.         CTA HEAD NECK W CONTRAST   Final Result   1. No acute intracranial abnormality. 2. Unremarkable CTA of the head and neck. CT HEAD WO CONTRAST   Final Result   1. No acute intracranial abnormality. 2. Unremarkable CTA of the head and neck. CT CHEST ABDOMEN PELVIS WO CONTRAST   Final Result   1. No evidence of acute cardiopulmonary disease   2. Marked hepatomegaly, with a sagittal length of 24.8 cm   3. Stoddard catheter within the urinary bladder   4. Skin laceration, left groin region   5. Nonobstructive bowel gas pattern         XR CHEST PORTABLE   Final Result   Right IJ catheter identified in satisfactory position. Hortencia Rast No pneumothorax. XR CHEST PORTABLE   Final Result   No acute process. CT HEAD WO CONTRAST   Final Result   No acute intracranial abnormality. CT CERVICAL SPINE WO CONTRAST   Final Result   No acute abnormality of the cervical spine. Assessment/Plan   1. DKA     2. HTN    3. Anemia    4. Acid- base/ Electrolyte imbalance     5.  Weakness       Plan   - Change to hypotonic sol   - MRI brain   - BS management   - CPM less likely   - monitor UO       Recommend to dose adjust all medications  based on renal functions  Maintain SBP> 90 mmHg   Daily weights   AVOID NSAIDs  Avoid Nephrotoxins  Monitor Intake/Output  Call if significant decrease in urine output                   Thank you for allowing us to participate in care of Lala Peralta MD  Feel free to contact me   [facility] [address]  Office : [phone]  Fax :[phone]

## 2022-07-30 NOTE — PROGRESS NOTES
Patient's mother in law, Macie Dey, called for update. Patient said it is ok to speak with her. All questions answered.

## 2022-07-30 NOTE — PROGRESS NOTES
Patient transported to CT and MRI with this RN and Kathy Naranjo RN. RNs spoke with Dr. Jesús Sykes prior to leaving for scans and MD stated it is okay to pause insulin gtt and D5 1/2NS during MRI. Dr. Destiny Landry said the priority is that patient receives her phosphorus replacement since her phos level was critically low. While at scan, Kathy Naranjo RN updated patient's mother-in-law and mother on patient's status and plan of care. All questions answered.     Electronically signed by Bekah Krause RN on 7/30/2022 at 3:54 PM

## 2022-07-30 NOTE — ED PROVIDER NOTES
I was asked to place a central venous catheter on this patient. I facilitated the procedure, as below. Remainder of ED care per Dr. Tami Villa and Ms. Blanca. Central Line    Date/Time: 7/29/2022 9:00 PM  Performed by: Yolie Delacruz MD  Authorized by: Yolie Delacruz MD     Consent:     Consent obtained:  Emergent situation  Pre-procedure details:     Hand hygiene: Hand hygiene performed prior to insertion      Sterile barrier technique: All elements of maximal sterile technique followed      Skin preparation:  2% chlorhexidine    Skin preparation agent: Skin preparation agent completely dried prior to procedure    Sedation:     Sedation type:  None  Anesthesia:     Anesthesia method:  Local infiltration    Local anesthetic:  Lidocaine 1% w/o epi  Procedure details:     Location:  R internal jugular    Site selection rationale:   Favorable anatomy    Patient position:  Trendelenburg    Procedural supplies:  Triple lumen    Catheter size:  7.5 Fr    Landmarks identified: yes      Ultrasound guidance: yes      Sterile ultrasound techniques: Sterile gel and sterile probe covers were used      Number of attempts:  1    Successful placement: yes    Post-procedure details:     Post-procedure:  Dressing applied and line sutured    Assessment:  Blood return through all ports, no pneumothorax on x-ray, placement verified by x-ray and free fluid flow    Procedure completion:  Tolerated well, no immediate complications        Yolie Delacruz MD  07/29/22 2966

## 2022-07-30 NOTE — PROGRESS NOTES
Brenda Mata, pt mother in law called for an update, per patient okay to give her information. Her number is [phone]. Jodee Loyd states she lives in the same apartment building as pt and states \"pt has had 5 seizures in the past 36 hours\". Per Jodee Loyd, pt frequently bites her tongue when she has seizures, which is why her tongue has a sore and her mouth is bleeding some. She also explained that the pt has mentioned her PCP was wanting to order an EEG due to the increase in seizure activity. Pt Apryl Lang came to pt room. Pt mother was updated on current status. Mother states the pt Mitzy Jesus had an increase in seizures over the past few weeks, having at least 3 a week, whereas before she was only having 2 a month. \" Pt see's a neurologist outpatient and per the mother \"isn't doing anything to help her\". As far as the mother is aware, pt is taking her seizure medication correctly and the medications have not changed. She also states the pt has been having issues getting diabetic supplies, but believes she is checking her sugars at home.

## 2022-07-30 NOTE — PROGRESS NOTES
Unable to speak to patient at this time. Verified all meds via dispense report only. Possible report that patient may not be compliant with medications. Also suspected seizure today PTA in the ED.     Porfirio Devi, Pharmacy Intern   7/29/2022 @ 9:26 PM

## 2022-07-30 NOTE — ED PROVIDER NOTES
[facility] 2W ICU  EMERGENCY DEPARTMENT ENCOUNTER        Pt Name: Jessica Santana  MRN: [id number]  [place] 1990  Date of evaluation: 2022  Provider: MARI Joseph CNP  PCP: Edgard Key MD  Note Started: 8:58 PM EDT        I have seen and evaluated this patient with my supervising physician Corinna rBantley*. CHIEF COMPLAINT       Chief Complaint   Patient presents with    Hyperglycemia       HISTORY OF PRESENT ILLNESS   (Location, Timing/Onset, Context/Setting, Quality, Duration, Modifying Factors, Severity, Associated Signs and Symptoms)  Note limiting factors. Chief Complaint: Hyperglycemia    Jessica Santana is a 34 y.o. female who presents to the emergency department today via EMS from home for evaluation of elevated blood sugar and possible seizure. Patient with history of insulin-dependent diabetes in addition to seizure disorder. Patient admitted to this hospital in May for DKA. She does have history of noncompliance. EMS states that history was taken from patient's significant other. No family at bedside to provide additional details at this time. Nursing Notes were all reviewed and agreed with or any disagreements were addressed in the HPI. REVIEW OF SYSTEMS    (2-9 systems for level 4, 10 or more for level 5)     Review of Systems   Unable to perform ROS: Mental status change     Positives and Pertinent negatives as per HPI. Except as noted above in the ROS, all other systems were reviewed and negative.        PAST MEDICAL HISTORY     Past Medical History:   Diagnosis Date    Depression     Diabetes mellitus ([facility]     Seizures ([facility]          SURGICAL HISTORY     Past Surgical History:   Procedure Laterality Date     SECTION      TUBAL LIGATION           CURRENTMEDICATIONS       Current Discharge Medication List        CONTINUE these medications which have NOT CHANGED    Details   gabapentin (NEURONTIN) 300 MG capsule TAKE 1 CAPSULE BY MOUTH THREE TIMES DAILY DRUNG THE DAY AND 4 CAPSULES BY MOUTH EVERY DAY AT BEDTIME  Qty: 210 capsule, Refills: 3    Associated Diagnoses: Diabetic peripheral neuropathy (HCC)      Continuous Blood Gluc Transmit (DEXCOM G6 TRANSMITTER) MISC USE AS DIRECTED  Qty: 1 each, Refills: 0    Associated Diagnoses: Type 1 diabetes mellitus with complication (HCC)      blood glucose test strips (TRUE METRIX BLOOD GLUCOSE TEST) strip Check blood sugar 4-6 times daily and as needed for symptoms of irregular glucose  Qty: 400 each, Refills: 3    Associated Diagnoses: Type 1 diabetes mellitus with complication (HCC)      Insulin Disposable Pump (OMNIPOD DASH PODS, GEN 4,) MISC Continuous pump - max dose 60 units  Qty: 10 each, Refills: 3    Associated Diagnoses: Type 1 diabetes mellitus with complication (HCC)      ondansetron (ZOFRAN) 4 MG tablet Take 1 tablet by mouth 3 times daily as needed for Nausea or Vomiting  Qty: 30 tablet, Refills: 1      ibuprofen (ADVIL;MOTRIN) 800 MG tablet Take 1 tablet by mouth 3 times daily as needed for Pain  Qty: 90 tablet, Refills: 1      Continuous Blood Gluc Sensor (DEXCOM G6 SENSOR) MISC Apply as directed for blood sugar monitoring  Qty: 10 each, Refills: 5    Associated Diagnoses: Type 1 diabetes mellitus with complication (HCC)      sertraline (ZOLOFT) 50 MG tablet Take 1 tablet by mouth daily  Qty: 30 tablet, Refills: 2      insulin aspart (NOVOLOG FLEXPEN) 100 UNIT/ML injection pen Inject 20 units with meals/snacks up to 5 times per day  Qty: 10 pen, Refills: 3    Associated Diagnoses: Type 1 diabetes mellitus with complication (HCC)      insulin aspart (NOVOLOG) 100 UNIT/ML injection vial Inject 100 units into the skin daily in conjunction with omnipod  Qty: 30 mL, Refills: 5      Blood Glucose Monitoring Suppl (TRUE METRIX METER) w/Device KIT Check blood sugar 4 times daily, tidac  Qty: 1 kit, Refills: 0    Associated Diagnoses: Type 1 diabetes mellitus with ketoacidosis without coma ([facility]  Type 1 diabetes mellitus with complication (HCC)      Lancets MISC Check blood sugar 4 times daily, tidac  Qty: 300 each, Refills: 1    Associated Diagnoses: Type 1 diabetes mellitus with complication ([facility] [address] Type 1 diabetes mellitus with ketoacidosis without coma ([facility])      clonazePAM (KLONOPIN) 2 MG tablet Take 1 tablet by mouth 2 times daily. Qty: 60 tablet, Refills: 2    Associated Diagnoses: Seizure disorder ([facility])      levETIRAcetam (KEPPRA) 500 MG tablet Take 2 tablets by mouth 2 times daily  Qty: 120 tablet, Refills: 5      insulin aspart (NOVOLOG) 100 UNIT/ML injection vial Inject 100 Units into the skin daily In conjunction with omnipod  Qty: 30 mL, Refills: 5    Associated Diagnoses: Type 1 diabetes mellitus with complication ([facility])      glucagon, rDNA, 1 MG injection Inject 1 mg into the muscle as needed for Low blood sugar (Blood glucose less than 70 mg/dL and patient NOT ALERT or NPO and does not have IV access.)  Qty: 10 each, Refills: 0      famotidine (PEPCID) 20 MG tablet Take 20 mg by mouth 2 times daily               ALLERGIES     Patient has no known allergies.     FAMILYHISTORY       Family History   Problem Relation Age of Onset    Asthma Mother     Allergies Mother         sun allergy    Diabetes Type 1  Father     Diabetes Type 1  Maternal Grandfather     Diabetes Type 1  Cousin     Diabetes Type 1  Cousin     Diabetes Type 1  Maternal Uncle     Diabetes Type 1  Maternal Uncle     Diabetes Type 1  Maternal Aunt           SOCIAL HISTORY       Social History     Tobacco Use    Smoking status: Never    Smokeless tobacco: Never   Vaping Use    Vaping Use: Never used   Substance Use Topics    Alcohol use: No    Drug use: No       SCREENINGS    [place] Coma Scale  Eye Opening: Spontaneous  Best Verbal Response: None  Best Motor Response: Localizes pain  Grace Coma Scale Score: 10        PHYSICAL EXAM    (up to 7 for level 4, 8 or more for level 5)     ED Triage Vitals [07/29/22 1933]   BP Temp Temp Source Heart Rate Resp SpO2 Height Weight   (!) 147/85 97.3 °F (36.3 °C) Oral (!) 139 (!) 34 99 % -- 105 lb 6.1 oz (47.8 kg)       Physical Exam  Vitals and nursing note reviewed. Constitutional:       Appearance: She is ill-appearing and toxic-appearing. HENT:      Head: Normocephalic and atraumatic. Right Ear: External ear normal.      Left Ear: External ear normal.      Nose: Nose normal.      Mouth/Throat:      Mouth: Mucous membranes are dry. Pharynx: Oropharynx is clear. No oropharyngeal exudate or posterior oropharyngeal erythema. Comments: Dried blood noted to the lips  Eyes:      General:         Right eye: No discharge. Left eye: No discharge. Extraocular Movements: Extraocular movements intact. Conjunctiva/sclera: Conjunctivae normal.   Cardiovascular:      Rate and Rhythm: Regular rhythm. Tachycardia present. Pulses: Normal pulses. Heart sounds: Normal heart sounds. Pulmonary:      Effort: No respiratory distress. Comments: Tachypneic, Kussmaul respirations noted  Abdominal:      General: Bowel sounds are normal.      Palpations: Abdomen is soft. Tenderness: There is no abdominal tenderness. There is no guarding or rebound. Musculoskeletal:      Cervical back: Normal range of motion and neck supple. No rigidity or tenderness. Comments: Moves all 4 extremities, no obvious injury   Skin:     General: Skin is warm and dry. Capillary Refill: Capillary refill takes less than 2 seconds. Coloration: Skin is pale. Findings: No lesion or rash. Comments: Approximate quarter sized erythematous, mildly fluctuant abscess noted to left side of the mons pubis. There is no active drainage. Neurological:      Mental Status: She is lethargic and confused. GCS: GCS eye subscore is 4. GCS verbal subscore is 3. GCS motor subscore is 4.    Psychiatric:      Comments: Unable to assess       DIAGNOSTIC RESULTS   LABS:    Labs Reviewed CBC WITH AUTO DIFFERENTIAL - Abnormal; Notable for the following components:       Result Value    WBC 37.7 (*)     Hemoglobin 10.3 (*)     .3 (*)     MCH 24.7 (*)     MCHC 24.4 (*)     RDW 16.2 (*)     Platelets 1,109 (*)     Neutrophils Absolute 30.2 (*)     Lymphocytes Absolute 7.2 (*)     Metamyelocytes Relative 1 (*)     Myelocyte Percent 4 (*)     Polychromasia 1+ (*)     All other components within normal limits    Narrative:     Samreen Ocampo tel. [phone],  Hematology results called to and read back by Festus Angulo RN, 07/29/2022  20:14, by Bianka Chase PANEL W/ REFLEX TO MG FOR LOW K - Abnormal; Notable for the following components:    Potassium reflex Magnesium 6.5 (*)     Chloride 97 (*)     CO2 3 (*)     Anion Gap 37 (*)     Glucose 1,254 (*)     GFR Non- 57 (*)     Total Protein 9.1 (*)     Albumin/Globulin Ratio 1.0 (*)     Alkaline Phosphatase 214 (*)     AST 45 (*)     All other components within normal limits    Narrative:     Samreen Ocampo tel. [phone],  Chemistry results called to and read back by Jeff Gresham, 07/29/2022 20:52, by  [facility]  Chemistry results called to and read back by Jeff Gresham, 07/29/2022 20:52, by  [facility]   BLOOD GAS, VENOUS - Abnormal; Notable for the following components:    pH, Drew 6.822 (*)     pCO2, Drew 20.9 (*)     HCO3, Venous 3 (*)     All other components within normal limits    Narrative:     Samreen Ocampo tel. [phone],  Chemistry results called to and read back by Mendel Nurse, CHIN, 07/29/2022 20:12, by  Linda Shepherd   BETA-HYDROXYBUTYRATE - Abnormal; Notable for the following components:    Beta-Hydroxybutyrate >8.00 (*)     All other components within normal limits    Narrative:     Samreen Ocampo tel. [phone],  Chemistry results called to and read back by Jeff Gresham, 07/29/2022 20:52, by  Manisha Bay - Abnormal; Notable for the following components:    Glucose, Ur >=1000 (*)     Ketones, Urine >=160 (*)     Protein, UA TRACE (*)     All other components within normal limits   LACTATE, SEPSIS - Abnormal; Notable for the following components:    Lactic Acid, Sepsis 5.3 (*)     All other components within normal limits    Narrative:     Oh Guo tel. [phone],  Chemistry results called to and read back by Marisela Rodriguez RN, 07/29/2022  21:25, by Des King   LIPASE - Abnormal; Notable for the following components:    Lipase 7.0 (*)     All other components within normal limits    Narrative:     Oh Guo tel. [phone],  Chemistry results called to and read back by Bud Posey, 07/29/2022 20:52, by  [facility]  Chemistry results called to and read back by Bud Posey, 07/29/2022 20:52, by  [facility]   LEVETIRACETAM LEVEL - Abnormal; Notable for the following components:    Levetiracetam Lvl <2.0 (*)     All other components within normal limits   LACTATE DEHYDROGENASE - Abnormal; Notable for the following components:     (*)     All other components within normal limits   SEDIMENTATION RATE - Abnormal; Notable for the following components:    Sed Rate 122 (*)     All other components within normal limits   C-REACTIVE PROTEIN - Abnormal; Notable for the following components:    CRP 34.2 (*)     All other components within normal limits   POCT GLUCOSE - Abnormal; Notable for the following components:    POC Glucose >600 (*)     All other components within normal limits   POCT GLUCOSE - Abnormal; Notable for the following components:    POC Glucose >600 (*)     All other components within normal limits   COVID-19, RAPID   RAPID INFLUENZA A/B ANTIGENS   CULTURE, BLOOD 1   CULTURE, BLOOD 2   CULTURE, WOUND   HCG, SERUM, QUALITATIVE   URINE DRUG SCREEN   TSH WITH REFLEX TO FT4   PERIPHERAL BLOOD SMEAR, PATH REVIEW   MICROSCOPIC URINALYSIS   LACTATE, SEPSIS   LACTATE, SEPSIS   BASIC METABOLIC PANEL   BASIC METABOLIC PANEL   BASIC METABOLIC PANEL   MAGNESIUM   MAGNESIUM   MAGNESIUM   PHOSPHORUS PHOSPHORUS   PHOSPHORUS   POCT GLUCOSE   POCT GLUCOSE   POCT GLUCOSE   POCT GLUCOSE   POCT GLUCOSE   POCT GLUCOSE   POCT GLUCOSE   POCT GLUCOSE   POCT GLUCOSE   POCT GLUCOSE   POCT GLUCOSE   POCT GLUCOSE   POCT GLUCOSE   POCT GLUCOSE   POCT GLUCOSE   POCT GLUCOSE       When ordered only abnormal lab results are displayed. All other labs were within normal range or not returned as of this dictation. EKG: When ordered, EKG's are interpreted by the Emergency Department Physician in the absence of a cardiologist.  Please see their note for interpretation of EKG. RADIOLOGY:   Non-plain film images such as CT, Ultrasound and MRI are read by the radiologist. Plain radiographic images are visualized and preliminarily interpreted by the ED Provider with the below findings:        Interpretation per the Radiologist below, if available at the time of this note:    CT CHEST [address]   Final Result   1. No evidence of acute cardiopulmonary disease   2. Marked hepatomegaly, with a sagittal length of 24.8 cm   3. Stoddard catheter within the urinary bladder   4. Skin laceration, left groin region   5. Nonobstructive bowel gas pattern         XR CHEST PORTABLE   Final Result   Right IJ catheter identified in satisfactory position. Paullette Rakes No pneumothorax. XR CHEST PORTABLE   Final Result   No acute process. CT HEAD WO CONTRAST   Final Result   No acute intracranial abnormality. CT CERVICAL SPINE WO CONTRAST   Final Result   No acute abnormality of the cervical spine. CT HEAD WO CONTRAST    Result Date: 7/29/2022  EXAMINATION: CT OF THE HEAD WITHOUT CONTRAST  7/29/2022 7:41 pm TECHNIQUE: CT of the head was performed without the administration of intravenous contrast. Automated exposure control, iterative reconstruction, and/or weight based adjustment of the mA/kV was utilized to reduce the radiation dose to as low as reasonably achievable. COMPARISON: MRI brain 11/01/2021.   CT brain 01/17/2011. HISTORY: ORDERING SYSTEM PROVIDED HISTORY: ams/seizure TECHNOLOGIST PROVIDED HISTORY: Reason for exam:->ams/seizure Has a \"code stroke\" or \"stroke alert\" been called? ->No Decision Support Exception - unselect if not a suspected or confirmed emergency medical condition->Emergency Medical Condition (MA) Is the patient pregnant?->No Reason for Exam: ams/seizure, Hyperglycemia FINDINGS: BRAIN/VENTRICLES: There is no acute intracranial hemorrhage, mass effect or midline shift. No abnormal extra-axial fluid collection. The gray-white differentiation is maintained without evidence of an acute infarct. There is no evidence of hydrocephalus. ORBITS: The visualized portion of the orbits demonstrate no acute abnormality. SINUSES: The visualized paranasal sinuses and mastoid air cells demonstrate no acute abnormality. SOFT TISSUES/SKULL:  No acute abnormality of the visualized skull or soft tissues. No acute intracranial abnormality. CT CERVICAL SPINE WO CONTRAST    Result Date: 7/29/2022  EXAMINATION: CT OF THE CERVICAL SPINE WITHOUT CONTRAST 7/29/2022 7:41 pm TECHNIQUE: CT of the cervical spine was performed without the administration of intravenous contrast. Multiplanar reformatted images are provided for review. Automated exposure control, iterative reconstruction, and/or weight based adjustment of the mA/kV was utilized to reduce the radiation dose to as low as reasonably achievable. COMPARISON: None. HISTORY: ORDERING SYSTEM PROVIDED HISTORY: ams/seizure TECHNOLOGIST PROVIDED HISTORY: Reason for exam:->ams/seizure Decision Support Exception - unselect if not a suspected or confirmed emergency medical condition->Emergency Medical Condition (MA) Is the patient pregnant?->No Reason for Exam: ams/seizure, Hyperglycemia FINDINGS: BONES/ALIGNMENT: There is no acute fracture or traumatic malalignment. DEGENERATIVE CHANGES: No significant degenerative changes.  SOFT TISSUES: There is no prevertebral soft 2200 07/29/22 2215 07/29/22 2300   BP: (!) 78/41 92/74 110/75    Pulse: (!) 127 (!) 130 (!) 128    Resp: (!) 34 (!) 36 (!) 35    Temp:    97.4 °F (36.3 °C)   TempSrc:    Axillary   SpO2: 100% 100% 100%    Weight:    100 lb 15.5 oz (45.8 kg)       Patient was given the following medications:  Medications   ondansetron (ZOFRAN) injection 4 mg (0 mg IntraVENous Held 7/29/22 2330)   glucose chewable tablet 16 g (has no administration in time range)   dextrose bolus 10% 125 mL (has no administration in time range)     Or   dextrose bolus 10% 250 mL (has no administration in time range)   glucagon (rDNA) injection 1 mg (has no administration in time range)   dextrose 5 % solution (has no administration in time range)   sodium bicarbonate 150 mEq in sterile water 1,000 mL infusion ( IntraVENous New Bag 7/29/22 2307)   0.9 % sodium chloride bolus (has no administration in time range)   cefepime (MAXIPIME) 2000 mg IVPB minibag (2,000 mg IntraVENous New Bag 7/29/22 2132)   metronidazole (FLAGYL) 500 mg in 0.9% NaCl 100 mL IVPB premix (has no administration in time range)   norepinephrine (LEVOPHED) 16 mg in dextrose 5% 250 mL infusion (5 mcg/min IntraVENous New Bag 7/29/22 2156)   insulin regular (HUMULIN R;NOVOLIN R) 100 Units in sodium chloride 0.9 % 100 mL infusion (0.1 Units/kg/hr × 47.8 kg IntraVENous Rate/Dose Verify 7/29/22 2229)   lidocaine 2 % injection 5 mL (has no administration in time range)   vancomycin (VANCOCIN) intermittent dosing (placeholder) (has no administration in time range)   vancomycin (VANCOCIN) 750 mg in dextrose 5 % 250 mL IVPB (has no administration in time range)   0.9 % sodium chloride bolus (0 mLs IntraVENous Stopped 7/29/22 2206)   0.9 % sodium chloride bolus (0 mLs IntraVENous Stopped 7/29/22 2209)   levETIRAcetam (KEPPRA) 1000 mg/100 mL IVPB (0 mg IntraVENous Stopped 7/29/22 2206)   sodium bicarbonate 8.4 % injection 50 mEq (50 mEq IntraVENous Given 7/29/22 2129)   sodium bicarbonate 8.4 % injection 50 mEq (50 mEq IntraVENous Given 7/29/22 2129)   calcium gluconate 1000 mg in sodium chloride 50 mL (1,000 mg IntraVENous New Bag 7/29/22 2141)         Is this patient to be included in the SEP-1 Core Measure due to severe sepsis or septic shock? Yes   SEP-1 CORE MEASURE DATA      Sepsis Criteria   Severe Sepsis Criteria   Septic Shock Criteria     Must be confirmed or suspected to move forward with diagnosis of sepsis. Must meet 2:    [] Temperature > 100.9 F (38.3 C)        or < 96.8 F (36 C)  [x] HR > 90  [] RR > 20  [x] WBC > 12 or < 4 or 10% bands      AND:      [x] Infection Confirmed or        Suspected. Must meet 1:    [x] Lactate > 2       or   [] Signs of Organ Dysfunction:    - SBP < 90 or MAP < 65  - Altered mental status  - Creatinine > 2 or increased from      baseline  - Urine Output < 0.5 ml/kg/hr  - Bilirubin > 2  - INR > 1.5 (not anticoagulated)  - Platelets < 714,790  - Acute Respiratory Failure as     evidenced by new need for NIPPV     or mechanical ventilation      [] No criteria met for Severe Sepsis. Must meet 1:    [x] Lactate > 4        or   [] SBP < 90 or MAP < 65 for at        least two readings in the first        hour after fluid bolus        administration      [] Vasopressors initiated (if hypotension persists after fluid resuscitation)        [] No criteria met for Septic Shock.    Patient Vitals for the past 6 hrs:   BP Temp Pulse Resp SpO2 Weight Weight Method Percent Weight Change   07/29/22 1933 (!) 147/85 97.3 °F (36.3 °C) (!) 139 (!) 34 99 % 105 lb 6.1 oz (47.8 kg) Actual 0   07/29/22 2100 95/66 -- (!) 128 30 99 % -- -- --   07/29/22 2130 102/61 -- (!) 126 (!) 31 99 % -- -- --   07/29/22 2145 (!) 78/41 -- (!) 127 (!) 34 100 % -- -- --   07/29/22 2200 92/74 -- (!) 130 (!) 36 100 % -- -- --   07/29/22 2215 110/75 -- (!) 128 (!) 35 100 % -- -- --   07/29/22 2300 -- 97.4 °F (36.3 °C) -- -- -- 100 lb 15.5 oz (45.8 kg) -- 0      Recent Labs     07/29/22 1944 WBC 37.7*   CREATININE 1.1   BILITOT <0.2   PLT 1,008*         Time Septic Shock Identified: 1944    Fluid Resuscitation Rational: at least 30mL/kg based on entered actual weight at time of triage    Repeat lactate level: ordered and pending at this time    Previous records reviewed in order to gain further information regarding patient's PMH as well as her HPI. Nursing notes reviewed. This is a 50-year-old  medically noncompliant female with history of insulin-dependent diabetes who presents for evaluation of elevated blood glucose. Per EMS, patient significant other states that patient has been having vomiting for the past \"couple of days\". He states that patient may have had a seizure as he found patient unresponsive with blood around her mouth. Physical exam complete. Patient arrives lethargic and tachycardic with Kussmaul respirations. She appears critically ill. POC glucose reads \"high\". DKA work-up initiated, see above. ED attending Dr. Blaine Ramos and hospitalist Dr. Kassi Gross immediately consulted and performed face-to-face evaluation. Laboratory studies as above. Patient is critically ill with a blood glucose of greater than 1200. pH is 6.8. White count is 37 with platelets of 0134. Imaging studies have been unremarkable. Central line placed by ED attending Dr. Lucía Gaelano, see his note for details. Incision and drainage of abscess to mons pubis performed as above, wound culture obtained and sent to lab. Patient reassessed. There has been no change from initial assessment despite intervention. Patient remains critically ill and her condition is extremely guarded at this time. There has been no family to bedside to offer additional details regarding specific symptoms leading up to this ED visit. FINAL IMPRESSION      1.  Diabetic ketoacidosis without coma associated with [facility]          DISPOSITION/PLAN   DISPOSITION Admitted 07/29/2022 09:03:58 PM      PATIENT REFERRED TO:  No follow-up provider specified.     DISCHARGE MEDICATIONS:  Current Discharge Medication List          DISCONTINUED MEDICATIONS:  Current Discharge Medication List                 (Please note that portions of this note were completed with a voice recognition program.  Efforts were made to edit the dictations but occasionally words are mis-transcribed.)    MARI Lilly CNP (electronically signed)            MARI Lilly CNP  07/29/22 6867

## 2022-07-30 NOTE — CONSULTS
Neurology Consult Note    Dr Mark Hartman MD asked me to see Maciej Martínez in consultation   for evaluation of impaired limb movement. HPI:  33 yo woman brought to ER unresponsive yesterday evening, with elevated blood sugar and possible seizure (dried blood noted on lips in ER). An abscess in L inguinal area has been identified in the course of her evaluation. No seizure-like behavior has been noted since her arrival. Treatment for DKA (pH 6.8, blood glucose >1000) has been in progress, and pt is gradually becoming more alert. There is some concern that she is not moving her extremities well. Pt doesn't give much detail, but nods or shakes head yes or no. Seizure disorder does not extend as far back as childhood, per her answer to my query. Pt may have had at least one seizure yesterday. PMHx:  DM (DKA May 2022)    Reported hx seizure disorder  FHx:     DM extensively  SocHx:  No smoking, no EtOH. No drugs. Meds: Reviewed   Includes clonazepam 2mg bid for seizure disorder   Includes keppra 1000mg bid   Includes GBP 300mg tid plus 1200mg at HS, for PN    Allergies:   No Known Allergies    Review of Systems:  Constitutional: denies fever  Musculoskeletal: denies joint pain  Behavioral: no complaints  HEENT: denies acute visual changes  CV/Resp: denies CP or SOB  GI: denies nausea  : denies dysuria  Heme: no unusual bruising or bleeding. Endocrine: DM as noted  Integument: possible abscess as noted    Vitals:  /66   Pulse (!) 123   Temp 98.3 °F (36.8 °C) (Oral)   Resp 15   Ht 4' 10\" (1.473 m)   Wt 100 lb 15.5 oz (45.8 kg)   SpO2 99%   BMI 21.10 kg/m²     Exam:    Constitutional: lying in ICU bed, no acute distress. HEENT: no retinal abnormalities identified. Cardiovascular: RRR, normal pulses, no edema. Musculoskeletal: no abnormalities identified. Mental status: awake. Makes eye contact. Nods yes or shakes head no. Mouths a word or two, but no complete sentences.  Command following is sluggish but she is able to do so. Does not appear to drift off. Cranial nerves: visual fields grossly full. pupils equal, round, reactive. She is able to track me around the room with her eyes. facial sensation can't be evaluated. facial expression symmetric and normal. no hearing loss identified. speech very hypophonic, nearly anarthric. tongue and palate are in midline. head in neutral posture, full range of motion. Motor: limited exam, but pt is able to raise each of her four limbs high off the bed without apparent difficulty. Tone: normal.   Coordination: sluggish movement, no overt ataxia. Reflexes: hypoactive. toes downgoing. Sensory: no abnormalities identified. Gait: can't be tested in current condition. Lab and Imaging Data:  CT head - no acute intracranial abnormality  A brain MRI in Nov 2021 was unremarkable. Reviewed glucose (1000 -> 200s) and other labs relevant to DKA. WBC very high. Hematocrit low after probable hemoconcentration initially). Impression:  Encephalopathy related to DKA. By report, pt is a bit more alert. Earlier today, when asked to move her limbs, she could barely do so, and now there is no focal deficit; I suspect this is related to her level of attention which is improving somewhat. Agree with concern of possible brain infarct in setting of very very high glucose, though her exam is more reassuring now than apparently had been earlier. Possibly at least one seizure in the time leading to transport to ER, would not be surprising in setting of DKA and existing seizure disorder. No apparent seizures since admission. Recommendations:  Is reasonable to proceed with brain MRI and CTA, as has already been ordered. Management of DKA is in progress. Management of abscess in progress (?perhaps precipitant of DKA). Continue home anticonvulsant regimen. Keppra is being given IV. Clonazepam is only available po.  If she is able to take po by this evening, would resume her home clonazepam dosing in order to avoid withdrawal. If she can't take po, might consider bridging with modest dose of iv lorazepam.    ___  Srinivas Damon MD PhD

## 2022-07-30 NOTE — PROGRESS NOTES
Pt arrived to the ICU room 2114. IV medications infusing, attached to monitor, call light in reach, and oriented to floor.

## 2022-07-30 NOTE — PROGRESS NOTES
Blood sugar critical received from lab: 1024. Spoke with hospitalist Dr. Becky Montgomery. Leave insulin infusion at current rate of 0.1 units/kg/hr for now due to continued critical high.  Recheck glucose with lab and keep attempting with glucometer on schedule to make sure sugar is not dropping too fast.

## 2022-07-31 PROBLEM — E10.10 DKA, TYPE 1, NOT AT GOAL (HCC): Status: ACTIVE | Noted: 2022-07-31

## 2022-07-31 PROBLEM — E87.8 ELECTROLYTE IMBALANCE: Status: ACTIVE | Noted: 2022-07-31

## 2022-07-31 PROBLEM — R53.1 WEAKNESS: Status: ACTIVE | Noted: 2022-07-31

## 2022-07-31 LAB
ANION GAP SERPL CALCULATED.3IONS-SCNC: 10 MMOL/L (ref 3–16)
ANION GAP SERPL CALCULATED.3IONS-SCNC: 13 MMOL/L (ref 3–16)
ANION GAP SERPL CALCULATED.3IONS-SCNC: 13 MMOL/L (ref 3–16)
BASOPHILS ABSOLUTE: 0.1 K/UL (ref 0–0.2)
BASOPHILS RELATIVE PERCENT: 0.3 %
BUN BLDV-MCNC: 4 MG/DL (ref 7–20)
BUN BLDV-MCNC: 5 MG/DL (ref 7–20)
BUN BLDV-MCNC: 6 MG/DL (ref 7–20)
CALCIUM SERPL-MCNC: 7.5 MG/DL (ref 8.3–10.6)
CALCIUM SERPL-MCNC: 7.7 MG/DL (ref 8.3–10.6)
CALCIUM SERPL-MCNC: 7.7 MG/DL (ref 8.3–10.6)
CHLORIDE BLD-SCNC: 108 MMOL/L (ref 99–110)
CHLORIDE BLD-SCNC: 112 MMOL/L (ref 99–110)
CHLORIDE BLD-SCNC: 112 MMOL/L (ref 99–110)
CO2: 17 MMOL/L (ref 21–32)
CO2: 19 MMOL/L (ref 21–32)
CO2: 19 MMOL/L (ref 21–32)
CREAT SERPL-MCNC: <0.5 MG/DL (ref 0.6–1.1)
EOSINOPHILS ABSOLUTE: 0 K/UL (ref 0–0.6)
EOSINOPHILS RELATIVE PERCENT: 0 %
GFR AFRICAN AMERICAN: >60
GFR NON-AFRICAN AMERICAN: >60
GLUCOSE BLD-MCNC: 122 MG/DL (ref 70–99)
GLUCOSE BLD-MCNC: 148 MG/DL (ref 70–99)
GLUCOSE BLD-MCNC: 152 MG/DL (ref 70–99)
GLUCOSE BLD-MCNC: 154 MG/DL (ref 70–99)
GLUCOSE BLD-MCNC: 156 MG/DL (ref 70–99)
GLUCOSE BLD-MCNC: 167 MG/DL (ref 70–99)
GLUCOSE BLD-MCNC: 168 MG/DL (ref 70–99)
GLUCOSE BLD-MCNC: 173 MG/DL (ref 70–99)
GLUCOSE BLD-MCNC: 175 MG/DL (ref 70–99)
GLUCOSE BLD-MCNC: 182 MG/DL (ref 70–99)
GLUCOSE BLD-MCNC: 184 MG/DL (ref 70–99)
GLUCOSE BLD-MCNC: 187 MG/DL (ref 70–99)
GLUCOSE BLD-MCNC: 195 MG/DL (ref 70–99)
GLUCOSE BLD-MCNC: 205 MG/DL (ref 70–99)
HCT VFR BLD CALC: 23.6 % (ref 36–48)
HEMOGLOBIN: 7.4 G/DL (ref 12–16)
LYMPHOCYTES ABSOLUTE: 3 K/UL (ref 1–5.1)
LYMPHOCYTES RELATIVE PERCENT: 14.1 %
MAGNESIUM: 1.9 MG/DL (ref 1.8–2.4)
MAGNESIUM: 2 MG/DL (ref 1.8–2.4)
MAGNESIUM: 2.5 MG/DL (ref 1.8–2.4)
MCH RBC QN AUTO: 25.4 PG (ref 26–34)
MCHC RBC AUTO-ENTMCNC: 31.6 G/DL (ref 31–36)
MCV RBC AUTO: 80.3 FL (ref 80–100)
MONOCYTES ABSOLUTE: 1.5 K/UL (ref 0–1.3)
MONOCYTES RELATIVE PERCENT: 7.1 %
NEUTROPHILS ABSOLUTE: 16.8 K/UL (ref 1.7–7.7)
NEUTROPHILS RELATIVE PERCENT: 78.5 %
PDW BLD-RTO: 15 % (ref 12.4–15.4)
PERFORMED ON: ABNORMAL
PHOSPHORUS: 2.2 MG/DL (ref 2.5–4.9)
PHOSPHORUS: 2.2 MG/DL (ref 2.5–4.9)
PHOSPHORUS: 2.7 MG/DL (ref 2.5–4.9)
PLATELET # BLD: 484 K/UL (ref 135–450)
PMV BLD AUTO: 7.3 FL (ref 5–10.5)
POTASSIUM SERPL-SCNC: 3.7 MMOL/L (ref 3.5–5.1)
POTASSIUM SERPL-SCNC: 3.7 MMOL/L (ref 3.5–5.1)
POTASSIUM SERPL-SCNC: 4.2 MMOL/L (ref 3.5–5.1)
RBC # BLD: 2.94 M/UL (ref 4–5.2)
REPORT: NORMAL
SODIUM BLD-SCNC: 138 MMOL/L (ref 136–145)
SODIUM BLD-SCNC: 141 MMOL/L (ref 136–145)
SODIUM BLD-SCNC: 144 MMOL/L (ref 136–145)
VANCOMYCIN TROUGH: 12.5 UG/ML (ref 10–20)
WBC # BLD: 21.4 K/UL (ref 4–11)

## 2022-07-31 PROCEDURE — 2580000003 HC RX 258: Performed by: STUDENT IN AN ORGANIZED HEALTH CARE EDUCATION/TRAINING PROGRAM

## 2022-07-31 PROCEDURE — 80202 ASSAY OF VANCOMYCIN: CPT

## 2022-07-31 PROCEDURE — 6360000002 HC RX W HCPCS: Performed by: FAMILY MEDICINE

## 2022-07-31 PROCEDURE — 1200000000 HC SEMI PRIVATE

## 2022-07-31 PROCEDURE — 80048 BASIC METABOLIC PNL TOTAL CA: CPT

## 2022-07-31 PROCEDURE — 85025 COMPLETE CBC W/AUTO DIFF WBC: CPT

## 2022-07-31 PROCEDURE — 6360000002 HC RX W HCPCS: Performed by: STUDENT IN AN ORGANIZED HEALTH CARE EDUCATION/TRAINING PROGRAM

## 2022-07-31 PROCEDURE — 94760 N-INVAS EAR/PLS OXIMETRY 1: CPT

## 2022-07-31 PROCEDURE — 87040 BLOOD CULTURE FOR BACTERIA: CPT

## 2022-07-31 PROCEDURE — 36592 COLLECT BLOOD FROM PICC: CPT

## 2022-07-31 PROCEDURE — 84100 ASSAY OF PHOSPHORUS: CPT

## 2022-07-31 PROCEDURE — 2580000003 HC RX 258: Performed by: INTERNAL MEDICINE

## 2022-07-31 PROCEDURE — 2580000003 HC RX 258: Performed by: FAMILY MEDICINE

## 2022-07-31 PROCEDURE — 83735 ASSAY OF MAGNESIUM: CPT

## 2022-07-31 PROCEDURE — 6370000000 HC RX 637 (ALT 250 FOR IP): Performed by: FAMILY MEDICINE

## 2022-07-31 PROCEDURE — 36415 COLL VENOUS BLD VENIPUNCTURE: CPT

## 2022-07-31 PROCEDURE — 99233 SBSQ HOSP IP/OBS HIGH 50: CPT | Performed by: INTERNAL MEDICINE

## 2022-07-31 PROCEDURE — 2500000003 HC RX 250 WO HCPCS: Performed by: STUDENT IN AN ORGANIZED HEALTH CARE EDUCATION/TRAINING PROGRAM

## 2022-07-31 PROCEDURE — 99254 IP/OBS CNSLTJ NEW/EST MOD 60: CPT | Performed by: SURGERY

## 2022-07-31 RX ORDER — GABAPENTIN 300 MG/1
300 CAPSULE ORAL
Status: DISCONTINUED | OUTPATIENT
Start: 2022-07-31 | End: 2022-08-03 | Stop reason: HOSPADM

## 2022-07-31 RX ORDER — GABAPENTIN 400 MG/1
1200 CAPSULE ORAL NIGHTLY
Status: DISCONTINUED | OUTPATIENT
Start: 2022-07-31 | End: 2022-08-03 | Stop reason: HOSPADM

## 2022-07-31 RX ORDER — ACETAMINOPHEN 650 MG/1
650 SUPPOSITORY RECTAL EVERY 4 HOURS PRN
Status: DISCONTINUED | OUTPATIENT
Start: 2022-07-31 | End: 2022-08-03 | Stop reason: HOSPADM

## 2022-07-31 RX ORDER — CLONAZEPAM 1 MG/1
2 TABLET ORAL 2 TIMES DAILY
Status: DISCONTINUED | OUTPATIENT
Start: 2022-07-31 | End: 2022-08-03 | Stop reason: HOSPADM

## 2022-07-31 RX ORDER — CHLORHEXIDINE GLUCONATE 0.12 MG/ML
15 RINSE ORAL 2 TIMES DAILY
Status: DISCONTINUED | OUTPATIENT
Start: 2022-07-31 | End: 2022-08-03 | Stop reason: HOSPADM

## 2022-07-31 RX ORDER — INSULIN LISPRO 100 [IU]/ML
0-4 INJECTION, SOLUTION INTRAVENOUS; SUBCUTANEOUS NIGHTLY
Status: DISCONTINUED | OUTPATIENT
Start: 2022-07-31 | End: 2022-08-03 | Stop reason: HOSPADM

## 2022-07-31 RX ORDER — ACETAMINOPHEN 325 MG/1
650 TABLET ORAL EVERY 4 HOURS PRN
Status: DISCONTINUED | OUTPATIENT
Start: 2022-07-31 | End: 2022-08-03 | Stop reason: HOSPADM

## 2022-07-31 RX ORDER — SODIUM CHLORIDE 450 MG/100ML
INJECTION, SOLUTION INTRAVENOUS CONTINUOUS
Status: DISCONTINUED | OUTPATIENT
Start: 2022-07-31 | End: 2022-08-02

## 2022-07-31 RX ORDER — INSULIN LISPRO 100 [IU]/ML
0-4 INJECTION, SOLUTION INTRAVENOUS; SUBCUTANEOUS
Status: DISCONTINUED | OUTPATIENT
Start: 2022-07-31 | End: 2022-08-03 | Stop reason: HOSPADM

## 2022-07-31 RX ORDER — INSULIN LISPRO 100 [IU]/ML
3 INJECTION, SOLUTION INTRAVENOUS; SUBCUTANEOUS
Status: DISCONTINUED | OUTPATIENT
Start: 2022-07-31 | End: 2022-08-03 | Stop reason: HOSPADM

## 2022-07-31 RX ORDER — INSULIN GLARGINE 100 [IU]/ML
20 INJECTION, SOLUTION SUBCUTANEOUS ONCE
Status: COMPLETED | OUTPATIENT
Start: 2022-07-31 | End: 2022-07-31

## 2022-07-31 RX ADMIN — VANCOMYCIN HYDROCHLORIDE 750 MG: 750 INJECTION, POWDER, LYOPHILIZED, FOR SOLUTION INTRAVENOUS at 18:13

## 2022-07-31 RX ADMIN — SERTRALINE 50 MG: 50 TABLET, FILM COATED ORAL at 15:14

## 2022-07-31 RX ADMIN — CLONAZEPAM 2 MG: 1 TABLET ORAL at 20:31

## 2022-07-31 RX ADMIN — VANCOMYCIN HYDROCHLORIDE 750 MG: 750 INJECTION, POWDER, LYOPHILIZED, FOR SOLUTION INTRAVENOUS at 01:25

## 2022-07-31 RX ADMIN — INSULIN GLARGINE 20 UNITS: 100 INJECTION, SOLUTION SUBCUTANEOUS at 10:19

## 2022-07-31 RX ADMIN — GABAPENTIN 1200 MG: 400 CAPSULE ORAL at 20:32

## 2022-07-31 RX ADMIN — METRONIDAZOLE 500 MG: 500 INJECTION, SOLUTION INTRAVENOUS at 00:10

## 2022-07-31 RX ADMIN — POTASSIUM CHLORIDE 20 MEQ: 29.8 INJECTION, SOLUTION INTRAVENOUS at 01:33

## 2022-07-31 RX ADMIN — POTASSIUM CHLORIDE 20 MEQ: 29.8 INJECTION, SOLUTION INTRAVENOUS at 03:07

## 2022-07-31 RX ADMIN — HEPARIN SODIUM 5000 UNITS: 5000 INJECTION INTRAVENOUS; SUBCUTANEOUS at 06:12

## 2022-07-31 RX ADMIN — LEVETIRACETAM 1000 MG: 10 INJECTION, SOLUTION INTRAVENOUS at 06:10

## 2022-07-31 RX ADMIN — POTASSIUM CHLORIDE 20 MEQ: 29.8 INJECTION, SOLUTION INTRAVENOUS at 12:18

## 2022-07-31 RX ADMIN — DEXTROSE AND SODIUM CHLORIDE: 5; 450 INJECTION, SOLUTION INTRAVENOUS at 06:35

## 2022-07-31 RX ADMIN — VANCOMYCIN HYDROCHLORIDE 750 MG: 750 INJECTION, POWDER, LYOPHILIZED, FOR SOLUTION INTRAVENOUS at 09:40

## 2022-07-31 RX ADMIN — METRONIDAZOLE 500 MG: 500 INJECTION, SOLUTION INTRAVENOUS at 08:44

## 2022-07-31 RX ADMIN — CLONAZEPAM 2 MG: 1 TABLET ORAL at 15:14

## 2022-07-31 RX ADMIN — POTASSIUM CHLORIDE 20 MEQ: 29.8 INJECTION, SOLUTION INTRAVENOUS at 06:12

## 2022-07-31 RX ADMIN — SODIUM CHLORIDE: 4.5 INJECTION, SOLUTION INTRAVENOUS at 15:21

## 2022-07-31 RX ADMIN — 0.12% CHLORHEXIDINE GLUCONATE 15 ML: 1.2 RINSE ORAL at 20:29

## 2022-07-31 RX ADMIN — POTASSIUM CHLORIDE 20 MEQ: 29.8 INJECTION, SOLUTION INTRAVENOUS at 11:06

## 2022-07-31 RX ADMIN — HEPARIN SODIUM 5000 UNITS: 5000 INJECTION INTRAVENOUS; SUBCUTANEOUS at 20:34

## 2022-07-31 RX ADMIN — SODIUM PHOSPHATE, MONOBASIC, MONOHYDRATE 15 MMOL: 276; 142 INJECTION, SOLUTION INTRAVENOUS at 06:39

## 2022-07-31 RX ADMIN — GABAPENTIN 300 MG: 300 CAPSULE ORAL at 15:13

## 2022-07-31 RX ADMIN — LEVETIRACETAM 1000 MG: 10 INJECTION, SOLUTION INTRAVENOUS at 18:14

## 2022-07-31 RX ADMIN — HEPARIN SODIUM 5000 UNITS: 5000 INJECTION INTRAVENOUS; SUBCUTANEOUS at 15:13

## 2022-07-31 RX ADMIN — SODIUM PHOSPHATE, MONOBASIC, MONOHYDRATE 15 MMOL: 276; 142 INJECTION, SOLUTION INTRAVENOUS at 11:04

## 2022-07-31 RX ADMIN — POTASSIUM CHLORIDE 20 MEQ: 29.8 INJECTION, SOLUTION INTRAVENOUS at 10:04

## 2022-07-31 NOTE — CONSULTS
General Surgery Consult Note      Acie Gottron   : 1990 MRN: [id number]  Date of Admission: 2022  Admitting [address], DO  Primary Care Physician: Augustine Pace MD    Chief Complaint   Patient presents with    Hyperglycemia       Reason for Consult: left groin abscess    Diagnosis Present on Admission:   DKA, type 1, not at goal [facility])   Sepsis ([facility] [address]   Leukocytosis   Abscess   Elevated platelet count   Seizure ([address]   AMS (altered mental status)      History of Present Illness  Acie Gottron is a 34 y.o. female with hx of DM type 2 and seizures who was found to be unresponsive and brought in for further evaluation on . She was found to be in DKA. Her glucose levels have been slowly improving. She is unable to provide much history, but per the nursing staff she had a left groin abscess that was drained by urgent care and had packing placed. She denies any groin pain or drainage. Past Medical History:   Diagnosis Date    Depression     Diabetes mellitus ([address]     Seizures ([address]      Past Surgical History:   Procedure Laterality Date     SECTION      TUBAL LIGATION       Family history reviewed and otherwise negative.   Family History   Problem Relation Age of Onset    Asthma Mother     Allergies Mother         sun allergy    Diabetes Type 1  Father     Diabetes Type 1  Maternal Grandfather     Diabetes Type 1  Cousin     Diabetes Type 1  Cousin     Diabetes Type 1  Maternal Uncle     Diabetes Type 1  Maternal Uncle     Diabetes Type 1  Maternal Aunt      Social History     Socioeconomic History    Marital status:      Spouse name: Not on file    Number of children: Not on file    Years of education: Not on file    Highest education level: Not on file   Occupational History    Not on file   Tobacco Use    Smoking status: Never    Smokeless tobacco: Never   Vaping Use    Vaping Use: Never used   Substance and Sexual Activity    Alcohol use: No    Drug use: No    Sexual activity: Yes     Partners: Male   Other Topics Concern    Not on file   Social History Narrative    Not on file     Social Determinants of Health     Financial Resource Strain: Low Risk     Difficulty of Paying Living Expenses: Not hard at all   Food Insecurity: No Food Insecurity    Worried About Running Out of Food in the Last Year: Never true    Ran Out of Food in the Last Year: Never true   Transportation Needs: Not on file   Physical Activity: Not on file   Stress: Not on file   Social Connections: Not on file   Intimate Partner Violence: Not on file   Housing Stability: Not on file     No Known Allergies  Prior to Admission medications    Medication Sig Start Date End Date Taking? Authorizing Provider   cephALEXin (KEFLEX) 500 MG capsule Take 500 mg by mouth in the morning and 500 mg before bedtime.  7/24/22 8/6/22 Yes Historical Provider, MD   Continuous Blood Gluc Transmit (DEXCOM G6 TRANSMITTER) MISC USE AS DIRECTED 7/19/22   Lala Schafer MD   gabapentin (NEURONTIN) 300 MG capsule TAKE 1 CAPSULE BY MOUTH THREE TIMES DAILY DRUNG THE DAY AND 4 CAPSULES BY MOUTH EVERY DAY AT BEDTIME 7/19/22 8/19/22  Lala Schafer MD   blood glucose test strips (TRUE METRIX BLOOD GLUCOSE TEST) strip Check blood sugar 4-6 times daily and as needed for symptoms of irregular glucose 7/18/22   Lala Schafer MD   Insulin Disposable Pump (OMNIPOD DASH PODS, GEN 4,) MISC Continuous pump - max dose 60 units 7/18/22   Lala Schafer MD   ondansetron (ZOFRAN) 4 MG tablet Take 1 tablet by mouth 3 times daily as needed for Nausea or Vomiting 7/12/22   Lala Schafer MD   ibuprofen (ADVIL;MOTRIN) 800 MG tablet Take 1 tablet by mouth 3 times daily as needed for Pain 7/12/22   Lala Schafer MD   Continuous Blood Gluc Sensor (DEXCOM G6 SENSOR) MISC Apply as directed for blood sugar monitoring 6/9/22   Lala Schafer MD   sertraline (ZOLOFT) 50 MG tablet Take 1 tablet by mouth daily 6/9/22   Lala Schafer MD   insulin aspart (S[employer] Learn FLEXPEN) 100 UNIT/ML injection pen Inject 20 units with meals/snacks up to 5 times per day 6/9/22   Lala Schafer MD   insulin aspart (NOVOLOG) 100 UNIT/ML injection vial Inject 100 units into the skin daily in conjunction with omnipod 6/9/22   Lala Schafer MD   Blood Glucose Monitoring Suppl (TRUE METRIX METER) w/Device KIT Check blood sugar 4 times daily, tidac 5/27/22   Taylor Chacne, DO   Lancets MISC Check blood sugar 4 times daily, tidac 5/27/22   Taylor Chance, DO   clonazePAM (KLONOPIN) 2 MG tablet Take 1 tablet by mouth 2 times daily. 5/6/22 5/7/23  Lala Schafer MD   levETIRAcetam (KEPPRA) 500 MG tablet Take 2 tablets by mouth 2 times daily 5/6/22   Lala Schafer MD   insulin aspart (NOVOLOG) 100 UNIT/ML injection vial Inject 100 Units into the skin daily In conjunction with omnipod 2/15/22   Lala Schafer MD   glucagon, rDNA, 1 MG injection Inject 1 mg into the muscle as needed for Low blood sugar (Blood glucose less than 70 mg/dL and patient NOT ALERT or NPO and does not have IV access.) 11/26/21 11/21/22  Antonino Sahu MD   famotidine (PEPCID) 20 MG tablet Take 20 mg by mouth 2 times daily    Historical Provider, MD       Review of Systems  Pertinent positives and negatives are in HPI, otherwise all systems reviewed and negative    Physical Exam  Vitals:    07/31/22 0818   BP:    Pulse: (!) 113   Resp: 14   Temp:    SpO2: 97%         Intake/Output Summary (Last 24 hours) at 7/31/2022 0851  Last data filed at 7/31/2022 0845  Gross per 24 hour   Intake 7332 ml   Output 2180 ml   Net 5152 ml       Body mass index is 21.24 kg/m². General/Appearance: NAD, alert and oriented to person  HEENT: PERRLA, Conjunctiva non injected, no scleral icterus. Mucous membranes pink and moist.  Neck is symmetrical. Trachea appears midline.   Lung: normal respiratory effort, no accessory muscle use  Cardiac: Regular rate and rhythm  Abdomen: soft, ND, NT, left groin abscess site with opening, minimal induration and erythema, no active drainage, packing replaced  Neuro: No gross motor or sensory deficits. Skin: as above    Labs  Recent Labs     07/29/22 1944 07/30/22  0430 07/31/22  0445   WBC 37.7* 44.4* 21.4*   HGB 10.3* 8.1* 7.4*   HCT 42.3 26.7* 23.6*   PLT 1,008* 697* 484*     Recent Labs     07/30/22 2045 07/31/22  0015 07/31/22  0445   * 144 141   K 3.7 3.7 4.2   * 112* 112*   CO2 20* 19* 19*   BUN 7 6* 5*   GFRAA >60 >60 >60   MG 2.50* 2.50* 2.00   PHOS 2.1* 2.7 2.2*     Recent Labs     07/29/22 1944   AST 45*   ALT 30     No results for input(s): UOSM in the last 72 hours. Invalid input(s): UASD, [facility], [facility], [place], [facility], [facility], [facility]    Imaging  MRI LUMBAR SPINE WO CONTRAST   Final Result   1. No acute abnormality. 2. No significant spinal canal stenosis. MRI THORACIC SPINE WO CONTRAST   Final Result   No acute abnormality. No significant degenerative change. MRI CERVICAL SPINE WO CONTRAST   Final Result   1. No acute abnormality   2. No significant spinal canal stenosis. 3. T1 hypointense, stir hyperintense lesion the C2 vertebral body, possibly   an atypical hemangioma. Post-contrast imaging may be useful for further   evaluation. MRI BRAIN WO CONTRAST   Final Result   Unremarkable MRI of the brain without contrast.         CTA HEAD NECK W CONTRAST   Final Result   1. No acute intracranial abnormality. 2. Unremarkable CTA of the head and neck. CT HEAD WO CONTRAST   Final Result   1. No acute intracranial abnormality. 2. Unremarkable CTA of the head and neck. CT CHEST ABDOMEN PELVIS WO CONTRAST   Final Result   1. No evidence of acute cardiopulmonary disease   2. Marked hepatomegaly, with a sagittal length of 24.8 cm   3. Stoddard catheter within the urinary bladder   4. Skin laceration, left groin region   5. Nonobstructive bowel gas pattern         XR CHEST PORTABLE   Final Result   Right IJ catheter identified in satisfactory position. Yael Whitehead   No pneumothorax. XR CHEST PORTABLE   Final Result   No acute process. CT HEAD WO CONTRAST   Final Result   No acute intracranial abnormality. CT CERVICAL SPINE WO CONTRAST   Final Result   No acute abnormality of the cervical spine. CT abd/pelvis personally reviewed, showing left groin abscess with packing, no residual abscess    Assessment  Elieser Daly is a 34 y.o. female admitted for DKA, seizures, left groin abscess    Plan    1. Left groin abscess  Appropriately drained. Continue local wound care  Continue vancomycin, cefepime, flagyl  Unclear if this is the main source of her leukocytosis, which is down to 21.4 from 44.4  2. DKA  Per hospitalist  3.  Hx of seizures  Neurology consulted      Latonia John MD

## 2022-07-31 NOTE — PLAN OF CARE
Problem: Discharge Planning  Goal: Discharge to home or other facility with appropriate resources  Outcome: Progressing  Flowsheets (Taken 7/30/2022 2000)  Discharge to home or other facility with appropriate resources: Identify barriers to discharge with patient and caregiver     Problem: Pain  Goal: Verbalizes/displays adequate comfort level or baseline comfort level  Outcome: Progressing     Problem: Skin/Tissue Integrity  Goal: Absence of new skin breakdown  Description: 1. Monitor for areas of redness and/or skin breakdown  2. Assess vascular access sites hourly  3. Every 4-6 hours minimum:  Change oxygen saturation probe site  4. Every 4-6 hours:  If on nasal continuous positive airway pressure, respiratory therapy assess nares and determine need for appliance change or resting period.   Outcome: Progressing     Problem: Safety - Adult  Goal: Free from fall injury  Outcome: Progressing     Problem: ABCDS Injury Assessment  Goal: Absence of physical injury  Outcome: Progressing

## 2022-07-31 NOTE — PROGRESS NOTES
Patient tearful, yelling out \"HELP\". When RN enters room to talk with patient, patient has very limited speech only giving one to two word answers intermittently. Otherwise, patient only nods to questions. When asked what is wrong, patient states \"\" \"I'm being choked\" \" scar\" and continues to remain tearful.

## 2022-07-31 NOTE — PROGRESS NOTES
Nephrology Note                                                                                                                                                                                                                                                                                                                                                               Office : [phone]     Fax :[phone]              Patient's Name: Chapito Jarrell  2022    Reason for Consult: electrolyte imbalance   Requesting Physician:  Jennifer Moore MD      Good Uo   Na better   Phos better     Past Medical History:   Diagnosis Date    Depression     Diabetes mellitus ([address]     Seizures ([address]        Past Surgical History:   Procedure Laterality Date     SECTION      TUBAL LIGATION         Family History   Problem Relation Age of Onset    Asthma Mother     Allergies Mother         sun allergy    Diabetes Type 1  Father     Diabetes Type 1  Maternal Grandfather     Diabetes Type 1  Cousin     Diabetes Type 1  Cousin     Diabetes Type 1  Maternal Uncle     Diabetes Type 1  Maternal Uncle     Diabetes Type 1  Maternal Aunt         reports that she has never smoked. She has never used smokeless tobacco. She reports that she does not drink alcohol and does not use drugs. Allergies:  Patient has no known allergies.     Current Medications:    vancomycin (VANCOCIN) 750 mg in sodium chloride 0.9 % 250 mL IVPB, Q8H  insulin lispro (HUMALOG) injection vial 0-4 Units, TID WC  insulin lispro (HUMALOG) injection vial 0-4 Units, Nightly  insulin lispro (HUMALOG) injection vial 3 Units, TID WC  levETIRAcetam (KEPPRA) 1000 mg/100 mL IVPB, Q12H  dextrose bolus 10% 125 mL, PRN   Or  dextrose bolus 10% 250 mL, PRN  magnesium sulfate 1000 mg in dextrose 5% 100 mL IVPB, PRN  sodium phosphate 10 mmol in sodium chloride 0.9 % 250 mL IVPB, PRN   Or  sodium phosphate 15 mmol in dextrose 5 % 250 mL IVPB, PRN   Or  sodium phosphate 20 mmol in dextrose 5 % 500 mL IVPB, PRN  heparin (porcine) injection 5,000 Units, 3 times per day  midazolam (VERSED) injection 2 mg, Q4H PRN  diazePAM (VALIUM) injection 2.5 mg, Q12H PRN  ondansetron (ZOFRAN) injection 4 mg, Once  glucose chewable tablet 16 g, PRN  dextrose bolus 10% 125 mL, PRN   Or  dextrose bolus 10% 250 mL, PRN  glucagon (rDNA) injection 1 mg, PRN  dextrose 5 % solution, PRN  norepinephrine (LEVOPHED) 16 mg in dextrose 5% 250 mL infusion, Continuous  vancomycin (VANCOCIN) intermittent dosing (placeholder), RX Placeholder      Review of Systems:   Lethargic       Physical exam:     Vitals:  /83   Pulse (!) 117   Temp 99 °F (37.2 °C) (Axillary)   Resp 18   Ht 4' 10\" (1.473 m)   Wt 101 lb 10.1 oz (46.1 kg)   SpO2 99%   BMI 21.24 kg/m²   Constitutional:  lethargic   Skin: no rash, turgor wnl  Heent:  eomi, mmm  Neck: no bruits or jvd noted  Cardiovascular:  S1, S2 without m/r/g  Respiratory: CTA B without w/r/r  Abdomen:  +bs, soft, nt, nd  Ext: + lower extremity edema  Psychiatric: mood and affect appropriate  Musculoskeletal:  Rom, muscular strength weakness     Data:   Labs:  CBC:   Recent Labs     07/29/22 1944 07/30/22  0430 07/31/22  0445   WBC 37.7* 44.4* 21.4*   HGB 10.3* 8.1* 7.4*   PLT 1,008* 697* 484*       BMP:    Recent Labs     07/31/22  0015 07/31/22  0445 07/31/22  0840    141 138   K 3.7 4.2 3.7   * 112* 108   CO2 19* 19* 17*   BUN 6* 5* 4*   CREATININE <0.5* <0.5* <0.5*   GLUCOSE 182* 175* 187*       Ca/Mg/Phos:   Recent Labs     07/31/22  0015 07/31/22  0445 07/31/22  0840   CALCIUM 7.7* 7.5* 7.7*   MG 2.50* 2.00 1.90   PHOS 2.7 2.2* 2.2*       Hepatic:   Recent Labs     07/29/22 1944   AST 45*   ALT 30   BILITOT <0.2   ALKPHOS 214*       Troponin: No results for input(s): TROPONINI in the last 72 hours. BNP: No results for input(s): BNP in the last 72 hours. Lipids: No results for input(s): CHOL, TRIG, HDL, LDLCALC, LABVLDL in the last 72 hours.   ABGs: IJ catheter identified in satisfactory position. Chelle Fanning No pneumothorax. XR CHEST PORTABLE   Final Result   No acute process. CT HEAD WO CONTRAST   Final Result   No acute intracranial abnormality. CT CERVICAL SPINE WO CONTRAST   Final Result   No acute abnormality of the cervical spine. Assessment/Plan   1. DKA     2. HTN    3. Anemia    4. Acid- base/ Electrolyte imbalance     5.  Weakness       Plan   - Change to 0.45 saline   - MRI brain - no acute changes   - BS management   - replace lytes   - monitor UO       Recommend to dose adjust all medications  based on renal functions  Maintain SBP> 90 mmHg   Daily weights   AVOID NSAIDs  Avoid Nephrotoxins  Monitor Intake/Output  Call if significant decrease in urine output                   Thank you for allowing us to participate in care of Ransom Fothergill, MD  Feel free to contact me   [facility] [address]  Office : [phone]  Fax :[phone]

## 2022-07-31 NOTE — PLAN OF CARE
Problem: Discharge Planning  Goal: Discharge to home or other facility with appropriate resources  7/31/2022 1837 by Evelin Flores RN  Outcome: Progressing  7/31/2022 0527 by Ghada Conklin RN  Outcome: Progressing  Flowsheets (Taken 7/30/2022 2000)  Discharge to home or other facility with appropriate resources: Identify barriers to discharge with patient and caregiver     Problem: Pain  Goal: Verbalizes/displays adequate comfort level or baseline comfort level  7/31/2022 1837 by Evelin Flores RN  Outcome: Progressing  7/31/2022 0527 by Ghada Conklin RN  Outcome: Progressing     Problem: Skin/Tissue Integrity  Goal: Absence of new skin breakdown  Description: 1. Monitor for areas of redness and/or skin breakdown  2. Assess vascular access sites hourly  3. Every 4-6 hours minimum:  Change oxygen saturation probe site  4. Every 4-6 hours:  If on nasal continuous positive airway pressure, respiratory therapy assess nares and determine need for appliance change or resting period.   7/31/2022 1837 by Evelin Flores RN  Outcome: Progressing  7/31/2022 0527 by Ghada Conklin RN  Outcome: Progressing     Problem: Safety - Adult  Goal: Free from fall injury  7/31/2022 1837 by Evelin Flores RN  Outcome: Progressing  7/31/2022 0527 by Ghada Conklin RN  Outcome: Progressing     Problem: ABCDS Injury Assessment  Goal: Absence of physical injury  7/31/2022 1837 by Evelin Flores RN  Outcome: Progressing  7/31/2022 0527 by Ghada Conklin RN  Outcome: Progressing

## 2022-07-31 NOTE — PROGRESS NOTES
Clinical Pharmacy Note  Vancomycin Consult    Juliet Ayala is a 34 y.o. female ordered Vancomycin for MRSA bacteremia secondary to skin abscess; consult received from Dr. Kamaljit Moyer to manage therapy. Also receiving cefepime and metronidazole. Allergies:  Patient has no known allergies. Temp max:  Temp (24hrs), Av.1 °F (37.3 °C), Min:98.3 °F (36.8 °C), Max:100.1 °F (37.8 °C)      Recent Labs     22  1944 22  0430 22  0445   WBC 37.7* 44.4* 21.4*       Recent Labs     22  2045 22  0015 22  0445   BUN 7 6* 5*   CREATININE <0.5* <0.5* <0.5*         Intake/Output Summary (Last 24 hours) at 2022 0659  Last data filed at 2022 0631  Gross per 24 hour   Intake 7332 ml   Output 2130 ml   Net 5202 ml       Culture Results:  Blood cultures + MRSA  Wound cultures + GPCs - speciation pending    Ht Readings from Last 1 Encounters:   22 4' 10\" (1.473 m)        Wt Readings from Last 1 Encounters:   22 101 lb 10.1 oz (46.1 kg)         Estimated Creatinine Clearance: 116 mL/min (based on SCr of 0.5 mg/dL). Assessment:  Day # 2 of vancomycin. Current regimen: 750 mg every 12 hours  Vancomycin level: 12.8 mg/L (4.5 hours post dose)  Predicted AUC: 333    Plan:  Change to 750 mg IV Q8H  Predicted AUC = 497    Thank you for the consult.      Joselyn Gifford PharmD.  2022  7:01 AM

## 2022-08-01 ENCOUNTER — TELEPHONE (OUTPATIENT)
Dept: INTERNAL MEDICINE CLINIC | Age: 32
End: 2022-08-01

## 2022-08-01 PROBLEM — L02.214 ABSCESS OF LEFT GROIN: Status: ACTIVE | Noted: 2022-08-01

## 2022-08-01 PROBLEM — Z87.898 HISTORY OF SEIZURES: Status: ACTIVE | Noted: 2022-08-01

## 2022-08-01 LAB
ANION GAP SERPL CALCULATED.3IONS-SCNC: 10 MMOL/L (ref 3–16)
BANDED NEUTROPHILS RELATIVE PERCENT: 2 % (ref 0–7)
BASOPHILS ABSOLUTE: 0 K/UL (ref 0–0.2)
BASOPHILS ABSOLUTE: 0.1 K/UL (ref 0–0.2)
BASOPHILS RELATIVE PERCENT: 0 %
BASOPHILS RELATIVE PERCENT: 0.6 %
BUN BLDV-MCNC: <2 MG/DL (ref 7–20)
CALCIUM SERPL-MCNC: 8.5 MG/DL (ref 8.3–10.6)
CHLORIDE BLD-SCNC: 111 MMOL/L (ref 99–110)
CO2: 23 MMOL/L (ref 21–32)
CREAT SERPL-MCNC: <0.5 MG/DL (ref 0.6–1.1)
EOSINOPHILS ABSOLUTE: 0 K/UL (ref 0–0.6)
EOSINOPHILS ABSOLUTE: 0 K/UL (ref 0–0.6)
EOSINOPHILS RELATIVE PERCENT: 0 %
EOSINOPHILS RELATIVE PERCENT: 0.1 %
GFR AFRICAN AMERICAN: >60
GFR NON-AFRICAN AMERICAN: >60
GLUCOSE BLD-MCNC: 104 MG/DL (ref 70–99)
GLUCOSE BLD-MCNC: 140 MG/DL (ref 70–99)
GLUCOSE BLD-MCNC: 73 MG/DL (ref 70–99)
GLUCOSE BLD-MCNC: 83 MG/DL (ref 70–99)
GLUCOSE BLD-MCNC: 91 MG/DL (ref 70–99)
GRAM STAIN RESULT: ABNORMAL
HCT VFR BLD CALC: 24.8 % (ref 36–48)
HCT VFR BLD CALC: 42.3 % (ref 36–48)
HEMATOLOGY PATH CONSULT: NORMAL
HEMATOLOGY PATH CONSULT: YES
HEMOGLOBIN: 10.3 G/DL (ref 12–16)
HEMOGLOBIN: 7.8 G/DL (ref 12–16)
LV EF: 63 %
LVEF MODALITY: NORMAL
LYMPHOCYTES ABSOLUTE: 3.8 K/UL (ref 1–5.1)
LYMPHOCYTES ABSOLUTE: 7.2 K/UL (ref 1–5.1)
LYMPHOCYTES RELATIVE PERCENT: 19 %
LYMPHOCYTES RELATIVE PERCENT: 34.2 %
MAGNESIUM: 1.9 MG/DL (ref 1.8–2.4)
MCH RBC QN AUTO: 24.7 PG (ref 26–34)
MCH RBC QN AUTO: 25.1 PG (ref 26–34)
MCHC RBC AUTO-ENTMCNC: 24.4 G/DL (ref 31–36)
MCHC RBC AUTO-ENTMCNC: 31.5 G/DL (ref 31–36)
MCV RBC AUTO: 101.3 FL (ref 80–100)
MCV RBC AUTO: 79.8 FL (ref 80–100)
METAMYELOCYTES RELATIVE PERCENT: 1 %
MONOCYTES ABSOLUTE: 0.4 K/UL (ref 0–1.3)
MONOCYTES ABSOLUTE: 0.7 K/UL (ref 0–1.3)
MONOCYTES RELATIVE PERCENT: 1 %
MONOCYTES RELATIVE PERCENT: 5.8 %
MYELOCYTE PERCENT: 4 %
NEUTROPHILS ABSOLUTE: 30.2 K/UL (ref 1.7–7.7)
NEUTROPHILS ABSOLUTE: 6.7 K/UL (ref 1.7–7.7)
NEUTROPHILS RELATIVE PERCENT: 59.3 %
NEUTROPHILS RELATIVE PERCENT: 73 %
ORGANISM: ABNORMAL
PDW BLD-RTO: 15.2 % (ref 12.4–15.4)
PDW BLD-RTO: 16.2 % (ref 12.4–15.4)
PERFORMED ON: ABNORMAL
PERFORMED ON: ABNORMAL
PERFORMED ON: NORMAL
PERFORMED ON: NORMAL
PHOSPHORUS: 3.2 MG/DL (ref 2.5–4.9)
PLATELET # BLD: 1008 K/UL (ref 135–450)
PLATELET # BLD: 470 K/UL (ref 135–450)
PLATELET SLIDE REVIEW: ABNORMAL
PMV BLD AUTO: 7.1 FL (ref 5–10.5)
PMV BLD AUTO: 8.4 FL (ref 5–10.5)
POLYCHROMASIA: ABNORMAL
POTASSIUM SERPL-SCNC: 3.7 MMOL/L (ref 3.5–5.1)
RBC # BLD: 3.1 M/UL (ref 4–5.2)
RBC # BLD: 4.17 M/UL (ref 4–5.2)
SLIDE REVIEW: ABNORMAL
SODIUM BLD-SCNC: 144 MMOL/L (ref 136–145)
VANCOMYCIN TROUGH: 13.1 UG/ML (ref 10–20)
WBC # BLD: 11.3 K/UL (ref 4–11)
WBC # BLD: 37.7 K/UL (ref 4–11)
WOUND/ABSCESS: ABNORMAL

## 2022-08-01 PROCEDURE — 6360000002 HC RX W HCPCS: Performed by: STUDENT IN AN ORGANIZED HEALTH CARE EDUCATION/TRAINING PROGRAM

## 2022-08-01 PROCEDURE — 85025 COMPLETE CBC W/AUTO DIFF WBC: CPT

## 2022-08-01 PROCEDURE — 99231 SBSQ HOSP IP/OBS SF/LOW 25: CPT | Performed by: SURGERY

## 2022-08-01 PROCEDURE — 83735 ASSAY OF MAGNESIUM: CPT

## 2022-08-01 PROCEDURE — 6360000002 HC RX W HCPCS: Performed by: FAMILY MEDICINE

## 2022-08-01 PROCEDURE — 84100 ASSAY OF PHOSPHORUS: CPT

## 2022-08-01 PROCEDURE — 99232 SBSQ HOSP IP/OBS MODERATE 35: CPT | Performed by: HOSPITALIST

## 2022-08-01 PROCEDURE — 1200000000 HC SEMI PRIVATE

## 2022-08-01 PROCEDURE — 6370000000 HC RX 637 (ALT 250 FOR IP): Performed by: FAMILY MEDICINE

## 2022-08-01 PROCEDURE — 80202 ASSAY OF VANCOMYCIN: CPT

## 2022-08-01 PROCEDURE — 2580000003 HC RX 258: Performed by: INTERNAL MEDICINE

## 2022-08-01 PROCEDURE — 93306 TTE W/DOPPLER COMPLETE: CPT

## 2022-08-01 PROCEDURE — 94760 N-INVAS EAR/PLS OXIMETRY 1: CPT

## 2022-08-01 PROCEDURE — 2580000003 HC RX 258: Performed by: FAMILY MEDICINE

## 2022-08-01 PROCEDURE — 99253 IP/OBS CNSLTJ NEW/EST LOW 45: CPT | Performed by: REGISTERED NURSE

## 2022-08-01 PROCEDURE — APPSS45 APP SPLIT SHARED TIME 31-45 MINUTES: Performed by: PHYSICIAN ASSISTANT

## 2022-08-01 PROCEDURE — APPNB45 APP NON BILLABLE 31-45 MINUTES: Performed by: PHYSICIAN ASSISTANT

## 2022-08-01 PROCEDURE — 80048 BASIC METABOLIC PNL TOTAL CA: CPT

## 2022-08-01 RX ORDER — INSULIN GLARGINE 100 [IU]/ML
10 INJECTION, SOLUTION SUBCUTANEOUS DAILY
Status: DISCONTINUED | OUTPATIENT
Start: 2022-08-01 | End: 2022-08-03 | Stop reason: HOSPADM

## 2022-08-01 RX ADMIN — INSULIN GLARGINE 10 UNITS: 100 INJECTION, SOLUTION SUBCUTANEOUS at 12:02

## 2022-08-01 RX ADMIN — SERTRALINE 50 MG: 50 TABLET, FILM COATED ORAL at 08:02

## 2022-08-01 RX ADMIN — VANCOMYCIN HYDROCHLORIDE 750 MG: 750 INJECTION, POWDER, LYOPHILIZED, FOR SOLUTION INTRAVENOUS at 12:18

## 2022-08-01 RX ADMIN — CLONAZEPAM 2 MG: 1 TABLET ORAL at 20:30

## 2022-08-01 RX ADMIN — GABAPENTIN 1200 MG: 400 CAPSULE ORAL at 20:30

## 2022-08-01 RX ADMIN — 0.12% CHLORHEXIDINE GLUCONATE 15 ML: 1.2 RINSE ORAL at 20:31

## 2022-08-01 RX ADMIN — CLONAZEPAM 2 MG: 1 TABLET ORAL at 08:02

## 2022-08-01 RX ADMIN — HEPARIN SODIUM 5000 UNITS: 5000 INJECTION INTRAVENOUS; SUBCUTANEOUS at 22:02

## 2022-08-01 RX ADMIN — LEVETIRACETAM 1000 MG: 10 INJECTION, SOLUTION INTRAVENOUS at 06:06

## 2022-08-01 RX ADMIN — GABAPENTIN 300 MG: 300 CAPSULE ORAL at 17:02

## 2022-08-01 RX ADMIN — GABAPENTIN 300 MG: 300 CAPSULE ORAL at 11:55

## 2022-08-01 RX ADMIN — HEPARIN SODIUM 5000 UNITS: 5000 INJECTION INTRAVENOUS; SUBCUTANEOUS at 14:03

## 2022-08-01 RX ADMIN — GABAPENTIN 300 MG: 300 CAPSULE ORAL at 08:02

## 2022-08-01 RX ADMIN — HEPARIN SODIUM 5000 UNITS: 5000 INJECTION INTRAVENOUS; SUBCUTANEOUS at 06:07

## 2022-08-01 RX ADMIN — SODIUM CHLORIDE: 4.5 INJECTION, SOLUTION INTRAVENOUS at 09:41

## 2022-08-01 RX ADMIN — VANCOMYCIN HYDROCHLORIDE 750 MG: 750 INJECTION, POWDER, LYOPHILIZED, FOR SOLUTION INTRAVENOUS at 22:40

## 2022-08-01 RX ADMIN — LEVETIRACETAM 1000 MG: 10 INJECTION, SOLUTION INTRAVENOUS at 17:27

## 2022-08-01 RX ADMIN — INSULIN LISPRO 3 UNITS: 100 INJECTION, SOLUTION INTRAVENOUS; SUBCUTANEOUS at 11:54

## 2022-08-01 RX ADMIN — 0.12% CHLORHEXIDINE GLUCONATE 15 ML: 1.2 RINSE ORAL at 08:01

## 2022-08-01 RX ADMIN — VANCOMYCIN HYDROCHLORIDE 750 MG: 750 INJECTION, POWDER, LYOPHILIZED, FOR SOLUTION INTRAVENOUS at 02:19

## 2022-08-01 ASSESSMENT — PAIN SCALES - GENERAL
PAINLEVEL_OUTOF10: 3
PAINLEVEL_OUTOF10: 0
PAINLEVEL_OUTOF10: 0

## 2022-08-01 ASSESSMENT — PAIN DESCRIPTION - LOCATION: LOCATION: OTHER (COMMENT)

## 2022-08-01 NOTE — PROGRESS NOTES
Clinical Pharmacy Note  Vancomycin Consult    Chong Melo is a 34 y.o. female ordered Vancomycin for MRSA bacteremia secondary to skin abscess; consult received from Dr. Wanda Garduno to manage therapy. Allergies:  Patient has no known allergies. Temp max:  Temp (24hrs), Av.5 °F (36.9 °C), Min:98.3 °F (36.8 °C), Max:99.3 °F (37.4 °C)      Recent Labs     22  0430 22  0445 22  0500   WBC 44.4* 21.4* 11.3*         Recent Labs     22  0445 22  0840 22  0500   BUN 5* 4* <2*   CREATININE <0.5* <0.5* <0.5*           Intake/Output Summary (Last 24 hours) at 2022 1428  Last data filed at 2022 1100  Gross per 24 hour   Intake 1802.46 ml   Output 4695 ml   Net -2892.54 ml         Culture Results:  22 Blood: MRSA  22 Wound: MRSA  22 Blood: pending      Ht Readings from Last 1 Encounters:   22 4' 10\" (1.473 m)        Wt Readings from Last 1 Encounters:   22 102 lb 1.2 oz (46.3 kg)         Estimated Creatinine Clearance: 117 mL/min (based on SCr of 0.5 mg/dL). Assessment:  Day # 3 of vancomycin. Current regimen: 750 mg every 8 hours  Vancomycin level: 13.1 mg/L   Predicted AUC: 432    Plan:  Continue current regimen    Thank you for the consult.      Myra Breaux, [address] [place] [address], PharmD, BCCCP

## 2022-08-01 NOTE — CARE COORDINATION
INITIAL CASE MANAGEMENT ASSESSMENT     Reviewed chart, met with patient to assess possible discharge needs. Explained Case Management role/services. SW met with patient alone at bedside today. Living Situation: Patient resides in a basement apartment with her  and two minor aged children. There are no pets. There are six stairs leading down to the front door. Prior to medical admission she reports that she had no trouble getting in and out of the property. ADLs: Prior to medical admission patient reports that she was independent. She stated that she doesn't anticipate any needs at discharge. DME: Prior to medical admission patient reports that she had an insulin pump and a continuous glucose monitor . She stated that she doesn't anticipate any needs at discharge. PT/OT Recs: Not ordered. Active Services: Prior to medical admission patient reports that she had a  assigned and working with her through [email or web address]. She stated that she doesn't anticipate any needs at discharge. Transportation: Prior to medical admission patient reports that her  transported her to and from medical appointments and errands. She stated that he will likely transport her home at discharge. Medications: Patient receives medications from the [facility] on [address] [place] [address]. At this time there are no issues with access or affordability. PCP: Susan Eddy [phone] (phone) and [phone] (fax number). Patient reports that her last appointment with this provider was May of 2022. HD/PD: N/A     PLAN/COMMENTS:   1) Waiting on psych, nephrology, neurology and general surgery clearances. 2) Discharge to home with family. SW provided contact information for patient or family to call with any questions. SW will follow and assist as needed. Respectfully submitted,     Bertha Tong Mt MSW, LISW-S  [facility] Worker  [phone]    Electronically signed by FARSHAD Longoria on 8/1/2022 at 10:32 AM

## 2022-08-01 NOTE — PROGRESS NOTES
Hospitalist Progress Note      PCP: Lala Schafer MD    Date of Admission: 7/29/2022    Chief Complaint: Weakness    Hospital Course:      Subjective: talking and moving normally. No complaints      Medications:  Reviewed    Infusion Medications    sodium chloride 50 mL/hr at 08/01/22 0941    dextrose      norepinephrine Stopped (07/30/22 0636)     Scheduled Medications    insulin glargine  10 Units SubCUTAneous Daily    vancomycin  750 mg IntraVENous Q8H    insulin lispro  0-4 Units SubCUTAneous TID WC    insulin lispro  0-4 Units SubCUTAneous Nightly    insulin lispro  3 Units SubCUTAneous TID WC    clonazePAM  2 mg Oral BID    gabapentin  300 mg Oral TID WC    And    gabapentin  1,200 mg Oral Nightly    sertraline  50 mg Oral Daily    chlorhexidine  15 mL Mouth/Throat BID    levETIRAcetam  1,000 mg IntraVENous Q12H    heparin (porcine)  5,000 Units SubCUTAneous 3 times per day    ondansetron  4 mg IntraVENous Once    vancomycin (VANCOCIN) intermittent dosing (placeholder)   Other RX Placeholder     PRN Meds: acetaminophen **OR** acetaminophen, dextrose bolus **OR** dextrose bolus, magnesium sulfate, sodium phosphate IVPB **OR** sodium phosphate IVPB **OR** sodium phosphate IVPB, midazolam, diazePAM, glucose, dextrose bolus **OR** dextrose bolus, glucagon (rDNA), dextrose      Intake/Output Summary (Last 24 hours) at 8/1/2022 1556  Last data filed at 8/1/2022 1100  Gross per 24 hour   Intake 1802.46 ml   Output 4135 ml   Net -2332.54 ml         Exam:    /82   Pulse 99   Temp 98.3 °F (36.8 °C) (Oral)   Resp 14   Ht 4' 10\" (1.473 m)   Wt 102 lb 1.2 oz (46.3 kg)   SpO2 98%   BMI 21.33 kg/m²     General appearance: No apparent distress, appears stated age and cooperative. HEENT: Pupils equal, round, and reactive to light. Conjunctivae/corneas clear. Neck: Supple, with full range of motion. No jugular venous distention. Trachea midline. Respiratory:  Normal respiratory effort.  Clear to auscultation, bilaterally without Rales/Wheezes/Rhonchi. Cardiovascular: Regular rate and rhythm with normal S1/S2 without murmurs, rubs or gallops. Abdomen: Soft, non-tender, non-distended with normal bowel sounds. Musculoskeletal: No clubbing, cyanosis or edema bilaterally. Skin: Skin color, texture, turgor normal.  No rashes or lesions. Neurologic: CN's 2-12 intact, moving all extremities well  Psychiatric: Alert and oriented, thought content appropriate, normal insight  Capillary Refill: Brisk,< 3 seconds   Peripheral Pulses: +2 palpable, equal bilaterally       Labs:   Recent Labs     07/30/22  0430 07/31/22  0445 08/01/22  0500   WBC 44.4* 21.4* 11.3*   HGB 8.1* 7.4* 7.8*   HCT 26.7* 23.6* 24.8*   * 484* 470*       Recent Labs     07/31/22  0445 07/31/22  0840 08/01/22  0500    138 144   K 4.2 3.7 3.7   * 108 111*   CO2 19* 17* 23   BUN 5* 4* <2*   CREATININE <0.5* <0.5* <0.5*   CALCIUM 7.5* 7.7* 8.5   PHOS 2.2* 2.2* 3.2       Recent Labs     07/29/22  1944   AST 45*   ALT 30   BILITOT <0.2   ALKPHOS 214*       No results for input(s): INR in the last 72 hours. No results for input(s): Anastasiia Kras in the last 72 hours. Urinalysis:      Lab Results   Component Value Date/Time    NITRU Negative 07/29/2022 10:21 PM    WBCUA 0 07/29/2022 10:21 PM    BACTERIA None Seen 07/29/2022 10:21 PM    RBCUA 0 07/29/2022 10:21 PM    BLOODU Negative 07/29/2022 10:21 PM    SPECGRAV 1.025 07/29/2022 10:21 PM    GLUCOSEU >=1000 07/29/2022 10:21 PM    GLUCOSEU >=1000 01/17/2011 09:15 AM       Radiology:  MRI LUMBAR SPINE WO CONTRAST   Final Result   1. No acute abnormality. 2. No significant spinal canal stenosis. MRI THORACIC SPINE WO CONTRAST   Final Result   No acute abnormality. No significant degenerative change. MRI CERVICAL SPINE WO CONTRAST   Final Result   1. No acute abnormality   2. No significant spinal canal stenosis.    3. T1 hypointense, stir hyperintense lesion 07/29/2022     Priority: Medium    Abscess [L02.91] 07/29/2022     Priority: Medium    Acidosis [E87.2] 02/13/2018     DKA  Lactic acidosis/metabolic acidosis  -s/p DKA insulin drip and electrolyte repletion per protocol  - insulin subQ restarted    Diffuse weakness  -Upper and lower extremity weakness profound and throughout -- improving  -Talks to certain providers and not others  -likely multifactorial from DKA, electrolyte disturbances and psyciatric/conversion d/o    Suspected seizure: Prior to presentation  -Likely due to DKA  -Continue Keppra  -home clonazepam restarted    Inguinal area abscess  -Status post drainage and packing in the ED  -Continue cefepime Flagyl vancomycin    Hypokalemia, hypomagnesemia, hypophosphatemia, hypocalcium  -Replete IV as needed    DVT Prophylaxis: Lovenox  Diet: ADULT DIET; Regular; 4 carb choices (60 gm/meal)  Code Status: Full Code    PT/OT Eval Status: Pending    Dispo -MedSurg appropriate.   Likely D/C tomorrow    Yuliet Sarah MD

## 2022-08-01 NOTE — PROGRESS NOTES
Psychiatric  Diabetes Education   Progress Note       NAME:  [facility] RECORD NUMBER:  [id number]  AGE: 34 y.o. GENDER: female  : 1990  TODAY'S DATE:  2022    Subjective   Reason for Diabetic Education Evaluation and Assessment: DKKENNY Toure reports using Omni Pod patch pump and Dexcom CGM. She does not have either device with her today.   She is vague about the ability of her family to bring supplies to the hospital.      Visit Type: evaluation      Deni Bonilla is a 34 y.o. female referred by:     [] Physician  [x] Nursing  [] Chart Review   [] Other:     PAST MEDICAL HISTORY        Diagnosis Date    Depression     Diabetes mellitus ([facility]     Seizures ([facility]        PAST SURGICAL HISTORY    Past Surgical History:   Procedure Laterality Date     SECTION      TUBAL LIGATION         FAMILY HISTORY    Family History   Problem Relation Age of Onset    Asthma Mother     Allergies Mother         sun allergy    Diabetes Type 1  Father     Diabetes Type 1  Maternal Grandfather     Diabetes Type 1  Cousin     Diabetes Type 1  Cousin     Diabetes Type 1  Maternal Uncle     Diabetes Type 1  Maternal Uncle     Diabetes Type 1  Maternal Aunt        SOCIAL HISTORY    Social History     Tobacco Use    Smoking status: Never    Smokeless tobacco: Never   Vaping Use    Vaping Use: Never used   Substance Use Topics    Alcohol use: No    Drug use: No       ALLERGIES    No Known Allergies    MEDICATIONS     vancomycin  750 mg IntraVENous Q8H    insulin lispro  0-4 Units SubCUTAneous TID WC    insulin lispro  0-4 Units SubCUTAneous Nightly    insulin lispro  3 Units SubCUTAneous TID WC    clonazePAM  2 mg Oral BID    gabapentin  300 mg Oral TID WC    And    gabapentin  1,200 mg Oral Nightly    sertraline  50 mg Oral Daily    chlorhexidine  15 mL Mouth/Throat BID    levETIRAcetam  1,000 mg IntraVENous Q12H    heparin (porcine)  5,000 Units SubCUTAneous 3 times per day ondansetron  4 mg IntraVENous Once    vancomycin (VANCOCIN) intermittent dosing (placeholder)   Other RX Placeholder       Objective        Patient Active Problem List   Diagnosis Code    Acidosis E87.2    Seizure disorder ([facility] G40.909    Type 1 diabetes mellitus with complication (HCC) H24.6    Anxiety and depression F41.9, F32. A    ROME (generalized anxiety disorder) F41.1    Anorexia nervosa F50.00    Diabetic peripheral neuropathy (HCC) E11.42    Gastroparesis K31.84    Weight loss, unintentional R63.4    Tachycardia R00.0    Elevated transaminase level R74.01    Bandemia D72.825    Elevated liver function tests R79.89    Nausea and vomiting R11.2    Diarrhea R19.7    Elevated liver enzymes R74.8    Diabetic ketoacidosis without coma associated with type 1 diabetes mellitus (HCC) E10.10    Sepsis (HCC) A41.9    Leukocytosis D72.829    Abscess L02.91    Elevated platelet count E01.02    Seizure (HCC) R56.9    AMS (altered mental status) R41.82    Electrolyte imbalance E87.8    Weakness R53.1    DKA, type 1, not at goal ([facility] E10.10        /89   Pulse (!) 109   Temp 98.3 °F (36.8 °C) (Oral)   Resp 15   Ht 4' 10\" (1.473 m)   Wt 102 lb 1.2 oz (46.3 kg)   SpO2 98%   BMI 21.33 kg/m²     HgBA1c:    Lab Results   Component Value Date/Time    LABA1C 13.6 05/24/2022 02:49 PM       Recent Labs     07/31/22  1200 07/31/22  1617 07/31/22  1943 08/01/22  0753   POCGLU 122* 173* 152* 83       BUN/Creatinine:    Lab Results   Component Value Date/Time    BUN <2 08/01/2022 05:00 AM    CREATININE <0.5 08/01/2022 05:00 AM       Assessment        Diabetes Management and Education    Does the patient have a Primary Care Physician? Yes, Caridad Foote MD       Does the patient require new medication instruction? Yes - she reports removing her devices just before coming to the hospital.    Discussed basal and prandial insulin concepts. Discussed importance of limiting time off Omni pod.       Person responsible for administration of Insulin/Medication:        [x] Self     [] Caregiver       [] Spouse       [] Other Family Member   []  Other      Level of patient/caregiver understanding able to:       [] Verbalized Understanding   [] Demonstrated Understanding       [] Teach Back       [x] Needs Reinforcement     []  Other:        Does the patient/caregiver monitor Blood Glucoses? Yes Reports having CGM and fingerstick options. She did not have results to share with me. Reviewed glycemic control targets, testing frequency and when to call PCP. Level of patient/caregiver understanding able to:        [x] Verbalized Understanding   [] Demonstrated Understanding       [] Teach Back       [] Needs Reinforcement     []  Other:        Does the patient/caregiver follow a Meal Plan? Yes - identifies common carbs. Does the patient/caregiver understand S/S of Hypoglycemia? Yes - reports symptoms when less than 60. Reviewed symptoms, prevention and treatment. Level of patient/caregiver understanding able to:       [x] Verbalized Understanding   [] Demonstrated Understanding       [] Teach Back       [] Needs Reinforcement     [x]  Other:  Agrees to notify staff of any symptoms. Does the patient/caregiver understand S/S of Hyperglycemia? No: Seems overwhelmed with self care. Reviewed symptoms, prevention and treatment. Level of patient/caregiver understanding able to:        [] Verbalized Understanding   [] Demonstrated Understanding       [] Teach Back       [x] Needs Reinforcement     []  Other:           Plan        Ongoing diabetes education and blood glucose monitoring. Endocrinology office reports referral is required from PCP. Susan Eddy MD office contacted with request to review for [facility] endocrinology referral.      Aleja Isidro have family bring Omni Pod and CGM supplies to the hospital.      Recommend adding Lantus to insulin plan until pump supplies are available. Teaching Time Diabetes Education:  30 minutes     Electronically signed by Antione Berg on 8/1/2022 at 10:40 AM      PCP office confirmed active referral to endocrinology.       Electronically signed by Fer Kiran RN on 8/1/2022 at 12:55 PM

## 2022-08-01 NOTE — PLAN OF CARE
Problem: Discharge Planning  Goal: Discharge to home or other facility with appropriate resources  8/1/2022 0533 by Ghada Conklin RN  Outcome: Progressing  Flowsheets (Taken 7/31/2022 2000)  Discharge to home or other facility with appropriate resources: Identify barriers to discharge with patient and caregiver  7/31/2022 1837 by Evelin Flores RN  Outcome: Progressing     Problem: Pain  Goal: Verbalizes/displays adequate comfort level or baseline comfort level  8/1/2022 0533 by Ghada Conklin RN  Outcome: Progressing  7/31/2022 1837 by Evelin Flores RN  Outcome: Progressing     Problem: Skin/Tissue Integrity  Goal: Absence of new skin breakdown  Description: 1. Monitor for areas of redness and/or skin breakdown  2. Assess vascular access sites hourly  3. Every 4-6 hours minimum:  Change oxygen saturation probe site  4. Every 4-6 hours:  If on nasal continuous positive airway pressure, respiratory therapy assess nares and determine need for appliance change or resting period.   8/1/2022 0533 by Ghada Conklin RN  Outcome: Progressing  7/31/2022 1837 by Evelin Flores RN  Outcome: Progressing     Problem: Safety - Adult  Goal: Free from fall injury  8/1/2022 0533 by Ghada Conklin RN  Outcome: Progressing  7/31/2022 1837 by Evelin Flores RN  Outcome: Progressing     Problem: ABCDS Injury Assessment  Goal: Absence of physical injury  8/1/2022 0533 by Ghada Conklin RN  Outcome: Progressing  7/31/2022 1837 by Evelin Flores RN  Outcome: Progressing

## 2022-08-01 NOTE — TELEPHONE ENCOUNTER
Spoke to Keiko Caballero explained open referral to Dr. James Brewer she stated they will see what they can do on there end as well to get pt to get in with endo

## 2022-08-01 NOTE — PROGRESS NOTES
General and Vascular Surgery                                                           Daily Progress Note                                                             Kelsea Walls PA-C     Pt Name: Robynn Lanes Record Number: [id number]  Date of Birth 1990   Today's Date: 8/1/2022    ASSESSMENT/PLAN   1. Left groin abscess  Appropriately drained. Continue local wound care. Dressing changed  Continue vancomycin, cefepime, flagyl  Unclear if this is the main source of her leukocytosis. Leukocytosis improving: WBC count 44.4 --> 21.4 -->11.3  No general surgery needs at this time  2. DKA  Per hospitalist  3. Hx of seizures  Neurology consulted    EDUCATION  Patient educated about their illness/diagnosis, stated above, and all questions answered. We discussed the importance of nutrition, medications they are taking, and healthy lifestyle. Erenest Holter has improved from yesterday. Pain is well controlled. She is tolerating regular diet. OBJECTIVE  VITALS:  height is 4' 10\" (1.473 m) and weight is 102 lb 1.2 oz (46.3 kg). Her oral temperature is 98.3 °F (36.8 °C). Her blood pressure is 125/89 and her pulse is 103 (abnormal). Her respiration is 16 and oxygen saturation is 98%. VITALS:  /89   Pulse (!) 103   Temp 98.3 °F (36.8 °C) (Oral)   Resp 16   Ht 4' 10\" (1.473 m)   Wt 102 lb 1.2 oz (46.3 kg)   SpO2 98%   BMI 21.33 kg/m²   GENERAL: alert, no distress  LUNGS: clear to ausculation, without wheezes, rales or rhonci  HEART: normal rate and regular rhythm  ABDOMEN: soft, non-tender, non-distended, and bowel sounds present in all 4 quadrants  EXTREMITY/GROIN: Left groin abscess site draining well. Pain controlled  I/O last 3 completed shifts: In: 6716.8 [I.V.:3609.3;  IV Piggyback:3107.6]  Out: 5480 [Urine:5480]  I/O this shift:  In: 250 [P.O.:250]  Out: [facility]     07/29/22 1944 oriented  Left groin wound with minimal drainage and erythema, improved from yesterday    WBC 11.3  Cr <0.5    A/P: 33 yo female with DKA, left groin abscess    Continue local wound care  Continue IV abx    Glucose control per primary team    Herminio Reis MD

## 2022-08-01 NOTE — PROGRESS NOTES
Hospitalist Progress Note      PCP: Reena Herman MD    Date of Admission: 7/29/2022    Chief Complaint: Weakness    Hospital Course:      Subjective: Imaging and labs for weakness are unremarkable. Patient inconsistently interacting with people      Medications:  Reviewed    Infusion Medications    sodium chloride 50 mL/hr at 07/31/22 1521    dextrose      norepinephrine Stopped (07/30/22 0636)     Scheduled Medications    vancomycin  750 mg IntraVENous Q8H    insulin lispro  0-4 Units SubCUTAneous TID WC    insulin lispro  0-4 Units SubCUTAneous Nightly    insulin lispro  3 Units SubCUTAneous TID WC    clonazePAM  2 mg Oral BID    gabapentin  300 mg Oral TID WC    And    gabapentin  1,200 mg Oral Nightly    sertraline  50 mg Oral Daily    chlorhexidine  15 mL Mouth/Throat BID    levETIRAcetam  1,000 mg IntraVENous Q12H    heparin (porcine)  5,000 Units SubCUTAneous 3 times per day    ondansetron  4 mg IntraVENous Once    vancomycin (VANCOCIN) intermittent dosing (placeholder)   Other RX Placeholder     PRN Meds: acetaminophen **OR** acetaminophen, dextrose bolus **OR** dextrose bolus, magnesium sulfate, sodium phosphate IVPB **OR** sodium phosphate IVPB **OR** sodium phosphate IVPB, midazolam, diazePAM, glucose, dextrose bolus **OR** dextrose bolus, glucagon (rDNA), dextrose      Intake/Output Summary (Last 24 hours) at 7/31/2022 2234  Last data filed at 7/31/2022 2000  Gross per 24 hour   Intake 5164.38 ml   Output 3455 ml   Net 1709.38 ml         Exam:    BP (!) 124/90   Pulse 100   Temp 98.3 °F (36.8 °C) (Oral)   Resp 12   Ht 4' 10\" (1.473 m)   Wt 101 lb 10.1 oz (46.1 kg)   SpO2 97%   BMI 21.24 kg/m²     General appearance: No apparent distress, appears stated age and cooperative. HEENT: Pupils equal, round, and reactive to light. Conjunctivae/corneas clear. Neck: Supple, with full range of motion. No jugular venous distention. Trachea midline. Respiratory:  Normal respiratory effort.  Clear to auscultation, bilaterally without Rales/Wheezes/Rhonchi. Cardiovascular: Regular rate and rhythm with normal S1/S2 without murmurs, rubs or gallops. Abdomen: Soft, non-tender, non-distended with normal bowel sounds. Musculoskeletal: No clubbing, cyanosis or edema bilaterally. Skin: Skin color, texture, turgor normal.  No rashes or lesions. Neurologic: Only nodding head, will not say any words to me. Initially on exam had 1 out of 5 strength except for wiggling toes and weak handgrip. After phosphorus replacement, did have some mild extremity elevation against gravity. Psychiatric: Alert and oriented, thought content appropriate, normal insight  Capillary Refill: Brisk,< 3 seconds   Peripheral Pulses: +2 palpable, equal bilaterally       Labs:   Recent Labs     07/29/22 1944 07/30/22  0430 07/31/22  0445   WBC 37.7* 44.4* 21.4*   HGB 10.3* 8.1* 7.4*   HCT 42.3 26.7* 23.6*   PLT 1,008* 697* 484*       Recent Labs     07/31/22  0015 07/31/22  0445 07/31/22  0840    141 138   K 3.7 4.2 3.7   * 112* 108   CO2 19* 19* 17*   BUN 6* 5* 4*   CREATININE <0.5* <0.5* <0.5*   CALCIUM 7.7* 7.5* 7.7*   PHOS 2.7 2.2* 2.2*       Recent Labs     07/29/22 1944   AST 45*   ALT 30   BILITOT <0.2   ALKPHOS 214*       No results for input(s): INR in the last 72 hours. No results for input(s): Wayna Khat in the last 72 hours. Urinalysis:      Lab Results   Component Value Date/Time    NITRU Negative 07/29/2022 10:21 PM    WBCUA 0 07/29/2022 10:21 PM    BACTERIA None Seen 07/29/2022 10:21 PM    RBCUA 0 07/29/2022 10:21 PM    BLOODU Negative 07/29/2022 10:21 PM    SPECGRAV 1.025 07/29/2022 10:21 PM    GLUCOSEU >=1000 07/29/2022 10:21 PM    GLUCOSEU >=1000 01/17/2011 09:15 AM       Radiology:  MRI LUMBAR SPINE WO CONTRAST   Final Result   1. No acute abnormality. 2. No significant spinal canal stenosis. MRI THORACIC SPINE WO CONTRAST   Final Result   No acute abnormality.   No significant degenerative change. MRI CERVICAL SPINE WO CONTRAST   Final Result   1. No acute abnormality   2. No significant spinal canal stenosis. 3. T1 hypointense, stir hyperintense lesion the C2 vertebral body, possibly   an atypical hemangioma. Post-contrast imaging may be useful for further   evaluation. MRI BRAIN WO CONTRAST   Final Result   Unremarkable MRI of the brain without contrast.         CTA HEAD NECK W CONTRAST   Final Result   1. No acute intracranial abnormality. 2. Unremarkable CTA of the head and neck. CT HEAD WO CONTRAST   Final Result   1. No acute intracranial abnormality. 2. Unremarkable CTA of the head and neck. CT CHEST ABDOMEN PELVIS WO CONTRAST   Final Result   1. No evidence of acute cardiopulmonary disease   2. Marked hepatomegaly, with a sagittal length of 24.8 cm   3. Stoddard catheter within the urinary bladder   4. Skin laceration, left groin region   5. Nonobstructive bowel gas pattern         XR CHEST PORTABLE   Final Result   Right IJ catheter identified in satisfactory position. [place] No pneumothorax. XR CHEST PORTABLE   Final Result   No acute process. CT HEAD WO CONTRAST   Final Result   No acute intracranial abnormality. CT CERVICAL SPINE WO CONTRAST   Final Result   No acute abnormality of the cervical spine.                  Assessment/Plan:    Active Hospital Problems    Diagnosis Date Noted    Elevated platelet count [Z79.47] 07/30/2022     Priority: Medium    Seizure ([address] [R56.9] 07/30/2022     Priority: Medium    AMS (altered mental status) [R41.82] 07/30/2022     Priority: Medium    Diabetic ketoacidosis without coma associated with type 1 diabetes mellitus ([address] [E10.10] 07/29/2022     Priority: Medium    Sepsis ([address] [A41.9] 07/29/2022     Priority: Medium    Leukocytosis [D72.829] 07/29/2022     Priority: Medium    Abscess [L02.91] 07/29/2022     Priority: Medium     DKA  Lactic acidosis/metabolic acidosis  -s/p DKA

## 2022-08-01 NOTE — PROGRESS NOTES
Nephrology Note                                                                                                                                                                                                                                                                                                                                                               Office : [phone]     Fax :[phone]              Patient's Name: Marquetta Hashimoto  8/1/2022    Reason for Consult: electrolyte imbalance       INTERVAL HISTORY    Feels better  Shortness of breath: No   UOP: Good   Creat: stable  No nausea or vomiting        Allergies:  Patient has no known allergies.     Current Medications:    vancomycin (VANCOCIN) 750 mg in sodium chloride 0.9 % 250 mL IVPB, Q8H  insulin lispro (HUMALOG) injection vial 0-4 Units, TID WC  insulin lispro (HUMALOG) injection vial 0-4 Units, Nightly  insulin lispro (HUMALOG) injection vial 3 Units, TID WC  0.45 % sodium chloride infusion, Continuous  clonazePAM (KLONOPIN) tablet 2 mg, BID  gabapentin (NEURONTIN) capsule 300 mg, TID WC   And  gabapentin (NEURONTIN) capsule 1,200 mg, Nightly  sertraline (ZOLOFT) tablet 50 mg, Daily  acetaminophen (TYLENOL) tablet 650 mg, Q4H PRN   Or  acetaminophen (TYLENOL) suppository 650 mg, Q4H PRN  chlorhexidine (PERIDEX) 0.12 % solution 15 mL, BID  levETIRAcetam (KEPPRA) 1000 mg/100 mL IVPB, Q12H  dextrose bolus 10% 125 mL, PRN   Or  dextrose bolus 10% 250 mL, PRN  magnesium sulfate 1000 mg in dextrose 5% 100 mL IVPB, PRN  sodium phosphate 10 mmol in sodium chloride 0.9 % 250 mL IVPB, PRN   Or  sodium phosphate 15 mmol in dextrose 5 % 250 mL IVPB, PRN   Or  sodium phosphate 20 mmol in dextrose 5 % 500 mL IVPB, PRN  heparin (porcine) injection 5,000 Units, 3 times per day  midazolam (VERSED) injection 2 mg, Q4H PRN  diazePAM (VALIUM) injection 2.5 mg, Q12H PRN  ondansetron (ZOFRAN) injection 4 mg, Once  glucose chewable tablet 16 g, PRN  dextrose bolus 10% 125 mL, PRN   Or  dextrose bolus 10% 250 mL, PRN  glucagon (rDNA) injection 1 mg, PRN  dextrose 5 % solution, PRN  norepinephrine (LEVOPHED) 16 mg in dextrose 5% 250 mL infusion, Continuous  vancomycin (VANCOCIN) intermittent dosing (placeholder), RX Placeholder          Physical exam:     Vitals:  /89   Pulse 95   Temp 98.3 °F (36.8 °C) (Oral)   Resp 12   Ht 4' 10\" (1.473 m)   Wt 102 lb 1.2 oz (46.3 kg)   SpO2 98%   BMI 21.33 kg/m²     Constitutional:  awake, NAD  HEENT:  MMM  Neck: supple, no JVD  Cardiovascular:  S1, S2 reg  Respiratory: CTA  Abdomen:  +BS, soft, ND  Ext: no lower extremity edema  Skin: dry/intact  CNS: alert, no agitation     Data:   Labs:  CBC:   Recent Labs     07/30/22  0430 07/31/22  0445 08/01/22  0500   WBC 44.4* 21.4* 11.3*   HGB 8.1* 7.4* 7.8*   * 484* 470*     BMP:    Recent Labs     07/31/22  0445 07/31/22  0840 08/01/22  0500    138 144   K 4.2 3.7 3.7   * 108 111*   CO2 19* 17* 23   BUN 5* 4* <2*   CREATININE <0.5* <0.5* <0.5*   GLUCOSE 175* 187* 91     Ca/Mg/Phos:   Recent Labs     07/31/22  0445 07/31/22  0840 08/01/22  0500   CALCIUM 7.5* 7.7* 8.5   MG 2.00 1.90 1.90   PHOS 2.2* 2.2* 3.2     Hepatic:   Recent Labs     07/29/22  1944   AST 45*   ALT 30   BILITOT <0.2   ALKPHOS 214*     Troponin: No results for input(s): TROPONINI in the last 72 hours. BNP: No results for input(s): BNP in the last 72 hours. Lipids: No results for input(s): CHOL, TRIG, HDL, LDLCALC, LABVLDL in the last 72 hours. ABGs: No results for input(s): PHART, PO2ART, TBH2SWV in the last 72 hours. INR: No results for input(s): INR in the last 72 hours.   UA:  Recent Labs     07/29/22  2221   COLORU Yellow   CLARITYU Clear   GLUCOSEU >=1000*   BILIRUBINUR Negative   KETUA >=160*   SPECGRAV 1.025   BLOODU Negative   PHUR 5.0  5.0   PROTEINU TRACE*   UROBILINOGEN 0.2   NITRU Negative   LEUKOCYTESUR Negative   LABMICR YES   URINETYPE NotGiven      Urine Microscopic: Recent Labs     07/29/22  2221   BACTERIA None Seen   HYALCAST 2   WBCUA 0   RBCUA 0   EPIU 0     Urine Culture: No results for input(s): Rakel Hero in the last 72 hours. Urine Chemistry: No results for input(s): Lalita Pipes, PROTEINUR, NAUR in the last 72 hours. IMAGING:  MRI LUMBAR SPINE WO CONTRAST   Final Result   1. No acute abnormality. 2. No significant spinal canal stenosis. MRI THORACIC SPINE WO CONTRAST   Final Result   No acute abnormality. No significant degenerative change. MRI CERVICAL SPINE WO CONTRAST   Final Result   1. No acute abnormality   2. No significant spinal canal stenosis. 3. T1 hypointense, stir hyperintense lesion the C2 vertebral body, possibly   an atypical hemangioma. Post-contrast imaging may be useful for further   evaluation. MRI BRAIN WO CONTRAST   Final Result   Unremarkable MRI of the brain without contrast.         CTA HEAD NECK W CONTRAST   Final Result   1. No acute intracranial abnormality. 2. Unremarkable CTA of the head and neck. CT HEAD WO CONTRAST   Final Result   1. No acute intracranial abnormality. 2. Unremarkable CTA of the head and neck. CT CHEST ABDOMEN PELVIS WO CONTRAST   Final Result   1. No evidence of acute cardiopulmonary disease   2. Marked hepatomegaly, with a sagittal length of 24.8 cm   3. Stoddard catheter within the urinary bladder   4. Skin laceration, left groin region   5. Nonobstructive bowel gas pattern         XR CHEST PORTABLE   Final Result   Right IJ catheter identified in satisfactory position. Barnetta Todd No pneumothorax. XR CHEST PORTABLE   Final Result   No acute process. CT HEAD WO CONTRAST   Final Result   No acute intracranial abnormality. CT CERVICAL SPINE WO CONTRAST   Final Result   No acute abnormality of the cervical spine. Assessment/Plan   1. DKA   -resolved    2. HTN  -BP at goal    3. Acid- base/ Electrolyte imbalance     5.  Weakness       Plan - IVF for hemodynamic support   - MRI brain - no acute changes   - BS management   - replace lytes   - monitor UO       Recommend to dose adjust all medications  based on renal functions  Maintain SBP> 90 mmHg   Daily weights   AVOID NSAIDs  Avoid Nephrotoxins  Monitor Intake/Output  Call if significant decrease in urine output                   Thank you for allowing us to participate in care of Vero Falcon MD  Feel free to contact me   [facility] [address]  Office : [phone]  Fax :[phone]

## 2022-08-01 NOTE — CONSULTS
PSYCHIATRY CONSULT, INITIAL EVALUATION    Referring Provider: Toro Amato MD    CC/Reason for Consult:\" not interacting appropriately\"     Psychiatric Dx:  ROME  Depression, unspecified     RECOMMENDATIONS:     Psychiatric   - Acute encephalopathy in the setting of DKA and groin abscess. The groin abscess may have triggered the DKA episode. - Off insulin gtt for DKA and transitioned to SS inulin now. - MRI of Brain would be appropriate after infection and hyperglycemia ruled out.   - Continue Sertraline 50 mg daily ( for depression and anxiety) , Klonopin and Keppra for Seizure. - labs reviewed. - Neurology consulted for seizure. - Surgery following for groin abscess. Dispo: To home when medially cleared. Patient denies SI/HI/AVH. Safety: Appropriate for inpatient standards of care. Risk factors : ROME, depression, chronic health issues. She is not imminent  to hurt her self of someone. Pink slip: N/A  ___________________________________________________________________________    HPI:   context:  \" The patient is a 34 y.o. female with past medical history of seizure disorder and type 2 diabetes who presents to [facility] brought via EMS from home where it was supposedly noted the patient was found unresponsive by her partner and notably had some blood around her lips and was suspected to have a seizure and was postictal.\" She has pmhx of DM, seizure and depression. At the time of my assessment, the patient was alert and oriented sitting up in a recliner chair. She is pleasant and engaged in conversation appropriately. She feels depressed and anxious at times. She endorses eating disorder mainly BN when she was young. associated symptoms: Confusion, muted at times. modifying factors: Recent DKA, infection in groin area  Timing: acute   duration: 3-4 days   severity: mild     Collateral information: N/A    ROS: Negative for SOB, CP, syncope, dizziness, tremor, HA, abd pain.       Past Psychiatric History:        Hosp: denies        Diagnoses: BN, ROME       Med trials: Zoloft ( since 14 yrs)  Klonopin and Keppra ( for seziure)        Outpt:  when she was in 20's. Suicide Attempts: denies     Substance Use History:     Nicotine: denies      Alcohol: denies       Illicits: denies     Past Medical History:   Past Medical History:   Diagnosis Date    Depression     Diabetes mellitus ([address]     Seizures ([facility] [address]      Past Surgical History:   Procedure Laterality Date     SECTION      TUBAL LIGATION         Social/Developmental History:     Relationship:  born and raised in [place]. She has three brothers and two sisters. Her mom is alive. And dad passed away.  for 3 yrs. Children:  she 2 children ( 9, 11  yrs)     Supports: depends on  income. Housing: lives with spouse and 2 children. Occ/Inc: unemployed    Legal: denies     Abuse: denies     Violence: denies     Access to firearms: No     Family History:   Family History   Problem Relation Age of Onset    Asthma Mother     Allergies Mother         sun allergy    Diabetes Type 1  Father     Diabetes Type 1  Maternal Grandfather     Diabetes Type 1  Cousin     Diabetes Type 1  Cousin     Diabetes Type 1  Maternal Uncle     Diabetes Type 1  Maternal Uncle     Diabetes Type 1  Maternal Aunt        Psychiatric: sister- decreased due to heroin and other drugs. History of completed suicide: denies     Allergies:  No Known Allergies    Home Medications:   No current facility-administered medications on file prior to encounter. Current Outpatient Medications on File Prior to Encounter   Medication Sig Dispense Refill    cephALEXin (KEFLEX) 500 MG capsule Take 500 mg by mouth in the morning and 500 mg before bedtime.       Continuous Blood Gluc Transmit (DEXCOM G6 TRANSMITTER) MISC USE AS DIRECTED 1 each 0    gabapentin (NEURONTIN) 300 MG capsule TAKE 1 CAPSULE BY MOUTH THREE TIMES DAILY DRUNG THE DAY AND 4 CAPSULES BY MOUTH EVERY DAY AT BEDTIME 210 capsule 3    blood glucose test strips (TRUE METRIX BLOOD GLUCOSE TEST) strip Check blood sugar 4-6 times daily and as needed for symptoms of irregular glucose 400 each 3    Insulin Disposable Pump (OMNIPOD DASH PODS, GEN 4,) MISC Continuous pump - max dose 60 units 10 each 3    ondansetron (ZOFRAN) 4 MG tablet Take 1 tablet by mouth 3 times daily as needed for Nausea or Vomiting 30 tablet 1    ibuprofen (ADVIL;MOTRIN) 800 MG tablet Take 1 tablet by mouth 3 times daily as needed for Pain 90 tablet 1    Continuous Blood Gluc Sensor (DEXCOM G6 SENSOR) MISC Apply as directed for blood sugar monitoring 10 each 5    sertraline (ZOLOFT) 50 MG tablet Take 1 tablet by mouth daily 30 tablet 2    insulin aspart (NOVOLOG FLEXPEN) 100 UNIT/ML injection pen Inject 20 units with meals/snacks up to 5 times per day 10 pen 3    insulin aspart (NOVOLOG) 100 UNIT/ML injection vial Inject 100 units into the skin daily in conjunction with omnipod 30 mL 5    Blood Glucose Monitoring Suppl (TRUE METRIX METER) w/Device KIT Check blood sugar 4 times daily, tidac 1 kit 0    Lancets MISC Check blood sugar 4 times daily, tidac 300 each 1    clonazePAM (KLONOPIN) 2 MG tablet Take 1 tablet by mouth 2 times daily. 60 tablet 2    levETIRAcetam (KEPPRA) 500 MG tablet Take 2 tablets by mouth 2 times daily 120 tablet 5    insulin aspart (NOVOLOG) 100 UNIT/ML injection vial Inject 100 Units into the skin daily In conjunction with omnipod 30 mL 5    glucagon, rDNA, 1 MG injection Inject 1 mg into the muscle as needed for Low blood sugar (Blood glucose less than 70 mg/dL and patient NOT ALERT or NPO and does not have IV access. ) 10 each 0    famotidine (PEPCID) 20 MG tablet Take 20 mg by mouth 2 times daily         Medications:  Scheduled Meds:   vancomycin  750 mg IntraVENous Q8H    insulin lispro  0-4 Units SubCUTAneous TID WC    insulin lispro  0-4 Units SubCUTAneous Nightly    insulin lispro  3 Units SubCUTAneous TID WC    clonazePAM  2 mg Oral BID    gabapentin  300 mg Oral TID WC    And    gabapentin  1,200 mg Oral Nightly    sertraline  50 mg Oral Daily    chlorhexidine  15 mL Mouth/Throat BID    levETIRAcetam  1,000 mg IntraVENous Q12H    heparin (porcine)  5,000 Units SubCUTAneous 3 times per day    ondansetron  4 mg IntraVENous Once    vancomycin (VANCOCIN) intermittent dosing (placeholder)   Other RX Placeholder     PRN Meds:.acetaminophen **OR** acetaminophen, dextrose bolus **OR** dextrose bolus, magnesium sulfate, sodium phosphate IVPB **OR** sodium phosphate IVPB **OR** sodium phosphate IVPB, midazolam, diazePAM, glucose, dextrose bolus **OR** dextrose bolus, glucagon (rDNA), dextrose    OBJECTIVE:  .  Vitals:    08/01/22 0600 08/01/22 0753 08/01/22 0800 08/01/22 0900   BP:       Pulse: 90 (!) 101 95 (!) 106   Resp: 15 15 12 21   Temp:   98.3 °F (36.8 °C)    TempSrc:   Oral    SpO2:  98%     Weight:       Height:           MSE:   Appearance    alert, cooperative  Motor: Normal strength and tone, No abnormal movements, tics or mannerisms.   Speech    spontaneous and slow  Language    0 - no aphasia, normal  Mood/Affect    Depressed / depressed affect  Thought Process    linear, goal directed, and coherent  Thought Content    intact , no suicidal ideation  Associations    logical connections  Attention/Concentration    intact  Orientation    oriented to person, place, time, and general circumstances  Memory    recent and remote memory intact  Fund of Knowledge    intact  Insight/Judgement    Good / Intact    Labs:     Recent Labs     07/30/22  0430 07/31/22  0445 08/01/22  0500   WBC 44.4* 21.4* 11.3*   HGB 8.1* 7.4* 7.8*   HCT 26.7* 23.6* 24.8*   MCV 82.3 80.3 79.8*   * 484* 470*     Recent Labs     07/31/22  0445 07/31/22  0840 08/01/22  0500    138 144   K 4.2 3.7 3.7   * 108 111*   CO2 19* 17* 23   BUN 5* 4* <2*   MG 2.00 1.90 1.90   PHOS 2.2* 2.2* 3.2       Recent Labs 07/29/22 1944   AST 45*   ALT 30      Lab Results   Component Value Date/Time    COLORU Yellow 07/29/2022 10:21 PM    NITRU Negative 07/29/2022 10:21 PM    GLUCOSEU >=1000 07/29/2022 10:21 PM    GLUCOSEU >=1000 01/17/2011 09:15 AM    KETUA >=160 07/29/2022 10:21 PM    UROBILINOGEN 0.2 07/29/2022 10:21 PM    BILIRUBINUR Negative 07/29/2022 10:21 PM    BILIRUBINUR NEGATIVE 01/17/2011 09:15 AM     Lab Results   Component Value Date    LABA1C 13.6 05/24/2022     Lab Results   Component Value Date    .6 05/24/2022     Lab Results   Component Value Date    CHOL 179 09/13/2021    CHOL 221 (H) 10/22/2018    CHOL 138 02/14/2018     Lab Results   Component Value Date    TRIG 92 09/13/2021    TRIG 170 (H) 10/22/2018    TRIG 266 (H) 02/14/2018     Lab Results   Component Value Date    HDL 58 09/13/2021    HDL 38 (L) 10/22/2018    HDL 19 (L) 02/14/2018     Lab Results   Component Value Date    LDLCALC 103 (H) 09/13/2021    LDLCALC 149 (H) 10/22/2018    LDLCALC 66 02/14/2018     Lab Results   Component Value Date    LABVLDL 18 09/13/2021    LABVLDL 34 10/22/2018    LABVLDL 53 02/14/2018     No results found for: CHOLHDLRATIO  Lab Results   Component Value Date    TSH 0.77 01/18/2011     No results found for: [id number]  No results found for: [id number]  No results found for: FOLATE    Last Drug screen: None available    Imaging: MRI of Lumbar Spine WO contrast     Impression   1. No acute abnormality. 2. No significant spinal canal stenosis. EKG:   Vitals:  HR 91 PVC 0 QTc 476 ? QTc 25   RR 12 ST? I 0.0 ST? II 0.1  ST? III 0.1 ST?aVR ?0.1 ST?aVL 0.0 ST?aVF 0.0  ST? V 0.9  8/1/2022 07:00:00 Saved strip re?labeled to Sinus Rhythm HI 0.15 QRS 0.08 RR 0.71 QT 0.39 QTc 0.46    Ferna Vickers, JORDI, PMHNP-BC, CNP  [facility]  Thank you for this consult, please call the psychiatry consult line for further questions.

## 2022-08-01 NOTE — TELEPHONE ENCOUNTER
Patient is in the hospital and she needs a referral for an endocrinologist from her pcp  Diabetic ketoacidosis without coma associated with type 1 diabetes mellitus ([facility] [address]    Please advise and call

## 2022-08-02 ENCOUNTER — APPOINTMENT (OUTPATIENT)
Dept: GENERAL RADIOLOGY | Age: 32
DRG: 710 | End: 2022-08-02
Payer: COMMERCIAL

## 2022-08-02 PROBLEM — D72.9 NEUTROPHILIA: Status: ACTIVE | Noted: 2022-07-29

## 2022-08-02 PROBLEM — B95.62 MRSA BACTEREMIA: Status: ACTIVE | Noted: 2022-08-02

## 2022-08-02 PROBLEM — R78.81 MRSA BACTEREMIA: Status: ACTIVE | Noted: 2022-08-02

## 2022-08-02 PROBLEM — E11.65 POORLY CONTROLLED DIABETES MELLITUS (HCC): Status: ACTIVE | Noted: 2022-08-02

## 2022-08-02 PROBLEM — D72.828 NEUTROPHILIA: Status: ACTIVE | Noted: 2022-07-29

## 2022-08-02 PROBLEM — E87.5 HYPERKALEMIA: Status: ACTIVE | Noted: 2022-08-02

## 2022-08-02 LAB
ANION GAP SERPL CALCULATED.3IONS-SCNC: 10 MMOL/L (ref 3–16)
BASOPHILS ABSOLUTE: 0.1 K/UL (ref 0–0.2)
BASOPHILS RELATIVE PERCENT: 1 %
BLOOD CULTURE, ROUTINE: ABNORMAL
BLOOD CULTURE, ROUTINE: ABNORMAL
BUN BLDV-MCNC: <2 MG/DL (ref 7–20)
CALCIUM SERPL-MCNC: 8.5 MG/DL (ref 8.3–10.6)
CHLORIDE BLD-SCNC: 108 MMOL/L (ref 99–110)
CO2: 25 MMOL/L (ref 21–32)
CREAT SERPL-MCNC: <0.5 MG/DL (ref 0.6–1.1)
EOSINOPHILS ABSOLUTE: 0.1 K/UL (ref 0–0.6)
EOSINOPHILS RELATIVE PERCENT: 0.8 %
GFR AFRICAN AMERICAN: >60
GFR NON-AFRICAN AMERICAN: >60
GLUCOSE BLD-MCNC: 106 MG/DL (ref 70–99)
GLUCOSE BLD-MCNC: 149 MG/DL (ref 70–99)
GLUCOSE BLD-MCNC: 158 MG/DL (ref 70–99)
GLUCOSE BLD-MCNC: 181 MG/DL (ref 70–99)
GLUCOSE BLD-MCNC: 77 MG/DL (ref 70–99)
HCT VFR BLD CALC: 24.8 % (ref 36–48)
HEMOGLOBIN: 7.9 G/DL (ref 12–16)
LYMPHOCYTES ABSOLUTE: 2.9 K/UL (ref 1–5.1)
LYMPHOCYTES RELATIVE PERCENT: 45.3 %
MAGNESIUM: 1.7 MG/DL (ref 1.8–2.4)
MCH RBC QN AUTO: 25.4 PG (ref 26–34)
MCHC RBC AUTO-ENTMCNC: 31.9 G/DL (ref 31–36)
MCV RBC AUTO: 79.7 FL (ref 80–100)
MONOCYTES ABSOLUTE: 0.4 K/UL (ref 0–1.3)
MONOCYTES RELATIVE PERCENT: 5.7 %
NEUTROPHILS ABSOLUTE: 3 K/UL (ref 1.7–7.7)
NEUTROPHILS RELATIVE PERCENT: 47.2 %
ORGANISM: ABNORMAL
ORGANISM: ABNORMAL
PDW BLD-RTO: 15.4 % (ref 12.4–15.4)
PERFORMED ON: ABNORMAL
PERFORMED ON: NORMAL
PHOSPHORUS: 4.2 MG/DL (ref 2.5–4.9)
PLATELET # BLD: 418 K/UL (ref 135–450)
PMV BLD AUTO: 7.3 FL (ref 5–10.5)
POTASSIUM SERPL-SCNC: 4.1 MMOL/L (ref 3.5–5.1)
RBC # BLD: 3.11 M/UL (ref 4–5.2)
SODIUM BLD-SCNC: 143 MMOL/L (ref 136–145)
WBC # BLD: 6.4 K/UL (ref 4–11)

## 2022-08-02 PROCEDURE — 2580000003 HC RX 258: Performed by: INTERNAL MEDICINE

## 2022-08-02 PROCEDURE — 1200000000 HC SEMI PRIVATE

## 2022-08-02 PROCEDURE — 85025 COMPLETE CBC W/AUTO DIFF WBC: CPT

## 2022-08-02 PROCEDURE — 6360000002 HC RX W HCPCS: Performed by: FAMILY MEDICINE

## 2022-08-02 PROCEDURE — 6360000002 HC RX W HCPCS: Performed by: STUDENT IN AN ORGANIZED HEALTH CARE EDUCATION/TRAINING PROGRAM

## 2022-08-02 PROCEDURE — 2580000003 HC RX 258: Performed by: FAMILY MEDICINE

## 2022-08-02 PROCEDURE — 6370000000 HC RX 637 (ALT 250 FOR IP): Performed by: FAMILY MEDICINE

## 2022-08-02 PROCEDURE — C1751 CATH, INF, PER/CENT/MIDLINE: HCPCS

## 2022-08-02 PROCEDURE — 84100 ASSAY OF PHOSPHORUS: CPT

## 2022-08-02 PROCEDURE — APPSS45 APP SPLIT SHARED TIME 31-45 MINUTES: Performed by: PHYSICIAN ASSISTANT

## 2022-08-02 PROCEDURE — 6370000000 HC RX 637 (ALT 250 FOR IP): Performed by: HOSPITALIST

## 2022-08-02 PROCEDURE — 71045 X-RAY EXAM CHEST 1 VIEW: CPT

## 2022-08-02 PROCEDURE — 99255 IP/OBS CONSLTJ NEW/EST HI 80: CPT | Performed by: INTERNAL MEDICINE

## 2022-08-02 PROCEDURE — 2500000003 HC RX 250 WO HCPCS: Performed by: FAMILY MEDICINE

## 2022-08-02 PROCEDURE — APPNB45 APP NON BILLABLE 31-45 MINUTES: Performed by: PHYSICIAN ASSISTANT

## 2022-08-02 PROCEDURE — 80048 BASIC METABOLIC PNL TOTAL CA: CPT

## 2022-08-02 PROCEDURE — 36569 INSJ PICC 5 YR+ W/O IMAGING: CPT

## 2022-08-02 PROCEDURE — 99232 SBSQ HOSP IP/OBS MODERATE 35: CPT | Performed by: HOSPITALIST

## 2022-08-02 PROCEDURE — 83735 ASSAY OF MAGNESIUM: CPT

## 2022-08-02 RX ORDER — SODIUM CHLORIDE 9 MG/ML
25 INJECTION, SOLUTION INTRAVENOUS PRN
Status: DISCONTINUED | OUTPATIENT
Start: 2022-08-02 | End: 2022-08-03 | Stop reason: HOSPADM

## 2022-08-02 RX ORDER — SODIUM CHLORIDE 0.9 % (FLUSH) 0.9 %
5-40 SYRINGE (ML) INJECTION PRN
Status: DISCONTINUED | OUTPATIENT
Start: 2022-08-02 | End: 2022-08-03 | Stop reason: HOSPADM

## 2022-08-02 RX ORDER — LIDOCAINE HYDROCHLORIDE 10 MG/ML
5 INJECTION, SOLUTION EPIDURAL; INFILTRATION; INTRACAUDAL; PERINEURAL ONCE
Status: COMPLETED | OUTPATIENT
Start: 2022-08-02 | End: 2022-08-02

## 2022-08-02 RX ORDER — LANOLIN ALCOHOL/MO/W.PET/CERES
400 CREAM (GRAM) TOPICAL DAILY
Status: DISCONTINUED | OUTPATIENT
Start: 2022-08-02 | End: 2022-08-03 | Stop reason: HOSPADM

## 2022-08-02 RX ORDER — SODIUM CHLORIDE 0.9 % (FLUSH) 0.9 %
5-40 SYRINGE (ML) INJECTION EVERY 12 HOURS SCHEDULED
Status: DISCONTINUED | OUTPATIENT
Start: 2022-08-02 | End: 2022-08-03 | Stop reason: HOSPADM

## 2022-08-02 RX ADMIN — GABAPENTIN 300 MG: 300 CAPSULE ORAL at 11:17

## 2022-08-02 RX ADMIN — INSULIN GLARGINE 10 UNITS: 100 INJECTION, SOLUTION SUBCUTANEOUS at 08:05

## 2022-08-02 RX ADMIN — SODIUM CHLORIDE, PRESERVATIVE FREE 10 ML: 5 INJECTION INTRAVENOUS at 20:42

## 2022-08-02 RX ADMIN — GABAPENTIN 1200 MG: 400 CAPSULE ORAL at 20:32

## 2022-08-02 RX ADMIN — LEVETIRACETAM 1000 MG: 10 INJECTION, SOLUTION INTRAVENOUS at 06:12

## 2022-08-02 RX ADMIN — GABAPENTIN 300 MG: 300 CAPSULE ORAL at 16:04

## 2022-08-02 RX ADMIN — MAGNESIUM SULFATE HEPTAHYDRATE 1000 MG: 1 INJECTION, SOLUTION INTRAVENOUS at 09:35

## 2022-08-02 RX ADMIN — 0.12% CHLORHEXIDINE GLUCONATE 15 ML: 1.2 RINSE ORAL at 08:04

## 2022-08-02 RX ADMIN — INSULIN LISPRO 3 UNITS: 100 INJECTION, SOLUTION INTRAVENOUS; SUBCUTANEOUS at 16:05

## 2022-08-02 RX ADMIN — CLONAZEPAM 2 MG: 1 TABLET ORAL at 20:33

## 2022-08-02 RX ADMIN — 0.12% CHLORHEXIDINE GLUCONATE 15 ML: 1.2 RINSE ORAL at 20:32

## 2022-08-02 RX ADMIN — SERTRALINE 50 MG: 50 TABLET, FILM COATED ORAL at 08:04

## 2022-08-02 RX ADMIN — GABAPENTIN 300 MG: 300 CAPSULE ORAL at 08:04

## 2022-08-02 RX ADMIN — HEPARIN SODIUM 5000 UNITS: 5000 INJECTION INTRAVENOUS; SUBCUTANEOUS at 06:09

## 2022-08-02 RX ADMIN — ACETAMINOPHEN 650 MG: 325 TABLET ORAL at 18:10

## 2022-08-02 RX ADMIN — LIDOCAINE HYDROCHLORIDE 5 ML: 10 INJECTION, SOLUTION EPIDURAL; INFILTRATION; INTRACAUDAL; PERINEURAL at 15:14

## 2022-08-02 RX ADMIN — LEVETIRACETAM 1000 MG: 10 INJECTION, SOLUTION INTRAVENOUS at 17:30

## 2022-08-02 RX ADMIN — VANCOMYCIN HYDROCHLORIDE 750 MG: 750 INJECTION, POWDER, LYOPHILIZED, FOR SOLUTION INTRAVENOUS at 14:45

## 2022-08-02 RX ADMIN — Medication 400 MG: at 11:17

## 2022-08-02 RX ADMIN — CLONAZEPAM 2 MG: 1 TABLET ORAL at 08:04

## 2022-08-02 RX ADMIN — VANCOMYCIN HYDROCHLORIDE 750 MG: 750 INJECTION, POWDER, LYOPHILIZED, FOR SOLUTION INTRAVENOUS at 06:41

## 2022-08-02 RX ADMIN — HEPARIN SODIUM 5000 UNITS: 5000 INJECTION INTRAVENOUS; SUBCUTANEOUS at 22:19

## 2022-08-02 RX ADMIN — HEPARIN SODIUM 5000 UNITS: 5000 INJECTION INTRAVENOUS; SUBCUTANEOUS at 14:00

## 2022-08-02 RX ADMIN — SODIUM CHLORIDE: 4.5 INJECTION, SOLUTION INTRAVENOUS at 04:40

## 2022-08-02 RX ADMIN — VANCOMYCIN HYDROCHLORIDE 750 MG: 750 INJECTION, POWDER, LYOPHILIZED, FOR SOLUTION INTRAVENOUS at 23:09

## 2022-08-02 RX ADMIN — INSULIN LISPRO 3 UNITS: 100 INJECTION, SOLUTION INTRAVENOUS; SUBCUTANEOUS at 08:05

## 2022-08-02 ASSESSMENT — PAIN DESCRIPTION - DESCRIPTORS: DESCRIPTORS: ACHING

## 2022-08-02 ASSESSMENT — PAIN DESCRIPTION - LOCATION: LOCATION: HEAD

## 2022-08-02 ASSESSMENT — PAIN SCALES - GENERAL
PAINLEVEL_OUTOF10: 0
PAINLEVEL_OUTOF10: 0

## 2022-08-02 NOTE — CARE COORDINATION
Discharge Planning:    Received call from Dr. Leigh Mitchell of patient's need for outpatient IV abx. Call placed to patient. Choice on home care company provided, but patient stated she has no preference. The Plan for Transition of Care is related to the following treatment goals: home with home healthcare for IV abx    The Patient was provided with a choice of provider and agrees   with the discharge plan. [x] Yes [] No    Freedom of choice list was provided with basic dialogue that supports the patient's individualized plan of care/goals, treatment preferences and shares the quality data associated with the providers. [] Yes [x] No; deferred list stating she doesn't have a preference on home healthcare provider. Referral placed to [facility]. Will follow for updates from Infectious Disease on abx prescription(s). Electronically signed by Urvashi Spence RN on 8/2/2022 at 2:57 PM    Called patient's bedside RN and Sylvester Barnard to update that patient not going to leave today. We are still waiting on a completed TAMMY with final Ivabx script as well as an accepting home healthcare company. Will confirm all details in the morning.     Electronically signed by Urvashi Spence RN on 8/2/2022 at 4:31 PM

## 2022-08-02 NOTE — CARE COORDINATION
CASE MANAGEMENT DISCHARGE SUMMARY:    DISCHARGE DATE: 8/2/2022    DISCHARGED TO HOME     TRANSPORTATION: via family             TIME: TBD by patient and bedside RN              COMMENTS: Patient to return home with family at discharge. Patient denies any discharge needs at this time.     Electronically signed by Ronnell Rosario RN on 8/2/2022 at 11:41 AM

## 2022-08-02 NOTE — CONSULTS
Infectious Diseases Inpatient Consult Note      Reason for Consult:  DKA, Sepsis, MRSA bacteremia and Left groin abscess       Requesting Physician:   Lidia Woodson     Primary Care Physician:  Bethel Lai MD    History Obtained From:  Epic and pt     CHIEF COMPLAINT:     Chief Complaint   Patient presents with    Hyperglycemia         HISTORY OF PRESENT ILLNESS:  34 y.o. woman with significant history for diabetes, seizure disorder, depression, admitted to hospital secondary to DKA. She was found unresponsive by her partner who was not clear whether she had suffered a seizure however was postictal.  Patient was found to be in rapid acidotic breathing, blood glucose was more than thousand with lactic acid of 7 admitted to ICU for DKA protocol. Admission labs indicate potassium of 6.5 creatinine at 1.1 glucose 1254 CRP 34.9, beta hydroxybutyric acid elevated greater than 8, WBC 37.7 with marked left shift and thrombocytosis, blood culture from admission positive for MRSA. She was also found to have a left groin abscess that required incision and drainage by surgery. Wound culture now positive for MRSA as well. Since correction of DKA or acidosis history of resolved mental status improved patient is able to provide some history during rounds in the ICU today. She is getting a PICC line placed for IV antibiotics. Given the sepsis MRSA bacteremia we are consulted for recommendations. Patient indicates she developed a left groin abscess a week ago that progressively got worse. Her diabetes control has been poor with elevated hemoglobin A1c.   Location :    Left groin pain, swelling    Quality :   aching      Severity :  10/10     Duration :   5 days    Timing : constant   Context :  DKA    Modifying factors :none   Associated signs and symptoms:Left groin swelling, pain and abscess         Past Medical History:    Past Medical History:   Diagnosis Date    Depression     Diabetes mellitus (HCC)     [facility]        Past Surgical History:    Past Surgical History:   Procedure Laterality Date     SECTION      TUBAL LIGATION         Current Medications:    Outpatient Medications Marked as Taking for the 22 encounter [facility] Encounter)   Medication Sig Dispense Refill    cephALEXin (KEFLEX) 500 MG capsule Take 500 mg by mouth in the morning and 500 mg before bedtime. Allergies:  Patient has no known allergies.     Immunizations :   Immunization History   Administered Date(s) Administered    Influenza Virus Vaccine 2020    Influenza, Scherry Craze, Recombinant, IM PF (Flublok 18 yrs and older) 10/22/2018         Social History:    Social History     Tobacco Use    Smoking status: Never    Smokeless tobacco: Never   Vaping Use    Vaping Use: Never used   Substance Use Topics    Alcohol use: No    Drug use: No     Social History     Tobacco Use   Smoking Status Never   Smokeless Tobacco Never      Family History   Problem Relation Age of Onset    Asthma Mother     Allergies Mother         sun allergy    Diabetes Type 1  Father     Diabetes Type 1  Maternal Grandfather     Diabetes Type 1  Cousin     Diabetes Type 1  Cousin     Diabetes Type 1  Maternal Uncle     Diabetes Type 1  Maternal Uncle     Diabetes Type 1  Maternal Aunt          REVIEW OF SYSTEMS:     Constitutional:  negative for fevers, chills, night sweats  Eyes:  negative for blurred vision, eye discharge, visual disturbance   HEENT:  negative for hearing loss, ear drainage,nasal congestion  Respiratory:  negative for cough, shortness of breath or hemoptysis   Cardiovascular:  negative for chest pain, palpitations, syncope  Gastrointestinal:  negative for nausea, vomiting, diarrhea, constipation, abdominal pain  Genitourinary:  negative for frequency, dysuria, urinary incontinence, hematuria  Hematologic/Lymphatic:  negative for easy bruising, bleeding and lymphadenopathy  Allergic/Immunologic:  negative for recurrent infections, angioedema, anaphylaxis   Endocrine:  negative for weight changes, polyuria, polydipsia and polyphagia  Musculoskeletal: Left groin t  pain+ , swelling,++  decreased range of motion  Integumentary: No rashes, skin lesions  Neurological:  negative for headaches, slurred speech, unilateral weakness  Psychiatric: negative for hallucinations,confusion,agitation.      PHYSICAL EXAM:      Vitals:  T max  100.1   /71   Pulse 90   Temp 98 °F (36.7 °C) (Oral)   Resp 15   Ht 4' 10\" (1.473 m)   Wt 103 lb 13.4 oz (47.1 kg)   SpO2 98%   BMI 21.70 kg/m²     General Appearance: alert,in some  acute distress,+++  pallor, no icterus chronic ill appearing woman +   Skin: warm and dry, no rash or erythema  Head: normocephalic and atraumatic  Eyes: pupils equal, round, and reactive to light, conjunctivae normal  ENT: tympanic membrane, external ear and ear canal normal bilaterally, nose without deformity, nasal mucosa and turbinates normal without polyps  Neck: supple and non-tender without mass, no thyromegaly  no cervical lymphadenopathy  Pulmonary/Chest: clear to auscultation bilaterally- no wheezes, rales or rhonchi, normal air movement, no respiratory distress  Cardiovascular: normal rate, regular rhythm, normal S1 and S2, no murmurs, rubs, clicks, or gallops, no carotid bruits  Abdomen: soft, non-tender, non-distended, normal bowel sounds, no masses or organomegaly  Extremities: no cyanosis, clubbing or edema  Musculoskeletal: normal range of motion, no joint swelling, deformity or tenderness  Integumentary: No rashes, no abnormal skin lesions, no petechiae  Neurologic: reflexes normal and symmetric, no cranial nerve deficit  Psych:  Orientation, sensorium, mood normal   Lines: PICC  Left groin area packing dressing+    DATA:    CBC:   Lab Results   Component Value Date    WBC 6.4 08/02/2022    HGB 7.9 (L) 08/02/2022    HCT 24.8 (L) 08/02/2022    MCV 79.7 (L) 08/02/2022     08/02/2022     RENAL:   Lab Results Component Value Date    CREATININE <0.5 (L) 08/02/2022    BUN <2 (L) 08/02/2022     08/02/2022    K 4.1 08/02/2022     08/02/2022    CO2 25 08/02/2022     SED RATE:   Lab Results   Component Value Date/Time    SEDRATE 122 07/29/2022 07:44 PM     CK: No results found for: CKTOTAL  CRP:   Lab Results   Component Value Date/Time    CRP 34.2 07/29/2022 07:44 PM     Hepatic Function Panel:   Lab Results   Component Value Date/Time    ALKPHOS 214 07/29/2022 07:44 PM    ALT 30 07/29/2022 07:44 PM    AST 45 07/29/2022 07:44 PM    PROT 9.1 07/29/2022 07:44 PM    PROT 7.4 02/27/2013 10:30 PM    BILITOT <0.2 07/29/2022 07:44 PM    BILIDIR <0.2 11/21/2021 12:11 AM    IBILI see below 11/21/2021 12:11 AM    LABALBU 4.6 07/29/2022 07:44 PM     UA:  Lab Results   Component Value Date/Time    COLORU Yellow 07/29/2022 10:21 PM    CLARITYU Clear 07/29/2022 10:21 PM    GLUCOSEU >=1000 07/29/2022 10:21 PM    GLUCOSEU >=1000 01/17/2011 09:15 AM    BILIRUBINUR Negative 07/29/2022 10:21 PM    BILIRUBINUR NEGATIVE 01/17/2011 09:15 AM    KETUA >=160 07/29/2022 10:21 PM    SPECGRAV 1.025 07/29/2022 10:21 PM    BLOODU Negative 07/29/2022 10:21 PM    PHUR 5.0 07/29/2022 10:21 PM    PHUR 5.0 07/29/2022 10:21 PM    PROTEINU TRACE 07/29/2022 10:21 PM    UROBILINOGEN 0.2 07/29/2022 10:21 PM    NITRU Negative 07/29/2022 10:21 PM    LEUKOCYTESUR Negative 07/29/2022 10:21 PM    LABMICR YES 07/29/2022 10:21 PM    Arlana Emil NotGiven 07/29/2022 10:21 PM      Urine Microscopic:   Lab Results   Component Value Date/Time    BACTERIA None Seen 07/29/2022 10:21 PM    COMU see below 05/26/2022 06:41 PM    HYALCAST 2 07/29/2022 10:21 PM    WBCUA 0 07/29/2022 10:21 PM    RBCUA 0 07/29/2022 10:21 PM    EPIU 0 07/29/2022 10:21 PM     Urine Reflex to Culture:   Lab Results   Component Value Date/Time    URRFLXCULT Not Indicated 07/29/2022 10:21 PM     Additional information available - comment, result note        Rhythm: Within normal limits HR: 99 bpm BP: 113/82 mmHg      Conclusions      Summary   Normal left ventricle size, wall thickness, and systolic function with an   estimated ejection fraction of 60-65%. No regional wall motion abnormalities   are seen. Normal diastolic function. The right ventricle is normal in size and function. No significant valve abnormalities noted. Signature      ------------------------------------------------------------------   Electronically signed by James Phelps MD   (Interpreting physician) on 08/01/2022 at 03:43 PM   ------------------------------------------------------------------      Findings      Hemoglobin A1C  Order: [id number]  Status: Final result    Visible to patient: Yes (seen)    Next appt: 09/13/2022 at 02:40 PM in Internal Medicine Marcie Ruffin MD)    Dx: Type 1 diabetes mellitus with complic. ..    1 Result Note    1  Topic    Component Ref Range & Units 5/24/22 1449 1/25/22 1444 10/27/21 1553 9/13/21 1522 8/27/20 1/16/20 0504 9/13/19 0836   Hemoglobin A1C See comment % 13.6  11.1 CM  11.4 CM  11.4 CM  8.3 R  9.8 High  R, CM  9.5 R    Comment: Comment:   Diagnosis of Diabetes: > or = 6.5%   Increased risk of diabetes (Prediabetes): 5.7-6.4%   Glycemic Control: Nonpregnant Adults: <7.0%           Lactic acid  7    Glucose  1254   Wbc  37.7       CRP  34.2   Component Ref Range & Units 07/29/22 1944   Beta-Hydroxybutyrate 0.00 - 0.27 mmol/L >8.00 High     Resulting Agency  [address] [facility]     Narrative    CALL  [facility] tel. [phone],   Chemistry results called to and read back by Chele Little, 07/29/2022 20:52, by   Fredrick Ramirez        Specimen Collected: 07/29/22 1944 Last Resulted: 07/29/22 2052 View Other Order Details          MICRO: cultures reviewed and updated by me   29/22 3670       Order Status: Completed Specimen: Blood Updated: 08/02/22 0636    Organism Staph aureus MRSA DNA Detected Abnormal     Blood Culture, Routine -- Abnormal     mecA/C gene and MREJ gene Detected. Updated: 08/01/22 0622    Gram Stain Result 2+ WBC's (Polymorphonuclear)   No Epithelial Cells seen   2+ Gram positive cocci    Abnormal     Organism Staph aureus MRSA Abnormal     WOUND/ABSCESS -- Abnormal     Moderate growth   CONTACT PRECAUTIONS INDICATED   PBP2= POSITIVE    Abnormal    Narrative:     ORDER#: [id number]                          ORDERED BY: Marlon Berg   SOURCE: Skin Abscess to mons pubis         COLLECTED:  07/29/22 22:11   ANTIBIOTICS AT [place].:                      RECEIVED :  07/29/22 22:22   CALL  [place]  [facility] tel. [phone],   Previous panic on this admission - call not needed per SOP, 07/31/2022 10:29,   by Yari Ashton   Culture, Blood 2 [[id number]] Collected: 07/29/22 2211   Order Status: Completed Specimen: Blood Updated: 07/31/22 0115    Culture, Blood 2 No Growth to date. Any change in status will be called. Narrative:     ORDER#: [id number]                          ORDERED BY: Marlon Berg   SOURCE: Blood                              COLLECTED:  07/29/22 22:11   ANTIBIOTICS AT [place].:                      RECEIVED :  07/29/22 22:22   If child <=2 yrs old please draw pediatric bottle. ~Blood Culture #2   Culture, Blood, PCR ID Panel [[id number]] Collected: 07/29/22 2326   Order Status: Completed Updated: 07/31/22 0038    Report SEE IMAGE   Narrative:     Cris Tesfayetzer  [facility] tel. [phone],   Microbiology results called to and read back by CHIN Coronel, 07/31/2022   00:37, by [facility]   Microbiology results called to and read back by Lanny King, 07/31/2022   00:36, by Bethany Weaver [[id number]] Collected: 07/29/22 2211   Order Status: Completed Specimen: Nasopharyngeal Swab Updated: 07/29/22 2300    SARS-CoV-2, NAAT Not Detected    Comment: Rapid NAAT:   Negative results should be treated as presumptive and,   if inconsistent with clinical signs and symptoms or necessary for   patient management, should be tested with an alternative molecular   assay.  Negative results do not preclude SARS-CoV-2 infection and   should not be used as the sole basis for patient management decisions. This test has been authorized by the FDA under an Emergency Use   Authorization (EUA) for use by authorized laboratories. Fact sheet for Healthcare Providers:   Lauren.[email or web address]   Fact sheet for Patients: Lauren.[email or web address]     METHODOLOGY: Isothermal Nucleic Acid Amplification       Rapid influenza A/B antigens [[id number]] Collected: 07/29/22 2211   Order Status: Completed Specimen: Nasopharyngeal Updated: 07/29/22 2255    Rapid Influenza A Ag Negative    Rapid Influenza B Ag Negative     Resulting Agency [email or web address]          Susceptibility      Staph aureus mrsa (2)    Antibiotic Interpretation Microscan  Method Status    ceFAZolin Resistant >16 mcg/mL BACTERIAL SUSCEPTIBILITY PANEL BY RASHAWN     clindamycin Sensitive <=0.5 mcg/mL BACTERIAL SUSCEPTIBILITY PANEL BY RASHAWN     DAPTOmycin Sensitive <=0.5 mcg/mL BACTERIAL SUSCEPTIBILITY PANEL BY RASHAWN     erythromycin Resistant >4 mcg/mL BACTERIAL SUSCEPTIBILITY PANEL BY RASHAWN     linezolid Sensitive 2 mcg/mL BACTERIAL SUSCEPTIBILITY PANEL BY RASHAWN     oxacillin Resistant >2 mcg/mL BACTERIAL SUSCEPTIBILITY PANEL BY RASHAWN     tetracycline Sensitive <=4 mcg/mL BACTERIAL SUSCEPTIBILITY PANEL BY RASHAWN     trimethoprim-sulfamethoxazole Sensitive <=0.5/9.5 mcg/mL BACTERIAL SUSCEPTIBILITY PANEL BY RASHAWN     vancomycin Sensitive 1 mcg/mL BACTERIAL SUSCEPTIBILITY PANEL BY RASHAWN          Narrative  Performed by: [facility]  ORDER#: [id number]                          ORDERED BY: Tayla Keys   SOURCE: Blood                              COLLECTED:  07/29/22 23:26              Blood Culture:   Lab Results   Component Value Date/Time    [facility]  07/31/2022 10:18 AM     No Growth to date. Any change in status will be called. BLOODCULT2  07/31/2022 10:18 AM     No Growth to date. Any change in status will be called. evaluation. MRI BRAIN WO CONTRAST   Final Result   Unremarkable MRI of the brain without contrast.         CTA HEAD NECK W CONTRAST   Final Result   1. No acute intracranial abnormality. 2. Unremarkable CTA of the head and neck. CT HEAD WO CONTRAST   Final Result   1. No acute intracranial abnormality. 2. Unremarkable CTA of the head and neck. CT CHEST ABDOMEN PELVIS WO CONTRAST   Final Result   1. No evidence of acute cardiopulmonary disease   2. Marked hepatomegaly, with a sagittal length of 24.8 cm   3. Stoddard catheter within the urinary bladder   4. Skin laceration, left groin region   5. Nonobstructive bowel gas pattern         XR CHEST PORTABLE   Final Result   Right IJ catheter identified in satisfactory position. Yael Whitehead No pneumothorax. XR CHEST PORTABLE   Final Result   No acute process. CT HEAD WO CONTRAST   Final Result   No acute intracranial abnormality. CT CERVICAL SPINE WO CONTRAST   Final Result   No acute abnormality of the cervical spine. All pertinent images and reports for the current Hospitalization were reviewed by me.     IMPRESSION:    Patient Active Problem List   Diagnosis    Acidosis    Seizure disorder (HCC)    Type 1 diabetes mellitus with complication (HCC)    Anxiety and depression    ROME (generalized anxiety disorder)    Anorexia nervosa    Diabetic peripheral neuropathy (HCC)    Gastroparesis    Weight loss, unintentional    Tachycardia    Elevated transaminase level    Bandemia    Elevated liver function tests    Nausea and vomiting    Diarrhea    Elevated liver enzymes    Diabetic ketoacidosis without coma associated with type 1 diabetes mellitus (HCC)    Sepsis (HCC)    Leukocytosis    Abscess    Elevated platelet count    Seizure (HCC)    AMS (altered mental status)    Electrolyte imbalance    Weakness    DKA, type 1, not at goal [facility])    Abscess of left groin    History of seizures     Sepsis'  DKA  DM poor control   WBC elevation  MRSA bacteremia  MRSA left groin abscess  Seizures   Change in mentation on admit  Hyperkalemia  TTE -VE  Lactic acidosis  Elevated Beta hydroxybutyric acid   MRI BRAIN -VE  Cxr  -VE      She was evaluated on admission to ICU secondary to DKA and sepsis from MRSA bacteremia. Simple left groin abscess opened and drained WBC seems to be improving follow-up blood culture remain negative. Given diabetes several complicationS and poor control will need IV antibiotic at discharge given associated MRSA bacteremia and sepsis    OPAT d/w pt and she has PICC placed     TTE -VE        Labs, Microbiology, Radiology and pertinent results from current hospitalization and care every where were reviewed by me as a part of the consultation. PLAN :  Cont IV Vancomycin x  750 mg Q 8 hrs  Watch creat  Trend WBC    ON Keppra for seizures  Control DM   Home going can choose IV Daptomycin once a day regimen for OPAT - anticipate x 4 weeks of therapy  PICC in place       Discussed with patient/Family and Nursing   Risk of Complications/Morbidity: High      Illness(es)/ Infection present that pose threat to bodily function. There is potential for severe exacerbation of infection/side effects of treatment. Therapy requires intensive monitoring for antimicrobial agent toxicity. Thanks for allowing me to participate in your patient's care please call me with any questions or concerns.     Dr. Donal Kapoor MD  [facility] Physician  Phone: [phone]   Fax : [phone]

## 2022-08-02 NOTE — PLAN OF CARE
Problem: Discharge Planning  Goal: Discharge to home or other facility with appropriate resources  Outcome: Progressing  Flowsheets (Taken 8/1/2022 2000)  Discharge to home or other facility with appropriate resources: Identify barriers to discharge with patient and caregiver     Problem: Pain  Goal: Verbalizes/displays adequate comfort level or baseline comfort level  Outcome: Progressing     Problem: Skin/Tissue Integrity  Goal: Absence of new skin breakdown  Description: 1. Monitor for areas of redness and/or skin breakdown  2. Assess vascular access sites hourly  3. Every 4-6 hours minimum:  Change oxygen saturation probe site  4. Every 4-6 hours:  If on nasal continuous positive airway pressure, respiratory therapy assess nares and determine need for appliance change or resting period.   Outcome: Progressing     Problem: Safety - Adult  Goal: Free from fall injury  Outcome: Progressing     Problem: ABCDS Injury Assessment  Goal: Absence of physical injury  Outcome: Progressing

## 2022-08-02 NOTE — CONSULTS
[address] [place] [address]  Diabetes Service    NAME: [facility] RECORD NUMBER:  [id number]  AGE: 34 y.o. GENDER: female  : 1990  TODAY'S DATE:  2022      Emma Burciaga  was referred to the [address] [place] [address] for Diabetes Services. Destiny Starr was sound asleep. Will try again at a later time or date.       Electronically signed by Mis Rodgers PharmD on 2022 at 4:37 PM    [address] [place] [address]  Diabetes Service  Phone: [phone]  Fax [phone]

## 2022-08-02 NOTE — PROGRESS NOTES
[facility]  Diabetes Education   Progress Note       NAME:  José Brasher [place] RECORD NUMBER:  [id number]  AGE: 34 y.o. GENDER: female  : 1990  TODAY'S DATE:  2022    Subjective   Reason for Diabetic Education Evaluation and Assessment: DKA    Truman Burleson reports feeling better this morning. She has her insulin pump and CGM supplies. Dexcom CGM inserted. Confirmed that  is a shard result contact for BG < 55 or > 250.       Recommend insulin pump be resumed 8/3 at 6 - 7 AM.      Visit Type: evaluation      Genoveva Celis is a 34 y.o. female referred by:     [x] Physician  [] Nursing  [] Chart Review   [] Other:     PAST MEDICAL HISTORY        Diagnosis Date    Depression     Diabetes mellitus ([facility] [address]     Seizures ([facility] [address]        PAST SURGICAL HISTORY    Past Surgical History:   Procedure Laterality Date     SECTION      TUBAL LIGATION         FAMILY HISTORY    Family History   Problem Relation Age of Onset    Asthma Mother     Allergies Mother         sun allergy    Diabetes Type 1  Father     Diabetes Type 1  Maternal Grandfather     Diabetes Type 1  Cousin     Diabetes Type 1  Cousin     Diabetes Type 1  Maternal Uncle     Diabetes Type 1  Maternal Uncle     Diabetes Type 1  Maternal Aunt        SOCIAL HISTORY    Social History     Tobacco Use    Smoking status: Never    Smokeless tobacco: Never   Vaping Use    Vaping Use: Never used   Substance Use Topics    Alcohol use: No    Drug use: No       ALLERGIES    No Known Allergies    MEDICATIONS     magnesium oxide  400 mg Oral Daily    insulin glargine  10 Units SubCUTAneous Daily    vancomycin  750 mg IntraVENous Q8H    insulin lispro  0-4 Units SubCUTAneous TID WC    insulin lispro  0-4 Units SubCUTAneous Nightly    insulin lispro  3 Units SubCUTAneous TID WC    clonazePAM  2 mg Oral BID    gabapentin  300 mg Oral TID WC    And    gabapentin  1,200 mg Oral Nightly    sertraline  50 mg Oral Daily    chlorhexidine 15 mL Mouth/Throat BID    levETIRAcetam  1,000 mg IntraVENous Q12H    heparin (porcine)  5,000 Units SubCUTAneous 3 times per day    ondansetron  4 mg IntraVENous Once    vancomycin (VANCOCIN) intermittent dosing (placeholder)   Other RX Placeholder       Objective        Patient Active Problem List   Diagnosis Code    Acidosis E87.2    Seizure disorder ([facility] [address] G40.909    Type 1 diabetes mellitus with complication (HCC) G12.7    Anxiety and depression F41.9, F32. A    ROME (generalized anxiety disorder) F41.1    Anorexia nervosa F50.00    Diabetic peripheral neuropathy (HCC) E11.42    Gastroparesis K31.84    Weight loss, unintentional R63.4    Tachycardia R00.0    Elevated transaminase level R74.01    Bandemia D72.825    Elevated liver function tests R79.89    Nausea and vomiting R11.2    Diarrhea R19.7    Elevated liver enzymes R74.8    Diabetic ketoacidosis without coma associated with type 1 diabetes mellitus (HCC) E10.10    Sepsis ([facility]) A41.9    Leukocytosis D72.829    Abscess L02.91    Elevated platelet count B95.25    Seizure (HCC) R56.9    AMS (altered mental status) R41.82    Electrolyte imbalance E87.8    Weakness R53.1    DKA, type 1, not at goal [facility]) E10.10    Abscess of left groin L02.214    History of seizures Z87.898        /71   Pulse 95   Temp 98 °F (36.7 °C) (Oral)   Resp 11   Ht 4' 10\" (1.473 m)   Wt 103 lb 13.4 oz (47.1 kg)   SpO2 98%   BMI 21.70 kg/m²     HgBA1c:    Lab Results   Component Value Date/Time    LABA1C 13.6 05/24/2022 02:49 PM       Recent Labs     08/01/22  1610 08/01/22  1933 08/02/22  0737 08/02/22  1116   POCGLU 73 104* 149* 158*       BUN/Creatinine:    Lab Results   Component Value Date/Time    BUN <2 08/02/2022 05:00 AM    CREATININE <0.5 08/02/2022 05:00 AM       Assessment        Diabetes Management and Education    Does the patient have a Primary Care Physician? Yes, Jessenia Garcia MD       Does the patient require new medication instruction?  Yes - Lantus dose given this morning. She has [employer] Pod supplies and states will be picking up new insulin from [address] today. Recommend resuming insulin pump 8/3 6 - 7 AM.    Recommend using new insulin vial to fill Omni Pod. Person responsible for administration of Insulin/Medication:        [x] Self     [] Caregiver       [] Spouse       [] Other Family Member   []  Other    Insulin Instruction:  insulin pump  Injection Site:   [x] location    [x] rotation     Level of patient/caregiver understanding able to:      [x] Verbalized Understanding   [] Demonstrated Understanding       [] Teach Back       [] Needs Reinforcement     []  Other:        Does the patient/caregiver monitor Blood Glucoses? Yes - CGM in warm up period     Does the patient/caregiver understand S/S of Hypoglycemia? Yes  Reviewed symptoms, prevention and treatment. Level of patient/caregiver understanding able to:       [x] Verbalized Understanding   [] Demonstrated Understanding       [] Teach Back       [] Needs Reinforcement     []  Other:                    Does the patient/caregiver understand S/S of Hyperglycemia? No:   Identified temp affecting insulin, infection and missed dose as potential caused of hyperglycemia. Reviewed symptoms, prevention and treatment. Level of patient/caregiver understanding able to:        [x] Verbalized Understanding   [] Demonstrated Understanding       [] Teach Back       [] Needs Reinforcement     []  Other:           Plan        Ongoing diabetes education and blood glucose monitoring. Recommend follow up with [facility].                                            Discharge Plan:  Discharge needs: no prescription needs identified  Placement for patient upon discharge: independent living     New endocrinology scheduled:      Sep20 Follow up with Dr. Doreen Christie Sep 20, 2022  Specialty:Internal Medicine  at 2:55 for Diabetes follow up          Teaching Time Diabetes Education:  40 minutes Electronically signed by Felix Dyer on 8/2/2022 at 12:13 PM

## 2022-08-02 NOTE — PROGRESS NOTES
Arrived to place PICC line in patient withBerna RN at bedside, pre procedure and allergies reviewed, no issues accessing basilic vein, pt tolerated well, blood return and flushed well, tip verified with 3cg technology with peaked p-waves, OK to use PICC. Pt left in stable condition and bed braked and in lowest position. Pt call light within reach.  Handoff to RN

## 2022-08-02 NOTE — DISCHARGE INSTR - COC
Continuity of Care Form    Patient Name: Garcia Saleem   :  1990  MRN:  [id number]    Admit date:  2022  Discharge date:  8/3/22    Code Status Order: Full Code   Advance Directives:     Admitting Physician:  Salome Burnett DO  PCP: Madyson Aceves MD    Discharging Nurse: [address] Unit/Room#: D4E-2686/2114-01  Discharging Unit Phone Number: [phone]    Emergency Contact:   Extended Emergency Contact Information  Primary Emergency Contact: Rosalind De La O [address] Phone: [phone]  Mobile Phone: [phone]  Relation: Spouse  Secondary Emergency Contact: [address] Phone: [phone]  Relation: Parent    Past Surgical History:  Past Surgical History:   Procedure Laterality Date     SECTION      TUBAL LIGATION         Immunization History:   Immunization History   Administered Date(s) Administered    Influenza Virus Vaccine 2020    Influenza, David Staple, Recombinant, IM PF (Flublok 18 yrs and older) 10/22/2018       Active Problems:  Patient Active Problem List   Diagnosis Code    Acidosis E87.2    Seizure disorder ([facility] [address] G40.909    Type 1 diabetes mellitus with complication (HCC) M83.5    Anxiety and depression F41.9, F32. A    ROME (generalized anxiety disorder) F41.1    Anorexia nervosa F50.00    Diabetic peripheral neuropathy (HCC) E11.42    Gastroparesis K31.84    Weight loss, unintentional R63.4    Tachycardia R00.0    Elevated transaminase level R74.01    Bandemia D72.825    Elevated liver function tests R79.89    Nausea and vomiting R11.2    Diarrhea R19.7    Elevated liver enzymes R74.8    Diabetic ketoacidosis without coma associated with type 1 diabetes mellitus (HCC) E10.10    Sepsis (HCC) A41.9    Leukocytosis D72.829    Abscess L02.91    Elevated platelet count B70.11    Seizure ([facility] [address] R56.9    AMS (altered mental status) R41.82    Electrolyte imbalance E87.8    Weakness R53.1    DKA, type 1, not at goal [facility]) E10.10    Abscess of left groin L02.214    History of seizures Z87.898       Isolation/Infection:   Isolation            Contact          Patient Infection Status       Infection Onset Added Last Indicated Last Indicated By Review Planned Expiration Resolved Resolved By    MRSA 07/29/22 07/31/22 07/29/22 Culture, Blood 1        Resolved    COVID-19 (Rule Out) 07/29/22 07/29/22 07/29/22 COVID-19, Rapid (Ordered)   07/29/22 Rule-Out Test Resulted            Nurse Assessment:  Last Vital Signs: /71   Pulse 90   Temp 98 °F (36.7 °C) (Oral)   Resp 15   Ht 4' 10\" (1.473 m)   Wt 103 lb 13.4 oz (47.1 kg)   SpO2 98%   BMI 21.70 kg/m²     Last documented pain score (0-10 scale): Pain Level: 0  Last Weight:   Wt Readings from Last 1 Encounters:   08/02/22 103 lb 13.4 oz (47.1 kg)     Mental Status:  oriented and alert    IV Access:  - PICC - site  R Basilic, insertion date: 8/2/22    Nursing Mobility/ADLs:  Walking   Independent  Transfer  Independent  Bathing  Independent  Dressing  Independent  Toileting  Independent  Feeding  Independent  Med [address]  Med Delivery   whole    Wound Care Documentation and Therapy:        Elimination:  Continence: Bowel: Yes  Bladder: Yes  Urinary Catheter: None   Colostomy/Ileostomy/Ileal Conduit: No       Date of Last BM: 8/3/22    Intake/Output Summary (Last 24 hours) at 8/2/2022 1445  Last data filed at 8/2/2022 1000  Gross per 24 hour   Intake 2437.65 ml   Output 1300 ml   Net 1137.65 ml     I/O last 3 completed shifts: In: 3606.8 [P.O.:250; I.V.:1855.3; IV Piggyback:1501.4]  Out: 4180 [Urine:4180]    Safety Concerns:     History of Falls (last 30 days) and History of Seizures    Impairments/Disabilities:      None    Nutrition Therapy:  Current Nutrition Therapy:   - Oral Diet:  General and Carb Control 4 carbs/meal (1800kcals/day)    Routes of Feeding: Oral  Liquids:  Thin Liquids  Daily Fluid Restriction: no  Last Modified Barium Swallow with Video (Video Swallowing Test): not done    Treatments at the Time of Hospital Discharge:   Respiratory Treatments: N/A  Oxygen Therapy:  is not on home oxygen therapy. Ventilator:    - No ventilator support    Rehab Therapies: Nurse  Weight Bearing Status/Restrictions: No weight bearing restrictions  Other Medical Equipment (for information only, NOT a DME order): Other Treatments:     Patient's personal belongings (please select all that are sent with patient):  Clothing, cell phone, phone . RN SIGNATURE:  Electronically signed by Elisabeth Phan RN on 8/3/22 at 5:00 PM EDT    CASE MANAGEMENT/SOCIAL WORK SECTION    Inpatient Status Date: 7/29/2022    Readmission Risk Assessment Score:  Readmission Risk              Risk of Unplanned Readmission:  [id number]           Discharging to Facility/ Agency   Tyrell Montilla   [phone]     Dialysis Facility (if applicable)   Name:  Address:  Dialysis Schedule:  Phone:  Fax:    / signature: Electronically signed by Fatimah Valenzuela RN on 8/3/22 at 4:00 PM EDT    PHYSICIAN SECTION    Prognosis: Good    Condition at Discharge: Stable    Rehab Potential (if transferring to Rehab): Good    Recommended Labs or Other Treatments After Discharge:     IV Daptomycin x 285  mg x Q 24 HR X STOP DATE  X 8/29  CBC with diff, BMP, ESR, CRP, CK weekly  Fax results to [phone]  PICC line in place  First dose given in hospital   No ID follow up  [id number] Jayda Peña for ID pharmacist to monitor OPAT    Rosalio Vásquez MD  [facility] Physician  Phone: [phone]   Fax : [phone]       Physician Certification: I certify the above information and transfer of Lenore Amador  is necessary for the continuing treatment of the diagnosis listed and that she requires [address] for less 30 days.      Update Admission H&P: No change in H&P    PHYSICIAN SIGNATURE:  Electronically signed by Geena Nicholas MD on 8/2/22 at 4:21 PM EDT

## 2022-08-02 NOTE — PLAN OF CARE
Problem: Discharge Planning  Goal: Discharge to home or other facility with appropriate resources  8/2/2022 1110 by Marion Wright RN  Outcome: Completed  8/2/2022 0457 by Rachelle Crenshaw RN  Outcome: Progressing  Flowsheets (Taken 8/1/2022 2000)  Discharge to home or other facility with appropriate resources: Identify barriers to discharge with patient and caregiver     Problem: Pain  Goal: Verbalizes/displays adequate comfort level or baseline comfort level  8/2/2022 1110 by Marion Wright RN  Outcome: Completed  8/2/2022 0457 by Rachelle Crenshaw RN  Outcome: Progressing     Problem: Skin/Tissue Integrity  Goal: Absence of new skin breakdown  Description: 1. Monitor for areas of redness and/or skin breakdown  2. Assess vascular access sites hourly  3. Every 4-6 hours minimum:  Change oxygen saturation probe site  4. Every 4-6 hours:  If on nasal continuous positive airway pressure, respiratory therapy assess nares and determine need for appliance change or resting period.   8/2/2022 1110 by Marion Wright RN  Outcome: Completed  8/2/2022 0457 by Rachelle Crenshaw RN  Outcome: Progressing     Problem: Safety - Adult  Goal: Free from fall injury  8/2/2022 1110 by Marion Wright RN  Outcome: Completed  8/2/2022 0457 by Rachelle Crenshaw RN  Outcome: Progressing     Problem: ABCDS Injury Assessment  Goal: Absence of physical injury  8/2/2022 1110 by Marion Wright RN  Outcome: Completed  8/2/2022 0457 by Rachelle Crenshaw RN  Outcome: Progressing     Problem: Chronic Conditions and Co-morbidities  Goal: Patient's chronic conditions and co-morbidity symptoms are monitored and maintained or improved  Outcome: Completed

## 2022-08-02 NOTE — PROGRESS NOTES
General and Vascular Surgery                                                           Daily Progress Note                                                             Eli Brambila PA-C     Pt Name: Afshan Herman Record Number: [id number]  Date of Birth 1990   Today's Date: 8/2/2022    ASSESSMENT/PLAN   1. Left groin abscess  Appropriately drained. Continue local wound care. Dressing changed. No purulent drainage or signs of infection. No fluctuance. Continue vancomycin, cefepime, flagyl  Unclear if this is the main source of her leukocytosis. Leukocytosis improving: WBC count 44.4 --> 21.4 -->11.3-->6.4  No general surgery needs at this time  OK to D/C home. No need to pack abscess at home just cover with guaze and keep wound site/incision site clean until healed   2. DKA  Per hospitalist  3. Hx of seizures  Neurology consulted    EDUCATION  Patient educated about their illness/diagnosis, stated above, and all questions answered. We discussed the importance of nutrition, medications they are taking, and healthy lifestyle. Phuong Garcia has improved from yesterday. Pain is well controlled. She is tolerating regular diet. OBJECTIVE  VITALS:  height is 4' 10\" (1.473 m) and weight is 103 lb 13.4 oz (47.1 kg). Her oral temperature is 98 °F (36.7 °C). Her blood pressure is 102/71 and her pulse is 90. Her respiration is 13 and oxygen saturation is 98%. VITALS:  /71   Pulse 90   Temp 98 °F (36.7 °C) (Oral)   Resp 13   Ht 4' 10\" (1.473 m)   Wt 103 lb 13.4 oz (47.1 kg)   SpO2 98%   BMI 21.70 kg/m²   GENERAL: alert, no distress  LUNGS: clear to ausculation, without wheezes, rales or rhonci  HEART: normal rate and regular rhythm  ABDOMEN: soft, non-tender, non-distended, and bowel sounds present in all 4 quadrants  EXTREMITY/GROIN: Left groin abscess site draining well. Pain controlled  I/O last 3 completed shifts:   In: 3606.8 [P.O.:250; I.V.:1855.3; IV Piggyback:1501.4]  Out: 4180 [Urine:4180]  No intake/output data recorded.     LABS  Recent Labs     08/02/22  0500   WBC 6.4   HGB 7.9*   HCT 24.8*         K 4.1      CO2 25   BUN <2*   CREATININE <0.5*   MG 1.70*   PHOS 4.2   CALCIUM 8.5     CBC:   Lab Results   Component Value Date/Time    WBC 6.4 08/02/2022 05:00 AM    RBC 3.11 08/02/2022 05:00 AM    HGB 7.9 08/02/2022 05:00 AM    HCT 24.8 08/02/2022 05:00 AM    MCV 79.7 08/02/2022 05:00 AM    MCH 25.4 08/02/2022 05:00 AM    MCHC 31.9 08/02/2022 05:00 AM    RDW 15.4 08/02/2022 05:00 AM     08/02/2022 05:00 AM    MPV 7.3 08/02/2022 05:00 AM     CMP:    Lab Results   Component Value Date/Time     08/02/2022 05:00 AM    K 4.1 08/02/2022 05:00 AM    K 6.5 07/29/2022 07:44 PM     08/02/2022 05:00 AM    CO2 25 08/02/2022 05:00 AM    BUN <2 08/02/2022 05:00 AM    CREATININE <0.5 08/02/2022 05:00 AM    GFRAA >60 08/02/2022 05:00 AM    GFRAA >60 02/27/2013 10:30 PM    AGRATIO 1.0 07/29/2022 07:44 PM    LABGLOM >60 08/02/2022 05:00 AM    GLUCOSE 181 08/02/2022 05:00 AM    PROT 9.1 07/29/2022 07:44 PM    PROT 7.4 02/27/2013 10:30 PM    LABALBU 4.6 07/29/2022 07:44 PM    CALCIUM 8.5 08/02/2022 05:00 AM    BILITOT <0.2 07/29/2022 07:44 PM    ALKPHOS 214 07/29/2022 07:44 PM    AST 45 07/29/2022 07:44 PM    ALT 30 07/29/2022 07:44 PM         Bhumi Black PA-C  Electronically signed 8/2/2022 at 9:45 AM

## 2022-08-02 NOTE — PROGRESS NOTES
Nephrology Note                                                                                                                                                                                                                                                                                                                                                               Office : [phone]     Fax :[phone]              Patient's Name: Cl Ray  8/2/2022    Reason for Consult: electrolyte imbalance       INTERVAL HISTORY    Feels better  Shortness of breath: No   UOP: Good   Creat: stable  No nausea or vomiting        Allergies:  Patient has no known allergies.     Current Medications:    insulin glargine (LANTUS) injection vial 10 Units, Daily  vancomycin (VANCOCIN) 750 mg in sodium chloride 0.9 % 250 mL IVPB, Q8H  insulin lispro (HUMALOG) injection vial 0-4 Units, TID WC  insulin lispro (HUMALOG) injection vial 0-4 Units, Nightly  insulin lispro (HUMALOG) injection vial 3 Units, TID WC  0.45 % sodium chloride infusion, Continuous  clonazePAM (KLONOPIN) tablet 2 mg, BID  gabapentin (NEURONTIN) capsule 300 mg, TID WC   And  gabapentin (NEURONTIN) capsule 1,200 mg, Nightly  sertraline (ZOLOFT) tablet 50 mg, Daily  acetaminophen (TYLENOL) tablet 650 mg, Q4H PRN   Or  acetaminophen (TYLENOL) suppository 650 mg, Q4H PRN  chlorhexidine (PERIDEX) 0.12 % solution 15 mL, BID  levETIRAcetam (KEPPRA) 1000 mg/100 mL IVPB, Q12H  dextrose bolus 10% 125 mL, PRN   Or  dextrose bolus 10% 250 mL, PRN  magnesium sulfate 1000 mg in dextrose 5% 100 mL IVPB, PRN  sodium phosphate 10 mmol in sodium chloride 0.9 % 250 mL IVPB, PRN   Or  sodium phosphate 15 mmol in dextrose 5 % 250 mL IVPB, PRN   Or  sodium phosphate 20 mmol in dextrose 5 % 500 mL IVPB, PRN  heparin (porcine) injection 5,000 Units, 3 times per day  midazolam (VERSED) injection 2 mg, Q4H PRN  diazePAM (VALIUM) injection 2.5 mg, Q12H PRN  ondansetron (ZOFRAN) injection 4 72 hours. Urine Culture: No results for input(s): LABURIN in the last 72 hours. Urine Chemistry: No results for input(s): Miki Sloop, PROTEINUR, NAUR in the last 72 hours. IMAGING:  MRI LUMBAR SPINE WO CONTRAST   Final Result   1. No acute abnormality. 2. No significant spinal canal stenosis. MRI THORACIC SPINE WO CONTRAST   Final Result   No acute abnormality. No significant degenerative change. MRI CERVICAL SPINE WO CONTRAST   Final Result   1. No acute abnormality   2. No significant spinal canal stenosis. 3. T1 hypointense, stir hyperintense lesion the C2 vertebral body, possibly   an atypical hemangioma. Post-contrast imaging may be useful for further   evaluation. MRI BRAIN WO CONTRAST   Final Result   Unremarkable MRI of the brain without contrast.         CTA HEAD NECK W CONTRAST   Final Result   1. No acute intracranial abnormality. 2. Unremarkable CTA of the head and neck. CT HEAD WO CONTRAST   Final Result   1. No acute intracranial abnormality. 2. Unremarkable CTA of the head and neck. CT CHEST ABDOMEN PELVIS WO CONTRAST   Final Result   1. No evidence of acute cardiopulmonary disease   2. Marked hepatomegaly, with a sagittal length of 24.8 cm   3. Stoddard catheter within the urinary bladder   4. Skin laceration, left groin region   5. Nonobstructive bowel gas pattern         XR CHEST PORTABLE   Final Result   Right IJ catheter identified in satisfactory position. Bonne Major No pneumothorax. XR CHEST PORTABLE   Final Result   No acute process. CT HEAD WO CONTRAST   Final Result   No acute intracranial abnormality. CT CERVICAL SPINE WO CONTRAST   Final Result   No acute abnormality of the cervical spine. Assessment/Plan   1. DKA   -resolved    2. HTN  -BP at goal    3. Anemia    5.  Weakness       Plan   - replace Mg  Dc IVF  - MRI brain - no acute changes   - BS management   - replace lytes   - monitor UO Recommend to dose adjust all medications  based on renal functions  Maintain SBP> 90 mmHg   Daily weights   AVOID NSAIDs  Avoid Nephrotoxins  Monitor Intake/Output  Call if significant decrease in urine output                   Thank you for allowing us to participate in care of Ministerio Friedman MD  Feel free to contact me   [facility] [address]  Office : [phone]  Fax :[phone]

## 2022-08-03 ENCOUNTER — TELEPHONE (OUTPATIENT)
Dept: INTERNAL MEDICINE CLINIC | Age: 32
End: 2022-08-03

## 2022-08-03 VITALS
HEART RATE: 95 BPM | HEIGHT: 58 IN | WEIGHT: 104.5 LBS | OXYGEN SATURATION: 98 % | BODY MASS INDEX: 21.94 KG/M2 | TEMPERATURE: 97.8 F | SYSTOLIC BLOOD PRESSURE: 119 MMHG | DIASTOLIC BLOOD PRESSURE: 99 MMHG | RESPIRATION RATE: 19 BRPM

## 2022-08-03 LAB
ANION GAP SERPL CALCULATED.3IONS-SCNC: 8 MMOL/L (ref 3–16)
BASOPHILS ABSOLUTE: 0 K/UL (ref 0–0.2)
BASOPHILS RELATIVE PERCENT: 0.8 %
BUN BLDV-MCNC: <2 MG/DL (ref 7–20)
CALCIUM SERPL-MCNC: 8.5 MG/DL (ref 8.3–10.6)
CHLORIDE BLD-SCNC: 107 MMOL/L (ref 99–110)
CO2: 29 MMOL/L (ref 21–32)
CREAT SERPL-MCNC: <0.5 MG/DL (ref 0.6–1.1)
CULTURE, BLOOD 2: NORMAL
EOSINOPHILS ABSOLUTE: 0.1 K/UL (ref 0–0.6)
EOSINOPHILS RELATIVE PERCENT: 1.7 %
GFR AFRICAN AMERICAN: >60
GFR NON-AFRICAN AMERICAN: >60
GLUCOSE BLD-MCNC: 112 MG/DL (ref 70–99)
GLUCOSE BLD-MCNC: 118 MG/DL (ref 70–99)
GLUCOSE BLD-MCNC: 209 MG/DL (ref 70–99)
GLUCOSE BLD-MCNC: 84 MG/DL (ref 70–99)
HCT VFR BLD CALC: 25.6 % (ref 36–48)
HEMOGLOBIN: 8 G/DL (ref 12–16)
LYMPHOCYTES ABSOLUTE: 2.5 K/UL (ref 1–5.1)
LYMPHOCYTES RELATIVE PERCENT: 44.6 %
MAGNESIUM: 1.8 MG/DL (ref 1.8–2.4)
MCH RBC QN AUTO: 25.1 PG (ref 26–34)
MCHC RBC AUTO-ENTMCNC: 31.3 G/DL (ref 31–36)
MCV RBC AUTO: 80 FL (ref 80–100)
MONOCYTES ABSOLUTE: 0.3 K/UL (ref 0–1.3)
MONOCYTES RELATIVE PERCENT: 6.1 %
NEUTROPHILS ABSOLUTE: 2.6 K/UL (ref 1.7–7.7)
NEUTROPHILS RELATIVE PERCENT: 46.8 %
PDW BLD-RTO: 14.9 % (ref 12.4–15.4)
PERFORMED ON: ABNORMAL
PERFORMED ON: ABNORMAL
PERFORMED ON: NORMAL
PHOSPHORUS: 4.5 MG/DL (ref 2.5–4.9)
PLATELET # BLD: 391 K/UL (ref 135–450)
PMV BLD AUTO: 7.4 FL (ref 5–10.5)
POTASSIUM SERPL-SCNC: 3.8 MMOL/L (ref 3.5–5.1)
RBC # BLD: 3.2 M/UL (ref 4–5.2)
SODIUM BLD-SCNC: 144 MMOL/L (ref 136–145)
WBC # BLD: 5.6 K/UL (ref 4–11)

## 2022-08-03 PROCEDURE — 6360000002 HC RX W HCPCS: Performed by: INTERNAL MEDICINE

## 2022-08-03 PROCEDURE — 84100 ASSAY OF PHOSPHORUS: CPT

## 2022-08-03 PROCEDURE — 6370000000 HC RX 637 (ALT 250 FOR IP): Performed by: FAMILY MEDICINE

## 2022-08-03 PROCEDURE — 85025 COMPLETE CBC W/AUTO DIFF WBC: CPT

## 2022-08-03 PROCEDURE — 6360000002 HC RX W HCPCS: Performed by: STUDENT IN AN ORGANIZED HEALTH CARE EDUCATION/TRAINING PROGRAM

## 2022-08-03 PROCEDURE — 2580000003 HC RX 258: Performed by: INTERNAL MEDICINE

## 2022-08-03 PROCEDURE — 6370000000 HC RX 637 (ALT 250 FOR IP): Performed by: HOSPITALIST

## 2022-08-03 PROCEDURE — 80048 BASIC METABOLIC PNL TOTAL CA: CPT

## 2022-08-03 PROCEDURE — 2580000003 HC RX 258: Performed by: FAMILY MEDICINE

## 2022-08-03 PROCEDURE — 99233 SBSQ HOSP IP/OBS HIGH 50: CPT | Performed by: INTERNAL MEDICINE

## 2022-08-03 PROCEDURE — 83735 ASSAY OF MAGNESIUM: CPT

## 2022-08-03 PROCEDURE — 6360000002 HC RX W HCPCS: Performed by: FAMILY MEDICINE

## 2022-08-03 RX ADMIN — GABAPENTIN 300 MG: 300 CAPSULE ORAL at 10:28

## 2022-08-03 RX ADMIN — 0.12% CHLORHEXIDINE GLUCONATE 15 ML: 1.2 RINSE ORAL at 10:19

## 2022-08-03 RX ADMIN — Medication 400 MG: at 10:18

## 2022-08-03 RX ADMIN — LEVETIRACETAM 1000 MG: 10 INJECTION, SOLUTION INTRAVENOUS at 17:49

## 2022-08-03 RX ADMIN — LEVETIRACETAM 1000 MG: 10 INJECTION, SOLUTION INTRAVENOUS at 05:16

## 2022-08-03 RX ADMIN — HEPARIN SODIUM 5000 UNITS: 5000 INJECTION INTRAVENOUS; SUBCUTANEOUS at 13:45

## 2022-08-03 RX ADMIN — INSULIN LISPRO 1 UNITS: 100 INJECTION, SOLUTION INTRAVENOUS; SUBCUTANEOUS at 13:45

## 2022-08-03 RX ADMIN — INSULIN LISPRO 3 UNITS: 100 INJECTION, SOLUTION INTRAVENOUS; SUBCUTANEOUS at 13:47

## 2022-08-03 RX ADMIN — VANCOMYCIN HYDROCHLORIDE 750 MG: 750 INJECTION, POWDER, LYOPHILIZED, FOR SOLUTION INTRAVENOUS at 06:28

## 2022-08-03 RX ADMIN — CLONAZEPAM 2 MG: 1 TABLET ORAL at 10:17

## 2022-08-03 RX ADMIN — HEPARIN SODIUM 5000 UNITS: 5000 INJECTION INTRAVENOUS; SUBCUTANEOUS at 05:15

## 2022-08-03 RX ADMIN — DAPTOMYCIN 285 MG: 500 INJECTION, POWDER, LYOPHILIZED, FOR SOLUTION INTRAVENOUS at 12:18

## 2022-08-03 RX ADMIN — SERTRALINE 50 MG: 50 TABLET, FILM COATED ORAL at 10:18

## 2022-08-03 RX ADMIN — GABAPENTIN 300 MG: 300 CAPSULE ORAL at 17:47

## 2022-08-03 RX ADMIN — GABAPENTIN 300 MG: 300 CAPSULE ORAL at 13:45

## 2022-08-03 ASSESSMENT — PAIN SCALES - GENERAL
PAINLEVEL_OUTOF10: 0

## 2022-08-03 NOTE — CARE COORDINATION
CASE MANAGEMENT DISCHARGE SUMMARY:    DISCHARGE DATE: 8/3/2022    DISCHARGED TO: home     Discharging to Facility/ Agency   Name: [facility]  Address:  [address] [place] [address]   Phone:  [phone]  Fax:  [phone]     TRANSPORTATION: via family             TIME: TBD by patient and bedside RN    COMMENTS: Patient to discharge home with family support. Patient on Ivabx for bacteremia. [facility] [employer] to follow outpatient to aid in abx administration. Both companies aware of discharge today. Bedside RN and patient aware of discharge plan and timeline.     Electronically signed by Thu Gomez RN on 8/3/2022 at 3:58 PM

## 2022-08-03 NOTE — PROGRESS NOTES
Infectious Disease Follow up Notes  Admit Date: 7/29/2022  Hospital Day: 6    Antibiotics :   IV Daptomycin     CHIEF COMPLAINT:     Sepsis  DKA  MRSA bacteremia  WBC elevation    Subjective interval History :  34 y.o. woman with significant history for diabetes, seizure disorder, depression, admitted to hospital secondary to DKA. She was found unresponsive by her partner who was not clear whether she had suffered a seizure however was postictal.  Patient was found to be in rapid acidotic breathing, blood glucose was more than thousand with lactic acid of 7 admitted to ICU for DKA protocol. Admission labs indicate potassium of 6.5 creatinine at 1.1 glucose 1254 CRP 34.9, beta hydroxybutyric acid elevated greater than 8, WBC 37.7 with marked left shift and thrombocytosis, blood culture from admission positive for MRSA. She was also found to have a left groin abscess that required incision and drainage by surgery. Wound culture now positive for MRSA as well. Since correction of DKA or acidosis history of resolved mental status improved patient is able to provide some history during rounds in the ICU today. She is getting a PICC line placed for IV antibiotics. Given the sepsis MRSA bacteremia we are consulted for recommendations. Patient indicates she developed a left groin abscess a week ago that progressively got worse. Her diabetes control has been poor with elevated hemoglobin A1c. Location :    Left groin pain, swelling    Quality :   aching      Severity :  10/10     Duration :   5 days    Timing : constant   Context :  DKA    Modifying factors :none   Associated signs and symptoms:Left groin swelling, pain and abscess     Interval History : Remains in ICU tolerating antibiotic therapy okay PICC line placed.   Still feeling more thirsty and mouth is still dry, WBC trending down, follow blood cultures remain negative          Past Medical History:    Past Medical History:   Diagnosis Date    Depression     Diabetes mellitus ([facility]     Seizures ([facility]        Past Surgical History:    Past Surgical History:   Procedure Laterality Date     SECTION      TUBAL LIGATION         Current Medications:    Outpatient Medications Marked as Taking for the 22 encounter [facility] Encounter)   Medication Sig Dispense Refill    cephALEXin (KEFLEX) 500 MG capsule Take 500 mg by mouth in the morning and 500 mg before bedtime. Allergies:  Patient has no known allergies.     Immunizations :   Immunization History   Administered Date(s) Administered    Influenza Virus Vaccine 2020    Influenza, Sullivan Patee, Recombinant, IM PF (Flublok 18 yrs and older) 10/22/2018       Social History:    Social History     Tobacco Use    Smoking status: Never    Smokeless tobacco: Never   Vaping Use    Vaping Use: Never used   Substance Use Topics    Alcohol use: No    Drug use: No     Social History     Tobacco Use   Smoking Status Never   Smokeless Tobacco Never      Family History   Problem Relation Age of Onset    Asthma Mother     Allergies Mother         sun allergy    Diabetes Type 1  Father     Diabetes Type 1  Maternal Grandfather     Diabetes Type 1  Cousin     Diabetes Type 1  Cousin     Diabetes Type 1  Maternal Uncle     Diabetes Type 1  Maternal Uncle     Diabetes Type 1  Maternal Aunt          REVIEW OF SYSTEMS:     Constitutional:  negative for fevers, chills, night sweats  Eyes:  negative for blurred vision, eye discharge, visual disturbance   HEENT:  negative for hearing loss, ear drainage,nasal congestion  Respiratory:  negative for cough, shortness of breath or hemoptysis   Cardiovascular:  negative for chest pain, palpitations, syncope  Gastrointestinal:  negative for nausea, vomiting, diarrhea, constipation, abdominal pain  Genitourinary:  negative for frequency, dysuria, urinary incontinence, hematuria  Hematologic/Lymphatic:  negative for easy bruising, bleeding and lymphadenopathy  Allergic/Immunologic:  negative for recurrent infections, angioedema, anaphylaxis   Endocrine:  negative for weight changes, polyuria, polydipsia and polyphagia  Musculoskeletal:  Left groin pain+ , swelling, decreased range of motion  Integumentary: No rashes, skin lesions  Neurological:  negative for headaches, slurred speech, unilateral weakness  Psychiatric: negative for hallucinations,confusion,agitation.                 PHYSICAL EXAM:      Vitals:    /83   Pulse 81   Temp 97.4 °F (36.3 °C) (Axillary)   Resp 10   Ht 4' 10\" (1.473 m)   Wt 104 lb 8 oz (47.4 kg)   SpO2 98%   BMI 21.84 kg/m²   General Appearance: alert,in some  acute distress,+++  pallor, no icterus chronic ill appearing woman +   Skin: warm and dry, no rash or erythema  Head: normocephalic and atraumatic  Eyes: pupils equal, round, and reactive to light, conjunctivae normal  ENT: tympanic membrane, external ear and ear canal normal bilaterally, nose without deformity, nasal mucosa and turbinates normal without polyps  Neck: supple and non-tender without mass, no thyromegaly  no cervical lymphadenopathy  Pulmonary/Chest: clear to auscultation bilaterally- no wheezes, rales or rhonchi, normal air movement, no respiratory distress  Cardiovascular: normal rate, regular rhythm, normal S1 and S2, no murmurs, rubs, clicks, or gallops, no carotid bruits  Abdomen: soft, non-tender, non-distended, normal bowel sounds, no masses or organomegaly  Extremities: no cyanosis, clubbing or edema  Musculoskeletal: normal range of motion, no joint swelling, deformity or tenderness  Integumentary: No rashes, no abnormal skin lesions, no petechiae  Neurologic: reflexes normal and symmetric, no cranial nerve deficit  Psych:  Orientation, sensorium, mood normal            Lines: PICC  Left groin area packing dressing+    Data Review:    CBC:   Lab Results   Component Value Date    WBC 5.6 08/03/2022    HGB 8.0 (L) 08/03/2022    HCT 25.6 (L) 08/03/2022    MCV 80.0 08/03/2022     08/03/2022     RENAL:   Lab Results   Component Value Date    CREATININE <0.5 (L) 08/03/2022    BUN <2 (L) 08/03/2022     08/03/2022    K 3.8 08/03/2022     08/03/2022    CO2 29 08/03/2022     SED RATE:   Lab Results   Component Value Date/Time    SEDRATE 122 07/29/2022 07:44 PM     CK: No results found for: CKTOTAL  CRP:   Lab Results   Component Value Date/Time    CRP 34.2 07/29/2022 07:44 PM     Hepatic Function Panel:   Lab Results   Component Value Date/Time    ALKPHOS 214 07/29/2022 07:44 PM    ALT 30 07/29/2022 07:44 PM    AST 45 07/29/2022 07:44 PM    PROT 9.1 07/29/2022 07:44 PM    PROT 7.4 02/27/2013 10:30 PM    BILITOT <0.2 07/29/2022 07:44 PM    BILIDIR <0.2 11/21/2021 12:11 AM    IBILI see below 11/21/2021 12:11 AM    LABALBU 4.6 07/29/2022 07:44 PM     UA:  Lab Results   Component Value Date/Time    COLORU Yellow 07/29/2022 10:21 PM    CLARITYU Clear 07/29/2022 10:21 PM    GLUCOSEU >=1000 07/29/2022 10:21 PM    GLUCOSEU >=1000 01/17/2011 09:15 AM    BILIRUBINUR Negative 07/29/2022 10:21 PM    BILIRUBINUR NEGATIVE 01/17/2011 09:15 AM    KETUA >=160 07/29/2022 10:21 PM    SPECGRAV 1.025 07/29/2022 10:21 PM    BLOODU Negative 07/29/2022 10:21 PM    PHUR 5.0 07/29/2022 10:21 PM    PHUR 5.0 07/29/2022 10:21 PM    PROTEINU TRACE 07/29/2022 10:21 PM    UROBILINOGEN 0.2 07/29/2022 10:21 PM    NITRU Negative 07/29/2022 10:21 PM    LEUKOCYTESUR Negative 07/29/2022 10:21 PM    LABMICR YES 07/29/2022 10:21 PM    Augusto Sagarjessica NotGiven 07/29/2022 10:21 PM      Urine Microscopic:   Lab Results   Component Value Date/Time    BACTERIA None Seen 07/29/2022 10:21 PM    COMU see below 05/26/2022 06:41 PM    HYALCAST 2 07/29/2022 10:21 PM    WBCUA 0 07/29/2022 10:21 PM    RBCUA 0 07/29/2022 10:21 PM    EPIU 0 07/29/2022 10:21 PM     Urine Reflex to Culture:   Lab Results   Component Value Date/Time    URRFLXCULT Not Indicated 07/29/2022 10:21 PM         MICRO: cultures reviewed and updated by me   Blood Culture:   Procedure Component Value Units Date/Time   Culture, Blood 2 [[id number]] Collected: 07/29/22 2211   Order Status: Completed Specimen: Blood Updated: 08/03/22 0115    Culture, Blood 2 No Growth after 4 days of incubation. Narrative:     ORDER#: [id number]                          ORDERED BY: Joanna Avila   SOURCE: Blood                              COLLECTED:  07/29/22 22:11   ANTIBIOTICS AT SHILPA.:                      RECEIVED :  07/29/22 22:22   If child <=2 yrs old please draw pediatric bottle. ~Blood Culture #2   Culture, Blood 1 [[id number]] (Abnormal)  Collected: 07/29/22 2326   Order Status: Completed Specimen: Blood Updated: 08/02/22 0636    Organism Staph aureus MRSA DNA Detected Abnormal     Blood Culture, Routine -- Abnormal     mecA/C gene and MREJ gene Detected. CONTACT PRECAUTIONS INDICATED   See additional report for complete BCID panel. See additional report for complete BCID panel. Abnormal     Organism Staph aureus MRSA Abnormal     Blood Culture, Routine -- Abnormal     POSITIVE for   CONTACT PRECAUTIONS INDICATED   Isolated one of two sets    Abnormal    Narrative:     ORDER#: [id number]                          ORDERED BY: Joanna Avila   SOURCE: Blood                              COLLECTED:  07/29/22 23:26   ANTIBIOTICS AT SHILPA.:                      RECEIVED :  07/30/22 00:40   CALL  [place]  [facility] tel. [phone],   Microbiology results called to and read back by CHIN Cross, 07/31/2022   00:37, by [facility]   Microbiology results called to and read back by Lanny Webb, 07/31/2022   00:36, by [facility]   If child <=2 yrs old please draw pediatric bottle. ~Blood Culture 1   Culture, Blood 1 [[id number]] Collected: 07/31/22 1018   Order Status: Completed Specimen: Blood Updated: 08/01/22 1218    Blood Culture, Routine No Growth to date.   Any change in status will be called. Narrative:     ORDER#: [id number]                          ORDERED BY: Rachele Echola   SOURCE: Blood                              COLLECTED:  07/31/22 10:18   ANTIBIOTICS AT SHILPA.:                      RECEIVED :  07/31/22 10:22   If child <=2 yrs old please draw pediatric bottle. ~Blood Culture 1   Culture, Blood 2 [[id number]] Collected: 07/31/22 1018   Order Status: Completed Specimen: Blood Updated: 08/01/22 1218    Culture, Blood 2 No Growth to date. Any change in status will be called. Narrative:     ORDER#: [id number]                          ORDERED BY: Rachele Echola   SOURCE: Blood                              COLLECTED:  07/31/22 10:18   ANTIBIOTICS AT SHILPA.:                      RECEIVED :  07/31/22 10:22   If child <=2 yrs old please draw pediatric bottle. ~Blood Culture #2   Culture, Wound [[id number]] (Abnormal)  Collected: 07/29/22 2211   Order Status: Completed Specimen: Skin Updated: 08/01/22 0622    Gram Stain Result 2+ WBC's (Polymorphonuclear)   No Epithelial Cells seen   2+ Gram positive cocci    Abnormal     Organism Staph aureus MRSA Abnormal     WOUND/ABSCESS -- Abnormal     Moderate growth   CONTACT PRECAUTIONS INDICATED   PBP2= POSITIVE    Abnormal    Narrative:     ORDER#: [id number]                          ORDERED BY: Liberty Larose   SOURCE: Skin Abscess to mons pubis         COLLECTED:  07/29/22 22:11   ANTIBIOTICS AT SHILPA.:                      RECEIVED :  07/29/22 22:22   CALL  [place]  [facility] tel. [phone],   Previous panic on this admission - call not needed per SOP, 07/31/2022 10:29,   by Yari Ashton   Culture, Blood, PCR ID Panel [[id number]] Collected: 07/29/22 2326   Order Status: Completed Updated: 07/31/22 0038    Report SEE IMAGE   Narrative:     [place]  [facility] tel. [phone],   Microbiology results called to and read back by CHIN Sharp, 07/31/2022   00:37, by [facility]   Microbiology results called to and read back by Lanny Mancera, 07/31/2022   00:36, by [facility] ORDERED BY: Medhat Singh   SOURCE: Blood                              COLLECTED:  07/29/22 23:26        Lab Results   Component Value Date/Time    [facility]  07/31/2022 10:18 AM     No Growth to date. Any change in status will be called. BLOODCULT2  07/31/2022 10:18 AM     No Growth to date. Any change in status will be called. Respiratory Culture:  Lab Results   Component Value Date/Time    LABGRAM  07/29/2022 10:11 PM     2+ WBC's (Polymorphonuclear)  No Epithelial Cells seen  2+ Gram positive cocci       AFB:No results found for: AFBSMEAR  Viral Culture:  Lab Results   Component Value Date/Time    COVID19 Not Detected 07/29/2022 10:11 PM     Urine Culture: No results for input(s): LABURIN in the last 72 hours. IMAGING:    XR CHEST PORTABLE   Final Result   Interval placement a right-sided PICC line with the tip overlying the   cavoatrial junction. No radiographic evidence of acute pulmonary abnormality   seen. MRI LUMBAR SPINE WO CONTRAST   Final Result   1. No acute abnormality. 2. No significant spinal canal stenosis. MRI THORACIC SPINE WO CONTRAST   Final Result   No acute abnormality. No significant degenerative change. MRI CERVICAL SPINE WO CONTRAST   Final Result   1. No acute abnormality   2. No significant spinal canal stenosis. 3. T1 hypointense, stir hyperintense lesion the C2 vertebral body, possibly   an atypical hemangioma. Post-contrast imaging may be useful for further   evaluation. MRI BRAIN WO CONTRAST   Final Result   Unremarkable MRI of the brain without contrast.         CTA HEAD NECK W CONTRAST   Final Result   1. No acute intracranial abnormality. 2. Unremarkable CTA of the head and neck. CT HEAD WO CONTRAST   Final Result   1. No acute intracranial abnormality. 2. Unremarkable CTA of the head and neck. CT CHEST ABDOMEN PELVIS WO CONTRAST   Final Result   1. No evidence of acute cardiopulmonary disease   2.  Marked hepatomegaly, with a sagittal length of 24.8 cm   3. Stoddard catheter within the urinary bladder   4. Skin laceration, left groin region   5. Nonobstructive bowel gas pattern         XR CHEST PORTABLE   Final Result   Right IJ catheter identified in satisfactory position. Farhan Power No pneumothorax. XR CHEST PORTABLE   Final Result   No acute process. CT HEAD WO CONTRAST   Final Result   No acute intracranial abnormality. CT CERVICAL SPINE WO CONTRAST   Final Result   No acute abnormality of the cervical spine.                All the pertinent images and reports for the current Hospitalization were reviewed by me     Scheduled Meds:   magnesium oxide  400 mg Oral Daily    sodium chloride flush  5-40 mL IntraVENous 2 times per day    insulin glargine  10 Units SubCUTAneous Daily    vancomycin  750 mg IntraVENous Q8H    insulin lispro  0-4 Units SubCUTAneous TID WC    insulin lispro  0-4 Units SubCUTAneous Nightly    insulin lispro  3 Units SubCUTAneous TID WC    clonazePAM  2 mg Oral BID    gabapentin  300 mg Oral TID WC    And    gabapentin  1,200 mg Oral Nightly    sertraline  50 mg Oral Daily    chlorhexidine  15 mL Mouth/Throat BID    levETIRAcetam  1,000 mg IntraVENous Q12H    heparin (porcine)  5,000 Units SubCUTAneous 3 times per day    ondansetron  4 mg IntraVENous Once    vancomycin (VANCOCIN) intermittent dosing (placeholder)   Other RX Placeholder       Continuous Infusions:   sodium chloride      dextrose      norepinephrine Stopped (07/30/22 0636)       PRN Meds:  sodium chloride flush, sodium chloride, acetaminophen **OR** acetaminophen, dextrose bolus **OR** dextrose bolus, magnesium sulfate, sodium phosphate IVPB **OR** sodium phosphate IVPB **OR** sodium phosphate IVPB, midazolam, diazePAM, glucose, dextrose bolus **OR** dextrose bolus, glucagon (rDNA), dextrose      Assessment:     Patient Active Problem List   Diagnosis    Lactic acidosis    Seizure disorder (HCC)    Type 1 diabetes - anticipate x stop date  x  8/29  7 PICC in place     8/ COC DONE       Discussed with patient/Family and Nursing d/w Primary team   Risk of Complications/Morbidity: High      Illness(es)/ Infection present that pose threat to bodily function. There is potential for severe exacerbation of infection/side effects of treatment. Therapy requires intensive monitoring for antimicrobial agent toxicity    Discussed with patient/Family and Nursing staff     Thanks for allowing me to participate in your patient's care and please call me with any questions or concerns.     Odessa Hogan MD  Infectious Disease  [facility]) Physician  Phone: [phone]   Fax : [phone]

## 2022-08-03 NOTE — TELEPHONE ENCOUNTER
Patient was admitted to [address] [place] [address] on 07/29/22 for DM. Possible discharge for tomorrow (08/04/22).

## 2022-08-03 NOTE — CARE COORDINATION
General acute hospital SPECIALTY [facility]    Referral received from CM to follow for home care services. [facility] unable to staff timely, will require alternate agency, no preference, CM aware. [facility] [employer], [facility]. Per Diaz Vincent at [facility], unable to staff. Per Anthony Burnette at [facility], unable to staff. Per Kunal Saleem at [facility] [employer], not taking payor at present time. Per Maliha Mijares at [place], [place]    Per Silas at [facility] at [place], [place]    Per Malika Montilla at [facility], [place]    Per Maria Guadalupe Kay at [facility], they are in network, but unable to staff for IV needs    LMVM for intake at [facility], demos faxed via Epic, waiting on response. [facility], unable to staff. Per Sha Luo at [address]    Per Luis Eduardo Xiao at [place], [place]    Referral accepted by Tracey Alcocer at [facility], will pull from [place]. Donna at [address] notified.           Luis Eduardo Kinney RN, BSN CTN  [facility] [phone]

## 2022-08-03 NOTE — PROGRESS NOTES
Patient to discharge home. Patient's  driving to transport patient. VSS, no complaints of pain or shortness of breath. Patient to discharge with single lumen PICC in R basilic. Patient denies needs at this time. AVS printed and reviewed with patient. RN discussed importance of follow up appointments and getting dexcom synced with phone.

## 2022-08-03 NOTE — PROGRESS NOTES
Focus:  diabetes education     Wakes to name. Slow to answer my questions. CGM with loss of signal message. Gloria Chapman was not able to independently follow the \"help\"  instructions. Do not recommend transitioning to insulin pump until more awake.       Electronically signed by Bethanie Grayson RN on 8/3/2022 at 9:59 AM

## 2022-08-03 NOTE — CARE COORDINATION
Discharge Planning:    [address] accepted patient. [employer] aware. Waiting on TAMMY to be completed with [facility] information. Secure message sent to Dr. James Ayers; awaiting response.     Electronically signed by Aiden Bernabe RN on 8/3/2022 at 9:51 AM

## 2022-08-03 NOTE — PROGRESS NOTES
Hospitalist Progress Note      PCP: Rik Lopez MD    Date of Admission: 7/29/2022    Chief Complaint: Weakness    Hospital Course:      Subjective: no complaints. Talking and interacting more normally      Medications:  Reviewed    Infusion Medications    sodium chloride      dextrose      norepinephrine Stopped (07/30/22 0636)     Scheduled Medications    magnesium oxide  400 mg Oral Daily    sodium chloride flush  5-40 mL IntraVENous 2 times per day    insulin glargine  10 Units SubCUTAneous Daily    vancomycin  750 mg IntraVENous Q8H    insulin lispro  0-4 Units SubCUTAneous TID WC    insulin lispro  0-4 Units SubCUTAneous Nightly    insulin lispro  3 Units SubCUTAneous TID WC    clonazePAM  2 mg Oral BID    gabapentin  300 mg Oral TID WC    And    gabapentin  1,200 mg Oral Nightly    sertraline  50 mg Oral Daily    chlorhexidine  15 mL Mouth/Throat BID    levETIRAcetam  1,000 mg IntraVENous Q12H    heparin (porcine)  5,000 Units SubCUTAneous 3 times per day    ondansetron  4 mg IntraVENous Once    vancomycin (VANCOCIN) intermittent dosing (placeholder)   Other RX Placeholder     PRN Meds: sodium chloride flush, sodium chloride, acetaminophen **OR** acetaminophen, dextrose bolus **OR** dextrose bolus, magnesium sulfate, sodium phosphate IVPB **OR** sodium phosphate IVPB **OR** sodium phosphate IVPB, midazolam, diazePAM, glucose, dextrose bolus **OR** dextrose bolus, glucagon (rDNA), dextrose      Intake/Output Summary (Last 24 hours) at 8/2/2022 2128  Last data filed at 8/2/2022 1000  Gross per 24 hour   Intake 1400.88 ml   Output --   Net 1400.88 ml         Exam:    BP (!) 158/87   Pulse 99   Temp 97.6 °F (36.4 °C) (Oral)   Resp 10   Ht 4' 10\" (1.473 m)   Wt 103 lb 13.4 oz (47.1 kg)   SpO2 100%   BMI 21.70 kg/m²     General appearance: No apparent distress, appears stated age and cooperative. HEENT: Pupils equal, round, and reactive to light. Conjunctivae/corneas clear.   Neck: Supple, with full range of motion. No jugular venous distention. Trachea midline. Respiratory:  Normal respiratory effort. Clear to auscultation, bilaterally without Rales/Wheezes/Rhonchi. Cardiovascular: Regular rate and rhythm with normal S1/S2 without murmurs, rubs or gallops. Abdomen: Soft, non-tender, non-distended with normal bowel sounds. Musculoskeletal: No clubbing, cyanosis or edema bilaterally. Skin: Skin color, texture, turgor normal.  No rashes or lesions. Neurologic: CN's 2-12 intact, moving all extremities well  Psychiatric: Alert and oriented, thought content appropriate, normal insight  Capillary Refill: Brisk,< 3 seconds   Peripheral Pulses: +2 palpable, equal bilaterally       Labs:   Recent Labs     07/31/22  0445 08/01/22  0500 08/02/22  0500   WBC 21.4* 11.3* 6.4   HGB 7.4* 7.8* 7.9*   HCT 23.6* 24.8* 24.8*   * 470* 418       Recent Labs     07/31/22  0840 08/01/22  0500 08/02/22  0500    144 143   K 3.7 3.7 4.1    111* 108   CO2 17* 23 25   BUN 4* <2* <2*   CREATININE <0.5* <0.5* <0.5*   CALCIUM 7.7* 8.5 8.5   PHOS 2.2* 3.2 4.2       No results for input(s): AST, ALT, BILIDIR, BILITOT, ALKPHOS in the last 72 hours. No results for input(s): INR in the last 72 hours. No results for input(s): Janet Highman in the last 72 hours. Urinalysis:      Lab Results   Component Value Date/Time    NITRU Negative 07/29/2022 10:21 PM    WBCUA 0 07/29/2022 10:21 PM    BACTERIA None Seen 07/29/2022 10:21 PM    RBCUA 0 07/29/2022 10:21 PM    BLOODU Negative 07/29/2022 10:21 PM    SPECGRAV 1.025 07/29/2022 10:21 PM    GLUCOSEU >=1000 07/29/2022 10:21 PM    GLUCOSEU >=1000 01/17/2011 09:15 AM       Radiology:  XR CHEST PORTABLE   Final Result   Interval placement a right-sided PICC line with the tip overlying the   cavoatrial junction. No radiographic evidence of acute pulmonary abnormality   seen. MRI LUMBAR SPINE WO CONTRAST   Final Result   1. No acute abnormality.    2. No significant spinal canal stenosis. MRI THORACIC SPINE WO CONTRAST   Final Result   No acute abnormality. No significant degenerative change. MRI CERVICAL SPINE WO CONTRAST   Final Result   1. No acute abnormality   2. No significant spinal canal stenosis. 3. T1 hypointense, stir hyperintense lesion the C2 vertebral body, possibly   an atypical hemangioma. Post-contrast imaging may be useful for further   evaluation. MRI BRAIN WO CONTRAST   Final Result   Unremarkable MRI of the brain without contrast.         CTA HEAD NECK W CONTRAST   Final Result   1. No acute intracranial abnormality. 2. Unremarkable CTA of the head and neck. CT HEAD WO CONTRAST   Final Result   1. No acute intracranial abnormality. 2. Unremarkable CTA of the head and neck. CT CHEST ABDOMEN PELVIS WO CONTRAST   Final Result   1. No evidence of acute cardiopulmonary disease   2. Marked hepatomegaly, with a sagittal length of 24.8 cm   3. Stoddard catheter within the urinary bladder   4. Skin laceration, left groin region   5. Nonobstructive bowel gas pattern         XR CHEST PORTABLE   Final Result   Right IJ catheter identified in satisfactory position. Mala Gull No pneumothorax. XR CHEST PORTABLE   Final Result   No acute process. CT HEAD WO CONTRAST   Final Result   No acute intracranial abnormality. CT CERVICAL SPINE WO CONTRAST   Final Result   No acute abnormality of the cervical spine.                  Assessment/Plan:    Active Hospital Problems    Diagnosis Date Noted    MRSA bacteremia [R78.81, B95.62] 08/02/2022     Priority: Medium    Poorly controlled diabetes mellitus ([facility] [address] [E11.65] 08/02/2022     Priority: Medium    Hyperkalemia [E87.5] 08/02/2022     Priority: Medium    Abscess of groin, left [L02.214] 08/01/2022     Priority: Medium    History of seizures [Z87.898] 08/01/2022     Priority: Medium    Electrolyte imbalance [E87.8] 07/31/2022     Priority: Medium    Weakness [R53.1] 07/31/2022     Priority: Medium    DKA, type 1, not at goal [facility]) [E10.10] 07/31/2022     Priority: Medium    Elevated platelet count [R32.98] 07/30/2022     Priority: Medium    Seizures ([address] [R56.9] 07/30/2022     Priority: Medium    AMS (altered mental status) [R41.82] 07/30/2022     Priority: Medium    Diabetic ketoacidosis without coma associated with type 1 diabetes mellitus ([facility] [address] [E10.10] 07/29/2022     Priority: Medium    Sepsis ([facility] [address] [A41.9] 07/29/2022     Priority: Medium    Neutrophilia [D72.9] 07/29/2022     Priority: Medium    Abscess [L02.91] 07/29/2022     Priority: Medium    Lactic acidosis [E87.2] 02/13/2018     DKA  Lactic acidosis/metabolic acidosis  -s/p DKA insulin drip and electrolyte repletion per protocol  - insulin subQ restarted    MRSA bacteremia  Inguinal area abscess  -Status post drainage and packing in the ED  -Continue vancomycin inpatient  - ID consulted:  IV daptomycin on D/C    Diffuse weakness:  resolved  -likely multifactorial from DKA, electrolyte disturbances and psyciatric/conversion d/o  - MRI workup reassuring    Seizure: Prior to presentation  -Likely due to DKA  -Continue Keppra  -home clonazepam restarted    Hypokalemia, hypomagnesemia, hypophosphatemia, hypocalcium  -Replete IV as needed    DVT Prophylaxis: Lovenox  Diet: ADULT DIET; Regular; 4 carb choices (60 gm/meal)  Code Status: Full Code    PT/OT Eval Status: Pending    Dispo -MedSurg appropriate.   Likely D/C tomorrow after arranging for home nursing    Hany Alas MD

## 2022-08-03 NOTE — PROGRESS NOTES
Hospitalist Progress Note      PCP: Susan Eddy MD    Date of Admission: 7/29/2022    Chief Complaint: Weakness    Hospital Course:      Subjective: denies complaints      Medications:  Reviewed    Infusion Medications    sodium chloride      dextrose      norepinephrine Stopped (07/30/22 0636)     Scheduled Medications    daptomycin (CUBICIN) IVPB  6 mg/kg IntraVENous Q24H    magnesium oxide  400 mg Oral Daily    sodium chloride flush  5-40 mL IntraVENous 2 times per day    insulin glargine  10 Units SubCUTAneous Daily    insulin lispro  0-4 Units SubCUTAneous TID WC    insulin lispro  0-4 Units SubCUTAneous Nightly    insulin lispro  3 Units SubCUTAneous TID WC    clonazePAM  2 mg Oral BID    gabapentin  300 mg Oral TID WC    And    gabapentin  1,200 mg Oral Nightly    sertraline  50 mg Oral Daily    chlorhexidine  15 mL Mouth/Throat BID    levETIRAcetam  1,000 mg IntraVENous Q12H    heparin (porcine)  5,000 Units SubCUTAneous 3 times per day    ondansetron  4 mg IntraVENous Once     PRN Meds: sodium chloride flush, sodium chloride, acetaminophen **OR** acetaminophen, dextrose bolus **OR** dextrose bolus, magnesium sulfate, sodium phosphate IVPB **OR** sodium phosphate IVPB **OR** sodium phosphate IVPB, midazolam, diazePAM, glucose, dextrose bolus **OR** dextrose bolus, glucagon (rDNA), dextrose      Intake/Output Summary (Last 24 hours) at 8/3/2022 1024  Last data filed at 8/3/2022 7855  Gross per 24 hour   Intake 714.83 ml   Output --   Net 714.83 ml         Exam:    /83   Pulse 81   Temp 97.4 °F (36.3 °C) (Axillary)   Resp 10   Ht 4' 10\" (1.473 m)   Wt 104 lb 8 oz (47.4 kg)   SpO2 98%   BMI 21.84 kg/m²     General appearance: No apparent distress, appears stated age and cooperative. HEENT: Pupils equal, round, and reactive to light. Conjunctivae/corneas clear. Neck: Supple, with full range of motion. No jugular venous distention. Trachea midline. Respiratory:  Normal respiratory effort. Clear to auscultation, bilaterally without Rales/Wheezes/Rhonchi. Cardiovascular: Regular rate and rhythm with normal S1/S2 without murmurs, rubs or gallops. Abdomen: Soft, non-tender, non-distended with normal bowel sounds. Musculoskeletal: No clubbing, cyanosis or edema bilaterally. Skin: Skin color, texture, turgor normal.  No rashes or lesions. Neurologic: CN's 2-12 intact, moving all extremities well  Psychiatric: Alert and oriented, thought content appropriate, normal insight  Capillary Refill: Brisk,< 3 seconds   Peripheral Pulses: +2 palpable, equal bilaterally       Labs:   Recent Labs     08/01/22  0500 08/02/22  0500 08/03/22  0515   WBC 11.3* 6.4 5.6   HGB 7.8* 7.9* 8.0*   HCT 24.8* 24.8* 25.6*   * 418 391       Recent Labs     08/01/22  0500 08/02/22  0500 08/03/22  0515    143 144   K 3.7 4.1 3.8   * 108 107   CO2 23 25 29   BUN <2* <2* <2*   CREATININE <0.5* <0.5* <0.5*   CALCIUM 8.5 8.5 8.5   PHOS 3.2 4.2 4.5       No results for input(s): AST, ALT, BILIDIR, BILITOT, ALKPHOS in the last 72 hours. No results for input(s): INR in the last 72 hours. No results for input(s): Jacqueline Parisian in the last 72 hours. Urinalysis:      Lab Results   Component Value Date/Time    NITRU Negative 07/29/2022 10:21 PM    WBCUA 0 07/29/2022 10:21 PM    BACTERIA None Seen 07/29/2022 10:21 PM    RBCUA 0 07/29/2022 10:21 PM    BLOODU Negative 07/29/2022 10:21 PM    SPECGRAV 1.025 07/29/2022 10:21 PM    GLUCOSEU >=1000 07/29/2022 10:21 PM    GLUCOSEU >=1000 01/17/2011 09:15 AM       Radiology:  XR CHEST PORTABLE   Final Result   Interval placement a right-sided PICC line with the tip overlying the   cavoatrial junction. No radiographic evidence of acute pulmonary abnormality   seen. MRI LUMBAR SPINE WO CONTRAST   Final Result   1. No acute abnormality. 2. No significant spinal canal stenosis. MRI THORACIC SPINE WO CONTRAST   Final Result   No acute abnormality.   No significant degenerative change. MRI CERVICAL SPINE WO CONTRAST   Final Result   1. No acute abnormality   2. No significant spinal canal stenosis. 3. T1 hypointense, stir hyperintense lesion the C2 vertebral body, possibly   an atypical hemangioma. Post-contrast imaging may be useful for further   evaluation. MRI BRAIN WO CONTRAST   Final Result   Unremarkable MRI of the brain without contrast.         CTA HEAD NECK W CONTRAST   Final Result   1. No acute intracranial abnormality. 2. Unremarkable CTA of the head and neck. CT HEAD WO CONTRAST   Final Result   1. No acute intracranial abnormality. 2. Unremarkable CTA of the head and neck. CT CHEST ABDOMEN PELVIS WO CONTRAST   Final Result   1. No evidence of acute cardiopulmonary disease   2. Marked hepatomegaly, with a sagittal length of 24.8 cm   3. Stoddard catheter within the urinary bladder   4. Skin laceration, left groin region   5. Nonobstructive bowel gas pattern         XR CHEST PORTABLE   Final Result   Right IJ catheter identified in satisfactory position. Cloteal Sarah No pneumothorax. XR CHEST PORTABLE   Final Result   No acute process. CT HEAD WO CONTRAST   Final Result   No acute intracranial abnormality. CT CERVICAL SPINE WO CONTRAST   Final Result   No acute abnormality of the cervical spine.                  Assessment/Plan:    Active Hospital Problems    Diagnosis Date Noted    MRSA bacteremia [R78.81, B95.62] 08/02/2022     Priority: Medium    Poorly controlled diabetes mellitus ([facility] [address] [E11.65] 08/02/2022     Priority: Medium    Hyperkalemia [E87.5] 08/02/2022     Priority: Medium    Abscess of groin, left [L02.214] 08/01/2022     Priority: Medium    History of seizures [Z87.898] 08/01/2022     Priority: Medium    Electrolyte imbalance [E87.8] 07/31/2022     Priority: Medium    Weakness [R53.1] 07/31/2022     Priority: Medium    DKA, type 1, not at goal [facility]) [E10.10] 07/31/2022     Priority: Medium Elevated platelet count [D86.91] 07/30/2022     Priority: Medium    Seizures ([facility] [address] [R56.9] 07/30/2022     Priority: Medium    AMS (altered mental status) [R41.82] 07/30/2022     Priority: Medium    Diabetic ketoacidosis without coma associated with type 1 diabetes mellitus ([facility] [address] [E10.10] 07/29/2022     Priority: Medium    Sepsis ([facility] [address] [A41.9] 07/29/2022     Priority: Medium    Neutrophilia [D72.9] 07/29/2022     Priority: Medium    Abscess [L02.91] 07/29/2022     Priority: Medium    Lactic acidosis [E87.2] 02/13/2018     DKA  Lactic acidosis/metabolic acidosis  -s/p DKA insulin drip and electrolyte repletion per protocol  - insulin subQ restarted    MRSA bacteremia  Inguinal area abscess  -Status post drainage and packing in the ED  -Continue vancomycin inpatient  - ID consulted:  IV daptomycin on D/C    Diffuse weakness:  resolved  -likely multifactorial from DKA, electrolyte disturbances and psyciatric/conversion d/o  - MRI workup reassuring    Seizure: Prior to presentation  -Likely due to DKA  -Continue Keppra  -home clonazepam restarted    Hypokalemia, hypomagnesemia, hypophosphatemia, hypocalcium  -Replete IV as needed    DVT Prophylaxis: Lovenox  Diet: ADULT DIET;  Regular; 4 carb choices (60 gm/meal)  Code Status: Full Code    PT/OT Eval Status: Pending    Dispo -dc planning for next day    Tisha Roa MD

## 2022-08-03 NOTE — PROGRESS NOTES
Physician Progress Note      Karlos Tucker  CSN #:                  [id number]  :                       1990  ADMIT DATE:       2022 7:25 PM  [address] [place] DATE:  RESPONDING  PROVIDER #:        Juni Berg MD          QUERY TEXT:    Pt admitted with sepsis . Pt noted to have altered mental status . If   possible, please document in the progress notes and discharge summary if you   are evaluating and / or treating any of the following: The medical record reflects the following:  Risk Factors: dka, sepsis, seizure  Clinical Indicators: Documentation per h&p of altered mental status. Per   Neurology - Encephalopathy related to DKA. Per H&P-     Patient is still   altered and cannot give any further history and is not able to participate in   exam.  Neurologic: Unable to evaluate  Mental status: Unable to evaluate . Per pn - Patient inconsistently   interacting with people,  Only nodding head, will not say any words to me.     bc + mrsa, glu- 1254, k+6.5,   wbc 37.7. CT/MRI brain - no acute  Treatment: monitor mental status, reorient to environment,  insulin gtt,   monitor labs, iv abx. Thank you, Armand Warner RN CDS CRCR CCDS  [email or web address]  Options provided:  -- Metabolic encephalopathy  -- Septic encephalopathy  -- Other - I will add my own diagnosis  -- Disagree - Not applicable / Not valid  -- Disagree - Clinically unable to determine / Unknown  -- Refer to Clinical Documentation Reviewer    PROVIDER RESPONSE TEXT:    This patient has metabolic encephalopathy. Query created by:  Devin Warner on 2022 8:44 AM      Electronically signed by:  Juni Berg MD 8/3/2022 8:26 AM

## 2022-08-04 ENCOUNTER — TELEPHONE (OUTPATIENT)
Dept: INTERNAL MEDICINE CLINIC | Age: 32
End: 2022-08-04

## 2022-08-04 ENCOUNTER — PHARMACY VISIT (OUTPATIENT)
Dept: INFECTIOUS DISEASES | Age: 32
End: 2022-08-04

## 2022-08-04 DIAGNOSIS — L02.91 ABSCESS: Primary | ICD-10-CM

## 2022-08-04 LAB
BLOOD CULTURE, ROUTINE: NORMAL
CULTURE, BLOOD 2: NORMAL

## 2022-08-04 NOTE — PROGRESS NOTES
Dr. Chanda Pineda has placed a referral order for pharmacist to manage Outpatient Parental Antimicrobial Therapy (OPAT) pursuant the ID Collaborative Practice Agreement. Patient and/or caregiver has verbally consented to have drug therapy managed by a pharmacist. The benefits and risks of complex antimicrobial therapy including drug-specific adverse reactions and necessary follow-up were discussed with patient and/or caregiver and they are amenable to OPAT and pharmacist management. Pertinent Objective Data:     Wt Readings from Last 1 Encounters:   08/03/22 104 lb 8 oz (47.4 kg)      BMI Readings from Last 1 Encounters:   08/03/22 21.84 kg/m²      Serum creatinine: 0.5 mg/dL   Estimated creatinine clearance: 119 mL/min    Lab Results   Component Value Date     08/03/2022    K 3.8 08/03/2022     08/03/2022    CO2 29 08/03/2022    BUN <2 (L) 08/03/2022    CREATININE <0.5 (L) 08/03/2022    GLUCOSE 112 (H) 08/03/2022    CALCIUM 8.5 08/03/2022    PROT 9.1 (H) 07/29/2022    LABALBU 4.6 07/29/2022    BILITOT <0.2 07/29/2022    ALKPHOS 214 (H) 07/29/2022    AST 45 (H) 07/29/2022    ALT 30 07/29/2022    LABGLOM >60 08/03/2022    GFRAA >60 08/03/2022    AGRATIO 1.0 (L) 07/29/2022    GLOB 2.9 10/27/2021       Lab Results   Component Value Date    WBC 5.6 08/03/2022    HGB 8.0 (L) 08/03/2022    HCT 25.6 (L) 08/03/2022    MCV 80.0 08/03/2022     08/03/2022    LYMPHOPCT 44.6 08/03/2022    RBC 3.20 (L) 08/03/2022    MCH 25.1 (L) 08/03/2022    MCHC 31.3 08/03/2022    RDW 14.9 08/03/2022       Lab Results   Component Value Date    CRP 34.2 (H) 07/29/2022       Lab Results   Component Value Date    SEDRATE 122 (H) 07/29/2022       OPAT Orders:  Organism/Diagnosis  MRSA bacteremia likely 2/2 L groin abscess  7/29 blood- MRSA with vanc RASHAWN= 1  7/29 abscess- MRSA vanc RASHAWN= 2  7/31 blood- NGTD   Antimicrobial Regimen and Projected End Date IV dapto 285 mg (6 mg/kg) daily until 8/29   Weekly Lab Monitoring CBCwDiff, CMP, CRP, ESR, and CK   Repeat Imaging Needed None   Follow up in ID Clinic None   LDA/OPAT Access R single lumen PICC   [facility]   Home RN Agency  [facility]     Lab and medication orders have been placed. Clinical Pharmacist will review patient weekly or as needed and make recommendations regarding the above therapy plan. Thank you,  Loco Cardoso, PharmD, [facility]  [facility] [address] [facility]  [address]  [address] [place], [address] [place] [address]  Phone: [phone] (also available on [facility])  Fax: [phone]    For Pharmacy [address] [place] [address] in place:  Yes  Time Spent (min): 15

## 2022-08-04 NOTE — TELEPHONE ENCOUNTER
I left a voice mail for the patient. I want to do her TCM call and get her scheduled for a follow up appointment.

## 2022-08-05 ENCOUNTER — TELEPHONE (OUTPATIENT)
Dept: INTERNAL MEDICINE CLINIC | Age: 32
End: 2022-08-05

## 2022-08-05 NOTE — TELEPHONE ENCOUNTER
Caroline Bennett, a RN  from [address] [place] [address], called in stating that the patient was discharged from hospital on 8/3/22. Pls advise.

## 2022-08-08 ENCOUNTER — TELEPHONE (OUTPATIENT)
Dept: PHARMACY | Age: 32
End: 2022-08-08

## 2022-08-08 LAB
ANION GAP SERPL CALCULATED.3IONS-SCNC: 12 MMOL/L (ref 3–16)
BASOPHILS ABSOLUTE: 0.1 K/UL (ref 0–0.2)
BASOPHILS RELATIVE PERCENT: 1.7 %
BUN BLDV-MCNC: 8 MG/DL (ref 7–20)
C-REACTIVE PROTEIN: 3.4 MG/L (ref 0–5.1)
CALCIUM SERPL-MCNC: 9.7 MG/DL (ref 8.3–10.6)
CHLORIDE BLD-SCNC: 101 MMOL/L (ref 99–110)
CO2: 25 MMOL/L (ref 21–32)
CREAT SERPL-MCNC: <0.5 MG/DL (ref 0.6–1.1)
EOSINOPHILS ABSOLUTE: 0.1 K/UL (ref 0–0.6)
EOSINOPHILS RELATIVE PERCENT: 0.9 %
GFR AFRICAN AMERICAN: >60
GFR NON-AFRICAN AMERICAN: >60
GLUCOSE BLD-MCNC: 196 MG/DL (ref 70–99)
HCT VFR BLD CALC: 30.7 % (ref 36–48)
HEMOGLOBIN: 10.1 G/DL (ref 12–16)
LYMPHOCYTES ABSOLUTE: 2.6 K/UL (ref 1–5.1)
LYMPHOCYTES RELATIVE PERCENT: 42.4 %
MCH RBC QN AUTO: 25.5 PG (ref 26–34)
MCHC RBC AUTO-ENTMCNC: 32.7 G/DL (ref 31–36)
MCV RBC AUTO: 77.8 FL (ref 80–100)
MONOCYTES ABSOLUTE: 0.6 K/UL (ref 0–1.3)
MONOCYTES RELATIVE PERCENT: 9.1 %
NEUTROPHILS ABSOLUTE: 2.9 K/UL (ref 1.7–7.7)
NEUTROPHILS RELATIVE PERCENT: 45.9 %
PDW BLD-RTO: 15.4 % (ref 12.4–15.4)
PLATELET # BLD: 422 K/UL (ref 135–450)
PMV BLD AUTO: 7.9 FL (ref 5–10.5)
POTASSIUM SERPL-SCNC: 4.4 MMOL/L (ref 3.5–5.1)
RBC # BLD: 3.95 M/UL (ref 4–5.2)
SEDIMENTATION RATE, ERYTHROCYTE: 55 MM/HR (ref 0–20)
SODIUM BLD-SCNC: 138 MMOL/L (ref 136–145)
TOTAL CK: 32 U/L (ref 26–192)
WBC # BLD: 6.2 K/UL (ref 4–11)

## 2022-08-08 NOTE — TELEPHONE ENCOUNTER
New Diabetes referral from the hospitalist.  Recommended by the Diabetes Educator. Reached out to schedule initial appointment.      Left message to please return my call    [place], PharmD  [address] [facility] [place], [place]  Diabetes Service  [phone]

## 2022-08-11 ENCOUNTER — TELEPHONE (OUTPATIENT)
Dept: INTERNAL MEDICINE CLINIC | Age: 32
End: 2022-08-11

## 2022-08-11 ENCOUNTER — HOSPITAL ENCOUNTER (EMERGENCY)
Age: 32
Discharge: HOME | DRG: 720 | End: 2022-08-11
Payer: COMMERCIAL

## 2022-08-11 ENCOUNTER — APPOINTMENT (OUTPATIENT)
Dept: GENERAL RADIOLOGY | Age: 32
DRG: 720 | End: 2022-08-11
Payer: COMMERCIAL

## 2022-08-11 VITALS
BODY MASS INDEX: 20.82 KG/M2 | OXYGEN SATURATION: 98 % | HEART RATE: 94 BPM | RESPIRATION RATE: 14 BRPM | TEMPERATURE: 97.7 F | HEIGHT: 58 IN | DIASTOLIC BLOOD PRESSURE: 78 MMHG | SYSTOLIC BLOOD PRESSURE: 110 MMHG | WEIGHT: 99.21 LBS

## 2022-08-11 DIAGNOSIS — Z95.828 STATUS POST PERIPHERALLY INSERTED CENTRAL CATHETER (PICC) CENTRAL LINE PLACEMENT: Primary | ICD-10-CM

## 2022-08-11 PROCEDURE — 71046 X-RAY EXAM CHEST 2 VIEWS: CPT

## 2022-08-11 PROCEDURE — 99283 EMERGENCY DEPT VISIT LOW MDM: CPT

## 2022-08-11 ASSESSMENT — PAIN - FUNCTIONAL ASSESSMENT
PAIN_FUNCTIONAL_ASSESSMENT: NONE - DENIES PAIN
PAIN_FUNCTIONAL_ASSESSMENT: NONE - DENIES PAIN

## 2022-08-11 NOTE — DISCHARGE INSTRUCTIONS
Continue using the PICC line as instructed. Be cautious about it and dressing to make sure that you are not disturbing the line. Follow-up outpatient as indicated. Return to the emergency department for any emergency medical condition.

## 2022-08-11 NOTE — TELEPHONE ENCOUNTER
Pt transferred from [facility] ([facility])    Pt c/o PICC line is coming out and mother stated that BS is 1200.     Appt offered for n/a- was advised to got to the ED     Dr. Marian Contreras informed- pt should be seen in the ED     Also, appt for Sept changed to sooner appt in Aug.

## 2022-08-11 NOTE — ED PROVIDER NOTES
**ADVANCED PRACTICE PROVIDER, I HAVE EVALUATED THIS PATIENT**        [address]      Pt Name: Kenya Odonnell  TFU:[id number]  Cresenciogfanastasiya 1990  Date of evaluation: 2022  Provider: Jean-Pierre Michael PA-C      Chief Complaint:    Chief Complaint   Patient presents with    Vascular Access Problem     Thinks PICC line may be dislodged. Nursing Notes, Past Medical Hx, Past Surgical Hx, Social Hx, Allergies, and Family Hx were all reviewed and agreed with or any disagreements were addressed in the HPI.    HPI: (Location, Duration, Timing, Severity, Quality, Assoc Sx, Context, Modifying factors)    Chief Complaint of worried about potential issue with right arm PICC line    This is a  34 y.o. female who presents stating that she recently had blood infection and is now on daily IV antibiotics through her PICC line. Something started feeling funny with the right upper extremity PICC line yesterday evening and still today. Therefore she did not take her afternoon antibiotic but decided to come to the ER for further care and treatment. She thinks that maybe it is starting to come out because there was a little local tenderness or burning. No swelling or increased heat or redness bruising or deformities. No shortness of breath or chest pain fevers or chills cough congestion or other acute complaint.     PastMedical/Surgical History:      Diagnosis Date    Depression     Diabetes mellitus ([facility]     Seizures ([facility]          Procedure Laterality Date     SECTION      TUBAL LIGATION         Medications:  Previous Medications    BLOOD GLUCOSE MONITORING SUPPL (TRUE METRIX METER) W/DEVICE KIT    Check blood sugar 4 times daily, tidac    BLOOD GLUCOSE TEST STRIPS (TRUE METRIX BLOOD GLUCOSE TEST) STRIP    Check blood sugar 4-6 times daily and as needed for symptoms of irregular glucose    CLONAZEPAM (KLONOPIN) 2 MG TABLET    Take 1 tablet by mouth 2 times daily. CONTINUOUS BLOOD GLUC SENSOR (DEXCOM G6 SENSOR) MISC    Apply as directed for blood sugar monitoring    CONTINUOUS BLOOD GLUC TRANSMIT (DEXCOM G6 TRANSMITTER) MISC    USE AS DIRECTED    DAPTOMYCIN (CUBICIN) INFUSION    Infuse 285 mg intravenously every 24 hours for 25 days Compound per protocol. FAMOTIDINE (PEPCID) 20 MG TABLET    Take 20 mg by mouth 2 times daily    GABAPENTIN (NEURONTIN) 300 MG CAPSULE    TAKE 1 CAPSULE BY MOUTH THREE TIMES DAILY DRUNG THE DAY AND 4 CAPSULES BY MOUTH EVERY DAY AT BEDTIME    GLUCAGON, RDNA, 1 MG INJECTION    Inject 1 mg into the muscle as needed for Low blood sugar (Blood glucose less than 70 mg/dL and patient NOT ALERT or NPO and does not have IV access.)    IBUPROFEN (ADVIL;MOTRIN) 800 MG TABLET    Take 1 tablet by mouth 3 times daily as needed for Pain    INSULIN ASPART (NOVOLOG FLEXPEN) 100 UNIT/ML INJECTION PEN    Inject 20 units with meals/snacks up to 5 times per day    INSULIN ASPART (NOVOLOG) 100 UNIT/ML INJECTION VIAL    Inject 100 Units into the skin daily In conjunction with omnipod    INSULIN ASPART (NOVOLOG) 100 UNIT/ML INJECTION VIAL    Inject 100 units into the skin daily in conjunction with omnipod    INSULIN DISPOSABLE PUMP (OMNIPOD DASH PODS, GEN 4,) MISC    Continuous pump - max dose 60 units    LANCETS MISC    Check blood sugar 4 times daily, tidac    LEVETIRACETAM (KEPPRA) 500 MG TABLET    Take 2 tablets by mouth 2 times daily    ONDANSETRON (ZOFRAN) 4 MG TABLET    Take 1 tablet by mouth 3 times daily as needed for Nausea or Vomiting    SERTRALINE (ZOLOFT) 50 MG TABLET    Take 1 tablet by mouth daily         Review of Systems:  (2-9 systems needed)  Review of Systems  Positive history as above with no acute fall contusion or twisting injury or known overuse injury. Patient with PICC line still in the right upper extremity with no local swelling increased heat redness or new deformity or any oozing or seeping.   No generalized extremity tenderness. No shortness of breath or chest pain vomiting or diarrhea fevers or chills cough or congestion. \"Positives and Pertinent negatives as per HPI\"    Physical Exam:  Physical Exam  Vitals and nursing note reviewed. Constitutional:       General: She is not in acute distress. Appearance: Normal appearance. She is not ill-appearing, toxic-appearing or diaphoretic. HENT:      Head: Normocephalic and atraumatic. Right Ear: External ear normal.      Left Ear: External ear normal.      Nose: Nose normal.   Eyes:      General:         Right eye: No discharge. Left eye: No discharge. Conjunctiva/sclera: Conjunctivae normal.   Cardiovascular:      Rate and Rhythm: Normal rate and regular rhythm. Pulses: Normal pulses. Heart sounds: Normal heart sounds. No murmur heard. No gallop. Pulmonary:      Effort: Pulmonary effort is normal. No respiratory distress. Breath sounds: Normal breath sounds. No wheezing, rhonchi or rales. Musculoskeletal:         General: No swelling, tenderness or signs of injury. Normal range of motion. Cervical back: Normal range of motion and neck supple. No rigidity or tenderness. Comments: Right upper arm does have the PICC line appearing well fastened and in place with no increased heat redness bruising crusting or oozing. Distal pulses 2+ bilaterally and radialis. Capillary fill brisk less than 1 second throughout. No elbow pain or wrist pain or edema. Lymphadenopathy:      Cervical: No cervical adenopathy. Skin:     General: Skin is warm and dry. Capillary Refill: Capillary refill takes less than 2 seconds. Findings: No rash. Neurological:      Mental Status: She is alert and oriented to person, place, and time. Mental status is at baseline.    Psychiatric:         Mood and Affect: Mood normal.         Behavior: Behavior normal.       MEDICAL DECISION MAKING    Vitals:    Vitals:    08/11/22 1356   BP: 110/78   Pulse: 94   Resp: 14   Temp: 97.7 °F (36.5 °C)   TempSrc: Oral   SpO2: 98%   Weight: 99 lb 3.3 oz (45 kg)   Height: 4' 10\" (1.473 m)       LABS:Labs Reviewed - No data to display       RADIOLOGY:   Non-plain film images such as CT, Ultrasound and MRI are read by the radiologist. Ghada Barba PA-C have directly visualized the radiologic plain film image(s) with the below findings:      Interpretation per the Radiologist below, if available at the time of this note:    XR CHEST (2 VW)   Final Result   No acute cardiopulmonary process. Right PICC line tip in the superior vena cava right atrial junction. Echo Complete    Result Date: 8/1/2022  Transthoracic Echocardiography Report (TTE)  Demographics   Patient Name       Alena Hutton   Date of Study      08/01/2022        Gender              Female   Patient Number     [id number]        Date of Birth       1990   Visit Number       [id number]         Age                 34 year(s)   Accession Number   [id number]        Room Number         2114   Corporate ID       [id number]            [facility],                                                           JESSICA, RVT   Ordering Physician Meagan Breaux MD  Interpreting        [address] [place] [address].                                       Physician           Garrett Villanueva MD  Procedure Type of Study   TTE procedure:ECHOCARDIOGRAM COMPLETE 2D W DOPPLER W COLOR. Procedure Date Date: 08/01/2022 Start: 01:55 PM Study Location: [facility] - Echo Lab Technical Quality: Adequate visualization Additional Indications:MRSA bacteremia. H/o diabetes.  Patient Status: Routine Height: 58 inches Weight: 102 pounds BSA: 1.37 m2 BMI: 21.32 kg/m2 Rhythm: Within normal limits HR: 99 bpm BP: 113/82 mmHg  Conclusions   Summary  Normal left ventricle size, wall thickness, and systolic function with an estimated ejection fraction of 60-65%. No regional wall motion abnormalities  are seen. Normal diastolic function. The right ventricle is normal in size and function. No significant valve abnormalities noted. Signature   ------------------------------------------------------------------  Electronically signed by Vincent Salinas MD  (Interpreting physician) on 08/01/2022 at 03:43 PM  ------------------------------------------------------------------   Findings   Left Ventricle  Normal left ventricle size, wall thickness, and systolic function with an  estimated ejection fraction of 60-65%. No regional wall motion abnormalities  are seen. Normal diastolic function. Mitral Valve  The mitral valve normal in structure and function. No evidence of mitral regurgitation or stenosis. No vegetation or mass seen on the mitral valve. Left Atrium  The left atrium is normal in size. Aortic Valve  The aortic valve is structurally normal. There is no significant aortic  valve regurgitation or stenosis. No vegetation or mass seen on the aortic valve. Aorta  The aortic root and ascending aorta are normal in size. Right Ventricle  The right ventricle is normal in size and function. Tricuspid Valve  The tricuspid valve is normal in structure and function. There is no  significant tricuspid valve regurgitation or stenosis. No vegetation or mass seen on the tricuspid valve. Right Atrium  The right atrial size is normal.   Pulmonic Valve  The pulmonic valve is not well visualized. There is no evidence of pulmonic valve regurgitation or stenosis. No vegetation or mass seen on the pulmonic valve. Pericardial Effusion  No pericardial effusion noted. Pleural Effusion  No pleural effusion. Miscellaneous  IVC size is normal (<2.1cm) and collapses > 50% with respiration consistent  with normal RA pressure (3mmHg). Unable to estimate pulmonary artery pressure secondary to incomplete TR jet  envelope. TECHNIQUE: CT of the head was performed without the administration of intravenous contrast. Automated exposure control, iterative reconstruction, and/or weight based adjustment of the mA/kV was utilized to reduce the radiation dose to as low as reasonably achievable.; CTA of the head and neck was performed with the administration of intravenous contrast. Multiplanar reformatted images are provided for review. MIP images are provided for review. Stenosis of the internal carotid arteries measured using NASCET criteria. Automated exposure control, iterative reconstruction, and/or weight based adjustment of the mA/kV was utilized to reduce the radiation dose to as low as reasonably achievable. Noncontrast CT of the head with reconstructed 2-D images are also provided for review. COMPARISON: None. HISTORY: ORDERING SYSTEM PROVIDED HISTORY: r/o embolism TECHNOLOGIST PROVIDED HISTORY: Reason for exam:->r/o embolism Has a \"code stroke\" or \"stroke alert\" been called? ->No Is the patient pregnant?->No Reason for Exam: r/o embolism; ORDERING SYSTEM PROVIDED HISTORY: r/o embolism TECHNOLOGIST PROVIDED HISTORY: Reason for exam:->r/o embolism Has a \"code stroke\" or \"stroke alert\" been called? ->No FINDINGS: CT HEAD: BRAIN/VENTRICLES:  No acute intracranial hemorrhage or extraaxial fluid collection. Grey-white differentiation is maintained. No evidence of mass, mass effect or midline shift. No evidence of hydrocephalus. ORBITS: The visualized portion of the orbits demonstrate no acute abnormality. SINUSES:  The visualized paranasal sinuses and mastoid air cells demonstrate no acute abnormality. SOFT TISSUES/SKULL: No acute abnormality of the visualized skull or soft tissues. CTA NECK: AORTIC ARCH/ARCH VESSELS: No dissection or arterial injury. No significant stenosis of the brachiocephalic or subclavian arteries. CAROTID ARTERIES: No dissection, arterial injury, or hemodynamically significant stenosis by NASCET criteria. abnormality. SINUSES: The visualized paranasal sinuses and mastoid air cells demonstrate no acute abnormality. SOFT TISSUES/SKULL:  No acute abnormality of the visualized skull or soft tissues. No acute intracranial abnormality. CT CERVICAL SPINE WO CONTRAST    Result Date: 7/29/2022  EXAMINATION: CT OF THE CERVICAL SPINE WITHOUT CONTRAST 7/29/2022 7:41 pm TECHNIQUE: CT of the cervical spine was performed without the administration of intravenous contrast. Multiplanar reformatted images are provided for review. Automated exposure control, iterative reconstruction, and/or weight based adjustment of the mA/kV was utilized to reduce the radiation dose to as low as reasonably achievable. COMPARISON: None. HISTORY: ORDERING SYSTEM PROVIDED HISTORY: ams/seizure TECHNOLOGIST PROVIDED HISTORY: Reason for exam:->ams/seizure Decision Support Exception - unselect if not a suspected or confirmed emergency medical condition->Emergency Medical Condition (MA) Is the patient pregnant?->No Reason for Exam: ams/seizure, Hyperglycemia FINDINGS: BONES/ALIGNMENT: There is no acute fracture or traumatic malalignment. DEGENERATIVE CHANGES: No significant degenerative changes. SOFT TISSUES: There is no prevertebral soft tissue swelling. No acute abnormality of the cervical spine. MRI CERVICAL SPINE WO CONTRAST    Result Date: 7/30/2022  EXAMINATION: MRI OF THE CERVICAL SPINE WITHOUT CONTRAST 7/30/2022 3:42 pm TECHNIQUE: Multiplanar multisequence MRI of the cervical spine was performed without the administration of intravenous contrast. COMPARISON: None. HISTORY: ORDERING SYSTEM PROVIDED HISTORY: unable to move extremities TECHNOLOGIST PROVIDED HISTORY: Reason for exam:->unable to move extremities FINDINGS: Examination is degraded by motion. BONES/ALIGNMENT: Straightening of the normal cervical lordosis. No significant listhesis. Vertebral body heights are maintained.   There is a T1 hypointense/STIR hyperintense lesion in the C2 vertebral body. . SPINAL CORD: No abnormal cord signal is seen. SOFT TISSUES: No paraspinal mass identified. C2-C3: There is no significant disc protrusion, spinal canal stenosis or neural foraminal narrowing. C3-C4: There is no significant disc protrusion, spinal canal stenosis or neural foraminal narrowing. C4-C5: There is no significant disc protrusion, spinal canal stenosis or neural foraminal narrowing. C5-C6: There is no significant disc protrusion, spinal canal stenosis or neural foraminal narrowing. C6-C7: Posterior disc osteophyte complex without significant spinal canal or neural foraminal stenosis. C7-T1: There is no significant disc protrusion, spinal canal stenosis or neural foraminal narrowing. 1. No acute abnormality 2. No significant spinal canal stenosis. 3. T1 hypointense, stir hyperintense lesion the C2 vertebral body, possibly an atypical hemangioma. Post-contrast imaging may be useful for further evaluation. MRI THORACIC SPINE WO CONTRAST    Result Date: 7/30/2022  EXAMINATION: MRI OF THE THORACIC SPINE WITHOUT CONTRAST  7/30/2022 3:42 pm TECHNIQUE: Multiplanar multisequence MRI of the thoracic spine was performed without the administration of intravenous contrast. COMPARISON: None. HISTORY: ORDERING SYSTEM PROVIDED HISTORY: unable to move extremities, acute TECHNOLOGIST PROVIDED HISTORY: Reason for exam:->unable to move extremities, acute FINDINGS: BONES/ALIGNMENT: There is normal alignment of the spine. The vertebral body heights are maintained. The bone marrow signal appears unremarkable. SPINAL CORD: No abnormal cord signal is seen. SOFT TISSUES: No paraspinal mass identified. DEGENERATIVE CHANGES: No significant spinal canal stenosis or neural foraminal narrowing of the thoracic spine. Mildly prominent epidural fat. No acute abnormality. No significant degenerative change.      MRI LUMBAR SPINE WO CONTRAST    Result Date: 7/30/2022  EXAMINATION: MRI OF THE LUMBAR SPINE WITHOUT CONTRAST, 7/30/2022 3:42 pm TECHNIQUE: Multiplanar multisequence MRI of the lumbar spine was performed without the administration of intravenous contrast. COMPARISON: None. HISTORY: ORDERING SYSTEM PROVIDED HISTORY: unable to move extremities, acute TECHNOLOGIST PROVIDED HISTORY: Reason for exam:->unable to move extremities, acute FINDINGS: BONES/ALIGNMENT: There is normal alignment of the spine. The vertebral body heights are maintained. The bone marrow signal appears unremarkable. SPINAL CORD: The conus terminates normally. SOFT TISSUES: No paraspinal mass identified. L1-L2: There is no significant disc herniation, spinal canal stenosis or neural foraminal narrowing. L2-L3: There is no significant disc herniation, spinal canal stenosis or neural foraminal narrowing. L3-L4: There is no significant disc herniation, spinal canal stenosis or neural foraminal narrowing. L4-L5: There is no significant disc herniation, spinal canal stenosis or neural foraminal narrowing. L5-S1: There is no significant disc herniation, spinal canal stenosis or neural foraminal narrowing. 1. No acute abnormality. 2. No significant spinal canal stenosis. XR CHEST PORTABLE    Result Date: 8/2/2022  EXAMINATION: ONE XRAY VIEW OF THE CHEST 8/2/2022 4:08 pm COMPARISON: 07/29/2022. HISTORY: ORDERING SYSTEM PROVIDED HISTORY: line placement TECHNOLOGIST PROVIDED HISTORY: Reason for exam:->line placement FINDINGS: There has been interval placement of the right-sided PICC line with the tip overlying the cavoatrial junction. Previously seen right IJ central venous catheter is no longer visualized. The cardiac silhouette appears within normal limits. No confluent airspace opacity, pleural effusion or pneumothorax is seen. Interval placement a right-sided PICC line with the tip overlying the cavoatrial junction. No radiographic evidence of acute pulmonary abnormality seen.      XR CHEST PORTABLE    Result Date: 7/29/2022  EXAMINATION: ONE XRAY VIEW OF THE CHEST 7/29/2022 9:14 pm COMPARISON: 07/29/2022 HISTORY: ORDERING SYSTEM PROVIDED HISTORY: central line insertion TECHNOLOGIST PROVIDED HISTORY: Reason for exam:->central line insertion Reason for Exam: central line insertion FINDINGS: Right IJ catheter identified tip terminates the right cavoatrial junction. No right-sided pneumothorax. The cardiomediastinal silhouette is normal in size and contour. The lungs are clear. No pleural effusion or pneumothorax is present. Right IJ catheter identified in satisfactory position. Gita Bloodgood No pneumothorax. XR CHEST PORTABLE    Result Date: 7/29/2022  EXAMINATION: ONE XRAY VIEW OF THE CHEST 7/29/2022 8:15 pm COMPARISON: Chest radiograph 05/24/2022. HISTORY: ORDERING SYSTEM PROVIDED HISTORY: seizure/ams TECHNOLOGIST PROVIDED HISTORY: Reason for exam:->seizure/ams Reason for Exam: seizure/ams FINDINGS: The lungs are without acute focal process. There is no effusion or pneumothorax. The cardiomediastinal silhouette is without acute process. The osseous structures are without acute process. No acute process. CTA HEAD NECK W CONTRAST    Result Date: 7/30/2022  EXAMINATION: CT OF THE HEAD WITHOUT CONTRAST; CTA OF THE HEAD AND NECK WITH CONTRAST 7/30/2022 2:48 pm; 7/30/2022 2:41 pm: TECHNIQUE: CT of the head was performed without the administration of intravenous contrast. Automated exposure control, iterative reconstruction, and/or weight based adjustment of the mA/kV was utilized to reduce the radiation dose to as low as reasonably achievable.; CTA of the head and neck was performed with the administration of intravenous contrast. Multiplanar reformatted images are provided for review. MIP images are provided for review. Stenosis of the internal carotid arteries measured using NASCET criteria.  Automated exposure control, iterative reconstruction, and/or weight based adjustment of the mA/kV was utilized to reduce the radiation dose to as low as reasonably achievable. Noncontrast CT of the head with reconstructed 2-D images are also provided for review. COMPARISON: None. HISTORY: ORDERING SYSTEM PROVIDED HISTORY: r/o embolism TECHNOLOGIST PROVIDED HISTORY: Reason for exam:->r/o embolism Has a \"code stroke\" or \"stroke alert\" been called? ->No Is the patient pregnant?->No Reason for Exam: r/o embolism; ORDERING SYSTEM PROVIDED HISTORY: r/o embolism TECHNOLOGIST PROVIDED HISTORY: Reason for exam:->r/o embolism Has a \"code stroke\" or \"stroke alert\" been called? ->No FINDINGS: CT HEAD: BRAIN/VENTRICLES:  No acute intracranial hemorrhage or extraaxial fluid collection. Grey-white differentiation is maintained. No evidence of mass, mass effect or midline shift. No evidence of hydrocephalus. ORBITS: The visualized portion of the orbits demonstrate no acute abnormality. SINUSES:  The visualized paranasal sinuses and mastoid air cells demonstrate no acute abnormality. SOFT TISSUES/SKULL: No acute abnormality of the visualized skull or soft tissues. CTA NECK: AORTIC ARCH/ARCH VESSELS: No dissection or arterial injury. No significant stenosis of the brachiocephalic or subclavian arteries. CAROTID ARTERIES: No dissection, arterial injury, or hemodynamically significant stenosis by NASCET criteria. VERTEBRAL ARTERIES: No dissection, arterial injury, or significant stenosis. SOFT TISSUES: The lung apices are clear. No cervical or superior mediastinal lymphadenopathy. The larynx and pharynx are unremarkable. No acute abnormality of the salivary and thyroid glands. Right internal jugular center venous catheter noted. BONES: No acute osseous abnormality. CTA HEAD: ANTERIOR CIRCULATION: No significant stenosis of the intracranial internal carotid, anterior cerebral, or middle cerebral arteries. No aneurysm. POSTERIOR CIRCULATION: No significant stenosis of the vertebral, basilar, or posterior cerebral arteries. No aneurysm.  OTHER: No dural venous sinus thrombosis on this non-dedicated study. 1. No acute intracranial abnormality. 2. Unremarkable CTA of the head and neck. CT CHEST ABDOMEN PELVIS WO CONTRAST    Result Date: 7/29/2022  EXAMINATION: CT OF THE CHEST, ABDOMEN, AND PELVIS WITHOUT CONTRAST 7/29/2022 5:44 pm TECHNIQUE: CT of the chest, abdomen and pelvis was performed without the administration of intravenous contrast. Multiplanar reformatted images are provided for review. Automated exposure control, iterative reconstruction, and/or weight based adjustment of the mA/kV was utilized to reduce the radiation dose to as low as reasonably achievable. COMPARISON: Chest x-ray dated July 29, 2022 HISTORY: ORDERING SYSTEM PROVIDED HISTORY: fever, AMS, high pLT and WBC around 1000 and 37 TECHNOLOGIST PROVIDED HISTORY: Reason for exam:->fever, AMS, high pLT and WBC around 1000 and 37 Additional Contrast?->None Decision Support Exception - unselect if not a suspected or confirmed emergency medical condition->Emergency Medical Condition (MA) Is the patient pregnant?->No Reason for Exam: fever, AMS, high pLT and WBC around 1000 and 37 FINDINGS: Chest: Mediastinum: No acute traumatic injury is present involving the thoracic aorta or heart. Benign-appearing mediastinal lymph nodes are present. A right IJ catheter is in place, tip at the junction of the SVC and right atrium. Trace pericardial effusion is noted. Lungs/pleura: No focal area of consolidation, pleural effusion, or pneumothorax is present. Soft Tissues/Bones: No acute osseous or soft tissue abnormality is present. Abdomen/Pelvis: Organs: Hepatomegaly is present, with a sagittal length of 24.8 cm. The spleen, pancreas, gallbladder, adrenal glands, and kidneys demonstrate no acute abnormality. GI/Bowel: Stomach is mildly distended and fluid-filled. Small bowel appears normal without evidence of obstruction. No evidence of appendicitis is present.   No focal inflammatory change or wall thickening is present involving the large bowel. Pelvis: A Stoddard catheter is within the urinary bladder. Uterus and adnexal regions appear normal. Peritoneum/Retroperitoneum: No free fluid or free air is present within the abdomen or pelvis. No aneurysm formation is noted. No pathological adenopathy is present. Bones/Soft Tissues: No acute osseous abnormality is present. Skin laceration is present within the left groin region. Radiopaque material is present within the laceration. 1. No evidence of acute cardiopulmonary disease 2. Marked hepatomegaly, with a sagittal length of 24.8 cm 3. Stoddard catheter within the urinary bladder 4. Skin laceration, left groin region 5. Nonobstructive bowel gas pattern     MRI BRAIN WO CONTRAST    Result Date: 7/30/2022  EXAMINATION: MRI OF THE BRAIN WITHOUT CONTRAST  7/30/2022 3:42 pm TECHNIQUE: Multiplanar multisequence MRI of the brain was performed without the administration of intravenous contrast. COMPARISON: None. HISTORY: ORDERING SYSTEM PROVIDED HISTORY: AMS, unable to verbally communicate or move extremities TECHNOLOGIST PROVIDED HISTORY: Reason for exam:->AMS, unable to verbally communicate or move extremities FINDINGS: INTRACRANIAL STRUCTURES/VENTRICLES: The brain parenchyma has normal signal and morphology. There is no acute infarct or mass. No mass effect or midline shift. No evidence of an acute intracranial hemorrhage. The ventricles and sulci are normal in size and configuration. The sellar/suprasellar regions appear unremarkable. The normal signal voids within the major intracranial vessels appear maintained. ORBITS: The visualized portion of the orbits demonstrate no acute abnormality. SINUSES: The visualized paranasal sinuses and mastoid air cells demonstrate no acute abnormality. BONES/SOFT TISSUES: The bone marrow signal intensity appears normal. The soft tissues demonstrate no acute abnormality.      Unremarkable MRI of the brain without contrast.

## 2022-08-11 NOTE — ED NOTES
Able to easily flush PICC with saline and aspirate blood, pt states feels okay.      Elvia Curran RN  08/11/22 1400

## 2022-08-12 DIAGNOSIS — G40.909 SEIZURE DISORDER (HCC): ICD-10-CM

## 2022-08-12 RX ORDER — CLONAZEPAM 2 MG/1
TABLET ORAL
Qty: 60 TABLET | Refills: 0 | Status: SHIPPED | OUTPATIENT
Start: 2022-08-12 | End: 2022-09-05 | Stop reason: SDUPTHER

## 2022-08-13 ENCOUNTER — APPOINTMENT (OUTPATIENT)
Dept: GENERAL RADIOLOGY | Age: 32
DRG: 720 | End: 2022-08-13
Payer: COMMERCIAL

## 2022-08-13 ENCOUNTER — APPOINTMENT (OUTPATIENT)
Dept: CT IMAGING | Age: 32
DRG: 720 | End: 2022-08-13
Payer: COMMERCIAL

## 2022-08-13 ENCOUNTER — HOSPITAL ENCOUNTER (INPATIENT)
Age: 32
LOS: 3 days | Discharge: HOME | DRG: 720 | End: 2022-08-16
Attending: EMERGENCY MEDICINE | Admitting: STUDENT IN AN ORGANIZED HEALTH CARE EDUCATION/TRAINING PROGRAM
Payer: COMMERCIAL

## 2022-08-13 DIAGNOSIS — D64.9 ANEMIA, UNSPECIFIED TYPE: ICD-10-CM

## 2022-08-13 DIAGNOSIS — A41.9 SEPTICEMIA (HCC): ICD-10-CM

## 2022-08-13 DIAGNOSIS — Z91.199 NON-ADHERENCE TO MEDICAL TREATMENT: ICD-10-CM

## 2022-08-13 DIAGNOSIS — R65.10 SIRS (SYSTEMIC INFLAMMATORY RESPONSE SYNDROME) (HCC): ICD-10-CM

## 2022-08-13 DIAGNOSIS — R56.9 SEIZURE (HCC): Primary | ICD-10-CM

## 2022-08-13 DIAGNOSIS — E87.6 HYPOKALEMIA: ICD-10-CM

## 2022-08-13 DIAGNOSIS — E87.20 LACTIC ACIDOSIS: ICD-10-CM

## 2022-08-13 PROBLEM — R53.83 FATIGUE: Status: ACTIVE | Noted: 2022-08-13

## 2022-08-13 LAB
A/G RATIO: 1.1 (ref 1.1–2.2)
ALBUMIN SERPL-MCNC: 4.1 G/DL (ref 3.4–5)
ALP BLD-CCNC: 108 U/L (ref 40–129)
ALT SERPL-CCNC: 21 U/L (ref 10–40)
AMMONIA: 12 UMOL/L (ref 11–51)
AMPHETAMINE SCREEN, URINE: NORMAL
ANION GAP SERPL CALCULATED.3IONS-SCNC: 19 MMOL/L (ref 3–16)
AST SERPL-CCNC: 51 U/L (ref 15–37)
BARBITURATE SCREEN URINE: NORMAL
BASE EXCESS VENOUS: -3.7 MMOL/L
BASOPHILS ABSOLUTE: 0.1 K/UL (ref 0–0.2)
BASOPHILS RELATIVE PERCENT: 1.1 %
BENZODIAZEPINE SCREEN, URINE: NORMAL
BETA-HYDROXYBUTYRATE: 0.29 MMOL/L (ref 0–0.27)
BILIRUB SERPL-MCNC: <0.2 MG/DL (ref 0–1)
BILIRUBIN URINE: NEGATIVE
BLOOD, URINE: NEGATIVE
BUN BLDV-MCNC: 7 MG/DL (ref 7–20)
CALCIUM SERPL-MCNC: 10 MG/DL (ref 8.3–10.6)
CANNABINOID SCREEN URINE: NORMAL
CARBOXYHEMOGLOBIN: 1.4 %
CHLORIDE BLD-SCNC: 98 MMOL/L (ref 99–110)
CLARITY: CLEAR
CO2: 18 MMOL/L (ref 21–32)
COCAINE METABOLITE SCREEN URINE: NORMAL
COLOR: YELLOW
CREAT SERPL-MCNC: 0.6 MG/DL (ref 0.6–1.1)
EOSINOPHILS ABSOLUTE: 0.1 K/UL (ref 0–0.6)
EOSINOPHILS RELATIVE PERCENT: 1.5 %
GFR AFRICAN AMERICAN: >60
GFR NON-AFRICAN AMERICAN: >60
GLUCOSE BLD-MCNC: 127 MG/DL (ref 70–99)
GLUCOSE BLD-MCNC: 233 MG/DL (ref 70–99)
GLUCOSE BLD-MCNC: 236 MG/DL (ref 70–99)
GLUCOSE URINE: >=1000 MG/DL
HCG(URINE) PREGNANCY TEST: NEGATIVE
HCO3 VENOUS: 21 MMOL/L (ref 23–29)
HCT VFR BLD CALC: 34 % (ref 36–48)
HEMOGLOBIN: 10.8 G/DL (ref 12–16)
KEPPRA DOSE AMT: ABNORMAL
KEPPRA: <2 UG/ML (ref 6–46)
KETONES, URINE: ABNORMAL MG/DL
LACTIC ACID, SEPSIS: 2.9 MMOL/L (ref 0.4–1.9)
LACTIC ACID, SEPSIS: 5.8 MMOL/L (ref 0.4–1.9)
LEUKOCYTE ESTERASE, URINE: NEGATIVE
LYMPHOCYTES ABSOLUTE: 2.5 K/UL (ref 1–5.1)
LYMPHOCYTES RELATIVE PERCENT: 30.3 %
Lab: NORMAL
MAGNESIUM: 1.9 MG/DL (ref 1.8–2.4)
MCH RBC QN AUTO: 24.7 PG (ref 26–34)
MCHC RBC AUTO-ENTMCNC: 31.7 G/DL (ref 31–36)
MCV RBC AUTO: 77.7 FL (ref 80–100)
METHADONE SCREEN, URINE: NORMAL
METHEMOGLOBIN VENOUS: 0.9 %
MICROSCOPIC EXAMINATION: ABNORMAL
MONOCYTES ABSOLUTE: 0.4 K/UL (ref 0–1.3)
MONOCYTES RELATIVE PERCENT: 4.5 %
NEUTROPHILS ABSOLUTE: 5.2 K/UL (ref 1.7–7.7)
NEUTROPHILS RELATIVE PERCENT: 62.6 %
NITRITE, URINE: NEGATIVE
O2 SAT, VEN: 76 %
O2 THERAPY: ABNORMAL
OPIATE SCREEN URINE: NORMAL
OXYCODONE URINE: NORMAL
PCO2, VEN: 36.3 MMHG (ref 40–50)
PDW BLD-RTO: 15.3 % (ref 12.4–15.4)
PERFORMED ON: ABNORMAL
PERFORMED ON: ABNORMAL
PH UA: 6
PH UA: 6 (ref 5–8)
PH VENOUS: 7.37 (ref 7.35–7.45)
PHENCYCLIDINE SCREEN URINE: NORMAL
PLATELET # BLD: 379 K/UL (ref 135–450)
PMV BLD AUTO: 7.6 FL (ref 5–10.5)
PO2, VEN: 42 MMHG
POTASSIUM REFLEX MAGNESIUM: 3.4 MMOL/L (ref 3.5–5.1)
PROPOXYPHENE SCREEN: NORMAL
PROTEIN UA: NEGATIVE MG/DL
RBC # BLD: 4.37 M/UL (ref 4–5.2)
SODIUM BLD-SCNC: 135 MMOL/L (ref 136–145)
SPECIFIC GRAVITY UA: 1.02 (ref 1–1.03)
TCO2 CALC VENOUS: 22 MMOL/L
TOTAL PROTEIN: 7.8 G/DL (ref 6.4–8.2)
URINE REFLEX TO CULTURE: ABNORMAL
URINE TYPE: ABNORMAL
UROBILINOGEN, URINE: 0.2 E.U./DL
WBC # BLD: 8.3 K/UL (ref 4–11)

## 2022-08-13 PROCEDURE — 99285 EMERGENCY DEPT VISIT HI MDM: CPT

## 2022-08-13 PROCEDURE — 80053 COMPREHEN METABOLIC PANEL: CPT

## 2022-08-13 PROCEDURE — 2580000003 HC RX 258: Performed by: EMERGENCY MEDICINE

## 2022-08-13 PROCEDURE — 2060000000 HC ICU INTERMEDIATE R&B

## 2022-08-13 PROCEDURE — 80177 DRUG SCRN QUAN LEVETIRACETAM: CPT

## 2022-08-13 PROCEDURE — 71045 X-RAY EXAM CHEST 1 VIEW: CPT

## 2022-08-13 PROCEDURE — 2580000003 HC RX 258: Performed by: STUDENT IN AN ORGANIZED HEALTH CARE EDUCATION/TRAINING PROGRAM

## 2022-08-13 PROCEDURE — 82803 BLOOD GASES ANY COMBINATION: CPT

## 2022-08-13 PROCEDURE — 36415 COLL VENOUS BLD VENIPUNCTURE: CPT

## 2022-08-13 PROCEDURE — 84703 CHORIONIC GONADOTROPIN ASSAY: CPT

## 2022-08-13 PROCEDURE — 87040 BLOOD CULTURE FOR BACTERIA: CPT

## 2022-08-13 PROCEDURE — 6360000002 HC RX W HCPCS: Performed by: EMERGENCY MEDICINE

## 2022-08-13 PROCEDURE — 83605 ASSAY OF LACTIC ACID: CPT

## 2022-08-13 PROCEDURE — 82140 ASSAY OF AMMONIA: CPT

## 2022-08-13 PROCEDURE — 80307 DRUG TEST PRSMV CHEM ANLYZR: CPT

## 2022-08-13 PROCEDURE — 85025 COMPLETE CBC W/AUTO DIFF WBC: CPT

## 2022-08-13 PROCEDURE — 6370000000 HC RX 637 (ALT 250 FOR IP): Performed by: STUDENT IN AN ORGANIZED HEALTH CARE EDUCATION/TRAINING PROGRAM

## 2022-08-13 PROCEDURE — 6360000002 HC RX W HCPCS

## 2022-08-13 PROCEDURE — 83735 ASSAY OF MAGNESIUM: CPT

## 2022-08-13 PROCEDURE — 70450 CT HEAD/BRAIN W/O DYE: CPT

## 2022-08-13 PROCEDURE — 2580000003 HC RX 258

## 2022-08-13 PROCEDURE — 81003 URINALYSIS AUTO W/O SCOPE: CPT

## 2022-08-13 PROCEDURE — 82010 KETONE BODYS QUAN: CPT

## 2022-08-13 PROCEDURE — 96374 THER/PROPH/DIAG INJ IV PUSH: CPT

## 2022-08-13 RX ORDER — MIDAZOLAM HYDROCHLORIDE 1 MG/ML
1 INJECTION INTRAMUSCULAR; INTRAVENOUS
Status: DISCONTINUED | OUTPATIENT
Start: 2022-08-13 | End: 2022-08-16 | Stop reason: HOSPADM

## 2022-08-13 RX ORDER — SODIUM CHLORIDE, SODIUM LACTATE, POTASSIUM CHLORIDE, AND CALCIUM CHLORIDE .6; .31; .03; .02 G/100ML; G/100ML; G/100ML; G/100ML
30 INJECTION, SOLUTION INTRAVENOUS ONCE
Status: COMPLETED | OUTPATIENT
Start: 2022-08-13 | End: 2022-08-13

## 2022-08-13 RX ORDER — SODIUM CHLORIDE 9 MG/ML
INJECTION, SOLUTION INTRAVENOUS CONTINUOUS
Status: ACTIVE | OUTPATIENT
Start: 2022-08-13 | End: 2022-08-14

## 2022-08-13 RX ORDER — SODIUM CHLORIDE 9 MG/ML
INJECTION, SOLUTION INTRAVENOUS PRN
Status: DISCONTINUED | OUTPATIENT
Start: 2022-08-13 | End: 2022-08-16 | Stop reason: HOSPADM

## 2022-08-13 RX ORDER — 0.9 % SODIUM CHLORIDE 0.9 %
1000 INTRAVENOUS SOLUTION INTRAVENOUS ONCE
Status: COMPLETED | OUTPATIENT
Start: 2022-08-13 | End: 2022-08-13

## 2022-08-13 RX ORDER — SODIUM CHLORIDE 0.9 % (FLUSH) 0.9 %
5-40 SYRINGE (ML) INJECTION PRN
Status: DISCONTINUED | OUTPATIENT
Start: 2022-08-13 | End: 2022-08-16 | Stop reason: HOSPADM

## 2022-08-13 RX ORDER — FAMOTIDINE 20 MG/1
20 TABLET, FILM COATED ORAL 2 TIMES DAILY
Status: DISCONTINUED | OUTPATIENT
Start: 2022-08-13 | End: 2022-08-16 | Stop reason: HOSPADM

## 2022-08-13 RX ORDER — LEVETIRACETAM 10 MG/ML
1000 INJECTION INTRAVASCULAR ONCE
Status: COMPLETED | OUTPATIENT
Start: 2022-08-13 | End: 2022-08-13

## 2022-08-13 RX ORDER — GABAPENTIN 300 MG/1
300 CAPSULE ORAL 3 TIMES DAILY
Status: DISCONTINUED | OUTPATIENT
Start: 2022-08-13 | End: 2022-08-15

## 2022-08-13 RX ORDER — LEVETIRACETAM 5 MG/ML
500 INJECTION INTRAVASCULAR EVERY 12 HOURS
Status: DISCONTINUED | OUTPATIENT
Start: 2022-08-14 | End: 2022-08-16 | Stop reason: HOSPADM

## 2022-08-13 RX ORDER — ENOXAPARIN SODIUM 100 MG/ML
30 INJECTION SUBCUTANEOUS DAILY
Status: DISCONTINUED | OUTPATIENT
Start: 2022-08-14 | End: 2022-08-16 | Stop reason: HOSPADM

## 2022-08-13 RX ORDER — ACETAMINOPHEN 325 MG/1
650 TABLET ORAL EVERY 6 HOURS PRN
Status: DISCONTINUED | OUTPATIENT
Start: 2022-08-13 | End: 2022-08-16 | Stop reason: HOSPADM

## 2022-08-13 RX ORDER — ONDANSETRON 2 MG/ML
4 INJECTION INTRAMUSCULAR; INTRAVENOUS EVERY 6 HOURS PRN
Status: DISCONTINUED | OUTPATIENT
Start: 2022-08-13 | End: 2022-08-16 | Stop reason: HOSPADM

## 2022-08-13 RX ORDER — SODIUM CHLORIDE 0.9 % (FLUSH) 0.9 %
5-40 SYRINGE (ML) INJECTION EVERY 12 HOURS SCHEDULED
Status: DISCONTINUED | OUTPATIENT
Start: 2022-08-13 | End: 2022-08-16 | Stop reason: HOSPADM

## 2022-08-13 RX ORDER — ACETAMINOPHEN 650 MG/1
650 SUPPOSITORY RECTAL EVERY 6 HOURS PRN
Status: DISCONTINUED | OUTPATIENT
Start: 2022-08-13 | End: 2022-08-16 | Stop reason: HOSPADM

## 2022-08-13 RX ORDER — CLONAZEPAM 0.5 MG/1
0.5 TABLET ORAL 2 TIMES DAILY
Status: DISCONTINUED | OUTPATIENT
Start: 2022-08-13 | End: 2022-08-14

## 2022-08-13 RX ADMIN — SODIUM CHLORIDE, PRESERVATIVE FREE 10 ML: 5 INJECTION INTRAVENOUS at 23:16

## 2022-08-13 RX ADMIN — FAMOTIDINE 20 MG: 20 TABLET, FILM COATED ORAL at 23:16

## 2022-08-13 RX ADMIN — SODIUM CHLORIDE 1000 ML: 9 INJECTION, SOLUTION INTRAVENOUS at 19:45

## 2022-08-13 RX ADMIN — LEVETIRACETAM 1000 MG: 10 INJECTION INTRAVENOUS at 18:40

## 2022-08-13 RX ADMIN — SODIUM CHLORIDE, POTASSIUM CHLORIDE, SODIUM LACTATE AND CALCIUM CHLORIDE 1470 ML: 600; 310; 30; 20 INJECTION, SOLUTION INTRAVENOUS at 18:39

## 2022-08-13 RX ADMIN — SODIUM CHLORIDE: 9 INJECTION, SOLUTION INTRAVENOUS at 21:10

## 2022-08-13 RX ADMIN — CLONAZEPAM 0.5 MG: 0.5 TABLET ORAL at 23:16

## 2022-08-13 RX ADMIN — VANCOMYCIN HYDROCHLORIDE 1000 MG: 1 INJECTION, POWDER, LYOPHILIZED, FOR SOLUTION INTRAVENOUS at 21:10

## 2022-08-13 RX ADMIN — GABAPENTIN 300 MG: 300 CAPSULE ORAL at 23:16

## 2022-08-13 ASSESSMENT — PAIN SCALES - GENERAL
PAINLEVEL_OUTOF10: 0

## 2022-08-13 ASSESSMENT — PAIN - FUNCTIONAL ASSESSMENT: PAIN_FUNCTIONAL_ASSESSMENT: NONE - DENIES PAIN

## 2022-08-13 NOTE — H&P
Hospital Medicine History & Physical      PCP: Dottie Hyde MD    Date of Admission: 2022    Date of Service: Pt seen/examined on 2022 and admitted to inpatient    Chief Complaint: Fatigue, possible noncompliance with meds, seizure      History Of Present Illness: The patient is a 34 y.o. female with past medical history of type 1 diabetes, seizure disorder, and most recently was currently being treated with outpatient IV antibiotics for MRSA bacteremia from previous admission on daptomycin who presents to [facility] with progressive intermittent and somewhat worsening fatigue over the last 3 to 4 days with the patient just having a recent seizure 3 days ago but is still been increasingly fatigued since then. She apparently tells me that she is compliant with her medications but according to what was reported to ED provider patient's  reported that patient has been not taking any of her medications for at least the last 2 weeks although she has been apparently taking the IV antibiotics for MRSA bacteremia. She apparently missed doses of her seizure medications according to her report but the  is currently not at bedside further questioning at this time. Patient apart from noting fatigue and had hyperglycemia at home but only had a blood sugar of 150, denies that she has had any other recent symptoms of dizziness, syncope, chest pain, shortness of breath, dysuria, blood in urine/stool/sputum, nausea/vomiting/diarrhea/abdominal pain.     Past Medical History:        Diagnosis Date    Depression     Diabetes mellitus ([facility] [address]     Seizures ([facility] [address]        Past Surgical History:        Procedure Laterality Date     SECTION      TUBAL LIGATION         Medications Prior to Admission:    Prior to Admission medications    Medication Sig Start Date End Date Taking? Authorizing Provider   clonMilady Joshi) 2 MG tablet Take 1 tablet by mouth twice daily 8/12/22 9/11/22  Lopez Marino MD   DAPTOmycin (CUBICIN) infusion Infuse 285 mg intravenously every 24 hours for 25 days Compound per protocol.  8/4/22 8/29/22  Ron Felipe [facility]   Continuous Blood Gluc Transmit (DEXCOM G6 TRANSMITTER) MISC USE AS DIRECTED 7/19/22   Lopez Marino MD   gabapentin (NEURONTIN) 300 MG capsule TAKE 1 CAPSULE BY MOUTH THREE TIMES DAILY DRUNG THE DAY AND 4 CAPSULES BY MOUTH EVERY DAY AT BEDTIME 7/19/22 8/19/22  Lopez Marino MD   blood glucose test strips (TRUE METRIX BLOOD GLUCOSE TEST) strip Check blood sugar 4-6 times daily and as needed for symptoms of irregular glucose 7/18/22   Lopez Marino MD   Insulin Disposable Pump (OMNIPOD DASH PODS, GEN 4,) MISC Continuous pump - max dose 60 units 7/18/22   Lopez Marino MD   ondansetron (ZOFRAN) 4 MG tablet Take 1 tablet by mouth 3 times daily as needed for Nausea or Vomiting 7/12/22   Lopez Marino MD   ibuprofen (ADVIL;MOTRIN) 800 MG tablet Take 1 tablet by mouth 3 times daily as needed for Pain 7/12/22   Lopez Marino MD   Continuous Blood Gluc Sensor (DEXCOM G6 SENSOR) MISC Apply as directed for blood sugar monitoring 6/9/22   Lopez Marino MD   sertraline (ZOLOFT) 50 MG tablet Take 1 tablet by mouth daily 6/9/22   Lopez Marino MD   insulin aspart (NOVOLOG FLEXPEN) 100 UNIT/ML injection pen Inject 20 units with meals/snacks up to 5 times per day 6/9/22   Lopez Marino MD   insulin aspart (NOVOLOG) 100 UNIT/ML injection vial Inject 100 units into the skin daily in conjunction with omnipod 6/9/22   Lopez Marino MD   Blood Glucose Monitoring Suppl (TRUE METRIX METER) w/Device KIT Check blood sugar 4 times daily, tidac 5/27/22   Taylor Chance, DO   Lancets MISC Check blood sugar 4 times daily, tidac 5/27/22   Taylor Chance, DO   levETIRAcetam (KEPPRA) 500 MG tablet Take 2 tablets by mouth 2 times daily 5/6/22   Lopez Marino MD   insulin aspart (NOVOLOG) 100 UNIT/ML injection vial Inject 100 Units into the skin daily In conjunction with omnipod 2/15/22   Bethel Lai MD   glucagon, rDNA, 1 MG injection Inject 1 mg into the muscle as needed for Low blood sugar (Blood glucose less than 70 mg/dL and patient NOT ALERT or NPO and does not have IV access.) 11/26/21 11/21/22  Autumn Jacobo MD   famotidine (PEPCID) 20 MG tablet Take 20 mg by mouth 2 times daily    Historical Provider, MD       Allergies:  Patient has no known allergies. Social History:  The patient currently lives home    TOBACCO:   reports that she has never smoked. She has never used smokeless tobacco.  ETOH:   reports no history of alcohol use. Family History:  Reviewed in detail and negative for DM, Early CAD, Cancer, CVA. Positive as follows:        Problem Relation Age of Onset    Asthma Mother     Allergies Mother         sun allergy    Diabetes Type 1  Father     Diabetes Type 1  Maternal Grandfather     Diabetes Type 1  Cousin     Diabetes Type 1  Cousin     Diabetes Type 1  Maternal Uncle     Diabetes Type 1  Maternal Uncle     Diabetes Type 1  Maternal Aunt        REVIEW OF SYSTEMS:    and as noted in the HPI. All other systems reviewed and negative. PHYSICAL EXAM:    /68   Pulse 85   Temp 97.4 °F (36.3 °C) (Oral)   Resp 14   Ht 4' 10\" (1.473 m)   Wt 108 lb 3.9 oz (49.1 kg)   SpO2 99%   BMI 22.62 kg/m²     General appearance: Fatigued appearing, nontoxic appearing, at this time seems alert and oriented and not stuttering her speech  HEENT Normal cephalic, atraumatic without obvious deformity. Pupils equal, round, and reactive to light. Extra ocular muscles intact.   Dry mucous membranes  Neck: Supple, no JVD  Lungs: Diminished breath sounds bilaterally, overall clear  Heart: Regular rate and rhythm, no murmurs noted  Abdomen: Soft, nontender, nondistended, active bowel sound  Extremities: No edema  Skin: No rashes  Neurologic: Currently seems grossly intact neurologically, 5 out of 5 strength, sensation motor seems intact  Mental status: Alert, oriented, thought content appropriate. Capillary Refill: Acceptable  < 3 seconds  Peripheral Pulses: +3 Easily felt, not easily obliterated with pressure      08/13/22 2130  XR CHEST PORTABLE   Performed: 08/13/22 2034  Final        Impression: Status post PICC line placement on the right in good position with no acute abnormality seen. 08/13/22 1753  CT HEAD WO CONTRAST   Performed: 08/13/22 1723  Final        Impression: No acute abnormality. CBC   Recent Labs     08/13/22 1720 08/14/22 0459   WBC 8.3 5.2   HGB 10.8* 8.3*   HCT 34.0* 26.4*    254      RENAL  Recent Labs     08/13/22 1720 08/14/22 0459   * 133*   K 3.4* 3.7   CL 98* 101   CO2 18* 20*   BUN 7 4*   CREATININE 0.6 0.5*     LFT'S  Recent Labs     08/13/22 1720 08/14/22 0459   AST 51* 59*   ALT 21 19   BILITOT <0.2 <0.2   ALKPHOS 108 80     COAG  No results for input(s): INR in the last 72 hours. CARDIAC ENZYMES  No results for input(s): CKTOTAL, CKMB, CKMBINDEX, TROPONINI in the last 72 hours.     U/A:    Lab Results   Component Value Date/Time    COLORU Yellow 08/13/2022 05:20 PM    WBCUA 0 07/29/2022 10:21 PM    RBCUA 0 07/29/2022 10:21 PM    MUCUS Rare 05/24/2022 08:41 PM    BACTERIA None Seen 07/29/2022 10:21 PM    CLARITYU Clear 08/13/2022 05:20 PM    SPECGRAV 1.021 08/13/2022 05:20 PM    LEUKOCYTESUR Negative 08/13/2022 05:20 PM    BLOODU Negative 08/13/2022 05:20 PM    GLUCOSEU >=1000 08/13/2022 05:20 PM    GLUCOSEU >=1000 01/17/2011 09:15 AM       ABG    Lab Results   Component Value Date/Time    VMN9JFI 18.3 05/12/2010 08:45 PM    BEART -5.7 05/12/2010 08:45 PM    L2QLULUF 98.0 05/12/2010 08:45 PM    PHART 7.366 05/12/2010 08:45 PM    THGBART 11.2 05/12/2010 08:45 PM    SMC5OLC 32.8 05/12/2010 08:45 PM    PO2ART 104 05/12/2010 08:45 PM           Active Hospital Problems    Diagnosis Date Noted    Fatigue [R53.83] 08/13/2022     Priority: Medium    MRSA bacteremia [R78.81, B95.62] 08/02/2022     Priority: Medium    AMS (altered mental status) [R41.82] 07/30/2022     Priority: Medium    Seizure disorder ([facility] [address] [G40.909] 02/13/2018    Lactic acidosis [E87.2] 02/13/2018         PHYSICIANS CERTIFICATION:    I certify that Andrew Tello is expected to be hospitalized for greater than 2 midnights based on the following assessment and plan:      ASSESSMENT/PLAN:  Fatigue  Altered mental status  Seizure disorder  Lactic acidosis  MRSA bacteremia  Type 1 diabetes    Plan:  Does not seem to be lactic acidosis from DKA, I feel more consistent to say patient has possible lactic acidosis possibly from fatigue with seizure disorder and possibly related to her newfound MRSA bacteremia while on antibiotics. Low suspicion for worsening infection, continue to monitor  Repeat blood cultures pending  Start patient on vancomycin for MRSA bacteremia that she is still taking daptomycin at home  Restarting patient's seizure medications, there is reported noncompliance, patient loaded with 1 g of IV Keppra and restarted on 500 IV twice daily Keppra  Restart other home medications  Versed as needed for seizures  Infect disease consult for MRSA bacteremia  Neurology consult for seizure disorder and noncompliance  Repeat labs daily  Sliding-scale insulin and every 4 Accu-Cheks    DVT Prophylaxis: Lovenox  Diet: ADULT DIET; Regular; 4 carb choices (60 gm/meal); Low Sodium (2 gm)  Code Status: Full Code  PT/OT Eval Status: Ambulatory    Dispo -pending clinical course       Rahat Briggs DO    Thank you Antione Beatty MD for the opportunity to be involved in this patient's care. If you have any questions or concerns please feel free to contact me at [phone].

## 2022-08-13 NOTE — ED PROVIDER NOTES
[address]        Pt Name: Veena Munoz  MRN: [id number]  [place] 1990  Date of evaluation: 8/13/2022  Provider: MARI Schumacher - SAMMY  PCP: Saad Rene MD  Note Started: 5:21 PM EDT       I have seen and evaluated this patient with my supervising physician Elsi Spivey MD.      Ned KANG 49.       Chief Complaint   Patient presents with    Fatigue    Hyperglycemia     Pt reports blood sugar of 150 today. Weakness after starting antibiotic. Hx seizures, lase one three days ago, pt doesn't feel she has recovered from it. HISTORY OF PRESENT ILLNESS   (Location/Symptom, Timing/Onset, Context/Setting, Quality, Duration, Modifying Factors, Severity)  Note limiting factors. Chief Complaint: Above    Veena Munoz is a 34 y.o. female who presents to the ED for evaluation of not feeling right. She states she has had a history of seizures. Unable to tell me what caused this. States she is on Keppra for this and reports compliance with all medications. Tells me that had a last seizure about 3 days ago but ever since then feels like she is increasingly fatigued and \"just does not feel right. \". Denies any pain anywhere today. Usually has a CGM but is not wearing it as she has had a PICC line inserted in her right upper extremity. Patient was recently discharged in the hospital with MRSA bacteremia. She is on outpatient antibiotics which she also reports compliance with. The patient describes her symptoms today as a new onset of stuttering which has been going on for the past 48 hours at the very least.    A little while after after the patient comes into the ER  comes to the ED. He tells me patient not been taking her Keppra for the past 3 weeks as \"she does not like how it makes her feel\". Does report compliance with the antibiotic.   Tells me there are also some concerns with the patient's children being in custody of mother in [place]. Nursing Notes were all reviewed and agreed with or any disagreements were addressed in the HPI. REVIEW OF SYSTEMS    (2-9 systems for level 4, 10 or more for level 5)     Review of Systems   Constitutional:  Positive for activity change and fatigue. Negative for chills, diaphoresis and fever. HENT:  Negative for congestion, rhinorrhea and sore throat. Eyes:  Negative for pain and visual disturbance. Respiratory:  Negative for cough and shortness of breath. Cardiovascular:  Negative for chest pain and leg swelling. Gastrointestinal:  Negative for abdominal pain, constipation, diarrhea, nausea and vomiting. Genitourinary:  Negative for frequency and hematuria. Musculoskeletal:  Negative for back pain and neck pain. Skin:  Negative for rash and wound. Neurological:  Positive for seizures, speech difficulty and headaches. Negative for dizziness, tremors, syncope, facial asymmetry, weakness, light-headedness and numbness. PAST MEDICAL HISTORY     Past Medical History:   Diagnosis Date    Depression     Diabetes mellitus ([facility] [address]     Seizures ([facility] [address]        SURGICAL HISTORY     Past Surgical History:   Procedure Laterality Date     SECTION      TUBAL LIGATION         CURRENTMEDICATIONS       Current Discharge Medication List        CONTINUE these medications which have NOT CHANGED    Details   clonazePAM (KLONOPIN) 2 MG tablet Take 1 tablet by mouth twice daily  Qty: 60 tablet, Refills: 0    Associated Diagnoses: Seizure disorder ([facility] [address]      DAPTOmycin (CUBICIN) infusion Infuse 285 mg intravenously every 24 hours for 25 days Compound per protocol.   Qty: 8 g, Refills: 0      Continuous Blood Gluc Transmit (DEXCOM G6 TRANSMITTER) MISC USE AS DIRECTED  Qty: 1 each, Refills: 0    Associated Diagnoses: Type 1 diabetes mellitus with complication (HCC)      gabapentin (NEURONTIN) 300 MG capsule TAKE 1 CAPSULE BY MOUTH THREE TIMES DAILY DRUNG THE DAY AND 4 CAPSULES BY MOUTH EVERY DAY AT BEDTIME  Qty: 210 capsule, Refills: 3    Associated Diagnoses: Diabetic peripheral neuropathy (HCC)      blood glucose test strips (TRUE METRIX BLOOD GLUCOSE TEST) strip Check blood sugar 4-6 times daily and as needed for symptoms of irregular glucose  Qty: 400 each, Refills: 3    Associated Diagnoses: Type 1 diabetes mellitus with complication (HCC)      Insulin Disposable Pump (OMNIPOD DASH PODS, GEN 4,) MISC Continuous pump - max dose 60 units  Qty: 10 each, Refills: 3    Associated Diagnoses: Type 1 diabetes mellitus with complication (HCC)      ondansetron (ZOFRAN) 4 MG tablet Take 1 tablet by mouth 3 times daily as needed for Nausea or Vomiting  Qty: 30 tablet, Refills: 1      ibuprofen (ADVIL;MOTRIN) 800 MG tablet Take 1 tablet by mouth 3 times daily as needed for Pain  Qty: 90 tablet, Refills: 1      Continuous Blood Gluc Sensor (DEXCOM G6 SENSOR) MISC Apply as directed for blood sugar monitoring  Qty: 10 each, Refills: 5    Associated Diagnoses: Type 1 diabetes mellitus with complication (HCC)      sertraline (ZOLOFT) 50 MG tablet Take 1 tablet by mouth daily  Qty: 30 tablet, Refills: 2      insulin aspart (NOVOLOG FLEXPEN) 100 UNIT/ML injection pen Inject 20 units with meals/snacks up to 5 times per day  Qty: 10 pen, Refills: 3    Associated Diagnoses: Type 1 diabetes mellitus with complication (HCC)      insulin aspart (NOVOLOG) 100 UNIT/ML injection vial Inject 100 units into the skin daily in conjunction with omnipod  Qty: 30 mL, Refills: 5      Blood Glucose Monitoring Suppl (TRUE METRIX METER) w/Device KIT Check blood sugar 4 times daily, tidac  Qty: 1 kit, Refills: 0    Associated Diagnoses: Type 1 diabetes mellitus with ketoacidosis without coma ([facility] [address] Type 1 diabetes mellitus with complication (HCC)      Lancets MISC Check blood sugar 4 times daily, tidac  Qty: 300 each, Refills: 1    Associated Diagnoses: Type 1 diabetes mellitus with complication ([facility] [address]  Type 1 diabetes mellitus with ketoacidosis without coma (HCC)      levETIRAcetam (KEPPRA) 500 MG tablet Take 2 tablets by mouth 2 times daily  Qty: 120 tablet, Refills: 5      insulin aspart (NOVOLOG) 100 UNIT/ML injection vial Inject 100 Units into the skin daily In conjunction with omnipod  Qty: 30 mL, Refills: 5    Associated Diagnoses: Type 1 diabetes mellitus with complication (HCC)      glucagon, rDNA, 1 MG injection Inject 1 mg into the muscle as needed for Low blood sugar (Blood glucose less than 70 mg/dL and patient NOT ALERT or NPO and does not have IV access.)  Qty: 10 each, Refills: 0      famotidine (PEPCID) 20 MG tablet Take 20 mg by mouth 2 times daily             ALLERGIES     Patient has no known allergies. FAMILYHISTORY       Family History   Problem Relation Age of Onset    Asthma Mother     Allergies Mother         sun allergy    Diabetes Type 1  Father     Diabetes Type 1  Maternal Grandfather     Diabetes Type 1  Cousin     Diabetes Type 1  Cousin     Diabetes Type 1  Maternal Uncle     Diabetes Type 1  Maternal Uncle     Diabetes Type 1  Maternal Aunt         SOCIAL HISTORY       Social History     Socioeconomic History    Marital status:      Spouse name: None    Number of children: None    Years of education: None    Highest education level: None   Tobacco Use    Smoking status: Never    Smokeless tobacco: Never   Vaping Use    Vaping Use: Never used   Substance and Sexual Activity    Alcohol use: No    Drug use: No    Sexual activity: Yes     Partners: Male     Social Determinants of Health     Financial Resource Strain: Low Risk     Difficulty of Paying Living Expenses: Not hard at all   Food Insecurity: No Food Insecurity    Worried About Running Out of Food in the Last Year: Never true    Ran Out of Food in the Last Year: Never true       SCREENINGS   NIH Stroke Scale  Interval: Hand-off/Transfer  Level of Consciousness (1a): Alert  LOC Questions (1b):  Answers both correctly  LOC Commands (1c): Performs both tasks correctly  Best Gaze (2): Normal  Visual (3): No visual loss  Facial Palsy (4): Normal symmetrical movement  Motor Arm, Left (5a): No drift  Motor Arm, Right (5b): No drift  Motor Leg, Left (6a): No drift  Motor Leg, Right (6b): No drift  Limb Ataxia (7): Absent  Sensory (8): Normal  Best Language (9): No aphasia  Dysarthria (10): Mild to moderate, slurs some words  Extinction and Inattention (11): No abnormality  Total: 1Glasgow Coma Scale  Eye Opening: Spontaneous  Best Verbal Response: Oriented  Best Motor Response: Obeys commands  Grace Coma Scale Score: 15        PHYSICAL EXAM    (up to 7 for level 4, 8 or more for level 5)     ED Triage Vitals [08/13/22 1655]   BP Temp Temp Source Heart Rate Resp SpO2 Height Weight   97/81 97.9 °F (36.6 °C) Oral (!) 130 13 96 % -- 108 lb 0.4 oz (49 kg)       Physical Exam  Vitals and nursing note reviewed. Chaperone present: Nurse Vonnie chaperoning exam.   Constitutional:       Appearance: Normal appearance. She is normal weight. She is ill-appearing. She is not toxic-appearing or diaphoretic. HENT:      Head: Normocephalic and atraumatic. Right Ear: External ear normal.      Left Ear: External ear normal.      Nose: Nose normal. No congestion or rhinorrhea. Mouth/Throat:      Mouth: Mucous membranes are moist.      Pharynx: Oropharynx is clear. No oropharyngeal exudate or posterior oropharyngeal erythema. Eyes:      General: No scleral icterus. Right eye: No discharge. Left eye: No discharge. Extraocular Movements: Extraocular movements intact. Conjunctiva/sclera: Conjunctivae normal.      Pupils: Pupils are equal, round, and reactive to light. Cardiovascular:      Rate and Rhythm: Normal rate and regular rhythm. Pulses: Normal pulses. Heart sounds: Normal heart sounds. No murmur heard. No friction rub. No gallop.    Pulmonary:      Effort: Pulmonary effort is normal. No respiratory distress. Breath sounds: Normal breath sounds. No stridor. No wheezing, rhonchi or rales. Abdominal:      General: Abdomen is flat. Bowel sounds are normal. There is no distension. Palpations: Abdomen is soft. Tenderness: There is no abdominal tenderness. There is no right CVA tenderness, left CVA tenderness or guarding. Musculoskeletal:         General: No deformity. Normal range of motion. Cervical back: Normal range of motion and neck supple. No rigidity or tenderness. Skin:     General: Skin is warm and dry. Capillary Refill: Capillary refill takes less than 2 seconds. Coloration: Skin is pale. Skin is not jaundiced. Findings: No rash. Comments: Groin abscess area does not appear to be acutely infected. No purulent discharge noted. Neurological:      General: No focal deficit present. Mental Status: She is alert and oriented to person, place, and time. Mental status is at baseline. Comments: - Alert and oriented to person, place, time, situation. - CN II-XII intact. - Full and symmetric strength bilateral upper and lower extremities. - Sensation intact to light touch in all extremities. - Normal rapidly alternating movements  - Patella reflexes are 2+ and symmetric.   - Normal finger to nose.  Normal heel to shin.   - No truncal ataxia   Psychiatric:         Mood and Affect: Mood normal.         Behavior: Behavior normal.       DIAGNOSTIC RESULTS   LABS:    Labs Reviewed   CBC WITH AUTO DIFFERENTIAL - Abnormal; Notable for the following components:       Result Value    Hemoglobin 10.8 (*)     Hematocrit 34.0 (*)     MCV 77.7 (*)     MCH 24.7 (*)     All other components within normal limits   COMPREHENSIVE METABOLIC PANEL W/ REFLEX TO MG FOR LOW K - Abnormal; Notable for the following components:    Sodium 135 (*)     Potassium reflex Magnesium 3.4 (*)     Chloride 98 (*)     CO2 18 (*)     Anion Gap 19 (*)     Glucose 236 (*)     AST 51 (*) All other components within normal limits   LACTATE, SEPSIS - Abnormal; Notable for the following components:    Lactic Acid, Sepsis 5.8 (*)     All other components within normal limits    Narrative:     Sean Singh tel. [phone],  Chemistry results called to and read back by Jenifer Potts, 08/13/2022  18:01, by Liset Moreau   LACTATE, SEPSIS - Abnormal; Notable for the following components:    Lactic Acid, Sepsis 2.9 (*)     All other components within normal limits   URINALYSIS WITH REFLEX TO CULTURE - Abnormal; Notable for the following components:    Glucose, Ur >=1000 (*)     Ketones, Urine TRACE (*)     All other components within normal limits   LEVETIRACETAM LEVEL - Abnormal; Notable for the following components:    Levetiracetam Lvl <2.0 (*)     All other components within normal limits   BLOOD GAS, VENOUS - Abnormal; Notable for the following components:    pCO2, Drew 36.3 (*)     HCO3, Venous 21 (*)     All other components within normal limits   BETA-HYDROXYBUTYRATE - Abnormal; Notable for the following components:    Beta-Hydroxybutyrate 0.29 (*)     All other components within normal limits   CBC WITH AUTO DIFFERENTIAL - Abnormal; Notable for the following components:    RBC 3.30 (*)     Hemoglobin 8.3 (*)     Hematocrit 26.4 (*)     MCV 79.9 (*)     MCH 25.0 (*)     RDW 15.5 (*)     All other components within normal limits   COMPREHENSIVE METABOLIC PANEL - Abnormal; Notable for the following components:    Sodium 133 (*)     CO2 20 (*)     Glucose 604 (*)     BUN 4 (*)     Creatinine 0.5 (*)     Calcium 7.7 (*)     Total Protein 5.6 (*)     Albumin 3.0 (*)     AST 59 (*)     All other components within normal limits    Narrative:     Colton Cagle  [id number] tel. [phone],  Chemistry results called to and read back by Chanda Tilley RN, 08/14/2022  05:43, by BLUSH   MAGNESIUM - Abnormal; Notable for the following components:    Magnesium 1.30 (*)     All other components within normal limits Narrative:     Karey Condon  [id number] tel. [phone],  Chemistry results called to and read back by Maliha Goddard RN, 08/14/2022  05:43, by BLUSH   POCT GLUCOSE - Abnormal; Notable for the following components:    POC Glucose 233 (*)     All other components within normal limits   POCT GLUCOSE - Abnormal; Notable for the following components:    POC Glucose 127 (*)     All other components within normal limits   POCT GLUCOSE - Abnormal; Notable for the following components:    POC Glucose 576 (*)     All other components within normal limits   POCT GLUCOSE - Abnormal; Notable for the following components:    POC Glucose 484 (*)     All other components within normal limits   POCT GLUCOSE - Abnormal; Notable for the following components:    POC Glucose 345 (*)     All other components within normal limits   POCT GLUCOSE - Abnormal; Notable for the following components:    POC Glucose 292 (*)     All other components within normal limits   POCT GLUCOSE - Abnormal; Notable for the following components:    POC Glucose 239 (*)     All other components within normal limits   POCT GLUCOSE - Abnormal; Notable for the following components:    POC Glucose 264 (*)     All other components within normal limits   POCT GLUCOSE - Abnormal; Notable for the following components:    POC Glucose 186 (*)     All other components within normal limits   POCT GLUCOSE - Abnormal; Notable for the following components:    POC Glucose 303 (*)     All other components within normal limits   CULTURE, BLOOD 2   CULTURE, BLOOD 1   PREGNANCY, URINE   MAGNESIUM   AMMONIA   URINE DRUG SCREEN   LACTIC ACID    Narrative:     Collection has been rescheduled by [facility] at 08/14/2022 00:08 Reason:   Patient has port or line   LACTIC ACID   LACTIC ACID   VANCOMYCIN LEVEL, TROUGH   CBC WITH AUTO DIFFERENTIAL   COMPREHENSIVE METABOLIC PANEL   MAGNESIUM   CK   POCT GLUCOSE   POCT GLUCOSE   POCT GLUCOSE   POCT GLUCOSE   POCT GLUCOSE       When ordered, only capsule 300 mg (300 mg Oral Given 8/14/22 1318)   sodium chloride flush 0.9 % injection 5-40 mL (5 mLs IntraVENous Not Given 8/14/22 1034)   sodium chloride flush 0.9 % injection 5-40 mL (has no administration in time range)   0.9 % sodium chloride infusion (0 mL/hr IntraVENous Stopped 8/14/22 1613)   enoxaparin Sodium (LOVENOX) injection 30 mg (30 mg SubCUTAneous Given 8/14/22 1031)   acetaminophen (TYLENOL) tablet 650 mg (has no administration in time range)     Or   acetaminophen (TYLENOL) suppository 650 mg (has no administration in time range)   sertraline (ZOLOFT) tablet 50 mg (50 mg Oral Given 8/14/22 1032)   famotidine (PEPCID) tablet 20 mg (20 mg Oral Given 8/14/22 1033)   ondansetron (ZOFRAN) injection 4 mg (has no administration in time range)   levETIRAcetam (KEPPRA) 500 mg/100 mL IVPB (0 mg IntraVENous Stopped 8/14/22 1043)   midazolam (VERSED) injection 1 mg (has no administration in time range)   0.9 % sodium chloride infusion ( IntraVENous New Bag 8/14/22 0431)   vancomycin (VANCOCIN) 750 mg in dextrose 5 % 250 mL IVPB (0 mg IntraVENous Stopped 8/14/22 1557)   vancomycin (VANCOCIN) intermittent dosing (placeholder) (has no administration in time range)   glucose chewable tablet 16 g (has no administration in time range)   dextrose bolus 10% 125 mL (has no administration in time range)     Or   dextrose bolus 10% 250 mL (has no administration in time range)   glucagon (rDNA) injection 1 mg (has no administration in time range)   dextrose 10 % infusion (has no administration in time range)   clonazePAM (KLONOPIN) tablet 2 mg (has no administration in time range)   insulin lispro (HUMALOG) injection vial 3 Units (3 Units SubCUTAneous Given 8/14/22 1847)   insulin lispro (HUMALOG) injection vial 0-8 Units (0 Units SubCUTAneous Not Given 8/14/22 1837)   insulin lispro (HUMALOG) injection vial 0-4 Units (has no administration in time range)   lactated ringers bolus (0 mLs IntraVENous Stopped 8/13/22 2040) levETIRAcetam (KEPPRA) 1000 mg/100 mL IVPB (0 mg IntraVENous Stopped 8/13/22 1855)   vancomycin 1000 mg IVPB in 250 mL D5W addavial (0 mg IntraVENous Stopped 8/13/22 2210)   0.9 % sodium chloride bolus (0 mLs IntraVENous Stopped 8/13/22 2045)   insulin regular (HUMULIN R;NOVOLIN R) injection 10 Units (10 Units IntraVENous Given 8/14/22 0520)   insulin regular (HUMULIN R;NOVOLIN R) injection 10 Units (10 Units IntraVENous Given 8/14/22 0645)   magnesium sulfate 4000 mg in 100 mL IVPB premix (0 mg IntraVENous Stopped 8/14/22 1557)   clonazePAM (KLONOPIN) tablet 1.5 mg (1.5 mg Oral Given 8/14/22 1129)         Is this patient to be included in the SEP-1 Core Measure due to severe sepsis or septic shock? Yes   SEP-1 CORE MEASURE DATA      Sepsis Criteria   Severe Sepsis Criteria   Septic Shock Criteria     Must be confirmed or suspected to move forward with diagnosis of sepsis. Must meet 2:    [] Temperature > 100.9 F (38.3 C)        or < 96.8 F (36 C)  [x] HR > 90  [x] RR > 20  [] WBC > 12 or < 4 or 10% bands      AND:      [x] Infection Confirmed or        Suspected. Must meet 1:    [x] Lactate > 2       or   [] Signs of Organ Dysfunction:    - SBP < 90 or MAP < 65  - Altered mental status  - Creatinine > 2 or increased from      baseline  - Urine Output < 0.5 ml/kg/hr  - Bilirubin > 2  - INR > 1.5 (not anticoagulated)  - Platelets < 565,925  - Acute Respiratory Failure as     evidenced by new need for NIPPV     or mechanical ventilation      [] No criteria met for Severe Sepsis. Must meet 1:    [x] Lactate > 4        or   [] SBP < 90 or MAP < 65 for at        least two readings in the first        hour after fluid bolus        administration      [] Vasopressors initiated (if hypotension persists after fluid resuscitation)        [] No criteria met for Septic Shock.    Patient Vitals for the past 6 hrs:   BP Temp Pulse Resp SpO2   08/14/22 1616 112/81 97.5 °F (36.4 °C) 100 -- 98 %   08/14/22 2010 99/66 97.7 °F (36.5 °C) 87 18 100 %   08/14/22 2038 111/83 97.6 °F (36.4 °C) 98 16 99 %      Recent Labs     08/13/22  1720 08/14/22  0015 08/14/22  0459 08/14/22  1205   WBC 8.3  --  5.2  --    LACTA  --  0.8 0.7 0.8   CREATININE 0.6  --  0.5*  --    BILITOT <0.2  --  <0.2  --      --  254  --          Time Severe Sepsis Identified: [id number]    Fluid Resuscitation Rational: at least 30mL/kg based on entered actual weight at time of triage      Repeat lactate level: improving    Reassessment Exam:   I have reassessed tissue perfusion and hemodynamic status after fluid bolus at this time: Unchanged from assessment 2 hours prior patient responding well to fluid bolus with slight improvement to heart rate      18-year-old female coming into ED for questionable seizures versus DKA versus sepsis from MRSA bacteremia as above. Found to be nonadherent to home antiepileptics. There is some concern whether this is truly seizures versus PNES. We will defer this to neurology service. The patient may also have been withdrawing from benzos which may have contributed to her seizure. Her labs in ED are concerning for sepsis with initial lactate 5.8 which trended downwards to 2.9 after 30 mils per kilo fluid resuscitation. Her CBC without leukocytosis. Her anemia is unchanged which explains her pallor. Her CMP reveals minimal hyponatremia with an elevated glucose suggestive pseudohyponatremia. Her potassium is 3.4 and her magnesium is within normal limits. Her urine drug screen does not reveal any positives. Her serum ketones are minimally elevated 0.29 and her VBG does not reveal acidosis. At this point, I believe her symptoms are more related to her medication nonadherence. She is not in DKA at this time. She is fluid resuscitated, started on appropriate antibiotics for her known MRSA bacteremia and will be admitted to the medicine service.   Goals of care were discussed with the patient and her , she is to be full code. Results were discussed with the patient and her . Chest x-ray also reviewed as above. Given her stuttering, which she believes to be secondary to her medication nonadherence a CT scan of the head was ordered. We do not believe this to be a stroke and the patient is outside the window for a stroke alert regardless. CT head does not reveal any acute abnormalities. FINAL IMPRESSION      1. Seizure ([address]    2. Anemia, unspecified type    3. Non-adherence to medical treatment    4. Hypokalemia    5. Lactic acidosis    6. SIRS (systemic inflammatory response syndrome) (HCC)    7. Septicemia [facility])          DISPOSITION/PLAN   DISPOSITION Admitted 08/13/2022 08:41:55 PM      PATIENT REFERREDTO:  No follow-up provider specified.     DISCHARGE MEDICATIONS:  Current Discharge Medication List          DISCONTINUED MEDICATIONS:  Current Discharge Medication List                 (Please note that portions ofthis note were completed with a voice recognition program.  Efforts were made to edit the dictations but occasionally words are mis-transcribed.)    MARI Walker CNP (electronically signed)             MARI Walker CNP  08/14/22 2045

## 2022-08-13 NOTE — CONSULTS
Clinical Pharmacy Note  Vancomycin Consult    Pharmacy consult received for one-time dose of vancomycin in the Emergency Department per Dr. Masood Zuleta. Ht Readings from Last 1 Encounters:   08/11/22 4' 10\" (1.473 m)        Wt Readings from Last 1 Encounters:   08/13/22 108 lb 0.4 oz (49 kg)         Assessment/Plan:  Vancomycin 1000 mg x 1 in ED. If Vancomycin is to continue on admission and pharmacy is to manage dosing, please re-consult with admission orders.        eladio

## 2022-08-13 NOTE — ED PROVIDER NOTES
Attending Supervisory Note/Shared Visit   I have personally performed a face to face diagnostic evaluation on this patient. I have reviewed the mid-levels findings and agree. History and Exam by me shows an alert white female no acute distress. Patient is a type I diabetic. Recent hospitalization for DKA and MRSA bacteremia. She had a left groin abscess that was drained. Discharged home with a PICC line and continued IV antibiotics. She has a history of seizures. She states she had a seizure 3 days ago. She is been compliant with her Keppra. She states she just has not felt well since then. Mainly she feels fatigued and tired. General: An alert white female in no obvious distress. HEENT: Conjunctiva are clear. Pupils equal round reactive. Nose is clear. Oropharynx is dry. Neck: Supple, nontender, no adenopathy. Heart: Tachycardic, regular, no murmurs or gallops noted. Lungs: Breath sounds equal bilaterally and clear. Min: Soft, nondistended, nontender. Skin: Pale, no diaphoresis. Healing left inguinal abscess without any erythema or fluctuance. Lab reviewed. H&H of 10.8 and 34.0. White blood cell count 8300 with 63 neutrophils and 30 lymphs. Sodium 135 with potassium 3.4. Bicarb of 18 with an anion gap of 19. Glucose of 236. BUN of 7 with a creatinine of 0.6. ALT of 21 with an AST of 51. Bilirubin is normal at less than 0.2. Lactic acid is elevated at 5.8. Keppra level less than 2.0. Pregnancy test negative. Venous pH is 7.37 with a PCO2 of 36. Beta hydroxybutyrate of 0.29. Magnesium of 1.9. CT head: No acute intracranial abnormality. Type I diabetic was tachycardic on arrival.  Recent hospitalization for MRSA sepsis. Had a abscess drained. Had a PICC line and was on IV antibiotics at home. Had a seizure 3 days ago. Has not felt well since then. Feeling generally fatigued and weak. He is afebrile. Her blood pressures are in the 46P systolic.   She has no obvious source of infection on exam.  She has a lactic acid of 5.8. She has no significant pyuria. She has no abdominal pain or tenderness. Her lactic acid is less than 2.0. Her pregnancy test was negative. Because of concerns for continued sepsis secondary to MRSA sepsis fluids were initiated, blood cultures were obtained, IV antibiotics were ordered. Additional history obtained later in nurse day from her  is at she has not been taking her medications, she does not like the way they make her feel. Obviously she is not been taking her Keppra, her level is less than 2.0. She was loaded with Keppra. Hospitalist consulted for admission      (Please note that portions of this note were completed with a voice recognition program.  Efforts were made to edit the dictations but occasionally words are mis-transcribed.)    Dleia Monique MD  Attending Emergency Physician  An alert white female in no acute distress.      Kodak Forrest MD  08/13/22 [id number]

## 2022-08-14 PROBLEM — Z79.2 RECEIVING INTRAVENOUS ANTIBIOTIC TREATMENT AS OUTPATIENT: Status: ACTIVE | Noted: 2022-08-14

## 2022-08-14 PROBLEM — D64.9 ANEMIA: Status: ACTIVE | Noted: 2022-08-14

## 2022-08-14 PROBLEM — B99.9 INFECTION REQUIRING CONTACT ISOLATION PRECAUTIONS: Status: ACTIVE | Noted: 2022-08-14

## 2022-08-14 PROBLEM — E87.6 HYPOKALEMIA: Status: ACTIVE | Noted: 2022-08-14

## 2022-08-14 PROBLEM — Z91.199 NON-ADHERENCE TO MEDICAL TREATMENT: Status: ACTIVE | Noted: 2022-08-14

## 2022-08-14 PROBLEM — Z86.59 HISTORY OF DEPRESSION: Status: ACTIVE | Noted: 2022-08-14

## 2022-08-14 LAB
A/G RATIO: 1.2 (ref 1.1–2.2)
ALBUMIN SERPL-MCNC: 3 G/DL (ref 3.4–5)
ALP BLD-CCNC: 80 U/L (ref 40–129)
ALT SERPL-CCNC: 19 U/L (ref 10–40)
ANION GAP SERPL CALCULATED.3IONS-SCNC: 12 MMOL/L (ref 3–16)
AST SERPL-CCNC: 59 U/L (ref 15–37)
BASOPHILS ABSOLUTE: 0.1 K/UL (ref 0–0.2)
BASOPHILS RELATIVE PERCENT: 1.2 %
BILIRUB SERPL-MCNC: <0.2 MG/DL (ref 0–1)
BUN BLDV-MCNC: 4 MG/DL (ref 7–20)
CALCIUM SERPL-MCNC: 7.7 MG/DL (ref 8.3–10.6)
CHLORIDE BLD-SCNC: 101 MMOL/L (ref 99–110)
CO2: 20 MMOL/L (ref 21–32)
CREAT SERPL-MCNC: 0.5 MG/DL (ref 0.6–1.1)
EOSINOPHILS ABSOLUTE: 0.1 K/UL (ref 0–0.6)
EOSINOPHILS RELATIVE PERCENT: 2 %
GFR AFRICAN AMERICAN: >60
GFR NON-AFRICAN AMERICAN: >60
GLUCOSE BLD-MCNC: 186 MG/DL (ref 70–99)
GLUCOSE BLD-MCNC: 239 MG/DL (ref 70–99)
GLUCOSE BLD-MCNC: 264 MG/DL (ref 70–99)
GLUCOSE BLD-MCNC: 292 MG/DL (ref 70–99)
GLUCOSE BLD-MCNC: 303 MG/DL (ref 70–99)
GLUCOSE BLD-MCNC: 345 MG/DL (ref 70–99)
GLUCOSE BLD-MCNC: 484 MG/DL (ref 70–99)
GLUCOSE BLD-MCNC: 576 MG/DL (ref 70–99)
GLUCOSE BLD-MCNC: 604 MG/DL (ref 70–99)
HCT VFR BLD CALC: 26.4 % (ref 36–48)
HEMOGLOBIN: 8.3 G/DL (ref 12–16)
LACTIC ACID: 0.7 MMOL/L (ref 0.4–2)
LACTIC ACID: 0.8 MMOL/L (ref 0.4–2)
LACTIC ACID: 0.8 MMOL/L (ref 0.4–2)
LYMPHOCYTES ABSOLUTE: 1.7 K/UL (ref 1–5.1)
LYMPHOCYTES RELATIVE PERCENT: 33.3 %
MAGNESIUM: 1.3 MG/DL (ref 1.8–2.4)
MCH RBC QN AUTO: 25 PG (ref 26–34)
MCHC RBC AUTO-ENTMCNC: 31.3 G/DL (ref 31–36)
MCV RBC AUTO: 79.9 FL (ref 80–100)
MONOCYTES ABSOLUTE: 0.4 K/UL (ref 0–1.3)
MONOCYTES RELATIVE PERCENT: 7.7 %
NEUTROPHILS ABSOLUTE: 2.9 K/UL (ref 1.7–7.7)
NEUTROPHILS RELATIVE PERCENT: 55.8 %
PDW BLD-RTO: 15.5 % (ref 12.4–15.4)
PERFORMED ON: ABNORMAL
PLATELET # BLD: 254 K/UL (ref 135–450)
PMV BLD AUTO: 8.1 FL (ref 5–10.5)
POTASSIUM SERPL-SCNC: 3.7 MMOL/L (ref 3.5–5.1)
RBC # BLD: 3.3 M/UL (ref 4–5.2)
SODIUM BLD-SCNC: 133 MMOL/L (ref 136–145)
TOTAL CK: 26 U/L (ref 26–192)
TOTAL PROTEIN: 5.6 G/DL (ref 6.4–8.2)
VANCOMYCIN TROUGH: 16.4 UG/ML (ref 10–20)
WBC # BLD: 5.2 K/UL (ref 4–11)

## 2022-08-14 PROCEDURE — 99255 IP/OBS CONSLTJ NEW/EST HI 80: CPT | Performed by: INTERNAL MEDICINE

## 2022-08-14 PROCEDURE — 36415 COLL VENOUS BLD VENIPUNCTURE: CPT

## 2022-08-14 PROCEDURE — 82550 ASSAY OF CK (CPK): CPT

## 2022-08-14 PROCEDURE — 6360000002 HC RX W HCPCS: Performed by: FAMILY MEDICINE

## 2022-08-14 PROCEDURE — 6370000000 HC RX 637 (ALT 250 FOR IP)

## 2022-08-14 PROCEDURE — 2060000000 HC ICU INTERMEDIATE R&B

## 2022-08-14 PROCEDURE — 83735 ASSAY OF MAGNESIUM: CPT

## 2022-08-14 PROCEDURE — 94760 N-INVAS EAR/PLS OXIMETRY 1: CPT

## 2022-08-14 PROCEDURE — 83605 ASSAY OF LACTIC ACID: CPT

## 2022-08-14 PROCEDURE — 80202 ASSAY OF VANCOMYCIN: CPT

## 2022-08-14 PROCEDURE — 2580000003 HC RX 258: Performed by: STUDENT IN AN ORGANIZED HEALTH CARE EDUCATION/TRAINING PROGRAM

## 2022-08-14 PROCEDURE — 6370000000 HC RX 637 (ALT 250 FOR IP): Performed by: STUDENT IN AN ORGANIZED HEALTH CARE EDUCATION/TRAINING PROGRAM

## 2022-08-14 PROCEDURE — 80053 COMPREHEN METABOLIC PANEL: CPT

## 2022-08-14 PROCEDURE — 85025 COMPLETE CBC W/AUTO DIFF WBC: CPT

## 2022-08-14 PROCEDURE — 6360000002 HC RX W HCPCS: Performed by: STUDENT IN AN ORGANIZED HEALTH CARE EDUCATION/TRAINING PROGRAM

## 2022-08-14 PROCEDURE — 6370000000 HC RX 637 (ALT 250 FOR IP): Performed by: FAMILY MEDICINE

## 2022-08-14 RX ORDER — DEXTROSE MONOHYDRATE 100 MG/ML
INJECTION, SOLUTION INTRAVENOUS CONTINUOUS PRN
Status: DISCONTINUED | OUTPATIENT
Start: 2022-08-14 | End: 2022-08-16 | Stop reason: HOSPADM

## 2022-08-14 RX ORDER — INSULIN LISPRO 100 [IU]/ML
0-8 INJECTION, SOLUTION INTRAVENOUS; SUBCUTANEOUS
Status: DISCONTINUED | OUTPATIENT
Start: 2022-08-14 | End: 2022-08-16

## 2022-08-14 RX ORDER — MAGNESIUM SULFATE IN WATER 40 MG/ML
4000 INJECTION, SOLUTION INTRAVENOUS ONCE
Status: COMPLETED | OUTPATIENT
Start: 2022-08-14 | End: 2022-08-14

## 2022-08-14 RX ORDER — INSULIN GLARGINE 100 [IU]/ML
25 INJECTION, SOLUTION SUBCUTANEOUS NIGHTLY
Status: DISCONTINUED | OUTPATIENT
Start: 2022-08-15 | End: 2022-08-16

## 2022-08-14 RX ORDER — INSULIN LISPRO 100 [IU]/ML
3 INJECTION, SOLUTION INTRAVENOUS; SUBCUTANEOUS
Status: DISCONTINUED | OUTPATIENT
Start: 2022-08-14 | End: 2022-08-14

## 2022-08-14 RX ORDER — CLONAZEPAM 1 MG/1
2 TABLET ORAL 2 TIMES DAILY
Status: DISCONTINUED | OUTPATIENT
Start: 2022-08-14 | End: 2022-08-16 | Stop reason: HOSPADM

## 2022-08-14 RX ORDER — INSULIN LISPRO 100 [IU]/ML
0-4 INJECTION, SOLUTION INTRAVENOUS; SUBCUTANEOUS
Status: DISCONTINUED | OUTPATIENT
Start: 2022-08-14 | End: 2022-08-14

## 2022-08-14 RX ORDER — INSULIN LISPRO 100 [IU]/ML
5 INJECTION, SOLUTION INTRAVENOUS; SUBCUTANEOUS
Status: DISCONTINUED | OUTPATIENT
Start: 2022-08-15 | End: 2022-08-16 | Stop reason: HOSPADM

## 2022-08-14 RX ORDER — INSULIN LISPRO 100 [IU]/ML
0-4 INJECTION, SOLUTION INTRAVENOUS; SUBCUTANEOUS NIGHTLY
Status: DISCONTINUED | OUTPATIENT
Start: 2022-08-14 | End: 2022-08-16

## 2022-08-14 RX ORDER — INSULIN LISPRO 100 [IU]/ML
0-4 INJECTION, SOLUTION INTRAVENOUS; SUBCUTANEOUS NIGHTLY
Status: DISCONTINUED | OUTPATIENT
Start: 2022-08-14 | End: 2022-08-14

## 2022-08-14 RX ADMIN — CLONAZEPAM 1.5 MG: 1 TABLET ORAL at 11:29

## 2022-08-14 RX ADMIN — SODIUM CHLORIDE, PRESERVATIVE FREE 10 ML: 5 INJECTION INTRAVENOUS at 21:57

## 2022-08-14 RX ADMIN — VANCOMYCIN HYDROCHLORIDE 750 MG: 750 INJECTION, POWDER, LYOPHILIZED, FOR SOLUTION INTRAVENOUS at 04:27

## 2022-08-14 RX ADMIN — GABAPENTIN 300 MG: 300 CAPSULE ORAL at 21:57

## 2022-08-14 RX ADMIN — LEVETIRACETAM 500 MG: 5 INJECTION INTRAVENOUS at 22:00

## 2022-08-14 RX ADMIN — INSULIN HUMAN 10 UNITS: 100 INJECTION, SOLUTION PARENTERAL at 05:20

## 2022-08-14 RX ADMIN — SODIUM CHLORIDE: 9 INJECTION, SOLUTION INTRAVENOUS at 04:31

## 2022-08-14 RX ADMIN — SERTRALINE 50 MG: 50 TABLET, FILM COATED ORAL at 10:32

## 2022-08-14 RX ADMIN — FAMOTIDINE 20 MG: 20 TABLET, FILM COATED ORAL at 10:33

## 2022-08-14 RX ADMIN — INSULIN LISPRO 3 UNITS: 100 INJECTION, SOLUTION INTRAVENOUS; SUBCUTANEOUS at 13:18

## 2022-08-14 RX ADMIN — LEVETIRACETAM 500 MG: 5 INJECTION INTRAVENOUS at 10:28

## 2022-08-14 RX ADMIN — INSULIN LISPRO 4 UNITS: 100 INJECTION, SOLUTION INTRAVENOUS; SUBCUTANEOUS at 22:01

## 2022-08-14 RX ADMIN — INSULIN HUMAN 10 UNITS: 100 INJECTION, SOLUTION PARENTERAL at 06:45

## 2022-08-14 RX ADMIN — GABAPENTIN 300 MG: 300 CAPSULE ORAL at 13:18

## 2022-08-14 RX ADMIN — GABAPENTIN 300 MG: 300 CAPSULE ORAL at 10:33

## 2022-08-14 RX ADMIN — CLONAZEPAM 0.5 MG: 0.5 TABLET ORAL at 10:32

## 2022-08-14 RX ADMIN — INSULIN LISPRO 4 UNITS: 100 INJECTION, SOLUTION INTRAVENOUS; SUBCUTANEOUS at 13:17

## 2022-08-14 RX ADMIN — CLONAZEPAM 2 MG: 1 TABLET ORAL at 21:57

## 2022-08-14 RX ADMIN — VANCOMYCIN HYDROCHLORIDE 750 MG: 750 INJECTION, POWDER, LYOPHILIZED, FOR SOLUTION INTRAVENOUS at 13:28

## 2022-08-14 RX ADMIN — ENOXAPARIN SODIUM 30 MG: 100 INJECTION SUBCUTANEOUS at 10:31

## 2022-08-14 RX ADMIN — FAMOTIDINE 20 MG: 20 TABLET, FILM COATED ORAL at 21:57

## 2022-08-14 RX ADMIN — INSULIN LISPRO 3 UNITS: 100 INJECTION, SOLUTION INTRAVENOUS; SUBCUTANEOUS at 18:47

## 2022-08-14 RX ADMIN — VANCOMYCIN HYDROCHLORIDE 750 MG: 750 INJECTION, POWDER, LYOPHILIZED, FOR SOLUTION INTRAVENOUS at 21:07

## 2022-08-14 RX ADMIN — INSULIN LISPRO 1 UNITS: 100 INJECTION, SOLUTION INTRAVENOUS; SUBCUTANEOUS at 08:48

## 2022-08-14 RX ADMIN — MAGNESIUM SULFATE HEPTAHYDRATE 4000 MG: 40 INJECTION, SOLUTION INTRAVENOUS at 11:30

## 2022-08-14 ASSESSMENT — ENCOUNTER SYMPTOMS
EYE DISCHARGE: 0
ABDOMINAL PAIN: 0
SORE THROAT: 0
SINUS PRESSURE: 0
RHINORRHEA: 0
BACK PAIN: 0
EYE REDNESS: 0
SHORTNESS OF BREATH: 0
CONSTIPATION: 0
WHEEZING: 0
COUGH: 0
EYE PAIN: 0
NAUSEA: 0
SINUS PAIN: 0
VOMITING: 0
DIARRHEA: 0

## 2022-08-14 ASSESSMENT — PAIN SCALES - GENERAL
PAINLEVEL_OUTOF10: 0
PAINLEVEL_OUTOF10: 0

## 2022-08-14 NOTE — PROGRESS NOTES
Call returned to pt's mother in law, Jany Dixon. Updates provided and all questions answered at this time. Jany Dixon states she lives in the same apartment complex as the pt. Jany Dixon has been assisting with IV antibiotics via pt's PICC and assisting with general needs at home. Jany Dixon is concerned about pt returning home after hospital stay d/t pt's apartment being \"very dirty\", and believes pt would do best at a rehab facility prior to coming home. Jany Dixon informed that a  would likely discuss options with pt and family regarding plan. Call back number provided for any further questions or concerns.

## 2022-08-14 NOTE — PROGRESS NOTES
Pt requesting POCT BS be checked as she feels her BS is high. Pt's BS noted to be 576 at this time. Message sent to gopal FLORES via [employer] New orders placed for a one time dose of 10 Units Regular Insulin. Will administer and recheck BS again in 15min.

## 2022-08-14 NOTE — CARE COORDINATION
08/14/22 1314   Readmission Assessment   Number of Days since last admission? 8-30 days   Previous Disposition Home with Home Health   Who is being Interviewed Patient   What was the patient's/caregiver's perception as to why they think they needed to return back to the hospital? Other (Comment)  (had another seizure)   Did you visit your Primary Care Physician after you left the hospital, before you returned this time? No   Why weren't you able to visit your PCP? Did not have an appointment   Did you see a specialist, such as Cardiac, Pulmonary, Orthopedic Physician, etc. after you left the hospital? No   Who advised the patient to return to the hospital? Self-referral   Does the patient report anything that got in the way of taking their medications?  No     Carlyn Green RN, BSN,   [phone]  Electronically signed by Carlyn Green RN on 8/14/2022 at 1:22 PM

## 2022-08-14 NOTE — CONSULTS
Reason for Consult:  seizure, possible medication non-compliance  Attending Physician: Johnathon Franklin MD           HISTORY OF PRESENT ILLNESS:              The patient is a 34 y.o. female with significant past medical history of type I DM, seizures, depression admitted for concern for possible sepsis 2/2 MRSA infection. She was recently admitted for MRSA sepsis and DKA. She had a groin abscess drained and started on IV antibiotics. She continued IV antibiotics at home via PICC line. She came to the ER last night because she had not been feeling well for the last several days. She states her blood sugar was very high yesterday despite not eating and taking 4 doses of insulin. Her mother-in-law was concerned about her high glucose readings and encouraged her to come to the ER. Flower Tenorio reports having a seizure about 3 days ago. She initially denies missing any doses of her medications including Keppra. When discussing her undetectable Keppra levels, she initially said it was because they gave her IV fluids before drawing the level. I pointed out this would not be the case. She then admitted that she has missed some doses since she has been ill. She reports taking her Keppra 500 mg in the evening and 500 mg about 2-3 hrs later. She does not take a morning dose. She does take the clonazepam 2 mg bid. She previously had an EMU admission at [facility] in 2020. One \"typical event of head jerking during stressful event was noted without abnormal EEG correlate suggesting that this was nonepileptic in nature. The clinical appearance and lack of EEG change is suggestive of psychogenic nonepilepticseizure. \"    She had moved to [place] to care for father who has since passed away.   She has been back in the area for about 6 months, but has not yet re-established with an epileptologist.      Description of events per Dr. Rene Valverde note:     Event type 1: weird/odd feeling with a 'head jerk' followed by panic attack like event. These are brief and often precede type 3 events (convulsions). States she can have a few of these before but sometimes they occur independently as well. She notes that they can be brought on by stress and crowds. These are occurring a few times per month often preceding the convulsions. Most recent occurred 2 weeks ago. Event type 2:   Staring spells- unclear if responsive. Family not present to corroborate. These are occurring also a few times per month and often precede the convulsions as well. She notes that they have my occur more frequently but it is hard to say. Most recent occurred 2 weeks ago. Event type 3: GTCs/ generalized convulsions- sometimes preceded by type 1 events but not always. Many of them have no warning. Sometimes will cluster (for example had 5 in a row a couple months ago). +tongue biting. Occurring 3xper month. Most recent occurred 1 week ago. She reports having a GTC 3 days ago. She is not aware during any of her events. She did endorse that the head jerking often precedes the GTCs. At her last encounter with Dr. Carisa White, she was on Keppra 2000 BID (worsened eadaches/ineffective), Lamictal 200 mg BID (hair loss?), and   onfi 10 mg at night. In addition to clonazepam 2 mg bid for anxiety and gabapentin 300 mg am/ 900 mg pm for diabetic neuropathy.        Previous AEDs:  zonisamide- adverse effects and possible contribution to acidosis  Dilantin- adverse effects and not effective      Past Medical History:    Past Medical History:   Diagnosis Date    Depression     Diabetes mellitus ([facility] [address] [address]     Seizures ([facility]        Past Surgical History:    Past Surgical History:   Procedure Laterality Date     SECTION      TUBAL LIGATION         Medications:   Current Facility-Administered Medications: glucose chewable tablet 16 g, 4 tablet, Oral, PRN  dextrose bolus 10% 125 mL, 125 mL, IntraVENous, PRN **OR** dextrose bolus 10% 250 mL, 250 mL, IntraVENous, PRN  glucagon (rDNA) injection 1 mg, 1 mg, SubCUTAneous, PRN  dextrose 10 % infusion, , IntraVENous, Continuous PRN  insulin lispro (HUMALOG) injection vial 0-4 Units, 0-4 Units, SubCUTAneous, TID WC  insulin lispro (HUMALOG) injection vial 0-4 Units, 0-4 Units, SubCUTAneous, Nightly  clonazePAM (KLONOPIN) tablet 2 mg, 2 mg, Oral, BID  magnesium sulfate 4000 mg in 100 mL IVPB premix, 4,000 mg, IntraVENous, Once  gabapentin (NEURONTIN) capsule 300 mg, 300 mg, Oral, TID  sodium chloride flush 0.9 % injection 5-40 mL, 5-40 mL, IntraVENous, 2 times per day  sodium chloride flush 0.9 % injection 5-40 mL, 5-40 mL, IntraVENous, PRN  0.9 % sodium chloride infusion, , IntraVENous, PRN  enoxaparin Sodium (LOVENOX) injection 30 mg, 30 mg, SubCUTAneous, Daily  acetaminophen (TYLENOL) tablet 650 mg, 650 mg, Oral, Q6H PRN **OR** acetaminophen (TYLENOL) suppository 650 mg, 650 mg, Rectal, Q6H PRN  sertraline (ZOLOFT) tablet 50 mg, 50 mg, Oral, Daily  famotidine (PEPCID) tablet 20 mg, 20 mg, Oral, BID  ondansetron (ZOFRAN) injection 4 mg, 4 mg, IntraVENous, Q6H PRN  levETIRAcetam (KEPPRA) 500 mg/100 mL IVPB, 500 mg, IntraVENous, Q12H  midazolam (VERSED) injection 1 mg, 1 mg, IntraVENous, Q3H PRN  0.9 % sodium chloride infusion, , IntraVENous, Continuous  vancomycin (VANCOCIN) 750 mg in dextrose 5 % 250 mL IVPB, 750 mg, IntraVENous, Q8H  vancomycin (VANCOCIN) intermittent dosing (placeholder), , Other, RX Placeholder   Medications Prior to Admission: clonazePAM (KLONOPIN) 2 MG tablet, Take 1 tablet by mouth twice daily  DAPTOmycin (CUBICIN) infusion, Infuse 285 mg intravenously every 24 hours for 25 days Compound per protocol.   Continuous Blood Gluc Transmit (DEXCOM G6 TRANSMITTER) MISC, USE AS DIRECTED  gabapentin (NEURONTIN) 300 MG capsule, TAKE 1 CAPSULE BY MOUTH THREE TIMES DAILY DRUNG THE DAY AND 4 CAPSULES BY MOUTH EVERY DAY AT BEDTIME  blood glucose test strips (TRUE METRIX BLOOD GLUCOSE TEST) strip, Check blood sugar 4-6 times daily and as needed for symptoms of irregular glucose  Insulin Disposable Pump (OMNIPOD DASH PODS, GEN 4,) MISC, Continuous pump - max dose 60 units  ondansetron (ZOFRAN) 4 MG tablet, Take 1 tablet by mouth 3 times daily as needed for Nausea or Vomiting  ibuprofen (ADVIL;MOTRIN) 800 MG tablet, Take 1 tablet by mouth 3 times daily as needed for Pain  Continuous Blood Gluc Sensor (DEXCOM G6 SENSOR) MISC, Apply as directed for blood sugar monitoring  sertraline (ZOLOFT) 50 MG tablet, Take 1 tablet by mouth daily  insulin aspart (NOVOLOG FLEXPEN) 100 UNIT/ML injection pen, Inject 20 units with meals/snacks up to 5 times per day  insulin aspart (NOVOLOG) 100 UNIT/ML injection vial, Inject 100 units into the skin daily in conjunction with omnipod  Blood Glucose Monitoring Suppl (TRUE METRIX METER) w/Device KIT, Check blood sugar 4 times daily, tidac  Lancets MISC, Check blood sugar 4 times daily, tidac  levETIRAcetam (KEPPRA) 500 MG tablet, Take 2 tablets by mouth 2 times daily  insulin aspart (NOVOLOG) 100 UNIT/ML injection vial, Inject 100 Units into the skin daily In conjunction with omnipod  glucagon, rDNA, 1 MG injection, Inject 1 mg into the muscle as needed for Low blood sugar (Blood glucose less than 70 mg/dL and patient NOT ALERT or NPO and does not have IV access.)  famotidine (PEPCID) 20 MG tablet, Take 20 mg by mouth 2 times daily    Allergies:  Patient has no known allergies.     Social History:  Social History     Socioeconomic History    Marital status:      Spouse name: Not on file    Number of children: Not on file    Years of education: Not on file    Highest education level: Not on file   Occupational History    Not on file   Tobacco Use    Smoking status: Never    Smokeless tobacco: Never   Vaping Use    Vaping Use: Never used   Substance and Sexual Activity    Alcohol use: No    Drug use: No    Sexual activity: Yes     Partners: Male   Other Topics Concern    Not on file   Social History Narrative    Not on file     Social Determinants of Health     Financial Resource Strain: Low Risk     Difficulty of Paying Living Expenses: Not hard at all   Food Insecurity: No Food Insecurity    Worried About Running Out of Food in the Last Year: Never true    Ran Out of Food in the Last Year: Never true   Transportation Needs: Not on file   Physical Activity: Not on file   Stress: Not on file   Social Connections: Not on file   Intimate Partner Violence: Not on file   Housing Stability: Not on file     Social History     Tobacco Use   Smoking Status Never   Smokeless Tobacco Never     Social History     Substance and Sexual Activity   Drug Use No     Social History     Substance and Sexual Activity   Alcohol Use No       Family History:   Family History   Problem Relation Age of Onset    Asthma Mother     Allergies Mother         sun allergy    Diabetes Type 1  Father     Diabetes Type 1  Maternal Grandfather     Diabetes Type 1  Cousin     Diabetes Type 1  Cousin     Diabetes Type 1  Maternal Uncle     Diabetes Type 1  Maternal Uncle     Diabetes Type 1  Maternal Aunt           ROS:  Constitutional- No weight loss or fevers  Eyes- No diplopia. No photophobia. Ears/nose/throat- No dysphagia. No Dysarthria  Cardiovascular- No palpitations. No chest pain  Respiratory- No dyspnea. No Cough  Gastrointestinal- No Abdominal pain. No Vomiting. Genitourinary- No incontinence. No urinary retention  Musculoskeletal- + myalgia. No arthralgia  Skin- No rash. No easy bruising. Psychiatric- No depression. + anxiety  Endocrine- +diabetes. No thyroid issues. Hematologic- No bleeding difficulty. +Fatigue  Neurologic- +generalized weakness. No Headache. Exam:  Blood pressure 103/68, pulse 85, temperature 97.6 °F (36.4 °C), temperature source Oral, resp. rate 14, height 4' 10\" (1.473 m), weight 108 lb 3.9 oz (49.1 kg), SpO2 99 %, not currently breastfeeding.     Constitutional    Vital signs: BP, HR, and RR reviewed   General Alert, no distress, well-nourished  Cardiovascular: pulses symmetric in all 4 extremities. No peripheral edema. Psychiatric: cooperative with examination, no  psychotic behavior noted. Neurologic  Mental status:   Oriented to person and place   General fund of knowledge: grossly intact   Memory: grossly intact   Attention: intact as able to attend well to the exam     Language: fluent in conversation   Comprehension: intact; follows simple commands  Cranial nerves:   CN2: Visual Fields full w/o extinction on confrontational testing,   CN 3,4,6: extraocular muscles intact,  CN5: facial sensation symmetric   CN7: face symmetric without dysarthria,   CN8: hearing grossly intact  CN11: trap full strength on shoulder shrug  CN12: tongue midline with protrusion  Strength: No pronator drift. Good strength in all 4 extremities   Sensory: light touch intact in all 4 extremities.   No sensory extinction on double simultaneous stimulation  Cerebellar/coordination: finger nose finger normal without ataxia  Tone: normal in all 4 extremities  Gait: deferred for patient safety      Labs:     Lab Results   Component Value Date/Time     08/14/2022 04:59 AM    K 3.7 08/14/2022 04:59 AM    K 3.4 08/13/2022 05:20 PM     08/14/2022 04:59 AM    CO2 20 08/14/2022 04:59 AM    BUN 4 08/14/2022 04:59 AM    CREATININE 0.5 08/14/2022 04:59 AM    GLUCOSE 604 08/14/2022 04:59 AM    CALCIUM 7.7 08/14/2022 04:59 AM        Lab Results   Component Value Date    ALT 19 08/14/2022    AST 59 (H) 08/14/2022     (H) 05/24/2022    ALKPHOS 80 08/14/2022    BILITOT <0.2 08/14/2022       Lab Results   Component Value Date    WBC 5.2 08/14/2022    HGB 8.3 (L) 08/14/2022    HCT 26.4 (L) 08/14/2022    MCV 79.9 (L) 08/14/2022     08/14/2022    LYMPHOPCT 33.3 08/14/2022    RBC 3.30 (L) 08/14/2022    MCH 25.0 (L) 08/14/2022    MCHC 31.3 08/14/2022    RDW 15.5 (H) 08/14/2022            Studies:  CT Head: Results for orders placed during the hospital encounter of 08/13/22    CT HEAD WO CONTRAST    Narrative  EXAMINATION:  CT OF THE HEAD WITHOUT CONTRAST  8/13/2022 5:19 pm    TECHNIQUE:  CT of the head was performed without the administration of intravenous  contrast. Automated exposure control, iterative reconstruction, and/or weight  based adjustment of the mA/kV was utilized to reduce the radiation dose to as  low as reasonably achievable. COMPARISON:  None. HISTORY:  Patient is stuttering and having a hard time getting words out x 48 hours. FINDINGS:  BRAIN/VENTRICLES: No acute intracranial hemorrhage, mass effect, or midline  shift. No abnormal extra-axial fluid collection. The gray-white  differentiation is maintained without evidence of an acute infarct. No  hydrocephalus. ORBITS: The visualized portion of the orbits demonstrate no acute abnormality. SINUSES: The visualized paranasal sinuses and mastoid air cells demonstrate  no acute abnormality. SOFT TISSUES/SKULL:  No acute abnormality of the visualized skull or soft  tissues. Impression  No acute abnormality. EEG 1/15/2020 at [facility]  EEG Interpretation: This EEG is abnormal secondary to:  · Generalized 4-6 Hz irregular spike/polyspike/wave and polyspike/wave activity, maximal over bifrontal head regions with slight preference for right on this recording. The presence of these discharges is most consistent with a diagnosis of seizures of generalized onset. · No electrographic seizures or non-convulsive status epilepticus was seen over the entire monitoring period. Clinical correlation is recommended. Impression:  Kenya Odonnell is a 34 y.o. female who presented with feeling fatigued, generally not feeling well for several days since a seizure. Also noted to have hyperglycemia > 600 on admission. She has a complicated seizure history and has been difficult to control.   Her poorly controlled diabetes and several admissions for DKA has complicated the ability to control her seizures. In addition, it appears, she has not been compliant with her medications. 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 I encouraged her to re-establish with Dr. Uribe So now that she is back in [place]. Recommendations: For now continue with Keppra 1000 mg bid - reminded Demetrio Miller she should be taking it in the morning and at night. Continue clonazepam at home dose as withdrawal can lead to seizures  - of note UDS was negative for benzos suggesting the seizure 3 days could have due to withdrawal.     If has seizures while inpatient could add Vimpat 50 mg bid as it appears Keppra as monotherapy was ineffective in the past.    Would otherwise not make any additional seizure medication adjustments while inpatient. She needs to re-establish outpatient care to ensure further management and adjustment of medications. Thank you for this consult. Will follow peripherally.   Please call with questions     Yuliana Shepard MD  Neurology/Neuro-immunology   [address] [place] [address] [id number] Neuroscience    [phone]

## 2022-08-14 NOTE — PROGRESS NOTES
Critical result received from lab for a glucose level of 604. On call MD already aware and pt already given one time dose of 10 Units Regular Insulin.

## 2022-08-14 NOTE — CONSULTS
Clinical Pharmacy Note  Vancomycin Consult    Suzanne Pizarro is a 34 y.o. female ordered Vancomycin for Bacteremia; consult received from  [facility] to manage therapy. Allergies:  Patient has no known allergies. Temp max:  Temp (24hrs), Av.6 °F (36.4 °C), Min:97.4 °F (36.3 °C), Max:97.9 °F (36.6 °C)      Recent Labs     22  1720   WBC 8.3       Recent Labs     22  1720   BUN 7   CREATININE 0.6         Intake/Output Summary (Last 24 hours) at 2022 2356  Last data filed at 2022 2319  Gross per 24 hour   Intake 2048. 1 ml   Output --   Net 2048. 1 ml       Ht Readings from Last 1 Encounters:   22 4' 10\" (1.473 m)        Wt Readings from Last 1 Encounters:   22 108 lb 3.9 oz (49.1 kg)         Estimated Creatinine Clearance: 99 mL/min (based on SCr of 0.6 mg/dL). Assessment/Plan:  Was on Daptomycin as outpatient for ease of daily administration. Will resume vancomycin dose patient was on prior to being put on Daptomycin. Vancomycin 750 mg IV every 8 hours ordered. Level ordered for 22 @1800. Thank you for the consult.    Patt Reyna, YairD

## 2022-08-14 NOTE — PLAN OF CARE
Problem: Discharge Planning  Goal: Discharge to home or other facility with appropriate resources  Outcome: Progressing     Problem: Safety - Adult  Goal: Free from fall injury  Outcome: Progressing     Problem: ABCDS Injury Assessment  Goal: Absence of physical injury  Outcome: Progressing  Flowsheets (Taken 8/14/2022 0237)  Absence of Physical Injury: Implement safety measures based on patient assessment     Problem: Neurosensory - Adult  Goal: Achieves stable or improved neurological status  Outcome: Progressing  Goal: Absence of seizures  Outcome: Progressing     Problem: Gastrointestinal - Adult  Goal: Maintains adequate nutritional intake  Outcome: Progressing     Problem: Infection - Adult  Goal: Absence of infection at discharge  Outcome: Progressing     Problem: Metabolic/Fluid and Electrolytes - Adult  Goal: Electrolytes maintained within normal limits  Outcome: Progressing  Goal: Glucose maintained within prescribed range  Outcome: Progressing

## 2022-08-14 NOTE — PROGRESS NOTES
Pharmacy Medication Reconciliation Note     List of medications Juliet Ayala is currently taking is complete. Source of information:   1. Conversation with patient at bedside  2. EMR    Notes regarding home medications:   1. Patient was extremely drowsy and was unable to go over entire medical history. 2. Patient did report not using omnipod anymore--reported using 10 units with meals   3.  Med list also reports dapto QD 08/04-08/29--unknown last known administration     Unknown if patient is taking any OTC or herbal medications    Abhishek Melton, Pharmacy Intern  8/13/2022  8:01 PM

## 2022-08-14 NOTE — CARE COORDINATION
Verified address, lives with spouse & children in a basement apartment with 6 MIRLANDE going down    Uses [address] on [facility]  near [facility], denies any barriers. Active with Silvino Peña for IVAB at home (per Dr. Archie Cunningham note 8/3/22 'IV Daptomycin x 285 mg x once a day regimen for OPAT - anticipate x stop date  x  8/29') both notified of admission. 08/14/22 1328   Service Assessment   Patient Orientation Alert and Oriented;Person;Place;Situation   Cognition Alert  (a little mixed up/slurred speech)   History Provided By Patient   Primary [address] is: Legal Next of Kin   PCP Verified by CM Yes   Prior Functional Level Independent in ADLs/IADLs   Current Functional Level Independent in ADLs/IADLs   Can patient return to prior living arrangement Yes   Ability to make needs known: Good   Family able to assist with home care needs: Yes   Financial Resources Medicaid   Social/Functional History   Lives With Spouse; Family   Type of [address] [facility] One level   Home Access Stairs to enter with rails   Entrance Stairs - Number of Steps 6 [address]IRLANDE down to basement apartment   [address] Independent   Transfer Assistance Independent   Active  No   Patient's  Info spouse   Discharge Planning   Type of Residence Apartment   Living Arrangements Spouse/Significant Other;Children   Current Services Prior To Admission Home Care;Home [address];N/A   DME Ordered?  No   Potential Assistance Purchasing Medications No   Type of Home Care Services Nursing Services   Patient expects to be discharged to: [facility] Discharge   Services At/After Discharge IV Therapy;Nursing services

## 2022-08-14 NOTE — PROGRESS NOTES
Pt to Rm#[id number] via ED stretcher with all personal belongings. Oriented to room and role as RN. PCU bedside monitor applied and verified by Indio Blanco. Pt transfered to bed, alert and oriented x4. Assessment of pt in progress. POC and education initiated per protocol. Call light within reach. Bed in lowest position and wheels locked. Room is free of clutter. Personal belongings within reach. No current complaints at this time.

## 2022-08-14 NOTE — PROGRESS NOTES
Pt's POCT BS rechecked at this time and is now 345. Day shift RN notified. BS will be rechecked again prior to breakfast for AC ss insulin coverage.

## 2022-08-14 NOTE — CONSULTS
Called Nathaniel Easley with [address]; per RN documentation there is no issues at home. SW to f/u in morning to verify.     Delores Chen RN, BSN,   [phone]  Electronically signed by Delores Chen RN on 8/14/2022 at 1:26 PM

## 2022-08-14 NOTE — PROGRESS NOTES
New orders noted for magnesium replacement and increase in clonazepam dose. Social work consult ordered, patient's mother in law Abe Lamar [phone] called this nurse and expressed concern for pt to be discharged to home. The mother in law said the patient's apartment is filthy and has \"bugs\" and gnats.

## 2022-08-14 NOTE — PROGRESS NOTES
Perfect serve sent to Dr. Yuki Jaime regarding Clonazepam dose, pt states she takes 2mg BID and she only has 0.5mg BID ordered here.

## 2022-08-14 NOTE — PLAN OF CARE
Problem: Discharge Planning  Goal: Discharge to home or other facility with appropriate resources  8/14/2022 1412 by Milagros Marquis RN  Outcome: Progressing  Flowsheets (Taken 8/14/2022 1412)  Discharge to home or other facility with appropriate resources:   Identify barriers to discharge with patient and caregiver   Arrange for needed discharge resources and transportation as appropriate   Identify discharge learning needs (meds, wound care, etc)  Note: Prior to admission pt was living at home with her  and children with support from her mother in law. Social work and case management involved to assist with discharge needs. Problem: Safety - Adult  Goal: Free from fall injury  8/14/2022 1412 by Milagros Marquis RN  Outcome: Progressing  Flowsheets (Taken 8/14/2022 1412)  Free From Fall Injury:   Jay Ohs family/caregiver on patient safety   Based on caregiver fall risk screen, instruct family/caregiver to ask for assistance with transferring infant if caregiver noted to have fall risk factors  Note: Patient assessed for fall risk; fall precautions initiated. Patient and family instructed about safety devices. Environment kept free of clutter and adequate lighting provided. Bed locked and in lowest position. Call light within reach. Will continue to monitor. Pt is compliant with calling for assistance prior to getting up. Problem: ABCDS Injury Assessment  Goal: Absence of physical injury  8/14/2022 1412 by Milagros Marquis RN  Outcome: Progressing  Flowsheets (Taken 8/14/2022 1412)  Absence of Physical Injury: Implement safety measures based on patient assessment  Note: No signs of physical injury.      Problem: Neurosensory - Adult  Goal: Achieves stable or improved neurological status  8/14/2022 1412 by Milagros Marquis RN  Outcome: Progressing  Flowsheets (Taken 8/14/2022 1412)  Achieves stable or improved neurological status:   Assess for and report changes in neurological status   Maintain blood replacement. Problem: Metabolic/Fluid and Electrolytes - Adult  Goal: Glucose maintained within prescribed range  8/14/2022 1412 by Mary Montenegro RN  Outcome: Progressing  Flowsheets (Taken 8/14/2022 1412)  Glucose maintained within prescribed range:   Monitor blood glucose as ordered   Assess for signs and symptoms of hyperglycemia and hypoglycemia   Administer ordered medications to maintain glucose within target range   Assess barriers to adequate nutritional intake and initiate nutrition consult as needed   Instruct patient on self management of diabetes and initiate consult as needed  Note: Blood sugars being checked ACHS with coverage as ordered.      Problem: Chronic Conditions and Co-morbidities  Goal: Patient's chronic conditions and co-morbidity symptoms are monitored and maintained or improved  Outcome: Progressing  Flowsheets (Taken 8/14/2022 1412)  Care Plan - Patient's Chronic Conditions and Co-Morbidity Symptoms are Monitored and Maintained or Improved:   Monitor and assess patient's chronic conditions and comorbid symptoms for stability, deterioration, or improvement   Collaborate with multidisciplinary team to address chronic and comorbid conditions and prevent exacerbation or deterioration   Update acute care plan with appropriate goals if chronic or comorbid symptoms are exacerbated and prevent overall improvement and discharge

## 2022-08-14 NOTE — CONSULTS
Infectious Diseases   Consult Note        Admission Date: 8/13/2022  Hospital Day: Hospital Day: 2   Attending: Diann Salinas MD  Date of service: 8/14/22     Reason for admission: Hypokalemia [E87.6]  Lactic acidosis [E87.2]  Seizure ([address] [R56.9]  SIRS (systemic inflammatory response syndrome) (HCC) [R65.10]  Non-adherence to medical treatment [Z91.19]  Anemia, unspecified type [D64.9]  AMS (altered mental status) [R41.82]    Chief complaint/ Reason for consult: MRSA bacteremia, PICC line problem    Microbiology:        I have reviewed allavailable micro lab data and cultures    Blood culture (2/2) - collected on 8/13/2022: in process  Blood culture - collected on 7/29/22:MRSA  Susceptibility     Staph aureus mrsa     BACTERIAL SUSCEPTIBILITY PANEL BY RASHAWN     ceFAZolin >16 mcg/mL Resistant     clindamycin <=0.5 mcg/mL Sensitive     DAPTOmycin <=0.5 mcg/mL Sensitive     erythromycin >4 mcg/mL Resistant     linezolid 2 mcg/mL Sensitive     oxacillin >2 mcg/mL Resistant     tetracycline <=4 mcg/mL Sensitive     trimethoprim-sulfamethoxazole <=0.5/9.5 m. .. Sensitive     vancomycin 1 mcg/mL Sensitive                Mon pubis abscess fluid culture  - collected on 7/29/2022: MRSA    Susceptibility     Staph aureus mrsa     BACTERIAL SUSCEPTIBILITY PANEL BY RASHAWN     ceFAZolin >16 mcg/mL Resistant     clindamycin <=0.5 mcg/mL Sensitive     DAPTOmycin <=0.5 mcg/mL Sensitive     erythromycin >4 mcg/mL Resistant     linezolid 4 mcg/mL Sensitive     oxacillin >2 mcg/mL Resistant     tetracycline <=4 mcg/mL Sensitive     trimethoprim-sulfamethoxazole <=0.5/9.5 m. ..  Sensitive     vancomycin 2 mcg/mL Sensitive               Linear View     Antibiotics and immunizations:       Current antibiotics: All antibiotics and their doses were reviewed by me    Recent Abx Admin                     vancomycin (VANCOCIN) 750 mg in dextrose 5 % 250 mL IVPB (mg) 750 mg New Bag 08/14/22 1328     750 mg New Bag  0427    vancomycin 1000 mg IVPB Diagnostic Workup:    Agree with blood cultures  Follow lactic acid trends  Continue to follow fever curve, WBC count and blood cultures  Follow up on liverand renal functions closely  Check CK level tomorrow    Antimicrobials: Will switch IV vancomycin back to IV daptomycin   Will order IV daptomycin 285 mg every 24 hours  Contact isolation for MRSA  We will follow up on the culture results and clinical progress and will make further recommendations accordingly. Continue close vitals monitoring. Maintain good glycemic control. Fall precautions. Aspiration precautions. Continue to watch for new fever or diarrhea. DVT prophylaxis. Discussed all above with patient and RN. Drug Monitoring:    Continue serial monitoring for antibiotic toxicity as follows: CBC, CK, CMP  Continue to watch for following: new or worsening fever, hypotension, hives, lip swelling and redness or purulence at vascular access sites. I/v access Management:    Continue to monitor i.v access sites for erythema, induration, discharge or tenderness. As always, continue efforts to minimizetubes/lines/drains as clinically appropriate to reduce chances of line associated infections. Current isolation precautions:    Currently active isolation(s): Contact       Level of complexity of visit and medical decision making: High     Risk of Complications/Morbidity: High     Illness(es)/ Infection present that pose threat to life/bodily function. There is potential for severe exacerbation of infection/side effects of treatment. Therapy requires intensive monitoring for antimicrobial agent toxicity. Thank you for involving me in the care of your patient. I will continue to follow. If you have any additional questions, please do not hesitate to contact me. Subjective:     Presenting complaint in ER:     Chief Complaint   Patient presents with    Fatigue    Hyperglycemia     Pt reports blood sugar of 150 today.  Weakness after starting antibiotic. Hx seizures, lase one three days ago, pt doesn't feel she has recovered from it. HPI: Kenya Odonnell is a 34 y.o. female patient, who was seen at the request of Dr. Fazal Kessler MD.    History was obtained from chart review and the patient. The patient was admitted on 2022. I have been consulted to see the patient for above mentioned reason(s). The patient has multiple medical comorbidities, and presented to the ER for burning and tenderness sensation at the PICC line insertion site. The patient    I have been asked for my opinion for management for this patient. Was diagnosed with MRSA bacteremia earlier this month and was a started on IV daptomycin 285 mg daily by Dr. Key Crespo to be continued until . The patient was afebrile in ER. She was admitted and has seen started on iv vancomycin by primary team                   Past Medical History: All past medical history reviewed today. Past Medical History:   Diagnosis Date    Depression     Diabetes mellitus ([facility]     Seizures ([facility]          Past Surgical History: All pastsurgical history was reviewed today. Past Surgical History:   Procedure Laterality Date     SECTION      TUBAL LIGATION           Family History: All family history was reviewed today. Problem Relation Age of Onset    Asthma Mother     Allergies Mother         sun allergy    Diabetes Type 1  Father     Diabetes Type 1  Maternal Grandfather     Diabetes Type 1  Cousin     Diabetes Type 1  Cousin     Diabetes Type 1  Maternal Uncle     Diabetes Type 1  Maternal Uncle     Diabetes Type 1  Maternal Aunt          Medications: All current and past medications were reviewed. Medications Prior to Admission: clonazePAM (KLONOPIN) 2 MG tablet, Take 1 tablet by mouth twice daily  DAPTOmycin (CUBICIN) infusion, Infuse 285 mg intravenously every 24 hours for 25 days Compound per protocol.   Continuous Blood Gluc Transmit (DEXCOM G6 TRANSMITTER) MISC, USE AS DIRECTED  gabapentin (NEURONTIN) 300 MG capsule, TAKE 1 CAPSULE BY MOUTH THREE TIMES DAILY DRUNG THE DAY AND 4 CAPSULES BY MOUTH EVERY DAY AT BEDTIME  blood glucose test strips (TRUE METRIX BLOOD GLUCOSE TEST) strip, Check blood sugar 4-6 times daily and as needed for symptoms of irregular glucose  Insulin Disposable Pump (OMNIPOD DASH PODS, GEN 4,) MISC, Continuous pump - max dose 60 units  ondansetron (ZOFRAN) 4 MG tablet, Take 1 tablet by mouth 3 times daily as needed for Nausea or Vomiting  ibuprofen (ADVIL;MOTRIN) 800 MG tablet, Take 1 tablet by mouth 3 times daily as needed for Pain  Continuous Blood Gluc Sensor (DEXCOM G6 SENSOR) MISC, Apply as directed for blood sugar monitoring  sertraline (ZOLOFT) 50 MG tablet, Take 1 tablet by mouth daily  insulin aspart (NOVOLOG FLEXPEN) 100 UNIT/ML injection pen, Inject 20 units with meals/snacks up to 5 times per day  insulin aspart (NOVOLOG) 100 UNIT/ML injection vial, Inject 100 units into the skin daily in conjunction with omnipod  Blood Glucose Monitoring Suppl (TRUE METRIX METER) w/Device KIT, Check blood sugar 4 times daily, tidac  Lancets MISC, Check blood sugar 4 times daily, tidac  levETIRAcetam (KEPPRA) 500 MG tablet, Take 2 tablets by mouth 2 times daily  insulin aspart (NOVOLOG) 100 UNIT/ML injection vial, Inject 100 Units into the skin daily In conjunction with omnipod  glucagon, rDNA, 1 MG injection, Inject 1 mg into the muscle as needed for Low blood sugar (Blood glucose less than 70 mg/dL and patient NOT ALERT or NPO and does not have IV access.)  famotidine (PEPCID) 20 MG tablet, Take 20 mg by mouth 2 times daily     clonazePAM  2 mg Oral BID    magnesium sulfate  4,000 mg IntraVENous Once    insulin lispro  3 Units SubCUTAneous TID WC    insulin lispro  0-8 Units SubCUTAneous TID WC    insulin lispro  0-4 Units SubCUTAneous Nightly    gabapentin  300 mg Oral TID    sodium chloride flush  5-40 mL IntraVENous 2 times per day enoxaparin  30 mg SubCUTAneous Daily    sertraline  50 mg Oral Daily    famotidine  20 mg Oral BID    levETIRAcetam  500 mg IntraVENous Q12H    vancomycin  750 mg IntraVENous Q8H    vancomycin (VANCOCIN) intermittent dosing (placeholder)   Other RX Placeholder          REVIEW OF SYSTEMS:       Review of Systems   Constitutional:  Positive for fatigue. Negative for chills, diaphoresis and fever. HENT:  Negative for ear discharge, ear pain, postnasal drip, rhinorrhea, sinus pressure, sinus pain and sore throat. Eyes:  Negative for discharge and redness. Respiratory:  Negative for cough, shortness of breath and wheezing. Cardiovascular:  Negative for chest pain and leg swelling. Gastrointestinal:  Negative for abdominal pain, constipation, diarrhea and nausea. Endocrine: Negative for cold intolerance, heat intolerance and polydipsia. Genitourinary:  Negative for dysuria, flank pain, frequency, hematuria and urgency. Musculoskeletal:  Negative for back pain and myalgias. Skin:  Negative for rash. Allergic/Immunologic: Negative for immunocompromised state. Neurological:  Negative for dizziness, seizures and headaches. Hematological:  Does not bruise/bleed easily. Psychiatric/Behavioral:  Negative for agitation, hallucinations and suicidal ideas. The patient is not nervous/anxious. All other systems reviewed and are negative. Objective:       PHYSICAL EXAM:      Vitals:   Vitals:    08/14/22 0617 08/14/22 0827 08/14/22 0909 08/14/22 1217   BP:    104/78   Pulse:    85   Resp:       Temp:  97.6 °F (36.4 °C)  97.7 °F (36.5 °C)   TempSrc:  Oral  Oral   SpO2:  100% 99% 100%   Weight: 108 lb 3.9 oz (49.1 kg)      Height:           Physical Exam  Vitals and nursing note reviewed. Constitutional:       Appearance: She is well-developed. She is not diaphoretic. Comments: The patient was seen earlier today. HENT:      Head: Normocephalic and atraumatic.       Right Ear: External ear normal. There is no impacted cerumen. Left Ear: External ear normal. There is no impacted cerumen. Nose: Nose normal.      Mouth/Throat:      Mouth: Mucous membranes are moist.      Pharynx: Oropharynx is clear. No oropharyngeal exudate. Eyes:      General: No scleral icterus. Right eye: No discharge. Left eye: No discharge. Conjunctiva/sclera: Conjunctivae normal.      Pupils: Pupils are equal, round, and reactive to light. Neck:      Thyroid: No thyromegaly. Cardiovascular:      Rate and Rhythm: Normal rate and regular rhythm. Heart sounds: Normal heart sounds. No murmur heard. No friction rub. Pulmonary:      Effort: No respiratory distress. Breath sounds: No stridor. No wheezing or rales. Abdominal:      General: Bowel sounds are normal.      Palpations: Abdomen is soft. Tenderness: There is no abdominal tenderness. There is no guarding or rebound. Musculoskeletal:         General: No swelling, tenderness or deformity. Normal range of motion. Cervical back: Normal range of motion and neck supple. Right lower leg: No edema. Left lower leg: No edema. Lymphadenopathy:      Cervical: No cervical adenopathy. Skin:     General: Skin is warm and dry. Coloration: Skin is not jaundiced. Findings: No bruising, erythema or rash. Neurological:      General: No focal deficit present. Mental Status: She is alert and oriented to person, place, and time. Mental status is at baseline. Motor: No abnormal muscle tone. Psychiatric:         Mood and Affect: Mood normal.         Behavior: Behavior normal.         Lines and drains: All vascular access sites are healthy with no local erythema, discharge or tenderness. Intake and output:     I/O last 3 completed shifts: In: 4041 [P.O.:1320; I.V.:919.6; IV Piggyback:1801.5]  Out: 2000 [Urine:2000]    Lab Data:   All available labs were reviewed by me today.      CBC:   Recent Labs 08/13/22  1720 08/14/22  0459   WBC 8.3 5.2   RBC 4.37 3.30*   HGB 10.8* 8.3*   HCT 34.0* 26.4*    254   MCV 77.7* 79.9*   MCH 24.7* 25.0*   MCHC 31.7 31.3   RDW 15.3 15.5*        BMP:  Recent Labs     08/13/22  1720 08/14/22  0459   * 133*   K 3.4* 3.7   CL 98* 101   CO2 18* 20*   BUN 7 4*   CREATININE 0.6 0.5*   CALCIUM 10.0 7.7*   GLUCOSE 236* 604*        Hepatic FunctionPanel:   Lab Results   Component Value Date/Time    ALKPHOS 80 08/14/2022 04:59 AM    ALT 19 08/14/2022 04:59 AM    AST 59 08/14/2022 04:59 AM    PROT 5.6 08/14/2022 04:59 AM    PROT 7.4 02/27/2013 10:30 PM    BILITOT <0.2 08/14/2022 04:59 AM    BILIDIR <0.2 11/21/2021 12:11 AM    IBILI see below 11/21/2021 12:11 AM    LABALBU 3.0 08/14/2022 04:59 AM       CPK:   Lab Results   Component Value Date    CKTOTAL 32 08/08/2022     ESR:   Lab Results   Component Value Date    SEDRATE 55 (H) 08/08/2022     CRP:   Lab Results   Component Value Date    CRP 3.4 08/08/2022         Imaging: All pertinent images and reports for the current visit were reviewed by me during this visit. I reviewed the chest x-ray/CT scan/MRI images and independently interpreted the findings and results today. XR CHEST PORTABLE   Final Result   Status post PICC line placement on the right in good position with no acute   abnormality seen. CT HEAD WO CONTRAST   Final Result   No acute abnormality. Outside records:    Labs, Microbiology, Radiology and pertinent results from Care everywhere, if available, were reviewed as a part ofthe consultation. Problem list:       Patient Active Problem List   Diagnosis Code    Lactic acidosis E87.2    Seizure disorder ([facility] G40.909    Type 1 diabetes mellitus with complication (HCC) F74.2    Anxiety and depression F41.9, F32. A    ROME (generalized anxiety disorder) F41.1    Anorexia nervosa F50.00    Diabetic peripheral neuropathy (HCC) E11.42    Gastroparesis K31.84    Weight loss, unintentional R63.4    Tachycardia R00.0    Elevated transaminase level R74.01    Bandemia D72.825    Elevated liver function tests R79.89    Nausea and vomiting R11.2    Diarrhea R19.7    Elevated liver enzymes R74.8    Diabetic ketoacidosis without coma associated with type 1 diabetes mellitus (HCC) E10.10    Sepsis (HCC) A41.9    Neutrophilia D72.9    Abscess L02.91    Elevated platelet count B59.16    Seizures (HCC) R56.9    AMS (altered mental status) R41.82    Electrolyte imbalance E87.8    Weakness R53.1    DKA, type 1, not at goal [facility]) E10.10    Abscess of groin, left L02.214    History of seizures Z87.898    MRSA bacteremia R78.81, B95.62    Poorly controlled diabetes mellitus ([address] E11.65    Hyperkalemia E87.5    Fatigue R53.83         Please note that this chart was generated using Dragon dictation software. Although every effort was made to ensure the accuracy of this automated transcription, some errors in transcription may have occurred inadvertently. If you may need any clarification, please do not hesitate to contact me through EPIC or at the phone number provided below with my electronic signature. Any pictures or media included in this note were obtained after taking informed verbal consent from the patient and with their approval to include those in the patient's medical record. Kameron Michael MD, MPH, FACP, [facility]  8/14/2022, 2:59 PM  [facility] Infectious Disease   [address] [place] [address]., [address] [facility], [address] [place] [address]  Office: [phone]  Fax: [phone]  In-person Clinic days:  Tuesday & Thursday a.m. Virtual clinic days: Monday, Wednesday & Friday a.m.

## 2022-08-15 PROBLEM — Z71.89 DIABETES EDUCATION, ENCOUNTER FOR: Status: ACTIVE | Noted: 2019-04-19

## 2022-08-15 LAB
A/G RATIO: 1.2 (ref 1.1–2.2)
ALBUMIN SERPL-MCNC: 3 G/DL (ref 3.4–5)
ALP BLD-CCNC: 75 U/L (ref 40–129)
ALT SERPL-CCNC: 14 U/L (ref 10–40)
ANION GAP SERPL CALCULATED.3IONS-SCNC: 11 MMOL/L (ref 3–16)
AST SERPL-CCNC: 21 U/L (ref 15–37)
BASE EXCESS VENOUS: 1.1 MMOL/L
BASOPHILS ABSOLUTE: 0.1 K/UL (ref 0–0.2)
BASOPHILS RELATIVE PERCENT: 1.4 %
BETA-HYDROXYBUTYRATE: 0.19 MMOL/L (ref 0–0.27)
BILIRUB SERPL-MCNC: <0.2 MG/DL (ref 0–1)
BUN BLDV-MCNC: 3 MG/DL (ref 7–20)
CALCIUM SERPL-MCNC: 8.1 MG/DL (ref 8.3–10.6)
CARBOXYHEMOGLOBIN: 1.7 %
CHLORIDE BLD-SCNC: 109 MMOL/L (ref 99–110)
CO2: 24 MMOL/L (ref 21–32)
CREAT SERPL-MCNC: <0.5 MG/DL (ref 0.6–1.1)
EOSINOPHILS ABSOLUTE: 0.2 K/UL (ref 0–0.6)
EOSINOPHILS RELATIVE PERCENT: 3.1 %
GFR AFRICAN AMERICAN: >60
GFR NON-AFRICAN AMERICAN: >60
GLUCOSE BLD-MCNC: 105 MG/DL (ref 70–99)
GLUCOSE BLD-MCNC: 193 MG/DL (ref 70–99)
GLUCOSE BLD-MCNC: 215 MG/DL (ref 70–99)
GLUCOSE BLD-MCNC: 215 MG/DL (ref 70–99)
GLUCOSE BLD-MCNC: 235 MG/DL (ref 70–99)
GLUCOSE BLD-MCNC: 245 MG/DL (ref 70–99)
GLUCOSE BLD-MCNC: 74 MG/DL (ref 70–99)
HCO3 VENOUS: 26 MMOL/L (ref 23–29)
HCT VFR BLD CALC: 24.5 % (ref 36–48)
HEMOGLOBIN: 8.1 G/DL (ref 12–16)
LYMPHOCYTES ABSOLUTE: 2.4 K/UL (ref 1–5.1)
LYMPHOCYTES RELATIVE PERCENT: 41.3 %
MAGNESIUM: 1.9 MG/DL (ref 1.8–2.4)
MCH RBC QN AUTO: 25.5 PG (ref 26–34)
MCHC RBC AUTO-ENTMCNC: 33.2 G/DL (ref 31–36)
MCV RBC AUTO: 76.8 FL (ref 80–100)
METHEMOGLOBIN VENOUS: 1.2 %
MONOCYTES ABSOLUTE: 0.4 K/UL (ref 0–1.3)
MONOCYTES RELATIVE PERCENT: 7.3 %
NEUTROPHILS ABSOLUTE: 2.8 K/UL (ref 1.7–7.7)
NEUTROPHILS RELATIVE PERCENT: 46.9 %
O2 SAT, VEN: 83 %
O2 THERAPY: NORMAL
PCO2, VEN: 41.7 MMHG (ref 40–50)
PDW BLD-RTO: 15.2 % (ref 12.4–15.4)
PERFORMED ON: ABNORMAL
PERFORMED ON: NORMAL
PH VENOUS: 7.4 (ref 7.35–7.45)
PLATELET # BLD: 275 K/UL (ref 135–450)
PMV BLD AUTO: 7.8 FL (ref 5–10.5)
PO2, VEN: 48 MMHG
POTASSIUM SERPL-SCNC: 3.2 MMOL/L (ref 3.5–5.1)
RBC # BLD: 3.19 M/UL (ref 4–5.2)
SODIUM BLD-SCNC: 144 MMOL/L (ref 136–145)
TCO2 CALC VENOUS: 27 MMOL/L
TOTAL CK: 22 U/L (ref 26–192)
TOTAL PROTEIN: 5.5 G/DL (ref 6.4–8.2)
WBC # BLD: 5.9 K/UL (ref 4–11)

## 2022-08-15 PROCEDURE — 99254 IP/OBS CNSLTJ NEW/EST MOD 60: CPT | Performed by: REGISTERED NURSE

## 2022-08-15 PROCEDURE — 94760 N-INVAS EAR/PLS OXIMETRY 1: CPT

## 2022-08-15 PROCEDURE — 2060000000 HC ICU INTERMEDIATE R&B

## 2022-08-15 PROCEDURE — 6370000000 HC RX 637 (ALT 250 FOR IP): Performed by: STUDENT IN AN ORGANIZED HEALTH CARE EDUCATION/TRAINING PROGRAM

## 2022-08-15 PROCEDURE — 2580000003 HC RX 258: Performed by: STUDENT IN AN ORGANIZED HEALTH CARE EDUCATION/TRAINING PROGRAM

## 2022-08-15 PROCEDURE — 82010 KETONE BODYS QUAN: CPT

## 2022-08-15 PROCEDURE — 6370000000 HC RX 637 (ALT 250 FOR IP): Performed by: FAMILY MEDICINE

## 2022-08-15 PROCEDURE — 99233 SBSQ HOSP IP/OBS HIGH 50: CPT | Performed by: INTERNAL MEDICINE

## 2022-08-15 PROCEDURE — 85025 COMPLETE CBC W/AUTO DIFF WBC: CPT

## 2022-08-15 PROCEDURE — 2580000003 HC RX 258: Performed by: INTERNAL MEDICINE

## 2022-08-15 PROCEDURE — 6360000002 HC RX W HCPCS: Performed by: STUDENT IN AN ORGANIZED HEALTH CARE EDUCATION/TRAINING PROGRAM

## 2022-08-15 PROCEDURE — 80053 COMPREHEN METABOLIC PANEL: CPT

## 2022-08-15 PROCEDURE — 83735 ASSAY OF MAGNESIUM: CPT

## 2022-08-15 PROCEDURE — 82550 ASSAY OF CK (CPK): CPT

## 2022-08-15 PROCEDURE — 6360000002 HC RX W HCPCS: Performed by: INTERNAL MEDICINE

## 2022-08-15 PROCEDURE — 82803 BLOOD GASES ANY COMBINATION: CPT

## 2022-08-15 RX ORDER — GABAPENTIN 400 MG/1
400 CAPSULE ORAL 3 TIMES DAILY
Status: DISCONTINUED | OUTPATIENT
Start: 2022-08-15 | End: 2022-08-16 | Stop reason: HOSPADM

## 2022-08-15 RX ORDER — SERTRALINE HYDROCHLORIDE 100 MG/1
100 TABLET, FILM COATED ORAL DAILY
Status: DISCONTINUED | OUTPATIENT
Start: 2022-08-16 | End: 2022-08-16 | Stop reason: HOSPADM

## 2022-08-15 RX ORDER — POTASSIUM CHLORIDE 750 MG/1
40 TABLET, FILM COATED, EXTENDED RELEASE ORAL ONCE
Status: COMPLETED | OUTPATIENT
Start: 2022-08-15 | End: 2022-08-15

## 2022-08-15 RX ADMIN — INSULIN GLARGINE 25 UNITS: 100 INJECTION, SOLUTION SUBCUTANEOUS at 00:43

## 2022-08-15 RX ADMIN — SODIUM CHLORIDE, PRESERVATIVE FREE 10 ML: 5 INJECTION INTRAVENOUS at 19:55

## 2022-08-15 RX ADMIN — INSULIN LISPRO 5 UNITS: 100 INJECTION, SOLUTION INTRAVENOUS; SUBCUTANEOUS at 09:23

## 2022-08-15 RX ADMIN — POTASSIUM CHLORIDE 40 MEQ: 750 TABLET, FILM COATED, EXTENDED RELEASE ORAL at 09:46

## 2022-08-15 RX ADMIN — CLONAZEPAM 2 MG: 1 TABLET ORAL at 09:21

## 2022-08-15 RX ADMIN — GABAPENTIN 300 MG: 300 CAPSULE ORAL at 09:21

## 2022-08-15 RX ADMIN — LEVETIRACETAM 500 MG: 5 INJECTION INTRAVENOUS at 20:00

## 2022-08-15 RX ADMIN — ENOXAPARIN SODIUM 30 MG: 100 INJECTION SUBCUTANEOUS at 09:21

## 2022-08-15 RX ADMIN — SODIUM CHLORIDE, PRESERVATIVE FREE 10 ML: 5 INJECTION INTRAVENOUS at 09:47

## 2022-08-15 RX ADMIN — INSULIN LISPRO 2 UNITS: 100 INJECTION, SOLUTION INTRAVENOUS; SUBCUTANEOUS at 09:22

## 2022-08-15 RX ADMIN — INSULIN GLARGINE 25 UNITS: 100 INJECTION, SOLUTION SUBCUTANEOUS at 21:57

## 2022-08-15 RX ADMIN — DAPTOMYCIN 285 MG: 500 INJECTION, POWDER, LYOPHILIZED, FOR SOLUTION INTRAVENOUS at 23:55

## 2022-08-15 RX ADMIN — GABAPENTIN 400 MG: 400 CAPSULE ORAL at 19:55

## 2022-08-15 RX ADMIN — LEVETIRACETAM 500 MG: 5 INJECTION INTRAVENOUS at 09:23

## 2022-08-15 RX ADMIN — FAMOTIDINE 20 MG: 20 TABLET, FILM COATED ORAL at 19:55

## 2022-08-15 RX ADMIN — ACETAMINOPHEN 650 MG: 325 TABLET, FILM COATED ORAL at 19:54

## 2022-08-15 RX ADMIN — GABAPENTIN 300 MG: 300 CAPSULE ORAL at 15:38

## 2022-08-15 RX ADMIN — CLONAZEPAM 2 MG: 1 TABLET ORAL at 19:55

## 2022-08-15 RX ADMIN — FAMOTIDINE 20 MG: 20 TABLET, FILM COATED ORAL at 09:21

## 2022-08-15 RX ADMIN — DAPTOMYCIN 285 MG: 500 INJECTION, POWDER, LYOPHILIZED, FOR SOLUTION INTRAVENOUS at 00:52

## 2022-08-15 RX ADMIN — SERTRALINE 50 MG: 50 TABLET, FILM COATED ORAL at 09:21

## 2022-08-15 ASSESSMENT — PAIN SCALES - GENERAL
PAINLEVEL_OUTOF10: 8
PAINLEVEL_OUTOF10: 0

## 2022-08-15 ASSESSMENT — ENCOUNTER SYMPTOMS
RHINORRHEA: 0
ABDOMINAL PAIN: 0
EYE DISCHARGE: 0
SINUS PRESSURE: 0
WHEEZING: 0
COUGH: 0
NAUSEA: 0
SINUS PAIN: 0
BACK PAIN: 0
SORE THROAT: 0
DIARRHEA: 0
CONSTIPATION: 0
SHORTNESS OF BREATH: 0
EYE REDNESS: 0

## 2022-08-15 ASSESSMENT — PAIN DESCRIPTION - ONSET: ONSET: GRADUAL

## 2022-08-15 ASSESSMENT — PAIN DESCRIPTION - FREQUENCY: FREQUENCY: CONTINUOUS

## 2022-08-15 ASSESSMENT — PAIN DESCRIPTION - DESCRIPTORS: DESCRIPTORS: ACHING

## 2022-08-15 ASSESSMENT — PAIN DESCRIPTION - ORIENTATION: ORIENTATION: OTHER (COMMENT)

## 2022-08-15 ASSESSMENT — PAIN DESCRIPTION - LOCATION: LOCATION: HEAD

## 2022-08-15 ASSESSMENT — PAIN DESCRIPTION - PAIN TYPE: TYPE: ACUTE PAIN

## 2022-08-15 ASSESSMENT — PAIN - FUNCTIONAL ASSESSMENT: PAIN_FUNCTIONAL_ASSESSMENT: ACTIVITIES ARE NOT PREVENTED

## 2022-08-15 NOTE — PLAN OF CARE
Problem: Discharge Planning  Goal: Discharge to home or other facility with appropriate resources  Outcome: Progressing     Problem: Safety - Adult  Goal: Free from fall injury  Outcome: Progressing     Problem: ABCDS Injury Assessment  Goal: Absence of physical injury  Outcome: Progressing     Problem: Neurosensory - Adult  Goal: Achieves stable or improved neurological status  Outcome: Progressing  Goal: Absence of seizures  Outcome: Progressing     Problem: Gastrointestinal - Adult  Goal: Maintains adequate nutritional intake  Outcome: Progressing     Problem: Infection - Adult  Goal: Absence of infection at discharge  Outcome: Progressing     Problem: Metabolic/Fluid and Electrolytes - Adult  Goal: Electrolytes maintained within normal limits  Outcome: Progressing  Goal: Glucose maintained within prescribed range  Outcome: Progressing     Problem: Chronic Conditions and Co-morbidities  Goal: Patient's chronic conditions and co-morbidity symptoms are monitored and maintained or improved  Outcome: Progressing

## 2022-08-15 NOTE — PROGRESS NOTES
Infectious Diseases   Progress Note      Admission Date: 8/13/2022  Hospital Day: Hospital Day: 3   Attending: Jaspal Escobedo MD  Date of service: 8/15/2022     Chief complaint/ Reason for consult:     Lactic acidosis on admission  Recent MRSA bacteremia  Receiving IV daptomycin as an outpatient  Type 2 diabetes mellitus    Microbiology:        I have reviewed allavailable micro lab data and cultures    Blood culture (2/2) - collected on 8/13/2022: Negative      Blood culture - collected on 7/29/22:MRSA  Susceptibility              Staph aureus mrsa       BACTERIAL SUSCEPTIBILITY PANEL BY RASHAWN       ceFAZolin >16 mcg/mL Resistant       clindamycin <=0.5 mcg/mL Sensitive       DAPTOmycin <=0.5 mcg/mL Sensitive       erythromycin >4 mcg/mL Resistant       linezolid 2 mcg/mL Sensitive       oxacillin >2 mcg/mL Resistant       tetracycline <=4 mcg/mL Sensitive       trimethoprim-sulfamethoxazole <=0.5/9.5 m. .. Sensitive       vancomycin 1 mcg/mL Sensitive                       Mon pubis abscess fluid culture  - collected on 7/29/2022: MRSA     Susceptibility              Staph aureus mrsa       BACTERIAL SUSCEPTIBILITY PANEL BY RASHAWN       ceFAZolin >16 mcg/mL Resistant       clindamycin <=0.5 mcg/mL Sensitive       DAPTOmycin <=0.5 mcg/mL Sensitive       erythromycin >4 mcg/mL Resistant       linezolid 4 mcg/mL Sensitive       oxacillin >2 mcg/mL Resistant       tetracycline <=4 mcg/mL Sensitive       trimethoprim-sulfamethoxazole <=0.5/9.5 m. .. Sensitive       vancomycin 2 mcg/mL Sensitive                     Linear View          Antibiotics and immunizations:       Current antibiotics: All antibiotics and their doses were reviewed by me    Recent Abx Admin                     DAPTOmycin (CUBICIN) 285 mg in sodium chloride 0.9 % 50 mL IVPB (mg) 285 mg New Bag 08/15/22 0052    vancomycin (VANCOCIN) 750 mg in dextrose 5 % 250 mL IVPB (mg) 750 mg New Bag 08/14/22 2107                      Immunization History:  All immunization history was reviewed by me today. Immunization History   Administered Date(s) Administered    Influenza Virus Vaccine 09/20/2020    Influenza, Quadv, Recombinant, IM PF (Flublok 18 yrs and older) 10/22/2018       Known drug allergies: All allergies were reviewed and updated    No Known Allergies    Social history:     Social History:  All social andepidemiologic history was reviewed and updated by me today as needed. Tobacco use:   reports that she has never smoked. She has never used smokeless tobacco.  Alcohol use:   reports no history of alcohol use. Currently lives in: [address]   reports no history of drug use. COVID VACCINATION AND LAB RESULT RECORDS:     Internal Administration   First Dose      Second Dose           Last COVID Lab SARS-CoV-2, NAAT (no units)   Date Value   07/29/2022 Not Detected            Assessment:     The patient is a 34 y.o. old female who  has a past medical history of Depression, Diabetes mellitus ([address] and Seizures ([facility] [address] with following problems:    Lactic acidosis on admission-resolved  Recent MRSA bacteremia-covered with IV daptomycin  Receiving IV daptomycin as an outpatient  Type 2 diabetes mellitus-counseling done  Need for contact isolation  Seizure disorder  History of depression      Discussion:      The patient is afebrile. Follow-up blood cultures from 8/13/2022 remain negative. Had switched her from IV vancomycin to IV Dapto-yesterday    Serum creatinine 0.5. Her CK level was 22. Hypoglycemia is improving      Plan:     Diagnostic Workup:    Follow-up on blood cultures from admission  Continue to follow  fever curve, WBC count and blood cultures. Continue to monitor blood counts, liver and renal function. Antimicrobials:     Will continue IV daptomycin 300 mg every 24 hours  Contact isolation for MRSA  Remove the PICC line at discharge  Plan to switch IV daptomycin to oral linezolid 600 mg every 12 hour at discharge. Platelet count is 492,246  Recommend 2 weeks of oral linezolid at discharge to complete the antibiotic course for MRSA  Recommend reducing sertraline dose to half while on linezolid  Continue close vitals monitoring. Maintain good glycemic control. Fall precautions. Aspiration precautions. Continue to watch for new fever or diarrhea. DVT prophylaxis. Discussed all above with patient and RN. Drug Monitoring:    Continue monitoring for antibiotic toxicity as follows: CBC, CMP   Continue to watch for following: new or worsening fever, new hypotension, hives, lip swelling and redness or purulence at vascular access sites. I/v access Management:    Continue to monitor i.v access sites for erythema, induration, discharge or tenderness. As always, continue efforts to minimize tubes/lines/drains as clinically appropriate to reduce chances of line associated infections. Patient education and counseling: The patient was educated in detail about the side-effects of various antibiotics and things to watch for like new rashes, lip swelling, severe reaction, worsening diarrhea, break through fever etc.  Discussed patient's condition and what to expect. All of the patient's questions were addressed in a satisfactory manner and patient verbalized understanding all instructions. Level of complexity of visit and medical decision making: High     Diabetes mellitus education and counseling:    Patient was educated in detail about the importance of keeping diabetes under control to improve the cure rate of infection and prevent future infections. Patient was advised to check blood glucose level regularly and to stay compliant with the diabetes medications. Patient was advised to the trim the toe nails carefully, wear shoes or slippers at all times and check both feet everyday before going to bed to look for any cuts, blisters, swelling or redness.  Importance of washing the feet everyday with soap and water and keeping them dry, and seeking prompt medical attention in case of a non-healing wound or ulcer on the feet was also highlighted. TIME SPENT TODAY:     - Spent over  35 minutes on visit (including interval history, physical exam, review of data including labs, cultures, imaging, development and implementation of treatment plan and coordination of complex care). More than 50 percent of this includes face-to-face time spent with the patient for counseling and coordination of care. Thanks for allowing me to participate in your patient's care. I will sign off today, but will be available to answer any further questions or concerns that may arise during patient's stay in the hospital.      Subjective: Interval history: Interval history was obtained from chart review and patient/ RN. The patient is afebrile. She is tolerating antibiotic okay. No diarrhea. REVIEW OF SYSTEMS:      Review of Systems   Constitutional:  Positive for fatigue. Negative for chills, diaphoresis and fever. HENT:  Negative for ear discharge, ear pain, postnasal drip, rhinorrhea, sinus pressure, sinus pain and sore throat. Eyes:  Negative for discharge and redness. Respiratory:  Negative for cough, shortness of breath and wheezing. Cardiovascular:  Negative for chest pain and leg swelling. Gastrointestinal:  Negative for abdominal pain, constipation, diarrhea and nausea. Endocrine: Negative for cold intolerance, heat intolerance and polydipsia. Genitourinary:  Negative for dysuria, flank pain, frequency, hematuria and urgency. Musculoskeletal:  Negative for back pain and myalgias. Skin:  Negative for rash. Allergic/Immunologic: Negative for immunocompromised state. Neurological:  Negative for dizziness, seizures and headaches. Hematological:  Does not bruise/bleed easily. Psychiatric/Behavioral:  Negative for agitation, hallucinations and suicidal ideas.  The patient is not nervous/anxious. All other systems reviewed and are negative. Past Medical History: All past medical history reviewed today. Past Medical History:   Diagnosis Date    Depression     Diabetes mellitus ([address]     Seizures ([address]        Past Surgical History: All past surgical history was reviewed today. Past Surgical History:   Procedure Laterality Date     SECTION      TUBAL LIGATION         Family History: All family history was reviewed today. Problem Relation Age of Onset    Asthma Mother     Allergies Mother         sun allergy    Diabetes Type 1  Father     Diabetes Type 1  Maternal Grandfather     Diabetes Type 1  Cousin     Diabetes Type 1  Cousin     Diabetes Type 1  Maternal Uncle     Diabetes Type 1  Maternal Uncle     Diabetes Type 1  Maternal Aunt        Objective:       PHYSICAL EXAM:      Vitals:   Vitals:    08/15/22 0356 08/15/22 0800 08/15/22 0812 08/15/22 1246   BP: 94/79 104/80     Pulse: 80      Resp: 22 20     Temp: 97.9 °F (36.6 °C) 97.9 °F (36.6 °C)  97.6 °F (36.4 °C)   TempSrc: Oral Oral  Oral   SpO2: 99%  98% 100%   Weight:       Height:           Physical Exam  Vitals and nursing note reviewed. Constitutional:       Appearance: She is well-developed. She is not diaphoretic. Comments: The patient was seen earlier today. HENT:      Head: Normocephalic and atraumatic. Right Ear: External ear normal. There is no impacted cerumen. Left Ear: External ear normal. There is no impacted cerumen. Nose: Nose normal.      Mouth/Throat:      Mouth: Mucous membranes are moist.      Pharynx: Oropharynx is clear. No oropharyngeal exudate. Eyes:      General: No scleral icterus. Right eye: No discharge. Left eye: No discharge. Conjunctiva/sclera: Conjunctivae normal.      Pupils: Pupils are equal, round, and reactive to light. Neck:      Thyroid: No thyromegaly.    Cardiovascular:      Rate and Rhythm: Normal rate and regular rhythm. Heart sounds: Normal heart sounds. No murmur heard. No friction rub. Pulmonary:      Effort: No respiratory distress. Breath sounds: No stridor. No wheezing or rales. Abdominal:      General: Bowel sounds are normal.      Palpations: Abdomen is soft. Tenderness: There is no abdominal tenderness. There is no guarding or rebound. Musculoskeletal:         General: No swelling, tenderness or deformity. Normal range of motion. Cervical back: Normal range of motion and neck supple. Right lower leg: No edema. Left lower leg: No edema. Lymphadenopathy:      Cervical: No cervical adenopathy. Skin:     General: Skin is warm and dry. Coloration: Skin is not jaundiced. Findings: No bruising, erythema or rash. Neurological:      General: No focal deficit present. Mental Status: She is alert and oriented to person, place, and time. Mental status is at baseline. Motor: No abnormal muscle tone. Psychiatric:         Mood and Affect: Mood normal.         Behavior: Behavior normal.        Lines and drains: All vascular access sites are healthy with no local erythema, discharge or tenderness. Intake and output:    I/O last 3 completed shifts: In: 7267.1 [P.O.:2580; I.V.:1739.7; IV Piggyback:2947.5]  Out: 4100 [Urine:4100]    Lab Data:   All available labs and old records have been reviewed by me.     CBC:  Recent Labs     08/13/22  1720 08/14/22  0459 08/15/22  0430   WBC 8.3 5.2 5.9   RBC 4.37 3.30* 3.19*   HGB 10.8* 8.3* 8.1*   HCT 34.0* 26.4* 24.5*    254 275   MCV 77.7* 79.9* 76.8*   MCH 24.7* 25.0* 25.5*   MCHC 31.7 31.3 33.2   RDW 15.3 15.5* 15.2        BMP:  Recent Labs     08/13/22  1720 08/14/22  0459 08/15/22  0430   * 133* 144   K 3.4* 3.7 3.2*   CL 98* 101 109   CO2 18* 20* 24   BUN 7 4* 3*   CREATININE 0.6 0.5* <0.5*   CALCIUM 10.0 7.7* 8.1*   GLUCOSE 236* 604* 215*        Hepatic Function Panel:   Lab Results   Component Value inflammatory response syndrome) (HCC) R65.10    Seizure disorder ([facility] [address] G40.909    Type 1 diabetes mellitus with complication (HCC) J61.4    Anxiety and depression F41.9, F32. A    ROME (generalized anxiety disorder) F41.1    Anorexia nervosa F50.00    Diabetic peripheral neuropathy (HCC) E11.42    Gastroparesis K31.84    Weight loss, unintentional R63.4    Tachycardia R00.0    Elevated transaminase level R74.01    Bandemia D72.825    Elevated liver function tests R79.89    Nausea and vomiting R11.2    Diarrhea R19.7    Elevated liver enzymes R74.8    Diabetic ketoacidosis without coma associated with type 1 diabetes mellitus (HCC) E10.10    Sepsis (HCC) A41.9    Neutrophilia D72.9    Abscess L02.91    Elevated platelet count Y82.01    Seizure (HCC) R56.9    AMS (altered mental status) R41.82    Electrolyte imbalance E87.8    Weakness R53.1    DKA, type 1, not at goal [facility]) E10.10    Abscess of groin, left L02.214    History of seizures Z87.898    MRSA bacteremia R78.81, B95.62    Poorly controlled diabetes mellitus ([facility] [address] E11.65    Hyperkalemia E87.5    Fatigue R53.83    History of depression Z86.59    Infection requiring contact isolation precautions B99.9    Receiving intravenous antibiotic treatment as outpatient Z79.2    Anemia D64.9    Hypokalemia E87.6    Non-adherence to medical treatment Z91.19       Please note that this chart was generated using Dragon dictation software. Although every effort was made to ensure the accuracy of this automated transcription, some errors in transcription may have occurred inadvertently. If you may need any clarification, please do not hesitate to contact me through EPIC or at the phone number provided below with my electronic signature. Any pictures or media included in this note were obtained after taking informed verbal consent from the patient and with their approval to include those in the patient's medical record.         Miguelangel Jones MD, MPH, [address] [place] [address]  8/15/2022, 6:30 PM  [facility]   [address] [place] [address], [address] [facility], [address] [place] [address]  Office: [phone]  Fax: [phone]  In-person Clinic days:  Tuesday & Thursday a.m. Virtual clinic days: Monday, Wednesday & Friday a.m.

## 2022-08-15 NOTE — PROGRESS NOTES
Clinical Pharmacy Note  Vancomycin Consult    Maciej Martínez is a 34 y.o. female ordered Vancomycin for ; consult received from [facility] [address] to manage therapy. Also receiving . Allergies:  Patient has no known allergies. Temp max:  Temp (24hrs), Av.5 °F (36.4 °C), Min:97.4 °F (36.3 °C), Max:97.7 °F (36.5 °C)      Recent Labs     22  1720 22  0459   WBC 8.3 5.2       Recent Labs     22  1720 22  0459   BUN 7 4*   CREATININE 0.6 0.5*         Intake/Output Summary (Last 24 hours) at 2022  Last data filed at 2022 1832  Gross per 24 hour   Intake 5191.01 ml   Output 3800 ml   Net 1391.01 ml       Culture Results:      Ht Readings from Last 1 Encounters:   22 4' 10\" (1.473 m)        Wt Readings from Last 1 Encounters:   22 108 lb 3.9 oz (49.1 kg)         Estimated Creatinine Clearance: 119 mL/min (A) (based on SCr of 0.5 mg/dL (L)). Assessment/Plan:  Continue Vancomycin 750 mg IV every 8 hours ordered at this time     thank you for the consult.

## 2022-08-15 NOTE — PROGRESS NOTES
Hospitalist Progress Note      PCP: Lala Schafer MD    Date of Admission: 8/13/2022    Chief Complaint: seizure    Hospital Course:      Subjective: no further seizure activity here. Glucoses uncontrolled      Medications:  Reviewed    Infusion Medications    dextrose      sodium chloride Stopped (08/14/22 1613)     Scheduled Medications    clonazePAM  2 mg Oral BID    insulin lispro  3 Units SubCUTAneous TID WC    insulin lispro  0-8 Units SubCUTAneous TID WC    insulin lispro  0-4 Units SubCUTAneous Nightly    [START ON 8/15/2022] daptomycin (CUBICIN) IVPB  6 mg/kg (Adjusted) IntraVENous Q24H    gabapentin  300 mg Oral TID    sodium chloride flush  5-40 mL IntraVENous 2 times per day    enoxaparin  30 mg SubCUTAneous Daily    sertraline  50 mg Oral Daily    famotidine  20 mg Oral BID    levETIRAcetam  500 mg IntraVENous Q12H     PRN Meds: glucose, dextrose bolus **OR** dextrose bolus, glucagon (rDNA), dextrose, sodium chloride flush, sodium chloride, acetaminophen **OR** acetaminophen, ondansetron, midazolam      Intake/Output Summary (Last 24 hours) at 8/14/2022 2325  Last data filed at 8/14/2022 1832  Gross per 24 hour   Intake 3242.91 ml   Output 3800 ml   Net -557.09 ml       Exam:    /83   Pulse 98   Temp 97.6 °F (36.4 °C) (Oral)   Resp 16   Ht 4' 10\" (1.473 m)   Wt 108 lb 3.9 oz (49.1 kg)   SpO2 99%   BMI 22.62 kg/m²     General appearance: No apparent distress, appears stated age and cooperative. HEENT: Pupils equal, round, and reactive to light. Conjunctivae/corneas clear. Neck: Supple, with full range of motion. No jugular venous distention. Trachea midline. Respiratory:  Normal respiratory effort. Clear to auscultation, bilaterally without Rales/Wheezes/Rhonchi. Cardiovascular: Regular rate and rhythm with normal S1/S2 without murmurs, rubs or gallops. Abdomen: Soft, non-tender, non-distended with normal bowel sounds. Musculoskeletal: No clubbing, cyanosis or edema bilaterally. Full range of motion without deformity. Skin: Skin color, texture, turgor normal.  No rashes or lesions. Neurologic:  Neurovascularly intact without any focal sensory/motor deficits. Cranial nerves: II-XII intact, grossly non-focal.  Psychiatric: Alert and oriented, thought content appropriate, normal insight  Capillary Refill: Brisk,< 3 seconds   Peripheral Pulses: +2 palpable, equal bilaterally       Labs:   Recent Labs     08/13/22 1720 08/14/22  0459   WBC 8.3 5.2   HGB 10.8* 8.3*   HCT 34.0* 26.4*    254     Recent Labs     08/13/22  1720 08/14/22  0459   * 133*   K 3.4* 3.7   CL 98* 101   CO2 18* 20*   BUN 7 4*   CREATININE 0.6 0.5*   CALCIUM 10.0 7.7*     Recent Labs     08/13/22 1720 08/14/22  0459   AST 51* 59*   ALT 21 19   BILITOT <0.2 <0.2   ALKPHOS 108 80     No results for input(s): INR in the last 72 hours. No results for input(s): Janet Highman in the last 72 hours. Urinalysis:      Lab Results   Component Value Date/Time    NITRU Negative 08/13/2022 05:20 PM    WBCUA 0 07/29/2022 10:21 PM    BACTERIA None Seen 07/29/2022 10:21 PM    RBCUA 0 07/29/2022 10:21 PM    BLOODU Negative 08/13/2022 05:20 PM    SPECGRAV 1.021 08/13/2022 05:20 PM    GLUCOSEU >=1000 08/13/2022 05:20 PM    GLUCOSEU >=1000 01/17/2011 09:15 AM       Radiology:  XR CHEST PORTABLE   Final Result   Status post PICC line placement on the right in good position with no acute   abnormality seen. CT HEAD WO CONTRAST   Final Result   No acute abnormality.                  Assessment/Plan:    Active Hospital Problems    Diagnosis Date Noted    History of depression [Z86.59] 08/14/2022     Priority: Medium    Infection requiring contact isolation precautions [B99.9] 08/14/2022     Priority: Medium    Receiving intravenous antibiotic treatment as outpatient [Z79.2] 08/14/2022     Priority: Medium    Anemia [D64.9] 08/14/2022     Priority: Medium    Hypokalemia [E87.6] 08/14/2022     Priority: Medium Non-adherence to medical treatment [Z91.19] 2022     Priority: Medium    Fatigue [R53.83] 2022     Priority: Medium    MRSA bacteremia [R78.81, B95.62] 2022     Priority: Medium    AMS (altered mental status) [R41.82] 2022     Priority: Medium    Seizure ([address] [R56.9] 2022     Priority: Medium    Seizure disorder ([address] [G40.909] 2018    Lactic acidosis [E87.2] 2018    SIRS (systemic inflammatory response syndrome) (HCC) [R65.10] 2018       Seizure d/o  Medication noncompliance  - not taking Keppra consistently enough to manage seizures -- level undetectable in the ED  - neuro consulted:  cont home medications   - potentially add Vimpat if uncontrolled on home meds    Personality d/o vs mood d/o  - patient reportedly with bulimia per mother in law  - patient does not take medications as instructed and returns to hospital recurrently for care  - psychiatry consulted -- may benefit from inpatient psychiatric stay  - patient's sister reportedly  from heroin overdose    DM:  uncontrolled  - recently admitted with DKA triggered by bacteremia and inappropriate self dosing of insulin at home  - check VBG and beta HCG tomorrow. May need insulin drip/DKA protocol for better glucose regulation    MRSA bacteremia:  - diagnosed two weeks ago inpatient and discharged on IV daptomycin -- reportedly adherant to regimen  - repeat blood cultures ordered  - ID consulted -- cont IV daptomycin    DVT Prophylaxis: lovenox  Diet: ADULT DIET; Regular; 4 carb choices (60 gm/meal);  Low Sodium (2 gm)  Code Status: Full Code    PT/OT Eval Status: n/a    Dispo - PCU    Lesvia Casarez MD

## 2022-08-15 NOTE — PROGRESS NOTES
Patient slept at long intervals this shift. When awake she was very talkative. Offered no complaints of pain. Appetite good, ate 100% of her meals. Pt ambulating to the bathroom with no difficulty. No seizure activity noted. Call light within reach.

## 2022-08-15 NOTE — CONSULTS
home. She was recently in hospital treated for MRSA bacteremia with abx which she was presumably continued on the antibiotics. She also states her blood sugar has been not well controlled. She endorses not taking Keppra dose as prescribed. She takes Keppra 500 mg BID instead Keppra 1000 mg BID. She  endorses depressed mood, fatigue, racing thoughts, and insomnia. She experiences 1-3 times of panic attacks weekly. She excessively worries about her health and multiple admissions to hospital. She denies eating disorder for a while. She used to have Bulmia nervosa. She states, she has been not engaging in any kind eating disorders for six months or so. associated symptoms: Fatigue, AMS, non-compliance medications. modifying factors: insulin pump malfunction, not taking medications as prescribed, stress  Timing: acute/intermittent   duration: 5 days   severity: moderate   Collateral information: N/A    ROS: She denies HA, blurry vision, CP, SOB, dizziness, Syncope, tremors, abd pain, or abnormal movements. Past Psychiatric History:        Hosp: denies        Diagnoses: MDD, ROME, panic attacks, Anorexia. Med trials: Zoloft,Klonopin        Outpt: Dr Talya Martin and Dr Rena Bermudez in 2018      Suicide Attempts: denies    Substance Use History:     Nicotine: denies      Alcohol: denies     Illicits: denies     Past Medical History:   Past Medical History:   Diagnosis Date    Depression     Diabetes mellitus ([facility]     Seizures ([facility]      Past Surgical History:   Procedure Laterality Date     SECTION      TUBAL LIGATION         Social/Developmental History:   Relationship:  born and raised in [place]. She has three brothers and two sisters. Her mom is alive. And dad passed away.  for 3 yrs. Children:  she has 2 children ( 10, 6  yrs old)    Supports: depends on  income. Housing: lives with spouse and 2 children. Occ/Inc: unemployed. she wants to apply for permanent disability. Legal: denies     Abuse: denies     Violence: denies     Access to firearms: No      Family History:   Family History   Problem Relation Age of Onset    Asthma Mother     Allergies Mother         sun allergy    Diabetes Type 1  Father     Diabetes Type 1  Maternal Grandfather     Diabetes Type 1  Cousin     Diabetes Type 1  Cousin     Diabetes Type 1  Maternal Uncle     Diabetes Type 1  Maternal Uncle     Diabetes Type 1  Maternal Aunt        Psychiatric: Sister- drug issue. OD and passed away. History of completed suicide:  denies    Allergies:  No Known Allergies    Home Medications:   No current facility-administered medications on file prior to encounter. Current Outpatient Medications on File Prior to Encounter   Medication Sig Dispense Refill    clonazePAM (KLONOPIN) 2 MG tablet Take 1 tablet by mouth twice daily 60 tablet 0    DAPTOmycin (CUBICIN) infusion Infuse 285 mg intravenously every 24 hours for 25 days Compound per protocol.  8 g 0    Continuous Blood Gluc Transmit (DEXCOM G6 TRANSMITTER) MISC USE AS DIRECTED 1 each 0    gabapentin (NEURONTIN) 300 MG capsule TAKE 1 CAPSULE BY MOUTH THREE TIMES DAILY DRUNG THE DAY AND 4 CAPSULES BY MOUTH EVERY DAY AT BEDTIME 210 capsule 3    blood glucose test strips (TRUE METRIX BLOOD GLUCOSE TEST) strip Check blood sugar 4-6 times daily and as needed for symptoms of irregular glucose 400 each 3    Insulin Disposable Pump (OMNIPOD DASH PODS, GEN 4,) MISC Continuous pump - max dose 60 units 10 each 3    ondansetron (ZOFRAN) 4 MG tablet Take 1 tablet by mouth 3 times daily as needed for Nausea or Vomiting 30 tablet 1    ibuprofen (ADVIL;MOTRIN) 800 MG tablet Take 1 tablet by mouth 3 times daily as needed for Pain 90 tablet 1    Continuous Blood Gluc Sensor (DEXCOM G6 SENSOR) MISC Apply as directed for blood sugar monitoring 10 each 5    sertraline (ZOLOFT) 50 MG tablet Take 1 tablet by mouth daily 30 tablet 2    insulin aspart (NOVOLOG FLEXPEN) 100 UNIT/ML injection pen Inject 20 units with meals/snacks up to 5 times per day 10 pen 3    insulin aspart (NOVOLOG) 100 UNIT/ML injection vial Inject 100 units into the skin daily in conjunction with omnipod 30 mL 5    Blood Glucose Monitoring Suppl (TRUE METRIX METER) w/Device KIT Check blood sugar 4 times daily, tidac 1 kit 0    Lancets MISC Check blood sugar 4 times daily, tidac 300 each 1    levETIRAcetam (KEPPRA) 500 MG tablet Take 2 tablets by mouth 2 times daily 120 tablet 5    insulin aspart (NOVOLOG) 100 UNIT/ML injection vial Inject 100 Units into the skin daily In conjunction with omnipod 30 mL 5    glucagon, rDNA, 1 MG injection Inject 1 mg into the muscle as needed for Low blood sugar (Blood glucose less than 70 mg/dL and patient NOT ALERT or NPO and does not have IV access. ) 10 each 0    famotidine (PEPCID) 20 MG tablet Take 20 mg by mouth 2 times daily         Medications:  Scheduled Meds:   clonazePAM  2 mg Oral BID    insulin lispro  0-8 Units SubCUTAneous TID WC    insulin lispro  0-4 Units SubCUTAneous Nightly    daptomycin (CUBICIN) IVPB  6 mg/kg (Adjusted) IntraVENous Q24H    insulin lispro  5 Units SubCUTAneous TID WC    insulin glargine  25 Units SubCUTAneous Nightly    gabapentin  300 mg Oral TID    sodium chloride flush  5-40 mL IntraVENous 2 times per day    enoxaparin  30 mg SubCUTAneous Daily    sertraline  50 mg Oral Daily    famotidine  20 mg Oral BID    levETIRAcetam  500 mg IntraVENous Q12H     PRN Meds:.glucose, dextrose bolus **OR** dextrose bolus, glucagon (rDNA), dextrose, sodium chloride flush, sodium chloride, acetaminophen **OR** acetaminophen, ondansetron, midazolam    OBJECTIVE:  .  Vitals:    08/15/22 0348 08/15/22 0356 08/15/22 0800 08/15/22 0812   BP:  94/79 104/80    Pulse:  80     Resp:  22 20    Temp:  97.9 °F (36.6 °C) 97.9 °F (36.6 °C)    TempSrc:  Oral Oral    SpO2:  99%  98%   Weight: 109 lb 2 oz (49.5 kg)      Height:           MSE:   Appearance    alert, cooperative  Motor: Normal strength and tone, No abnormal movements, tics or mannerisms.   Speech    spontaneous  Language    0 - no aphasia, normal  Mood/Affect    Anxious  Depressed  Irritability / anxiety  Thought Process    linear and slow  Thought Content    intact , no suicidal ideation  Associations    logical connections  Attention/Concentration    intact  Orientation    oriented to person, place, time, and general circumstances  Memory    recent and remote memory intact  Fund of Knowledge    intact  Insight/Judgement    Fair / Intact    Labs:     Recent Labs     08/13/22  1720 08/14/22  0459 08/15/22  0430   WBC 8.3 5.2 5.9   HGB 10.8* 8.3* 8.1*   HCT 34.0* 26.4* 24.5*   MCV 77.7* 79.9* 76.8*    254 275     Recent Labs     08/13/22  1720 08/14/22  0459 08/15/22  0430   * 133* 144   K 3.4* 3.7 3.2*   CL 98* 101 109   CO2 18* 20* 24   BUN 7 4* 3*   MG 1.90 1.30* 1.90       Recent Labs     08/13/22  1720 08/14/22  0459 08/15/22  0430   AST 51* 59* 21   ALT 21 19 14      Lab Results   Component Value Date/Time    COLORU Yellow 08/13/2022 05:20 PM    NITRU Negative 08/13/2022 05:20 PM    GLUCOSEU >=1000 08/13/2022 05:20 PM    GLUCOSEU >=1000 01/17/2011 09:15 AM    KETUA TRACE 08/13/2022 05:20 PM    UROBILINOGEN 0.2 08/13/2022 05:20 PM    BILIRUBINUR Negative 08/13/2022 05:20 PM    BILIRUBINUR NEGATIVE 01/17/2011 09:15 AM     Lab Results   Component Value Date    LABA1C 13.6 05/24/2022     Lab Results   Component Value Date    .6 05/24/2022     Lab Results   Component Value Date    CHOL 179 09/13/2021    CHOL 221 (H) 10/22/2018    CHOL 138 02/14/2018     Lab Results   Component Value Date    TRIG 92 09/13/2021    TRIG 170 (H) 10/22/2018    TRIG 266 (H) 02/14/2018     Lab Results   Component Value Date    HDL 58 09/13/2021    HDL 38 (L) 10/22/2018    HDL 19 (L) 02/14/2018     Lab Results   Component Value Date    LDLCALC 103 (H) 09/13/2021    LDLCALC 149 (H) 10/22/2018    LDLCALC 66 02/14/2018 Lab Results   Component Value Date    LABVLDL 18 09/13/2021    LABVLDL 34 10/22/2018    LABVLDL 53 02/14/2018     No results found for: CHOLHDLRATIO  Lab Results   Component Value Date    TSH 0.77 01/18/2011     No results found for: RANJVYE0G9  No results found for: GPIQECLW03  No results found for: FOLATE    Last Drug screen:8/13/22 is Negative     Imaging: CT Head 8/13/22  Impression   No acute abnormality. EKG:     Vitals:  HR 97 PVC 0  8/15/2022 01:01:08 Saved strip re?labeled to Sinus Rhythm ? LZ? MD 0.13 QRS 0.09 RR 0.60 QT 0.34 QTc 0.44      Minor Bobo, DNP, PMHNP-BC, CNP  [facility]  Thank you for this consult, please call the psychiatry consult line for further questions.

## 2022-08-15 NOTE — PROGRESS NOTES
Hospitalist Progress Note      PCP: Latisha Doran MD    Date of Admission: 8/13/2022    Chief Complaint: Seizure activity, suspected medication noncompliance    Hospital Course:   67-year-old female patient with a past medical history of type 1 diabetes mellitus, seizure disorder, MRSA bacteremia (continues to be on IV antibiotics) , presented to emergency after having a breakthrough seizure. Subjective:   Patient continues to report bilateral lower limb neuropathy  Blood sugar well controlled    Medications:  Reviewed    Infusion Medications    dextrose      sodium chloride Stopped (08/14/22 1613)     Scheduled Medications    clonazePAM  2 mg Oral BID    insulin lispro  0-8 Units SubCUTAneous TID WC    insulin lispro  0-4 Units SubCUTAneous Nightly    daptomycin (CUBICIN) IVPB  6 mg/kg (Adjusted) IntraVENous Q24H    insulin lispro  5 Units SubCUTAneous TID WC    insulin glargine  25 Units SubCUTAneous Nightly    gabapentin  300 mg Oral TID    sodium chloride flush  5-40 mL IntraVENous 2 times per day    enoxaparin  30 mg SubCUTAneous Daily    sertraline  50 mg Oral Daily    famotidine  20 mg Oral BID    levETIRAcetam  500 mg IntraVENous Q12H     PRN Meds: glucose, dextrose bolus **OR** dextrose bolus, glucagon (rDNA), dextrose, sodium chloride flush, sodium chloride, acetaminophen **OR** acetaminophen, ondansetron, midazolam      Intake/Output Summary (Last 24 hours) at 8/15/2022 1112  Last data filed at 8/15/2022 0733  Gross per 24 hour   Intake 2891.7 ml   Output 1200 ml   Net 1691.7 ml       Physical Exam Performed:    BP 94/79   Pulse 80   Temp 97.9 °F (36.6 °C) (Oral)   Resp 22   Ht 4' 10\" (1.473 m)   Wt 109 lb 2 oz (49.5 kg)   SpO2 98%   BMI 22.81 kg/m²     General appearance: No apparent distress, appears stated age and cooperative. HEENT: Pupils equal, round, and reactive to light. Conjunctivae/corneas clear. Neck: Supple, with full range of motion. No jugular venous distention.  Trachea midline. Respiratory:  Normal respiratory effort. Clear to auscultation, bilaterally without Rales/Wheezes/Rhonchi. Cardiovascular: Regular rate and rhythm with normal S1/S2 without murmurs, rubs or gallops. Abdomen: Soft, non-tender, non-distended with normal bowel sounds. Musculoskeletal: No clubbing, cyanosis or edema bilaterally. Full range of motion without deformity. Skin: Skin color, texture, turgor normal.  No rashes or lesions. Neurologic:  Neurovascularly intact without any focal sensory/motor deficits. Cranial nerves: II-XII intact, grossly non-focal.  Psychiatric: Alert and oriented, thought content appropriate, normal insight  Capillary Refill: Brisk,3 seconds, normal   Peripheral Pulses: +2 palpable, equal bilaterally       Labs:   Recent Labs     08/13/22  1720 08/14/22  0459 08/15/22  0430   WBC 8.3 5.2 5.9   HGB 10.8* 8.3* 8.1*   HCT 34.0* 26.4* 24.5*    254 275     Recent Labs     08/13/22  1720 08/14/22 0459 08/15/22  0430   * 133* 144   K 3.4* 3.7 3.2*   CL 98* 101 109   CO2 18* 20* 24   BUN 7 4* 3*   CREATININE 0.6 0.5* <0.5*   CALCIUM 10.0 7.7* 8.1*     Recent Labs     08/13/22  1720 08/14/22 0459 08/15/22  0430   AST 51* 59* 21   ALT 21 19 14   BILITOT <0.2 <0.2 <0.2   ALKPHOS 108 80 75     No results for input(s): INR in the last 72 hours. Recent Labs     08/14/22  1840 08/15/22  0430   CKTOTAL 26 22*       Urinalysis:      Lab Results   Component Value Date/Time    NITRU Negative 08/13/2022 05:20 PM    WBCUA 0 07/29/2022 10:21 PM    BACTERIA None Seen 07/29/2022 10:21 PM    RBCUA 0 07/29/2022 10:21 PM    BLOODU Negative 08/13/2022 05:20 PM    SPECGRAV 1.021 08/13/2022 05:20 PM    GLUCOSEU >=1000 08/13/2022 05:20 PM    GLUCOSEU >=1000 01/17/2011 09:15 AM       Radiology:  XR CHEST PORTABLE   Final Result   Status post PICC line placement on the right in good position with no acute   abnormality seen. CT HEAD WO CONTRAST   Final Result   No acute abnormality. Assessment/Plan:    Active Hospital Problems    Diagnosis     History of depression [Z86.59]      Priority: Medium    Infection requiring contact isolation precautions [B99.9]      Priority: Medium    Receiving intravenous antibiotic treatment as outpatient [Z79.2]      Priority: Medium    Anemia [D64.9]      Priority: Medium    Hypokalemia [E87.6]      Priority: Medium    Non-adherence to medical treatment [Z91.19]      Priority: Medium    Fatigue [R53.83]      Priority: Medium    MRSA bacteremia [R78.81, B95.62]      Priority: Medium    AMS (altered mental status) [R41.82]      Priority: Medium    Seizure ([address] [R56.9]      Priority: Medium    Seizure disorder ([address] [G40.909]     Lactic acidosis [E87.2]     SIRS (systemic inflammatory response syndrome) (HCC) [R65.10]      Seizure d/o  Medication noncompliance  - not taking Keppra consistently enough to manage seizures -- level undetectable in the ED  - neuro consulted:  cont home medications              - potentially add Vimpat if uncontrolled on home meds     Personality d/o vs mood d/o  - patient reportedly with bulimia per mother in law  - patient does not take medications as instructed and returns to hospital recurrently for care  - psychiatry consulted --we will continue follow-up as outpatient, patient is safe for discharge. DM:  uncontrolled  - recently admitted with DKA triggered by bacteremia and inappropriate self dosing of insulin at home  Blood sugar better controlled  Continue on glargine 25 units     MRSA bacteremia:  - diagnosed two weeks ago inpatient and discharged on IV daptomycin -- reportedly adherant to regimen  - repeat blood cultures ordered  - ID consulted -- cont IV daptomycin  -Will need IV antibiotics at discharge. Worsening neuropathy. Increase home dose of gabapentin       DVT Prophylaxis: lovenox  Diet: ADULT DIET; Regular; 4 carb choices (60 gm/meal);  Low Sodium (2 gm)  Code Status: Full Code    PT/OT Eval Status: baselline.      Dispo - possible DC tomorrow ( with IV AB )     Evgeny Kruse MD

## 2022-08-15 NOTE — CARE COORDINATION
Discharge planning  Met with patient at bedside to discuss discharge planning. Patient spoke at length regarding family and living arrangement. Patient expressed concerns with daughters (6 and 15years old) school arrangements. Patient confirmed her daughters are in [place] in the care of maternal grandmother Jorge Kolb) and they will begin school there. Inquired about living conditions in patient's home. Patient reports she and  live in the basement of a house; 's mother and uncle reside in the main level of the house. Patient reports mother-in-law assisted with IV antibiotics at previous discharge, and the plan is to return home with home care and IV antibiotics. Patient expressed concerns regarding Gabapentin, reported her PCP mentioned increasing dosage, and requested I notify attending MD.   Yelitza De Leon MD via [employer] notifying of the above. [facility]: spoke to Mikie reyes, confirmed patient is active and they can resume services. Confirmed they will be able to send  to the home. [facility]: Spoke to Norma Lima notified of anticipated discharge home today with start of care for 8/16. Called to  per patient's request. Spouse is requesting I sign documents that patient's mother will have custody of children for the school year, I advised they contact the  to discuss what is needed for school placement.  reports patient will need to be watched because seizures have been coming on too frequently almost a week and she will have multiple.  cannot care for patient due to work schedule and expressed concerns regarding patient returning home. Met with patient. Discussed concerns with her returning home. Discussed skilled nursing facility as an option for iv antibiotics. Patient declined skilled nursing facility at this time. Patient confirmed they will contact her daughters' school  to discuss transition. Psychiatry Nurse Practitioner consulted. Will await recommendations.      ILSA Garces, IDALMIS, Social Work/Case Management   [phone]  Electronically signed by ILSA Garces, IDALMIS on 8/15/2022 at 12:15 PM

## 2022-08-15 NOTE — CARE COORDINATION
Call received from [facility] with [facility]. Per conversation, patient's home care nurse reports concerns of patient's living condition in the home confirming the basement is patient and 's apartment and confirmed it is infested with bugs. Per [facility], there are concerns expressed by nurse regarding if patient will get better at home.      ILSA Wood, IDALMIS, Social Work/Case Management   [phone]  Electronically signed by ILSA Wood LSW on 8/15/2022 at 12:34 PM

## 2022-08-16 VITALS
BODY MASS INDEX: 22.81 KG/M2 | TEMPERATURE: 98.3 F | HEART RATE: 88 BPM | SYSTOLIC BLOOD PRESSURE: 115 MMHG | WEIGHT: 108.69 LBS | HEIGHT: 58 IN | DIASTOLIC BLOOD PRESSURE: 83 MMHG | RESPIRATION RATE: 16 BRPM | OXYGEN SATURATION: 94 %

## 2022-08-16 LAB
A/G RATIO: 1.2 (ref 1.1–2.2)
ALBUMIN SERPL-MCNC: 3.1 G/DL (ref 3.4–5)
ALP BLD-CCNC: 74 U/L (ref 40–129)
ALT SERPL-CCNC: 13 U/L (ref 10–40)
ANION GAP SERPL CALCULATED.3IONS-SCNC: 7 MMOL/L (ref 3–16)
AST SERPL-CCNC: 19 U/L (ref 15–37)
BASOPHILS ABSOLUTE: 0.1 K/UL (ref 0–0.2)
BASOPHILS RELATIVE PERCENT: 1.1 %
BILIRUB SERPL-MCNC: <0.2 MG/DL (ref 0–1)
BUN BLDV-MCNC: 3 MG/DL (ref 7–20)
CALCIUM SERPL-MCNC: 8.5 MG/DL (ref 8.3–10.6)
CHLORIDE BLD-SCNC: 107 MMOL/L (ref 99–110)
CO2: 28 MMOL/L (ref 21–32)
CREAT SERPL-MCNC: <0.5 MG/DL (ref 0.6–1.1)
EOSINOPHILS ABSOLUTE: 0.3 K/UL (ref 0–0.6)
EOSINOPHILS RELATIVE PERCENT: 4.6 %
GFR AFRICAN AMERICAN: >60
GFR NON-AFRICAN AMERICAN: >60
GLUCOSE BLD-MCNC: 249 MG/DL (ref 70–99)
GLUCOSE BLD-MCNC: 44 MG/DL (ref 70–99)
GLUCOSE BLD-MCNC: 61 MG/DL (ref 70–99)
GLUCOSE BLD-MCNC: 76 MG/DL (ref 70–99)
GLUCOSE BLD-MCNC: 77 MG/DL (ref 70–99)
GLUCOSE BLD-MCNC: 86 MG/DL (ref 70–99)
HCT VFR BLD CALC: 25 % (ref 36–48)
HEMOGLOBIN: 8.1 G/DL (ref 12–16)
LYMPHOCYTES ABSOLUTE: 2.6 K/UL (ref 1–5.1)
LYMPHOCYTES RELATIVE PERCENT: 46.9 %
MAGNESIUM: 1.6 MG/DL (ref 1.8–2.4)
MCH RBC QN AUTO: 25 PG (ref 26–34)
MCHC RBC AUTO-ENTMCNC: 32.4 G/DL (ref 31–36)
MCV RBC AUTO: 77.2 FL (ref 80–100)
MONOCYTES ABSOLUTE: 0.5 K/UL (ref 0–1.3)
MONOCYTES RELATIVE PERCENT: 8.2 %
NEUTROPHILS ABSOLUTE: 2.2 K/UL (ref 1.7–7.7)
NEUTROPHILS RELATIVE PERCENT: 39.2 %
PDW BLD-RTO: 15.2 % (ref 12.4–15.4)
PERFORMED ON: ABNORMAL
PERFORMED ON: NORMAL
PERFORMED ON: NORMAL
PLATELET # BLD: 248 K/UL (ref 135–450)
PMV BLD AUTO: 7.7 FL (ref 5–10.5)
POTASSIUM SERPL-SCNC: 3.2 MMOL/L (ref 3.5–5.1)
RBC # BLD: 3.24 M/UL (ref 4–5.2)
SODIUM BLD-SCNC: 142 MMOL/L (ref 136–145)
TOTAL PROTEIN: 5.7 G/DL (ref 6.4–8.2)
WBC # BLD: 5.6 K/UL (ref 4–11)

## 2022-08-16 PROCEDURE — 6370000000 HC RX 637 (ALT 250 FOR IP): Performed by: FAMILY MEDICINE

## 2022-08-16 PROCEDURE — 6370000000 HC RX 637 (ALT 250 FOR IP): Performed by: STUDENT IN AN ORGANIZED HEALTH CARE EDUCATION/TRAINING PROGRAM

## 2022-08-16 PROCEDURE — 9990000010 HC NO CHARGE VISIT: Performed by: PHYSICAL THERAPIST

## 2022-08-16 PROCEDURE — 6370000000 HC RX 637 (ALT 250 FOR IP): Performed by: REGISTERED NURSE

## 2022-08-16 PROCEDURE — 85025 COMPLETE CBC W/AUTO DIFF WBC: CPT

## 2022-08-16 PROCEDURE — 94760 N-INVAS EAR/PLS OXIMETRY 1: CPT

## 2022-08-16 PROCEDURE — 80053 COMPREHEN METABOLIC PANEL: CPT

## 2022-08-16 PROCEDURE — 91305 HC RX W HCPCS: CPT | Performed by: STUDENT IN AN ORGANIZED HEALTH CARE EDUCATION/TRAINING PROGRAM

## 2022-08-16 PROCEDURE — 6360000002 HC RX W HCPCS: Performed by: STUDENT IN AN ORGANIZED HEALTH CARE EDUCATION/TRAINING PROGRAM

## 2022-08-16 PROCEDURE — 0054A: CPT | Performed by: STUDENT IN AN ORGANIZED HEALTH CARE EDUCATION/TRAINING PROGRAM

## 2022-08-16 PROCEDURE — 83735 ASSAY OF MAGNESIUM: CPT

## 2022-08-16 RX ORDER — INSULIN LISPRO 100 [IU]/ML
0-4 INJECTION, SOLUTION INTRAVENOUS; SUBCUTANEOUS NIGHTLY
Status: DISCONTINUED | OUTPATIENT
Start: 2022-08-16 | End: 2022-08-16 | Stop reason: HOSPADM

## 2022-08-16 RX ORDER — LINEZOLID 600 MG/1
600 TABLET, FILM COATED ORAL 2 TIMES DAILY
Qty: 28 TABLET | Refills: 0 | Status: SHIPPED | OUTPATIENT
Start: 2022-08-16 | End: 2022-08-30

## 2022-08-16 RX ORDER — INSULIN LISPRO 100 [IU]/ML
0-4 INJECTION, SOLUTION INTRAVENOUS; SUBCUTANEOUS
Status: DISCONTINUED | OUTPATIENT
Start: 2022-08-16 | End: 2022-08-16 | Stop reason: HOSPADM

## 2022-08-16 RX ORDER — DEXTROSE MONOHYDRATE 100 MG/ML
INJECTION, SOLUTION INTRAVENOUS CONTINUOUS PRN
Status: DISCONTINUED | OUTPATIENT
Start: 2022-08-16 | End: 2022-08-16 | Stop reason: HOSPADM

## 2022-08-16 RX ORDER — INSULIN GLARGINE 100 [IU]/ML
13 INJECTION, SOLUTION SUBCUTANEOUS NIGHTLY
Status: DISCONTINUED | OUTPATIENT
Start: 2022-08-16 | End: 2022-08-16 | Stop reason: HOSPADM

## 2022-08-16 RX ADMIN — LEVETIRACETAM 500 MG: 5 INJECTION INTRAVENOUS at 09:15

## 2022-08-16 RX ADMIN — Medication 16 G: at 11:37

## 2022-08-16 RX ADMIN — BNT162B2 0.3 ML: 0.23 INJECTION, SUSPENSION INTRAMUSCULAR at 13:08

## 2022-08-16 RX ADMIN — FAMOTIDINE 20 MG: 20 TABLET, FILM COATED ORAL at 09:12

## 2022-08-16 RX ADMIN — GABAPENTIN 400 MG: 400 CAPSULE ORAL at 13:40

## 2022-08-16 RX ADMIN — ENOXAPARIN SODIUM 30 MG: 100 INJECTION SUBCUTANEOUS at 09:13

## 2022-08-16 RX ADMIN — SERTRALINE 100 MG: 100 TABLET, FILM COATED ORAL at 09:12

## 2022-08-16 RX ADMIN — CLONAZEPAM 2 MG: 1 TABLET ORAL at 09:12

## 2022-08-16 RX ADMIN — GABAPENTIN 400 MG: 400 CAPSULE ORAL at 09:12

## 2022-08-16 ASSESSMENT — PAIN SCALES - GENERAL: PAINLEVEL_OUTOF10: 0

## 2022-08-16 NOTE — DISCHARGE INSTR - COC
Continuity of Care Form    Patient Name: Wendi Teran   :  1990  MRN:  [id number]    Admit date:  2022  Discharge date:  2022    Code Status Order: Full Code   Advance Directives:     Admitting Physician:  Iona Schirmer, DO  PCP: Latisha Doran MD    Discharging Nurse: [address]  Unit/Room#: N7L-2493/7348-43  Discharging Unit Phone Number:     Emergency Contact:   Extended Emergency Contact Information  Primary Emergency Contact: Giorgi Dickson [address] Phone: [phone]  Mobile Phone: [phone]  Relation: Spouse  Secondary Emergency Contact: [address] Phone: [phone]  Mobile Phone: [phone]  Relation: Parent    Past Surgical History:  Past Surgical History:   Procedure Laterality Date     SECTION      TUBAL LIGATION         Immunization History:   Immunization History   Administered Date(s) Administered    Influenza Virus Vaccine 2020    Influenza, [place] Johnie, Recombinant, IM PF (Flublok 18 yrs and older) 10/22/2018       Active Problems:  Patient Active Problem List   Diagnosis Code    Lactic acidosis E87.2    SIRS (systemic inflammatory response syndrome) ([facility] R65.10    Seizure disorder ([facility] G40.909    Type 1 diabetes mellitus with complication ([facility] V09.5    Anxiety and depression F41.9, F32. A    ROME (generalized anxiety disorder) F41.1    Anorexia nervosa F50.00    Diabetic peripheral neuropathy (HCC) E11.42    Gastroparesis K31.84    Encounter for medication counseling Z71.89    Weight loss, unintentional R63.4    Tachycardia R00.0    Elevated transaminase level R74.01    Bandemia D72.825    Elevated liver function tests R79.89    Nausea and vomiting R11.2    Diarrhea R19.7    Elevated liver enzymes R74.8    Diabetic ketoacidosis without coma associated with type 1 diabetes mellitus (HCC) E10.10    Sepsis ([facility] A41.9    Neutrophilia D72.9    Abscess L02.91 Elevated platelet count U64.42    Seizure (HCC) R56.9    AMS (altered mental status) R41.82    Electrolyte imbalance E87.8    Weakness R53.1    DKA, type 1, not at goal [facility]) E10.10    Abscess of groin, left L02.214    History of seizures Z87.898    MRSA bacteremia R78.81, B95.62    Poorly controlled diabetes mellitus ([address] E11.65    Hyperkalemia E87.5    Fatigue R53.83    History of depression Z86.59    Infection requiring contact isolation precautions B99.9    Receiving intravenous antibiotic treatment as outpatient Z79.2    Anemia D64.9    Hypokalemia E87.6    Non-adherence to medical treatment Z91.19       Isolation/Infection:   Isolation            Contact          Patient Infection Status       Infection Onset Added Last Indicated Last Indicated By Review Planned Expiration Resolved Resolved By    MRSA 07/29/22 07/31/22 07/29/22 Culture, Blood 1        Resolved    COVID-19 (Rule Out) 07/29/22 07/29/22 07/29/22 COVID-19, Rapid (Ordered)   07/29/22 Rule-Out Test Resulted            Nurse Assessment:  Last Vital Signs: /84   Pulse 99   Temp 97.7 °F (36.5 °C) (Axillary)   Resp 16   Ht 4' 10\" (1.473 m)   Wt 108 lb 11 oz (49.3 kg)   SpO2 98%   BMI 22.72 kg/m²     Last documented pain score (0-10 scale): Pain Level: 0  Last Weight:   Wt Readings from Last 1 Encounters:   08/16/22 108 lb 11 oz (49.3 kg)     Mental Status:  oriented and alert    IV Access:  - None    Nursing Mobility/ADLs:  Walking   Independent  Transfer  Independent  Bathing  Independent  Dressing  Independent  Toileting  Independent  Feeding  Independent  Med [address]  Independent  Med Delivery   whole    Wound Care Documentation and Therapy:        Elimination:  Continence:    Bowel: Yes  Bladder: Yes  Urinary Catheter: None   Colostomy/Ileostomy/Ileal Conduit: No       Date of Last BM:     Intake/Output Summary (Last 24 hours) at 8/16/2022 1142  Last data filed at 8/16/2022 0034  Gross per 24 hour   Intake 1436.11 ml   Output 2450 ml Net -1013.89 ml     I/O last 3 completed shifts: In: 3557.8 [P.O.:1540; I.V.:820.1; IV Piggyback:1197.7]  Out: 2750 [Urine:2750]    Safety Concerns:     History of Seizures    Impairments/Disabilities:      None    Nutrition Therapy:  Current Nutrition Therapy:   - Oral Diet:  Carb Control 4 carbs/meal (1800kcals/day)    Routes of Feeding: Oral  Liquids: No Restrictions  Daily Fluid Restriction: no  Last Modified Barium Swallow with Video (Video Swallowing Test): not done    Treatments at the Time of Hospital Discharge:   Respiratory Treatments: none  Oxygen Therapy:  is not on home oxygen therapy. Ventilator:    - No ventilator support    Rehab Therapies: {THERAPEUTIC INTERVENTION:6540173838}  Weight Bearing Status/Restrictions: No weight bearing restrictions  Other Medical Equipment (for information only, NOT a DME order):  {EQUIPMENT:003784510}  Other Treatments:     Patient's personal belongings (please select all that are sent with patient):  [place] NEELY SIGNATURE:  Electronically signed by Radha lAanis RN on 8/16/22 at 2:12 PM EDT    CASE MANAGEMENT/SOCIAL WORK SECTION    Inpatient Status Date: 8/13/22    Readmission Risk Assessment Score:  Readmission Risk              Risk of Unplanned Readmission:  21         Discharging to Facility/ Agency   Name: [facility]  Address:  [address], [address] [place] [address]  Phone:  [phone]  Fax:  [phone]    HOME INFUSION PHARMACY:  Name:     [facility]  Address: [facility].  [address]  Phone:    [phone]  Fax:        [phone]      / signature: Electronically signed by ILSA Jones, PRICILLAW on 8/16/22 at 11:42 AM EDT    PHYSICIAN SECTION    Prognosis: {Prognosis:9347704811}    Condition at Discharge: [address] Anthony Patient Condition:[id number]}    Rehab Potential (if transferring to Rehab): {Prognosis:4695264959}    Recommended Labs or Other Treatments After Discharge: ***    Physician Certification: I certify the above information and transfer of Oral Major  is necessary for the continuing treatment of the diagnosis listed and that she requires {Admit to Appropriate Level of Care:44917} for {GREATER/LESS:234059483} 30 days.      Update Admission H&P: {CHP DME Changes in YLN:388482333}    PHYSICIAN SIGNATURE:  {Esignature:[id number]}

## 2022-08-16 NOTE — PLAN OF CARE
Reviewed plan for discharge with patient, her spouse is only  and can  after work. Reviewed oral antibiotics and decreased dose of zoloft while on zyvox which will be completed in 14 days. Lunch glucose was low we used glucose tabs and oral intake, MD wants 1500 recheck before she can go home. Will remove PICC once we have the next glucose level. New meds were sent to her preferred home pharmacy of [facility].

## 2022-08-16 NOTE — PROGRESS NOTES
Patient's  was unable to fill zyzox script d/t cost of $600 out of pocket. Found coupon on [facility] and co pay would be 61.30. Call to case management as [facility] indicated co-pay would be zero if pre auth obtained, spoke to our pharmacy and they will dispense tomorrow's dose for them to go home with tonight so we can work on pre auth. Patient and spouse were given instructions and the phone number for our retail pharmacy to follow up on the pre authorization. PICC removed and new dressing in place.

## 2022-08-16 NOTE — DISCHARGE SUMMARY
Hospital Medicine Discharge Summary    Patient ID: Kenya Odonnell      Patient's PCP: Scar Hogan MD    Admit Date: 8/13/2022     Discharge Date:   08/16/22      Admitting Provider: Regino Bosworth, DO     Discharge Provider: Jesus Rodriguez MD     Discharge Diagnoses: Active Hospital Problems    Diagnosis     History of depression [Z86.59]      Priority: Medium    Infection requiring contact isolation precautions [B99.9]      Priority: Medium    Receiving intravenous antibiotic treatment as outpatient [Z79.2]      Priority: Medium    Anemia [D64.9]      Priority: Medium    Hypokalemia [E87.6]      Priority: Medium    Non-adherence to medical treatment [Z91.19]      Priority: Medium    Fatigue [R53.83]      Priority: Medium    MRSA bacteremia [R78.81, B95.62]      Priority: Medium    AMS (altered mental status) [R41.82]      Priority: Medium    Seizure ([address] [R56.9]      Priority: Medium    Encounter for medication counseling [Z71.89]     Seizure disorder ([facility] [address] [G40.909]     Lactic acidosis [E87.2]     SIRS (systemic inflammatory response syndrome) ([facility] [address] [R65.10]        The patient was seen and examined on day of discharge and this discharge summary is in conjunction with any daily progress note from day of discharge. Hospital Course:   76-year-old female patient with a past medical history of type 1 diabetes mellitus, seizure disorder, MRSA bacteremia (continues to be on IV antibiotics) , presented to emergency after having a breakthrough seizure.       Seizure d/o  Medication noncompliance  - not taking Keppra consistently enough to manage seizures -- level undetectable in the ED  - neuro consulted:  cont home medications              - potentially add Vimpat if uncontrolled on home meds     Personality d/o vs mood d/o  - patient reportedly with bulimia per mother in law  - patient does not take medications as instructed and returns to hospital recurrently for care  - psychiatry consulted --we will continue follow-up as outpatient, patient is safe for discharge. DM:    - recently admitted with DKA triggered by bacteremia and inappropriate self dosing of insulin at home  Blood sugar better controlled  Will continue on insulin pump at home     MRSA bacteremia:  - diagnosed two weeks ago inpatient and discharged on IV daptomycin -- reportedly adherant to regimen  - repeat blood cultures ordered  - ID consulted -- will stop daptomycin on DC and switch to PO zyvox. Will also half  the dose of SSRI ( to 25 mg from 50). Worsening neuropathy. Increase home dose of gabapentin        Physical Exam Performed:     /82   Pulse 78   Temp 97.6 °F (36.4 °C) (Oral)   Resp 16   Ht 4' 10\" (1.473 m)   Wt 108 lb 11 oz (49.3 kg)   SpO2 98%   BMI 22.72 kg/m²       General appearance:  No apparent distress, appears stated age and cooperative. HEENT:  Normal cephalic, atraumatic without obvious deformity. Pupils equal, round, and reactive to light. Extra ocular muscles intact. Conjunctivae/corneas clear. Neck: Supple, with full range of motion. No jugular venous distention. Trachea midline. Respiratory:  Normal respiratory effort. Clear to auscultation, bilaterally without Rales/Wheezes/Rhonchi. Cardiovascular:  Regular rate and rhythm with normal S1/S2 without murmurs, rubs or gallops. Abdomen: Soft, non-tender, non-distended with normal bowel sounds. Musculoskeletal:  No clubbing, cyanosis or edema bilaterally. Full range of motion without deformity. Skin: Skin color, texture, turgor normal.  No rashes or lesions. Neurologic:  Neurovascularly intact without any focal sensory/motor deficits. Cranial nerves: II-XII intact, grossly non-focal.  Psychiatric:  Alert and oriented, thought content appropriate, normal insight  Capillary Refill: Brisk,< 3 seconds   Peripheral Pulses: +2 palpable, equal bilaterally       Labs:  For convenience and continuity at follow-up the following most recent labs are provided:      CBC:    Lab Results   Component Value Date/Time    WBC 5.6 08/16/2022 06:47 AM    HGB 8.1 08/16/2022 06:47 AM    HCT 25.0 08/16/2022 06:47 AM     08/16/2022 06:47 AM       Renal:    Lab Results   Component Value Date/Time     08/16/2022 06:47 AM    K 3.2 08/16/2022 06:47 AM    K 3.4 08/13/2022 05:20 PM     08/16/2022 06:47 AM    CO2 28 08/16/2022 06:47 AM    BUN 3 08/16/2022 06:47 AM    CREATININE <0.5 08/16/2022 06:47 AM    CALCIUM 8.5 08/16/2022 06:47 AM    PHOS 4.5 08/03/2022 05:15 AM         Significant Diagnostic Studies    Radiology:   XR CHEST PORTABLE   Final Result   Status post PICC line placement on the right in good position with no acute   abnormality seen. CT HEAD WO CONTRAST   Final Result   No acute abnormality. Consults:     IP CONSULT TO NEUROLOGY  IP CONSULT TO INFECTIOUS DISEASES  IP CONSULT TO SOCIAL WORK  IP CONSULT TO PSYCHIATRY    Disposition:  home      Condition at Discharge: Stable    Discharge Instructions/Follow-up:    - zyvox for 14 days   - reduce sertraline to 25 mg from 50 mg ( while on zyvox). Code Status:  Full Code     Activity: activity as tolerated    Diet: diabetic diet      Discharge Medications:     Current Discharge Medication List             Details   linezolid (ZYVOX) 600 MG tablet Take 1 tablet by mouth in the morning and 1 tablet before bedtime. Do all this for 14 days. Qty: 28 tablet, Refills: 0                Details   sertraline (ZOLOFT) 50 MG tablet Take 0.5 tablets by mouth in the morning.   Qty: 30 tablet, Refills: 2                Details   clonazePAM (KLONOPIN) 2 MG tablet Take 1 tablet by mouth twice daily  Qty: 60 tablet, Refills: 0    Associated Diagnoses: Seizure disorder (HCC)      Continuous Blood Gluc Transmit (DEXCOM G6 TRANSMITTER) MISC USE AS DIRECTED  Qty: 1 each, Refills: 0    Associated Diagnoses: Type 1 diabetes mellitus with complication (HCC)      gabapentin (NEURONTIN) 300 MG capsule TAKE 1 CAPSULE BY MOUTH THREE TIMES DAILY DRUNG THE DAY AND 4 CAPSULES BY MOUTH EVERY DAY AT BEDTIME  Qty: 210 capsule, Refills: 3    Associated Diagnoses: Diabetic peripheral neuropathy (HCC)      blood glucose test strips (TRUE METRIX BLOOD GLUCOSE TEST) strip Check blood sugar 4-6 times daily and as needed for symptoms of irregular glucose  Qty: 400 each, Refills: 3    Associated Diagnoses: Type 1 diabetes mellitus with complication (HCC)      Insulin Disposable Pump (OMNIPOD DASH PODS, GEN 4,) MISC Continuous pump - max dose 60 units  Qty: 10 each, Refills: 3    Associated Diagnoses: Type 1 diabetes mellitus with complication (HCC)      ondansetron (ZOFRAN) 4 MG tablet Take 1 tablet by mouth 3 times daily as needed for Nausea or Vomiting  Qty: 30 tablet, Refills: 1      ibuprofen (ADVIL;MOTRIN) 800 MG tablet Take 1 tablet by mouth 3 times daily as needed for Pain  Qty: 90 tablet, Refills: 1      Continuous Blood Gluc Sensor (DEXCOM G6 SENSOR) MISC Apply as directed for blood sugar monitoring  Qty: 10 each, Refills: 5    Associated Diagnoses: Type 1 diabetes mellitus with complication (HCC)      insulin aspart (NOVOLOG FLEXPEN) 100 UNIT/ML injection pen Inject 20 units with meals/snacks up to 5 times per day  Qty: 10 pen, Refills: 3    Associated Diagnoses: Type 1 diabetes mellitus with complication (HCC)      insulin aspart (NOVOLOG) 100 UNIT/ML injection vial Inject 100 units into the skin daily in conjunction with omnipod  Qty: 30 mL, Refills: 5      Blood Glucose Monitoring Suppl (TRUE METRIX METER) w/Device KIT Check blood sugar 4 times daily, tidac  Qty: 1 kit, Refills: 0    Associated Diagnoses: Type 1 diabetes mellitus with ketoacidosis without coma ([facility] [address] Type 1 diabetes mellitus with complication (HCC)      Lancets MISC Check blood sugar 4 times daily, tidac  Qty: 300 each, Refills: 1    Associated Diagnoses: Type 1 diabetes mellitus with complication ([address]  Type 1 diabetes mellitus with ketoacidosis without coma (HCC)      levETIRAcetam (KEPPRA) 500 MG tablet Take 2 tablets by mouth 2 times daily  Qty: 120 tablet, Refills: 5      insulin aspart (NOVOLOG) 100 UNIT/ML injection vial Inject 100 Units into the skin daily In conjunction with omnipod  Qty: 30 mL, Refills: 5    Associated Diagnoses: Type 1 diabetes mellitus with complication (HCC)      glucagon, rDNA, 1 MG injection Inject 1 mg into the muscle as needed for Low blood sugar (Blood glucose less than 70 mg/dL and patient NOT ALERT or NPO and does not have IV access.)  Qty: 10 each, Refills: 0      famotidine (PEPCID) 20 MG tablet Take 20 mg by mouth 2 times daily             Time Spent on discharge is more than 30 minutes in the examination, evaluation, counseling and review of medications and discharge plan. Signed:    Steve Carrillo MD   8/16/2022      Thank you Latisha Doran MD for the opportunity to be involved in this patient's care. If you have any questions or concerns, please feel free to contact me at [phone].

## 2022-08-16 NOTE — PROGRESS NOTES
Physical Therapy  Stephie Miller  [phone]  [id number]    PT orders received and chart reviewed; attempted to see for PT eval however pt is Ind and has no needs; will sign off from therapy  Electronically signed by TERESSA WESTFALL, PT on 8/16/2022 at 10:34 AM

## 2022-08-16 NOTE — PROGRESS NOTES
Occupational Therapy    OT order received and chart reviewed. Pt educated on role of OT services. Pt declined needs and denied concerns with ADLs/mobility. RN confirmed. Will sign off at this time.     Electronically signed by Jose Raya OT on 8/16/2022 at 10:33 AM

## 2022-08-16 NOTE — CARE COORDINATION
Discharge order acknowledged. Met with patient at bedside, spoke with  via telephone. Discussed discharge plan for today.  expressed concerns with patient discharging home today due to Amrik (home care nurse) said she (patient) needs more testing. \"  was unable to identify which testing they were wanting. Called to Maryan Pino with [employer]. Discussed discharge plan. Notified oral antibiotics upon discharge. Maryan Pino confirmed they will not be servicing patient upon discharge due to no skilled needs. Will notify patient and family. ILSA Ortiz, IDALMIS, Social Work/Case Management   [phone]  Electronically signed by ILSA Ortiz LSW on 8/16/2022 at 12:22 PM    Met with patient at bedside. Notified of the denial for home care due to no skilled needs. Discussed discharge plan with patient. Patient reports they plan to return home. Patient states the landlord of the home had an  come today to kill the flies in the drains. Discussed [place] Revised Code Section 5400.38 [facility] obligations. Patient reports they are aware. Provided copy of revised code. Patient reports her children are at her mother's house. Patient provided permission for me to call and speak to maternal grandmother Refugio Lubinop #[phone]). Spoke to patient's mother to verify patient's daughters' whereabouts. Mother confirmed she has the daughters in her home and will be caring for them for the school year while patient's health improves.      Electronically signed by ILSA Ortiz LSW on 8/16/2022 at 1:33 PM

## 2022-08-17 ENCOUNTER — TELEPHONE (OUTPATIENT)
Dept: INTERNAL MEDICINE CLINIC | Age: 32
End: 2022-08-17

## 2022-08-17 ENCOUNTER — TELEPHONE (OUTPATIENT)
Dept: PHARMACY | Age: 32
End: 2022-08-17

## 2022-08-17 LAB
BLOOD CULTURE, ROUTINE: NORMAL
CULTURE, BLOOD 2: NORMAL

## 2022-08-17 NOTE — TELEPHONE ENCOUNTER
New Diabetes referral from the hospitalist 8/2. Recommended by the Diabetes Educator. Reached out to schedule initial appointment at that time and did not hear back. She was then re-admitted 8/13-8/16. Left message again today please return my call to scheduled an appointment.      Nani Orlando, PharmD  [address] [place] [address]  Diabetes Service  [phone]

## 2022-08-17 NOTE — PROGRESS NOTES
Physician Progress Note      PATIENTWinifred Scales  [facility] #:                  [id number]  :                       1990  ADMIT DATE:       2022 4:47 PM  [address] [place] DATE:        2022 6:52 PM  RESPONDING  PROVIDER #:        Scooter Becerra MD          QUERY TEXT:    Patient admitted with Seizure and lactic acidosis . Documentation reflects   Sepsis and SIRS in ED Provider note(s) dated 22. If possible, please   document in the progress notes and discharge summary if Sepsis and/or SIRS   was: The medical record reflects the following:  Risk Factors: Seizure non compliance with medication being treated with IV   Antibiotics for recent bacteremia  Clinical Indicators: HR > 90, lactic acid 5.8, 2.9  Treatment: In ED 3L [place]  Options provided:  -- Sepsis confirmed after study, SIRS ruled out  -- Sepsis ruled out after study, and SIRS confirmed after study  -- SIRS confirmed after study, Sepsis ruled out  -- Sepsis and SIRS ruled out after study  -- Other - I will add my own diagnosis  -- Disagree - Not applicable / Not valid  -- Disagree - Clinically unable to determine / Unknown  -- Refer to Clinical Documentation Reviewer    PROVIDER RESPONSE TEXT:    Sepsis confirmed after study, SIRS ruled out.     Query created by: Emerald Rowley on 8/15/2022 7:46 AM      Electronically signed by:  Scooter Becerra MD 2022 8:07 PM

## 2022-08-17 NOTE — PROGRESS NOTES
Pt was discharged after retail closed. Rx was sent to [facility] for Linezolid. It required a prior authorization Inpatient sent patient home with 3 tablets. We were able to get prior authorization.  Called Patient to  they will come after 6pm 8/17/22 Pt was given instructions to go to Information desk and Inpatient has rx (notified in patient about situation as well

## 2022-08-17 NOTE — TELEPHONE ENCOUNTER
[address] [facility] Trans[facility] Initial Follow Up Call    Outreach made within 2 business days of discharge: Yes    Patient: Maciej Marítnez Patient : 1990   MRN: [id number]  Reason for Admission: There are no discharge diagnoses documented for the most recent discharge. Discharge Date: 22       Spoke with: Marifer Blackwell    Discharge department/facility: [facility]    TCM Interactive Patient Contact:  Was patient able to fill all prescriptions: Yes, the patient was sent home with samples. The patient's  will  her prescription today. Was patient instructed to bring all medications to the follow-up visit: Yes    Is patient taking all medications as directed in the discharge summary?  Yes  Does patient understand their discharge instructions: Yes  Does patient have questions or concerns that need addressed prior to 7-14 day follow up office visit: No    Scheduled appointment with PCP within 7-14 days    Follow Up  Future Appointments   Date Time Provider Norma Katz   2022  1:20 PM Jewel Berry MD [address], [address] [place]

## 2022-08-19 ENCOUNTER — APPOINTMENT (OUTPATIENT)
Dept: CT IMAGING | Age: 32
End: 2022-08-19
Payer: COMMERCIAL

## 2022-08-19 ENCOUNTER — HOSPITAL ENCOUNTER (EMERGENCY)
Age: 32
Discharge: HOME | End: 2022-08-19
Attending: STUDENT IN AN ORGANIZED HEALTH CARE EDUCATION/TRAINING PROGRAM
Payer: COMMERCIAL

## 2022-08-19 VITALS
DIASTOLIC BLOOD PRESSURE: 76 MMHG | HEIGHT: 58 IN | WEIGHT: 108 LBS | BODY MASS INDEX: 22.67 KG/M2 | SYSTOLIC BLOOD PRESSURE: 110 MMHG | HEART RATE: 101 BPM | OXYGEN SATURATION: 97 % | TEMPERATURE: 98.7 F | RESPIRATION RATE: 13 BRPM

## 2022-08-19 DIAGNOSIS — Z79.899 SEIZURE SECONDARY TO SUBTHERAPEUTIC ANTICONVULSANT MEDICATION (HCC): Primary | ICD-10-CM

## 2022-08-19 DIAGNOSIS — R56.9 SEIZURE SECONDARY TO SUBTHERAPEUTIC ANTICONVULSANT MEDICATION (HCC): Primary | ICD-10-CM

## 2022-08-19 LAB
A/G RATIO: 1 (ref 1.1–2.2)
ALBUMIN SERPL-MCNC: 4 G/DL (ref 3.4–5)
ALP BLD-CCNC: 98 U/L (ref 40–129)
ALT SERPL-CCNC: 14 U/L (ref 10–40)
ANION GAP SERPL CALCULATED.3IONS-SCNC: 27 MMOL/L (ref 3–16)
AST SERPL-CCNC: 21 U/L (ref 15–37)
BASE EXCESS VENOUS: -1 MMOL/L (ref -3–3)
BASOPHILS ABSOLUTE: 0.1 K/UL (ref 0–0.2)
BASOPHILS RELATIVE PERCENT: 1.2 %
BILIRUB SERPL-MCNC: <0.2 MG/DL (ref 0–1)
BUN BLDV-MCNC: 10 MG/DL (ref 7–20)
CALCIUM SERPL-MCNC: 9.3 MG/DL (ref 8.3–10.6)
CARBOXYHEMOGLOBIN: 4 % (ref 0–1.5)
CHLORIDE BLD-SCNC: 97 MMOL/L (ref 99–110)
CO2: 12 MMOL/L (ref 21–32)
CREAT SERPL-MCNC: 0.6 MG/DL (ref 0.6–1.1)
EOSINOPHILS ABSOLUTE: 0.2 K/UL (ref 0–0.6)
EOSINOPHILS RELATIVE PERCENT: 1.8 %
GFR AFRICAN AMERICAN: >60
GFR NON-AFRICAN AMERICAN: >60
GLUCOSE BLD-MCNC: 297 MG/DL (ref 70–99)
HCO3 VENOUS: 23.1 MMOL/L (ref 23–29)
HCT VFR BLD CALC: 31.6 % (ref 36–48)
HEMOGLOBIN: 9.4 G/DL (ref 12–16)
KEPPRA DOSE AMT: ABNORMAL
KEPPRA: <2 UG/ML (ref 6–46)
LYMPHOCYTES ABSOLUTE: 3.9 K/UL (ref 1–5.1)
LYMPHOCYTES RELATIVE PERCENT: 31 %
MCH RBC QN AUTO: 23.9 PG (ref 26–34)
MCHC RBC AUTO-ENTMCNC: 29.7 G/DL (ref 31–36)
MCV RBC AUTO: 80.4 FL (ref 80–100)
METHEMOGLOBIN VENOUS: 0.3 %
MONOCYTES ABSOLUTE: 1 K/UL (ref 0–1.3)
MONOCYTES RELATIVE PERCENT: 8 %
NEUTROPHILS ABSOLUTE: 7.2 K/UL (ref 1.7–7.7)
NEUTROPHILS RELATIVE PERCENT: 58 %
O2 CONTENT, VEN: 12 VOL %
O2 SAT, VEN: >100 %
O2 THERAPY: ABNORMAL
PCO2, VEN: 35 MMHG (ref 40–50)
PDW BLD-RTO: 15.5 % (ref 12.4–15.4)
PH VENOUS: 7.43 (ref 7.35–7.45)
PLATELET # BLD: 300 K/UL (ref 135–450)
PMV BLD AUTO: 8.3 FL (ref 5–10.5)
PO2, VEN: 144 MMHG (ref 25–40)
POTASSIUM SERPL-SCNC: 3.8 MMOL/L (ref 3.5–5.1)
RBC # BLD: 3.93 M/UL (ref 4–5.2)
SODIUM BLD-SCNC: 136 MMOL/L (ref 136–145)
TCO2 CALC VENOUS: 54 MMOL/L
TOTAL PROTEIN: 7.9 G/DL (ref 6.4–8.2)
WBC # BLD: 12.4 K/UL (ref 4–11)

## 2022-08-19 PROCEDURE — 93005 ELECTROCARDIOGRAM TRACING: CPT | Performed by: EMERGENCY MEDICINE

## 2022-08-19 PROCEDURE — 80053 COMPREHEN METABOLIC PANEL: CPT

## 2022-08-19 PROCEDURE — 70450 CT HEAD/BRAIN W/O DYE: CPT

## 2022-08-19 PROCEDURE — 6360000002 HC RX W HCPCS: Performed by: STUDENT IN AN ORGANIZED HEALTH CARE EDUCATION/TRAINING PROGRAM

## 2022-08-19 PROCEDURE — 96374 THER/PROPH/DIAG INJ IV PUSH: CPT

## 2022-08-19 PROCEDURE — 85025 COMPLETE CBC W/AUTO DIFF WBC: CPT

## 2022-08-19 PROCEDURE — 82803 BLOOD GASES ANY COMBINATION: CPT

## 2022-08-19 PROCEDURE — 36415 COLL VENOUS BLD VENIPUNCTURE: CPT

## 2022-08-19 PROCEDURE — 80177 DRUG SCRN QUAN LEVETIRACETAM: CPT

## 2022-08-19 PROCEDURE — 99284 EMERGENCY DEPT VISIT MOD MDM: CPT

## 2022-08-19 RX ORDER — LEVETIRACETAM 10 MG/ML
1000 INJECTION INTRAVASCULAR ONCE
Status: COMPLETED | OUTPATIENT
Start: 2022-08-19 | End: 2022-08-19

## 2022-08-19 RX ORDER — LEVETIRACETAM 500 MG/5ML
1000 INJECTION, SOLUTION, CONCENTRATE INTRAVENOUS ONCE
Status: DISCONTINUED | OUTPATIENT
Start: 2022-08-19 | End: 2022-08-19 | Stop reason: SDUPTHER

## 2022-08-19 RX ADMIN — LEVETIRACETAM 1000 MG: 10 INJECTION INTRAVENOUS at 18:59

## 2022-08-19 NOTE — ED PROVIDER NOTES
4 times daily, tidac    BLOOD GLUCOSE TEST STRIPS (TRUE METRIX BLOOD GLUCOSE TEST) STRIP    Check blood sugar 4-6 times daily and as needed for symptoms of irregular glucose    CLONAZEPAM (KLONOPIN) 2 MG TABLET    Take 1 tablet by mouth twice daily    CONTINUOUS BLOOD GLUC SENSOR (DEXCOM G6 SENSOR) MISC    Apply as directed for blood sugar monitoring    CONTINUOUS BLOOD GLUC TRANSMIT (DEXCOM G6 TRANSMITTER) MISC    USE AS DIRECTED    FAMOTIDINE (PEPCID) 20 MG TABLET    Take 20 mg by mouth 2 times daily    GABAPENTIN (NEURONTIN) 300 MG CAPSULE    TAKE 1 CAPSULE BY MOUTH THREE TIMES DAILY DRUNG THE DAY AND 4 CAPSULES BY MOUTH EVERY DAY AT BEDTIME    GLUCAGON, RDNA, 1 MG INJECTION    Inject 1 mg into the muscle as needed for Low blood sugar (Blood glucose less than 70 mg/dL and patient NOT ALERT or NPO and does not have IV access.)    IBUPROFEN (ADVIL;MOTRIN) 800 MG TABLET    Take 1 tablet by mouth 3 times daily as needed for Pain    INSULIN ASPART (NOVOLOG FLEXPEN) 100 UNIT/ML INJECTION PEN    Inject 20 units with meals/snacks up to 5 times per day    INSULIN ASPART (NOVOLOG) 100 UNIT/ML INJECTION VIAL    Inject 100 Units into the skin daily In conjunction with omnipod    INSULIN ASPART (NOVOLOG) 100 UNIT/ML INJECTION VIAL    Inject 100 units into the skin daily in conjunction with omnipod    INSULIN DISPOSABLE PUMP (OMNIPOD DASH PODS, GEN 4,) MISC    Continuous pump - max dose 60 units    LANCETS MISC    Check blood sugar 4 times daily, tidac    LEVETIRACETAM (KEPPRA) 500 MG TABLET    Take 2 tablets by mouth 2 times daily    LINEZOLID (ZYVOX) 600 MG TABLET    Take 1 tablet by mouth in the morning and 1 tablet before bedtime. Do all this for 14 days. ONDANSETRON (ZOFRAN) 4 MG TABLET    Take 1 tablet by mouth 3 times daily as needed for Nausea or Vomiting    SERTRALINE (ZOLOFT) 50 MG TABLET    Take 0.5 tablets by mouth in the morning. ALLERGIES     Patient has no known allergies.     FAMILY HISTORY       Family History   Problem Relation Age of Onset    Asthma Mother     Allergies Mother         sun allergy    Diabetes Type 1  Father     Diabetes Type 1  Maternal Grandfather     Diabetes Type 1  Cousin     Diabetes Type 1  Cousin     Diabetes Type 1  Maternal Uncle     Diabetes Type 1  Maternal Uncle     Diabetes Type 1  Maternal Aunt           SOCIAL HISTORY       Social History     Socioeconomic History    Marital status:    Tobacco Use    Smoking status: Never    Smokeless tobacco: Never   Vaping Use    Vaping Use: Never used   Substance and Sexual Activity    Alcohol use: No    Drug use: No    Sexual activity: Yes     Partners: Male     Social Determinants of Health     Financial Resource Strain: Low Risk     Difficulty of Paying Living Expenses: Not hard at all   Food Insecurity: No Food Insecurity    Worried About Running Out of Food in the Last Year: Never true    Ran Out of Food in the Last Year: Never true       SCREENINGS        Grace Coma Scale  Eye Opening: Spontaneous  Best Verbal Response: Confused  Best Motor Response: Localizes pain  [place] Coma Scale Score: 13               PHYSICAL EXAM    (up to 7 for level 4, 8 or more for level 5)     ED Triage Vitals   BP Temp Temp src Pulse Resp SpO2 Height Weight   -- -- -- -- -- -- -- --       Physical Exam  Constitutional:       Appearance: Normal appearance. HENT:      Head: Normocephalic. Comments: Half centimeter laceration to the left lateral surface of the tongue, not through and through. Bleeding is controlled. Eyes:      Conjunctiva/sclera: Conjunctivae normal.      Comments: Pupils 4-3 bilaterally. Extraocular movements are intact. Cardiovascular:      Rate and Rhythm: Normal rate and regular rhythm. Pulses: Normal pulses. Heart sounds: Normal heart sounds. Pulmonary:      Effort: Pulmonary effort is normal.      Breath sounds: Normal breath sounds. Abdominal:      General: Abdomen is flat. There is no distension. Palpations: Abdomen is soft. There is no mass. Tenderness: no abdominal tenderness   Musculoskeletal:      Cervical back: Normal range of motion. No rigidity. Skin:     General: Skin is warm and dry. Capillary Refill: Capillary refill takes less than 2 seconds. Neurological:      Mental Status: She is alert. Comments: Patient oriented x4. Moves all 4 extremities appropriately to command. Brudzinski sign negative. Psychiatric:         Mood and Affect: Mood normal.         Behavior: Behavior normal.       DIAGNOSTIC RESULTS     EKG: All EKG's are interpreted by the Emergency Department Physician who either signs or Co-signs this chart in the absence of a cardiologist.    The Ekg interpreted by me in the absence of a cardiologist shows. Sinus tachycardia with a ventricular rate of 115. Axis normal.  QTc appropriate. No specific ST or T wave abnormality. Compared to prior 7-, ventricular rate has slowed. RADIOLOGY:   Non-plain film images such as CT, Ultrasound and MRI are read by the radiologist. Plain radiographic images are visualized and preliminarily interpreted by the emergency physician with the below findings:        Interpretation per the Radiologist below, if available at the time of this note:    CT Head W/O Contrast   Final Result   No acute intracranial abnormality.                LABS:  Labs Reviewed   COMPREHENSIVE METABOLIC PANEL - Abnormal; Notable for the following components:       Result Value    Chloride 97 (*)     CO2 12 (*)     Anion Gap 27 (*)     Glucose 297 (*)     Albumin/Globulin Ratio 1.0 (*)     All other components within normal limits    Narrative:     CALL  [place]  ER tel. [phone],  Chemistry results called to and read back by RNInez, 08/19/2022  17:57, by [facility]   CBC WITH AUTO DIFFERENTIAL - Abnormal; Notable for the following components:    WBC 12.4 (*)     RBC 3.93 (*)     Hemoglobin 9.4 (*)     Hematocrit 31.6 (*)     MCH 23.9 (*)     MCHC 29.7 (*)     RDW 15.5 (*)     All other components within normal limits   BLOOD GAS, VENOUS - Abnormal; Notable for the following components:    pCO2, Drew 35.0 (*)     pO2, Drew 144.0 (*)     Carboxyhemoglobin 4.0 (*)     All other components within normal limits   LEVETIRACETAM LEVEL     Leukocytosis likely reactive. Anemia at baseline today. EMERGENCY DEPARTMENT COURSE and DIFFERENTIAL DIAGNOSIS/MDM:   Vitals:    Vitals:    08/19/22 1827 08/19/22 1830 08/19/22 1900 08/19/22 2034   BP: 108/77 109/75 110/76    Pulse: (!) 101 (!) 105 (!) 104 (!) 101   Resp: 14 14 13    Temp:       TempSrc:       SpO2: 98% 99% 97%    Weight:       Height:         Medications   levETIRAcetam (KEPPRA) 1000 mg/100 mL IVPB (1,000 mg IntraVENous New Bag 8/19/22 1859)       Course and MDM:  Patient presents for evaluation of witnessed seizure activity with tongue biting. She arrives mildly tachycardic with stable blood pressure. She does appear postictal.  I do not note any focal neurologic deficit. Decision was made to proceed with Keppra load for high suspicion for seizure secondary to subtherapeutic Keppra. She has had multiple Keppra levels drawn in the emergency room and all levels from last 5 years have been subtherapeutic. She tells me she has not taken Keppra in a month. Head CT does not show any sign of mass occupying lesion or bleed. No signs of clinically significant electrolyte derangement or PRANEETH. Anion gap and low CO2 likely secondary to recent seizure. She denies any acute infectious symptoms. She was monitored in the emergency room for several hours until she was back to baseline, ambulatory and conversant. VBG performed 3 hours after arrival showing no signs of DKA. I confirmed that she has adequate supply of Keppra at home.   She does not drive or perform any potentially dangerous activity until she has been seen and cleared by her neurologist.  PROCEDURES:  Unless otherwise noted below, none Procedures        FINAL IMPRESSION      1. Seizure secondary to subtherapeutic anticonvulsant medication [facility])          DISPOSITION/PLAN   DISPOSITION Decision To Discharge 08/19/2022 08:34:27 PM      PATIENT REFERRED TO:  Sanam Hernandez MD  [place] [id number] [facility]  [phone]    In 1 week      DISCHARGE MEDICATIONS:      New Prescriptions    No medications on file       Controlled Substances Monitoring:    If the patient was prescribed a controlled substance today, the PDMP was reviewed as documented below. RX Monitoring 2/17/2021   Attestation -   Periodic Controlled Substance Monitoring No signs of potential drug abuse or diversion identified.        (Please note that portions of this note were completed with a voice recognition program.  Efforts were made to edit the dictations but occasionally words are mis-transcribed.)    Daina Garcia MD (electronically signed)  Attending Emergency Physician           Jesse Emanuel MD  08/19/22 5927       Jesse Emanuel MD  08/19/22 4104

## 2022-08-19 NOTE — ED NOTES
Pt. Melissa Angela in [facility] [address] and seizure precautions in place, report of pt. Being on Keppra but is non-compliant per report. Pt. Alert but confused, small amount of blood on side of pt's mouth. ST @ 107 on monitor.      Alfredo Kwon RN  08/19/22 [id number]

## 2022-08-20 NOTE — DISCHARGE INSTRUCTIONS
Do not drive or perform any potentially dangerous activity until you have been cleared to do so by your neurologist.

## 2022-08-20 NOTE — ED NOTES
Discharge and education instructions reviewed. Patient verbalized understanding, teach-back successful. Patient denied questions at this time. No acute distress noted. Patient instructed to follow-up as noted - return to emergency department if symptoms worsen. Patient verbalized understanding. Discharged per EDMD with discharged instructions.      Ming Casillas RN  08/19/22 4058

## 2022-08-21 LAB
EKG ATRIAL RATE: 115 BPM
EKG DIAGNOSIS: NORMAL
EKG P AXIS: 42 DEGREES
EKG P-R INTERVAL: 126 MS
EKG Q-T INTERVAL: 334 MS
EKG QRS DURATION: 70 MS
EKG QTC CALCULATION (BAZETT): 462 MS
EKG R AXIS: 61 DEGREES
EKG T AXIS: 28 DEGREES
EKG VENTRICULAR RATE: 115 BPM

## 2022-08-21 PROCEDURE — 93010 ELECTROCARDIOGRAM REPORT: CPT | Performed by: INTERNAL MEDICINE

## 2022-08-24 DIAGNOSIS — E10.8 TYPE 1 DIABETES MELLITUS WITH COMPLICATION (HCC): ICD-10-CM

## 2022-08-24 NOTE — TELEPHONE ENCOUNTER
Patient's  called in requesting refill for the following:      Continuous Blood Gluc Transmit (DEXCOM G6 TRANSMITTER) MISC    Continuous Blood Gluc Sensor (DEXCOM G6 SENSOR) MISC      Insulin Disposable Pump (OMNIPOD DASH PODS, GEN 4,) MISC      blood glucose test strips (TRUE METRIX BLOOD GLUCOSE TEST) strip    [facility] [address] [place] [address], [address] [place], [place] [address]se[facility] [phone]    Pls advise.

## 2022-08-25 RX ORDER — INSULIN PUMP CONTROLLER
EACH MISCELLANEOUS
Qty: 10 EACH | Refills: 3 | Status: SHIPPED | OUTPATIENT
Start: 2022-08-25 | End: 2022-09-05 | Stop reason: SDUPTHER

## 2022-08-25 RX ORDER — BLOOD-GLUCOSE TRANSMITTER
EACH MISCELLANEOUS
Qty: 1 EACH | Refills: 0 | Status: SHIPPED | OUTPATIENT
Start: 2022-08-25 | End: 2022-09-06

## 2022-08-25 RX ORDER — CALCIUM CITRATE/VITAMIN D3 200MG-6.25
TABLET ORAL
Qty: 400 EACH | Refills: 3 | Status: SHIPPED | OUTPATIENT
Start: 2022-08-25 | End: 2022-10-11 | Stop reason: SDUPTHER

## 2022-08-25 RX ORDER — BLOOD-GLUCOSE SENSOR
EACH MISCELLANEOUS
Qty: 10 EACH | Refills: 5 | Status: SHIPPED | OUTPATIENT
Start: 2022-08-25 | End: 2022-09-05 | Stop reason: SDUPTHER

## 2022-08-31 ENCOUNTER — TELEPHONE (OUTPATIENT)
Dept: PHARMACY | Age: 32
End: 2022-08-31

## 2022-08-31 NOTE — TELEPHONE ENCOUNTER
New Diabetes referral from the hospitalist 8/2. Recommended by the Diabetes Educator. Reached out to schedule initial appointment at that time and did not hear back. She was then re-admitted 8/13-8/16. No answer - mailbox full     Left message on spouse's number.     Genaro Heath, PharmD  [facility]  Diabetes Service  [phone]

## 2022-09-01 NOTE — DISCHARGE SUMMARY
Hospital Medicine Discharge Summary    Patient ID: Wendi Teran      Patient's PCP: Latisha Doran MD    Admit Date: 7/29/2022     Discharge Date: 8/3/2022      Admitting Provider: Iona Schirmer, DO     Discharge Provider: Alicia Garcia MD     Discharge Diagnoses:  Diabetic ketoacidosis  MRSA bacteremia  Inguinal area abscess status post I&D  Weakness  Diabetic ketoacidosis  Seizure disorder  Hypokalemia  Hypomagnesemia  Hypophosphatemia  Hypocalcemia       Active Hospital Problems    Diagnosis     MRSA bacteremia [R78.81, B95.62]      Priority: Medium    Poorly controlled diabetes mellitus ([address] [E11.65]      Priority: Medium    Hyperkalemia [E87.5]      Priority: Medium    Abscess of groin, left [L02.214]      Priority: Medium    History of seizures [Z87.898]      Priority: Medium    Electrolyte imbalance [E87.8]      Priority: Medium    Weakness [R53.1]      Priority: Medium    DKA, type 1, not at goal [facility]) [E10.10]      Priority: Medium    Elevated platelet count [M13.37]      Priority: Medium    Seizure ([address] [R56.9]      Priority: Medium    AMS (altered mental status) [R41.82]      Priority: Medium    Diabetic ketoacidosis without coma associated with type 1 diabetes mellitus ([address] [E10.10]      Priority: Medium    Sepsis ([address] [A41.9]      Priority: Medium    Neutrophilia [D72.9]      Priority: Medium    Abscess [L02.91]      Priority: Medium    Lactic acidosis [E87.2]        The patient was seen and examined on day of discharge and this discharge summary is in conjunction with any daily progress note from day of discharge. Hospital Course: 65-year-old female admitted to the hospital for DKA. Patient suffered a seizure at home. She was treated and admitted to the ICU. She received insulin therapy. She was also treated with broad-spectrum antibiotics. Found to have a left groin abscess. This required I&D by surgery. Patient was transitioned to IV daptomycin.   She will receive this through a PICC line. Was treated with Keppra for seizures. Sent home with home health care      Physical Exam Performed:     BP (!) 119/99   Pulse 95   Temp 97.8 °F (36.6 °C) (Oral)   Resp 19   Ht 4' 10\" (1.473 m)   Wt 104 lb 8 oz (47.4 kg)   SpO2 98%   BMI 21.84 kg/m²       General appearance: No apparent distress, appears stated age and cooperative. HEENT: Pupils equal, round, and reactive to light. Conjunctivae/corneas clear. Neck: Supple, with full range of motion. No jugular venous distention. Trachea midline. Respiratory:  Normal respiratory effort. Clear to auscultation, bilaterally without Rales/Wheezes/Rhonchi. Cardiovascular: Regular rate and rhythm with normal S1/S2 without murmurs, rubs or gallops. Abdomen: Soft, non-tender, non-distended with normal bowel sounds. Musculoskeletal: No clubbing, cyanosis or edema bilaterally. Skin: Skin color, texture, turgor normal.  No rashes or lesions. Neurologic: CN's 2-12 intact, moving all extremities well  Psychiatric: Alert and oriented, thought content appropriate, normal insight  Capillary Refill: Brisk,< 3 seconds   Peripheral Pulses: +2 palpable, equal bilaterally        Labs: For convenience and continuity at follow-up the following most recent labs are provided:      CBC:    Lab Results   Component Value Date/Time    WBC 12.4 08/19/2022 05:15 PM    HGB 9.4 08/19/2022 05:15 PM    HCT 31.6 08/19/2022 05:15 PM     08/19/2022 05:15 PM       Renal:    Lab Results   Component Value Date/Time     08/19/2022 05:15 PM    K 3.8 08/19/2022 05:15 PM    K 3.4 08/13/2022 05:20 PM    CL 97 08/19/2022 05:15 PM    CO2 12 08/19/2022 05:15 PM    BUN 10 08/19/2022 05:15 PM    CREATININE 0.6 08/19/2022 05:15 PM    CALCIUM 9.3 08/19/2022 05:15 PM    PHOS 4.5 08/03/2022 05:15 AM         Significant Diagnostic Studies    Radiology:   XR CHEST PORTABLE   Final Result   Interval placement a right-sided PICC line with the tip overlying the   cavoatrial junction. No radiographic evidence of acute pulmonary abnormality   seen. MRI LUMBAR SPINE WO CONTRAST   Final Result   1. No acute abnormality. 2. No significant spinal canal stenosis. MRI THORACIC SPINE WO CONTRAST   Final Result   No acute abnormality. No significant degenerative change. MRI CERVICAL SPINE WO CONTRAST   Final Result   1. No acute abnormality   2. No significant spinal canal stenosis. 3. T1 hypointense, stir hyperintense lesion the C2 vertebral body, possibly   an atypical hemangioma. Post-contrast imaging may be useful for further   evaluation. MRI BRAIN WO CONTRAST   Final Result   Unremarkable MRI of the brain without contrast.         CTA HEAD NECK W CONTRAST   Final Result   1. No acute intracranial abnormality. 2. Unremarkable CTA of the head and neck. CT HEAD WO CONTRAST   Final Result   1. No acute intracranial abnormality. 2. Unremarkable CTA of the head and neck. CT CHEST ABDOMEN PELVIS WO CONTRAST   Final Result   1. No evidence of acute cardiopulmonary disease   2. Marked hepatomegaly, with a sagittal length of 24.8 cm   3. Stoddard catheter within the urinary bladder   4. Skin laceration, left groin region   5. Nonobstructive bowel gas pattern         XR CHEST PORTABLE   Final Result   Right IJ catheter identified in satisfactory position. Wes Amend No pneumothorax. XR CHEST PORTABLE   Final Result   No acute process. CT HEAD WO CONTRAST   Final Result   No acute intracranial abnormality. CT CERVICAL SPINE WO CONTRAST   Final Result   No acute abnormality of the cervical spine.                 Consults:     IP CONSULT TO HOSPITALIST  IP CONSULT TO GENERAL SURGERY  IP CONSULT TO NEPHROLOGY  IP CONSULT TO NEUROLOGY  IP CONSULT TO DIABETES EDUCATOR  IP CONSULT TO PSYCHIATRY  IP CONSULT TO INFECTIOUS DISEASES  IP CONSULT TO HOME CARE NEEDS    Disposition:  home with [facility]     Condition at Discharge: Stable    Discharge Instructions/Follow-up:  ID in 1-2 weeks    Code Status:  full    Activity: activity as tolerated    Diet: diabetic diet      Discharge Medications:     Discharge Medication List as of 8/3/2022  5:21 PM             Details   gabapentin (NEURONTIN) 300 MG capsule TAKE 1 CAPSULE BY MOUTH THREE TIMES DAILY DRUNG THE DAY AND 4 CAPSULES BY MOUTH EVERY DAY AT BEDTIME, Disp-210 capsule, R-3Normal      Continuous Blood Gluc Transmit (DEXCOM G6 TRANSMITTER) MISC USE AS DIRECTED, Disp-1 each, R-0Normal      blood glucose test strips (TRUE METRIX BLOOD GLUCOSE TEST) strip Check blood sugar 4-6 times daily and as needed for symptoms of irregular glucose, Disp-400 each, R-3Normal      Insulin Disposable Pump (OMNIPOD DASH PODS, GEN 4,) MISC Continuous pump - max dose 60 units, Disp-10 each, R-3Normal      ondansetron (ZOFRAN) 4 MG tablet Take 1 tablet by mouth 3 times daily as needed for Nausea or Vomiting, Disp-30 tablet, R-1Normal      ibuprofen (ADVIL;MOTRIN) 800 MG tablet Take 1 tablet by mouth 3 times daily as needed for Pain, Disp-90 tablet, R-1Normal      insulin aspart (NOVOLOG FLEXPEN) 100 UNIT/ML injection pen Inject 20 units with meals/snacks up to 5 times per day, Disp-10 pen, R-3Normal      insulin aspart (NOVOLOG) 100 UNIT/ML injection vial Inject 100 units into the skin daily in conjunction with omnipod, Disp-30 mL, R-5Normal      Continuous Blood Gluc Sensor (DEXCOM G6 SENSOR) MISC Apply as directed for blood sugar monitoring, Disp-10 each, R-5Normal      sertraline (ZOLOFT) 50 MG tablet Take 1 tablet by mouth daily, Disp-30 tablet, R-2Normal      Blood Glucose Monitoring Suppl (TRUE METRIX METER) w/Device KIT Check blood sugar 4 times daily, tidac, Disp-1 kit, R-0Normal      Lancets MISC Disp-300 each, R-1, NormalCheck blood sugar 4 times daily, tidac      levETIRAcetam (KEPPRA) 500 MG tablet Take 2 tablets by mouth 2 times daily, Disp-120 tablet, R-5Normal      clonazePAM (KLONOPIN) 2 MG tablet Take 1 tablet by mouth 2 times daily. , Disp-60 tablet, R-2Normal      insulin aspart (NOVOLOG) 100 UNIT/ML injection vial Inject 100 Units into the skin daily In conjunction with omnipod, Disp-30 mL, R-5Normal      glucagon, rDNA, 1 MG injection Inject 1 mg into the muscle as needed for Low blood sugar (Blood glucose less than 70 mg/dL and patient NOT ALERT or NPO and does not have IV access.), Disp-10 each, R-0Normal      famotidine (PEPCID) 20 MG tablet Take 20 mg by mouth 2 times dailyHistorical Med             Time Spent on discharge is more than 45 minutes in the examination, evaluation, counseling and review of medications and discharge plan. Signed:    Guru Santana MD   9/1/2022      Thank you Madyson Aceves MD for the opportunity to be involved in this patient's care. If you have any questions or concerns, please feel free to contact me at [phone].

## 2022-09-04 DIAGNOSIS — E10.8 TYPE 1 DIABETES MELLITUS WITH COMPLICATION (HCC): ICD-10-CM

## 2022-09-05 DIAGNOSIS — E11.42 DIABETIC PERIPHERAL NEUROPATHY (HCC): ICD-10-CM

## 2022-09-05 DIAGNOSIS — E10.8 TYPE 1 DIABETES MELLITUS WITH COMPLICATION (HCC): ICD-10-CM

## 2022-09-05 DIAGNOSIS — G40.909 SEIZURE DISORDER (HCC): ICD-10-CM

## 2022-09-06 RX ORDER — BLOOD-GLUCOSE SENSOR
EACH MISCELLANEOUS
Qty: 10 EACH | Refills: 5 | Status: SHIPPED | OUTPATIENT
Start: 2022-09-06 | End: 2022-11-09 | Stop reason: SDUPTHER

## 2022-09-06 RX ORDER — INSULIN PUMP CONTROLLER
EACH MISCELLANEOUS
Qty: 10 EACH | Refills: 3 | OUTPATIENT
Start: 2022-09-06

## 2022-09-06 RX ORDER — INSULIN ASPART 100 [IU]/ML
INJECTION, SOLUTION INTRAVENOUS; SUBCUTANEOUS
OUTPATIENT
Start: 2022-09-06

## 2022-09-06 RX ORDER — BLOOD-GLUCOSE TRANSMITTER
EACH MISCELLANEOUS
Qty: 1 EACH | Refills: 0 | Status: SHIPPED | OUTPATIENT
Start: 2022-09-06 | End: 2022-10-11

## 2022-09-06 RX ORDER — IBUPROFEN 800 MG/1
800 TABLET ORAL 3 TIMES DAILY PRN
Qty: 90 TABLET | Refills: 1 | OUTPATIENT
Start: 2022-09-06

## 2022-09-06 RX ORDER — LEVETIRACETAM 500 MG/1
1000 TABLET ORAL 2 TIMES DAILY
Qty: 120 TABLET | Refills: 5 | OUTPATIENT
Start: 2022-09-06

## 2022-09-06 RX ORDER — GABAPENTIN 300 MG/1
CAPSULE ORAL
Qty: 210 CAPSULE | Refills: 3 | OUTPATIENT
Start: 2022-09-06 | End: 2022-10-06

## 2022-09-06 RX ORDER — ONDANSETRON 4 MG/1
4 TABLET, FILM COATED ORAL 3 TIMES DAILY PRN
Qty: 30 TABLET | Refills: 1 | Status: SHIPPED | OUTPATIENT
Start: 2022-09-06 | End: 2022-10-11 | Stop reason: ALTCHOICE

## 2022-09-06 RX ORDER — INSULIN ASPART 100 [IU]/ML
INJECTION, SOLUTION INTRAVENOUS; SUBCUTANEOUS
Qty: 10 ADJUSTABLE DOSE PRE-FILLED PEN SYRINGE | Refills: 3 | Status: SHIPPED | OUTPATIENT
Start: 2022-09-06 | End: 2022-10-11 | Stop reason: SDUPTHER

## 2022-09-06 RX ORDER — INSULIN ASPART 100 [IU]/ML
INJECTION, SOLUTION INTRAVENOUS; SUBCUTANEOUS
Qty: 30 ML | Refills: 5 | Status: SHIPPED | OUTPATIENT
Start: 2022-09-06 | End: 2022-11-09 | Stop reason: SDUPTHER

## 2022-09-06 RX ORDER — ONDANSETRON 4 MG/1
4 TABLET, FILM COATED ORAL 3 TIMES DAILY PRN
Qty: 30 TABLET | Refills: 1 | OUTPATIENT
Start: 2022-09-06

## 2022-09-06 RX ORDER — ONDANSETRON 4 MG/1
TABLET, FILM COATED ORAL
Qty: 90 TABLET | Refills: 0 | Status: SHIPPED | OUTPATIENT
Start: 2022-09-06

## 2022-09-06 RX ORDER — INSULIN ASPART 100 [IU]/ML
INJECTION, SOLUTION INTRAVENOUS; SUBCUTANEOUS
Qty: 30 ML | Refills: 5 | OUTPATIENT
Start: 2022-09-06

## 2022-09-06 RX ORDER — LEVETIRACETAM 500 MG/1
1000 TABLET ORAL 2 TIMES DAILY
Qty: 120 TABLET | Refills: 5 | Status: SHIPPED | OUTPATIENT
Start: 2022-09-06 | End: 2022-11-26 | Stop reason: SDUPTHER

## 2022-09-06 RX ORDER — CLONAZEPAM 2 MG/1
TABLET ORAL
Qty: 60 TABLET | Refills: 0 | OUTPATIENT
Start: 2022-09-06 | End: 2022-10-06

## 2022-09-06 RX ORDER — GABAPENTIN 300 MG/1
CAPSULE ORAL
Qty: 210 CAPSULE | Refills: 3 | Status: SHIPPED | OUTPATIENT
Start: 2022-09-06 | End: 2022-10-11 | Stop reason: SDUPTHER

## 2022-09-06 RX ORDER — INSULIN PUMP CONTROLLER
EACH MISCELLANEOUS
Qty: 10 EACH | Refills: 3 | Status: SHIPPED | OUTPATIENT
Start: 2022-09-06 | End: 2022-11-07

## 2022-09-06 RX ORDER — BLOOD-GLUCOSE SENSOR
EACH MISCELLANEOUS
Qty: 10 EACH | Refills: 5 | OUTPATIENT
Start: 2022-09-06

## 2022-09-06 RX ORDER — BLOOD-GLUCOSE TRANSMITTER
EACH MISCELLANEOUS
Qty: 1 EACH | Refills: 0 | OUTPATIENT
Start: 2022-09-06

## 2022-09-06 RX ORDER — CALCIUM CITRATE/VITAMIN D3 200MG-6.25
TABLET ORAL
Qty: 400 EACH | Refills: 3 | OUTPATIENT
Start: 2022-09-06

## 2022-09-06 RX ORDER — CLONAZEPAM 2 MG/1
2 TABLET ORAL 2 TIMES DAILY
Qty: 60 TABLET | Refills: 0 | Status: SHIPPED | OUTPATIENT
Start: 2022-09-06 | End: 2022-10-11 | Stop reason: SDUPTHER

## 2022-09-12 DIAGNOSIS — G40.909 SEIZURE DISORDER (HCC): ICD-10-CM

## 2022-09-12 RX ORDER — CLONAZEPAM 2 MG/1
TABLET ORAL
Qty: 60 TABLET | Refills: 0 | OUTPATIENT
Start: 2022-09-12

## 2022-09-12 NOTE — TELEPHONE ENCOUNTER
Patients  called, he is following up on the refill for:    clonazePAM (KLONOPIN) 2 MG tablet    She is completely out of this medication.     Licha Concepcion [address] [place] [address],[address] [facility]), [place] 9 [phone] - F [phone]    Please call and advise

## 2022-09-14 ENCOUNTER — TELEPHONE (OUTPATIENT)
Dept: PHARMACY | Age: 32
End: 2022-09-14

## 2022-09-14 NOTE — TELEPHONE ENCOUNTER
New Diabetes referral from the hospitalist 8/2. Recommended by the Diabetes Educator. Reached out to schedule initial appointment at that time and did not hear back. She was then re-admitted 8/13-8/16. Spoke with Meme Torres today. She reports she is doing \"alright. \"    Declines to schedule an appointment with me. She reports she has her Omnipod and her Dexcom CGM. BG readings are up and down. \"Having trouble with her epilepsy which is interfering with her Diabetes. \" Feels she will be okay until she sees her PCP. F/u w PCP 10/10/22 and the plan is to f/u w endocrinology. I will close her referral. She is welcome at any time.      Fran Dyer, PharmD  [address] [place] [address]  Diabetes Service  [phone]

## 2022-09-15 ENCOUNTER — TELEPHONE (OUTPATIENT)
Dept: INTERNAL MEDICINE CLINIC | Age: 32
End: 2022-09-15

## 2022-09-15 NOTE — TELEPHONE ENCOUNTER
Patient called in stating that she had a seizure and fell and knocked her front tooth out and bit her tongue really hard. Patient stated that her dentist would not see her until she gets a letter Dr. Jose Rodgers stating that she can go to the dentist.       Kierra Villa call and advise.

## 2022-09-15 NOTE — TELEPHONE ENCOUNTER
Called the dental office it goes to a recording had to leave message for them to call me back and explain what it is they are needing

## 2022-09-15 NOTE — TELEPHONE ENCOUNTER
She stated they told her she just needed a letter stating its okay to work on her because of the epilipsy.     24/7 dental emergency on [address]. [phone]
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
## 2022-10-04 NOTE — TELEPHONE ENCOUNTER
Patient needs a refill on:    blood glucose test strips (ASCENSIA AUTODISC VI;ONE TOUCH ULTRA TEST VI) strip     The other test strips were not working well, she had to test like 5 times for one test... Please provide enough to test 6 times a day!     [address] [place]), [place] [phone] - F [phone]    Please call and advise

## 2022-10-07 DIAGNOSIS — E10.8 TYPE 1 DIABETES MELLITUS WITH COMPLICATION (HCC): ICD-10-CM

## 2022-10-10 RX ORDER — BLOOD-GLUCOSE TRANSMITTER
EACH MISCELLANEOUS
Qty: 1 EACH | Refills: 0 | Status: SHIPPED | OUTPATIENT
Start: 2022-10-10

## 2022-10-10 RX ORDER — IBUPROFEN 800 MG/1
TABLET ORAL
Qty: 90 TABLET | Refills: 0 | Status: SHIPPED | OUTPATIENT
Start: 2022-10-10

## 2022-10-11 ENCOUNTER — OFFICE VISIT (OUTPATIENT)
Dept: INTERNAL MEDICINE CLINIC | Age: 32
End: 2022-10-11
Payer: COMMERCIAL

## 2022-10-11 VITALS
WEIGHT: 106 LBS | BODY MASS INDEX: 22.25 KG/M2 | HEIGHT: 58 IN | SYSTOLIC BLOOD PRESSURE: 126 MMHG | DIASTOLIC BLOOD PRESSURE: 66 MMHG

## 2022-10-11 DIAGNOSIS — E10.8 TYPE 1 DIABETES MELLITUS WITH COMPLICATION (HCC): ICD-10-CM

## 2022-10-11 DIAGNOSIS — E11.42 DIABETIC PERIPHERAL NEUROPATHY (HCC): ICD-10-CM

## 2022-10-11 DIAGNOSIS — G40.909 SEIZURE DISORDER (HCC): ICD-10-CM

## 2022-10-11 DIAGNOSIS — E10.8 TYPE 1 DIABETES MELLITUS WITH COMPLICATION (HCC): Primary | ICD-10-CM

## 2022-10-11 PROBLEM — Z79.2 RECEIVING INTRAVENOUS ANTIBIOTIC TREATMENT AS OUTPATIENT: Status: RESOLVED | Noted: 2022-08-14 | Resolved: 2022-10-11

## 2022-10-11 PROBLEM — R65.10 SIRS (SYSTEMIC INFLAMMATORY RESPONSE SYNDROME) (HCC): Status: RESOLVED | Noted: 2018-02-13 | Resolved: 2022-10-11

## 2022-10-11 PROBLEM — R41.82 AMS (ALTERED MENTAL STATUS): Status: RESOLVED | Noted: 2022-07-30 | Resolved: 2022-10-11

## 2022-10-11 PROBLEM — Z71.89 ENCOUNTER FOR MEDICATION COUNSELING: Status: RESOLVED | Noted: 2019-04-19 | Resolved: 2022-10-11

## 2022-10-11 PROBLEM — E87.8 ELECTROLYTE IMBALANCE: Status: RESOLVED | Noted: 2022-07-31 | Resolved: 2022-10-11

## 2022-10-11 PROBLEM — Z87.898 HISTORY OF SEIZURES: Status: RESOLVED | Noted: 2022-08-01 | Resolved: 2022-10-11

## 2022-10-11 PROBLEM — E87.5 HYPERKALEMIA: Status: RESOLVED | Noted: 2022-08-02 | Resolved: 2022-10-11

## 2022-10-11 PROBLEM — R79.89 ELEVATED LIVER FUNCTION TESTS: Status: RESOLVED | Noted: 2022-01-26 | Resolved: 2022-10-11

## 2022-10-11 PROBLEM — B99.9 INFECTION REQUIRING CONTACT ISOLATION PRECAUTIONS: Status: RESOLVED | Noted: 2022-08-14 | Resolved: 2022-10-11

## 2022-10-11 PROBLEM — E87.6 HYPOKALEMIA: Status: RESOLVED | Noted: 2022-08-14 | Resolved: 2022-10-11

## 2022-10-11 PROBLEM — A41.9 SEPSIS (HCC): Status: RESOLVED | Noted: 2022-07-29 | Resolved: 2022-10-11

## 2022-10-11 LAB
A/G RATIO: 1.5 (ref 1.1–2.2)
ALBUMIN SERPL-MCNC: 5.1 G/DL (ref 3.4–5)
ALP BLD-CCNC: 91 U/L (ref 40–129)
ALT SERPL-CCNC: 16 U/L (ref 10–40)
ANION GAP SERPL CALCULATED.3IONS-SCNC: 26 MMOL/L (ref 3–16)
AST SERPL-CCNC: 18 U/L (ref 15–37)
BASOPHILS ABSOLUTE: 0.1 K/UL (ref 0–0.2)
BASOPHILS RELATIVE PERCENT: 0.7 %
BILIRUB SERPL-MCNC: <0.2 MG/DL (ref 0–1)
BUN BLDV-MCNC: 12 MG/DL (ref 7–20)
CALCIUM SERPL-MCNC: 10 MG/DL (ref 8.3–10.6)
CHLORIDE BLD-SCNC: 99 MMOL/L (ref 99–110)
CHOLESTEROL, TOTAL: 168 MG/DL (ref 0–199)
CO2: 14 MMOL/L (ref 21–32)
CREAT SERPL-MCNC: 0.7 MG/DL (ref 0.6–1.1)
EOSINOPHILS ABSOLUTE: 0 K/UL (ref 0–0.6)
EOSINOPHILS RELATIVE PERCENT: 0 %
GFR AFRICAN AMERICAN: >60
GFR NON-AFRICAN AMERICAN: >60
GLUCOSE BLD-MCNC: 199 MG/DL (ref 70–99)
HCT VFR BLD CALC: 34.9 % (ref 36–48)
HDLC SERPL-MCNC: 55 MG/DL (ref 40–60)
HEMOGLOBIN: 10.5 G/DL (ref 12–16)
LDL CHOLESTEROL CALCULATED: 92 MG/DL
LYMPHOCYTES ABSOLUTE: 1.6 K/UL (ref 1–5.1)
LYMPHOCYTES RELATIVE PERCENT: 11.8 %
MCH RBC QN AUTO: 21.1 PG (ref 26–34)
MCHC RBC AUTO-ENTMCNC: 30 G/DL (ref 31–36)
MCV RBC AUTO: 70.5 FL (ref 80–100)
MONOCYTES ABSOLUTE: 0.8 K/UL (ref 0–1.3)
MONOCYTES RELATIVE PERCENT: 6 %
NEUTROPHILS ABSOLUTE: 11 K/UL (ref 1.7–7.7)
NEUTROPHILS RELATIVE PERCENT: 81.5 %
PDW BLD-RTO: 17.2 % (ref 12.4–15.4)
PLATELET # BLD: 522 K/UL (ref 135–450)
PMV BLD AUTO: 7.2 FL (ref 5–10.5)
POTASSIUM SERPL-SCNC: 4.3 MMOL/L (ref 3.5–5.1)
RBC # BLD: 4.96 M/UL (ref 4–5.2)
SODIUM BLD-SCNC: 139 MMOL/L (ref 136–145)
TOTAL PROTEIN: 8.4 G/DL (ref 6.4–8.2)
TRIGL SERPL-MCNC: 103 MG/DL (ref 0–150)
VLDLC SERPL CALC-MCNC: 21 MG/DL
WBC # BLD: 13.5 K/UL (ref 4–11)

## 2022-10-11 PROCEDURE — 1036F TOBACCO NON-USER: CPT | Performed by: INTERNAL MEDICINE

## 2022-10-11 PROCEDURE — G8484 FLU IMMUNIZE NO ADMIN: HCPCS | Performed by: INTERNAL MEDICINE

## 2022-10-11 PROCEDURE — 2022F DILAT RTA XM EVC RTNOPTHY: CPT | Performed by: INTERNAL MEDICINE

## 2022-10-11 PROCEDURE — G8427 DOCREV CUR MEDS BY ELIG CLIN: HCPCS | Performed by: INTERNAL MEDICINE

## 2022-10-11 PROCEDURE — G8420 CALC BMI NORM PARAMETERS: HCPCS | Performed by: INTERNAL MEDICINE

## 2022-10-11 PROCEDURE — 99214 OFFICE O/P EST MOD 30 MIN: CPT | Performed by: INTERNAL MEDICINE

## 2022-10-11 PROCEDURE — 3046F HEMOGLOBIN A1C LEVEL >9.0%: CPT | Performed by: INTERNAL MEDICINE

## 2022-10-11 RX ORDER — GABAPENTIN 300 MG/1
CAPSULE ORAL
Qty: 210 CAPSULE | Refills: 3 | Status: SHIPPED | OUTPATIENT
Start: 2022-10-11 | End: 2022-11-02 | Stop reason: SDUPTHER

## 2022-10-11 RX ORDER — CLONAZEPAM 2 MG/1
2 TABLET ORAL 2 TIMES DAILY
Qty: 60 TABLET | Refills: 0 | Status: SHIPPED | OUTPATIENT
Start: 2022-10-11 | End: 2022-11-10

## 2022-10-11 RX ORDER — CALCIUM CITRATE/VITAMIN D3 200MG-6.25
TABLET ORAL
Qty: 400 EACH | Refills: 3 | Status: SHIPPED | OUTPATIENT
Start: 2022-10-11 | End: 2022-10-28 | Stop reason: SDUPTHER

## 2022-10-11 RX ORDER — INSULIN ASPART 100 [IU]/ML
INJECTION, SOLUTION INTRAVENOUS; SUBCUTANEOUS
Qty: 10 ADJUSTABLE DOSE PRE-FILLED PEN SYRINGE | Refills: 5 | Status: SHIPPED | OUTPATIENT
Start: 2022-10-11

## 2022-10-11 SDOH — ECONOMIC STABILITY: FOOD INSECURITY: WITHIN THE PAST 12 MONTHS, THE FOOD YOU BOUGHT JUST DIDN'T LAST AND YOU DIDN'T HAVE MONEY TO GET MORE.: NEVER TRUE

## 2022-10-11 SDOH — ECONOMIC STABILITY: FOOD INSECURITY: WITHIN THE PAST 12 MONTHS, YOU WORRIED THAT YOUR FOOD WOULD RUN OUT BEFORE YOU GOT MONEY TO BUY MORE.: NEVER TRUE

## 2022-10-11 ASSESSMENT — SOCIAL DETERMINANTS OF HEALTH (SDOH): HOW HARD IS IT FOR YOU TO PAY FOR THE VERY BASICS LIKE FOOD, HOUSING, MEDICAL CARE, AND HEATING?: NOT HARD AT ALL

## 2022-10-11 NOTE — PROGRESS NOTES
Lupillo Mar (:  1990) is a 34 y.o. female,Established patient, here for evaluation of the following chief complaint(s):  Diabetes (Dentist needs a script or letter stating it is okay to work on her teeth. )         ASSESSMENT/PLAN:  1. Type 1 diabetes mellitus with complication (HCC)  -Improving with OmniPod and CGM. Sent NovoLog, this is her backup if she has any pod malfunction  -     blood glucose test strips (TRUE METRIX BLOOD GLUCOSE TEST) strip; Check blood sugar 4-6 times daily and as needed for symptoms of irregular glucose, Disp-400 each, R-3Normal  -     insulin aspart (NOVOLOG FLEXPEN) 100 UNIT/ML injection pen; Inject 20 units with meals/snacks up to 5 times per day, Disp-10 Adjustable Dose Pre-filled Pen Syringe, R-5Normal  -     Comprehensive Metabolic Panel; Future  -     Lipid Panel; Future  -     CBC with Auto Differential; Future  -     Hemoglobin A1C; Future  2. Diabetic peripheral neuropathy (HCC)  -Stable  -     gabapentin (NEURONTIN) 300 MG capsule; TAKE 1 CAPSULE BY MOUTH THREE TIMES DAILY DRUNG THE DAY AND 3 CAPSULES BY MOUTH EVERY DAY AT BEDTIME, Disp-210 capsule, R-3Normal  -     Comprehensive Metabolic Panel; Future  -     Lipid Panel; Future  -     CBC with Auto Differential; Future  -     Hemoglobin A1C; Future  3. Seizure disorder ([facility] [address]  -Not very well controlled, scheduled to see neurology.  -Continue Keppra 1000 mg twice daily  -     clonazePAM (KLONOPIN) 2 MG tablet; Take 1 tablet by mouth in the morning and at bedtime for 30 days. , Disp-60 tablet, R-0Normal      Return in about 3 months (around 2023) for aniety, diabetes, seizures. Subjective   SUBJECTIVE/OBJECTIVE:  HPI    Diabetes -sugars have been doing much better now that she has the CGM and the pods. Not done in normal range yet, but improving. She still having issue with her tooth, trying to get the dental work completed. Neuropathy is stable. Still using the gabapentin.   She completed all of the antibiotics for the infection, has completely cleared up. She had some seizures but was out of her medication and thinks that may been the trigger. She did bite her tongue, has been healing but feels like there is a bump. She has an appointment scheduled with a neurologist.    Review of Systems       Objective   Physical Exam  Vitals reviewed. Constitutional:       General: She is not in acute distress. Appearance: Normal appearance. She is well-developed. HENT:      Head: Normocephalic and atraumatic. Mouth/Throat:      Comments: Healed tongue laceration, small piece of mucosa sticking out  Cardiovascular:      Rate and Rhythm: Normal rate and regular rhythm. Heart sounds: Normal heart sounds. Pulmonary:      Effort: Pulmonary effort is normal. No respiratory distress. Breath sounds: Normal breath sounds. Skin:     General: Skin is warm and dry. Neurological:      Mental Status: She is alert. Psychiatric:         Behavior: Behavior normal.         Thought Content: Thought content normal.         Judgment: Judgment normal.                An electronic signature was used to authenticate this note.     --Noemi Jessica MD

## 2022-10-11 NOTE — LETTER
[facility] [address]  [address] [address]  Phone: [phone]  Fax: [phone]    Keshia Richards MD        October 11, 2022      To Whom It May Concern:    Gladis Lanier has no medical contraindication to dental work.        Sincerely,        Keshia Richards MD

## 2022-10-12 ENCOUNTER — TELEPHONE (OUTPATIENT)
Dept: INTERNAL MEDICINE CLINIC | Age: 32
End: 2022-10-12

## 2022-10-12 LAB
ESTIMATED AVERAGE GLUCOSE: 240.3 MG/DL
HBA1C MFR BLD: 10 %

## 2022-10-14 DIAGNOSIS — R79.81 ELEVATED CO2 LEVEL: Primary | ICD-10-CM

## 2022-10-28 DIAGNOSIS — E10.8 TYPE 1 DIABETES MELLITUS WITH COMPLICATION (HCC): ICD-10-CM

## 2022-10-28 RX ORDER — CALCIUM CITRATE/VITAMIN D3 200MG-6.25
TABLET ORAL
Qty: 400 EACH | Refills: 3 | Status: SHIPPED | OUTPATIENT
Start: 2022-10-28

## 2022-10-28 NOTE — TELEPHONE ENCOUNTER
Refill      blood glucose test strips (ASCENSIA AUTODISC VI;ONE TOUCH ULTRA TEST VI) strip         ondansetron (ZOFRAN) 4 MG tablet         [address] [phone] Skyla Owens [phone]   [address] [place] [address]   Phone:  [phone]  Fax:  [phone]

## 2022-11-01 DIAGNOSIS — E11.42 DIABETIC PERIPHERAL NEUROPATHY (HCC): ICD-10-CM

## 2022-11-01 NOTE — TELEPHONE ENCOUNTER
Refill    gabapentin (NEURONTIN) 300 MG capsule       [address] [place] [phone]   [address] [place] [address]   Phone:  [phone]  Fax:  [phone]

## 2022-11-02 RX ORDER — GABAPENTIN 300 MG/1
CAPSULE ORAL
Qty: 210 CAPSULE | Refills: 3 | Status: SHIPPED | OUTPATIENT
Start: 2022-11-02 | End: 2022-11-29 | Stop reason: SDUPTHER

## 2022-11-06 DIAGNOSIS — E10.8 TYPE 1 DIABETES MELLITUS WITH COMPLICATION (HCC): ICD-10-CM

## 2022-11-07 RX ORDER — INSULIN PUMP CONTROLLER
EACH MISCELLANEOUS
Qty: 10 EACH | Refills: 5 | Status: SHIPPED | OUTPATIENT
Start: 2022-11-07 | End: 2022-11-09 | Stop reason: SDUPTHER

## 2022-11-09 DIAGNOSIS — E10.8 TYPE 1 DIABETES MELLITUS WITH COMPLICATION (HCC): ICD-10-CM

## 2022-11-09 DIAGNOSIS — G40.909 SEIZURE DISORDER (HCC): ICD-10-CM

## 2022-11-09 DIAGNOSIS — E11.42 DIABETIC PERIPHERAL NEUROPATHY (HCC): ICD-10-CM

## 2022-11-09 RX ORDER — GABAPENTIN 300 MG/1
CAPSULE ORAL
Qty: 210 CAPSULE | Refills: 3 | Status: CANCELLED | OUTPATIENT
Start: 2022-11-09 | End: 2022-12-13

## 2022-11-10 DIAGNOSIS — G40.909 SEIZURE DISORDER (HCC): ICD-10-CM

## 2022-11-10 RX ORDER — CLONAZEPAM 2 MG/1
TABLET ORAL
Qty: 60 TABLET | Refills: 0 | OUTPATIENT
Start: 2022-11-10

## 2022-11-10 RX ORDER — CLONAZEPAM 2 MG/1
2 TABLET ORAL 2 TIMES DAILY
Qty: 60 TABLET | Refills: 0 | Status: SHIPPED | OUTPATIENT
Start: 2022-11-10 | End: 2022-11-26 | Stop reason: SDUPTHER

## 2022-11-10 RX ORDER — BLOOD-GLUCOSE SENSOR
EACH MISCELLANEOUS
Qty: 10 EACH | Refills: 5 | Status: SHIPPED | OUTPATIENT
Start: 2022-11-10 | End: 2022-11-26 | Stop reason: SDUPTHER

## 2022-11-10 RX ORDER — INSULIN PUMP CONTROLLER
EACH MISCELLANEOUS
Qty: 10 EACH | Refills: 5 | Status: SHIPPED | OUTPATIENT
Start: 2022-11-10 | End: 2022-11-27 | Stop reason: SDUPTHER

## 2022-11-10 RX ORDER — ONDANSETRON 4 MG/1
4 TABLET, FILM COATED ORAL EVERY 8 HOURS PRN
Qty: 90 TABLET | Refills: 2 | Status: SHIPPED | OUTPATIENT
Start: 2022-11-10

## 2022-11-10 RX ORDER — INSULIN ASPART 100 [IU]/ML
INJECTION, SOLUTION INTRAVENOUS; SUBCUTANEOUS
Qty: 30 ML | Refills: 5 | Status: SHIPPED | OUTPATIENT
Start: 2022-11-10 | End: 2022-11-26 | Stop reason: SDUPTHER

## 2022-11-23 DIAGNOSIS — E10.8 TYPE 1 DIABETES MELLITUS WITH COMPLICATION (HCC): ICD-10-CM

## 2022-11-23 NOTE — TELEPHONE ENCOUNTER
Pt needs a refill on   Insulin Disposable Pump (OMNIPOD DASH PODS, GEN 4,) MISC   insulin aspart (NOVOLOG FLEXPEN) 100 UNIT/ML injection pen    blood glucose test strips (ASCENSIA AUTODISC VI;ONE TOUCH ULTRA TEST VI) strip         [address] [facility] [address] [facility], [address] [place], [place] [id number]   Phone:  [phone]  Fax:  [phone]      Please advise

## 2022-11-26 DIAGNOSIS — E11.42 DIABETIC PERIPHERAL NEUROPATHY (HCC): ICD-10-CM

## 2022-11-26 DIAGNOSIS — E10.8 TYPE 1 DIABETES MELLITUS WITH COMPLICATION (HCC): ICD-10-CM

## 2022-11-26 DIAGNOSIS — G40.909 SEIZURE DISORDER (HCC): ICD-10-CM

## 2022-11-27 RX ORDER — INSULIN ASPART 100 [IU]/ML
INJECTION, SOLUTION INTRAVENOUS; SUBCUTANEOUS
Qty: 10 ADJUSTABLE DOSE PRE-FILLED PEN SYRINGE | Refills: 5 | Status: SHIPPED | OUTPATIENT
Start: 2022-11-27

## 2022-11-27 RX ORDER — INSULIN PUMP CONTROLLER
EACH MISCELLANEOUS
Qty: 10 EACH | Refills: 5 | Status: SHIPPED | OUTPATIENT
Start: 2022-11-27

## 2022-11-29 DIAGNOSIS — E11.42 DIABETIC PERIPHERAL NEUROPATHY (HCC): ICD-10-CM

## 2022-11-29 RX ORDER — GABAPENTIN 300 MG/1
CAPSULE ORAL
Qty: 210 CAPSULE | Refills: 3 | Status: SHIPPED | OUTPATIENT
Start: 2022-11-29 | End: 2023-01-02

## 2022-11-29 RX ORDER — INSULIN PUMP CONTROLLER
EACH MISCELLANEOUS
Qty: 10 EACH | Refills: 5 | OUTPATIENT
Start: 2022-11-29

## 2022-11-29 RX ORDER — INSULIN ASPART 100 [IU]/ML
INJECTION, SOLUTION INTRAVENOUS; SUBCUTANEOUS
Qty: 30 ML | Refills: 5 | Status: ON HOLD | OUTPATIENT
Start: 2022-11-29 | End: 2023-01-26 | Stop reason: HOSPADM

## 2022-11-29 RX ORDER — INSULIN ASPART 100 [IU]/ML
INJECTION, SOLUTION INTRAVENOUS; SUBCUTANEOUS
Qty: 30 ML | Refills: 0 | OUTPATIENT
Start: 2022-11-29

## 2022-11-29 RX ORDER — BLOOD-GLUCOSE TRANSMITTER
EACH MISCELLANEOUS
Qty: 1 EACH | Refills: 0 | Status: SHIPPED | OUTPATIENT
Start: 2022-11-29

## 2022-11-29 RX ORDER — BLOOD-GLUCOSE SENSOR
EACH MISCELLANEOUS
Qty: 10 EACH | Refills: 5 | Status: SHIPPED | OUTPATIENT
Start: 2022-11-29 | End: 2023-01-06 | Stop reason: SDUPTHER

## 2022-11-29 RX ORDER — LEVETIRACETAM 500 MG/1
1000 TABLET ORAL 2 TIMES DAILY
Qty: 120 TABLET | Refills: 5 | Status: SHIPPED | OUTPATIENT
Start: 2022-11-29

## 2022-11-29 RX ORDER — CLONAZEPAM 2 MG/1
2 TABLET ORAL 2 TIMES DAILY
Qty: 60 TABLET | Refills: 0 | Status: SHIPPED | OUTPATIENT
Start: 2022-11-29 | End: 2023-01-06 | Stop reason: SDUPTHER

## 2022-11-29 RX ORDER — GABAPENTIN 300 MG/1
CAPSULE ORAL
Qty: 210 CAPSULE | Refills: 3 | OUTPATIENT
Start: 2022-11-29 | End: 2022-12-30

## 2022-12-06 DIAGNOSIS — G40.909 SEIZURE DISORDER (HCC): ICD-10-CM

## 2022-12-06 RX ORDER — IBUPROFEN 800 MG/1
TABLET ORAL
Qty: 90 TABLET | Refills: 0 | Status: SHIPPED | OUTPATIENT
Start: 2022-12-06

## 2022-12-06 RX ORDER — ONDANSETRON 4 MG/1
TABLET, FILM COATED ORAL
Qty: 90 TABLET | Refills: 0 | Status: SHIPPED | OUTPATIENT
Start: 2022-12-06

## 2022-12-07 DIAGNOSIS — G40.909 SEIZURE DISORDER (HCC): ICD-10-CM

## 2022-12-07 RX ORDER — CLONAZEPAM 2 MG/1
2 TABLET ORAL 2 TIMES DAILY
Qty: 60 TABLET | Refills: 0 | OUTPATIENT
Start: 2022-12-07 | End: 2023-01-06

## 2022-12-07 RX ORDER — CLONAZEPAM 2 MG/1
2 TABLET ORAL 2 TIMES DAILY
Qty: 60 TABLET | Refills: 0 | Status: CANCELLED | OUTPATIENT
Start: 2022-12-07 | End: 2023-01-06

## 2022-12-07 NOTE — TELEPHONE ENCOUNTER
Lm for pt that we tried for 30 minutes to get ahold of the pharmacy with no luck they hung up. Stated it shows in Pamela Storm that they have it filled and if they need anything from us they can call us.

## 2022-12-13 ENCOUNTER — TELEPHONE (OUTPATIENT)
Dept: INTERNAL MEDICINE CLINIC | Age: 32
End: 2022-12-13

## 2022-12-13 NOTE — TELEPHONE ENCOUNTER
Pt Continuous Blood Gluc Transmit (DEXCOM G6 TRANSMITTER) MISC  Went out early before the 90 day jacquie and is needing a replacement as soon as possible or she will not be able to give herself insulin properly. Subjective   Jose Olson is a 70 y.o. male.     Chief Complaint   Patient presents with   • Follow-up     refill meds, request urology in [place], and request labs        History of Present Illness     Pt in f/u on depression, insomnia, BPH.    Pt says he is felling pretty good.  He says he is having a problem with a break in his urine.  He says he also has urgency, it hits him and he just has to go.  He has used Flomax in the past, but he had stopped taking it.  He wants to know if he needs to be referred to a urologist for this.  He says he has to get up during the night a few times, but that isn't abnormal for him as he has done that for years.   Pt is tolerating their conditions and medications well.       The following portions of the patient's history were reviewed and updated as appropriate: allergies, current medications, past family history, past medical history, past social history, past surgical history and problem list.    Review of Systems   Constitutional: Negative for activity change, appetite change, fatigue and unexpected weight change.   HENT: Negative for ear pain, facial swelling and hearing loss.    Respiratory: Negative for cough, chest tightness, shortness of breath and wheezing.    Cardiovascular: Negative for chest pain, palpitations and leg swelling.   Gastrointestinal: Negative for abdominal pain.   Genitourinary: Positive for difficulty urinating, frequency, hematuria and urgency. Negative for flank pain.   Musculoskeletal: Negative for arthralgias and back pain.   Skin: Negative for color change and rash.   Allergic/Immunologic: Negative for environmental allergies and food allergies.   Neurological: Negative for dizziness, syncope, weakness, numbness and headaches.   Hematological: Negative for adenopathy.   Psychiatric/Behavioral: Negative for confusion, decreased concentration, dysphoric mood, sleep disturbance and suicidal ideas. The patient is not nervous/anxious.    All  other systems reviewed and are negative.      Objective   Physical Exam   Constitutional: He is oriented to person, place, and time. Vital signs are normal. He appears well-developed and well-nourished.   HENT:   Head: Normocephalic and atraumatic.   Right Ear: External ear normal.   Left Ear: External ear normal.   Nose: Nose normal.   Eyes: Conjunctivae and EOM are normal. Pupils are equal, round, and reactive to light.   Neck: Normal range of motion. Neck supple.   Cardiovascular: Normal rate and regular rhythm.  Exam reveals no gallop and no friction rub.    No murmur heard.  Pulmonary/Chest: Effort normal and breath sounds normal. No respiratory distress. He has no wheezes. He has no rales.   Abdominal: Soft. He exhibits no distension. There is no tenderness. There is no rebound and no guarding.   Musculoskeletal: Normal range of motion. He exhibits no edema.   Neurological: He is alert and oriented to person, place, and time. No cranial nerve deficit.   Skin: Skin is warm and dry. No rash noted.   Psychiatric: He has a normal mood and affect. His behavior is normal. Judgment and thought content normal. His mood appears not anxious. He does not exhibit a depressed mood. He expresses no homicidal and no suicidal ideation. He expresses no suicidal plans and no homicidal plans.   Nursing note and vitals reviewed.      Assessment/Plan   Jose was seen today for follow-up.    Diagnoses and all orders for this visit:    Depression, unspecified depression type    Insomnia, unspecified type    Benign prostatic hyperplasia, presence of lower urinary tract symptoms unspecified, unspecified morphology    Other orders  -     mirtazapine (REMERON) 15 MG tablet; Take 1 tablet by mouth Every Night.         Make sure he has had a PSA in a year, if not get one.    Labs due: CBC CMP Lipids Thyroids in August.    Refill medications if due.    Schedule Urology in [place] for frequency, urgency, post void dribbling, hematuria, and  difficulty urinating.   Flomax .4mg for BPH    Scribed for Dr. Wilson by [facility] Dodd [facility]

## 2022-12-14 NOTE — TELEPHONE ENCOUNTER
If we have an available sample we can bridge. If her insurance will not let her fill early and we don't have a sample then contacting the company may be the best option, they should be able to replace the item if it failed early.

## 2023-01-06 DIAGNOSIS — E10.8 TYPE 1 DIABETES MELLITUS WITH COMPLICATION (HCC): ICD-10-CM

## 2023-01-06 DIAGNOSIS — E11.42 DIABETIC PERIPHERAL NEUROPATHY (HCC): ICD-10-CM

## 2023-01-06 DIAGNOSIS — G40.909 SEIZURE DISORDER (HCC): ICD-10-CM

## 2023-01-06 RX ORDER — CLONAZEPAM 2 MG/1
2 TABLET ORAL 2 TIMES DAILY
Qty: 60 TABLET | Refills: 0 | Status: SHIPPED | OUTPATIENT
Start: 2023-01-06 | End: 2023-01-30

## 2023-01-06 RX ORDER — BLOOD-GLUCOSE SENSOR
EACH MISCELLANEOUS
Qty: 10 EACH | Refills: 5 | Status: SHIPPED | OUTPATIENT
Start: 2023-01-06

## 2023-01-06 RX ORDER — GABAPENTIN 300 MG/1
CAPSULE ORAL
Qty: 210 CAPSULE | Refills: 3 | Status: SHIPPED | OUTPATIENT
Start: 2023-01-06 | End: 2023-03-06 | Stop reason: SDUPTHER

## 2023-01-06 RX ORDER — IBUPROFEN 800 MG/1
TABLET ORAL
Qty: 90 TABLET | Refills: 0 | Status: SHIPPED | OUTPATIENT
Start: 2023-01-06 | End: 2023-02-01

## 2023-01-06 RX ORDER — ONDANSETRON 4 MG/1
TABLET, FILM COATED ORAL
Qty: 90 TABLET | Refills: 0 | Status: SHIPPED | OUTPATIENT
Start: 2023-01-06 | End: 2023-02-01

## 2023-01-24 ENCOUNTER — APPOINTMENT (OUTPATIENT)
Dept: GENERAL RADIOLOGY | Age: 33
DRG: 053 | End: 2023-01-24
Payer: COMMERCIAL

## 2023-01-24 ENCOUNTER — HOSPITAL ENCOUNTER (INPATIENT)
Age: 33
LOS: 2 days | Discharge: HOME | DRG: 053 | End: 2023-01-26
Attending: EMERGENCY MEDICINE | Admitting: INTERNAL MEDICINE
Payer: COMMERCIAL

## 2023-01-24 DIAGNOSIS — G40.919 BREAKTHROUGH SEIZURE (HCC): ICD-10-CM

## 2023-01-24 DIAGNOSIS — G40.909 SEIZURE DISORDER (HCC): Primary | ICD-10-CM

## 2023-01-24 DIAGNOSIS — E10.10 DKA, TYPE 1, NOT AT GOAL (HCC): ICD-10-CM

## 2023-01-24 DIAGNOSIS — R73.9 HYPERGLYCEMIA: ICD-10-CM

## 2023-01-24 DIAGNOSIS — E87.29 HIGH ANION GAP METABOLIC ACIDOSIS: ICD-10-CM

## 2023-01-24 LAB
A/G RATIO: 1.2 (ref 1.1–2.2)
ALBUMIN SERPL-MCNC: 4.4 G/DL (ref 3.4–5)
ALP BLD-CCNC: 79 U/L (ref 40–129)
ALT SERPL-CCNC: 14 U/L (ref 10–40)
ANION GAP SERPL CALCULATED.3IONS-SCNC: 17 MMOL/L (ref 3–16)
ANION GAP SERPL CALCULATED.3IONS-SCNC: 26 MMOL/L (ref 3–16)
AST SERPL-CCNC: 19 U/L (ref 15–37)
BACTERIA: ABNORMAL /HPF
BASE EXCESS VENOUS: -10.8 MMOL/L
BASE EXCESS VENOUS: -17.5 MMOL/L
BASOPHILS ABSOLUTE: 0 K/UL (ref 0–0.2)
BASOPHILS ABSOLUTE: 0.1 K/UL (ref 0–0.2)
BASOPHILS RELATIVE PERCENT: 0.4 %
BASOPHILS RELATIVE PERCENT: 0.7 %
BETA-HYDROXYBUTYRATE: 2.29 MMOL/L (ref 0–0.27)
BILIRUB SERPL-MCNC: <0.2 MG/DL (ref 0–1)
BILIRUBIN URINE: NEGATIVE
BLOOD, URINE: ABNORMAL
BUN BLDV-MCNC: 11 MG/DL (ref 7–20)
BUN BLDV-MCNC: 14 MG/DL (ref 7–20)
CALCIUM SERPL-MCNC: 8.3 MG/DL (ref 8.3–10.6)
CALCIUM SERPL-MCNC: 9.4 MG/DL (ref 8.3–10.6)
CARBOXYHEMOGLOBIN: 0.9 %
CARBOXYHEMOGLOBIN: 1.5 %
CHLORIDE BLD-SCNC: 104 MMOL/L (ref 99–110)
CHLORIDE BLD-SCNC: 98 MMOL/L (ref 99–110)
CLARITY: CLEAR
CO2: 10 MMOL/L (ref 21–32)
CO2: 14 MMOL/L (ref 21–32)
COLOR: YELLOW
CREAT SERPL-MCNC: 0.6 MG/DL (ref 0.6–1.1)
CREAT SERPL-MCNC: 0.8 MG/DL (ref 0.6–1.1)
EOSINOPHILS ABSOLUTE: 0 K/UL (ref 0–0.6)
EOSINOPHILS ABSOLUTE: 0 K/UL (ref 0–0.6)
EOSINOPHILS RELATIVE PERCENT: 0 %
EOSINOPHILS RELATIVE PERCENT: 0.2 %
EPITHELIAL CELLS, UA: 5 /HPF (ref 0–5)
GFR SERPL CREATININE-BSD FRML MDRD: >60 ML/MIN/{1.73_M2}
GFR SERPL CREATININE-BSD FRML MDRD: >60 ML/MIN/{1.73_M2}
GLUCOSE BLD-MCNC: 124 MG/DL (ref 70–99)
GLUCOSE BLD-MCNC: 127 MG/DL (ref 70–99)
GLUCOSE BLD-MCNC: 273 MG/DL (ref 70–99)
GLUCOSE BLD-MCNC: 276 MG/DL (ref 70–99)
GLUCOSE BLD-MCNC: 83 MG/DL (ref 70–99)
GLUCOSE URINE: 250 MG/DL
HCG QUALITATIVE: NEGATIVE
HCO3 VENOUS: 10 MMOL/L (ref 23–29)
HCO3 VENOUS: 15 MMOL/L (ref 23–29)
HCT VFR BLD CALC: 30.1 % (ref 36–48)
HCT VFR BLD CALC: 36.3 % (ref 36–48)
HEMATOLOGY PATH CONSULT: YES
HEMOGLOBIN: 10.4 G/DL (ref 12–16)
HEMOGLOBIN: 9 G/DL (ref 12–16)
HYALINE CASTS: 5 /LPF (ref 0–8)
HYPOCHROMIA: ABNORMAL
KEPPRA DOSE AMT: ABNORMAL
KEPPRA: <2 UG/ML (ref 6–46)
KETONES, URINE: 80 MG/DL
LACTIC ACID: 10.3 MMOL/L (ref 0.4–2)
LACTIC ACID: 3.3 MMOL/L (ref 0.4–2)
LEUKOCYTE ESTERASE, URINE: NEGATIVE
LYMPHOCYTES ABSOLUTE: 0.7 K/UL (ref 1–5.1)
LYMPHOCYTES ABSOLUTE: 1.5 K/UL (ref 1–5.1)
LYMPHOCYTES RELATIVE PERCENT: 12.9 %
LYMPHOCYTES RELATIVE PERCENT: 5.6 %
MCH RBC QN AUTO: 20.6 PG (ref 26–34)
MCH RBC QN AUTO: 20.8 PG (ref 26–34)
MCHC RBC AUTO-ENTMCNC: 28.8 G/DL (ref 31–36)
MCHC RBC AUTO-ENTMCNC: 30 G/DL (ref 31–36)
MCV RBC AUTO: 69.2 FL (ref 80–100)
MCV RBC AUTO: 71.5 FL (ref 80–100)
METHEMOGLOBIN VENOUS: 0.7 %
METHEMOGLOBIN VENOUS: 1 %
MICROCYTES: ABNORMAL
MICROSCOPIC EXAMINATION: YES
MONOCYTES ABSOLUTE: 0.5 K/UL (ref 0–1.3)
MONOCYTES ABSOLUTE: 0.6 K/UL (ref 0–1.3)
MONOCYTES RELATIVE PERCENT: 4.2 %
MONOCYTES RELATIVE PERCENT: 5.3 %
NEUTROPHILS ABSOLUTE: 10.8 K/UL (ref 1.7–7.7)
NEUTROPHILS ABSOLUTE: 9.4 K/UL (ref 1.7–7.7)
NEUTROPHILS RELATIVE PERCENT: 82 %
NEUTROPHILS RELATIVE PERCENT: 88.7 %
NITRITE, URINE: NEGATIVE
O2 SAT, VEN: 44 %
O2 SAT, VEN: 64 %
O2 THERAPY: ABNORMAL
O2 THERAPY: ABNORMAL
PCO2, VEN: 27.2 MMHG (ref 40–50)
PCO2, VEN: 34.4 MMHG (ref 40–50)
PDW BLD-RTO: 20.8 % (ref 12.4–15.4)
PDW BLD-RTO: 20.8 % (ref 12.4–15.4)
PERFORMED ON: ABNORMAL
PERFORMED ON: ABNORMAL
PERFORMED ON: NORMAL
PH UA: 5 (ref 5–8)
PH VENOUS: 7.16 (ref 7.35–7.45)
PH VENOUS: 7.26 (ref 7.35–7.45)
PLATELET # BLD: 323 K/UL (ref 135–450)
PLATELET # BLD: 370 K/UL (ref 135–450)
PMV BLD AUTO: 7.7 FL (ref 5–10.5)
PMV BLD AUTO: 8.2 FL (ref 5–10.5)
PO2, VEN: 42 MMHG
PO2, VEN: <30 MMHG
POTASSIUM REFLEX MAGNESIUM: 4.2 MMOL/L (ref 3.5–5.1)
POTASSIUM REFLEX MAGNESIUM: 4.2 MMOL/L (ref 3.5–5.1)
PROTEIN UA: 30 MG/DL
RBC # BLD: 4.35 M/UL (ref 4–5.2)
RBC # BLD: 5.07 M/UL (ref 4–5.2)
RBC UA: 1 /HPF (ref 0–4)
REASON FOR REJECTION: NORMAL
REJECTED TEST: NORMAL
SODIUM BLD-SCNC: 134 MMOL/L (ref 136–145)
SODIUM BLD-SCNC: 135 MMOL/L (ref 136–145)
SPECIFIC GRAVITY UA: 1.03 (ref 1–1.03)
TCO2 CALC VENOUS: 11 MMOL/L
TCO2 CALC VENOUS: 16 MMOL/L
TOTAL PROTEIN: 8.2 G/DL (ref 6.4–8.2)
URINE REFLEX TO CULTURE: NORMAL
URINE TYPE: ABNORMAL
UROBILINOGEN, URINE: 0.2 E.U./DL
WBC # BLD: 11.5 K/UL (ref 4–11)
WBC # BLD: 12.2 K/UL (ref 4–11)
WBC UA: 1 /HPF (ref 0–5)
YEAST: PRESENT /HPF

## 2023-01-24 PROCEDURE — 81001 URINALYSIS AUTO W/SCOPE: CPT

## 2023-01-24 PROCEDURE — 80053 COMPREHEN METABOLIC PANEL: CPT

## 2023-01-24 PROCEDURE — 80177 DRUG SCRN QUAN LEVETIRACETAM: CPT

## 2023-01-24 PROCEDURE — 2580000003 HC RX 258: Performed by: INTERNAL MEDICINE

## 2023-01-24 PROCEDURE — 96365 THER/PROPH/DIAG IV INF INIT: CPT

## 2023-01-24 PROCEDURE — 6360000002 HC RX W HCPCS: Performed by: INTERNAL MEDICINE

## 2023-01-24 PROCEDURE — 84100 ASSAY OF PHOSPHORUS: CPT

## 2023-01-24 PROCEDURE — 36415 COLL VENOUS BLD VENIPUNCTURE: CPT

## 2023-01-24 PROCEDURE — 84703 CHORIONIC GONADOTROPIN ASSAY: CPT

## 2023-01-24 PROCEDURE — 2580000003 HC RX 258: Performed by: EMERGENCY MEDICINE

## 2023-01-24 PROCEDURE — 99285 EMERGENCY DEPT VISIT HI MDM: CPT

## 2023-01-24 PROCEDURE — 85025 COMPLETE CBC W/AUTO DIFF WBC: CPT

## 2023-01-24 PROCEDURE — 6360000002 HC RX W HCPCS

## 2023-01-24 PROCEDURE — 82010 KETONE BODYS QUAN: CPT

## 2023-01-24 PROCEDURE — 6370000000 HC RX 637 (ALT 250 FOR IP): Performed by: INTERNAL MEDICINE

## 2023-01-24 PROCEDURE — 81003 URINALYSIS AUTO W/O SCOPE: CPT

## 2023-01-24 PROCEDURE — 71045 X-RAY EXAM CHEST 1 VIEW: CPT

## 2023-01-24 PROCEDURE — 6360000002 HC RX W HCPCS: Performed by: EMERGENCY MEDICINE

## 2023-01-24 PROCEDURE — 83735 ASSAY OF MAGNESIUM: CPT

## 2023-01-24 PROCEDURE — 94761 N-INVAS EAR/PLS OXIMETRY MLT: CPT

## 2023-01-24 PROCEDURE — 2000000000 HC ICU R&B

## 2023-01-24 PROCEDURE — 83605 ASSAY OF LACTIC ACID: CPT

## 2023-01-24 PROCEDURE — 82803 BLOOD GASES ANY COMBINATION: CPT

## 2023-01-24 RX ORDER — SODIUM CHLORIDE 0.9 % (FLUSH) 0.9 %
5-40 SYRINGE (ML) INJECTION EVERY 12 HOURS SCHEDULED
Status: DISCONTINUED | OUTPATIENT
Start: 2023-01-24 | End: 2023-01-26 | Stop reason: HOSPADM

## 2023-01-24 RX ORDER — DEXTROSE AND SODIUM CHLORIDE 5; .45 G/100ML; G/100ML
INJECTION, SOLUTION INTRAVENOUS CONTINUOUS PRN
Status: DISCONTINUED | OUTPATIENT
Start: 2023-01-24 | End: 2023-01-26 | Stop reason: HOSPADM

## 2023-01-24 RX ORDER — MAGNESIUM SULFATE 1 G/100ML
1000 INJECTION INTRAVENOUS PRN
Status: DISCONTINUED | OUTPATIENT
Start: 2023-01-24 | End: 2023-01-26 | Stop reason: HOSPADM

## 2023-01-24 RX ORDER — ONDANSETRON 2 MG/ML
4 INJECTION INTRAMUSCULAR; INTRAVENOUS EVERY 6 HOURS PRN
Status: DISCONTINUED | OUTPATIENT
Start: 2023-01-24 | End: 2023-01-26 | Stop reason: HOSPADM

## 2023-01-24 RX ORDER — 0.9 % SODIUM CHLORIDE 0.9 %
15 INTRAVENOUS SOLUTION INTRAVENOUS ONCE
Status: COMPLETED | OUTPATIENT
Start: 2023-01-24 | End: 2023-01-24

## 2023-01-24 RX ORDER — POLYETHYLENE GLYCOL 3350 17 G/17G
17 POWDER, FOR SOLUTION ORAL DAILY PRN
Status: DISCONTINUED | OUTPATIENT
Start: 2023-01-24 | End: 2023-01-26 | Stop reason: HOSPADM

## 2023-01-24 RX ORDER — ACETAMINOPHEN 650 MG/1
650 SUPPOSITORY RECTAL EVERY 6 HOURS PRN
Status: DISCONTINUED | OUTPATIENT
Start: 2023-01-24 | End: 2023-01-26 | Stop reason: HOSPADM

## 2023-01-24 RX ORDER — LORAZEPAM 2 MG/ML
1 INJECTION INTRAMUSCULAR EVERY 4 HOURS PRN
Status: DISCONTINUED | OUTPATIENT
Start: 2023-01-24 | End: 2023-01-26 | Stop reason: HOSPADM

## 2023-01-24 RX ORDER — LEVETIRACETAM 10 MG/ML
1000 INJECTION INTRAVASCULAR ONCE
Status: DISCONTINUED | OUTPATIENT
Start: 2023-01-24 | End: 2023-01-24

## 2023-01-24 RX ORDER — ENOXAPARIN SODIUM 100 MG/ML
40 INJECTION SUBCUTANEOUS DAILY
Status: DISCONTINUED | OUTPATIENT
Start: 2023-01-25 | End: 2023-01-26 | Stop reason: HOSPADM

## 2023-01-24 RX ORDER — 0.9 % SODIUM CHLORIDE 0.9 %
1000 INTRAVENOUS SOLUTION INTRAVENOUS ONCE
Status: COMPLETED | OUTPATIENT
Start: 2023-01-24 | End: 2023-01-24

## 2023-01-24 RX ORDER — SODIUM CHLORIDE 0.9 % (FLUSH) 0.9 %
5-40 SYRINGE (ML) INJECTION PRN
Status: DISCONTINUED | OUTPATIENT
Start: 2023-01-24 | End: 2023-01-26 | Stop reason: HOSPADM

## 2023-01-24 RX ORDER — LEVETIRACETAM 10 MG/ML
1000 INJECTION INTRAVASCULAR EVERY 12 HOURS
Status: DISCONTINUED | OUTPATIENT
Start: 2023-01-25 | End: 2023-01-26

## 2023-01-24 RX ORDER — ACETAMINOPHEN 325 MG/1
650 TABLET ORAL EVERY 6 HOURS PRN
Status: DISCONTINUED | OUTPATIENT
Start: 2023-01-24 | End: 2023-01-26 | Stop reason: HOSPADM

## 2023-01-24 RX ORDER — INSULIN LISPRO 100 [IU]/ML
0-4 INJECTION, SOLUTION INTRAVENOUS; SUBCUTANEOUS NIGHTLY
Status: DISCONTINUED | OUTPATIENT
Start: 2023-01-24 | End: 2023-01-25 | Stop reason: ALTCHOICE

## 2023-01-24 RX ORDER — LEVETIRACETAM 10 MG/ML
INJECTION INTRAVASCULAR
Status: COMPLETED
Start: 2023-01-24 | End: 2023-01-24

## 2023-01-24 RX ORDER — INSULIN LISPRO 100 [IU]/ML
0-4 INJECTION, SOLUTION INTRAVENOUS; SUBCUTANEOUS EVERY 4 HOURS
Status: DISCONTINUED | OUTPATIENT
Start: 2023-01-24 | End: 2023-01-26 | Stop reason: HOSPADM

## 2023-01-24 RX ORDER — ONDANSETRON 4 MG/1
4 TABLET, ORALLY DISINTEGRATING ORAL EVERY 8 HOURS PRN
Status: DISCONTINUED | OUTPATIENT
Start: 2023-01-24 | End: 2023-01-26 | Stop reason: HOSPADM

## 2023-01-24 RX ORDER — SODIUM CHLORIDE 450 MG/100ML
INJECTION, SOLUTION INTRAVENOUS CONTINUOUS
Status: DISCONTINUED | OUTPATIENT
Start: 2023-01-24 | End: 2023-01-24

## 2023-01-24 RX ORDER — POTASSIUM CHLORIDE 7.45 MG/ML
10 INJECTION INTRAVENOUS PRN
Status: DISCONTINUED | OUTPATIENT
Start: 2023-01-24 | End: 2023-01-26 | Stop reason: HOSPADM

## 2023-01-24 RX ORDER — LEVETIRACETAM 10 MG/ML
1000 INJECTION INTRAVASCULAR ONCE
Status: COMPLETED | OUTPATIENT
Start: 2023-01-24 | End: 2023-01-24

## 2023-01-24 RX ORDER — SODIUM CHLORIDE 9 MG/ML
INJECTION, SOLUTION INTRAVENOUS PRN
Status: DISCONTINUED | OUTPATIENT
Start: 2023-01-24 | End: 2023-01-24

## 2023-01-24 RX ADMIN — LEVETIRACETAM 1000 MG: 10 INJECTION INTRAVENOUS at 17:36

## 2023-01-24 RX ADMIN — SODIUM CHLORIDE 825 ML: 9 INJECTION, SOLUTION INTRAVENOUS at 21:03

## 2023-01-24 RX ADMIN — LEVETIRACETAM 1750 MG: 100 INJECTION, SOLUTION INTRAVENOUS at 18:13

## 2023-01-24 RX ADMIN — ACETAMINOPHEN 650 MG: 325 TABLET ORAL at 22:28

## 2023-01-24 RX ADMIN — SODIUM CHLORIDE 1000 ML: 9 INJECTION, SOLUTION INTRAVENOUS at 16:53

## 2023-01-24 RX ADMIN — Medication 10 ML: at 22:30

## 2023-01-24 RX ADMIN — LEVETIRACETAM 1000 MG: 10 INJECTION INTRAVASCULAR at 17:36

## 2023-01-24 RX ADMIN — DEXTROSE AND SODIUM CHLORIDE: 5; 450 INJECTION, SOLUTION INTRAVENOUS at 22:47

## 2023-01-24 RX ADMIN — ONDANSETRON 4 MG: 2 INJECTION INTRAMUSCULAR; INTRAVENOUS at 22:28

## 2023-01-24 ASSESSMENT — PAIN DESCRIPTION - DESCRIPTORS: DESCRIPTORS: ACHING

## 2023-01-24 ASSESSMENT — PAIN DESCRIPTION - LOCATION: LOCATION: HEAD

## 2023-01-24 ASSESSMENT — PAIN SCALES - GENERAL: PAINLEVEL_OUTOF10: 9

## 2023-01-24 ASSESSMENT — PAIN DESCRIPTION - FREQUENCY: FREQUENCY: CONTINUOUS

## 2023-01-24 ASSESSMENT — PAIN DESCRIPTION - ONSET: ONSET: ON-GOING

## 2023-01-24 ASSESSMENT — PAIN DESCRIPTION - PAIN TYPE: TYPE: ACUTE PAIN

## 2023-01-24 ASSESSMENT — PAIN - FUNCTIONAL ASSESSMENT: PAIN_FUNCTIONAL_ASSESSMENT: NONE - DENIES PAIN

## 2023-01-24 NOTE — H&P
Hospital Medicine History & Physical      PCP: Saad Rene MD    Date of Admission: 2023    Date of Service: Pt seen/examined on 23 and Admitted to Inpatient with expected LOS greater than two midnights due to medical therapy. Chief Complaint:    Chief Complaint   Patient presents with    Seizures     Pt has had 3 seizures today. Pt states she takes Keppra and took today. She has been having seizures uncontrolled since she was 14years old. Pt states her last seizure prior to today was last week, but has not followed up with her neurologist at [facility]. History Of Present Illness: 69-year-old female with past medical history significant for insulin-dependent diabetes, seizure disorder presents from nursing facility with breakthrough seizure. Patient provides limited information, as such he appears to be on a postictal state. Laboratory studies show significant hyperglycemia with glucose over 400 and gap of 26. Past Medical History:          Diagnosis Date    Depression     Diabetes mellitus ([address]     Seizures ([address]        Past Surgical History:          Procedure Laterality Date     SECTION      TUBAL LIGATION         Medications Prior to Admission:      Prior to Admission medications    Medication Sig Start Date End Date Taking? Authorizing Provider   blood glucose test strips (ASCENSIA AUTODISC VI;ONE TOUCH ULTRA TEST VI) strip Up to 8 times daily As needed for blood sugar testing. 23   Saad Rene MD   Continuous Blood Gluc Sensor (DEXCOM G6 SENSOR) MISC Apply as directed for blood sugar monitoring 23   Saad Rene MD   clonazePAM (KLONOPIN) 2 MG tablet Take 1 tablet by mouth in the morning and at bedtime for 30 days.  23  Saad Rene MD   gabapentin (NEURONTIN) 300 MG capsule TAKE 1 CAPSULE BY MOUTH THREE TIMES DAILY DRUNG THE DAY AND 3 CAPSULES BY MOUTH EVERY DAY AT BEDTIME 23  Saad Rene MD   ondansetron (ZOFRAN) 4 MG tablet TAKE 1 TABLET BY MOUTH THREE TIMES DAILY AS NEEDED FOR NAUSEA FOR VOMITING 1/6/23   Saad Rene MD   ibuprofen (ADVIL;MOTRIN) 800 MG tablet TAKE 1 TABLET BY MOUTH THREE TIMES DAILY AS NEEDED FOR PAIN 1/6/23   Saad Rene MD   levETIRAcetam (KEPPRA) 500 MG tablet Take 2 tablets by mouth 2 times daily 11/29/22   Saad Rene MD   Continuous Blood Gluc Transmit (DEXCOM G6 TRANSMITTER) MISC USE AS DIRECTED 11/29/22   Saad Rene MD   insulin aspart (NOVOLOG) 100 UNIT/ML injection vial Inject 100 units into the skin daily in conjunction with omnipod 11/29/22   Saad Rene MD   sertraline (ZOLOFT) 50 MG tablet Take 1 tablet by mouth once daily 11/29/22   Saad Rene MD   Insulin Disposable Pump (OMNIPOD DASH PODS, GEN 4,) MISC USE AS DIRECTED (MAX  DOSE  60  UNITS) 11/27/22   Saad Rene MD   insulin aspart (NOVOLOG FLEXPEN) 100 UNIT/ML injection pen Inject 20 units with meals/snacks up to 5 times per day 11/27/22   Saad Rene MD   blood glucose test strips (TRUE METRIX BLOOD GLUCOSE TEST) strip Check blood sugar 4-6 times daily and as needed for symptoms of irregular glucose 10/28/22   Saad Rene MD   insulin aspart (NOVOLOG) 100 UNIT/ML injection vial Inject 100 Units into the skin daily In conjunction with omnipod 9/6/22   Saad Rene MD   Blood Glucose Monitoring Suppl (TRUE METRIX METER) w/Device KIT Check blood sugar 4 times daily, tidac 5/27/22   Taylor Chance, DO   Lancets MISC Check blood sugar 4 times daily, tidac 5/27/22   Taylor Chance, DO   famotidine (PEPCID) 20 MG tablet Take 20 mg by mouth 2 times daily    Historical Provider, MD       Allergies:  Patient has no known allergies. Social History:      The patient currently lives nursing home    TOBACCO:   reports that she has never smoked. She has never used smokeless tobacco.  ETOH:   reports no history of alcohol use.   E-cigarette/Vaping       Questions Responses    E-cigarette/Vaping Use Never User    Start Date     Passive Exposure     Quit Date Counseling Given     Comments               Family History:       Reviewed and negative in regards to presenting illness/complaint.        Problem Relation Age of Onset    Asthma Mother     Allergies Mother         sun allergy    Diabetes Type 1  Father     Diabetes Type 1  Maternal Grandfather     Diabetes Type 1  Cousin     Diabetes Type 1  Cousin     Diabetes Type 1  Maternal Uncle     Diabetes Type 1  Maternal Uncle     Diabetes Type 1  Maternal Aunt        REVIEW OF SYSTEMS COMPLETED:   Pertinent positives as noted in the HPI. All other systems reviewed and negative.    PHYSICAL EXAM PERFORMED:    /85   Pulse (!) 134   Temp 97.7 °F (36.5 °C) (Oral)   Resp 18   Ht 4' 11\" (1.499 m)   Wt 121 lb 4.1 oz (55 kg)   SpO2 98%   BMI 24.49 kg/m²     General appearance:  No apparent distress, appears stated age and cooperative.  HEENT:  Normal cephalic, atraumatic without obvious deformity. Pupils equal, round, and reactive to light.  Extra ocular muscles intact. Conjunctivae/corneas clear.  Neck: Supple, with full range of motion. No jugular venous distention. Trachea midline.  Respiratory:  Normal respiratory effort. Clear to auscultation, bilaterally without Rales/Wheezes/Rhonchi.  Cardiovascular:  Regular rate and rhythm with normal S1/S2 without murmurs, rubs or gallops.  Abdomen: Soft, non-tender, non-distended with normal bowel sounds.  Musculoskeletal:  No clubbing, cyanosis or edema bilaterally.  Full range of motion without deformity.  Skin: Skin color, texture, turgor normal.  No rashes or lesions.  Neurologic:  Neurovascularly intact without any focal sensory/motor deficits. Cranial nerves: II-XII intact, grossly non-focal.  Psychiatric:  Alert and oriented, thought content appropriate, normal insight  Capillary Refill: Brisk,3 seconds, normal  Peripheral Pulses: +2 palpable, equal bilaterally       Labs:     Recent Labs     01/24/23  1638   WBC 11.5*   HGB 10.4*   HCT 36.3        Recent  Labs     01/24/23  1638   *   K 4.2   CL 98*   CO2 10*   BUN 14   CREATININE 0.8   CALCIUM 9.4     Recent Labs     01/24/23  1638   AST 19   ALT 14   BILITOT <0.2   ALKPHOS 79     No results for input(s): INR in the last 72 hours. No results for input(s): Margaritaagueda Vazquez in the last 72 hours. Urinalysis:      Lab Results   Component Value Date/Time    NITRU Negative 08/13/2022 05:20 PM    WBCUA 0 07/29/2022 10:21 PM    BACTERIA None Seen 07/29/2022 10:21 PM    RBCUA 0 07/29/2022 10:21 PM    BLOODU Negative 08/13/2022 05:20 PM    SPECGRAV 1.021 08/13/2022 05:20 PM    GLUCOSEU >=1000 08/13/2022 05:20 PM    GLUCOSEU >=1000 01/17/2011 09:15 AM       Radiology:         No orders to display       Consults:    IP CONSULT TO NEUROLOGY  IP CONSULT TO HOSPITALIST  IP CONSULT TO NEUROLOGY    ASSESSMENT:    Active Hospital Problems    Diagnosis Date Noted    Breakthrough seizure [address] [address] Jose J Cash 01/24/2023     Priority: Medium         PLAN:    Diabetic ketoacidosis  Insulin-dependent diabetes   Initial glucose over 400, gap of 26, pH is 7.1   Initiate insulin infusion until gap closes   Keep n.p.o. Check CXR + UA    Seizure disorder with breakthrough seizure   Continue Keppra twice daily   Lorazepam as needed   Consult neurology    DVT Prophylaxis: +  Diet: No diet orders on file  Code Status: Prior    PT/OT Eval Status: -    Dispo - admit to Nishant Malone MD    Thank you Leanna Cintron MD for the opportunity to be involved in this patient's care. If you have any questions or concerns please feel free to contact me at [phone].

## 2023-01-24 NOTE — ED NOTES
Per EMS patient had a seizure around [phone]. Per EMS patient has had 3 seizures today, each lasting 30 seconds. Patient does take Keppra. . Known diabetic. Tachy on monitor. Alert and oriented but appears postictal at this time.       Faustino Varghese RN  01/24/23 9804

## 2023-01-24 NOTE — ED PROVIDER NOTES
11 [facility]    CHIEF COMPLAINT  Seizures (Pt has had 3 seizures today. Pt states she takes Keppra and took today. She has been having seizures uncontrolled since she was 14years old. Pt states her last seizure prior to today was last week, but has not followed up with her neurologist at [facility]. )       [facility]  Yael Cheema is a 34 y.o. female who presents to the ED with complaint of seizure. Known history of seizures. 3 seizures today. Last seizure lasted 30-60 seconds. Coming from home by EMS. No meds given prior to arrival. Takes Keppra. . Pt bit tongue prior to arrival. Otherwise without complaint. Noted visit to PCP 10/23/2022 which reveals seizure disorder, \"not very well controlled, scheduled to see neurology\" with plans to continue Keppra 1000 mg twice daily and Klonopin 2 mg twice daily for 30 days. Patient was admitted to [facility] 10/12/2020 through 10/16/2020 for DKA. At which time her medication regimen was noted to include Keppra 2000 mg twice daily, Lamictal 200 mg twice daily, Onfi 10 mg at night. I note previous adverse effects of zonisamide and Dilantin. Patient was also on Klonopin 2 mg twice daily daily for anxiety and gabapentin 300 mg a.m./100 mg p.m. for diabetic neuropathy at that time.     I have reviewed the following from the nursing documentation:    Past Medical History:   Diagnosis Date    Depression     Diabetes mellitus ([facility] [address]     Seizures ([facility] [address]      Past Surgical History:   Procedure Laterality Date     SECTION      TUBAL LIGATION       Family History   Problem Relation Age of Onset    Asthma Mother     Allergies Mother         sun allergy    Diabetes Type 1  Father     Diabetes Type 1  Maternal Grandfather     Diabetes Type 1  Cousin     Diabetes Type 1  Cousin     Diabetes Type 1  Maternal Uncle     Diabetes Type 1  Maternal Uncle     Diabetes Type 1  Maternal Aunt      Social History     Socioeconomic History    Marital status:      Spouse name: Not on file    Number of children: Not on file    Years of education: Not on file    Highest education level: Not on file   Occupational History    Not on file   Tobacco Use    Smoking status: Never    Smokeless tobacco: Never   Vaping Use    Vaping Use: Never used   Substance and Sexual Activity    Alcohol use: No    Drug use: No    Sexual activity: Yes     Partners: Male   Other Topics Concern    Not on file   Social History Narrative    Not on file     Social Determinants of Health     Financial Resource Strain: Low Risk     Difficulty of Paying Living Expenses: Not hard at all   Food Insecurity: No Food Insecurity    Worried About Running Out of Food in the Last Year: Never true    Ran Out of Food in the Last Year: Never true   Transportation Needs: Not on file   Physical Activity: Not on file   Stress: Not on file   Social Connections: Not on file   Intimate Partner Violence: Not on file   Housing Stability: Not on file     No current facility-administered medications for this encounter. Current Outpatient Medications   Medication Sig Dispense Refill    blood glucose test strips (ASCENSIA AUTODISC VI;ONE TOUCH ULTRA TEST VI) strip Up to 8 times daily As needed for blood sugar testing. 200 each 3    Continuous Blood Gluc Sensor (DEXCOM G6 SENSOR) MISC Apply as directed for blood sugar monitoring 10 each 5    clonazePAM (KLONOPIN) 2 MG tablet Take 1 tablet by mouth in the morning and at bedtime for 30 days.  60 tablet 0    gabapentin (NEURONTIN) 300 MG capsule TAKE 1 CAPSULE BY MOUTH THREE TIMES DAILY DRUNG THE DAY AND 3 CAPSULES BY MOUTH EVERY DAY AT BEDTIME 210 capsule 3    ondansetron (ZOFRAN) 4 MG tablet TAKE 1 TABLET BY MOUTH THREE TIMES DAILY AS NEEDED FOR NAUSEA FOR VOMITING 90 tablet 0    ibuprofen (ADVIL;MOTRIN) 800 MG tablet TAKE 1 TABLET BY MOUTH THREE TIMES DAILY AS NEEDED FOR PAIN 90 tablet 0    levETIRAcetam (KEPPRA) 500 MG tablet Take 2 tablets by mouth 2 times daily 120 tablet 5    Continuous Blood Gluc Transmit (DEXCOM G6 TRANSMITTER) MISC USE AS DIRECTED 1 each 0    insulin aspart (NOVOLOG) 100 UNIT/ML injection vial Inject 100 units into the skin daily in conjunction with omnipod 30 mL 5    sertraline (ZOLOFT) 50 MG tablet Take 1 tablet by mouth once daily 30 tablet 2    Insulin Disposable Pump (OMNIPOD DASH PODS, GEN 4,) MISC USE AS DIRECTED (MAX  DOSE  60  UNITS) 10 each 5    insulin aspart (NOVOLOG FLEXPEN) 100 UNIT/ML injection pen Inject 20 units with meals/snacks up to 5 times per day 10 Adjustable Dose Pre-filled Pen Syringe 5    blood glucose test strips (TRUE METRIX BLOOD GLUCOSE TEST) strip Check blood sugar 4-6 times daily and as needed for symptoms of irregular glucose 400 each 3    insulin aspart (NOVOLOG) 100 UNIT/ML injection vial Inject 100 Units into the skin daily In conjunction with omnipod 30 mL 5    Blood Glucose Monitoring Suppl (TRUE METRIX METER) w/Device KIT Check blood sugar 4 times daily, tidac 1 kit 0    Lancets MISC Check blood sugar 4 times daily, tidac 300 each 1    famotidine (PEPCID) 20 MG tablet Take 20 mg by mouth 2 times daily       No Known Allergies      PHYSICAL EXAM  ED Triage Vitals [01/24/23 1626]   Enc Vitals Group      /85      Heart Rate (!) 134      Resp 18      Temp 97.7 °F (36.5 °C)      Temp Source Oral      SpO2 98 %      Weight 121 lb 4.1 oz (55 kg)      Height 4' 11\" (1.499 m)      Head Circumference       Peak Flow       Pain Score       Pain Loc       Pain Edu? Excl. in [address]? General appearance: Awake and alert. Cooperative. No acute distress. HENT: Normocephalic. Mucous membranes are moist. Evidence of tongue biting. Neck: No meningismus. Eyes: PERRL. EOMI. Heart/Chest: RRR. No murmurs. Lungs: Respirations unlabored. CTAB. Good air exchange. Abdomen: Soft. Non-tender. Non-distended. No rebound or guarding. Musculoskeletal: No extremity edema. No deformity.   No tenderness in the extremities. All extremities neurovascularly intact. Skin: Warm and dry. No acute rashes. Neurological: Alert and oriented. CN II-XII intact. Psychiatric: Mood/affect: normal      LABS  I have reviewed all labs for this visit.    Results for orders placed or performed during the hospital encounter of 01/24/23   CBC with Auto Differential   Result Value Ref Range    WBC 11.5 (H) 4.0 - 11.0 K/uL    RBC 5.07 4.00 - 5.20 M/uL    Hemoglobin 10.4 (L) 12.0 - 16.0 g/dL    Hematocrit 36.3 36.0 - 48.0 %    MCV 71.5 (L) 80.0 - 100.0 fL    MCH 20.6 (L) 26.0 - 34.0 pg    MCHC 28.8 (L) 31.0 - 36.0 g/dL    RDW 20.8 (H) 12.4 - 15.4 %    Platelets 053 253 - 874 K/uL    MPV 8.2 5.0 - 10.5 fL    Neutrophils % 82.0 %    Lymphocytes % 12.9 %    Monocytes % 4.2 %    Eosinophils % 0.2 %    Basophils % 0.7 %    Neutrophils Absolute 9.4 (H) 1.7 - 7.7 K/uL    Lymphocytes Absolute 1.5 1.0 - 5.1 K/uL    Monocytes Absolute 0.5 0.0 - 1.3 K/uL    Eosinophils Absolute 0.0 0.0 - 0.6 K/uL    Basophils Absolute 0.1 0.0 - 0.2 K/uL   Comprehensive Metabolic Panel w/ Reflex to MG   Result Value Ref Range    Sodium 134 (L) 136 - 145 mmol/L    Potassium reflex Magnesium 4.2 3.5 - 5.1 mmol/L    Chloride 98 (L) 99 - 110 mmol/L    CO2 10 (LL) 21 - 32 mmol/L    Anion Gap 26 (H) 3 - 16    Glucose 276 (H) 70 - 99 mg/dL    BUN 14 7 - 20 mg/dL    Creatinine 0.8 0.6 - 1.1 mg/dL    Est, Glom Filt Rate >60 >60    Calcium 9.4 8.3 - 10.6 mg/dL    Total Protein 8.2 6.4 - 8.2 g/dL    Albumin 4.4 3.4 - 5.0 g/dL    Albumin/Globulin Ratio 1.2 1.1 - 2.2    Total Bilirubin <0.2 0.0 - 1.0 mg/dL    Alkaline Phosphatase 79 40 - 129 U/L    ALT 14 10 - 40 U/L    AST 19 15 - 37 U/L   Levetiracetam Level   Result Value Ref Range    Levetiracetam Lvl <2.0 (L) 6.0 - 46.0 ug/mL    KEPPRA Dose Amt Unknown    Beta-Hydroxybutyrate   Result Value Ref Range    Beta-Hydroxybutyrate 2.29 (H) 0.00 - 0.27 mmol/L   Blood Gas, Venous   Result Value Ref Range    pH, Francesca Rachel 7.162 (LL) 7.350 - 7.450    pCO2, Drew 27.2 (L) 40.0 - 50.0 mmHg    pO2, Drew 42 Not Established mmHg    HCO3, Venous 10 (L) 23 - 29 mmol/L    Base Excess, Drew -17.5 Not Established mmol/L    O2 Sat, Drew 64 Not Established %    Carboxyhemoglobin 1.5 %    MetHgb, Drew 1.0 <1.5 %    TC02 (Calc), Drew 11 Not Established mmol/L    O2 Therapy Unknown    Lactic Acid   Result Value Ref Range    Lactic Acid 10.3 (HH) 0.4 - 2.0 mmol/L   HCG, Serum, Qualitative   Result Value Ref Range    hCG Qual Negative Detects HCG level >10 MIU/mL         RADIOLOGY  I have reviewed all radiographic studies for this visit. No orders to display        ED COURSE/MDM    34 y.o. female presents with seizure. History of epilepsy with poorly controlled seizures and concern for lost to follow-up with outpatient neurology. On arrival patient is postictal with improving mental status. Patient tachycardic on arrival.  Administered 1 L IV normal saline bolus. Case was discussed with neurology at [facility] at patient's request.  D/w Dr. Luna Moss. To the extent patient to be managed outpatient recommends increasing Keppra to 2 g twice daily and restarting Onfi. Pt had another seizure (4th today) while I was speaking with the neurologist.  We agreed on administering a Keppra load and will admit to this facility. I reviewed the patient's laboratory studies which are notable for a severe metabolic acidosis secondary to lactate elevation, likely related to the patient's seizure. IV fluids as above. Hyperglycemia blood glucose 276. I suspect the anion gap is likely most related to the lactate elevation however hydroxybutyrate is also elevated. We will treat hyperglycemia with IV fluids but defer insulin drip pending reassessment of laboratory studies after fluid resuscitation and resolution of seizures. Case discussed with hospitalist via [email or web address].   Admit to hospital medicine.     - History obtained from: patient, patient's significant other, present at bedside, who indicates patient under stress related to recent death of a friend   - Records reviewed: OSH records as in HPI    - Consults:   IP CONSULT TO NEUROLOGY  IP CONSULT TO HOSPITALIST  IP CONSULT TO NEUROLOGY     - Tests considered: Consider noncontrast head CT however given well-documented history of epilepsy no indication for such at this time. Chronic Conditions: epilepsy      Is this patient to be included in the SEP-1 Core Measure? No   Exclusion criteria - the patient is NOT to be included for SEP-1 Core Measure due to: Infection is not suspected    I, Maya Mcdonald MD, am the primary clinician of record. During the patient's ED course, the patient was given:  Medications   0.9 % sodium chloride bolus (0 mLs IntraVENous Stopped 1/24/23 1753)   levETIRAcetam (KEPPRA) 1000 mg/100 mL IVPB (0 mg IntraVENous Stopped 1/24/23 1751)     And   levETIRAcetam (KEPPRA) 1,750 mg in sodium chloride 0.9 % 100 mL IVPB (0 mg IntraVENous Stopped 1/24/23 1828)        Patient was given prescriptions for the following medications. I counseled the patient as to how to take these medications. New Prescriptions    No medications on file       Patient instructed to follow up with:   No follow-up provider specified. All questions were answered and the patient/family expressed understanding and agreement with the plan. PROCEDURES  None    CRITICAL CARE  Total critical care time provided today thus far was 32 minutes. This excludes seperately billable procedures. Critical care time was provided for multiple seizures requiring IV antiepileptics and that required close evaluation and/or intervention with concern for potential patient decompensation. CLINICAL IMPRESSION  1. Breakthrough seizure ([address]    2. Hyperglycemia    3. High anion gap metabolic acidosis        DISPOSITION   admit    Maya Mcdonald MD    Note: This chart was created using voice recognition dictation software.  Efforts were made by me to ensure accuracy, however some errors may be present due to limitations of this technology and occasionally words are not transcribed correctly.         Maya Mcdonald MD  01/24/23 9192

## 2023-01-25 ENCOUNTER — TELEPHONE (OUTPATIENT)
Dept: INTERNAL MEDICINE CLINIC | Age: 33
End: 2023-01-25

## 2023-01-25 ENCOUNTER — APPOINTMENT (OUTPATIENT)
Dept: CT IMAGING | Age: 33
DRG: 053 | End: 2023-01-25
Payer: COMMERCIAL

## 2023-01-25 LAB
ANION GAP SERPL CALCULATED.3IONS-SCNC: 14 MMOL/L (ref 3–16)
ANION GAP SERPL CALCULATED.3IONS-SCNC: 14 MMOL/L (ref 3–16)
ANION GAP SERPL CALCULATED.3IONS-SCNC: 16 MMOL/L (ref 3–16)
BUN BLDV-MCNC: 7 MG/DL (ref 7–20)
BUN BLDV-MCNC: 8 MG/DL (ref 7–20)
BUN BLDV-MCNC: 9 MG/DL (ref 7–20)
CALCIUM SERPL-MCNC: 8.2 MG/DL (ref 8.3–10.6)
CALCIUM SERPL-MCNC: 8.2 MG/DL (ref 8.3–10.6)
CALCIUM SERPL-MCNC: 8.6 MG/DL (ref 8.3–10.6)
CHLORIDE BLD-SCNC: 105 MMOL/L (ref 99–110)
CHLORIDE BLD-SCNC: 107 MMOL/L (ref 99–110)
CHLORIDE BLD-SCNC: 109 MMOL/L (ref 99–110)
CO2: 13 MMOL/L (ref 21–32)
CO2: 15 MMOL/L (ref 21–32)
CO2: 15 MMOL/L (ref 21–32)
CREAT SERPL-MCNC: 0.5 MG/DL (ref 0.6–1.1)
CREAT SERPL-MCNC: 0.7 MG/DL (ref 0.6–1.1)
CREAT SERPL-MCNC: 0.7 MG/DL (ref 0.6–1.1)
GFR SERPL CREATININE-BSD FRML MDRD: >60 ML/MIN/{1.73_M2}
GLUCOSE BLD-MCNC: 118 MG/DL (ref 70–99)
GLUCOSE BLD-MCNC: 181 MG/DL (ref 70–99)
GLUCOSE BLD-MCNC: 205 MG/DL (ref 70–99)
GLUCOSE BLD-MCNC: 225 MG/DL (ref 70–99)
GLUCOSE BLD-MCNC: 241 MG/DL (ref 70–99)
GLUCOSE BLD-MCNC: 251 MG/DL (ref 70–99)
GLUCOSE BLD-MCNC: 261 MG/DL (ref 70–99)
GLUCOSE BLD-MCNC: 294 MG/DL (ref 70–99)
GLUCOSE BLD-MCNC: 328 MG/DL (ref 70–99)
GLUCOSE BLD-MCNC: 340 MG/DL (ref 70–99)
GLUCOSE BLD-MCNC: 346 MG/DL (ref 70–99)
GLUCOSE BLD-MCNC: 392 MG/DL (ref 70–99)
HEMATOLOGY PATH CONSULT: NORMAL
MAGNESIUM: 1.8 MG/DL (ref 1.8–2.4)
MAGNESIUM: 1.9 MG/DL (ref 1.8–2.4)
MAGNESIUM: 2.1 MG/DL (ref 1.8–2.4)
PERFORMED ON: ABNORMAL
PHOSPHORUS: 2.3 MG/DL (ref 2.5–4.9)
PHOSPHORUS: 2.9 MG/DL (ref 2.5–4.9)
PHOSPHORUS: 2.9 MG/DL (ref 2.5–4.9)
POTASSIUM SERPL-SCNC: 3.8 MMOL/L (ref 3.5–5.1)
POTASSIUM SERPL-SCNC: 4.1 MMOL/L (ref 3.5–5.1)
POTASSIUM SERPL-SCNC: 4.5 MMOL/L (ref 3.5–5.1)
SODIUM BLD-SCNC: 134 MMOL/L (ref 136–145)
SODIUM BLD-SCNC: 136 MMOL/L (ref 136–145)
SODIUM BLD-SCNC: 138 MMOL/L (ref 136–145)

## 2023-01-25 PROCEDURE — 2580000003 HC RX 258: Performed by: INTERNAL MEDICINE

## 2023-01-25 PROCEDURE — 6370000000 HC RX 637 (ALT 250 FOR IP): Performed by: NURSE PRACTITIONER

## 2023-01-25 PROCEDURE — 6360000002 HC RX W HCPCS: Performed by: NURSE PRACTITIONER

## 2023-01-25 PROCEDURE — 6370000000 HC RX 637 (ALT 250 FOR IP): Performed by: INTERNAL MEDICINE

## 2023-01-25 PROCEDURE — 83735 ASSAY OF MAGNESIUM: CPT

## 2023-01-25 PROCEDURE — 84100 ASSAY OF PHOSPHORUS: CPT

## 2023-01-25 PROCEDURE — 80048 BASIC METABOLIC PNL TOTAL CA: CPT

## 2023-01-25 PROCEDURE — 36415 COLL VENOUS BLD VENIPUNCTURE: CPT

## 2023-01-25 PROCEDURE — 94760 N-INVAS EAR/PLS OXIMETRY 1: CPT

## 2023-01-25 PROCEDURE — 6370000000 HC RX 637 (ALT 250 FOR IP): Performed by: STUDENT IN AN ORGANIZED HEALTH CARE EDUCATION/TRAINING PROGRAM

## 2023-01-25 PROCEDURE — 2060000000 HC ICU INTERMEDIATE R&B

## 2023-01-25 PROCEDURE — 70450 CT HEAD/BRAIN W/O DYE: CPT

## 2023-01-25 PROCEDURE — 6360000002 HC RX W HCPCS: Performed by: INTERNAL MEDICINE

## 2023-01-25 RX ORDER — INSULIN GLARGINE 100 [IU]/ML
10 INJECTION, SOLUTION SUBCUTANEOUS DAILY
Status: DISCONTINUED | OUTPATIENT
Start: 2023-01-25 | End: 2023-01-26 | Stop reason: HOSPADM

## 2023-01-25 RX ORDER — INSULIN GLARGINE 100 [IU]/ML
10 INJECTION, SOLUTION SUBCUTANEOUS NIGHTLY
Status: DISCONTINUED | OUTPATIENT
Start: 2023-01-25 | End: 2023-01-25

## 2023-01-25 RX ORDER — INSULIN LISPRO 100 [IU]/ML
3 INJECTION, SOLUTION INTRAVENOUS; SUBCUTANEOUS
Status: DISCONTINUED | OUTPATIENT
Start: 2023-01-25 | End: 2023-01-26 | Stop reason: HOSPADM

## 2023-01-25 RX ORDER — CLOBAZAM 10 MG/1
10 TABLET ORAL
Status: DISCONTINUED | OUTPATIENT
Start: 2023-01-25 | End: 2023-01-26 | Stop reason: HOSPADM

## 2023-01-25 RX ADMIN — Medication 10 ML: at 08:57

## 2023-01-25 RX ADMIN — ENOXAPARIN SODIUM 40 MG: 100 INJECTION SUBCUTANEOUS at 08:54

## 2023-01-25 RX ADMIN — INSULIN LISPRO 3 UNITS: 100 INJECTION, SOLUTION INTRAVENOUS; SUBCUTANEOUS at 18:14

## 2023-01-25 RX ADMIN — INSULIN LISPRO 1 UNITS: 100 INJECTION, SOLUTION INTRAVENOUS; SUBCUTANEOUS at 19:55

## 2023-01-25 RX ADMIN — INSULIN LISPRO 3 UNITS: 100 INJECTION, SOLUTION INTRAVENOUS; SUBCUTANEOUS at 03:20

## 2023-01-25 RX ADMIN — CLOBAZAM 10 MG: 10 TABLET ORAL at 21:38

## 2023-01-25 RX ADMIN — INSULIN GLARGINE 10 UNITS: 100 INJECTION, SOLUTION SUBCUTANEOUS at 12:48

## 2023-01-25 RX ADMIN — INSULIN LISPRO 2 UNITS: 100 INJECTION, SOLUTION INTRAVENOUS; SUBCUTANEOUS at 07:39

## 2023-01-25 RX ADMIN — Medication 10 ML: at 20:51

## 2023-01-25 RX ADMIN — LEVETIRACETAM 1000 MG: 10 INJECTION INTRAVENOUS at 18:26

## 2023-01-25 RX ADMIN — LEVETIRACETAM 1000 MG: 10 INJECTION INTRAVENOUS at 08:54

## 2023-01-25 RX ADMIN — INSULIN LISPRO 3 UNITS: 100 INJECTION, SOLUTION INTRAVENOUS; SUBCUTANEOUS at 15:19

## 2023-01-25 RX ADMIN — INSULIN LISPRO 1 UNITS: 100 INJECTION, SOLUTION INTRAVENOUS; SUBCUTANEOUS at 23:13

## 2023-01-25 ASSESSMENT — PAIN SCALES - GENERAL
PAINLEVEL_OUTOF10: 0
PAINLEVEL_OUTOF10: 0

## 2023-01-25 NOTE — CONSULTS
Neurology Consult Note  Reason for Consult: seizure     Chief complaint: Seizure     Dr Maricel Mera, * asked me to see Jame Shah in consultation for evaluation of seizure     History of Present Illness:  Jame Shah is a 34 y.o. female with past medical history of, DM and seizure (onset age 14) who presents with reports have three seizures prior to arrival.     I obtained my information via interview w/ the patient, supplemented by chart review. Patient states she woke up yesterday feeling normal. After her kids went to school her  called 911 d/t patient having three back to back seizures. Patient reports she has been having 2-3 seizures a month since being switched to keppra from phenytoin. Pt reports taking medication as prescribed without any missed doses. Reports biting tongue prior to arrival to evidence of trauma on exam. Patient reports taking Keppra 1000 mg BID and klonopin 2 mg BID. She states she sometimes takes and extra 1000 mg of keppra when she has seizure activity at home. Pt follows with Dr. Sonal Hampton outpatient was last seen 2020, she states her PCP has been managing her medications since. ED findings: /85, 134, 97.7. Postictal state. Laboratory studies show significant hyperglycemia with glucose over 400 and gap of 26.     Medical History:  Past Medical History:   Diagnosis Date    Depression     Diabetes mellitus ([facility]     Seizures ([facility] [address]      Past Surgical History:   Procedure Laterality Date     SECTION      TUBAL LIGATION       Scheduled Meds:   sodium chloride flush  5-40 mL IntraVENous 2 times per day    enoxaparin  40 mg SubCUTAneous Daily    levETIRAcetam  1,000 mg IntraVENous Q12H    insulin lispro  0-4 Units SubCUTAneous Q4H     Continuous Infusions:   dextrose 5 % and 0.45 % NaCl Stopped (23)     PRN Meds:.sodium chloride flush, LORazepam, ondansetron **OR** ondansetron, polyethylene glycol, acetaminophen **OR** acetaminophen, dextrose bolus **OR** dextrose bolus, potassium chloride, magnesium sulfate, sodium phosphate IVPB **OR** sodium phosphate IVPB **OR** sodium phosphate IVPB, dextrose 5 % and 0.45 % NaCl    Medications Prior to Admission: blood glucose test strips (ASCENSIA AUTODISC VI;ONE TOUCH ULTRA TEST VI) strip, Up to 8 times daily As needed for blood sugar testing. Continuous Blood Gluc Sensor (DEXCOM G6 SENSOR) MISC, Apply as directed for blood sugar monitoring  clonazePAM (KLONOPIN) 2 MG tablet, Take 1 tablet by mouth in the morning and at bedtime for 30 days.   gabapentin (NEURONTIN) 300 MG capsule, TAKE 1 CAPSULE BY MOUTH THREE TIMES DAILY DRUNG THE DAY AND 3 CAPSULES BY MOUTH EVERY DAY AT BEDTIME  ondansetron (ZOFRAN) 4 MG tablet, TAKE 1 TABLET BY MOUTH THREE TIMES DAILY AS NEEDED FOR NAUSEA FOR VOMITING  ibuprofen (ADVIL;MOTRIN) 800 MG tablet, TAKE 1 TABLET BY MOUTH THREE TIMES DAILY AS NEEDED FOR PAIN  levETIRAcetam (KEPPRA) 500 MG tablet, Take 2 tablets by mouth 2 times daily  Continuous Blood Gluc Transmit (DEXCOM G6 TRANSMITTER) MISC, USE AS DIRECTED  insulin aspart (NOVOLOG) 100 UNIT/ML injection vial, Inject 100 units into the skin daily in conjunction with omnipod  sertraline (ZOLOFT) 50 MG tablet, Take 1 tablet by mouth once daily  Insulin Disposable Pump (OMNIPOD DASH PODS, GEN 4,) MISC, USE AS DIRECTED (MAX  DOSE  60  UNITS)  insulin aspart (NOVOLOG FLEXPEN) 100 UNIT/ML injection pen, Inject 20 units with meals/snacks up to 5 times per day  blood glucose test strips (TRUE METRIX BLOOD GLUCOSE TEST) strip, Check blood sugar 4-6 times daily and as needed for symptoms of irregular glucose  insulin aspart (NOVOLOG) 100 UNIT/ML injection vial, Inject 100 Units into the skin daily In conjunction with omnipod  Blood Glucose Monitoring Suppl (TRUE METRIX METER) w/Device KIT, Check blood sugar 4 times daily, tidac  Lancets MISC, Check blood sugar 4 times daily, tidac  famotidine (PEPCID) 20 MG tablet, Take 20 mg by mouth 2 times daily    No Known Allergies    Family History   Problem Relation Age of Onset    Asthma Mother     Allergies Mother         sun allergy    Diabetes Type 1  Father     Diabetes Type 1  Maternal Grandfather     Diabetes Type 1  Cousin     Diabetes Type 1  Cousin     Diabetes Type 1  Maternal Uncle     Diabetes Type 1  Maternal Uncle     Diabetes Type 1  Maternal Aunt        Social History     Tobacco Use   Smoking Status Never   Smokeless Tobacco Never     Social History     Substance and Sexual Activity   Drug Use No     Social History     Substance and Sexual Activity   Alcohol Use No       ROS:  Constitutional- No weight loss or fevers  Eyes- No diplopia. No photophobia. Ears/nose/throat- No dysphagia. No Dysarthria  Cardiovascular- No palpitations. No chest pain  Respiratory- No dyspnea. No Cough  Gastrointestinal- No Abdominal pain. No Vomiting. Genitourinary- No incontinence. No urinary retention  Musculoskeletal- No myalgia. No arthralgia  Skin- No rash. No easy bruising. Psychiatric- No depression. No anxiety  Endocrine- No diabetes. No thyroid issues. Hematologic- No bleeding difficulty. No fatigue  Neurologic- + seizure. No weakness. No Headache. Exam:  Blood pressure 117/86, pulse (!) 122, temperature 98.7 °F (37.1 °C), temperature source Oral, resp. rate 14, height 4' 10\" (1.473 m), weight 121 lb 11.1 oz (55.2 kg), last menstrual period 12/24/2022, SpO2 100 %, not currently breastfeeding. Constitutional   Vital signs: BP, HR, and RR reviewed   General Alert, no distress, well-nourished  Eyes: unable to visualize the fundi  Cardiovascular: pulses symmetric in all 4 extremities. No peripheral edema. Psychiatric: cooperative with examination, no  psychotic behavior noted.   Neurologic  Mental status:   orientation to person place situation    General fund of knowledge grossly intact   Memory grossly intact   Attention intact as able to attend well to the exam     Language fluent in conversation   Comprehension intact; follows simple commands  Cranial nerves:   CN2: Visual Fields full w/o extinction on confrontational testing,   CN 3,4,6: extraocular muscles intact, pupils equal and reactive to light. CN5: facial sensation symmetric   CN7:face symmetric without dysarthria   CN8: hearing grossly intact  CN9: palate elevated symmetrically  CN11: trap full strength on shoulder shrug  CN12: tongue midline with protrusion  Strength: No pronator drift. Good strength in all 4 extremities   Deep tendon reflexes: normal in all 4 extremities  Sensory: light touch intact in all 4 extremities. No sensory extinction on double simultaneous stimulation  Cerebellar/coordination: finger nose finger normal without ataxia  Tone: normal in all 4 extremities  Gait: normal gait    Labs   Latest Reference Range & Units 1/25/23 07:46 1/25/23 08:39 1/25/23 11:12   Sodium 136 - 145 mmol/L 136     Potassium 3.5 - 5.1 mmol/L 4.1     Chloride 99 - 110 mmol/L 107     CO2 21 - 32 mmol/L 13 (LL)     BUN,BUNPL 7 - 20 mg/dL 8     Creatinine 0.6 - 1.1 mg/dL 0.7     Anion Gap 3 - 16  16     Est, Glom Filt Rate >60  >60     Magnesium 1.80 - 2.40 mg/dL 2.10     Glucose, Random 70 - 99 mg/dL 328 (H)     POC Glucose 70 - 99 mg/dl  225 (H) 181 (H)   CALCIUM, SERUM, 755609 8.3 - 10.6 mg/dL 8.6     Phosphorus 2.5 - 4.9 mg/dL 2.3 (L)        Latest Reference Range & Units 1/24/23 16:38   Levetiracetam 6.0 - 46.0 ug/mL <2.0 (L)      Latest Reference Range & Units 1/24/23 16:38   Color, UA Straw/Yellow  Yellow   Clarity, UA Clear  Clear   Glucose, UA Negative mg/dL 250 ! Bilirubin, Urine Negative  Negative   Ketones, Urine Negative mg/dL 80 ! Specific Gravity, UA 1.005 - 1.030  1.026   Blood, Urine Negative  TRACE !   pH, UA 5.0 - 8.0  5.0   Protein, UA Negative mg/dL 30 !    Urobilinogen, Urine <2.0 E.U./dL 0.2   Nitrite, Urine Negative  Negative   Leukocyte Esterase, Urine Negative  Negative   Urine Type  NotGiven   Urine Reflex to Culture  Not Indicated   Hyaline Casts, UA 0 - 8 /LPF 5   WBC, UA 0 - 5 /HPF 1   RBC, UA 0 - 4 /HPF 1   Epithelial Cells, UA 0 - 5 /HPF 5   Bacteria, UA None Seen /HPF None Seen   Yeast, Urine None Seen /HPF Present ! Microscopic Examination  YES   URINALYSIS WITH REFLEX TO CULTURE  Rpt       Studies  MRI brain w/wo 7/30/2022  Impression   Unremarkable MRI of the brain without contrast.     Impression  Patient with known history of seizure presents with DKA which likely lowered her seizure threshold causing her breakthrough seizure. Patient also with subtherapeutic keppra levels. Patient denies missed doses. Recommendations  CT head w/o (ordered)  Management of DKA is in progress. Continue home anticonvulsant regimen. Follow up with  neurology outpatient.      Jaspreet Garibay NP  [address] [place] [address] [id number] Neurology    A copy of this note was provided for Dr Isa Matamoros, *

## 2023-01-25 NOTE — PROGRESS NOTES
Hospitalist Progress Note      PCP: Laya Miller MD    Date of Admission: 1/24/2023    Chief Complaint:   Chief Complaint   Patient presents with    Seizures     Pt has had 3 seizures today. Pt states she takes Keppra and took today. She has been having seizures uncontrolled since she was 14years old. Pt states her last seizure prior to today was last week, but has not followed up with her neurologist at [facility]. Hospital Course: 27-year-old female with past medical history significant for insulin-dependent diabetes, seizure disorder presents from nursing facility with breakthrough seizure. Patient provides limited information, as such he appears to be on a postictal state. Laboratory studies show significant hyperglycemia with glucose over 400 and gap of 26.     Subjective: feeling better       Medications:  Reviewed    Infusion Medications    dextrose 5 % and 0.45 % NaCl Stopped (01/25/23 0314)     Scheduled Medications    insulin glargine  10 Units SubCUTAneous Nightly    sodium chloride flush  5-40 mL IntraVENous 2 times per day    enoxaparin  40 mg SubCUTAneous Daily    levETIRAcetam  1,000 mg IntraVENous Q12H    insulin lispro  0-4 Units SubCUTAneous Q4H     PRN Meds: sodium chloride flush, LORazepam, ondansetron **OR** ondansetron, polyethylene glycol, acetaminophen **OR** acetaminophen, dextrose bolus **OR** dextrose bolus, potassium chloride, magnesium sulfate, sodium phosphate IVPB **OR** sodium phosphate IVPB **OR** sodium phosphate IVPB, dextrose 5 % and 0.45 % NaCl      Intake/Output Summary (Last 24 hours) at 1/25/2023 1111  Last data filed at 1/25/2023 0313  Gross per 24 hour   Intake 943.31 ml   Output 1200 ml   Net -256.69 ml       Physical Exam Performed:    /86   Pulse (!) 122   Temp 98.7 °F (37.1 °C) (Oral)   Resp 14   Ht 4' 10\" (1.473 m)   Wt 121 lb 11.1 oz (55.2 kg)   LMP 12/24/2022 (Approximate)   SpO2 100%   BMI 25.43 kg/m²     General appearance: No apparent distress, appears stated age and cooperative.  HEENT: Pupils equal, round, and reactive to light. Conjunctivae/corneas clear.  Neck: Supple, with full range of motion. No jugular venous distention. Trachea midline.  Respiratory:  Normal respiratory effort. Clear to auscultation, bilaterally without Rales/Wheezes/Rhonchi.  Cardiovascular: Regular rate and rhythm with normal S1/S2 without murmurs, rubs or gallops.  Abdomen: Soft, non-tender, non-distended with normal bowel sounds.  Musculoskeletal: No clubbing, cyanosis or edema bilaterally.  Full range of motion without deformity.  Skin: Skin color, texture, turgor normal.  No rashes or lesions.  Neurologic:  Neurovascularly intact without any focal sensory/motor deficits. Cranial nerves: II-XII intact, grossly non-focal.  Psychiatric: Alert and oriented, thought content appropriate, normal insight  Capillary Refill: Brisk, 3 seconds, normal   Peripheral Pulses: +2 palpable, equal bilaterally       Labs:   Recent Labs     01/24/23  1638 01/24/23  1905   WBC 11.5* 12.2*   HGB 10.4* 9.0*   HCT 36.3 30.1*    323     Recent Labs     01/24/23  2349 01/25/23  0351 01/25/23  0746    134* 136   K 3.8 4.5 4.1    105 107   CO2 15* 15* 13*   BUN 9 7 8   CREATININE 0.5* 0.7 0.7   CALCIUM 8.2* 8.2* 8.6   PHOS 2.9 2.9 2.3*     Recent Labs     01/24/23  1638   AST 19   ALT 14   BILITOT <0.2   ALKPHOS 79     No results for input(s): INR in the last 72 hours.  No results for input(s): CKTOTAL, TROPONINI in the last 72 hours.    Urinalysis:      Lab Results   Component Value Date/Time    NITRU Negative 01/24/2023 04:38 PM    WBCUA 1 01/24/2023 04:38 PM    BACTERIA None Seen 01/24/2023 04:38 PM    RBCUA 1 01/24/2023 04:38 PM    BLOODU TRACE 01/24/2023 04:38 PM    SPECGRAV 1.026 01/24/2023 04:38 PM    GLUCOSEU 250 01/24/2023 04:38 PM    GLUCOSEU >=1000 01/17/2011 09:15 AM       Radiology:  XR CHEST PORTABLE   Final Result   No acute cardiopulmonary process.        IP CONSULT TO NEUROLOGY  IP CONSULT TO HOSPITALIST  IP CONSULT TO DIABETES EDUCATOR  IP CONSULT TO NEUROLOGY    Assessment/Plan:    Active Hospital Problems    Diagnosis     Breakthrough seizure ([facility] [address] [G40.919]      Priority: Medium     Diabetic ketoacidosis- resolved  Insulin-dependent diabetes            Poorly controled, last a1c 10; re start long acting insulin, monitor with accuchecks achs + SS      Seizure disorder with breakthrough seizure              Continue Keppra twice daily              Lorazepam as needed              Consult neurology     DVT Prophylaxis: +  Diet: No diet orders on file  Code Status: Prior     PT/OT Eval Status: -     Dispo - okay to transfer to medical floor          Juan Miguel Byrd MD

## 2023-01-25 NOTE — PROGRESS NOTES
Pt received to CVU 1 @ 2140 via ED. Drowsy, oriented to self, place and situation. Admission assessment/ questionnaire completed. C/O 9/10 headache and nausea- PRN's administered per order. BS 83 at this time. Pt has not received any insulin since arrival to hospital. Stated she uses an insulin pump which is at home, verified per skin assessment that there is no pump in place. Stated she took her novolog \"a few hours\" before her seizures and subsequent arrival to hospital. States she had a recent DKA admission and that she is a very brittle diabetic. Pharmacist and hospitalist notified. NP to bedside for eval. Orders to start patient on diet and D5 1/2NS gtt @ 150ml/hr. Insulin gtt cancelled and pt to be started on s/s insulin. POC discussed with pt and all questions addressed. She ate a turkey sandwich and pudding, tolerating PO fluids well. Assisted to [facility] and voiding urine without difficulty. Will cont to monitor.

## 2023-01-25 NOTE — PROGRESS NOTES
Updated spouse Phylicia Beams that patient transported to bed 1301.  No further questions at this time

## 2023-01-25 NOTE — PROGRESS NOTES
[facility]  Diabetes Education   Progress Note       [facility] REC[facility]:  [id number]  AGE: 34 y.o. GENDER: female  : 1990  TODAY'S DATE:  2023    Subjective   Reason for Diabetic Education Evaluation and Assessment: DKA    Solange Roper reports feeling better this morning. She ate breakfast this morning but avoided carbs. Solange Roper describes using only Novolog injections only to manage her diabetes while waiting for insurance/provider ordering issues to be resolved. She estimates that it has been about 2 weeks since she last used her insulin pump. She states that her Omni pod patch pump and Dexcom CGM supplies will be available 2/10.         Visit Type: evaluation      Azalia Mcdonough is a 34 y.o. female referred by:     [x] Physician  [] Nursing  [] Chart Review   [] Other:     PAST MEDICAL HISTORY        Diagnosis Date    Depression     Diabetes mellitus ([address]     Seizures ([facility] [address]        PAST SURGICAL HISTORY    Past Surgical History:   Procedure Laterality Date     SECTION      TUBAL LIGATION         FAMILY HISTORY    Family History   Problem Relation Age of Onset    Asthma Mother     Allergies Mother         sun allergy    Diabetes Type 1  Father     Diabetes Type 1  Maternal Grandfather     Diabetes Type 1  Cousin     Diabetes Type 1  Cousin     Diabetes Type 1  Maternal Uncle     Diabetes Type 1  Maternal Uncle     Diabetes Type 1  Maternal Aunt        SOCIAL HISTORY    Social History     Tobacco Use    Smoking status: Never    Smokeless tobacco: Never   Vaping Use    Vaping Use: Never used   Substance Use Topics    Alcohol use: No    Drug use: No       ALLERGIES    No Known Allergies    MEDICATIONS     sodium chloride flush  5-40 mL IntraVENous 2 times per day    enoxaparin  40 mg SubCUTAneous Daily    levETIRAcetam  1,000 mg IntraVENous Q12H    insulin lispro  0-4 Units SubCUTAneous Q4H       Objective        Patient Active Problem List Diagnosis Code    Lactic acidosis E87.20    Seizure disorder ([facility] G40.909    Type 1 diabetes mellitus with complication (HCC) K34.3    Anxiety and depression F41.9, F32. A    ROME (generalized anxiety disorder) F41.1    Anorexia nervosa F50.00    Diabetic peripheral neuropathy (HCC) E11.42    Gastroparesis K31.84    Weight loss, unintentional R63.4    Tachycardia R00.0    Elevated transaminase level R74.01    Bandemia D72.825    Nausea and vomiting R11.2    Diarrhea R19.7    Elevated liver enzymes R74.8    Diabetic ketoacidosis without coma associated with type 1 diabetes mellitus (HCC) E10.10    Neutrophilia D72.9    Abscess L02.91    Elevated platelet count U14.28    Seizure (HCC) R56.9    Weakness R53.1    DKA, type 1, not at goal [facility]) E10.10    Abscess of groin, left L02.214    MRSA bacteremia R78.81, B95.62    Poorly controlled diabetes mellitus ([facility] E11.65    Fatigue R53.83    History of depression Z86.59    Anemia D64.9    Non-adherence to medical treatment Z91.199    Breakthrough seizure (HCC) G40.919        /86   Pulse (!) 122   Temp 98.7 °F (37.1 °C) (Oral)   Resp 14   Ht 4' 10\" (1.473 m)   Wt 121 lb 11.1 oz (55.2 kg)   LMP 12/24/2022 (Approximate)   SpO2 100%   BMI 25.43 kg/m²     HgBA1c:    Lab Results   Component Value Date/Time    LABA1C 10.0 10/11/2022 01:48 PM       Recent Labs     01/24/23  2350 01/25/23  0305 01/25/23  0648 01/25/23  0839   POCGLU 127* 340* 261* 225*       BUN/Creatinine:    Lab Results   Component Value Date/Time    BUN 8 01/25/2023 07:46 AM    CREATININE 0.7 01/25/2023 07:46 AM       Assessment        Diabetes Management and Education    Does the patient have a Primary Care Physician? Yes, Marianna Ritter MD       Does the patient require new medication instruction? Yes - expresses confidence with insulin pen administration. She is able to describe correct technique. Reinforced basal and bolus best practices when pump not available.     Discussed transition back to pump timing based on last Lantus dose. Person responsible for administration of Insulin/Medication:        [x] Self     [] Caregiver       [] Spouse       [] Other Family Member   []  Other      Level of patient/caregiver understanding able to:       [x] Verbalized Understanding   [] Demonstrated Understanding       [] Teach Back       [] Needs Reinforcement     []  Other:        Does the patient/caregiver monitor Blood Glucoses? Yes. Reports having fingerstick supplies at home. Prefers to use Dexcom CGM. Recommend testing before meals and bedtime. Recommend BG targets 100 - 1800 due to hypoglycemia unawareness. Reviewed glycemic control targets, testing frequency and when to call PCP. Level of patient/caregiver understanding able to:        [x] Verbalized Understanding   [] Demonstrated Understanding       [] Teach Back       [] Needs Reinforcement     []  Other:        Does the patient/caregiver follow a Meal Plan? Yes Carb counts. She is able to identify carb amounts in common foods. Usual insulin to carb ratio is 1:15. Does the patient/caregiver understand S/S of Hypoglycemia? Yes. Reports not always feeling her lows. Reviewed symptoms, prevention and treatment. Level of patient/caregiver understanding able to:       [x] Verbalized Understanding   [] Demonstrated Understanding       [] Teach Back       [] Needs Reinforcement     [x]  Other:  agrees to notify staff if she has symptoms. Does the patient/caregiver understand S/S of Hyperglycemia? No:   Reports most of her blood sugars have been elevated. Reviewed symptoms, prevention and treatment. Level of patient/caregiver understanding able to:        [] Verbalized Understanding   [] Demonstrated Understanding       [] Teach Back       [x] Needs Reinforcement     []  Other:           Plan        Ongoing diabetes education and blood glucose monitoring. Recommend BG targets 100 - 180. Consider adding small prandial dose (2 - 3 units) of starts eating more consistent carbs. Win Montalvo reports she is expecting Dexcom and Omni pod supplies to be available on 2/10. Prefers insulin pens for Lantus.   Notified Braden Gilmore, *      Recommend follow up with Wellness Pharmacist.                                           Discharge Plan:  Discharge needs: insulin pens and 4mm pen needles       Teaching Time Diabetes Education:  40 minutes     Electronically signed by Ita Willoughby on 1/25/2023 at 10:54 AM

## 2023-01-25 NOTE — TELEPHONE ENCOUNTER
----- Message from Ramona Khoury sent at 1/25/2023 10:49 AM EST -----  Subject: Message to Provider    QUESTIONS  Information for Provider? pt has been placed in pt in [facility] due to   seizures. ---------------------------------------------------------------------------  --------------  [facility]  [phone]; OK to leave message on voicemail  ---------------------------------------------------------------------------  --------------  SCRIPT ANSWERS  Relationship to Patient?  Self

## 2023-01-25 NOTE — PROGRESS NOTES
4 Eyes Skin Assessment     NAME:  Deni Bonilla  YOB: 1990  MEDICAL RECORD NUMBER:  [id number]    The patient is being assessed for  Admission    I agree that One RN have performed a thorough Head to Toe Skin Assessment on the patient. ALL assessment sites listed below have been assessed. Areas assessed by both nurses:    Head, Face, Ears, Shoulders, Back, Chest, Arms, Elbows, Hands, Sacrum. Buttock, Coccyx, Ischium, and Legs. Feet and Heels        Does the Patient have a Wound?  No noted wound(s)       Luis Daniel Prevention initiated by RN: NA   Wound Care Orders initiated by RN: NA    Pressure Injury (Stage 3,4, Unstageable, DTI, NWPT, and Complex wounds) if present place referral order by RN under : NA    New and Established Ostomies, if present place, referral order under : NA      Nurse 1 eSignature: Electronically signed by Darian Monterroso RN on 1/24/23 at 11:12 PM EST    **SHARE this note so that the co-signing nurse is able to place an eSignature**    Nurse 2 eSignature: Electronically signed by Rachel Ruby RN on 1/25/23 at 12:48 AM EST

## 2023-01-25 NOTE — PROGRESS NOTES
Pharmacy Medication Reconciliation Note     List of medications Tanya Ornelas is currently taking is complete. Source of information:   1. Conversation with patient at bedside  2. EMR    Notes regarding home medications:   1. Patient still a bit lethargic, but reports taking all AM meds PTA in the ED  2. Patient reports compliance to DM and SZ medcation.     Patient denies taking any OTC or herbal medications    Geno Self, Pharmacy Intern  1/24/2023  8:02 PM

## 2023-01-25 NOTE — PROGRESS NOTES
ED SBAR report provider to [facility]IN [facility]YN[facility]. Patient to be transported to Room 1301 via stretcher by RN. Patient transported with bedside cardiac monitor. IV site clean, dry, and intact. MEWS score and pain assessed as 0/10 and documented. Updated patient on plan of care.

## 2023-01-25 NOTE — PROGRESS NOTES
BS up to 340. S/S insulin administered per order. Hospitalist notified, D5 1/2 NS gtt stopped per Dr. Tahira Hayes at this time.

## 2023-01-26 VITALS
SYSTOLIC BLOOD PRESSURE: 114 MMHG | WEIGHT: 116.18 LBS | RESPIRATION RATE: 14 BRPM | DIASTOLIC BLOOD PRESSURE: 77 MMHG | HEART RATE: 98 BPM | BODY MASS INDEX: 24.39 KG/M2 | TEMPERATURE: 98.8 F | OXYGEN SATURATION: 97 % | HEIGHT: 58 IN

## 2023-01-26 LAB
ANION GAP SERPL CALCULATED.3IONS-SCNC: 16 MMOL/L (ref 3–16)
BASOPHILS ABSOLUTE: 0.1 K/UL (ref 0–0.2)
BASOPHILS RELATIVE PERCENT: 1.1 %
BUN BLDV-MCNC: 6 MG/DL (ref 7–20)
CALCIUM SERPL-MCNC: 9.5 MG/DL (ref 8.3–10.6)
CHLORIDE BLD-SCNC: 104 MMOL/L (ref 99–110)
CO2: 19 MMOL/L (ref 21–32)
CREAT SERPL-MCNC: 0.6 MG/DL (ref 0.6–1.1)
EOSINOPHILS ABSOLUTE: 0.2 K/UL (ref 0–0.6)
EOSINOPHILS RELATIVE PERCENT: 1.9 %
GFR SERPL CREATININE-BSD FRML MDRD: >60 ML/MIN/{1.73_M2}
GLUCOSE BLD-MCNC: 112 MG/DL (ref 70–99)
GLUCOSE BLD-MCNC: 204 MG/DL (ref 70–99)
GLUCOSE BLD-MCNC: 231 MG/DL (ref 70–99)
GLUCOSE BLD-MCNC: 260 MG/DL (ref 70–99)
GLUCOSE BLD-MCNC: 74 MG/DL (ref 70–99)
HCT VFR BLD CALC: 32.9 % (ref 36–48)
HEMATOLOGY PATH CONSULT: NO
HEMOGLOBIN: 10.2 G/DL (ref 12–16)
LYMPHOCYTES ABSOLUTE: 2.9 K/UL (ref 1–5.1)
LYMPHOCYTES RELATIVE PERCENT: 33.8 %
MCH RBC QN AUTO: 21.5 PG (ref 26–34)
MCHC RBC AUTO-ENTMCNC: 30.9 G/DL (ref 31–36)
MCV RBC AUTO: 69.5 FL (ref 80–100)
MONOCYTES ABSOLUTE: 0.5 K/UL (ref 0–1.3)
MONOCYTES RELATIVE PERCENT: 6.2 %
NEUTROPHILS ABSOLUTE: 4.8 K/UL (ref 1.7–7.7)
NEUTROPHILS RELATIVE PERCENT: 57 %
PDW BLD-RTO: 21.2 % (ref 12.4–15.4)
PERFORMED ON: ABNORMAL
PERFORMED ON: NORMAL
PLATELET # BLD: 370 K/UL (ref 135–450)
PMV BLD AUTO: 7.9 FL (ref 5–10.5)
POTASSIUM SERPL-SCNC: 3.8 MMOL/L (ref 3.5–5.1)
RBC # BLD: 4.74 M/UL (ref 4–5.2)
SODIUM BLD-SCNC: 139 MMOL/L (ref 136–145)
WBC # BLD: 8.5 K/UL (ref 4–11)

## 2023-01-26 PROCEDURE — 80048 BASIC METABOLIC PNL TOTAL CA: CPT

## 2023-01-26 PROCEDURE — 6370000000 HC RX 637 (ALT 250 FOR IP): Performed by: INTERNAL MEDICINE

## 2023-01-26 PROCEDURE — 6370000000 HC RX 637 (ALT 250 FOR IP): Performed by: NURSE PRACTITIONER

## 2023-01-26 PROCEDURE — 2580000003 HC RX 258: Performed by: INTERNAL MEDICINE

## 2023-01-26 PROCEDURE — 6360000002 HC RX W HCPCS: Performed by: NURSE PRACTITIONER

## 2023-01-26 PROCEDURE — 94760 N-INVAS EAR/PLS OXIMETRY 1: CPT

## 2023-01-26 PROCEDURE — 36415 COLL VENOUS BLD VENIPUNCTURE: CPT

## 2023-01-26 PROCEDURE — 85025 COMPLETE CBC W/AUTO DIFF WBC: CPT

## 2023-01-26 PROCEDURE — 6360000002 HC RX W HCPCS: Performed by: INTERNAL MEDICINE

## 2023-01-26 RX ORDER — CLOBAZAM 10 MG/1
10 TABLET ORAL
Qty: 30 TABLET | Refills: 0 | Status: SHIPPED | OUTPATIENT
Start: 2023-01-26 | End: 2023-02-25

## 2023-01-26 RX ORDER — LEVETIRACETAM 500 MG/1
1000 TABLET ORAL 2 TIMES DAILY
Status: DISCONTINUED | OUTPATIENT
Start: 2023-01-26 | End: 2023-01-26 | Stop reason: HOSPADM

## 2023-01-26 RX ORDER — INSULIN GLARGINE 100 [IU]/ML
10 INJECTION, SOLUTION SUBCUTANEOUS NIGHTLY
Qty: 5 ADJUSTABLE DOSE PRE-FILLED PEN SYRINGE | Refills: 0 | Status: SHIPPED | OUTPATIENT
Start: 2023-01-26

## 2023-01-26 RX ORDER — CLOBAZAM 10 MG/1
10 TABLET ORAL
Qty: 30 TABLET | Refills: 0 | Status: SHIPPED | OUTPATIENT
Start: 2023-01-26 | End: 2023-01-26 | Stop reason: SDUPTHER

## 2023-01-26 RX ADMIN — ENOXAPARIN SODIUM 40 MG: 100 INJECTION SUBCUTANEOUS at 08:57

## 2023-01-26 RX ADMIN — INSULIN LISPRO 3 UNITS: 100 INJECTION, SOLUTION INTRAVENOUS; SUBCUTANEOUS at 08:57

## 2023-01-26 RX ADMIN — INSULIN LISPRO 1 UNITS: 100 INJECTION, SOLUTION INTRAVENOUS; SUBCUTANEOUS at 03:55

## 2023-01-26 RX ADMIN — INSULIN GLARGINE 10 UNITS: 100 INJECTION, SOLUTION SUBCUTANEOUS at 08:57

## 2023-01-26 RX ADMIN — INSULIN LISPRO 3 UNITS: 100 INJECTION, SOLUTION INTRAVENOUS; SUBCUTANEOUS at 13:14

## 2023-01-26 RX ADMIN — LEVETIRACETAM 1000 MG: 10 INJECTION INTRAVENOUS at 05:48

## 2023-01-26 RX ADMIN — Medication 10 ML: at 08:56

## 2023-01-26 RX ADMIN — INSULIN LISPRO 2 UNITS: 100 INJECTION, SOLUTION INTRAVENOUS; SUBCUTANEOUS at 08:58

## 2023-01-26 ASSESSMENT — PAIN SCALES - GENERAL: PAINLEVEL_OUTOF10: 0

## 2023-01-26 NOTE — PROGRESS NOTES
4 Eyes Skin Assessment     The patient is being assess for  Transfer to New Unit    I agree that 2 RN's have performed a thorough Head to Toe Skin Assessment on the patient. ALL assessment sites listed below have been assessed. Areas assessed by both nurses:   [x]   Head, Face, and Ears   [x]   Shoulders, Back, and Chest  [x]   Arms, Elbows, and Hands   [x]   Coccyx, Sacrum, and IschIum  [x]   Legs, Feet, and Heels        Does the Patient have Skin Breakdown?   No         Luis Daniel Prevention initiated:  NA   Wound Care Orders initiated:  NA      [facility] nurse consulted for Pressure Injury (Stage 3,4, Unstageable, DTI, NWPT, and Complex wounds), New and Established Ostomies:  NA      Nurse 1 eSignature: Electronically signed by Kemi Poe RN on 1/26/23 at 4:56 AM EST    **SHARE this note so that the co-signing nurse is able to place an eSignature**    Nurse 2 eSignature: Electronically signed by Madisyn Odonnell RN on 1/26/23 at 4:59 AM EST

## 2023-01-26 NOTE — PROGRESS NOTES
Patient transferred to PCU from CVICU Telemetry applied. Oriented patient to room and call light. Plan of care reviewed with patient. Call light within reach.

## 2023-01-26 NOTE — PROGRESS NOTES
D/c instructions went over with pt, no questions at this time. Pts IV out. Pt received meds from O/P pharmacy and knows where to  med that we did not have in stock. Pt will be leaving with  to go home with belongings. Pt wheeled out by transport.

## 2023-01-26 NOTE — PROGRESS NOTES
Patient to be transferred to PCU room 5262. Adamaris NEELY to resume care. Updated on patients reason for admission and events since admission. All questions answered. Patients belongings packed up and transport request placed. Patient VSS on room air.

## 2023-01-26 NOTE — DISCHARGE SUMMARY
Hospital Medicine Discharge Summary    Patient ID: Veena Munoz      Patient's PCP: Saad Rene MD    Admit Date: 1/24/2023     Discharge Date:   01/26/23      Admitting Provider: Agnieszka Tejeda MD     Discharge Provider: Agnieszka Tejeda MD     Discharge Diagnoses: Active Hospital Problems    Diagnosis     Breakthrough seizure [facility]) [G40.919]      Priority: Medium       The patient was seen and examined on day of discharge and this discharge summary is in conjunction with any daily progress note from day of discharge. Hospital Course:        Diabetic ketoacidosis- resolved  Insulin-dependent diabetes            Poorly controled, last a1c 10; re start long acting insulin, monitor with accuchecks achs + SS. Insulin supplies provided on discharge       Seizure disorder with breakthrough seizure             Patient is already taking multiple antiepileptic medications including Keppra, gabapentin in clonazepam.  Neurology was consulted. Neurologist discussed case with primary neurology Dr. Yuniel Moser. Who recommended to add another agent, Yimi Messenger. Keppra level was undetectable; therefore, medication non compliance is most likely. By the time of discharge she was seizure-free, patient to follow-up with outpatient neurology services      Physical Exam Performed:     /81   Pulse (!) 112   Temp 98.8 °F (37.1 °C) (Oral)   Resp 16   Ht 4' 10\" (1.473 m)   Wt 116 lb 2.9 oz (52.7 kg)   LMP 12/24/2022 (Approximate)   SpO2 99%   BMI 24.28 kg/m²       General appearance:  No apparent distress, appears stated age and cooperative. HEENT:  Normal cephalic, atraumatic without obvious deformity. Pupils equal, round, and reactive to light. Extra ocular muscles intact. Conjunctivae/corneas clear. Neck: Supple, with full range of motion. No jugular venous distention. Trachea midline. Respiratory:  Normal respiratory effort.  Clear to auscultation, bilaterally without Rales/Wheezes/Rhonchi. Cardiovascular:  Regular rate and rhythm with normal S1/S2 without murmurs, rubs or gallops. Abdomen: Soft, non-tender, non-distended with normal bowel sounds. Musculoskeletal:  No clubbing, cyanosis or edema bilaterally. Full range of motion without deformity. Skin: Skin color, texture, turgor normal.  No rashes or lesions. Neurologic:  Neurovascularly intact without any focal sensory/motor deficits. Cranial nerves: II-XII intact, grossly non-focal.  Psychiatric:  Alert and oriented, thought content appropriate, normal insight  Capillary Refill: Brisk,< 3 seconds   Peripheral Pulses: +2 palpable, equal bilaterally       Labs: For convenience and continuity at follow-up the following most recent labs are provided:      CBC:    Lab Results   Component Value Date/Time    WBC 8.5 01/26/2023 06:52 AM    HGB 10.2 01/26/2023 06:52 AM    HCT 32.9 01/26/2023 06:52 AM     01/26/2023 06:52 AM       Renal:    Lab Results   Component Value Date/Time     01/26/2023 06:52 AM    K 3.8 01/26/2023 06:52 AM    K 4.2 01/24/2023 07:48 PM     01/26/2023 06:52 AM    CO2 19 01/26/2023 06:52 AM    BUN 6 01/26/2023 06:52 AM    CREATININE 0.6 01/26/2023 06:52 AM    CALCIUM 9.5 01/26/2023 06:52 AM    PHOS 2.3 01/25/2023 07:46 AM         Significant Diagnostic Studies    Radiology:   CT HEAD WO CONTRAST   Final Result   No acute intracranial abnormality. XR CHEST PORTABLE   Final Result   No acute cardiopulmonary process. Consults:     IP CONSULT TO NEUROLOGY  IP CONSULT TO HOSPITALIST  IP CONSULT TO DIABETES EDUCATOR  IP CONSULT TO NEUROLOGY    Disposition:  home     Condition at Discharge: Stable    Discharge Instructions/Follow-up:  Follow-up with PCP within 7 days from discharge, not doing so could have life-threatening consequences.    Take medication as instructed;  return to the emergency department if persistent symptoms, experiencing side effects from medication including nausea vomiting or unable to keep medications down. Code Status:  Full Code     Activity: activity as tolerated    Diet: cardiac diet      Discharge Medications:     Current Discharge Medication List             Details   cloBAZam (ONFI) 10 MG TABS tablet Take 1 tablet by mouth nightly for 30 days. Max Daily Amount: 10 mg  Qty: 30 tablet, Refills: 0    Associated Diagnoses: Seizure disorder (HCC)      insulin glargine (LANTUS SOLOSTAR) 100 UNIT/ML injection pen Inject 10 Units into the skin nightly  Qty: 5 Adjustable Dose Pre-filled Pen Syringe, Refills: 0                Details   !! blood glucose test strips (ASCENSIA AUTODISC VI;ONE TOUCH ULTRA TEST VI) strip Up to 8 times daily As needed for blood sugar testing. Qty: 200 each, Refills: 3    Comments: ASCENSIA AUTODISC VI;ONE TOUCH ULTRA TEST VI      Continuous Blood Gluc Sensor (DEXCOM G6 SENSOR) MISC Apply as directed for blood sugar monitoring  Qty: 10 each, Refills: 5    Associated Diagnoses: Type 1 diabetes mellitus with complication (HCC)      clonazePAM (KLONOPIN) 2 MG tablet Take 1 tablet by mouth in the morning and at bedtime for 30 days.   Qty: 60 tablet, Refills: 0    Associated Diagnoses: Seizure disorder (HCC)      gabapentin (NEURONTIN) 300 MG capsule TAKE 1 CAPSULE BY MOUTH THREE TIMES DAILY DRUNG THE DAY AND 3 CAPSULES BY MOUTH EVERY DAY AT BEDTIME  Qty: 210 capsule, Refills: 3    Associated Diagnoses: Diabetic peripheral neuropathy (HCC)      ondansetron (ZOFRAN) 4 MG tablet TAKE 1 TABLET BY MOUTH THREE TIMES DAILY AS NEEDED FOR NAUSEA FOR VOMITING  Qty: 90 tablet, Refills: 0      ibuprofen (ADVIL;MOTRIN) 800 MG tablet TAKE 1 TABLET BY MOUTH THREE TIMES DAILY AS NEEDED FOR PAIN  Qty: 90 tablet, Refills: 0      levETIRAcetam (KEPPRA) 500 MG tablet Take 2 tablets by mouth 2 times daily  Qty: 120 tablet, Refills: 5      Continuous Blood Gluc Transmit (DEXCOM G6 TRANSMITTER) MISC USE AS DIRECTED  Qty: 1 each, Refills: 0    Associated Diagnoses: Type 1 diabetes mellitus with complication (HCC)      sertraline (ZOLOFT) 50 MG tablet Take 1 tablet by mouth once daily  Qty: 30 tablet, Refills: 2      Insulin Disposable Pump (OMNIPOD DASH PODS, GEN 4,) MISC USE AS DIRECTED (MAX  DOSE  60  UNITS)  Qty: 10 each, Refills: 5    Associated Diagnoses: Type 1 diabetes mellitus with complication (HCC)      insulin aspart (NOVOLOG FLEXPEN) 100 UNIT/ML injection pen Inject 20 units with meals/snacks up to 5 times per day  Qty: 10 Adjustable Dose Pre-filled Pen Syringe, Refills: 5    Associated Diagnoses: Type 1 diabetes mellitus with complication ([facility]      ! ! blood glucose test strips (TRUE METRIX BLOOD GLUCOSE TEST) strip Check blood sugar 4-6 times daily and as needed for symptoms of irregular glucose  Qty: 400 each, Refills: 3    Associated Diagnoses: Type 1 diabetes mellitus with complication (HCC)      Blood Glucose Monitoring Suppl (TRUE METRIX METER) w/Device KIT Check blood sugar 4 times daily, tidac  Qty: 1 kit, Refills: 0    Associated Diagnoses: Type 1 diabetes mellitus with ketoacidosis without coma ([address] Type 1 diabetes mellitus with complication (HCC)      Lancets MISC Check blood sugar 4 times daily, tidac  Qty: 300 each, Refills: 1    Associated Diagnoses: Type 1 diabetes mellitus with complication ([address] Type 1 diabetes mellitus with ketoacidosis without coma (HCC)      famotidine (PEPCID) 20 MG tablet Take 20 mg by mouth 2 times daily       ! ! - Potential duplicate medications found. Please discuss with provider. Time Spent on discharge: 33 min  in the examination, evaluation, counseling and review of medications and discharge plan. Signed:    Topher Quinn MD   1/26/2023      Thank you Lopez Marino MD for the opportunity to be involved in this patient's care. If you have any questions or concerns, please feel free to contact me at [phone].

## 2023-01-26 NOTE — DISCHARGE INSTR - COC
Continuity of Care Form    Patient Name: Azalia Mcdonough   :  1990  MRN:  [id number]    Admit date:  2023  Discharge date:  ***    Code Status Order: Full Code   Advance Directives:     Admitting Physician:  Uziel Rosario MD  PCP: Dottie Hyde MD    Discharging Nurse: [facility] Unit/Room#: G5E-0856/1040-01  Discharging Unit Phone Number: ***    Emergency Contact:   Extended Emergency Contact Information  Primary Emergency Contact: Carroll Field [address] Phone: [phone]  Mobile Phone: [phone]  Relation: Spouse  Secondary Emergency Contact: [address] Phone: [phone]  Mobile Phone: [phone]  Relation: Parent    Past Surgical History:  Past Surgical History:   Procedure Laterality Date     SECTION      TUBAL LIGATION         Immunization History:   Immunization History   Administered Date(s) Administered    COVID-19, PFIZER GRAY top, DO NOT Dilute, (age 15 y+), IM, 30 mcg/0.3 mL 2022    Influenza Virus Vaccine 2020    Influenza, FLUBLOK, (age 25 y+), PF, 0.5mL 10/22/2018       Active Problems:  Patient Active Problem List   Diagnosis Code    Lactic acidosis E87.20    Seizure disorder ([facility] [address] G40.909    Type 1 diabetes mellitus with complication ([facility] V98.3    Anxiety and depression F41.9, F32. A    ROME (generalized anxiety disorder) F41.1    Anorexia nervosa F50.00    Diabetic peripheral neuropathy (HCC) E11.42    Gastroparesis K31.84    Weight loss, unintentional R63.4    Tachycardia R00.0    Elevated transaminase level R74.01    Bandemia D72.825    Nausea and vomiting R11.2    Diarrhea R19.7    Elevated liver enzymes R74.8    Diabetic ketoacidosis without coma associated with type 1 diabetes mellitus (HCC) E10.10    Neutrophilia D72.9    Abscess L02.91    Elevated platelet count L22.14    Seizure (HCC) R56.9    Weakness R53.1    DKA, type 1, not at goal [facility]) E10.10    Abscess of groin, left L02.214    MRSA bacteremia R78.81, B95.62    Poorly controlled diabetes mellitus (HCC) E11.65    Fatigue R53.83    History of depression Z86.59    Anemia D64.9    Non-adherence to medical treatment Z91.199    Breakthrough seizure ([facility] G40.919       Isolation/Infection:   Isolation            Contact          Patient Infection Status       Infection Onset Added Last Indicated Last Indicated By Review Planned Expiration Resolved Resolved By    MRSA 07/29/22 07/31/22 07/29/22 Culture, Blood 1        Resolved    COVID-19 (Rule Out) 07/29/22 07/29/22 07/29/22 COVID-19, Rapid (Ordered)   07/29/22 Rule-Out Test Resulted            Nurse Assessment:  Last Vital Signs: /81   Pulse (!) 112   Temp 98.8 °F (37.1 °C) (Oral)   Resp 16   Ht 4' 10\" (1.473 m)   Wt 116 lb 2.9 oz (52.7 kg)   LMP 12/24/2022 (Approximate)   SpO2 99%   BMI 24.28 kg/m²     Last documented pain score (0-10 scale): Pain Level: 0  Last Weight:   Wt Readings from Last 1 Encounters:   01/26/23 116 lb 2.9 oz (52.7 kg)     Mental Status:  {IP PT MENTAL STATUS:73809}    IV Access:  { TAMMY IV ACCESS:362956317}    Nursing Mobility/ADLs:  Walking   {P DME LKXT:091860407}  Transfer  {P DME JXWY:107035071}  Bathing  {P DME DTDF:335522370}  Dressing  {P DME NBIX:994945020}  Toileting  {P DME LSPP:504699916}  Feeding  {[facility] DME AHKV:931638736}  Med Admin  {P DME YWAY:971272550}  Med Delivery   { TAMMY MED Delivery:595387697}    Wound Care Documentation and Therapy:        Elimination:  Continence: Bowel: {YES / LX:51812}  Bladder: {YES / UE:10602}  Urinary Catheter: {Urinary Catheter:080330275}   Colostomy/Ileostomy/Ileal Conduit: {YES / YV:47759}       Date of Last BM: ***    Intake/Output Summary (Last 24 hours) at 1/26/2023 1015  Last data filed at 1/26/2023 0351  Gross per 24 hour   Intake 965.79 ml   Output --   Net 965.79 ml     I/O last 3 completed shifts:   In: 1909.1 [P.O.:540; I.V.:1210; IV Piggyback:159.1]  Out: 1200 [[facility]:5732]    Safety Concerns:     [address] [employer] Safety Concerns:749777717}    Impairments/Disabilities:      [address] [facility] Impairments/Disabilities:823341140}    Nutrition Therapy:  Current Nutrition Therapy:   [facility] TAMMY Diet List:660321033}    Routes of Feeding: {CHP DME Other Feedings:928630808}  Liquids: {Slp liquid thickness:18046}  Daily Fluid Restriction: {CHP DME Yes amt example:447429394}  Last Modified Barium Swallow with Video (Video Swallowing Test): {Done Not Done RWFX:676584352}    Treatments at the Time of Hospital Discharge:   Respiratory Treatments: ***  Oxygen Therapy:  {Therapy; copd oxygen:51697}  Ventilator:    { CC Vent XMAC:433434219}    Rehab Therapies: {THERAPEUTIC INTERVENTION:7590507385}  Weight Bearing Status/Restrictions: [address] Raj  Weight Bearin}  Other Medical Equipment (for information only, NOT a DME order):  {EQUIPMENT:612238873}  Other Treatments: ***    Patient's personal belongings (please select all that are sent with patient):  {CHP DME Belongings:368242122}    RN SIGNATURE:  {Esignature:860587464}    CASE MANAGEMENT/SOCIAL WORK SECTION    Inpatient Status Date: ***    Readmission Risk Assessment Score:  Readmission Risk              Risk of Unplanned Readmission:  17           Discharging to Facility/ Agency   Name:   Address:  Phone:  Fax:    Dialysis Facility (if applicable)   Name:  Address:  Dialysis Schedule:  Phone:  Fax:    / signature: {Esignature:082695470}    PHYSICIAN SECTION    Prognosis: Fair    Condition at Discharge: Stable    Rehab Potential (if transferring to Rehab): Fair    Recommended Labs or Other Treatments After Discharge: Follow-up with PCP within 7 days from discharge, not doing so could have life-threatening consequences. Take medication as instructed;  return to the emergency department if persistent symptoms, experiencing side effects from medication including nausea vomiting or unable to keep medications down. Physician Certification: I certify the above information and transfer of Keith Ya  is necessary for the continuing treatment of the diagnosis listed and that she requires no additional service     Update Admission H&P: No change in H&P    PHYSICIAN SIGNATURE:  Electronically signed by Marisol Bonilla MD on 1/26/23 at 10:15 AM EST

## 2023-01-26 NOTE — PLAN OF CARE
Problem: Pain  Goal: Verbalizes/displays adequate comfort level or baseline comfort level  1/26/2023 1046 by Saroj Beckham RN  Outcome: Progressing  1/26/2023 0520 by Dory Pratt RN  Outcome: Progressing     Problem: Safety - Adult  Goal: Free from fall injury  1/26/2023 1046 by Saroj Beckham RN  Outcome: Progressing  1/26/2023 0520 by Dory Pratt RN  Outcome: Progressing

## 2023-01-26 NOTE — CARE COORDINATION
INITIAL CASE MANAGEMENT ASSESSMENT    Reviewed chart, met with patient to assess possible discharge needs. Explained Case Management role/services. Living Situation: Confirmed address, lives with  & 2 kids in a basement level apartment, 4-5 steps down to home; 0 steps to enter thru laundry room. ADLs: Independent     DME: None    PT/OT Recs: Not ordered     Active Services: None     Transportation:  transports PRN     Medications: Uses [facility] -- no barriers    PCP: Fidel Rachel MD      HD/PD: n/a    PLAN/COMMENTS: Patient will return home with family. Denies home needs.  transport. SW/CM provided contact information for patient or family to call with any questions. SW/CM will follow and assist as needed.     Electronically signed by Ramon Garcia RN Case Management [phone] on 1/26/2023 at 11:02 AM

## 2023-01-28 DIAGNOSIS — G40.909 SEIZURE DISORDER (HCC): ICD-10-CM

## 2023-01-30 RX ORDER — CLONAZEPAM 2 MG/1
TABLET ORAL
Qty: 60 TABLET | Refills: 0 | Status: SHIPPED | OUTPATIENT
Start: 2023-01-30 | End: 2023-03-01

## 2023-02-01 DIAGNOSIS — E10.8 TYPE 1 DIABETES MELLITUS WITH COMPLICATION (HCC): ICD-10-CM

## 2023-02-01 RX ORDER — BLOOD-GLUCOSE TRANSMITTER
EACH MISCELLANEOUS
Qty: 1 EACH | Refills: 0 | Status: SHIPPED | OUTPATIENT
Start: 2023-02-01

## 2023-02-01 RX ORDER — IBUPROFEN 800 MG/1
TABLET ORAL
Qty: 90 TABLET | Refills: 0 | Status: SHIPPED | OUTPATIENT
Start: 2023-02-01

## 2023-02-01 RX ORDER — ONDANSETRON 4 MG/1
TABLET, FILM COATED ORAL
Qty: 90 TABLET | Refills: 0 | Status: SHIPPED | OUTPATIENT
Start: 2023-02-01

## 2023-02-06 ENCOUNTER — TELEPHONE (OUTPATIENT)
Dept: INTERNAL MEDICINE CLINIC | Age: 33
End: 2023-02-06

## 2023-02-06 NOTE — TELEPHONE ENCOUNTER
[phone] (home)   Left  stating that she need to take both medications and that she needs to see a neurologist per dr gomez note

## 2023-02-06 NOTE — TELEPHONE ENCOUNTER
According to the neurology note from the hospital, yes, she is supposed to be taking both. We do need to get her in with a neurologist for longer term management.

## 2023-02-06 NOTE — TELEPHONE ENCOUNTER
Patient called in wanting to know if she is supposed to take the following medications together:    cloBAZam (ONFI) 10 MG TABS tablet    clonazePAM (KLONOPIN) 2 MG tablet    Patient was prescribed the Clobazam at the hospital.       Pls call and advise

## 2023-02-07 ENCOUNTER — TELEPHONE (OUTPATIENT)
Dept: INTERNAL MEDICINE CLINIC | Age: 33
End: 2023-02-07

## 2023-02-07 ENCOUNTER — OFFICE VISIT (OUTPATIENT)
Dept: INTERNAL MEDICINE CLINIC | Age: 33
End: 2023-02-07
Payer: COMMERCIAL

## 2023-02-07 VITALS
DIASTOLIC BLOOD PRESSURE: 68 MMHG | BODY MASS INDEX: 24.35 KG/M2 | HEIGHT: 58 IN | SYSTOLIC BLOOD PRESSURE: 126 MMHG | WEIGHT: 116 LBS

## 2023-02-07 DIAGNOSIS — G40.909 SEIZURE DISORDER (HCC): Primary | ICD-10-CM

## 2023-02-07 DIAGNOSIS — R10.13 EPIGASTRIC PAIN: ICD-10-CM

## 2023-02-07 DIAGNOSIS — E10.8 TYPE 1 DIABETES MELLITUS WITH COMPLICATION (HCC): ICD-10-CM

## 2023-02-07 PROCEDURE — 1036F TOBACCO NON-USER: CPT | Performed by: INTERNAL MEDICINE

## 2023-02-07 PROCEDURE — G8484 FLU IMMUNIZE NO ADMIN: HCPCS | Performed by: INTERNAL MEDICINE

## 2023-02-07 PROCEDURE — G8420 CALC BMI NORM PARAMETERS: HCPCS | Performed by: INTERNAL MEDICINE

## 2023-02-07 PROCEDURE — 99214 OFFICE O/P EST MOD 30 MIN: CPT | Performed by: INTERNAL MEDICINE

## 2023-02-07 PROCEDURE — 2022F DILAT RTA XM EVC RTNOPTHY: CPT | Performed by: INTERNAL MEDICINE

## 2023-02-07 PROCEDURE — 3046F HEMOGLOBIN A1C LEVEL >9.0%: CPT | Performed by: INTERNAL MEDICINE

## 2023-02-07 PROCEDURE — 1111F DSCHRG MED/CURRENT MED MERGE: CPT | Performed by: INTERNAL MEDICINE

## 2023-02-07 PROCEDURE — G8427 DOCREV CUR MEDS BY ELIG CLIN: HCPCS | Performed by: INTERNAL MEDICINE

## 2023-02-07 RX ORDER — INSULIN ASPART 100 [IU]/ML
INJECTION, SOLUTION INTRAVENOUS; SUBCUTANEOUS
Qty: 10 ADJUSTABLE DOSE PRE-FILLED PEN SYRINGE | Refills: 5 | Status: SHIPPED | OUTPATIENT
Start: 2023-02-07

## 2023-02-07 RX ORDER — OMEPRAZOLE 20 MG/1
20 CAPSULE, DELAYED RELEASE ORAL
Qty: 90 CAPSULE | Refills: 1 | Status: SHIPPED | OUTPATIENT
Start: 2023-02-07

## 2023-02-07 RX ORDER — INSULIN ASPART 100 [IU]/ML
INJECTION, SOLUTION INTRAVENOUS; SUBCUTANEOUS
Qty: 20 ML | Refills: 3 | Status: SHIPPED | OUTPATIENT
Start: 2023-02-07

## 2023-02-07 SDOH — ECONOMIC STABILITY: FOOD INSECURITY: WITHIN THE PAST 12 MONTHS, THE FOOD YOU BOUGHT JUST DIDN'T LAST AND YOU DIDN'T HAVE MONEY TO GET MORE.: NEVER TRUE

## 2023-02-07 SDOH — ECONOMIC STABILITY: INCOME INSECURITY: HOW HARD IS IT FOR YOU TO PAY FOR THE VERY BASICS LIKE FOOD, HOUSING, MEDICAL CARE, AND HEATING?: NOT HARD AT ALL

## 2023-02-07 SDOH — ECONOMIC STABILITY: HOUSING INSECURITY
IN THE LAST 12 MONTHS, WAS THERE A TIME WHEN YOU DID NOT HAVE A STEADY PLACE TO SLEEP OR SLEPT IN A SHELTER (INCLUDING NOW)?: NO

## 2023-02-07 SDOH — ECONOMIC STABILITY: FOOD INSECURITY: WITHIN THE PAST 12 MONTHS, YOU WORRIED THAT YOUR FOOD WOULD RUN OUT BEFORE YOU GOT MONEY TO BUY MORE.: NEVER TRUE

## 2023-02-07 ASSESSMENT — PATIENT HEALTH QUESTIONNAIRE - PHQ9
7. TROUBLE CONCENTRATING ON THINGS, SUCH AS READING THE NEWSPAPER OR WATCHING TELEVISION: 0
10. IF YOU CHECKED OFF ANY PROBLEMS, HOW DIFFICULT HAVE THESE PROBLEMS MADE IT FOR YOU TO DO YOUR WORK, TAKE CARE OF THINGS AT HOME, OR GET ALONG WITH OTHER PEOPLE: 0
3. TROUBLE FALLING OR STAYING ASLEEP: 0
5. POOR APPETITE OR OVEREATING: 0
9. THOUGHTS THAT YOU WOULD BE BETTER OFF DEAD, OR OF HURTING YOURSELF: 0
SUM OF ALL RESPONSES TO PHQ QUESTIONS 1-9: 0
4. FEELING TIRED OR HAVING LITTLE ENERGY: 0
SUM OF ALL RESPONSES TO PHQ9 QUESTIONS 1 & 2: 0
SUM OF ALL RESPONSES TO PHQ QUESTIONS 1-9: 0
8. MOVING OR SPEAKING SO SLOWLY THAT OTHER PEOPLE COULD HAVE NOTICED. OR THE OPPOSITE, BEING SO FIGETY OR RESTLESS THAT YOU HAVE BEEN MOVING AROUND A LOT MORE THAN USUAL: 0
6. FEELING BAD ABOUT YOURSELF - OR THAT YOU ARE A FAILURE OR HAVE LET YOURSELF OR YOUR FAMILY DOWN: 0
2. FEELING DOWN, DEPRESSED OR HOPELESS: 0
SUM OF ALL RESPONSES TO PHQ QUESTIONS 1-9: 0
SUM OF ALL RESPONSES TO PHQ QUESTIONS 1-9: 0
1. LITTLE INTEREST OR PLEASURE IN DOING THINGS: 0

## 2023-02-07 NOTE — PROGRESS NOTES
Lin Avelar (:  1990) is a 34 y.o. female,Established patient, here for evaluation of the following chief complaint(s):  Diabetes         ASSESSMENT/PLAN:  1. Seizure disorder ([address]  -Refer back to neurology, see if we can schedule her with the  clinic she was at before  -Continue Keppra at current dose, Onfi, clonazepam.  Reviewed the notes from the hospitalization and it did indicate she would be on both Onfi and clonazepam for right now. -     External Referral To Neurology  2. Type 1 diabetes mellitus with complication (HCC)  -Uncontrolled. Still needs to get into see Endo. Continue OmniPod and Dexcom  -     insulin aspart (NOVOLOG FLEXPEN) 100 UNIT/ML injection pen; Inject 20 units with meals/snacks up to 5 times per day, Disp-10 Adjustable Dose Pre-filled Pen Syringe, R-5Normal  -     Comprehensive Metabolic Panel; Future  -     Lipid Panel; Future  -     CBC with Auto Differential; Future  -     Hemoglobin A1C; Future  3. Epigastric pain   -Suspect GERD, start omeprazole 20 mg daily    Return in about 3 months (around 2023) for DM follow up. Subjective   SUBJECTIVE/OBJECTIVE:  HPI    Following up after hospitalization. She has not yet scheduled with the neurologist for follow-up. She feels like she is losing hair with keppra. She is taking the Onfi as well as the clonazepam, no sedation. No further seizures after hospitalization. Using lantus 20 units - was restarted in the hospital. Down to the 45s 60s couple of times a day. She has been out of the OmniPod but will get omnipod refill today. She has been having some epigastric pain, burning. Sometimes goes up into the chest.    Review of Systems       Objective   Physical Exam  Vitals reviewed. Constitutional:       General: She is not in acute distress. Appearance: Normal appearance. She is well-developed. HENT:      Head: Normocephalic and atraumatic.    Cardiovascular:      Rate and Rhythm: Normal rate and regular rhythm. Heart sounds: Normal heart sounds. Pulmonary:      Effort: Pulmonary effort is normal. No respiratory distress. Breath sounds: Normal breath sounds. Skin:     General: Skin is warm and dry. Neurological:      Mental Status: She is alert. Psychiatric:         Behavior: Behavior normal.         Thought Content: Thought content normal.         Judgment: Judgment normal.                An electronic signature was used to authenticate this note.     --Bee Ball MD

## 2023-02-07 NOTE — TELEPHONE ENCOUNTER
Pt states she needs a prior authorization for    Insulin Disposable Pump (OMNIPOD DASH PODS, GEN 4,) MISC      Please advise

## 2023-02-08 NOTE — TELEPHONE ENCOUNTER
Submitted PA for Omnipod DASH Pods (Gen 4)  [facility] Key: [id number]  STATUS: APPROVED through 08/08/2023; Approval letter attached. If this requires a response please respond to the pool ( P MHCX [facility]). Thank you please advise patient.

## 2023-02-14 ENCOUNTER — TELEPHONE (OUTPATIENT)
Dept: INTERNAL MEDICINE CLINIC | Age: 33
End: 2023-02-14

## 2023-02-14 DIAGNOSIS — E10.10 DKA, TYPE 1, NOT AT GOAL (HCC): Primary | ICD-10-CM

## 2023-02-14 DIAGNOSIS — E10.8 TYPE 1 DIABETES MELLITUS WITH COMPLICATION (HCC): ICD-10-CM

## 2023-02-14 DIAGNOSIS — E11.42 DIABETIC PERIPHERAL NEUROPATHY (HCC): ICD-10-CM

## 2023-02-14 NOTE — TELEPHONE ENCOUNTER
The G7 isn't released until later this week, does he mean the G6?  She has an active prescription for that at the pharmacy

## 2023-02-14 NOTE — TELEPHONE ENCOUNTER
Patients  called, his wife just got approval for the following from the insurance company:    Dexcom G7 and the sensors for it. Please send a new prescription for these.     [address] [facility], [address] [place] [address]

## 2023-02-17 ENCOUNTER — TELEPHONE (OUTPATIENT)
Dept: PHARMACY | Age: 33
End: 2023-02-17

## 2023-02-17 NOTE — TELEPHONE ENCOUNTER
New Diabetes referral from the hospitalist.  Recommended by the Diabetes Educator. Reached out to schedule initial appointment. No answer, no voice mail. Sophia Ewing, PharmD  [address] [place] [address]  Diabetes Service  [phone]    For Pharmacy Admin Tracking Only    Program: Medication Management  CPA in place:  Yes  Recommendation Provided To:    Intervention Detail:   Intervention Accepted By:   Leah Hawkins Closed?:    Time Spent (min): 5

## 2023-02-24 RX ORDER — BLOOD-GLUCOSE SENSOR
EACH MISCELLANEOUS
Qty: 3 EACH | Refills: 5 | Status: SHIPPED | OUTPATIENT
Start: 2023-02-24

## 2023-02-24 RX ORDER — BLOOD-GLUCOSE,RECEIVER,CONT
EACH MISCELLANEOUS
Qty: 1 EACH | Refills: 0 | Status: SHIPPED | OUTPATIENT
Start: 2023-02-24

## 2023-02-24 NOTE — TELEPHONE ENCOUNTER
Patient is calling to see if the [employer] is out yet     She would like one         Please advise and call

## 2023-03-01 ENCOUNTER — TELEPHONE (OUTPATIENT)
Dept: INTERNAL MEDICINE CLINIC | Age: 33
End: 2023-03-01

## 2023-03-01 DIAGNOSIS — G40.909 SEIZURE DISORDER (HCC): ICD-10-CM

## 2023-03-01 RX ORDER — CLOBAZAM 10 MG/1
10 TABLET ORAL
Qty: 30 TABLET | Refills: 0 | Status: SHIPPED | OUTPATIENT
Start: 2023-03-01 | End: 2023-03-31

## 2023-03-01 NOTE — TELEPHONE ENCOUNTER
Rx Request    cloBAZam (ONFI) 10 MG TABS tablet    [address] [phone] [facility] [employer] [phone]   [address] [place] [address]   Phone:  [phone]  Fax:  [phone]

## 2023-03-06 ENCOUNTER — TELEPHONE (OUTPATIENT)
Dept: PHARMACY | Age: 33
End: 2023-03-06

## 2023-03-06 DIAGNOSIS — E11.42 DIABETIC PERIPHERAL NEUROPATHY (HCC): ICD-10-CM

## 2023-03-06 DIAGNOSIS — G40.909 SEIZURE DISORDER (HCC): ICD-10-CM

## 2023-03-06 DIAGNOSIS — E10.8 TYPE 1 DIABETES MELLITUS WITH COMPLICATION (HCC): ICD-10-CM

## 2023-03-06 RX ORDER — INSULIN ASPART 100 [IU]/ML
INJECTION, SOLUTION INTRAVENOUS; SUBCUTANEOUS
Qty: 20 ML | Refills: 3 | Status: SHIPPED | OUTPATIENT
Start: 2023-03-06

## 2023-03-06 RX ORDER — INSULIN PUMP CONTROLLER
EACH MISCELLANEOUS
Qty: 10 EACH | Refills: 5 | Status: SHIPPED | OUTPATIENT
Start: 2023-03-06 | End: 2023-04-19 | Stop reason: SDUPTHER

## 2023-03-06 RX ORDER — CLONAZEPAM 2 MG/1
TABLET ORAL
Qty: 60 TABLET | Refills: 0 | Status: SHIPPED | OUTPATIENT
Start: 2023-03-06 | End: 2023-04-05

## 2023-03-06 RX ORDER — ONDANSETRON 4 MG/1
TABLET, FILM COATED ORAL
Qty: 90 TABLET | Refills: 0 | Status: SHIPPED | OUTPATIENT
Start: 2023-03-06

## 2023-03-06 RX ORDER — IBUPROFEN 800 MG/1
TABLET ORAL
Qty: 90 TABLET | Refills: 0 | Status: SHIPPED | OUTPATIENT
Start: 2023-03-06 | End: 2023-04-19 | Stop reason: SDUPTHER

## 2023-03-06 RX ORDER — CLONAZEPAM 2 MG/1
2 TABLET ORAL 2 TIMES DAILY
Qty: 60 TABLET | Refills: 0 | OUTPATIENT
Start: 2023-03-06 | End: 2023-04-05

## 2023-03-06 RX ORDER — LEVETIRACETAM 500 MG/1
1000 TABLET ORAL 2 TIMES DAILY
Qty: 120 TABLET | Refills: 5 | Status: SHIPPED | OUTPATIENT
Start: 2023-03-06

## 2023-03-06 RX ORDER — GABAPENTIN 300 MG/1
CAPSULE ORAL
Qty: 90 CAPSULE | Refills: 3 | Status: SHIPPED | OUTPATIENT
Start: 2023-03-06 | End: 2023-04-19 | Stop reason: SDUPTHER

## 2023-03-06 RX ORDER — INSULIN ASPART 100 [IU]/ML
INJECTION, SOLUTION INTRAVENOUS; SUBCUTANEOUS
Qty: 10 ADJUSTABLE DOSE PRE-FILLED PEN SYRINGE | Refills: 5 | Status: SHIPPED | OUTPATIENT
Start: 2023-03-06

## 2023-03-06 NOTE — TELEPHONE ENCOUNTER
New Diabetes referral from the hospitalist.  Recommended by the Diabetes Educator. Reached out to schedule initial appointment. Spoke with Asseta. Reports she is following with her PCP and is now doing well with her Diabetes care. Getting the Dexcom 7 and an Omnipod pump. Declines a visit with us. Will close her referral . Advised she could reach out at any time. Guerline Niteo, PharmD  [address] [facility]  Diabetes Service  [phone]    For Pharmacy Admin Tracking Only    Program: Medication Management  CPA in place:  Yes  Recommendation Provided To:    Intervention Detail:   Intervention Accepted By:   Laurel Weber Closed?:    Time Spent (min): 5

## 2023-03-07 ENCOUNTER — TELEPHONE (OUTPATIENT)
Dept: INTERNAL MEDICINE CLINIC | Age: 33
End: 2023-03-07

## 2023-03-07 NOTE — TELEPHONE ENCOUNTER
Pharmacy wanted to make sure doctor meant to send over both prescriptions for this med     gabapentin (NEURONTIN) 300 MG capsule       gabapentin (NEURONTIN) 600 MG tablet       [facility] [address] [address]   Phone:  [phone]  Fax:  [phone]

## 2023-03-08 ENCOUNTER — APPOINTMENT (OUTPATIENT)
Dept: CT IMAGING | Age: 33
End: 2023-03-08
Payer: COMMERCIAL

## 2023-03-08 ENCOUNTER — HOSPITAL ENCOUNTER (EMERGENCY)
Age: 33
Discharge: HOME | End: 2023-03-08
Attending: EMERGENCY MEDICINE
Payer: COMMERCIAL

## 2023-03-08 VITALS
TEMPERATURE: 98.9 F | SYSTOLIC BLOOD PRESSURE: 101 MMHG | DIASTOLIC BLOOD PRESSURE: 73 MMHG | RESPIRATION RATE: 17 BRPM | HEART RATE: 100 BPM | WEIGHT: 125.66 LBS | OXYGEN SATURATION: 98 % | BODY MASS INDEX: 26.26 KG/M2

## 2023-03-08 DIAGNOSIS — G40.919 BREAKTHROUGH SEIZURE (HCC): Primary | ICD-10-CM

## 2023-03-08 DIAGNOSIS — E16.2 HYPOGLYCEMIA: ICD-10-CM

## 2023-03-08 LAB
AMPHETAMINE SCREEN, URINE: ABNORMAL
ANION GAP SERPL CALCULATED.3IONS-SCNC: 14 MMOL/L (ref 3–16)
BACTERIA: NORMAL /HPF
BARBITURATE SCREEN URINE: ABNORMAL
BASOPHILS ABSOLUTE: 0.1 K/UL (ref 0–0.2)
BASOPHILS RELATIVE PERCENT: 0.5 %
BENZODIAZEPINE SCREEN, URINE: POSITIVE
BILIRUBIN URINE: NEGATIVE
BLOOD, URINE: NEGATIVE
BUN BLDV-MCNC: 6 MG/DL (ref 7–20)
CALCIUM SERPL-MCNC: 9.9 MG/DL (ref 8.3–10.6)
CANNABINOID SCREEN URINE: ABNORMAL
CHLORIDE BLD-SCNC: 104 MMOL/L (ref 99–110)
CLARITY: CLEAR
CO2: 24 MMOL/L (ref 21–32)
COCAINE METABOLITE SCREEN URINE: ABNORMAL
COLOR: YELLOW
CREAT SERPL-MCNC: 0.7 MG/DL (ref 0.6–1.1)
EOSINOPHILS ABSOLUTE: 0 K/UL (ref 0–0.6)
EOSINOPHILS RELATIVE PERCENT: 0 %
EPITHELIAL CELLS, UA: 4 /HPF (ref 0–5)
FENTANYL SCREEN, URINE: ABNORMAL
GFR SERPL CREATININE-BSD FRML MDRD: >60 ML/MIN/{1.73_M2}
GLUCOSE BLD-MCNC: 50 MG/DL (ref 70–99)
GLUCOSE BLD-MCNC: 73 MG/DL (ref 70–99)
GLUCOSE URINE: >=1000 MG/DL
HCG QUALITATIVE: NEGATIVE
HCT VFR BLD CALC: 36.7 % (ref 36–48)
HEMOGLOBIN: 11.2 G/DL (ref 12–16)
HYALINE CASTS: 1 /LPF (ref 0–8)
KEPPRA DOSE AMT: NORMAL
KEPPRA: 6.1 UG/ML (ref 6–46)
KETONES, URINE: 15 MG/DL
LACTIC ACID, SEPSIS: 3.8 MMOL/L (ref 0.4–1.9)
LEUKOCYTE ESTERASE, URINE: NEGATIVE
LYMPHOCYTES ABSOLUTE: 1.3 K/UL (ref 1–5.1)
LYMPHOCYTES RELATIVE PERCENT: 7 %
Lab: ABNORMAL
MCH RBC QN AUTO: 21.3 PG (ref 26–34)
MCHC RBC AUTO-ENTMCNC: 30.4 G/DL (ref 31–36)
MCV RBC AUTO: 70.1 FL (ref 80–100)
METHADONE SCREEN, URINE: ABNORMAL
MICROSCOPIC EXAMINATION: YES
MONOCYTES ABSOLUTE: 1.1 K/UL (ref 0–1.3)
MONOCYTES RELATIVE PERCENT: 5.6 %
NEUTROPHILS ABSOLUTE: 16.5 K/UL (ref 1.7–7.7)
NEUTROPHILS RELATIVE PERCENT: 86.9 %
NITRITE, URINE: NEGATIVE
OPIATE SCREEN URINE: ABNORMAL
OXYCODONE URINE: ABNORMAL
PDW BLD-RTO: 17.9 % (ref 12.4–15.4)
PERFORMED ON: NORMAL
PH UA: 5
PH UA: 5.5 (ref 5–8)
PHENCYCLIDINE SCREEN URINE: ABNORMAL
PLATELET # BLD: 350 K/UL (ref 135–450)
PMV BLD AUTO: 8 FL (ref 5–10.5)
POTASSIUM REFLEX MAGNESIUM: 3.8 MMOL/L (ref 3.5–5.1)
PROTEIN UA: ABNORMAL MG/DL
RBC # BLD: 5.23 M/UL (ref 4–5.2)
RBC UA: 1 /HPF (ref 0–4)
SODIUM BLD-SCNC: 142 MMOL/L (ref 136–145)
SPECIFIC GRAVITY UA: 1.03 (ref 1–1.03)
URINE REFLEX TO CULTURE: ABNORMAL
URINE TYPE: ABNORMAL
UROBILINOGEN, URINE: 0.2 E.U./DL
WBC # BLD: 19 K/UL (ref 4–11)
WBC UA: 1 /HPF (ref 0–5)

## 2023-03-08 PROCEDURE — 70450 CT HEAD/BRAIN W/O DYE: CPT

## 2023-03-08 PROCEDURE — 96365 THER/PROPH/DIAG IV INF INIT: CPT

## 2023-03-08 PROCEDURE — 6360000002 HC RX W HCPCS: Performed by: EMERGENCY MEDICINE

## 2023-03-08 PROCEDURE — 2580000003 HC RX 258: Performed by: EMERGENCY MEDICINE

## 2023-03-08 PROCEDURE — 99284 EMERGENCY DEPT VISIT MOD MDM: CPT

## 2023-03-08 PROCEDURE — 36415 COLL VENOUS BLD VENIPUNCTURE: CPT

## 2023-03-08 PROCEDURE — 80307 DRUG TEST PRSMV CHEM ANLYZR: CPT

## 2023-03-08 PROCEDURE — 84703 CHORIONIC GONADOTROPIN ASSAY: CPT

## 2023-03-08 PROCEDURE — 83605 ASSAY OF LACTIC ACID: CPT

## 2023-03-08 PROCEDURE — 85025 COMPLETE CBC W/AUTO DIFF WBC: CPT

## 2023-03-08 PROCEDURE — 81001 URINALYSIS AUTO W/SCOPE: CPT

## 2023-03-08 PROCEDURE — 80048 BASIC METABOLIC PNL TOTAL CA: CPT

## 2023-03-08 PROCEDURE — 93005 ELECTROCARDIOGRAM TRACING: CPT | Performed by: EMERGENCY MEDICINE

## 2023-03-08 PROCEDURE — 96375 TX/PRO/DX INJ NEW DRUG ADDON: CPT

## 2023-03-08 PROCEDURE — 80177 DRUG SCRN QUAN LEVETIRACETAM: CPT

## 2023-03-08 RX ORDER — 0.9 % SODIUM CHLORIDE 0.9 %
1000 INTRAVENOUS SOLUTION INTRAVENOUS ONCE
Status: COMPLETED | OUTPATIENT
Start: 2023-03-08 | End: 2023-03-08

## 2023-03-08 RX ORDER — LORAZEPAM 2 MG/ML
2 INJECTION INTRAMUSCULAR ONCE
Status: COMPLETED | OUTPATIENT
Start: 2023-03-08 | End: 2023-03-08

## 2023-03-08 RX ORDER — LEVETIRACETAM 5 MG/ML
500 INJECTION INTRAVASCULAR ONCE
Status: COMPLETED | OUTPATIENT
Start: 2023-03-08 | End: 2023-03-08

## 2023-03-08 RX ADMIN — LEVETIRACETAM 500 MG: 5 INJECTION INTRAVENOUS at 19:16

## 2023-03-08 RX ADMIN — LORAZEPAM 2 MG: 2 INJECTION INTRAMUSCULAR; INTRAVENOUS at 17:09

## 2023-03-08 RX ADMIN — SODIUM CHLORIDE 1000 ML: 9 INJECTION, SOLUTION INTRAVENOUS at 17:05

## 2023-03-08 ASSESSMENT — ENCOUNTER SYMPTOMS
DIARRHEA: 0
SHORTNESS OF BREATH: 0
SORE THROAT: 0
NAUSEA: 0
ABDOMINAL DISTENTION: 0
CONSTIPATION: 0
EYES NEGATIVE: 1
VOMITING: 0
ALLERGIC/IMMUNOLOGIC NEGATIVE: 1
RESPIRATORY NEGATIVE: 1
ABDOMINAL PAIN: 0

## 2023-03-08 ASSESSMENT — PAIN DESCRIPTION - PAIN TYPE: TYPE: ACUTE PAIN

## 2023-03-08 ASSESSMENT — PAIN DESCRIPTION - FREQUENCY: FREQUENCY: CONTINUOUS

## 2023-03-08 ASSESSMENT — PAIN - FUNCTIONAL ASSESSMENT
PAIN_FUNCTIONAL_ASSESSMENT: 0-10
PAIN_FUNCTIONAL_ASSESSMENT: 0-10

## 2023-03-08 ASSESSMENT — PAIN DESCRIPTION - DESCRIPTORS: DESCRIPTORS: ACHING

## 2023-03-08 ASSESSMENT — LIFESTYLE VARIABLES
HOW MANY STANDARD DRINKS CONTAINING ALCOHOL DO YOU HAVE ON A TYPICAL DAY: PATIENT DOES NOT DRINK
HOW OFTEN DO YOU HAVE A DRINK CONTAINING ALCOHOL: NEVER

## 2023-03-08 ASSESSMENT — PAIN SCALES - GENERAL
PAINLEVEL_OUTOF10: 3
PAINLEVEL_OUTOF10: 10

## 2023-03-08 ASSESSMENT — PAIN DESCRIPTION - LOCATION: LOCATION: HEAD

## 2023-03-08 NOTE — ED NOTES
Pt had minute long witnessed full body seizure. Amelia Swan MD made aware. Verbal order for 2 mg IV ativan.      Lidia Alaniz RN  03/08/23 1883

## 2023-03-09 LAB
EKG ATRIAL RATE: 141 BPM
EKG DIAGNOSIS: NORMAL
EKG P AXIS: 63 DEGREES
EKG P-R INTERVAL: 120 MS
EKG Q-T INTERVAL: 354 MS
EKG QRS DURATION: 64 MS
EKG QTC CALCULATION (BAZETT): 542 MS
EKG R AXIS: 91 DEGREES
EKG T AXIS: 57 DEGREES
EKG VENTRICULAR RATE: 141 BPM

## 2023-03-09 PROCEDURE — 93010 ELECTROCARDIOGRAM REPORT: CPT | Performed by: INTERNAL MEDICINE

## 2023-03-09 NOTE — ED PROVIDER NOTES
[address] [facility]        Pt Name: Juliet Ayala  MRN: [id number]  [place] 1990  Date of evaluation: 3/8/2023  Provider: Bear Moore MD  PCP: Leanna Cintron MD  Note Started: 8:05 PM EST 3/8/23    CHIEF COMPLAINT       Chief Complaint   Patient presents with    Seizures     Per EMS, pt family called  [phone] and advised pt has had 5 or 6 seizure today already, 4 or  yesterday. Tonic/Clonic in nature lasting 3 to 4 minutes each. Pt does have seizure history and is medicated for such, pt states she is compliant with her meds. Last seen her neuro a couple months ago. HISTORY OF PRESENT ILLNESS: 1 or more Elements     History from : Patient and EMS    Limitations to history : None    Juliet Ayala is a 34 y.o. female who presents with for seizure. Family called 911 patient had 4-5 seizures today. Several yesterday. Each of them are short. Patient has history of seizures and reports that she has taken her medications as scheduled. She states she has episodes like this every few months. Last seizure episodes she had like this was last month. Patient also has a history of poorly controlled diabetes she has history of type 1 diabetes. She reports that she took her insulin today but did not eat very much. No fevers or chills. No nausea vomiting diarrhea no chest pain no headache no dysuria no hematuria no numbness weakness or vision changes. Nursing Notes were all reviewed and agreed with or any disagreements were addressed in the HPI. REVIEW OF SYSTEMS :      Review of Systems   Constitutional: Negative. Negative for chills and fever. HENT: Negative. Negative for congestion and sore throat. Eyes: Negative. Respiratory: Negative. Negative for shortness of breath. Cardiovascular: Negative. Negative for chest pain and palpitations.    Gastrointestinal:  Negative for abdominal distention, abdominal pain, constipation, diarrhea, nausea and vomiting. Genitourinary: Negative. Negative for dysuria. Musculoskeletal: Negative. Skin: Negative. Allergic/Immunologic: Negative. Neurological:  Positive for seizures. Negative for light-headedness and headaches. Hematological: Negative. Does not bruise/bleed easily. All other systems reviewed and are negative. Positives and Pertinent negatives as per HPI. SURGICAL HISTORY     Past Surgical History:   Procedure Laterality Date     SECTION      TUBAL LIGATION         CURRENTMEDICATIONS       Previous Medications    BLOOD GLUCOSE MONITORING SUPPL (TRUE METRIX METER) W/DEVICE KIT    Check blood sugar 4 times daily, tidac    BLOOD GLUCOSE TEST STRIPS (ASCENSIA AUTODISC VI;ONE TOUCH ULTRA TEST VI) STRIP    Up to 8 times daily As needed for blood sugar testing. BLOOD GLUCOSE TEST STRIPS (TRUE METRIX BLOOD GLUCOSE TEST) STRIP    Check blood sugar 4-6 times daily and as needed for symptoms of irregular glucose    CLOBAZAM (ONFI) 10 MG TABS TABLET    Take 1 tablet by mouth nightly for 30 days.  Max Daily Amount: 10 mg    CLONAZEPAM (KLONOPIN) 2 MG TABLET    TAKE 1 TABLET BY MOUTH IN THE MORNING AND AT BEDTIME    CONTINUOUS BLOOD GLUC  (DEXCOM G7 ) KIM    Use as directed for blood glucose monitoring    CONTINUOUS BLOOD GLUC SENSOR (DEXCOM G6 SENSOR) MISC    Apply as directed for blood sugar monitoring    CONTINUOUS BLOOD GLUC SENSOR (DEXCOM G7 SENSOR) MISC    Apply every 10 days for blood glucose monitoring    CONTINUOUS BLOOD GLUC TRANSMIT (DEXCOM G6 TRANSMITTER) MISC    USE AS DIRECTED    GABAPENTIN (NEURONTIN) 300 MG CAPSULE    TAKE  3 CAPSULES BY MOUTH EVERY DAY AT BEDTIME    GABAPENTIN (NEURONTIN) 600 MG TABLET    Take 1 tablet by mouth in the morning and early afternoon    IBUPROFEN (ADVIL;MOTRIN) 800 MG TABLET    TAKE 1 TABLET BY MOUTH THREE TIMES DAILY AS NEEDED FOR PAIN    INSULIN ASPART (NOVOLOG FLEXPEN) 100 UNIT/ML INJECTION PEN    Inject 20 units with meals/snacks up to 5 times per day    INSULIN ASPART (NOVOLOG) 100 UNIT/ML INJECTION VIAL    Use 45 units daily with Omnipod    INSULIN DISPOSABLE PUMP (OMNIPOD DASH PODS, GEN 4,) MISC    USE AS DIRECTED (MAX  DOSE  60  UNITS)    INSULIN GLARGINE (LANTUS SOLOSTAR) 100 UNIT/ML INJECTION PEN    Inject 10 Units into the skin nightly    LANCETS MISC    Check blood sugar 4 times daily, tidac    LEVETIRACETAM (KEPPRA) 500 MG TABLET    Take 2 tablets by mouth 2 times daily    OMEPRAZOLE (PRILOSEC) 20 MG DELAYED RELEASE CAPSULE    Take 1 capsule by mouth every morning (before breakfast)    ONDANSETRON (ZOFRAN) 4 MG TABLET    TAKE 1 TABLET BY MOUTH THREE TIMES DAILY AS NEEDED FOR NAUSEA FOR VOMITING    SERTRALINE (ZOLOFT) 50 MG TABLET    Take 1 tablet by mouth once daily       ALLERGIES     Patient has no known allergies.     FAMILYHISTORY       Family History   Problem Relation Age of Onset    Asthma Mother     Allergies Mother         sun allergy    Diabetes Type 1  Father     Diabetes Type 1  Maternal Grandfather     Diabetes Type 1  Cousin     Diabetes Type 1  Cousin     Diabetes Type 1  Maternal Uncle     Diabetes Type 1  Maternal Uncle     Diabetes Type 1  Maternal Aunt         SOCIAL HISTORY       Social History     Tobacco Use    Smoking status: Never    Smokeless tobacco: Never   Vaping Use    Vaping Use: Never used   Substance Use Topics    Alcohol use: No    Drug use: No       SCREENINGS        Grace Coma Scale  Best Verbal Response: Oriented  Best Motor Response: Obeys commands                CIWA Assessment  BP: 104/68  Heart Rate: (!) 102           PHYSICAL EXAM  1 or more Elements     ED Triage Vitals   BP Temp Temp Source Heart Rate Resp SpO2 Height Weight   03/08/23 1629 03/08/23 1629 03/08/23 1629 03/08/23 1629 03/08/23 1629 03/08/23 1700 -- 03/08/23 1629   99/68 98.9 °F (37.2 °C) Oral (!) 141 16 96 %  125 lb 10.6 oz (57 kg)       Physical Exam  Vitals and nursing note reviewed. Constitutional:       General: She is not in acute distress. Appearance: Normal appearance. She is not ill-appearing, toxic-appearing or diaphoretic. HENT:      Head: Normocephalic and atraumatic. Right Ear: Tympanic membrane and external ear normal.      Left Ear: Tympanic membrane and external ear normal.      Nose: Nose normal.      Mouth/Throat:      Mouth: Mucous membranes are moist.   Eyes:      General: No visual field deficit. Extraocular Movements: Extraocular movements intact. Conjunctiva/sclera: Conjunctivae normal.      Pupils: Pupils are equal, round, and reactive to light. Cardiovascular:      Rate and Rhythm: Normal rate and regular rhythm. Heart sounds: Normal heart sounds. Pulmonary:      Effort: Pulmonary effort is normal.      Breath sounds: Normal breath sounds. Abdominal:      General: Abdomen is flat. Bowel sounds are normal.      Palpations: Abdomen is soft. Tenderness: There is no abdominal tenderness. Musculoskeletal:         General: Normal range of motion. Cervical back: Neck supple. Skin:     General: Skin is warm and dry. Capillary Refill: Capillary refill takes less than 2 seconds. Neurological:      General: No focal deficit present. Mental Status: She is alert and oriented to person, place, and time. GCS: GCS eye subscore is 4. GCS verbal subscore is 5. GCS motor subscore is 6. Cranial Nerves: No cranial nerve deficit, dysarthria or facial asymmetry. Sensory: Sensation is intact. No sensory deficit. Motor: Motor function is intact. No weakness, tremor, atrophy, abnormal muscle tone or pronator drift. Coordination: Coordination is intact.  Coordination normal. Finger-Nose-Finger Test and Heel to [facility] Test normal.   Psychiatric:         Mood and Affect: Mood normal.         Behavior: Behavior normal.       DIAGNOSTIC RESULTS   LABS:    Labs Reviewed   BASIC METABOLIC PANEL W/ REFLEX TO MG FOR LOW K - Abnormal; Notable for the following components:       Result Value    Glucose 50 (*)     BUN 6 (*)     All other components within normal limits   CBC WITH AUTO DIFFERENTIAL - Abnormal; Notable for the following components:    WBC 19.0 (*)     RBC 5.23 (*)     Hemoglobin 11.2 (*)     MCV 70.1 (*)     MCH 21.3 (*)     MCHC 30.4 (*)     RDW 17.9 (*)     Neutrophils Absolute 16.5 (*)     All other components within normal limits   LACTATE, SEPSIS - Abnormal; Notable for the following components:    Lactic Acid, Sepsis 3.8 (*)     All other components within normal limits   URINALYSIS WITH REFLEX TO CULTURE - Abnormal; Notable for the following components:    Glucose, Ur >=1000 (*)     Ketones, Urine 15 (*)     Protein, UA TRACE (*)     All other components within normal limits   HCG, SERUM, QUALITATIVE   LEVETIRACETAM LEVEL   URINE DRUG SCREEN   LACTATE, SEPSIS   MICROSCOPIC URINALYSIS   POCT GLUCOSE       When ordered only abnormal lab results are displayed. All other labs were within normal range or not returned as of this dictation. EKG:   EKG Interpretation    Interpreted by emergency department physician    Rhythm: Sinus tachycardia  Rate: 141  Axis: normal  Ectopy: none  Conduction: normal  ST Segments: Nonspecific changes  T Waves: Nonspecific changes  Q Waves: none    Clinical Impression: This tachycardia with a rate of 141 and nonspecific ST and T wave changes normal MS interval, normal QRS duration, normal QT QTC. Normal axis. No arrhythmia or signs of ischemia. Otherwise normal EKG. Interpreted by myself. RADIOLOGY:   Non-plain film images such as CT, Ultrasound and MRI are read by the radiologist. Plain radiographic images are visualized and preliminarily interpreted by the ED Provider with the below findings:    Interpretation per the Radiologist below, if available at the time of this note:    CT Head W/O Contrast   Final Result   1. No acute intracranial abnormality.            CT Head W/O Contrast    Result Date: 3/8/2023  EXAMINATION: CT OF THE HEAD WITHOUT CONTRAST  3/8/2023 4:45 pm TECHNIQUE: CT of the head was performed without the administration of intravenous contrast. Automated exposure control, iterative reconstruction, and/or weight based adjustment of the mA/kV was utilized to reduce the radiation dose to as low as reasonably achievable. COMPARISON: 01/25/2023 HISTORY: ORDERING SYSTEM PROVIDED HISTORY: Recurrent seziures TECHNOLOGIST PROVIDED HISTORY: Reason for exam:->Recurrent seziures Has a \"code stroke\" or \"stroke alert\" been called? ->No Decision Support Exception - unselect if not a suspected or confirmed emergency medical condition->Emergency Medical Condition (MA) Is the patient pregnant?->No Reason for Exam: Recurrent seziures FINDINGS: BRAIN/VENTRICLES: There is no acute intracerebral hemorrhage or extra-axial fluid collection. The ventricles and sulci are within normal limits. ORBITS: The orbits are unremarkable. SINUSES: The visualized paranasal sinuses and mastoid air cells are clear. SOFT TISSUES/SKULL:  The calvarium is intact. 1. No acute intracranial abnormality. No results found. PROCEDURES   Unless otherwise noted below, none     Procedures    CRITICAL CARE TIME   Total Critical Care time was 33 minutes, excluding separately reportable procedures. There was a high probability of clinically significant/life threatening deterioration in the patient's condition which required my urgent intervention. This includes multiple reevaluations, vital sign monitoring, pulse oximetry monitoring, telemetry monitoring, clinical response to the IV medications, reviewing the nursing notes, consultation time, dictation/documentation time, and interpretation of the labwork. (This time excludes time spent performing procedures). PAST MEDICAL HISTORY      has a past medical history of Depression, Diabetes mellitus ([facility] [address] and Seizures ([facility] [address]      EMERGENCY DEPARTMENT COURSE and DIFFERENTIAL DIAGNOSIS/MDM:   Vitals:    Vitals:    03/08/23 1629 03/08/23 1700 03/08/23 1730 03/08/23 2003   BP: 99/68  104/68    Pulse: (!) 141 (!) 142 (!) 129 (!) 102   Resp: 16 (!) 32 21    Temp: 98.9 °F (37.2 °C)      TempSrc: Oral      SpO2:  96% 93%    Weight: 125 lb 10.6 oz (57 kg)            Is this patient to be included in the SEP-1 Core Measure due to severe sepsis or septic shock? No   Exclusion criteria - the patient is NOT to be included for SEP-1 Core Measure due to: Alternative explanation for abnormal labs/vitals that do not relate to sepsis, see MDM for further explanation    Chronic Conditions: Epilepsy, DM    CONSULTS: (Who and What was discussed)  None    Discussion with Other Profesionals : None    Social Determinants : None    Records Reviewed : External ED Note reviewed hospital admission from January 24, 2023 when patient was admitted for seizures. CC/HPI Summary, DDx, ED Course, and Reassessment:     Differential diagnosis: status epilepticus, breakthrough seizure, intracranial bleed, brain tumor, hypoglycemia, neck fracture or other orthopedic fractures, posterior shoulder dislocation, tongue laceration, other. 54-year-old female who presents for seizures history of recurrent seizures patient had multiple seizures today and also took her insulin concern for possible hypoglycemia will obtain blood glucose as well as reeval closely for any further seizure activity patient reports she is taken all her medications are well obtain Keppra level. We will obtain lactate to look for elevated lactate which could be due to seizures or infection. Afebrile slightly tachycardic saturating well on room air normotensive. Will give IV fluids as well as obtain infectious work-up we will also obtain CT head with no obvious signs of trauma at this time patient's full neurologic exam is unremarkable no signs of shoulder dislocation or tongue laceration. No neck pain.   See differential diagnoses above. CBC-CBC was ordered to rule out anemia, infection, abnormal platelet count, polycythemia, abnormal Red cell pathology, or any other pathology that might be causing the patient's symptoms   CMP-CMP was ordered to rule out electrolyte abnormalities, liver dysfunction, kidney dysfunction, electrolyte imbalance, abnormal transaminases, or any other pathology that might be causing the patient's symptoms. Urine-urinalysis was ordered to rule out infection, renal failure, dehydration, pregnancy, proteinuria, bilirubinuria, or any other pathology that might be causing the patient's symptoms          ED Course as of 03/08/23 2005   Wed Mar 08, 2023   1709 Patient had seizure. Was given Ativan. Lasted less than 1 minute [SC]   1857 Patient's glucose was found to be 50. Lactate is elevated likely due to the seizure. We will give patient p.o. glucose and continue to evaluate patient's Keppra level is within normal limits but slightly low. Will give extra dose of Keppra at this time. [SC]   2003 Patient's glucose is improved. Patient was given Keppra. Has been given IV fluids. Patient has no other seizure activity. States she feels much better. Is tolerating p.o. Heart rate is significantly improved. No signs of infection. Likely breakthrough seizure due to decreased Keppra complicated by potential hypoglycemia from insulin use. .  Will discharge with strict return precautions for any new or worsening symptoms [SC]      ED Course User Index  [SC] Marlise Simmonds, MD       Patient was given the following medications:  Medications   0.9 % sodium chloride bolus (1,000 mLs IntraVENous New Bag 3/8/23 1705)   LORazepam (ATIVAN) injection 2 mg (2 mg IntraVENous Given 3/8/23 1709)   levETIRAcetam (KEPPRA) 500 mg/100 mL IVPB (500 mg IntraVENous New Bag 3/8/23 1916)       Disposition Considerations (tests considered but not done, Shared Decision Making, Pt Expectation of Test or Tx.): Shared decision-making used for discharge        Appropriate for outpatient management        The patient is at low risk for mortality based on demographic, history and clinical factors. Given the best available information and clinical assessment, I estimate the risk of hospitalization to be greater than risk of treatment at home. I have explained to the patient that the risk could rapidly change, given precautions for return and instructions. Explained to patient that the risk for mortality is low based on demographic, history and clinical factors. I discussed with patient the results of evaluation in the ED, diagnosis, care, and prognosis. The plan is to discharge to home. Patient is in agreement with plan and questions have been answered. I also discussed with patient the reasons which may require a return visit and the importance of follow-up care. The patient is well-appearing, nontoxic, and improved at the time of discharge. Patient agrees to call to arrange follow-up care as directed. Patient understands to return immediately for worsening/change in symptoms. I am the Primary Clinician of Record. FINAL IMPRESSION      1. Breakthrough seizure ([address]    2. Hypoglycemia          DISPOSITION/PLAN     DISPOSITION Decision To Discharge 03/08/2023 08:04:23 PM      PATIENT REFERRED TO:  Caridad Foote MD  [address]  [address] [place]  [phone]    Schedule an appointment as soon as possible for a visit       [facility] Emergency Department  [address]  [phone]    As needed, If symptoms worsen      DISCHARGE MEDICATIONS:  New Prescriptions    No medications on file       DISCONTINUED MEDICATIONS:  Discontinued Medications    No medications on file              (Please note that portions of this note were completed with a voice recognition program.  Efforts were made to edit the dictations but occasionally words are mis-transcribed.)    Echo Garduno MD (electronically signed)            Echo Garduno MD  03/08/23 2024

## 2023-03-09 NOTE — ED NOTES
Discharge and education instructions reviewed. Patient verbalized understanding, teach-back successful. Patient denied questions at this time. No acute distress noted. Patient instructed to follow-up as noted - return to emergency department if symptoms worsen. Patient verbalized understanding. Discharged per EDMD with discharge instructions.         Lucia Castellano RN  03/08/23 2020

## 2023-03-09 NOTE — ED NOTES
Pt alert and oriented with no signs of distress noted. Pt able to ambulate without assistance. Pt states that she is calling her \"Mother to come pick her up\". Pt reports that she feels \"comfortable waiting in lobby\".      Rod Grey RN  03/08/23 2016

## 2023-04-04 DIAGNOSIS — E10.8 TYPE 1 DIABETES MELLITUS WITH COMPLICATION (HCC): ICD-10-CM

## 2023-04-04 DIAGNOSIS — G40.909 SEIZURE DISORDER (HCC): ICD-10-CM

## 2023-04-04 RX ORDER — CLONAZEPAM 2 MG/1
TABLET ORAL
Qty: 60 TABLET | Refills: 0 | OUTPATIENT
Start: 2023-04-04 | End: 2023-05-04

## 2023-04-04 RX ORDER — BLOOD-GLUCOSE SENSOR
EACH MISCELLANEOUS
Qty: 3 EACH | Refills: 5 | Status: SHIPPED | OUTPATIENT
Start: 2023-04-04 | End: 2023-05-07 | Stop reason: SDUPTHER

## 2023-04-04 RX ORDER — CLONAZEPAM 2 MG/1
TABLET ORAL
Qty: 60 TABLET | Refills: 0 | Status: SHIPPED | OUTPATIENT
Start: 2023-04-04 | End: 2023-05-07 | Stop reason: SDUPTHER

## 2023-04-04 RX ORDER — PROCHLORPERAZINE 25 MG/1
SUPPOSITORY RECTAL
Qty: 1 EACH | Refills: 0 | OUTPATIENT
Start: 2023-04-04

## 2023-04-19 ENCOUNTER — OFFICE VISIT (OUTPATIENT)
Dept: INTERNAL MEDICINE CLINIC | Age: 33
End: 2023-04-19
Payer: COMMERCIAL

## 2023-04-19 VITALS
SYSTOLIC BLOOD PRESSURE: 92 MMHG | HEART RATE: 124 BPM | DIASTOLIC BLOOD PRESSURE: 62 MMHG | WEIGHT: 114 LBS | OXYGEN SATURATION: 99 % | TEMPERATURE: 97.7 F | HEIGHT: 58 IN | BODY MASS INDEX: 23.93 KG/M2

## 2023-04-19 DIAGNOSIS — E11.42 DIABETIC PERIPHERAL NEUROPATHY (HCC): ICD-10-CM

## 2023-04-19 DIAGNOSIS — F41.9 ANXIETY AND DEPRESSION: ICD-10-CM

## 2023-04-19 DIAGNOSIS — F32.A ANXIETY AND DEPRESSION: ICD-10-CM

## 2023-04-19 DIAGNOSIS — E10.8 TYPE 1 DIABETES MELLITUS WITH COMPLICATION (HCC): Primary | ICD-10-CM

## 2023-04-19 DIAGNOSIS — E10.8 TYPE 1 DIABETES MELLITUS WITH COMPLICATION (HCC): ICD-10-CM

## 2023-04-19 DIAGNOSIS — G40.909 SEIZURE DISORDER (HCC): ICD-10-CM

## 2023-04-19 PROBLEM — E87.20 LACTIC ACIDOSIS: Status: RESOLVED | Noted: 2018-02-13 | Resolved: 2023-04-19

## 2023-04-19 PROBLEM — E10.10 DIABETIC KETOACIDOSIS WITHOUT COMA ASSOCIATED WITH TYPE 1 DIABETES MELLITUS (HCC): Status: RESOLVED | Noted: 2022-07-29 | Resolved: 2023-04-19

## 2023-04-19 PROBLEM — E10.10 DKA, TYPE 1, NOT AT GOAL (HCC): Status: RESOLVED | Noted: 2022-07-31 | Resolved: 2023-04-19

## 2023-04-19 PROBLEM — D72.825 BANDEMIA: Status: RESOLVED | Noted: 2021-09-22 | Resolved: 2023-04-19

## 2023-04-19 PROBLEM — R74.01 ELEVATED TRANSAMINASE LEVEL: Status: RESOLVED | Noted: 2021-09-22 | Resolved: 2023-04-19

## 2023-04-19 PROBLEM — L02.214 ABSCESS OF GROIN, LEFT: Status: RESOLVED | Noted: 2022-08-01 | Resolved: 2023-04-19

## 2023-04-19 PROBLEM — Z86.59 HISTORY OF DEPRESSION: Status: RESOLVED | Noted: 2022-08-14 | Resolved: 2023-04-19

## 2023-04-19 PROBLEM — R19.7 DIARRHEA: Status: RESOLVED | Noted: 2022-05-25 | Resolved: 2023-04-19

## 2023-04-19 PROBLEM — R56.9 SEIZURE (HCC): Status: RESOLVED | Noted: 2022-07-30 | Resolved: 2023-04-19

## 2023-04-19 PROBLEM — L02.91 ABSCESS: Status: RESOLVED | Noted: 2022-07-29 | Resolved: 2023-04-19

## 2023-04-19 LAB
ALBUMIN SERPL-MCNC: 5 G/DL (ref 3.4–5)
ALBUMIN/GLOB SERPL: 1.6 {RATIO} (ref 1.1–2.2)
ALP SERPL-CCNC: 60 U/L (ref 40–129)
ALT SERPL-CCNC: 8 U/L (ref 10–40)
ANION GAP SERPL CALCULATED.3IONS-SCNC: 15 MMOL/L (ref 3–16)
AST SERPL-CCNC: 12 U/L (ref 15–37)
BASOPHILS # BLD: 0.1 K/UL (ref 0–0.2)
BASOPHILS NFR BLD: 0.9 %
BILIRUB SERPL-MCNC: <0.2 MG/DL (ref 0–1)
BUN SERPL-MCNC: 10 MG/DL (ref 7–20)
CALCIUM SERPL-MCNC: 10 MG/DL (ref 8.3–10.6)
CHLORIDE SERPL-SCNC: 98 MMOL/L (ref 99–110)
CHOLEST SERPL-MCNC: 173 MG/DL (ref 0–199)
CO2 SERPL-SCNC: 22 MMOL/L (ref 21–32)
CREAT SERPL-MCNC: 0.6 MG/DL (ref 0.6–1.1)
DEPRECATED RDW RBC AUTO: 16.7 % (ref 12.4–15.4)
EOSINOPHIL # BLD: 0 K/UL (ref 0–0.6)
EOSINOPHIL NFR BLD: 0.5 %
GFR SERPLBLD CREATININE-BSD FMLA CKD-EPI: >60 ML/MIN/{1.73_M2}
GLUCOSE SERPL-MCNC: 381 MG/DL (ref 70–99)
HCT VFR BLD AUTO: 35.6 % (ref 36–48)
HDLC SERPL-MCNC: 58 MG/DL (ref 40–60)
HGB BLD-MCNC: 11.2 G/DL (ref 12–16)
LDLC SERPL CALC-MCNC: 100 MG/DL
LYMPHOCYTES # BLD: 1.7 K/UL (ref 1–5.1)
LYMPHOCYTES NFR BLD: 20.9 %
MCH RBC QN AUTO: 22.4 PG (ref 26–34)
MCHC RBC AUTO-ENTMCNC: 31.6 G/DL (ref 31–36)
MCV RBC AUTO: 70.9 FL (ref 80–100)
MONOCYTES # BLD: 0.7 K/UL (ref 0–1.3)
MONOCYTES NFR BLD: 7.8 %
NEUTROPHILS # BLD: 5.9 K/UL (ref 1.7–7.7)
NEUTROPHILS NFR BLD: 69.9 %
PLATELET # BLD AUTO: 248 K/UL (ref 135–450)
PMV BLD AUTO: 8.9 FL (ref 5–10.5)
POTASSIUM SERPL-SCNC: 4.6 MMOL/L (ref 3.5–5.1)
PROT SERPL-MCNC: 8.1 G/DL (ref 6.4–8.2)
RBC # BLD AUTO: 5.02 M/UL (ref 4–5.2)
SODIUM SERPL-SCNC: 135 MMOL/L (ref 136–145)
TRIGL SERPL-MCNC: 74 MG/DL (ref 0–150)
TSH SERPL DL<=0.005 MIU/L-ACNC: 3.06 UIU/ML (ref 0.27–4.2)
VLDLC SERPL CALC-MCNC: 15 MG/DL
WBC # BLD AUTO: 8.4 K/UL (ref 4–11)

## 2023-04-19 PROCEDURE — 3046F HEMOGLOBIN A1C LEVEL >9.0%: CPT | Performed by: INTERNAL MEDICINE

## 2023-04-19 PROCEDURE — 1036F TOBACCO NON-USER: CPT | Performed by: INTERNAL MEDICINE

## 2023-04-19 PROCEDURE — G8420 CALC BMI NORM PARAMETERS: HCPCS | Performed by: INTERNAL MEDICINE

## 2023-04-19 PROCEDURE — 99214 OFFICE O/P EST MOD 30 MIN: CPT | Performed by: INTERNAL MEDICINE

## 2023-04-19 PROCEDURE — G8427 DOCREV CUR MEDS BY ELIG CLIN: HCPCS | Performed by: INTERNAL MEDICINE

## 2023-04-19 PROCEDURE — 2022F DILAT RTA XM EVC RTNOPTHY: CPT | Performed by: INTERNAL MEDICINE

## 2023-04-19 RX ORDER — IBUPROFEN 800 MG/1
TABLET ORAL
Qty: 90 TABLET | Refills: 0 | Status: SHIPPED | OUTPATIENT
Start: 2023-04-19

## 2023-04-19 RX ORDER — SERTRALINE HYDROCHLORIDE 100 MG/1
100 TABLET, FILM COATED ORAL DAILY
Qty: 30 TABLET | Refills: 5 | Status: SHIPPED | OUTPATIENT
Start: 2023-04-19

## 2023-04-19 RX ORDER — INSULIN PUMP CONTROLLER
EACH MISCELLANEOUS
Qty: 10 EACH | Refills: 5 | Status: SHIPPED | OUTPATIENT
Start: 2023-04-19

## 2023-04-19 RX ORDER — PROCHLORPERAZINE 25 MG/1
SUPPOSITORY RECTAL
Qty: 10 EACH | Refills: 5 | Status: SHIPPED | OUTPATIENT
Start: 2023-04-19 | End: 2023-04-19

## 2023-04-19 RX ORDER — CLOBAZAM 10 MG/1
10 TABLET ORAL
Qty: 30 TABLET | Refills: 0 | Status: CANCELLED | OUTPATIENT
Start: 2023-04-19 | End: 2023-05-19

## 2023-04-19 RX ORDER — GABAPENTIN 300 MG/1
900 CAPSULE ORAL 3 TIMES DAILY
Qty: 270 CAPSULE | Refills: 5 | Status: SHIPPED | OUTPATIENT
Start: 2023-04-19 | End: 2023-10-16

## 2023-04-19 ASSESSMENT — PATIENT HEALTH QUESTIONNAIRE - PHQ9
8. MOVING OR SPEAKING SO SLOWLY THAT OTHER PEOPLE COULD HAVE NOTICED. OR THE OPPOSITE, BEING SO FIGETY OR RESTLESS THAT YOU HAVE BEEN MOVING AROUND A LOT MORE THAN USUAL: 0
5. POOR APPETITE OR OVEREATING: 0
1. LITTLE INTEREST OR PLEASURE IN DOING THINGS: 1
SUM OF ALL RESPONSES TO PHQ QUESTIONS 1-9: 2
4. FEELING TIRED OR HAVING LITTLE ENERGY: 0
SUM OF ALL RESPONSES TO PHQ QUESTIONS 1-9: 2
9. THOUGHTS THAT YOU WOULD BE BETTER OFF DEAD, OR OF HURTING YOURSELF: 0
SUM OF ALL RESPONSES TO PHQ9 QUESTIONS 1 & 2: 2
SUM OF ALL RESPONSES TO PHQ QUESTIONS 1-9: 2
10. IF YOU CHECKED OFF ANY PROBLEMS, HOW DIFFICULT HAVE THESE PROBLEMS MADE IT FOR YOU TO DO YOUR WORK, TAKE CARE OF THINGS AT HOME, OR GET ALONG WITH OTHER PEOPLE: 0
3. TROUBLE FALLING OR STAYING ASLEEP: 0
SUM OF ALL RESPONSES TO PHQ QUESTIONS 1-9: 2
6. FEELING BAD ABOUT YOURSELF - OR THAT YOU ARE A FAILURE OR HAVE LET YOURSELF OR YOUR FAMILY DOWN: 0
2. FEELING DOWN, DEPRESSED OR HOPELESS: 1
7. TROUBLE CONCENTRATING ON THINGS, SUCH AS READING THE NEWSPAPER OR WATCHING TELEVISION: 0

## 2023-04-19 NOTE — PROGRESS NOTES
Thought Content: Thought content normal.         Judgment: Judgment normal.                An electronic signature was used to authenticate this note.     --Chasity Cid MD

## 2023-04-20 LAB
EST. AVERAGE GLUCOSE BLD GHB EST-MCNC: 217.3 MG/DL
HBA1C MFR BLD: 9.2 %

## 2023-05-01 DIAGNOSIS — E11.42 DIABETIC PERIPHERAL NEUROPATHY (HCC): ICD-10-CM

## 2023-05-01 RX ORDER — GABAPENTIN 300 MG/1
CAPSULE ORAL
Qty: 210 CAPSULE | Refills: 0 | OUTPATIENT
Start: 2023-05-01

## 2023-05-06 DIAGNOSIS — G40.909 SEIZURE DISORDER (HCC): ICD-10-CM

## 2023-05-07 DIAGNOSIS — G40.909 SEIZURE DISORDER (HCC): ICD-10-CM

## 2023-05-07 DIAGNOSIS — E10.8 TYPE 1 DIABETES MELLITUS WITH COMPLICATION (HCC): ICD-10-CM

## 2023-05-08 RX ORDER — LEVETIRACETAM 500 MG/1
1000 TABLET ORAL 2 TIMES DAILY
Qty: 360 TABLET | Refills: 1 | Status: SHIPPED | OUTPATIENT
Start: 2023-05-08

## 2023-05-08 RX ORDER — ONDANSETRON 4 MG/1
TABLET, FILM COATED ORAL
Qty: 90 TABLET | Refills: 0 | Status: SHIPPED | OUTPATIENT
Start: 2023-05-08

## 2023-05-08 RX ORDER — CLONAZEPAM 2 MG/1
TABLET ORAL
Qty: 60 TABLET | Refills: 2 | Status: SHIPPED | OUTPATIENT
Start: 2023-05-08 | End: 2023-06-07

## 2023-05-08 RX ORDER — CLOBAZAM 10 MG/1
10 TABLET ORAL
Qty: 30 TABLET | Refills: 1 | Status: SHIPPED | OUTPATIENT
Start: 2023-05-08 | End: 2023-07-07

## 2023-05-08 RX ORDER — INSULIN PUMP CONTROLLER
EACH MISCELLANEOUS
Qty: 10 EACH | Refills: 5 | Status: SHIPPED | OUTPATIENT
Start: 2023-05-08

## 2023-05-08 RX ORDER — BLOOD-GLUCOSE SENSOR
EACH MISCELLANEOUS
Qty: 3 EACH | Refills: 5 | Status: SHIPPED | OUTPATIENT
Start: 2023-05-08

## 2023-05-08 RX ORDER — BLOOD-GLUCOSE,RECEIVER,CONT
EACH MISCELLANEOUS
Qty: 1 EACH | Refills: 0 | Status: SHIPPED | OUTPATIENT
Start: 2023-05-08

## 2023-05-08 RX ORDER — INSULIN ASPART 100 [IU]/ML
INJECTION, SOLUTION INTRAVENOUS; SUBCUTANEOUS
Qty: 10 ADJUSTABLE DOSE PRE-FILLED PEN SYRINGE | Refills: 5 | Status: SHIPPED | OUTPATIENT
Start: 2023-05-08

## 2023-05-08 RX ORDER — INSULIN ASPART 100 [IU]/ML
INJECTION, SOLUTION INTRAVENOUS; SUBCUTANEOUS
Qty: 20 ML | Refills: 5 | Status: SHIPPED | OUTPATIENT
Start: 2023-05-08

## 2023-05-08 RX ORDER — SERTRALINE HYDROCHLORIDE 100 MG/1
100 TABLET, FILM COATED ORAL DAILY
Qty: 90 TABLET | Refills: 1 | Status: SHIPPED | OUTPATIENT
Start: 2023-05-08

## 2023-05-08 RX ORDER — OMEPRAZOLE 20 MG/1
20 CAPSULE, DELAYED RELEASE ORAL
Qty: 90 CAPSULE | Refills: 1 | Status: SHIPPED | OUTPATIENT
Start: 2023-05-08

## 2023-05-09 ENCOUNTER — TELEPHONE (OUTPATIENT)
Dept: INTERNAL MEDICINE CLINIC | Age: 33
End: 2023-05-09

## 2023-05-09 NOTE — TELEPHONE ENCOUNTER
Rx Request    clonazePAM Ron Reas) 2 MG tablet    [address] [place] [phone] - F [phone]

## 2023-05-10 RX ORDER — CLONAZEPAM 2 MG/1
TABLET ORAL
Qty: 60 TABLET | Refills: 0 | OUTPATIENT
Start: 2023-05-10

## 2023-06-29 ENCOUNTER — TELEPHONE (OUTPATIENT)
Dept: INTERNAL MEDICINE CLINIC | Age: 33
End: 2023-06-29

## 2023-07-03 NOTE — TELEPHONE ENCOUNTER
Spoke with patient on Friday - in ICU for DKA due to dental infection. Under a lot of stress which has been impacting health, anxiety is extremely high.  Discussed seeing Dr. Tawanna Park - she may call the office to schedule once she is out of the hospital.

## 2023-07-21 ENCOUNTER — TELEPHONE (OUTPATIENT)
Dept: INTERNAL MEDICINE CLINIC | Age: 33
End: 2023-07-21

## 2023-07-21 NOTE — TELEPHONE ENCOUNTER
Left message on machine for patient appt scheduled. To call back as to which medication she is out of.

## 2023-07-21 NOTE — TELEPHONE ENCOUNTER
----- Message from Livemapsantino Monson sent at 7/21/2023  9:52 AM EDT -----  Subject: Appointment Request    Reason for Call: Established Patient Appointment needed: Routine Existing   Condition Follow Up (Diabetes)    QUESTIONS    Reason for appointment request? Available appointments did not meet   patient need     Additional Information for Provider? Patient had 3 seizures and would like   to see Dr. Salvador Agarwal on 7/31/23 when her mother has an appointment.  If Dr. Salvador Agarwal   can see her please call and schedule with her, she said that she is also   out of medicine.  ---------------------------------------------------------------------------  --------------  Meek Kabetogama Shanique  [phone]; OK to leave message on voicemail  ---------------------------------------------------------------------------  --------------  SCRIPT ANSWERS

## 2023-07-31 ENCOUNTER — OFFICE VISIT (OUTPATIENT)
Dept: INTERNAL MEDICINE CLINIC | Age: 33
End: 2023-07-31
Payer: COMMERCIAL

## 2023-07-31 ENCOUNTER — OFFICE VISIT (OUTPATIENT)
Age: 33
End: 2023-07-31
Payer: COMMERCIAL

## 2023-07-31 VITALS
WEIGHT: 103 LBS | SYSTOLIC BLOOD PRESSURE: 110 MMHG | DIASTOLIC BLOOD PRESSURE: 64 MMHG | BODY MASS INDEX: 21.62 KG/M2 | HEIGHT: 58 IN

## 2023-07-31 DIAGNOSIS — F32.A ANXIETY AND DEPRESSION: ICD-10-CM

## 2023-07-31 DIAGNOSIS — G40.909 SEIZURE DISORDER (HCC): ICD-10-CM

## 2023-07-31 DIAGNOSIS — E10.8 TYPE 1 DIABETES MELLITUS WITH COMPLICATION (HCC): Primary | ICD-10-CM

## 2023-07-31 DIAGNOSIS — F41.9 ANXIETY AND DEPRESSION: ICD-10-CM

## 2023-07-31 DIAGNOSIS — K04.7 DENTAL INFECTION: ICD-10-CM

## 2023-07-31 DIAGNOSIS — F41.8 DEPRESSION WITH ANXIETY: Primary | ICD-10-CM

## 2023-07-31 PROCEDURE — G8427 DOCREV CUR MEDS BY ELIG CLIN: HCPCS | Performed by: INTERNAL MEDICINE

## 2023-07-31 PROCEDURE — 90791 PSYCH DIAGNOSTIC EVALUATION: CPT | Performed by: PSYCHOLOGIST

## 2023-07-31 PROCEDURE — 3046F HEMOGLOBIN A1C LEVEL >9.0%: CPT | Performed by: INTERNAL MEDICINE

## 2023-07-31 PROCEDURE — 1036F TOBACCO NON-USER: CPT | Performed by: INTERNAL MEDICINE

## 2023-07-31 PROCEDURE — G8420 CALC BMI NORM PARAMETERS: HCPCS | Performed by: INTERNAL MEDICINE

## 2023-07-31 PROCEDURE — 99214 OFFICE O/P EST MOD 30 MIN: CPT | Performed by: INTERNAL MEDICINE

## 2023-07-31 PROCEDURE — 2022F DILAT RTA XM EVC RTNOPTHY: CPT | Performed by: INTERNAL MEDICINE

## 2023-07-31 PROCEDURE — 1036F TOBACCO NON-USER: CPT | Performed by: PSYCHOLOGIST

## 2023-07-31 RX ORDER — HYDROCODONE BITARTRATE AND ACETAMINOPHEN 5; 325 MG/1; MG/1
1 TABLET ORAL EVERY 6 HOURS PRN
Qty: 20 TABLET | Refills: 0 | Status: SHIPPED | OUTPATIENT
Start: 2023-07-31 | End: 2023-08-05

## 2023-07-31 RX ORDER — AMOXICILLIN AND CLAVULANATE POTASSIUM 875; 125 MG/1; MG/1
1 TABLET, FILM COATED ORAL 2 TIMES DAILY
Qty: 14 TABLET | Refills: 0 | Status: SHIPPED | OUTPATIENT
Start: 2023-07-31 | End: 2023-08-07

## 2023-07-31 ASSESSMENT — PROMIS GLOBAL HEALTH SCALE
IN GENERAL, HOW WOULD YOU RATE YOUR MENTAL HEALTH, INCLUDING YOUR MOOD AND YOUR ABILITY TO THINK [ON A SCALE OF 1 (POOR) TO 5 (EXCELLENT)]?: 2
IN GENERAL, WOULD YOU SAY YOUR HEALTH IS...[ON A SCALE OF 1 (POOR) TO 5 (EXCELLENT)]: 1
IN THE PAST 7 DAYS, HOW WOULD YOU RATE YOUR PAIN ON AVERAGE [ON A SCALE FROM 0 (NO PAIN) TO 10 (WORST IMAGINABLE PAIN)]?: 3
SUM OF RESPONSES TO QUESTIONS 3, 6, 7, & 8: 11
IN GENERAL, WOULD YOU SAY YOUR QUALITY OF LIFE IS...[ON A SCALE OF 1 (POOR) TO 5 (EXCELLENT)]: 2
IN GENERAL, HOW WOULD YOU RATE YOUR PHYSICAL HEALTH [ON A SCALE OF 1 (POOR) TO 5 (EXCELLENT)]?: 2
IN THE PAST 7 DAYS, HOW WOULD YOU RATE YOUR FATIGUE ON AVERAGE [ON A SCALE FROM 1 (NONE) TO 5 (VERY SEVERE)]?: 1
IN GENERAL, PLEASE RATE HOW WELL YOU CARRY OUT YOUR USUAL SOCIAL ACTIVITIES (INCLUDES ACTIVITIES AT HOME, AT WORK, AND IN YOUR COMMUNITY, AND RESPONSIBILITIES AS A PARENT, CHILD, SPOUSE, EMPLOYEE, FRIEND, ETC) [ON A SCALE OF 1 (POOR) TO 5 (EXCELLENT)]?: 2
IN GENERAL, HOW WOULD YOU RATE YOUR SATISFACTION WITH YOUR SOCIAL ACTIVITIES AND RELATIONSHIPS [ON A SCALE OF 1 (POOR) TO 5 (EXCELLENT)]?: 1
SUM OF RESPONSES TO QUESTIONS 2, 4, 5, & 10: 6
TO WHAT EXTENT ARE YOU ABLE TO CARRY OUT YOUR EVERYDAY PHYSICAL ACTIVITIES SUCH AS WALKING, CLIMBING STAIRS, CARRYING GROCERIES, OR MOVING A CHAIR [ON A SCALE OF 1 (NOT AT ALL) TO 5 (COMPLETELY)]?: 5
IN THE PAST 7 DAYS, HOW OFTEN HAVE YOU BEEN BOTHERED BY EMOTIONAL PROBLEMS, SUCH AS FEELING ANXIOUS, DEPRESSED, OR IRRITABLE [ON A SCALE FROM 1 (NEVER) TO 5 (ALWAYS)]?: 1

## 2023-07-31 NOTE — PROGRESS NOTES
Whit Waters (:  1990) is a 28 y.o. female,Established patient, here for evaluation of the following chief complaint(s):  Seizures         ASSESSMENT/PLAN:  1. Type 1 diabetes mellitus with complication (HCC)  -Uncontrolled. We will give her Dexcom sensors to bridge to when she can get her next refill or get the boxes unpacked. Continue with injectable insulin until she can get her OmniPod again. -     Comprehensive Metabolic Panel; Future  -     Lipid Panel; Future  -     CBC with Auto Differential; Future  -     Hemoglobin A1C; Future  2. Anxiety and depression   -Uncontrolled. Continue sertraline 100 mg daily. Discussed seeing the psychologist, she agreed to see the psychologist today  3. Dental infection  -Treat with Augmentin 875-125 mg twice daily x7 days, she will also follow-up with her dentist.  Brenda Cast with low-dose of hydrocodone for dental pain for the short-term, she used this previously. OARRS reviewed, no concerns.  -     HYDROcodone-acetaminophen (NORCO) 5-325 MG per tablet; Take 1 tablet by mouth every 6 hours as needed for Pain for up to 5 days. Intended supply: 5 days. Take lowest dose possible to manage pain Max Daily Amount: 4 tablets, Disp-20 tablet, R-0Normal  4. Seizure disorder ([address])   -Uncontrolled. Again encouraged her to keep her follow-up with the neurologist.  Continue Keppra, Onfi, gabapentin    Return in about 3 months (around 10/31/2023) for DM follow up. Subjective   SUBJECTIVE/OBJECTIVE:  HPI    She was admitted at the beginning of the month for DKA. She thinks it was triggered partly by stress and partly by a dental infection. She has had 3 teeth removed, there is one more that needs to be pulled but her dentist went on vacation for 3 weeks. She is having pain and having trouble eating. She has lost more weight. She feels like she is going into DKA.   She moved and packed up her OmniPod and her Dexcom sensors so she is using fingersticks and

## 2023-07-31 NOTE — PROGRESS NOTES
Behavioral Health Consultation   Rodolfo Griffin, PhD  Clinical Psychologist  2023      Time spent with Patient: 30 minutes  2:30-3:00  This is patient's 1st  [facility] appointment. Reason for Consult:   Chief Complaint   Patient presents with    Anxiety    Stress    Depression       Referring Provider: Marcellus Diallo MD      Pt provided informed consent for the behavioral health program. Discussed with patient model of service to include the limits of confidentiality (i.e. abuse reporting, suicide intervention, etc.) and short-term intervention focused approach. Pt indicated understanding. Subjective  LIFE CONTEXT   1. Family  With whom do you live? Spouse  Zoila Re and 2 daughters - 15 and 8   or partner? Yes Length of relationship? 3  Children? Yes  Type of relationships with the people you live with? Difficult with spouse, good with both  Supportive relationships? Parent mom    2. Social  Friends? No Quality? n/a Frequency of contact? daily  Other connection with community? Moved from [place]  when dad     1. Work/School  Work/go to school? Unemployed, not seeking work  will be evaluated for on   How many years in this job? not applicable How are you doing? n/a  How do you support yourself financially? spouse    4. Recreation  For fun? Relaxation? Games on phone, facebook   How often? daily    5. Self-Care  Exercise on a regular basis for health? Seldom  What type of exercise and how often? Hard to do with health issues   rare  Sleep ok? up frequently at night and difficulty getting to sleep  Eat ok? Hx of eating disorder, she is better  Use tobacco (what and how often)? denies  Use caffeine (what and how often)? diet pepsi 2-3 cans of soda per day  Use alcohol (what and how often)? none  Use drugs (what and how often)? Never used  Problems in the past with tobacco, alcohol or drugs? No  Medications: Take as prescribed?  compliant most of the time - works to take seizure meds    Current

## 2023-08-04 ENCOUNTER — TELEPHONE (OUTPATIENT)
Dept: INTERNAL MEDICINE CLINIC | Age: 33
End: 2023-08-04

## 2023-08-04 NOTE — TELEPHONE ENCOUNTER
Spoke to pt informed pt that MD is out until 10th. Her appt is 8th. She needs 5 fillings and a root canal. With DM it hurts to eat and almost impossible to eat and that is affecting her DM to where her teeth are getting infected. When she gets infections her DM will go up in 600 and causes her to go into DK and she ends up in ICU for a week. The letter needs to say her teeth issues are causing her issues with blood sugars which is causing her to go into hospital with DK. They are needing the note to justify why to go ahead with the procedure.

## 2023-08-04 NOTE — TELEPHONE ENCOUNTER
Ms Micheal Costello is requesting a statement about her diabetes for her dental procedures. She was recently hospitalized for her diabetes due to her dental problems. This dentist needs this to continue her care. She would like the letter sent to her in [facility].

## 2023-08-07 NOTE — TELEPHONE ENCOUNTER
I couldn't print out the letter. Can someone print out the letter and let me know so I can sign it for the patient? Thank you.

## 2023-08-08 ENCOUNTER — APPOINTMENT (OUTPATIENT)
Dept: CT IMAGING | Age: 33
DRG: 420 | End: 2023-08-08
Payer: COMMERCIAL

## 2023-08-08 ENCOUNTER — HOSPITAL ENCOUNTER (INPATIENT)
Age: 33
LOS: 4 days | Discharge: HOME | DRG: 420 | End: 2023-08-12
Attending: EMERGENCY MEDICINE | Admitting: HOSPITALIST
Payer: COMMERCIAL

## 2023-08-08 ENCOUNTER — APPOINTMENT (OUTPATIENT)
Dept: GENERAL RADIOLOGY | Age: 33
DRG: 420 | End: 2023-08-08
Payer: COMMERCIAL

## 2023-08-08 DIAGNOSIS — E87.5 HYPERKALEMIA: ICD-10-CM

## 2023-08-08 DIAGNOSIS — G93.41 ACUTE METABOLIC ENCEPHALOPATHY: ICD-10-CM

## 2023-08-08 DIAGNOSIS — T68.XXXA HYPOTHERMIA, INITIAL ENCOUNTER: ICD-10-CM

## 2023-08-08 DIAGNOSIS — E10.10 DIABETIC KETOACIDOSIS WITHOUT COMA ASSOCIATED WITH TYPE 1 DIABETES MELLITUS (HCC): Primary | ICD-10-CM

## 2023-08-08 DIAGNOSIS — R56.9 SEIZURE (HCC): ICD-10-CM

## 2023-08-08 PROBLEM — E11.10 DKA, TYPE 2, NOT AT GOAL (HCC): Status: ACTIVE | Noted: 2023-08-08

## 2023-08-08 PROBLEM — N17.9 AKI (ACUTE KIDNEY INJURY) (HCC): Status: ACTIVE | Noted: 2023-08-08

## 2023-08-08 LAB
ALBUMIN SERPL-MCNC: 4 G/DL (ref 3.4–5)
ALBUMIN SERPL-MCNC: 5 G/DL (ref 3.4–5)
ALBUMIN/GLOB SERPL: 1.4 {RATIO} (ref 1.1–2.2)
ALBUMIN/GLOB SERPL: 1.4 {RATIO} (ref 1.1–2.2)
ALP SERPL-CCNC: 117 U/L (ref 40–129)
ALP SERPL-CCNC: 94 U/L (ref 40–129)
ALT SERPL-CCNC: 13 U/L (ref 10–40)
ALT SERPL-CCNC: 16 U/L (ref 10–40)
ANION GAP SERPL CALCULATED.3IONS-SCNC: 21 MMOL/L (ref 3–16)
ANION GAP SERPL CALCULATED.3IONS-SCNC: 25 MMOL/L (ref 3–16)
ANION GAP SERPL CALCULATED.3IONS-SCNC: 26 MMOL/L (ref 3–16)
ANION GAP SERPL CALCULATED.3IONS-SCNC: 36 MMOL/L (ref 3–16)
ANION GAP SERPL CALCULATED.3IONS-SCNC: 37 MMOL/L (ref 3–16)
ANION GAP SERPL CALCULATED.3IONS-SCNC: 38 MMOL/L (ref 3–16)
ANISOCYTOSIS BLD QL SMEAR: ABNORMAL
AST SERPL-CCNC: 16 U/L (ref 15–37)
AST SERPL-CCNC: <5 U/L (ref 15–37)
BACTERIA URNS QL MICRO: NORMAL /HPF
BASE EXCESS BLDV CALC-SCNC: -30 MMOL/L (ref -3–3)
BASOPHILS # BLD: 0 K/UL (ref 0–0.2)
BASOPHILS NFR BLD: 0 %
BETA-HYDROXYBUTYRATE: >8 MMOL/L (ref 0–0.27)
BILIRUB SERPL-MCNC: 0.3 MG/DL (ref 0–1)
BILIRUB SERPL-MCNC: <0.2 MG/DL (ref 0–1)
BILIRUB UR QL STRIP.AUTO: NEGATIVE
BUN SERPL-MCNC: 10 MG/DL (ref 7–20)
BUN SERPL-MCNC: 14 MG/DL (ref 7–20)
BUN SERPL-MCNC: 18 MG/DL (ref 7–20)
BUN SERPL-MCNC: 18 MG/DL (ref 7–20)
BUN SERPL-MCNC: 19 MG/DL (ref 7–20)
BUN SERPL-MCNC: 8 MG/DL (ref 7–20)
CA-I BLD-SCNC: 1.15 MMOL/L (ref 1.12–1.32)
CALCIUM SERPL-MCNC: 7.5 MG/DL (ref 8.3–10.6)
CALCIUM SERPL-MCNC: 7.6 MG/DL (ref 8.3–10.6)
CALCIUM SERPL-MCNC: 8.2 MG/DL (ref 8.3–10.6)
CALCIUM SERPL-MCNC: 8.7 MG/DL (ref 8.3–10.6)
CALCIUM SERPL-MCNC: 9 MG/DL (ref 8.3–10.6)
CALCIUM SERPL-MCNC: 9.7 MG/DL (ref 8.3–10.6)
CHLORIDE SERPL-SCNC: 101 MMOL/L (ref 99–110)
CHLORIDE SERPL-SCNC: 106 MMOL/L (ref 99–110)
CHLORIDE SERPL-SCNC: 108 MMOL/L (ref 99–110)
CHLORIDE SERPL-SCNC: 115 MMOL/L (ref 99–110)
CHLORIDE SERPL-SCNC: 115 MMOL/L (ref 99–110)
CHLORIDE SERPL-SCNC: 116 MMOL/L (ref 99–110)
CLARITY UR: CLEAR
CO2 BLDV-SCNC: 9 MMOL/L
CO2 SERPL-SCNC: 11 MMOL/L (ref 21–32)
CO2 SERPL-SCNC: 3 MMOL/L (ref 21–32)
CO2 SERPL-SCNC: 4 MMOL/L (ref 21–32)
CO2 SERPL-SCNC: 8 MMOL/L (ref 21–32)
CO2 SERPL-SCNC: 9 MMOL/L (ref 21–32)
CO2 SERPL-SCNC: <2 MMOL/L (ref 21–32)
COHGB MFR BLDV: 2.9 % (ref 0–1.5)
COLOR UR: YELLOW
CREAT SERPL-MCNC: 0.9 MG/DL (ref 0.6–1.1)
CREAT SERPL-MCNC: 1 MG/DL (ref 0.6–1.1)
CREAT SERPL-MCNC: 1.2 MG/DL (ref 0.6–1.1)
CREAT SERPL-MCNC: 1.2 MG/DL (ref 0.6–1.1)
DEPRECATED RDW RBC AUTO: 21.2 % (ref 12.4–15.4)
DO-HGB MFR BLDV: 13 %
EKG ATRIAL RATE: 132 BPM
EKG DIAGNOSIS: NORMAL
EKG P AXIS: 64 DEGREES
EKG P-R INTERVAL: 122 MS
EKG Q-T INTERVAL: 310 MS
EKG QRS DURATION: 72 MS
EKG QTC CALCULATION (BAZETT): 459 MS
EKG R AXIS: 75 DEGREES
EKG T AXIS: 39 DEGREES
EKG VENTRICULAR RATE: 132 BPM
EOSINOPHIL # BLD: 0.3 K/UL (ref 0–0.6)
EOSINOPHIL NFR BLD: 1 %
EPI CELLS #/AREA URNS AUTO: 0 /HPF (ref 0–5)
EST. AVERAGE GLUCOSE BLD GHB EST-MCNC: 211.6 MG/DL
GFR SERPLBLD CREATININE-BSD FMLA CKD-EPI: >60 ML/MIN/{1.73_M2}
GLUCOSE BLD-MCNC: 170 MG/DL (ref 70–99)
GLUCOSE BLD-MCNC: 183 MG/DL (ref 70–99)
GLUCOSE BLD-MCNC: 197 MG/DL (ref 70–99)
GLUCOSE BLD-MCNC: 198 MG/DL (ref 70–99)
GLUCOSE BLD-MCNC: 252 MG/DL (ref 70–99)
GLUCOSE BLD-MCNC: 265 MG/DL (ref 70–99)
GLUCOSE BLD-MCNC: 269 MG/DL (ref 70–99)
GLUCOSE BLD-MCNC: 288 MG/DL (ref 70–99)
GLUCOSE BLD-MCNC: 339 MG/DL (ref 70–99)
GLUCOSE BLD-MCNC: 373 MG/DL (ref 70–99)
GLUCOSE BLD-MCNC: 467 MG/DL (ref 70–99)
GLUCOSE BLD-MCNC: 561 MG/DL (ref 70–99)
GLUCOSE BLD-MCNC: 581 MG/DL (ref 70–99)
GLUCOSE BLD-MCNC: >600 MG/DL (ref 70–99)
GLUCOSE BLD-MCNC: >700 MG/DL (ref 70–99)
GLUCOSE SERPL-MCNC: 228 MG/DL (ref 70–99)
GLUCOSE SERPL-MCNC: 329 MG/DL (ref 70–99)
GLUCOSE SERPL-MCNC: 337 MG/DL (ref 70–99)
GLUCOSE SERPL-MCNC: 953 MG/DL (ref 70–99)
GLUCOSE SERPL-MCNC: 955 MG/DL (ref 70–99)
GLUCOSE SERPL-MCNC: 970 MG/DL (ref 70–99)
GLUCOSE UR STRIP.AUTO-MCNC: >=1000 MG/DL
HBA1C MFR BLD: 9 %
HCG SERPL QL: NEGATIVE
HCO3 BLDV-SCNC: 3.4 MMOL/L (ref 23–29)
HCT VFR BLD AUTO: 36 % (ref 36–48)
HCT VFR BLD AUTO: 39.8 % (ref 36–48)
HGB BLD CALC-MCNC: 12.2 GM/DL (ref 12–16)
HGB BLD-MCNC: 10.4 G/DL (ref 12–16)
HGB UR QL STRIP.AUTO: NEGATIVE
HYALINE CASTS #/AREA URNS AUTO: 4 /LPF (ref 0–8)
KETONES UR STRIP.AUTO-MCNC: >=160 MG/DL
LACTATE BLD-SCNC: 4.95 MMOL/L (ref 0.4–2)
LACTATE BLDV-SCNC: 6.7 MMOL/L (ref 0.4–1.9)
LACTATE BLDV-SCNC: 7 MMOL/L (ref 0.4–1.9)
LEUKOCYTE ESTERASE UR QL STRIP.AUTO: NEGATIVE
LIPASE SERPL-CCNC: 10 U/L (ref 13–60)
LYMPHOCYTES # BLD: 5 K/UL (ref 1–5.1)
LYMPHOCYTES NFR BLD: 17 %
MACROCYTES BLD QL SMEAR: ABNORMAL
MAGNESIUM SERPL-MCNC: 2.1 MG/DL (ref 1.8–2.4)
MAGNESIUM SERPL-MCNC: 2.3 MG/DL (ref 1.8–2.4)
MAGNESIUM SERPL-MCNC: 2.4 MG/DL (ref 1.8–2.4)
MAGNESIUM SERPL-MCNC: 2.8 MG/DL (ref 1.8–2.4)
MCH RBC QN AUTO: 21.8 PG (ref 26–34)
MCHC RBC AUTO-ENTMCNC: 26.1 G/DL (ref 31–36)
MCV RBC AUTO: 83.3 FL (ref 80–100)
METHGB MFR BLDV: 0.7 %
MONOCYTES # BLD: 0.8 K/UL (ref 0–1.3)
MONOCYTES NFR BLD: 3 %
NEUTROPHILS # BLD: 20.3 K/UL (ref 1.7–7.7)
NEUTROPHILS NFR BLD: 75 %
NEUTS BAND NFR BLD MANUAL: 2 % (ref 0–7)
NITRITE UR QL STRIP.AUTO: NEGATIVE
O2 CT VFR BLDV CALC: 13 VOL %
O2 THERAPY: ABNORMAL
OVALOCYTES BLD QL SMEAR: ABNORMAL
PCO2 BLDA: <12 MM HG (ref 35–45)
PCO2 BLDA: <12 MM HG (ref 35–45)
PCO2 BLDV: 21.5 MMHG (ref 40–50)
PERFORMED ON: ABNORMAL
PH BLDA: 6.95 [PH] (ref 7.35–7.45)
PH BLDA: 7.23 [PH] (ref 7.35–7.45)
PH BLDV: <6.818 [PH] (ref 7.35–7.45)
PH UR STRIP.AUTO: 5 [PH] (ref 5–8)
PHOSPHATE SERPL-MCNC: 1.1 MG/DL (ref 2.5–4.9)
PHOSPHATE SERPL-MCNC: 2.2 MG/DL (ref 2.5–4.9)
PHOSPHATE SERPL-MCNC: 2.8 MG/DL (ref 2.5–4.9)
PHOSPHATE SERPL-MCNC: 8 MG/DL (ref 2.5–4.9)
PLATELET # BLD AUTO: 619 K/UL (ref 135–450)
PLATELET BLD QL SMEAR: ABNORMAL
PMV BLD AUTO: 9 FL (ref 5–10.5)
PO2 BLDA: 130.9 MM HG (ref 75–108)
PO2 BLDA: 159.6 MM HG (ref 75–108)
PO2 BLDV: 88.3 MMHG (ref 25–40)
POC SAMPLE TYPE: ABNORMAL
POC SAMPLE TYPE: ABNORMAL
POLYCHROMASIA BLD QL SMEAR: ABNORMAL
POTASSIUM BLD-SCNC: 4.7 MMOL/L (ref 3.5–5.1)
POTASSIUM SERPL-SCNC: 4.2 MMOL/L (ref 3.5–5.1)
POTASSIUM SERPL-SCNC: 4.2 MMOL/L (ref 3.5–5.1)
POTASSIUM SERPL-SCNC: 4.6 MMOL/L (ref 3.5–5.1)
POTASSIUM SERPL-SCNC: 4.9 MMOL/L (ref 3.5–5.1)
POTASSIUM SERPL-SCNC: 5.6 MMOL/L (ref 3.5–5.1)
POTASSIUM SERPL-SCNC: 6.4 MMOL/L (ref 3.5–5.1)
PROT SERPL-MCNC: 6.8 G/DL (ref 6.4–8.2)
PROT SERPL-MCNC: 8.6 G/DL (ref 6.4–8.2)
PROT UR STRIP.AUTO-MCNC: 30 MG/DL
RBC # BLD AUTO: 4.77 M/UL (ref 4–5.2)
RBC CLUMPS #/AREA URNS AUTO: 0 /HPF (ref 0–4)
SAO2 % BLDV: 87 %
SLIDE REVIEW: ABNORMAL
SODIUM BLD-SCNC: 144 MMOL/L (ref 136–145)
SODIUM SERPL-SCNC: 143 MMOL/L (ref 136–145)
SODIUM SERPL-SCNC: 145 MMOL/L (ref 136–145)
SODIUM SERPL-SCNC: 147 MMOL/L (ref 136–145)
SODIUM SERPL-SCNC: 147 MMOL/L (ref 136–145)
SODIUM SERPL-SCNC: 149 MMOL/L (ref 136–145)
SODIUM SERPL-SCNC: 150 MMOL/L (ref 136–145)
SP GR UR STRIP.AUTO: 1.02 (ref 1–1.03)
UA COMPLETE W REFLEX CULTURE PNL UR: ABNORMAL
UA DIPSTICK W REFLEX MICRO PNL UR: YES
URN SPEC COLLECT METH UR: ABNORMAL
UROBILINOGEN UR STRIP-ACNC: 0.2 E.U./DL
VARIANT LYMPHS NFR BLD MANUAL: 2 % (ref 0–6)
WBC # BLD AUTO: 26.4 K/UL (ref 4–11)
WBC #/AREA URNS AUTO: 0 /HPF (ref 0–5)

## 2023-08-08 PROCEDURE — 6370000000 HC RX 637 (ALT 250 FOR IP): Performed by: EMERGENCY MEDICINE

## 2023-08-08 PROCEDURE — 83735 ASSAY OF MAGNESIUM: CPT

## 2023-08-08 PROCEDURE — 84100 ASSAY OF PHOSPHORUS: CPT

## 2023-08-08 PROCEDURE — 70450 CT HEAD/BRAIN W/O DYE: CPT

## 2023-08-08 PROCEDURE — 85025 COMPLETE CBC W/AUTO DIFF WBC: CPT

## 2023-08-08 PROCEDURE — 6360000002 HC RX W HCPCS: Performed by: INTERNAL MEDICINE

## 2023-08-08 PROCEDURE — 2500000003 HC RX 250 WO HCPCS: Performed by: EMERGENCY MEDICINE

## 2023-08-08 PROCEDURE — 85014 HEMATOCRIT: CPT

## 2023-08-08 PROCEDURE — 2000000000 HC ICU R&B

## 2023-08-08 PROCEDURE — 36415 COLL VENOUS BLD VENIPUNCTURE: CPT

## 2023-08-08 PROCEDURE — 2580000003 HC RX 258: Performed by: EMERGENCY MEDICINE

## 2023-08-08 PROCEDURE — 2580000003 HC RX 258: Performed by: HOSPITALIST

## 2023-08-08 PROCEDURE — 6360000002 HC RX W HCPCS: Performed by: HOSPITALIST

## 2023-08-08 PROCEDURE — 83690 ASSAY OF LIPASE: CPT

## 2023-08-08 PROCEDURE — 82803 BLOOD GASES ANY COMBINATION: CPT

## 2023-08-08 PROCEDURE — 87040 BLOOD CULTURE FOR BACTERIA: CPT

## 2023-08-08 PROCEDURE — 84132 ASSAY OF SERUM POTASSIUM: CPT

## 2023-08-08 PROCEDURE — 99285 EMERGENCY DEPT VISIT HI MDM: CPT

## 2023-08-08 PROCEDURE — 84703 CHORIONIC GONADOTROPIN ASSAY: CPT

## 2023-08-08 PROCEDURE — 82010 KETONE BODYS QUAN: CPT

## 2023-08-08 PROCEDURE — 6370000000 HC RX 637 (ALT 250 FOR IP): Performed by: INTERNAL MEDICINE

## 2023-08-08 PROCEDURE — 6360000002 HC RX W HCPCS: Performed by: EMERGENCY MEDICINE

## 2023-08-08 PROCEDURE — 2500000003 HC RX 250 WO HCPCS: Performed by: HOSPITALIST

## 2023-08-08 PROCEDURE — 80053 COMPREHEN METABOLIC PANEL: CPT

## 2023-08-08 PROCEDURE — 93010 ELECTROCARDIOGRAM REPORT: CPT | Performed by: INTERNAL MEDICINE

## 2023-08-08 PROCEDURE — 2500000003 HC RX 250 WO HCPCS

## 2023-08-08 PROCEDURE — 83605 ASSAY OF LACTIC ACID: CPT

## 2023-08-08 PROCEDURE — 96374 THER/PROPH/DIAG INJ IV PUSH: CPT

## 2023-08-08 PROCEDURE — 2580000003 HC RX 258: Performed by: INTERNAL MEDICINE

## 2023-08-08 PROCEDURE — 93005 ELECTROCARDIOGRAM TRACING: CPT | Performed by: EMERGENCY MEDICINE

## 2023-08-08 PROCEDURE — 87641 MR-STAPH DNA AMP PROBE: CPT

## 2023-08-08 PROCEDURE — 2500000003 HC RX 250 WO HCPCS: Performed by: INTERNAL MEDICINE

## 2023-08-08 PROCEDURE — 83036 HEMOGLOBIN GLYCOSYLATED A1C: CPT

## 2023-08-08 PROCEDURE — 82800 BLOOD PH: CPT

## 2023-08-08 PROCEDURE — 81001 URINALYSIS AUTO W/SCOPE: CPT

## 2023-08-08 PROCEDURE — 71045 X-RAY EXAM CHEST 1 VIEW: CPT

## 2023-08-08 PROCEDURE — 82947 ASSAY GLUCOSE BLOOD QUANT: CPT

## 2023-08-08 PROCEDURE — 84295 ASSAY OF SERUM SODIUM: CPT

## 2023-08-08 PROCEDURE — 99291 CRITICAL CARE FIRST HOUR: CPT | Performed by: INTERNAL MEDICINE

## 2023-08-08 PROCEDURE — 96375 TX/PRO/DX INJ NEW DRUG ADDON: CPT

## 2023-08-08 PROCEDURE — 82330 ASSAY OF CALCIUM: CPT

## 2023-08-08 RX ORDER — ENOXAPARIN SODIUM 100 MG/ML
30 INJECTION SUBCUTANEOUS DAILY
Status: DISCONTINUED | OUTPATIENT
Start: 2023-08-08 | End: 2023-08-12 | Stop reason: HOSPADM

## 2023-08-08 RX ORDER — POLYETHYLENE GLYCOL 3350 17 G/17G
17 POWDER, FOR SOLUTION ORAL DAILY PRN
Status: DISCONTINUED | OUTPATIENT
Start: 2023-08-08 | End: 2023-08-12 | Stop reason: HOSPADM

## 2023-08-08 RX ORDER — 0.9 % SODIUM CHLORIDE 0.9 %
500 INTRAVENOUS SOLUTION INTRAVENOUS ONCE
Status: COMPLETED | OUTPATIENT
Start: 2023-08-08 | End: 2023-08-08

## 2023-08-08 RX ORDER — 0.9 % SODIUM CHLORIDE 0.9 %
30 INTRAVENOUS SOLUTION INTRAVENOUS ONCE
Status: COMPLETED | OUTPATIENT
Start: 2023-08-08 | End: 2023-08-08

## 2023-08-08 RX ORDER — POTASSIUM CHLORIDE 29.8 MG/ML
20 INJECTION INTRAVENOUS PRN
Status: DISCONTINUED | OUTPATIENT
Start: 2023-08-08 | End: 2023-08-10

## 2023-08-08 RX ORDER — MAGNESIUM SULFATE IN WATER 40 MG/ML
2000 INJECTION, SOLUTION INTRAVENOUS PRN
Status: DISCONTINUED | OUTPATIENT
Start: 2023-08-08 | End: 2023-08-12 | Stop reason: HOSPADM

## 2023-08-08 RX ORDER — SODIUM CHLORIDE 450 MG/100ML
INJECTION, SOLUTION INTRAVENOUS CONTINUOUS
Status: DISCONTINUED | OUTPATIENT
Start: 2023-08-08 | End: 2023-08-08 | Stop reason: ALTCHOICE

## 2023-08-08 RX ORDER — LINEZOLID 2 MG/ML
600 INJECTION, SOLUTION INTRAVENOUS EVERY 12 HOURS
Status: DISCONTINUED | OUTPATIENT
Start: 2023-08-08 | End: 2023-08-09

## 2023-08-08 RX ORDER — LORAZEPAM 2 MG/ML
4 INJECTION INTRAMUSCULAR EVERY 4 HOURS PRN
Status: DISCONTINUED | OUTPATIENT
Start: 2023-08-08 | End: 2023-08-12 | Stop reason: HOSPADM

## 2023-08-08 RX ORDER — ACETAMINOPHEN 650 MG/1
650 SUPPOSITORY RECTAL EVERY 4 HOURS PRN
Status: DISCONTINUED | OUTPATIENT
Start: 2023-08-08 | End: 2023-08-12 | Stop reason: HOSPADM

## 2023-08-08 RX ORDER — HYDROCODONE BITARTRATE AND ACETAMINOPHEN 5; 325 MG/1; MG/1
1 TABLET ORAL EVERY 6 HOURS PRN
Status: ON HOLD | COMMUNITY
End: 2023-08-12 | Stop reason: HOSPADM

## 2023-08-08 RX ORDER — POTASSIUM CHLORIDE 7.45 MG/ML
10 INJECTION INTRAVENOUS PRN
Status: DISCONTINUED | OUTPATIENT
Start: 2023-08-08 | End: 2023-08-08 | Stop reason: ALTCHOICE

## 2023-08-08 RX ORDER — CALCIUM GLUCONATE 94 MG/ML
1000 INJECTION, SOLUTION INTRAVENOUS ONCE
Status: COMPLETED | OUTPATIENT
Start: 2023-08-08 | End: 2023-08-08

## 2023-08-08 RX ORDER — DEXTROSE MONOHYDRATE 100 MG/ML
INJECTION, SOLUTION INTRAVENOUS CONTINUOUS PRN
Status: DISCONTINUED | OUTPATIENT
Start: 2023-08-08 | End: 2023-08-08 | Stop reason: HOSPADM

## 2023-08-08 RX ORDER — 0.9 % SODIUM CHLORIDE 0.9 %
1000 INTRAVENOUS SOLUTION INTRAVENOUS ONCE
Status: DISCONTINUED | OUTPATIENT
Start: 2023-08-08 | End: 2023-08-08

## 2023-08-08 RX ORDER — DEXTROSE AND SODIUM CHLORIDE 5; .45 G/100ML; G/100ML
INJECTION, SOLUTION INTRAVENOUS CONTINUOUS PRN
Status: DISCONTINUED | OUTPATIENT
Start: 2023-08-08 | End: 2023-08-09 | Stop reason: ALTCHOICE

## 2023-08-08 RX ORDER — LEVETIRACETAM 10 MG/ML
1000 INJECTION INTRAVASCULAR EVERY 12 HOURS
Status: DISCONTINUED | OUTPATIENT
Start: 2023-08-08 | End: 2023-08-12

## 2023-08-08 RX ADMIN — SODIUM BICARBONATE 50 MEQ: 84 INJECTION INTRAVENOUS at 10:33

## 2023-08-08 RX ADMIN — PIPERACILLIN AND TAZOBACTAM 3375 MG: 3; .375 INJECTION, POWDER, LYOPHILIZED, FOR SOLUTION INTRAVENOUS at 07:40

## 2023-08-08 RX ADMIN — POTASSIUM CHLORIDE 20 MEQ: 29.8 INJECTION, SOLUTION INTRAVENOUS at 19:16

## 2023-08-08 RX ADMIN — SODIUM CHLORIDE: 4.5 INJECTION, SOLUTION INTRAVENOUS at 13:32

## 2023-08-08 RX ADMIN — DEXTROSE AND SODIUM CHLORIDE: 5; 450 INJECTION, SOLUTION INTRAVENOUS at 20:02

## 2023-08-08 RX ADMIN — PIPERACILLIN AND TAZOBACTAM 3375 MG: 3; .375 INJECTION, POWDER, LYOPHILIZED, FOR SOLUTION INTRAVENOUS at 17:03

## 2023-08-08 RX ADMIN — Medication 50 MEQ: at 10:33

## 2023-08-08 RX ADMIN — POTASSIUM CHLORIDE 20 MEQ: 29.8 INJECTION, SOLUTION INTRAVENOUS at 11:15

## 2023-08-08 RX ADMIN — ACETAMINOPHEN 650 MG: 650 SUPPOSITORY RECTAL at 15:47

## 2023-08-08 RX ADMIN — SODIUM CHLORIDE 0.1 UNITS/KG/HR: 9 INJECTION, SOLUTION INTRAVENOUS at 07:43

## 2023-08-08 RX ADMIN — SODIUM CHLORIDE 500 ML: 9 INJECTION, SOLUTION INTRAVENOUS at 10:38

## 2023-08-08 RX ADMIN — LINEZOLID 600 MG: 600 INJECTION, SOLUTION INTRAVENOUS at 15:59

## 2023-08-08 RX ADMIN — LEVETIRACETAM 1000 MG: 10 INJECTION, SOLUTION INTRAVENOUS at 09:33

## 2023-08-08 RX ADMIN — SODIUM PHOSPHATE, MONOBASIC, MONOHYDRATE AND SODIUM PHOSPHATE, DIBASIC, ANHYDROUS 20 MMOL: 142; 276 INJECTION, SOLUTION INTRAVENOUS at 14:35

## 2023-08-08 RX ADMIN — CALCIUM GLUCONATE 1000 MG: 98 INJECTION, SOLUTION INTRAVENOUS at 06:59

## 2023-08-08 RX ADMIN — POTASSIUM CHLORIDE 20 MEQ: 29.8 INJECTION, SOLUTION INTRAVENOUS at 14:26

## 2023-08-08 RX ADMIN — SODIUM CHLORIDE: 4.5 INJECTION, SOLUTION INTRAVENOUS at 17:41

## 2023-08-08 RX ADMIN — PIPERACILLIN AND TAZOBACTAM 3375 MG: 3; .375 INJECTION, POWDER, LYOPHILIZED, FOR SOLUTION INTRAVENOUS at 23:04

## 2023-08-08 RX ADMIN — SODIUM CHLORIDE: 4.5 INJECTION, SOLUTION INTRAVENOUS at 09:20

## 2023-08-08 RX ADMIN — SODIUM BICARBONATE: 84 INJECTION, SOLUTION INTRAVENOUS at 07:39

## 2023-08-08 RX ADMIN — SODIUM BICARBONATE: 84 INJECTION, SOLUTION INTRAVENOUS at 09:17

## 2023-08-08 RX ADMIN — LEVETIRACETAM 1000 MG: 10 INJECTION, SOLUTION INTRAVENOUS at 21:15

## 2023-08-08 RX ADMIN — POTASSIUM CHLORIDE 20 MEQ: 29.8 INJECTION, SOLUTION INTRAVENOUS at 23:05

## 2023-08-08 RX ADMIN — SODIUM CHLORIDE 1401 ML: 9 INJECTION, SOLUTION INTRAVENOUS at 06:10

## 2023-08-08 RX ADMIN — SODIUM BICARBONATE 50 MEQ: 84 INJECTION INTRAVENOUS at 06:36

## 2023-08-08 RX ADMIN — POTASSIUM CHLORIDE 20 MEQ: 29.8 INJECTION, SOLUTION INTRAVENOUS at 22:06

## 2023-08-08 RX ADMIN — ENOXAPARIN SODIUM 30 MG: 100 INJECTION SUBCUTANEOUS at 10:44

## 2023-08-08 RX ADMIN — POTASSIUM CHLORIDE 20 MEQ: 29.8 INJECTION, SOLUTION INTRAVENOUS at 18:12

## 2023-08-08 ASSESSMENT — PAIN SCALES - GENERAL
PAINLEVEL_OUTOF10: 0

## 2023-08-08 NOTE — ED NOTES
Patients medications started according to [facility]. Patient is resting in bed, seizure pads on 2x rails, bear hugger blanket applied and warming patient. Home arrest anklet notes on patients right ankle.       Estefani Lau RN  08/08/23 3145

## 2023-08-08 NOTE — H&P
Hospital Medicine History & Physical      PCP: Mara More MD    Date of Admission: 2023    Date of Service: Pt seen/examined on 2023 and Admitted to Inpatient     Chief Complaint:  ams, seizures, hyperglycemia      History Of Present Illness: Benjamin Monique is a 28 y.o. female  who presents to the hospital  with altered mental status. Patient upon arrival  is not able to provide much history due to her encephalopathy, she is tachypneic and tachycardic with dry mucus membranes. She has a known history of type 1 diabetes with DKA. She may have had multiple seizures prehospital according to EMS but has not had any seizures here. She does have a history of seizures. No known head trauma. Patient does not answer any questions, mostly just moaning and groaning and moving around the ED, but protecting her airway. As such history is severely limited at this time except for chart review as no one is at the bedside for collateral history regarding any recent symptomatology. Past Medical History:        Diagnosis Date    Depression     Diabetes mellitus ([address])     Diabetic ketoacidosis without coma associated with type 1 diabetes mellitus ([address]) 2022    Seizures ([address])        Past Surgical History:        Procedure Laterality Date     SECTION      TUBAL LIGATION         Medications Prior to Admission:    Prior to Admission medications    Medication Sig Start Date End Date Taking? Authorizing Provider   HYDROcodone-acetaminophen (NORCO) 5-325 MG per tablet Take 1 tablet by mouth every 6 hours as needed for Pain. Max Daily Amount: 4 tablets   Yes Historical Provider, MD   blood glucose test strips (ASCENSIA AUTODISC VI;ONE TOUCH ULTRA TEST VI) strip Up to 8 times daily As needed for blood sugar testing.  23   Mara More MD   omeprazole (PRILOSEC) 20 MG

## 2023-08-08 NOTE — CONSULTS
PULMONARY AND CRITICAL CARE MEDICINE CONSULT NOTE      Name: Edwin Bhardwaj  Sex: female  : 1990  MRN: [id number]  Admission Date: 2023  Admission Diagnosis: Hyperkalemia [E87.5]  Seizure ([address]) [R56.9]  DKA, type 2, not at goal [facility]) [E11.10]  Hypothermia, initial encounter [T68. XXXA]  Diabetic ketoacidosis without coma associated with type 1 diabetes mellitus ([address]) [R28.89]  Acute metabolic encephalopathy [C29.11]      HPI: Patient is a 28 y.o. female with past medical history significant for seizure disorder, type 1 diabetes mellitus, anxiety/depression, recent dental infection who was brought in by EMS for altered mental status. All history is obtained through medical records as patient was encephalopathic upon arrival.  Patient has history of multiple seizures before coming to hospital according to EMS but no seizures in the hospital.  Patient was noted to be tachycardic and tachypneic. Lab work was suggestive of diabetic ketoacidosis with blood sugars closer to 970 and severe metabolic acidosis with bicarb of less than 2 and a pH of 6.8. Patient was moaning and groaning but protecting her airway. Lactic acid was noted to be 7. Leukocytosis with white cell count of 26. No evidence of UTI. No evidence of pneumonia on chest x-ray. Blood cultures pending. Patient received a total of 1.4 L normal saline bolus in the ER and was then initiated on bicarbonate drip as well as half-normal saline at 250 cc/h per DKA protocol. She is also on insulin drip per DKA protocol. Patient is hypothermic and is on [employer]. 14 point ROS could not be obtained due to patient factors. Patient is altered and lethargic.       Past Medical History:   Diagnosis Date    Depression     Diabetes mellitus ([address])     Diabetic ketoacidosis without coma associated with type 1 diabetes mellitus ([address]) 2022    Seizures ([address])      Past Surgical History:   Procedure Laterality Date     SECTION   pneumonia. Critical Care Time: 39 Minutes  Total critical care time caring for this patient with life threatening illness, including direct patient contact, management of life support systems, review of data including imaging and labs, discussions with other team members and physicians excluding procedures. Electronically signed by Jade Hodges MD on 8/8/23 at 11:11 AM EDT     This note was completed using dragon medical speech recognition software. Grammatical errors, random word insertions, pronoun errors and incomplete sentences are occasional consequences of this technology due to software limitations. If there are questions or concerns about the content of this note of information contained within the body of this dictation, they should be addressed with the provider for clarification.

## 2023-08-08 NOTE — CARE COORDINATION
Discharge Planning Note:    Chart reviewed and it appears that patient has minimal needs for discharge at this time. Risk Score 17 %     Primary Care Physician is CARMEN LAINEZ  Primary insurance is [facility]    Please notify case management if any discharge needs are identified. Case management will continue to follow progress and update discharge plan as needed.

## 2023-08-08 NOTE — ED NOTES
ED TO INPATIENT SBAR HANDOFF    Patient Name: Swathi Byrd   :  1990  28 y.o. MRN:  [id number]  Preferred Name  [facility]  ED Room #:  ED-0001/01  Family/Caregiver Present no   Restraints no   Sitter no   Sepsis Risk Score Sepsis Risk Score: 14.54    Situation  Code Status: Prior No additional code details. Allergies: Patient has no known allergies. Weight: Patient Vitals for the past 96 hrs (Last 3 readings):   Weight   23 0600 102 lb 15.3 oz (46.7 kg)     Arrived from: home  Chief Complaint:   Chief Complaint   Patient presents with    Hyperglycemia    Altered Mental Status    Seizures     Hospital Problem/Diagnosis:  Principal Problem:    DKA, type 2, not at goal [facility])  Resolved Problems:    * No resolved hospital problems. *    Imaging:   CT HEAD WO CONTRAST   Final Result   No acute intracranial abnormality. XR CHEST PORTABLE   Final Result   No evidence of acute cardiopulmonary disease.            Abnormal labs:   Abnormal Labs Reviewed   CBC WITH AUTO DIFFERENTIAL - Abnormal; Notable for the following components:       Result Value    WBC 26.4 (*)     Hemoglobin 10.4 (*)     MCH 21.8 (*)     MCHC 26.1 (*)     RDW 21.2 (*)     Platelets 145 (*)     Neutrophils Absolute 20.3 (*)     Anisocytosis 1+ (*)     Macrocytes Occasional (*)     Polychromasia Occasional (*)     Ovalocytes Occasional (*)     All other components within normal limits   COMPREHENSIVE METABOLIC PANEL W/ REFLEX TO MG FOR LOW K - Abnormal; Notable for the following components:    Potassium reflex Magnesium 6.4 (*)     CO2 4 (*)     Anion Gap 38 (*)     Glucose 955 (*)     Creatinine 1.2 (*)     Total Protein 8.6 (*)     AST <5 (*)     All other components within normal limits    Narrative:     CALL  [place]  [facility] tel. [phone],  Chemistry results called to and read back by RN REYESROSA, 2023 06:53,  by [facility]  Chemistry results called to and read back by amada vasquez, 2023 06:39,  by Autumn Pagan Diagnosis Date    Depression     Diabetes mellitus ([address])     Diabetic ketoacidosis without coma associated with type 1 diabetes mellitus ([address]) 7/29/2022    Seizures ([address])        Assessment    Vitals/MEWS:        Vitals:    08/08/23 0721 08/08/23 0726 08/08/23 0731 08/08/23 0736   BP:   (!) 94/55    Pulse: (!) 122 (!) 121 (!) 120 (!) 120   Resp: 27 26 25 25   Temp:       TempSrc:       SpO2: 100% 100% 100% 100%   Weight:         FiO2 (%): RA  O2 Flow Rate: O2 Device: None (Room air)    Cardiac Rhythm:    Pain Assessment:  [] Verbal [] Susa Lund Scale  Pain Scale:    Last documented pain score (0-10 scale)    Last documented pain medication administered:   Mental Status: disoriented  Orientation Level:    NIH Score:    C-SSRS:    Bedside swallow:    [place] Coma Scale (GCS): Active LDA's:   Peripheral IV 53/94/05 Right Cephalic (Active)       Peripheral IV 08/08/23 Left Antecubital (Active)     PO Status: NPO  Pertinent or High Risk Medications/Drips: yes   If Yes, please provide details: Bicarb, Insulin  Pending Blood Product Administration: no       You may also review the ED PT Care Timeline found under the Summary Nursing Index tab. Recommendation    Pending orders None  Plan for Discharge (if known): Additional Comments: Patient has hx of DKA and has house arrest bracelet on, side rails padded with seizure pads. Central line and richardson in place. If any further questions, please [place] S[place] RN at [phone].     Electronically signed by: Electronically signed by Nawaf Lora RN on 8/8/2023 at 7:44 AM       Nawaf Lora RN  08/08/23 3461

## 2023-08-08 NOTE — PROGRESS NOTES
Patient febrile. PRN  tylenol given. Is more alert and respirations now unlabored. Following commands but not speaking consistently. Did saw \"ow. \"      at bedside all day. Home medications sent home with him.

## 2023-08-08 NOTE — PROGRESS NOTES
[facility]  Diabetes Education   Progress Note       NAME:  Corey Myers  RECORD NUMBER:  [id number]  AGE: 28 y.o. GENDER: female  : 1990  TODAY'S DATE:  2023    Subjective   Reason for Diabetes Education Evaluation and Assessment: Diabetic Ketoacidosis/Hyperosmolar Hyperglycemia    Visit Type:  Unable to evaluate pt due to cognition      Swathi Byrd is a 28 y.o. female referred by:  [x] Physician    After chart review, pt recently seen in PCP office on 2023 and was give Dexcom CGM and was advised to continue insulin while waiting for Omnipod (insulin pump) supplies to be delivered. Pt's limbs/abdomen evaluated with other nursing staff- no Dexcom visible. Will re evaluate when pt is awake and able to answer questions related to her DM.      PAST MEDICAL HISTORY        Diagnosis Date    Depression     Diabetes mellitus ([address])     Diabetic ketoacidosis without coma associated with type 1 diabetes mellitus ([address]) 2022    Seizures ([address])        PAST SURGICAL HISTORY    Past Surgical History:   Procedure Laterality Date     SECTION      TUBAL LIGATION         FAMILY HISTORY    Family History   Problem Relation Age of Onset    Asthma Mother     Allergies Mother         sun allergy    Diabetes Type 1  Father     Diabetes Type 1  Maternal Grandfather     Diabetes Type 1  Cousin     Diabetes Type 1  Cousin     Diabetes Type 1  Maternal Uncle     Diabetes Type 1  Maternal Uncle     Diabetes Type 1  Maternal Aunt        SOCIAL HISTORY    Social History     Tobacco Use    Smoking status: Never    Smokeless tobacco: Never   Vaping Use    Vaping Use: Never used   Substance Use Topics    Alcohol use: No    Drug use: No       ALLERGIES    No Known Allergies    MEDICATIONS     levETIRAcetam  1,000 mg IntraVENous Q12H    enoxaparin  30 mg SubCUTAneous Daily    piperacillin-tazobactam  3,375 mg IntraVENous Q8H       Objective        Patient Active Problem List

## 2023-08-08 NOTE — PROGRESS NOTES
Notified patient's  and mom of admission to ICU. Per , patient had multiple seizures yesterday but was talking to her when they went to bed last night. This morning he woke up around 4 AM and found her sitting on the toilet. Her breathing was labored, lips were purple and she wasn't talking. Reports that she has been taking her seizure meds. Said that she has been compliant with her novolog. She told him yesterday that her blood glucose was in the 500s. Reports that she doesn't smoke, drink alcohol, or use any illicit drugs.

## 2023-08-08 NOTE — PROGRESS NOTES
Patient to room 3906 from ED via stretcher. Responsive to pain only. Temperature 93.6. Warming blanket in place. Responsive to pain only. Opening eyes spontaneously. No talking. Respirations labored. On room air. ST on tele. Insulin gtt infusing. MDs at bedside.

## 2023-08-08 NOTE — PROGRESS NOTES
ABG drawn from right radial artery- pressure held X 5 minutes- no complications noted. Dr. Bello Dangelo updated on results and drop in blood sugar. Continue current plan of care and DKA protocol. Pt alert and maintaining airway. Yuki Spaulding, RN, BSN, CCRN.

## 2023-08-08 NOTE — ED NOTES
Patient transported to ICU by this RN and Naseem Cordon RN in stable condition on bed side monitor with insulin and bicarb infusing per [facility]. Patient transported with seizure pads in place with all patient belongings.       Viry Martinez RN  08/08/23 9247

## 2023-08-08 NOTE — ACP (ADVANCE CARE PLANNING)
Advance Care Planning     Advance Care Planning Inpatient Note  Spiritual Care Department    Today's Date: 8/8/2023  Unit: [facility] ICU    Received request from IDT Member and family. Upon review of chart and communication with care team, Spiritual Care will defer advance care planning with patient at this time. . Patient was present in the room during visit. Goals of ACP Conversation:      Health Care Decision Makers:       Primary Decision Maker: Serge Morales Spouse - [phone]    Secondary Decision Maker: Natividad Marie - Parent - [phone]  Summary:    {Advance Care Planning Documents (Patient Wishes):  None  Per discussion w/RN patient is not able to have ACP discussion at this time; pt's  had requested Advance Directive documents. Interventions:  Per discussion w/RN plan is  for a re-consultation for Advance Directives if patient is interested when medically able to discuss documents. Care Preferences Communicated: Outcomes/Plan:  Follow up will be at discretion of patient when medically able to have discussion.     Electronically signed by Malik Dumont, [address] [place] [address] on 8/8/2023 at 12:01 PM

## 2023-08-08 NOTE — PLAN OF CARE
Problem: Skin/Tissue Integrity  Goal: Absence of new skin breakdown  Description: 1. Monitor for areas of redness and/or skin breakdown  2. Assess vascular access sites hourly  3. Every 4-6 hours minimum:  Change oxygen saturation probe site  4. Every 4-6 hours:  If on nasal continuous positive airway pressure, respiratory therapy assess nares and determine need for appliance change or resting period.   Outcome: Progressing     Problem: Safety - Adult  Goal: Free from fall injury  Outcome: Progressing     Problem: Metabolic/Fluid and Electrolytes - Adult  Goal: Electrolytes maintained within normal limits  Outcome: Progressing  Goal: Hemodynamic stability and optimal renal function maintained  Outcome: Progressing  Goal: Glucose maintained within prescribed range  Outcome: Progressing

## 2023-08-08 NOTE — PROGRESS NOTES
4 Eyes Skin Assessment     NAME:  Krishan Guy  YOB: 1990  MEDICAL RECORD NUMBER:  [id number]    The patient is being assessed for  Admission    I agree that at least one RN has performed a thorough Head to Toe Skin Assessment on the patient. ALL assessment sites listed below have been assessed. Areas assessed by both nurses:    Head, Face, Ears, Shoulders, Back, Chest, Arms, Elbows, Hands, Sacrum. Buttock, Coccyx, Ischium, Legs. Feet and Heels, and Under Medical Devices         Does the Patient have a Wound?  No noted wound(s)       Luis Daniel Prevention initiated by RN: No  Wound Care Orders initiated by RN: No    Pressure Injury (Stage 3,4, Unstageable, DTI, NWPT, and Complex wounds) if present, place Wound referral order by RN under : No    New Ostomies, if present place, Ostomy referral order under : No     Nurse 1 eSignature: Electronically signed by Raul Banks RN on 8/8/23 at 5:24 PM EDT    **SHARE this note so that the co-signing nurse can place an eSignature**    Nurse 2 eSignature: Electronically signed by Hang Vance RN on 8/8/23 at 7:56 PM EDT

## 2023-08-09 LAB
ANION GAP SERPL CALCULATED.3IONS-SCNC: 11 MMOL/L (ref 3–16)
ANION GAP SERPL CALCULATED.3IONS-SCNC: 12 MMOL/L (ref 3–16)
ANION GAP SERPL CALCULATED.3IONS-SCNC: 13 MMOL/L (ref 3–16)
ANION GAP SERPL CALCULATED.3IONS-SCNC: 14 MMOL/L (ref 3–16)
ANION GAP SERPL CALCULATED.3IONS-SCNC: 16 MMOL/L (ref 3–16)
ANION GAP SERPL CALCULATED.3IONS-SCNC: 18 MMOL/L (ref 3–16)
BASOPHILS # BLD: 0 K/UL (ref 0–0.2)
BASOPHILS NFR BLD: 0.2 %
BUN SERPL-MCNC: 3 MG/DL (ref 7–20)
BUN SERPL-MCNC: 3 MG/DL (ref 7–20)
BUN SERPL-MCNC: 4 MG/DL (ref 7–20)
BUN SERPL-MCNC: 5 MG/DL (ref 7–20)
BUN SERPL-MCNC: <2 MG/DL (ref 7–20)
BUN SERPL-MCNC: <2 MG/DL (ref 7–20)
CALCIUM SERPL-MCNC: 7.2 MG/DL (ref 8.3–10.6)
CALCIUM SERPL-MCNC: 7.2 MG/DL (ref 8.3–10.6)
CALCIUM SERPL-MCNC: 7.4 MG/DL (ref 8.3–10.6)
CALCIUM SERPL-MCNC: 7.6 MG/DL (ref 8.3–10.6)
CALCIUM SERPL-MCNC: 7.8 MG/DL (ref 8.3–10.6)
CALCIUM SERPL-MCNC: 8 MG/DL (ref 8.3–10.6)
CHLORIDE SERPL-SCNC: 110 MMOL/L (ref 99–110)
CHLORIDE SERPL-SCNC: 111 MMOL/L (ref 99–110)
CHLORIDE SERPL-SCNC: 111 MMOL/L (ref 99–110)
CHLORIDE SERPL-SCNC: 112 MMOL/L (ref 99–110)
CHLORIDE SERPL-SCNC: 119 MMOL/L (ref 99–110)
CHLORIDE SERPL-SCNC: 120 MMOL/L (ref 99–110)
CO2 SERPL-SCNC: 11 MMOL/L (ref 21–32)
CO2 SERPL-SCNC: 15 MMOL/L (ref 21–32)
CO2 SERPL-SCNC: 16 MMOL/L (ref 21–32)
CO2 SERPL-SCNC: 18 MMOL/L (ref 21–32)
CO2 SERPL-SCNC: 18 MMOL/L (ref 21–32)
CO2 SERPL-SCNC: 19 MMOL/L (ref 21–32)
CREAT SERPL-MCNC: 0.5 MG/DL (ref 0.6–1.1)
CREAT SERPL-MCNC: 0.6 MG/DL (ref 0.6–1.1)
CREAT SERPL-MCNC: 0.7 MG/DL (ref 0.6–1.1)
CREAT SERPL-MCNC: 0.7 MG/DL (ref 0.6–1.1)
CREAT SERPL-MCNC: 0.8 MG/DL (ref 0.6–1.1)
CREAT SERPL-MCNC: 0.8 MG/DL (ref 0.6–1.1)
DEPRECATED RDW RBC AUTO: 20.5 % (ref 12.4–15.4)
EOSINOPHIL # BLD: 0 K/UL (ref 0–0.6)
EOSINOPHIL NFR BLD: 0 %
GFR SERPLBLD CREATININE-BSD FMLA CKD-EPI: >60 ML/MIN/{1.73_M2}
GLUCOSE BLD-MCNC: 124 MG/DL (ref 70–99)
GLUCOSE BLD-MCNC: 126 MG/DL (ref 70–99)
GLUCOSE BLD-MCNC: 128 MG/DL (ref 70–99)
GLUCOSE BLD-MCNC: 138 MG/DL (ref 70–99)
GLUCOSE BLD-MCNC: 139 MG/DL (ref 70–99)
GLUCOSE BLD-MCNC: 142 MG/DL (ref 70–99)
GLUCOSE BLD-MCNC: 149 MG/DL (ref 70–99)
GLUCOSE BLD-MCNC: 155 MG/DL (ref 70–99)
GLUCOSE BLD-MCNC: 156 MG/DL (ref 70–99)
GLUCOSE BLD-MCNC: 160 MG/DL (ref 70–99)
GLUCOSE BLD-MCNC: 163 MG/DL (ref 70–99)
GLUCOSE BLD-MCNC: 168 MG/DL (ref 70–99)
GLUCOSE BLD-MCNC: 170 MG/DL (ref 70–99)
GLUCOSE BLD-MCNC: 192 MG/DL (ref 70–99)
GLUCOSE BLD-MCNC: 205 MG/DL (ref 70–99)
GLUCOSE BLD-MCNC: 258 MG/DL (ref 70–99)
GLUCOSE BLD-MCNC: 288 MG/DL (ref 70–99)
GLUCOSE BLD-MCNC: 327 MG/DL (ref 70–99)
GLUCOSE BLD-MCNC: 357 MG/DL (ref 70–99)
GLUCOSE BLD-MCNC: 409 MG/DL (ref 70–99)
GLUCOSE SERPL-MCNC: 143 MG/DL (ref 70–99)
GLUCOSE SERPL-MCNC: 155 MG/DL (ref 70–99)
GLUCOSE SERPL-MCNC: 164 MG/DL (ref 70–99)
GLUCOSE SERPL-MCNC: 195 MG/DL (ref 70–99)
GLUCOSE SERPL-MCNC: 218 MG/DL (ref 70–99)
GLUCOSE SERPL-MCNC: 403 MG/DL (ref 70–99)
HCT VFR BLD AUTO: 29.7 % (ref 36–48)
HGB BLD-MCNC: 8.7 G/DL (ref 12–16)
LYMPHOCYTES # BLD: 1.5 K/UL (ref 1–5.1)
LYMPHOCYTES NFR BLD: 10.5 %
MAGNESIUM SERPL-MCNC: 1.7 MG/DL (ref 1.8–2.4)
MAGNESIUM SERPL-MCNC: 1.7 MG/DL (ref 1.8–2.4)
MAGNESIUM SERPL-MCNC: 1.9 MG/DL (ref 1.8–2.4)
MAGNESIUM SERPL-MCNC: 1.9 MG/DL (ref 1.8–2.4)
MAGNESIUM SERPL-MCNC: 2 MG/DL (ref 1.8–2.4)
MAGNESIUM SERPL-MCNC: 2 MG/DL (ref 1.8–2.4)
MCH RBC QN AUTO: 21.8 PG (ref 26–34)
MCHC RBC AUTO-ENTMCNC: 29.4 G/DL (ref 31–36)
MCV RBC AUTO: 74.3 FL (ref 80–100)
MONOCYTES # BLD: 1.1 K/UL (ref 0–1.3)
MONOCYTES NFR BLD: 8 %
MRSA DNA SPEC QL NAA+PROBE: NORMAL
NEUTROPHILS # BLD: 11.7 K/UL (ref 1.7–7.7)
NEUTROPHILS NFR BLD: 81.3 %
OVALOCYTES BLD QL SMEAR: ABNORMAL
PERFORMED ON: ABNORMAL
PHOSPHATE SERPL-MCNC: 1.5 MG/DL (ref 2.5–4.9)
PHOSPHATE SERPL-MCNC: 1.8 MG/DL (ref 2.5–4.9)
PHOSPHATE SERPL-MCNC: 2.4 MG/DL (ref 2.5–4.9)
PHOSPHATE SERPL-MCNC: 3.3 MG/DL (ref 2.5–4.9)
PLATELET # BLD AUTO: 287 K/UL (ref 135–450)
PMV BLD AUTO: 7.8 FL (ref 5–10.5)
POIKILOCYTOSIS BLD QL SMEAR: ABNORMAL
POTASSIUM SERPL-SCNC: 4.1 MMOL/L (ref 3.5–5.1)
POTASSIUM SERPL-SCNC: 4.2 MMOL/L (ref 3.5–5.1)
POTASSIUM SERPL-SCNC: 5.2 MMOL/L (ref 3.5–5.1)
RBC # BLD AUTO: 4 M/UL (ref 4–5.2)
REASON FOR REJECTION: NORMAL
REJECTED TEST: NORMAL
SODIUM SERPL-SCNC: 139 MMOL/L (ref 136–145)
SODIUM SERPL-SCNC: 140 MMOL/L (ref 136–145)
SODIUM SERPL-SCNC: 142 MMOL/L (ref 136–145)
SODIUM SERPL-SCNC: 143 MMOL/L (ref 136–145)
SODIUM SERPL-SCNC: 149 MMOL/L (ref 136–145)
SODIUM SERPL-SCNC: 151 MMOL/L (ref 136–145)
WBC # BLD AUTO: 14.3 K/UL (ref 4–11)

## 2023-08-09 PROCEDURE — 83735 ASSAY OF MAGNESIUM: CPT

## 2023-08-09 PROCEDURE — 36415 COLL VENOUS BLD VENIPUNCTURE: CPT

## 2023-08-09 PROCEDURE — 2500000003 HC RX 250 WO HCPCS: Performed by: HOSPITALIST

## 2023-08-09 PROCEDURE — 99233 SBSQ HOSP IP/OBS HIGH 50: CPT | Performed by: INTERNAL MEDICINE

## 2023-08-09 PROCEDURE — 85025 COMPLETE CBC W/AUTO DIFF WBC: CPT

## 2023-08-09 PROCEDURE — 80048 BASIC METABOLIC PNL TOTAL CA: CPT

## 2023-08-09 PROCEDURE — 2580000003 HC RX 258: Performed by: INTERNAL MEDICINE

## 2023-08-09 PROCEDURE — 2580000003 HC RX 258: Performed by: HOSPITALIST

## 2023-08-09 PROCEDURE — 2000000000 HC ICU R&B

## 2023-08-09 PROCEDURE — 84100 ASSAY OF PHOSPHORUS: CPT

## 2023-08-09 PROCEDURE — 6370000000 HC RX 637 (ALT 250 FOR IP): Performed by: INTERNAL MEDICINE

## 2023-08-09 PROCEDURE — 6360000002 HC RX W HCPCS: Performed by: INTERNAL MEDICINE

## 2023-08-09 PROCEDURE — 6370000000 HC RX 637 (ALT 250 FOR IP): Performed by: HOSPITALIST

## 2023-08-09 PROCEDURE — 6360000002 HC RX W HCPCS: Performed by: HOSPITALIST

## 2023-08-09 RX ORDER — DEXTROSE, SODIUM CHLORIDE, SODIUM LACTATE, POTASSIUM CHLORIDE, AND CALCIUM CHLORIDE 5; .6; .31; .03; .02 G/100ML; G/100ML; G/100ML; G/100ML; G/100ML
INJECTION, SOLUTION INTRAVENOUS CONTINUOUS
Status: DISCONTINUED | OUTPATIENT
Start: 2023-08-09 | End: 2023-08-10

## 2023-08-09 RX ORDER — SODIUM CHLORIDE, SODIUM LACTATE, POTASSIUM CHLORIDE, CALCIUM CHLORIDE 600; 310; 30; 20 MG/100ML; MG/100ML; MG/100ML; MG/100ML
INJECTION, SOLUTION INTRAVENOUS CONTINUOUS PRN
Status: DISCONTINUED | OUTPATIENT
Start: 2023-08-09 | End: 2023-08-12 | Stop reason: HOSPADM

## 2023-08-09 RX ORDER — CLONAZEPAM 1 MG/1
2 TABLET ORAL EVERY 12 HOURS
Status: DISCONTINUED | OUTPATIENT
Start: 2023-08-09 | End: 2023-08-12 | Stop reason: HOSPADM

## 2023-08-09 RX ADMIN — POTASSIUM CHLORIDE 20 MEQ: 29.8 INJECTION, SOLUTION INTRAVENOUS at 12:11

## 2023-08-09 RX ADMIN — LEVETIRACETAM 1000 MG: 10 INJECTION, SOLUTION INTRAVENOUS at 23:00

## 2023-08-09 RX ADMIN — POTASSIUM CHLORIDE 20 MEQ: 29.8 INJECTION, SOLUTION INTRAVENOUS at 14:33

## 2023-08-09 RX ADMIN — POTASSIUM CHLORIDE 20 MEQ: 29.8 INJECTION, SOLUTION INTRAVENOUS at 10:42

## 2023-08-09 RX ADMIN — SODIUM PHOSPHATE, MONOBASIC, MONOHYDRATE AND SODIUM PHOSPHATE, DIBASIC, ANHYDROUS 15 MMOL: 142; 276 INJECTION, SOLUTION INTRAVENOUS at 11:27

## 2023-08-09 RX ADMIN — POTASSIUM CHLORIDE 20 MEQ: 29.8 INJECTION, SOLUTION INTRAVENOUS at 06:09

## 2023-08-09 RX ADMIN — POTASSIUM CHLORIDE 20 MEQ: 29.8 INJECTION, SOLUTION INTRAVENOUS at 16:26

## 2023-08-09 RX ADMIN — PIPERACILLIN AND TAZOBACTAM 3375 MG: 3; .375 INJECTION, POWDER, LYOPHILIZED, FOR SOLUTION INTRAVENOUS at 14:55

## 2023-08-09 RX ADMIN — POTASSIUM CHLORIDE 20 MEQ: 29.8 INJECTION, SOLUTION INTRAVENOUS at 18:28

## 2023-08-09 RX ADMIN — POTASSIUM CHLORIDE 20 MEQ: 29.8 INJECTION, SOLUTION INTRAVENOUS at 02:14

## 2023-08-09 RX ADMIN — SODIUM PHOSPHATE, MONOBASIC, MONOHYDRATE AND SODIUM PHOSPHATE, DIBASIC, ANHYDROUS 10 MMOL: 142; 276 INJECTION, SOLUTION INTRAVENOUS at 23:59

## 2023-08-09 RX ADMIN — ENOXAPARIN SODIUM 30 MG: 100 INJECTION SUBCUTANEOUS at 08:24

## 2023-08-09 RX ADMIN — SODIUM CHLORIDE 0.01 UNITS/KG/HR: 9 INJECTION, SOLUTION INTRAVENOUS at 23:10

## 2023-08-09 RX ADMIN — DEXTROSE AND SODIUM CHLORIDE: 5; 450 INJECTION, SOLUTION INTRAVENOUS at 03:30

## 2023-08-09 RX ADMIN — SODIUM CHLORIDE, SODIUM LACTATE, POTASSIUM CHLORIDE, CALCIUM CHLORIDE AND DEXTROSE MONOHYDRATE: 5; 600; 310; 30; 20 INJECTION, SOLUTION INTRAVENOUS at 23:05

## 2023-08-09 RX ADMIN — DEXTROSE AND SODIUM CHLORIDE: 5; 450 INJECTION, SOLUTION INTRAVENOUS at 10:16

## 2023-08-09 RX ADMIN — PIPERACILLIN AND TAZOBACTAM 3375 MG: 3; .375 INJECTION, POWDER, LYOPHILIZED, FOR SOLUTION INTRAVENOUS at 23:34

## 2023-08-09 RX ADMIN — CLONAZEPAM 2 MG: 1 TABLET ORAL at 18:24

## 2023-08-09 RX ADMIN — POTASSIUM CHLORIDE 20 MEQ: 29.8 INJECTION, SOLUTION INTRAVENOUS at 23:23

## 2023-08-09 RX ADMIN — SODIUM PHOSPHATE, MONOBASIC, MONOHYDRATE AND SODIUM PHOSPHATE, DIBASIC, ANHYDROUS 15 MMOL: 142; 276 INJECTION, SOLUTION INTRAVENOUS at 02:11

## 2023-08-09 RX ADMIN — LEVETIRACETAM 1000 MG: 10 INJECTION, SOLUTION INTRAVENOUS at 08:28

## 2023-08-09 RX ADMIN — POTASSIUM CHLORIDE 20 MEQ: 29.8 INJECTION, SOLUTION INTRAVENOUS at 04:09

## 2023-08-09 RX ADMIN — PIPERACILLIN AND TAZOBACTAM 3375 MG: 3; .375 INJECTION, POWDER, LYOPHILIZED, FOR SOLUTION INTRAVENOUS at 07:09

## 2023-08-09 RX ADMIN — LINEZOLID 600 MG: 600 INJECTION, SOLUTION INTRAVENOUS at 04:08

## 2023-08-09 RX ADMIN — SODIUM CHLORIDE, SODIUM LACTATE, POTASSIUM CHLORIDE, CALCIUM CHLORIDE AND DEXTROSE MONOHYDRATE: 5; 600; 310; 30; 20 INJECTION, SOLUTION INTRAVENOUS at 14:32

## 2023-08-09 RX ADMIN — POTASSIUM CHLORIDE 20 MEQ: 29.8 INJECTION, SOLUTION INTRAVENOUS at 20:25

## 2023-08-09 RX ADMIN — SODIUM PHOSPHATE, MONOBASIC, MONOHYDRATE AND SODIUM PHOSPHATE, DIBASIC, ANHYDROUS 15 MMOL: 142; 276 INJECTION, SOLUTION INTRAVENOUS at 19:13

## 2023-08-09 ASSESSMENT — PAIN SCALES - GENERAL
PAINLEVEL_OUTOF10: 0

## 2023-08-09 NOTE — PLAN OF CARE
Problem: Skin/Tissue Integrity  Goal: Absence of new skin breakdown  Description: 1. Monitor for areas of redness and/or skin breakdown  2. Assess vascular access sites hourly  3. Every 4-6 hours minimum:  Change oxygen saturation probe site  4. Every 4-6 hours:  If on nasal continuous positive airway pressure, respiratory therapy assess nares and determine need for appliance change or resting period.   8/9/2023 0935 by Tutu Ramon RN  Outcome: Progressing     Problem: Metabolic/Fluid and Electrolytes - Adult  Goal: Electrolytes maintained within normal limits  8/9/2023 0935 by Tutu Ramon RN  Outcome: Progressing     Problem: Metabolic/Fluid and Electrolytes - Adult  Goal: Glucose maintained within prescribed range  8/9/2023 0935 by Tutu Ramon RN  Outcome: Progressing     Problem: Chronic Conditions and Co-morbidities  Goal: Patient's chronic conditions and co-morbidity symptoms are monitored and maintained or improved  Outcome: Progressing

## 2023-08-09 NOTE — PROGRESS NOTES
Patient intermittently confused. Thinks she is at a high school. MD aware.  states he thinks she was taking clonazepam prior to admission and that clobazam had been stopped. States she hasn't used her insulin pump in a few months and has been doing injections.

## 2023-08-09 NOTE — PLAN OF CARE
Problem: Discharge Planning  Goal: Discharge to home or other facility with appropriate resources  Outcome: Progressing  Flowsheets (Taken 8/8/2023 2000)  Discharge to home or other facility with appropriate resources:   Identify barriers to discharge with patient and caregiver   Arrange for needed discharge resources and transportation as appropriate   Refer to discharge planning if patient needs post-hospital services based on physician order or complex needs related to functional status, cognitive ability or social support system   Identify discharge learning needs (meds, wound care, etc)     Problem: Pain  Goal: Verbalizes/displays adequate comfort level or baseline comfort level  Outcome: Progressing  Flowsheets  Taken 8/9/2023 0000  Verbalizes/displays adequate comfort level or baseline comfort level:   Encourage patient to monitor pain and request assistance   Assess pain using appropriate pain scale   Administer analgesics based on type and severity of pain and evaluate response   Implement non-pharmacological measures as appropriate and evaluate response   Consider cultural and social influences on pain and pain management   Notify Licensed Independent Practitioner if interventions unsuccessful or patient reports new pain  Taken 8/8/2023 2000  Verbalizes/displays adequate comfort level or baseline comfort level:   Encourage patient to monitor pain and request assistance   Assess pain using appropriate pain scale   Administer analgesics based on type and severity of pain and evaluate response   Implement non-pharmacological measures as appropriate and evaluate response     Problem: Skin/Tissue Integrity  Goal: Absence of new skin breakdown  Description: 1. Monitor for areas of redness and/or skin breakdown  2. Assess vascular access sites hourly  3. Every 4-6 hours minimum:  Change oxygen saturation probe site  4.   Every 4-6 hours:  If on nasal continuous positive airway pressure, respiratory therapy 2000)  Glucose maintained within prescribed range:   Monitor blood glucose as ordered   Assess for signs and symptoms of hyperglycemia and hypoglycemia   Administer ordered medications to maintain glucose within target range   Assess barriers to adequate nutritional intake and initiate nutrition consult as needed   Instruct patient on self management of diabetes and initiate consult as needed  8/8/2023 1748 by Celine Paulino RN  Outcome: Progressing

## 2023-08-09 NOTE — PROGRESS NOTES
AM assessment complete. Oriented x4. Denies pain. Respirations regular and unlabored. Breath sounds clear. On room air. ST on tele. Abdomen soft. Insulin gtt infusing. Scheduled medications given as ordered. Fall precautions in place.

## 2023-08-09 NOTE — PROGRESS NOTES
[facility]  Diabetes Education   Progress Note       NAME:  Corey Myers  RECORD NUMBER:  [id number]  AGE: 28 y.o. GENDER: female  : 1990  TODAY'S DATE:  2023    Subjective   Reason for Diabetes Education Evaluation and Assessment: DKA    Visit Type: evaluation      Nathen Harvey is a 28 y.o. female referred by:  [x] Physician  [] Nursing  [] Chart Review   [] Other:     Pt seen for DM education. Pt denies questions r/t DM management, states she was diagnosed as a young child. Pt reports strong family hx of T1DM. Pt states she has all necessary equipment at home for Dexcom CGM and Omnipod Pump but was not using these at the time of admission. Pt was giving insulin injections manually. Pt reports high blood sugar, vomiting, and diarrhea at home.     PAST MEDICAL HISTORY        Diagnosis Date    Depression     Diabetes mellitus ([address])     Diabetic ketoacidosis without coma associated with type 1 diabetes mellitus ([address]) 2022    Seizures ([address])        PAST SURGICAL HISTORY    Past Surgical History:   Procedure Laterality Date     SECTION      TUBAL LIGATION         FAMILY HISTORY    Family History   Problem Relation Age of Onset    Asthma Mother     Allergies Mother         sun allergy    Diabetes Type 1  Father     Diabetes Type 1  Maternal Grandfather     Diabetes Type 1  Cousin     Diabetes Type 1  Cousin     Diabetes Type 1  Maternal Uncle     Diabetes Type 1  Maternal Uncle     Diabetes Type 1  Maternal Aunt        SOCIAL HISTORY    Social History     Tobacco Use    Smoking status: Never    Smokeless tobacco: Never   Vaping Use    Vaping Use: Never used   Substance Use Topics    Alcohol use: No    Drug use: No       ALLERGIES    No Known Allergies    MEDICATIONS     clonazePAM  2 mg Oral Q12H    levETIRAcetam  1,000 mg IntraVENous Q12H    enoxaparin  30 mg SubCUTAneous Daily    piperacillin-tazobactam  3,375 mg IntraVENous Q8H       Objective

## 2023-08-10 LAB
ANION GAP SERPL CALCULATED.3IONS-SCNC: 10 MMOL/L (ref 3–16)
ANION GAP SERPL CALCULATED.3IONS-SCNC: 11 MMOL/L (ref 3–16)
ANION GAP SERPL CALCULATED.3IONS-SCNC: 11 MMOL/L (ref 3–16)
ANION GAP SERPL CALCULATED.3IONS-SCNC: 13 MMOL/L (ref 3–16)
ANION GAP SERPL CALCULATED.3IONS-SCNC: 13 MMOL/L (ref 3–16)
BASOPHILS # BLD: 0.1 K/UL (ref 0–0.2)
BASOPHILS NFR BLD: 0.6 %
BUN SERPL-MCNC: <2 MG/DL (ref 7–20)
CALCIUM SERPL-MCNC: 8 MG/DL (ref 8.3–10.6)
CALCIUM SERPL-MCNC: 8.2 MG/DL (ref 8.3–10.6)
CALCIUM SERPL-MCNC: 8.3 MG/DL (ref 8.3–10.6)
CALCIUM SERPL-MCNC: 8.4 MG/DL (ref 8.3–10.6)
CALCIUM SERPL-MCNC: 8.6 MG/DL (ref 8.3–10.6)
CHLORIDE SERPL-SCNC: 107 MMOL/L (ref 99–110)
CHLORIDE SERPL-SCNC: 108 MMOL/L (ref 99–110)
CHLORIDE SERPL-SCNC: 109 MMOL/L (ref 99–110)
CHLORIDE SERPL-SCNC: 109 MMOL/L (ref 99–110)
CHLORIDE SERPL-SCNC: 112 MMOL/L (ref 99–110)
CO2 SERPL-SCNC: 17 MMOL/L (ref 21–32)
CO2 SERPL-SCNC: 18 MMOL/L (ref 21–32)
CO2 SERPL-SCNC: 21 MMOL/L (ref 21–32)
CO2 SERPL-SCNC: 21 MMOL/L (ref 21–32)
CO2 SERPL-SCNC: 22 MMOL/L (ref 21–32)
CREAT SERPL-MCNC: 0.5 MG/DL (ref 0.6–1.1)
CREAT SERPL-MCNC: 0.7 MG/DL (ref 0.6–1.1)
CREAT SERPL-MCNC: <0.5 MG/DL (ref 0.6–1.1)
DEPRECATED RDW RBC AUTO: 20.3 % (ref 12.4–15.4)
EOSINOPHIL # BLD: 0 K/UL (ref 0–0.6)
EOSINOPHIL NFR BLD: 0 %
GFR SERPLBLD CREATININE-BSD FMLA CKD-EPI: >60 ML/MIN/{1.73_M2}
GLUCOSE BLD-MCNC: 116 MG/DL (ref 70–99)
GLUCOSE BLD-MCNC: 120 MG/DL (ref 70–99)
GLUCOSE BLD-MCNC: 139 MG/DL (ref 70–99)
GLUCOSE BLD-MCNC: 147 MG/DL (ref 70–99)
GLUCOSE BLD-MCNC: 162 MG/DL (ref 70–99)
GLUCOSE BLD-MCNC: 171 MG/DL (ref 70–99)
GLUCOSE BLD-MCNC: 180 MG/DL (ref 70–99)
GLUCOSE BLD-MCNC: 199 MG/DL (ref 70–99)
GLUCOSE BLD-MCNC: 201 MG/DL (ref 70–99)
GLUCOSE BLD-MCNC: 219 MG/DL (ref 70–99)
GLUCOSE BLD-MCNC: 231 MG/DL (ref 70–99)
GLUCOSE BLD-MCNC: 233 MG/DL (ref 70–99)
GLUCOSE BLD-MCNC: 238 MG/DL (ref 70–99)
GLUCOSE BLD-MCNC: 238 MG/DL (ref 70–99)
GLUCOSE BLD-MCNC: 248 MG/DL (ref 70–99)
GLUCOSE BLD-MCNC: 259 MG/DL (ref 70–99)
GLUCOSE BLD-MCNC: 259 MG/DL (ref 70–99)
GLUCOSE BLD-MCNC: 269 MG/DL (ref 70–99)
GLUCOSE BLD-MCNC: 275 MG/DL (ref 70–99)
GLUCOSE SERPL-MCNC: 126 MG/DL (ref 70–99)
GLUCOSE SERPL-MCNC: 216 MG/DL (ref 70–99)
GLUCOSE SERPL-MCNC: 249 MG/DL (ref 70–99)
GLUCOSE SERPL-MCNC: 256 MG/DL (ref 70–99)
GLUCOSE SERPL-MCNC: 273 MG/DL (ref 70–99)
HCT VFR BLD AUTO: 25.3 % (ref 36–48)
HGB BLD-MCNC: 7.6 G/DL (ref 12–16)
LYMPHOCYTES # BLD: 2.5 K/UL (ref 1–5.1)
LYMPHOCYTES NFR BLD: 22.9 %
MAGNESIUM SERPL-MCNC: 1.7 MG/DL (ref 1.8–2.4)
MAGNESIUM SERPL-MCNC: 2.3 MG/DL (ref 1.8–2.4)
MCH RBC QN AUTO: 21.5 PG (ref 26–34)
MCHC RBC AUTO-ENTMCNC: 30.1 G/DL (ref 31–36)
MCV RBC AUTO: 71.2 FL (ref 80–100)
MONOCYTES # BLD: 0.7 K/UL (ref 0–1.3)
MONOCYTES NFR BLD: 6.7 %
NEUTROPHILS # BLD: 7.7 K/UL (ref 1.7–7.7)
NEUTROPHILS NFR BLD: 69.8 %
PERFORMED ON: ABNORMAL
PHOSPHATE SERPL-MCNC: 2.1 MG/DL (ref 2.5–4.9)
PHOSPHATE SERPL-MCNC: 2.3 MG/DL (ref 2.5–4.9)
PHOSPHATE SERPL-MCNC: 2.4 MG/DL (ref 2.5–4.9)
PHOSPHATE SERPL-MCNC: 2.8 MG/DL (ref 2.5–4.9)
PHOSPHATE SERPL-MCNC: 2.9 MG/DL (ref 2.5–4.9)
PLATELET # BLD AUTO: 261 K/UL (ref 135–450)
PMV BLD AUTO: 7.5 FL (ref 5–10.5)
POTASSIUM SERPL-SCNC: 3.7 MMOL/L (ref 3.5–5.1)
POTASSIUM SERPL-SCNC: 4.4 MMOL/L (ref 3.5–5.1)
POTASSIUM SERPL-SCNC: 5 MMOL/L (ref 3.5–5.1)
RBC # BLD AUTO: 3.55 M/UL (ref 4–5.2)
SODIUM SERPL-SCNC: 138 MMOL/L (ref 136–145)
SODIUM SERPL-SCNC: 139 MMOL/L (ref 136–145)
SODIUM SERPL-SCNC: 140 MMOL/L (ref 136–145)
SODIUM SERPL-SCNC: 141 MMOL/L (ref 136–145)
SODIUM SERPL-SCNC: 144 MMOL/L (ref 136–145)
WBC # BLD AUTO: 11.1 K/UL (ref 4–11)

## 2023-08-10 PROCEDURE — 6360000002 HC RX W HCPCS: Performed by: INTERNAL MEDICINE

## 2023-08-10 PROCEDURE — 2500000003 HC RX 250 WO HCPCS: Performed by: HOSPITALIST

## 2023-08-10 PROCEDURE — 80048 BASIC METABOLIC PNL TOTAL CA: CPT

## 2023-08-10 PROCEDURE — 2000000000 HC ICU R&B

## 2023-08-10 PROCEDURE — 83735 ASSAY OF MAGNESIUM: CPT

## 2023-08-10 PROCEDURE — 2580000003 HC RX 258: Performed by: HOSPITALIST

## 2023-08-10 PROCEDURE — 85025 COMPLETE CBC W/AUTO DIFF WBC: CPT

## 2023-08-10 PROCEDURE — 36592 COLLECT BLOOD FROM PICC: CPT

## 2023-08-10 PROCEDURE — 99233 SBSQ HOSP IP/OBS HIGH 50: CPT | Performed by: INTERNAL MEDICINE

## 2023-08-10 PROCEDURE — 6360000002 HC RX W HCPCS: Performed by: HOSPITALIST

## 2023-08-10 PROCEDURE — 6370000000 HC RX 637 (ALT 250 FOR IP): Performed by: INTERNAL MEDICINE

## 2023-08-10 PROCEDURE — 2580000003 HC RX 258: Performed by: INTERNAL MEDICINE

## 2023-08-10 PROCEDURE — 84100 ASSAY OF PHOSPHORUS: CPT

## 2023-08-10 PROCEDURE — 6370000000 HC RX 637 (ALT 250 FOR IP): Performed by: NURSE PRACTITIONER

## 2023-08-10 RX ORDER — INSULIN LISPRO 100 [IU]/ML
0-4 INJECTION, SOLUTION INTRAVENOUS; SUBCUTANEOUS NIGHTLY
Status: DISCONTINUED | OUTPATIENT
Start: 2023-08-10 | End: 2023-08-12 | Stop reason: HOSPADM

## 2023-08-10 RX ORDER — INSULIN LISPRO 100 [IU]/ML
0-16 INJECTION, SOLUTION INTRAVENOUS; SUBCUTANEOUS
Status: DISCONTINUED | OUTPATIENT
Start: 2023-08-10 | End: 2023-08-12

## 2023-08-10 RX ORDER — INSULIN GLARGINE 100 [IU]/ML
12 INJECTION, SOLUTION SUBCUTANEOUS DAILY
Status: DISCONTINUED | OUTPATIENT
Start: 2023-08-10 | End: 2023-08-12 | Stop reason: HOSPADM

## 2023-08-10 RX ORDER — GABAPENTIN 300 MG/1
300 CAPSULE ORAL ONCE
Status: COMPLETED | OUTPATIENT
Start: 2023-08-10 | End: 2023-08-10

## 2023-08-10 RX ORDER — MAGNESIUM SULFATE IN WATER 40 MG/ML
2000 INJECTION, SOLUTION INTRAVENOUS ONCE
Status: COMPLETED | OUTPATIENT
Start: 2023-08-10 | End: 2023-08-10

## 2023-08-10 RX ADMIN — PIPERACILLIN AND TAZOBACTAM 3375 MG: 3; .375 INJECTION, POWDER, LYOPHILIZED, FOR SOLUTION INTRAVENOUS at 08:13

## 2023-08-10 RX ADMIN — POTASSIUM CHLORIDE 20 MEQ: 29.8 INJECTION, SOLUTION INTRAVENOUS at 13:01

## 2023-08-10 RX ADMIN — INSULIN LISPRO 8 UNITS: 100 INJECTION, SOLUTION INTRAVENOUS; SUBCUTANEOUS at 18:20

## 2023-08-10 RX ADMIN — SODIUM CHLORIDE, SODIUM LACTATE, POTASSIUM CHLORIDE, CALCIUM CHLORIDE AND DEXTROSE MONOHYDRATE: 5; 600; 310; 30; 20 INJECTION, SOLUTION INTRAVENOUS at 13:06

## 2023-08-10 RX ADMIN — CLONAZEPAM 2 MG: 1 TABLET ORAL at 05:55

## 2023-08-10 RX ADMIN — POTASSIUM CHLORIDE 20 MEQ: 29.8 INJECTION, SOLUTION INTRAVENOUS at 06:31

## 2023-08-10 RX ADMIN — POTASSIUM CHLORIDE 20 MEQ: 29.8 INJECTION, SOLUTION INTRAVENOUS at 11:26

## 2023-08-10 RX ADMIN — SODIUM PHOSPHATE, MONOBASIC, MONOHYDRATE AND SODIUM PHOSPHATE, DIBASIC, ANHYDROUS 10 MMOL: 142; 276 INJECTION, SOLUTION INTRAVENOUS at 18:28

## 2023-08-10 RX ADMIN — POTASSIUM CHLORIDE 20 MEQ: 29.8 INJECTION, SOLUTION INTRAVENOUS at 00:32

## 2023-08-10 RX ADMIN — SODIUM PHOSPHATE, MONOBASIC, MONOHYDRATE AND SODIUM PHOSPHATE, DIBASIC, ANHYDROUS 15 MMOL: 142; 276 INJECTION, SOLUTION INTRAVENOUS at 06:48

## 2023-08-10 RX ADMIN — POTASSIUM CHLORIDE 20 MEQ: 29.8 INJECTION, SOLUTION INTRAVENOUS at 03:45

## 2023-08-10 RX ADMIN — PIPERACILLIN AND TAZOBACTAM 3375 MG: 3; .375 INJECTION, POWDER, LYOPHILIZED, FOR SOLUTION INTRAVENOUS at 23:06

## 2023-08-10 RX ADMIN — INSULIN GLARGINE 12 UNITS: 100 INJECTION, SOLUTION SUBCUTANEOUS at 17:25

## 2023-08-10 RX ADMIN — PIPERACILLIN AND TAZOBACTAM 3375 MG: 3; .375 INJECTION, POWDER, LYOPHILIZED, FOR SOLUTION INTRAVENOUS at 15:03

## 2023-08-10 RX ADMIN — LEVETIRACETAM 1000 MG: 10 INJECTION, SOLUTION INTRAVENOUS at 21:17

## 2023-08-10 RX ADMIN — ENOXAPARIN SODIUM 30 MG: 100 INJECTION SUBCUTANEOUS at 08:11

## 2023-08-10 RX ADMIN — LEVETIRACETAM 1000 MG: 10 INJECTION, SOLUTION INTRAVENOUS at 12:50

## 2023-08-10 RX ADMIN — MAGNESIUM SULFATE HEPTAHYDRATE 2000 MG: 40 INJECTION, SOLUTION INTRAVENOUS at 15:01

## 2023-08-10 RX ADMIN — SODIUM CHLORIDE, SODIUM LACTATE, POTASSIUM CHLORIDE, CALCIUM CHLORIDE AND DEXTROSE MONOHYDRATE: 5; 600; 310; 30; 20 INJECTION, SOLUTION INTRAVENOUS at 05:37

## 2023-08-10 RX ADMIN — GABAPENTIN 300 MG: 300 CAPSULE ORAL at 21:14

## 2023-08-10 RX ADMIN — CLONAZEPAM 2 MG: 1 TABLET ORAL at 17:29

## 2023-08-10 ASSESSMENT — PAIN DESCRIPTION - LOCATION: LOCATION: FOOT

## 2023-08-10 ASSESSMENT — PAIN SCALES - GENERAL
PAINLEVEL_OUTOF10: 0
PAINLEVEL_OUTOF10: 6
PAINLEVEL_OUTOF10: 0
PAINLEVEL_OUTOF10: 0

## 2023-08-10 ASSESSMENT — PAIN DESCRIPTION - DESCRIPTORS: DESCRIPTORS: CRAMPING

## 2023-08-10 ASSESSMENT — PAIN DESCRIPTION - ORIENTATION: ORIENTATION: RIGHT;LEFT

## 2023-08-10 ASSESSMENT — PAIN DESCRIPTION - PAIN TYPE: TYPE: NEUROPATHIC PAIN

## 2023-08-10 ASSESSMENT — PAIN - FUNCTIONAL ASSESSMENT: PAIN_FUNCTIONAL_ASSESSMENT: ACTIVITIES ARE NOT PREVENTED

## 2023-08-10 NOTE — PROGRESS NOTES
Hospitalist Progress Note    Name:  Whit Waters    /Age/Sex: 1990  (28 y.o. female)  MRN & CSN:  [id number] & [id number]    PCP: Trae Ryan MD    Date of Admission: 2023    Patient Status:  Inpatient     Chief Complaint:   Chief Complaint   Patient presents with    Hyperglycemia    Altered Mental Status    Seizures       Hospital Course: Whit Waters is a 28 y.o. female  who presents to the hospital  with altered mental status. Patient upon arrival  is not able to provide much history due to her encephalopathy, she is tachypneic and tachycardic with dry mucus membranes. She has a known history of type 1 diabetes with DKA. She may have had multiple seizures prehospital according to EMS but has not had any seizures here. She does have a history of seizures. No known head trauma. Patient does not answer any questions, mostly just moaning and groaning and moving around the ED, but protecting her airway. As such history is severely limited at this time except for chart review as no one is at the bedside for collateral history regarding any recent symptomatology. Subjective: Today is:  Hospital Day: 3. Patient seen and examined in ICU-3906/3906-. She is a bit more alert but still confused though able to answer some questions  Her anion gap is closed and I am transitioning to lantus and sliding scale.       Medications:  Reviewed    Infusion Medications    dextrose 5% in lactated ringers Stopped (08/10/23 1929)    lactated ringers IV soln      insulin Stopped (08/10/23 1928)     Scheduled Medications    piperacillin-tazobactam  3,375 mg IntraVENous Q8H    insulin glargine  12 Units SubCUTAneous Daily    insulin lispro  0-16 Units SubCUTAneous TID WC    insulin lispro  0-4 Units SubCUTAneous Nightly    clonazePAM  2 mg Oral Q12H    levETIRAcetam  1,000 mg IntraVENous Q12H    enoxaparin  30 mg SubCUTAneous Daily     PRN Meds: lactated ringers IV soln, glucagon (rDNA), dextrose

## 2023-08-10 NOTE — PLAN OF CARE
Problem: Discharge Planning  Goal: Discharge to home or other facility with appropriate resources  Outcome: Progressing  Flowsheets  Taken 8/10/2023 0800 by Ian Monique RN  Discharge to home or other facility with appropriate resources:   Identify barriers to discharge with patient and caregiver   Arrange for needed discharge resources and transportation as appropriate   Identify discharge learning needs (meds, wound care, etc)   Arrange for interpreters to assist at discharge as needed   Refer to discharge planning if patient needs post-hospital services based on physician order or complex needs related to functional status, cognitive ability or social support system  Taken 8/10/2023 0000 by Erickson Brown RN  Discharge to home or other facility with appropriate resources:   Identify barriers to discharge with patient and caregiver   Arrange for needed discharge resources and transportation as appropriate   Identify discharge learning needs (meds, wound care, etc)   Arrange for interpreters to assist at discharge as needed   Refer to discharge planning if patient needs post-hospital services based on physician order or complex needs related to functional status, cognitive ability or social support system     Problem: Pain  Goal: Verbalizes/displays adequate comfort level or baseline comfort level  Outcome: Progressing  Flowsheets  Taken 8/10/2023 0800 by Ian Monique RN  Verbalizes/displays adequate comfort level or baseline comfort level:   Encourage patient to monitor pain and request assistance   Assess pain using appropriate pain scale   Administer analgesics based on type and severity of pain and evaluate response   Implement non-pharmacological measures as appropriate and evaluate response   Consider cultural and social influences on pain and pain management   Notify Licensed Independent Practitioner if interventions unsuccessful or patient reports new pain  Taken 8/10/2023 0000 by Dana Daly CHIN Parks  Verbalizes/displays adequate comfort level or baseline comfort level: Assess pain using appropriate pain scale     Problem: Skin/Tissue Integrity  Goal: Absence of new skin breakdown  Description: 1. Monitor for areas of redness and/or skin breakdown  2. Assess vascular access sites hourly  3. Every 4-6 hours minimum:  Change oxygen saturation probe site  4. Every 4-6 hours:  If on nasal continuous positive airway pressure, respiratory therapy assess nares and determine need for appliance change or resting period.   Outcome: Progressing     Problem: Safety - Adult  Goal: Free from fall injury  Outcome: Progressing  Flowsheets (Taken 8/10/2023 0800)  Free From Fall Injury:   Instruct family/caregiver on patient safety   Based on caregiver fall risk screen, instruct family/caregiver to ask for assistance with transferring infant if caregiver noted to have fall risk factors     Problem: Metabolic/Fluid and Electrolytes - Adult  Goal: Electrolytes maintained within normal limits  Outcome: Progressing  Flowsheets  Taken 8/10/2023 0800 by Jocelyne Granda RN  Electrolytes maintained within normal limits:   Monitor labs and assess patient for signs and symptoms of electrolyte imbalances   Administer electrolyte replacement as ordered   Monitor response to electrolyte replacements, including repeat lab results as appropriate   Fluid restriction as ordered   Instruct patient on fluid and nutrition restrictions as appropriate  Taken 8/10/2023 0000 by Shruti Villa RN  Electrolytes maintained within normal limits:   Monitor labs and assess patient for signs and symptoms of electrolyte imbalances   Administer electrolyte replacement as ordered  Goal: Hemodynamic stability and optimal renal function maintained  Outcome: Progressing  Flowsheets  Taken 8/10/2023 0800 by Jocelyne Granda RN  Hemodynamic stability and optimal renal function maintained:   Monitor labs and assess for signs and symptoms of volume

## 2023-08-10 NOTE — PLAN OF CARE
Problem: Discharge Planning  Goal: Discharge to home or other facility with appropriate resources  Outcome: Progressing  Flowsheets (Taken 8/9/2023 2000)  Discharge to home or other facility with appropriate resources:   Identify barriers to discharge with patient and caregiver   Arrange for needed discharge resources and transportation as appropriate   Identify discharge learning needs (meds, wound care, etc)   Refer to discharge planning if patient needs post-hospital services based on physician order or complex needs related to functional status, cognitive ability or social support system     Problem: Pain  Goal: Verbalizes/displays adequate comfort level or baseline comfort level  Outcome: Progressing  Flowsheets (Taken 8/9/2023 2000)  Verbalizes/displays adequate comfort level or baseline comfort level:   Assess pain using appropriate pain scale   Administer analgesics based on type and severity of pain and evaluate response     Problem: Skin/Tissue Integrity  Goal: Absence of new skin breakdown  Description: 1. Monitor for areas of redness and/or skin breakdown  2. Assess vascular access sites hourly  3. Every 4-6 hours minimum:  Change oxygen saturation probe site  4. Every 4-6 hours:  If on nasal continuous positive airway pressure, respiratory therapy assess nares and determine need for appliance change or resting period.   8/9/2023 2043 by Christa Parry, RN  Outcome: Progressing  8/9/2023 0935 by Chapin He RN  Outcome: Progressing     Problem: Safety - Adult  Goal: Free from fall injury  Outcome: Progressing  Flowsheets (Taken 8/9/2023 2000)  Free From Fall Injury: Instruct family/caregiver on patient safety     Problem: Metabolic/Fluid and Electrolytes - Adult  Goal: Electrolytes maintained within normal limits  8/9/2023 2043 by Christa Parry RN  Outcome: Progressing  Flowsheets (Taken 8/9/2023 2000)  Electrolytes maintained within normal limits:   Monitor labs and assess patient for signs and symptoms exacerbated and prevent overall improvement and discharge  8/9/2023 0935 by Lucia Ann, RN  Outcome: Progressing     Problem: ABCDS Injury Assessment  Goal: Absence of physical injury  Outcome: Progressing  Flowsheets (Taken 8/9/2023 2000)  Absence of Physical Injury: Implement safety measures based on patient assessment

## 2023-08-11 LAB
ANION GAP SERPL CALCULATED.3IONS-SCNC: 8 MMOL/L (ref 3–16)
BASOPHILS # BLD: 0.1 K/UL (ref 0–0.2)
BASOPHILS NFR BLD: 1.2 %
BUN SERPL-MCNC: 3 MG/DL (ref 7–20)
CALCIUM SERPL-MCNC: 8.6 MG/DL (ref 8.3–10.6)
CHLORIDE SERPL-SCNC: 109 MMOL/L (ref 99–110)
CO2 SERPL-SCNC: 26 MMOL/L (ref 21–32)
CREAT SERPL-MCNC: 0.5 MG/DL (ref 0.6–1.1)
DEPRECATED RDW RBC AUTO: 19.8 % (ref 12.4–15.4)
EOSINOPHIL # BLD: 0 K/UL (ref 0–0.6)
EOSINOPHIL NFR BLD: 0.3 %
GFR SERPLBLD CREATININE-BSD FMLA CKD-EPI: >60 ML/MIN/{1.73_M2}
GLUCOSE BLD-MCNC: 103 MG/DL (ref 70–99)
GLUCOSE BLD-MCNC: 106 MG/DL (ref 70–99)
GLUCOSE BLD-MCNC: 111 MG/DL (ref 70–99)
GLUCOSE BLD-MCNC: 137 MG/DL (ref 70–99)
GLUCOSE BLD-MCNC: 71 MG/DL (ref 70–99)
GLUCOSE SERPL-MCNC: 104 MG/DL (ref 70–99)
HCT VFR BLD AUTO: 25 % (ref 36–48)
HGB BLD-MCNC: 7.5 G/DL (ref 12–16)
LYMPHOCYTES # BLD: 2.6 K/UL (ref 1–5.1)
LYMPHOCYTES NFR BLD: 41.9 %
MCH RBC QN AUTO: 21.6 PG (ref 26–34)
MCHC RBC AUTO-ENTMCNC: 30 G/DL (ref 31–36)
MCV RBC AUTO: 71.8 FL (ref 80–100)
MONOCYTES # BLD: 0.4 K/UL (ref 0–1.3)
MONOCYTES NFR BLD: 6.3 %
NEUTROPHILS # BLD: 3.1 K/UL (ref 1.7–7.7)
NEUTROPHILS NFR BLD: 50.3 %
PERFORMED ON: ABNORMAL
PERFORMED ON: NORMAL
PHOSPHATE SERPL-MCNC: 4.3 MG/DL (ref 2.5–4.9)
PLATELET # BLD AUTO: 241 K/UL (ref 135–450)
PMV BLD AUTO: 7.9 FL (ref 5–10.5)
POTASSIUM SERPL-SCNC: 3.8 MMOL/L (ref 3.5–5.1)
RBC # BLD AUTO: 3.48 M/UL (ref 4–5.2)
SODIUM SERPL-SCNC: 143 MMOL/L (ref 136–145)
WBC # BLD AUTO: 6.2 K/UL (ref 4–11)

## 2023-08-11 PROCEDURE — 6370000000 HC RX 637 (ALT 250 FOR IP): Performed by: INTERNAL MEDICINE

## 2023-08-11 PROCEDURE — 2580000003 HC RX 258: Performed by: INTERNAL MEDICINE

## 2023-08-11 PROCEDURE — 1200000000 HC SEMI PRIVATE

## 2023-08-11 PROCEDURE — 6360000002 HC RX W HCPCS: Performed by: HOSPITALIST

## 2023-08-11 PROCEDURE — 99233 SBSQ HOSP IP/OBS HIGH 50: CPT | Performed by: INTERNAL MEDICINE

## 2023-08-11 PROCEDURE — 85025 COMPLETE CBC W/AUTO DIFF WBC: CPT

## 2023-08-11 PROCEDURE — 80048 BASIC METABOLIC PNL TOTAL CA: CPT

## 2023-08-11 PROCEDURE — 6360000002 HC RX W HCPCS: Performed by: INTERNAL MEDICINE

## 2023-08-11 PROCEDURE — 36592 COLLECT BLOOD FROM PICC: CPT

## 2023-08-11 PROCEDURE — 84100 ASSAY OF PHOSPHORUS: CPT

## 2023-08-11 RX ORDER — INSULIN LISPRO 100 [IU]/ML
4 INJECTION, SOLUTION INTRAVENOUS; SUBCUTANEOUS
Status: DISCONTINUED | OUTPATIENT
Start: 2023-08-11 | End: 2023-08-12 | Stop reason: HOSPADM

## 2023-08-11 RX ORDER — GABAPENTIN 300 MG/1
900 CAPSULE ORAL 3 TIMES DAILY
Status: DISCONTINUED | OUTPATIENT
Start: 2023-08-11 | End: 2023-08-12 | Stop reason: HOSPADM

## 2023-08-11 RX ADMIN — GABAPENTIN 900 MG: 300 CAPSULE ORAL at 10:22

## 2023-08-11 RX ADMIN — GABAPENTIN 900 MG: 300 CAPSULE ORAL at 14:21

## 2023-08-11 RX ADMIN — INSULIN GLARGINE 12 UNITS: 100 INJECTION, SOLUTION SUBCUTANEOUS at 09:35

## 2023-08-11 RX ADMIN — INSULIN LISPRO 4 UNITS: 100 INJECTION, SOLUTION INTRAVENOUS; SUBCUTANEOUS at 13:32

## 2023-08-11 RX ADMIN — LEVETIRACETAM 1000 MG: 10 INJECTION, SOLUTION INTRAVENOUS at 21:50

## 2023-08-11 RX ADMIN — CLONAZEPAM 2 MG: 1 TABLET ORAL at 05:30

## 2023-08-11 RX ADMIN — LEVETIRACETAM 1000 MG: 10 INJECTION, SOLUTION INTRAVENOUS at 09:29

## 2023-08-11 RX ADMIN — PIPERACILLIN AND TAZOBACTAM 3375 MG: 3; .375 INJECTION, POWDER, LYOPHILIZED, FOR SOLUTION INTRAVENOUS at 07:36

## 2023-08-11 RX ADMIN — PIPERACILLIN AND TAZOBACTAM 3375 MG: 3; .375 INJECTION, POWDER, LYOPHILIZED, FOR SOLUTION INTRAVENOUS at 23:16

## 2023-08-11 RX ADMIN — ENOXAPARIN SODIUM 30 MG: 100 INJECTION SUBCUTANEOUS at 09:35

## 2023-08-11 RX ADMIN — GABAPENTIN 900 MG: 300 CAPSULE ORAL at 21:49

## 2023-08-11 RX ADMIN — PIPERACILLIN AND TAZOBACTAM 3375 MG: 3; .375 INJECTION, POWDER, LYOPHILIZED, FOR SOLUTION INTRAVENOUS at 16:02

## 2023-08-11 RX ADMIN — CLONAZEPAM 2 MG: 1 TABLET ORAL at 17:34

## 2023-08-11 ASSESSMENT — PAIN SCALES - GENERAL
PAINLEVEL_OUTOF10: 0

## 2023-08-11 NOTE — PROGRESS NOTES
1810 Pt transferred to [facility]. Bedside report given to [facility].

## 2023-08-11 NOTE — PROGRESS NOTES
Patient arrived to unit via wheelchair from ICU, admitted to room 5565. Report received from Klever/RN. Patient is alert and oriented and assisted to ambulate to restroom. IV infusing, vital signs obtained. Patient assisted to sit up in chair per request. Her dinner tray is in route to bedside. Call light is within reach.

## 2023-08-11 NOTE — PLAN OF CARE
Problem: Pain  Goal: Verbalizes/displays adequate comfort level or baseline comfort level  8/10/2023 2138 by German Dior RN  Outcome: Progressing     Problem: Skin/Tissue Integrity  Goal: Absence of new skin breakdown  Description: 1. Monitor for areas of redness and/or skin breakdown  2. Assess vascular access sites hourly  3. Every 4-6 hours minimum:  Change oxygen saturation probe site  4. Every 4-6 hours:  If on nasal continuous positive airway pressure, respiratory therapy assess nares and determine need for appliance change or resting period.   8/10/2023 2138 by German Dior RN  Outcome: Progressing     Problem: Safety - Adult  Goal: Free from fall injury  8/10/2023 2138 by German Dior RN  Outcome: Progressing     Problem: Metabolic/Fluid and Electrolytes - Adult  Goal: Electrolytes maintained within normal limits  8/10/2023 2138 by German Dior RN  Outcome: Progressing     Problem: Metabolic/Fluid and Electrolytes - Adult  Goal: Hemodynamic stability and optimal renal function maintained  8/10/2023 2138 by German Dior RN  Outcome: Progressing     Problem: Chronic Conditions and Co-morbidities  Goal: Patient's chronic conditions and co-morbidity symptoms are monitored and maintained or improved  8/10/2023 2138 by German Dior RN  Outcome: Progressing     Problem: ABCDS Injury Assessment  Goal: Absence of physical injury  8/10/2023 2138 by German Dior RN  Outcome: Progressing     Problem: Discharge Planning  Goal: Discharge to home or other facility with appropriate resources  8/10/2023 2138 by eGrman Dior RN  Outcome: Progressing

## 2023-08-11 NOTE — PROGRESS NOTES
1935: Insulin gtt and D 5 with LR stopped per order for Pt being out of DKA protocol. 2000: Shift assessment complete. VS obtained/stable. Pt complains of cramping on her legs d/t Neuropathy and requested for Gabapentin that she takes at home as regular medications. Perfect served Hospitalist, new one time order received for Gabapentin/given. Pt is A&O x 4, able to follow commands. On RA, respirations regular/unlabored. Lung sound clear. Sinus Tachycardia on monitor. Abdomen soft/flat with active bowel sounds. [place]oddard draining/patent with yellow output. Peripheral pulses palpable & skin remains intact. Pt ambulated to the bathroom/back to bed. Denies further needs. Call light within reach. Standard safety measures & seizures precautions remains in place.

## 2023-08-11 NOTE — PROGRESS NOTES
Physician Progress Note      Pedro Wesley  CSN #:                  [id number]  :                       1990  ADMIT DATE:       2023 5:43 AM  [address] [facility] DATE:  RESPONDING  PROVIDER #:        Stanley Sepulveda MD          QUERY TEXT:    Patient admitted with DKA. Noted documentation of sepsis in Pulmonology notes. If possible, please confirm whether the diagnosis of sepsis was present on   admit, and the likely source, or please document if the diagnosis of sepsis   has been ruled out after further study. .. The medical record reflects the following:  Risk Factors:  Recent history of dental infection for which patient took   Augmentin. Unclear whether she finished the course or not. Clinical Indicators: WBC on admit 26.4, Lactic Acid Sepsis 7.0, CRP normal, no   procalcitonin; Temp 94.0 then up to 100.5 per bladder probe, Has had   hypotension and tachycardia up to 134; Blood cultures negative. No evidence   of UTI. No evidence of pneumonia. Per pulmonary on : \"Sepsis\"   \"Leukocytosis with elevated lactic acid concerning for sepsis. \"  Treatment: continue Zosyn for 5 days  Options provided:  -- Sepsis present on admit related to dental infection  -- Sepsis was ruled out after study  -- Other - I will add my own diagnosis  -- Disagree - Not applicable / Not valid  -- Disagree - Clinically unable to determine / Unknown  -- Refer to Clinical Documentation Reviewer    PROVIDER RESPONSE TEXT:    Sepsis was ruled out after study.     Query created by: Vanessa Mcginnis on 2023 2:49 PM      Electronically signed by:  Stanley Sepulveda MD 2023 2:53 PM

## 2023-08-11 NOTE — PLAN OF CARE
Problem: Discharge Planning  Goal: Discharge to home or other facility with appropriate resources  Outcome: Progressing  Flowsheets (Taken 8/11/2023 0800)  Discharge to home or other facility with appropriate resources:   Identify barriers to discharge with patient and caregiver   Arrange for needed discharge resources and transportation as appropriate   Identify discharge learning needs (meds, wound care, etc)   Arrange for interpreters to assist at discharge as needed   Refer to discharge planning if patient needs post-hospital services based on physician order or complex needs related to functional status, cognitive ability or social support system     Problem: Pain  Goal: Verbalizes/displays adequate comfort level or baseline comfort level  Outcome: Progressing  Flowsheets (Taken 8/11/2023 0800)  Verbalizes/displays adequate comfort level or baseline comfort level:   Encourage patient to monitor pain and request assistance   Assess pain using appropriate pain scale   Administer analgesics based on type and severity of pain and evaluate response   Implement non-pharmacological measures as appropriate and evaluate response   Consider cultural and social influences on pain and pain management   Notify Licensed Independent Practitioner if interventions unsuccessful or patient reports new pain     Problem: Skin/Tissue Integrity  Goal: Absence of new skin breakdown  Description: 1. Monitor for areas of redness and/or skin breakdown  2. Assess vascular access sites hourly  3. Every 4-6 hours minimum:  Change oxygen saturation probe site  4. Every 4-6 hours:  If on nasal continuous positive airway pressure, respiratory therapy assess nares and determine need for appliance change or resting period.   Outcome: Progressing     Problem: Safety - Adult  Goal: Free from fall injury  Outcome: Progressing  Flowsheets (Taken 8/11/2023 0800)  Free From Fall Injury:   Instruct family/caregiver on patient safety   Based on caregiver fall risk screen, instruct family/caregiver to ask for assistance with transferring infant if caregiver noted to have fall risk factors     Problem: Metabolic/Fluid and Electrolytes - Adult  Goal: Electrolytes maintained within normal limits  Outcome: Progressing  Flowsheets (Taken 8/11/2023 0800)  Electrolytes maintained within normal limits:   Monitor labs and assess patient for signs and symptoms of electrolyte imbalances   Administer electrolyte replacement as ordered   Monitor response to electrolyte replacements, including repeat lab results as appropriate   Fluid restriction as ordered   Instruct patient on fluid and nutrition restrictions as appropriate  Goal: Hemodynamic stability and optimal renal function maintained  Outcome: Progressing  Flowsheets (Taken 8/11/2023 0800)  Hemodynamic stability and optimal renal function maintained:   Monitor labs and assess for signs and symptoms of volume excess or deficit   Monitor intake, output and patient weight   Monitor urine specific gravity, serum osmolarity and serum sodium as indicated or ordered   Monitor response to interventions for patient's volume status, including labs, urine output, blood pressure (other measures as available)   Encourage oral intake as appropriate   Instruct patient on fluid and nutrition restrictions as appropriate  Goal: Glucose maintained within prescribed range  Outcome: Progressing  Flowsheets (Taken 8/11/2023 0800)  Glucose maintained within prescribed range:   Monitor blood glucose as ordered   Assess for signs and symptoms of hyperglycemia and hypoglycemia   Administer ordered medications to maintain glucose within target range   Assess barriers to adequate nutritional intake and initiate nutrition consult as needed   Instruct patient on self management of diabetes and initiate consult as needed     Problem: Chronic Conditions and Co-morbidities  Goal: Patient's chronic conditions and co-morbidity symptoms are

## 2023-08-12 VITALS
HEIGHT: 58 IN | OXYGEN SATURATION: 100 % | BODY MASS INDEX: 23.74 KG/M2 | SYSTOLIC BLOOD PRESSURE: 102 MMHG | DIASTOLIC BLOOD PRESSURE: 67 MMHG | WEIGHT: 113.1 LBS | TEMPERATURE: 97.4 F | RESPIRATION RATE: 16 BRPM | HEART RATE: 98 BPM

## 2023-08-12 DIAGNOSIS — G40.909 SEIZURE DISORDER (HCC): ICD-10-CM

## 2023-08-12 DIAGNOSIS — E10.8 TYPE 1 DIABETES MELLITUS WITH COMPLICATION (HCC): ICD-10-CM

## 2023-08-12 LAB
BACTERIA BLD CULT ORG #2: NORMAL
BACTERIA BLD CULT: NORMAL
BASOPHILS # BLD: 0.1 K/UL (ref 0–0.2)
BASOPHILS NFR BLD: 0.9 %
DEPRECATED RDW RBC AUTO: 19.1 % (ref 12.4–15.4)
EOSINOPHIL # BLD: 0.3 K/UL (ref 0–0.6)
EOSINOPHIL NFR BLD: 4.3 %
GLUCOSE BLD-MCNC: 178 MG/DL (ref 70–99)
GLUCOSE BLD-MCNC: 217 MG/DL (ref 70–99)
GLUCOSE BLD-MCNC: 232 MG/DL (ref 70–99)
GLUCOSE BLD-MCNC: 258 MG/DL (ref 70–99)
GLUCOSE BLD-MCNC: 43 MG/DL (ref 70–99)
GLUCOSE BLD-MCNC: 54 MG/DL (ref 70–99)
GLUCOSE BLD-MCNC: 56 MG/DL (ref 70–99)
GLUCOSE BLD-MCNC: 63 MG/DL (ref 70–99)
GLUCOSE BLD-MCNC: 64 MG/DL (ref 70–99)
GLUCOSE BLD-MCNC: 92 MG/DL (ref 70–99)
HCT VFR BLD AUTO: 27.1 % (ref 36–48)
HGB BLD-MCNC: 8.2 G/DL (ref 12–16)
LYMPHOCYTES # BLD: 3.3 K/UL (ref 1–5.1)
LYMPHOCYTES NFR BLD: 50.9 %
MCH RBC QN AUTO: 21.5 PG (ref 26–34)
MCHC RBC AUTO-ENTMCNC: 30.3 G/DL (ref 31–36)
MCV RBC AUTO: 71.1 FL (ref 80–100)
MONOCYTES # BLD: 0.5 K/UL (ref 0–1.3)
MONOCYTES NFR BLD: 8 %
NEUTROPHILS # BLD: 2.3 K/UL (ref 1.7–7.7)
NEUTROPHILS NFR BLD: 35.9 %
PERFORMED ON: ABNORMAL
PERFORMED ON: NORMAL
PLATELET # BLD AUTO: 266 K/UL (ref 135–450)
PMV BLD AUTO: 7.7 FL (ref 5–10.5)
RBC # BLD AUTO: 3.81 M/UL (ref 4–5.2)
WBC # BLD AUTO: 6.4 K/UL (ref 4–11)

## 2023-08-12 PROCEDURE — 6370000000 HC RX 637 (ALT 250 FOR IP): Performed by: INTERNAL MEDICINE

## 2023-08-12 PROCEDURE — 6360000002 HC RX W HCPCS: Performed by: INTERNAL MEDICINE

## 2023-08-12 PROCEDURE — 6360000002 HC RX W HCPCS: Performed by: HOSPITALIST

## 2023-08-12 PROCEDURE — 85025 COMPLETE CBC W/AUTO DIFF WBC: CPT

## 2023-08-12 PROCEDURE — 36415 COLL VENOUS BLD VENIPUNCTURE: CPT

## 2023-08-12 PROCEDURE — 6370000000 HC RX 637 (ALT 250 FOR IP): Performed by: STUDENT IN AN ORGANIZED HEALTH CARE EDUCATION/TRAINING PROGRAM

## 2023-08-12 PROCEDURE — 2580000003 HC RX 258: Performed by: INTERNAL MEDICINE

## 2023-08-12 RX ORDER — AMOXICILLIN AND CLAVULANATE POTASSIUM 875; 125 MG/1; MG/1
1 TABLET, FILM COATED ORAL EVERY 12 HOURS SCHEDULED
Status: DISCONTINUED | OUTPATIENT
Start: 2023-08-12 | End: 2023-08-12 | Stop reason: HOSPADM

## 2023-08-12 RX ORDER — ACETAMINOPHEN 325 MG/1
650 TABLET ORAL EVERY 4 HOURS PRN
Status: DISCONTINUED | OUTPATIENT
Start: 2023-08-12 | End: 2023-08-12 | Stop reason: HOSPADM

## 2023-08-12 RX ORDER — AMOXICILLIN AND CLAVULANATE POTASSIUM 875; 125 MG/1; MG/1
1 TABLET, FILM COATED ORAL 2 TIMES DAILY
Qty: 6 TABLET | Refills: 0 | Status: SHIPPED | OUTPATIENT
Start: 2023-08-12 | End: 2023-08-15

## 2023-08-12 RX ORDER — LEVETIRACETAM 500 MG/1
1000 TABLET ORAL 2 TIMES DAILY
Status: DISCONTINUED | OUTPATIENT
Start: 2023-08-12 | End: 2023-08-12 | Stop reason: HOSPADM

## 2023-08-12 RX ADMIN — GABAPENTIN 900 MG: 300 CAPSULE ORAL at 09:35

## 2023-08-12 RX ADMIN — CLONAZEPAM 2 MG: 1 TABLET ORAL at 05:13

## 2023-08-12 RX ADMIN — INSULIN LISPRO 4 UNITS: 100 INJECTION, SOLUTION INTRAVENOUS; SUBCUTANEOUS at 09:32

## 2023-08-12 RX ADMIN — ENOXAPARIN SODIUM 30 MG: 100 INJECTION SUBCUTANEOUS at 09:33

## 2023-08-12 RX ADMIN — PIPERACILLIN AND TAZOBACTAM 3375 MG: 3; .375 INJECTION, POWDER, LYOPHILIZED, FOR SOLUTION INTRAVENOUS at 09:31

## 2023-08-12 RX ADMIN — GABAPENTIN 900 MG: 300 CAPSULE ORAL at 15:01

## 2023-08-12 RX ADMIN — LEVETIRACETAM 1000 MG: 500 TABLET, FILM COATED ORAL at 10:59

## 2023-08-12 RX ADMIN — ACETAMINOPHEN 650 MG: 325 TABLET ORAL at 17:20

## 2023-08-12 RX ADMIN — INSULIN GLARGINE 12 UNITS: 100 INJECTION, SOLUTION SUBCUTANEOUS at 09:32

## 2023-08-12 ASSESSMENT — PAIN DESCRIPTION - DESCRIPTORS: DESCRIPTORS: STABBING

## 2023-08-12 ASSESSMENT — PAIN DESCRIPTION - LOCATION: LOCATION: TEETH

## 2023-08-12 ASSESSMENT — PAIN SCALES - GENERAL: PAINLEVEL_OUTOF10: 8

## 2023-08-12 NOTE — PROGRESS NOTES
Pt with low blood sugar. Pt asymptomatic. Ate 1 pudding and drank 1 can ginger ale. Blood sugar remains low. Pt ate 1 additional pudding and drank 1 additional can of ginger ale. Blood sugar improved but still hypoglycemic. Pt requesting to eat lunch before administering treatment IV. Pt remains asymptomatic.  Lunch at bedside

## 2023-08-12 NOTE — DISCHARGE INSTRUCTIONS
Seizure precautions for 6 months : No driving, no riding bicycles in traffic, no operating dangerous machinery,no engaging in activities at dangerous heights including using ladders, no taking baths unattended, no swimming, engaging in activities near fire unattended, caution or avoidance of cooking or using potentially dangerous objects such as knives    Continue home dose insulin   Check blood sugar before meals and bedtime

## 2023-08-12 NOTE — DISCHARGE SUMMARY
tenderness  Musculoskeletal: No edema  Skin: warm, dry  Neuro: Alert. Psych: Mood appropriate. Labs and Imaging   CT HEAD WO CONTRAST    Result Date: 8/8/2023  EXAMINATION: CT OF THE HEAD WITHOUT CONTRAST  8/8/2023 6:28 am TECHNIQUE: CT of the head was performed without the administration of intravenous contrast. Automated exposure control, iterative reconstruction, and/or weight based adjustment of the mA/kV was utilized to reduce the radiation dose to as low as reasonably achievable. COMPARISON: 03/08/2023, 01/25/2023, 09/26/2008 HISTORY: ORDERING SYSTEM PROVIDED HISTORY: multiple seizures, dka, AMS TECHNOLOGIST PROVIDED HISTORY: Reason for exam:->multiple seizures, dka, AMS Has a \"code stroke\" or \"stroke alert\" been called? ->No Decision Support Exception - unselect if not a suspected or confirmed emergency medical condition->Emergency Medical Condition (MA) Is the patient pregnant?->No Reason for Exam: multiple seizures, dka, AMS Relevant Medical/Surgical History: Hyperglycemia FINDINGS: BRAIN/VENTRICLES: There is no acute intracranial hemorrhage, mass effect or midline shift. No abnormal extra-axial fluid collection. The gray-white differentiation is maintained without evidence of an acute infarct. There is no evidence of hydrocephalus. No focus of acute abnormal brain attenuation is identified. ORBITS: The visualized portion of the orbits demonstrate no acute abnormality. SINUSES: The visualized paranasal sinuses and mastoid air cells demonstrate no acute abnormality. SOFT TISSUES/SKULL:  No acute abnormality of the visualized skull or soft tissues. No acute intracranial abnormality.      XR CHEST PORTABLE    Result Date: 8/8/2023  EXAMINATION: ONE XRAY VIEW OF THE CHEST 8/8/2023 6:14 am COMPARISON: 01/24/2023 HISTORY: ORDERING SYSTEM PROVIDED HISTORY: Seizures, DKA,SOB TECHNOLOGIST PROVIDED HISTORY: Reason for exam:->Seizures, DKA,SOB Reason for Exam: altered mental status FINDINGS: The heart CFU/ml 05/26/2022 06:41 PM     Blood Cultures:   Lab Results   Component Value Date/Time    BC No Growth after 4 days of incubation. 08/08/2023 06:08 AM     Lab Results   Component Value Date/Time    BLOODCULT2 No Growth after 4 days of incubation.  08/08/2023 06:08 AM     Organism:   Lab Results   Component Value Date/Time    ORG Staph aureus MRSA DNA Detected 07/29/2022 11:26 PM    ORG Staph aureus MRSA 07/29/2022 11:26 PM       Time Spent Discharging patient 32 minutes    Electronically signed by Percy Schilder, MD on 8/12/2023 at 2:04 PM

## 2023-08-14 RX ORDER — CLONAZEPAM 2 MG/1
TABLET ORAL
Qty: 60 TABLET | Refills: 2 | Status: SHIPPED | OUTPATIENT
Start: 2023-08-14 | End: 2023-09-14

## 2023-08-14 RX ORDER — OMEPRAZOLE 20 MG/1
20 CAPSULE, DELAYED RELEASE ORAL
Qty: 30 CAPSULE | Refills: 3 | Status: SHIPPED | OUTPATIENT
Start: 2023-08-14

## 2023-08-14 RX ORDER — CLONAZEPAM 2 MG/1
TABLET ORAL
Qty: 60 TABLET | Refills: 3 | OUTPATIENT
Start: 2023-08-14 | End: 2023-11-12

## 2023-08-14 RX ORDER — INSULIN PUMP CONTROLLER
EACH MISCELLANEOUS
Qty: 10 EACH | Refills: 3 | Status: SHIPPED | OUTPATIENT
Start: 2023-08-14 | End: 2023-10-03

## 2023-08-14 RX ORDER — ACYCLOVIR 400 MG/1
TABLET ORAL
Qty: 1 EACH | Refills: 0 | Status: SHIPPED | OUTPATIENT
Start: 2023-08-14

## 2023-08-14 RX ORDER — INSULIN ASPART 100 [IU]/ML
INJECTION, SOLUTION INTRAVENOUS; SUBCUTANEOUS
Qty: 20 ML | Refills: 3 | Status: SHIPPED | OUTPATIENT
Start: 2023-08-14

## 2023-08-14 RX ORDER — ONDANSETRON 4 MG/1
TABLET, FILM COATED ORAL
Qty: 90 TABLET | Refills: 3 | Status: SHIPPED | OUTPATIENT
Start: 2023-08-14

## 2023-08-14 RX ORDER — ACYCLOVIR 400 MG/1
TABLET ORAL
Qty: 3 EACH | Refills: 3 | Status: SHIPPED | OUTPATIENT
Start: 2023-08-14

## 2023-08-14 RX ORDER — INSULIN ASPART 100 [IU]/ML
INJECTION, SOLUTION INTRAVENOUS; SUBCUTANEOUS
Qty: 10 ADJUSTABLE DOSE PRE-FILLED PEN SYRINGE | Refills: 3 | Status: SHIPPED | OUTPATIENT
Start: 2023-08-14

## 2023-08-14 RX ORDER — LEVETIRACETAM 500 MG/1
1000 TABLET ORAL 2 TIMES DAILY
Qty: 360 TABLET | Refills: 3 | Status: SHIPPED | OUTPATIENT
Start: 2023-08-14

## 2023-08-15 ENCOUNTER — OFFICE VISIT (OUTPATIENT)
Age: 33
End: 2023-08-15
Payer: COMMERCIAL

## 2023-08-15 ENCOUNTER — TELEPHONE (OUTPATIENT)
Dept: INTERNAL MEDICINE CLINIC | Age: 33
End: 2023-08-15

## 2023-08-15 DIAGNOSIS — K04.7 DENTAL INFECTION: Primary | ICD-10-CM

## 2023-08-15 DIAGNOSIS — E10.8 TYPE 1 DIABETES MELLITUS WITH COMPLICATION (HCC): ICD-10-CM

## 2023-08-15 DIAGNOSIS — F41.8 DEPRESSION WITH ANXIETY: Primary | ICD-10-CM

## 2023-08-15 PROCEDURE — 1036F TOBACCO NON-USER: CPT | Performed by: PSYCHOLOGIST

## 2023-08-15 PROCEDURE — 90832 PSYTX W PT 30 MINUTES: CPT | Performed by: PSYCHOLOGIST

## 2023-08-15 RX ORDER — INSULIN PUMP CONTROLLER
EACH MISCELLANEOUS
Qty: 10 EACH | Refills: 3 | OUTPATIENT
Start: 2023-08-15

## 2023-08-15 RX ORDER — OMEPRAZOLE 20 MG/1
20 CAPSULE, DELAYED RELEASE ORAL
Qty: 30 CAPSULE | Refills: 3 | OUTPATIENT
Start: 2023-08-15

## 2023-08-15 RX ORDER — HYDROCODONE BITARTRATE AND ACETAMINOPHEN 5; 325 MG/1; MG/1
1 TABLET ORAL EVERY 6 HOURS PRN
Qty: 20 TABLET | Refills: 0 | Status: SHIPPED | OUTPATIENT
Start: 2023-08-15 | End: 2023-08-20

## 2023-08-15 RX ORDER — ONDANSETRON 4 MG/1
TABLET, FILM COATED ORAL
Qty: 90 TABLET | Refills: 3 | OUTPATIENT
Start: 2023-08-15

## 2023-08-15 RX ORDER — LEVETIRACETAM 500 MG/1
1000 TABLET ORAL 2 TIMES DAILY
Qty: 360 TABLET | Refills: 3 | OUTPATIENT
Start: 2023-08-15

## 2023-08-15 ASSESSMENT — PROMIS GLOBAL HEALTH SCALE
SUM OF RESPONSES TO QUESTIONS 2, 4, 5, & 10: 18
IN GENERAL, HOW WOULD YOU RATE YOUR SATISFACTION WITH YOUR SOCIAL ACTIVITIES AND RELATIONSHIPS [ON A SCALE OF 1 (POOR) TO 5 (EXCELLENT)]?: 5
IN GENERAL, HOW WOULD YOU RATE YOUR PHYSICAL HEALTH [ON A SCALE OF 1 (POOR) TO 5 (EXCELLENT)]?: 4
SUM OF RESPONSES TO QUESTIONS 3, 6, 7, & 8: 16
IN GENERAL, WOULD YOU SAY YOUR QUALITY OF LIFE IS...[ON A SCALE OF 1 (POOR) TO 5 (EXCELLENT)]: 4
IN THE PAST 7 DAYS, HOW WOULD YOU RATE YOUR FATIGUE ON AVERAGE [ON A SCALE FROM 1 (NONE) TO 5 (VERY SEVERE)]?: 4
TO WHAT EXTENT ARE YOU ABLE TO CARRY OUT YOUR EVERYDAY PHYSICAL ACTIVITIES SUCH AS WALKING, CLIMBING STAIRS, CARRYING GROCERIES, OR MOVING A CHAIR [ON A SCALE OF 1 (NOT AT ALL) TO 5 (COMPLETELY)]?: 5
IN THE PAST 7 DAYS, HOW OFTEN HAVE YOU BEEN BOTHERED BY EMOTIONAL PROBLEMS, SUCH AS FEELING ANXIOUS, DEPRESSED, OR IRRITABLE [ON A SCALE FROM 1 (NEVER) TO 5 (ALWAYS)]?: 4
IN THE PAST 7 DAYS, HOW WOULD YOU RATE YOUR PAIN ON AVERAGE [ON A SCALE FROM 0 (NO PAIN) TO 10 (WORST IMAGINABLE PAIN)]?: 3
IN GENERAL, WOULD YOU SAY YOUR HEALTH IS...[ON A SCALE OF 1 (POOR) TO 5 (EXCELLENT)]: 4
IN GENERAL, PLEASE RATE HOW WELL YOU CARRY OUT YOUR USUAL SOCIAL ACTIVITIES (INCLUDES ACTIVITIES AT HOME, AT WORK, AND IN YOUR COMMUNITY, AND RESPONSIBILITIES AS A PARENT, CHILD, SPOUSE, EMPLOYEE, FRIEND, ETC) [ON A SCALE OF 1 (POOR) TO 5 (EXCELLENT)]?: 5
IN GENERAL, HOW WOULD YOU RATE YOUR MENTAL HEALTH, INCLUDING YOUR MOOD AND YOUR ABILITY TO THINK [ON A SCALE OF 1 (POOR) TO 5 (EXCELLENT)]?: 5

## 2023-08-22 ENCOUNTER — OFFICE VISIT (OUTPATIENT)
Age: 33
End: 2023-08-22
Payer: COMMERCIAL

## 2023-08-22 DIAGNOSIS — F41.8 DEPRESSION WITH ANXIETY: Primary | ICD-10-CM

## 2023-08-22 PROCEDURE — 1036F TOBACCO NON-USER: CPT | Performed by: PSYCHOLOGIST

## 2023-08-22 PROCEDURE — 90832 PSYTX W PT 30 MINUTES: CPT | Performed by: PSYCHOLOGIST

## 2023-08-22 ASSESSMENT — PROMIS GLOBAL HEALTH SCALE
TO WHAT EXTENT ARE YOU ABLE TO CARRY OUT YOUR EVERYDAY PHYSICAL ACTIVITIES SUCH AS WALKING, CLIMBING STAIRS, CARRYING GROCERIES, OR MOVING A CHAIR [ON A SCALE OF 1 (NOT AT ALL) TO 5 (COMPLETELY)]?: 2
IN GENERAL, HOW WOULD YOU RATE YOUR SATISFACTION WITH YOUR SOCIAL ACTIVITIES AND RELATIONSHIPS [ON A SCALE OF 1 (POOR) TO 5 (EXCELLENT)]?: 1
IN GENERAL, WOULD YOU SAY YOUR QUALITY OF LIFE IS...[ON A SCALE OF 1 (POOR) TO 5 (EXCELLENT)]: 3
IN THE PAST 7 DAYS, HOW OFTEN HAVE YOU BEEN BOTHERED BY EMOTIONAL PROBLEMS, SUCH AS FEELING ANXIOUS, DEPRESSED, OR IRRITABLE [ON A SCALE FROM 1 (NEVER) TO 5 (ALWAYS)]?: 2
SUM OF RESPONSES TO QUESTIONS 2, 4, 5, & 10: 8
IN THE PAST 7 DAYS, HOW WOULD YOU RATE YOUR FATIGUE ON AVERAGE [ON A SCALE FROM 1 (NONE) TO 5 (VERY SEVERE)]?: 2
IN GENERAL, HOW WOULD YOU RATE YOUR PHYSICAL HEALTH [ON A SCALE OF 1 (POOR) TO 5 (EXCELLENT)]?: 2
IN THE PAST 7 DAYS, HOW WOULD YOU RATE YOUR PAIN ON AVERAGE [ON A SCALE FROM 0 (NO PAIN) TO 10 (WORST IMAGINABLE PAIN)]?: 3
SUM OF RESPONSES TO QUESTIONS 3, 6, 7, & 8: 9
IN GENERAL, HOW WOULD YOU RATE YOUR MENTAL HEALTH, INCLUDING YOUR MOOD AND YOUR ABILITY TO THINK [ON A SCALE OF 1 (POOR) TO 5 (EXCELLENT)]?: 2
IN GENERAL, PLEASE RATE HOW WELL YOU CARRY OUT YOUR USUAL SOCIAL ACTIVITIES (INCLUDES ACTIVITIES AT HOME, AT WORK, AND IN YOUR COMMUNITY, AND RESPONSIBILITIES AS A PARENT, CHILD, SPOUSE, EMPLOYEE, FRIEND, ETC) [ON A SCALE OF 1 (POOR) TO 5 (EXCELLENT)]?: 2
IN GENERAL, WOULD YOU SAY YOUR HEALTH IS...[ON A SCALE OF 1 (POOR) TO 5 (EXCELLENT)]: 2

## 2023-08-29 ENCOUNTER — OFFICE VISIT (OUTPATIENT)
Dept: INTERNAL MEDICINE CLINIC | Age: 33
End: 2023-08-29

## 2023-08-29 ENCOUNTER — OFFICE VISIT (OUTPATIENT)
Dept: PSYCHIATRY | Age: 33
End: 2023-08-29

## 2023-08-29 VITALS
DIASTOLIC BLOOD PRESSURE: 60 MMHG | SYSTOLIC BLOOD PRESSURE: 100 MMHG | WEIGHT: 105 LBS | HEART RATE: 130 BPM | HEIGHT: 58 IN | BODY MASS INDEX: 22.04 KG/M2

## 2023-08-29 VITALS
BODY MASS INDEX: 22.04 KG/M2 | DIASTOLIC BLOOD PRESSURE: 64 MMHG | SYSTOLIC BLOOD PRESSURE: 110 MMHG | WEIGHT: 105 LBS | HEIGHT: 58 IN

## 2023-08-29 DIAGNOSIS — K04.7 DENTAL INFECTION: ICD-10-CM

## 2023-08-29 DIAGNOSIS — F41.1 GAD (GENERALIZED ANXIETY DISORDER): ICD-10-CM

## 2023-08-29 DIAGNOSIS — F33.1 MODERATE EPISODE OF RECURRENT MAJOR DEPRESSIVE DISORDER (HCC): Primary | ICD-10-CM

## 2023-08-29 DIAGNOSIS — G40.909 SEIZURE DISORDER (HCC): ICD-10-CM

## 2023-08-29 DIAGNOSIS — E10.8 TYPE 1 DIABETES MELLITUS WITH COMPLICATION (HCC): Primary | ICD-10-CM

## 2023-08-29 PROBLEM — E11.10 DKA, TYPE 2, NOT AT GOAL (HCC): Status: RESOLVED | Noted: 2023-08-08 | Resolved: 2023-08-29

## 2023-08-29 PROBLEM — F32.A ANXIETY AND DEPRESSION: Status: RESOLVED | Noted: 2018-10-22 | Resolved: 2023-08-29

## 2023-08-29 PROBLEM — F41.9 ANXIETY AND DEPRESSION: Status: RESOLVED | Noted: 2018-10-22 | Resolved: 2023-08-29

## 2023-08-29 RX ORDER — FLUOXETINE HYDROCHLORIDE 40 MG/1
40 CAPSULE ORAL DAILY
Qty: 30 CAPSULE | Refills: 3 | Status: SHIPPED | OUTPATIENT
Start: 2023-08-29

## 2023-08-29 RX ORDER — HYDROCODONE BITARTRATE AND ACETAMINOPHEN 5; 325 MG/1; MG/1
1 TABLET ORAL EVERY 6 HOURS PRN
Qty: 20 TABLET | Refills: 0 | Status: SHIPPED | OUTPATIENT
Start: 2023-08-29 | End: 2023-09-03

## 2023-08-29 ASSESSMENT — ANXIETY QUESTIONNAIRES
6. BECOMING EASILY ANNOYED OR IRRITABLE: 3
2. NOT BEING ABLE TO STOP OR CONTROL WORRYING: 3
IF YOU CHECKED OFF ANY PROBLEMS ON THIS QUESTIONNAIRE, HOW DIFFICULT HAVE THESE PROBLEMS MADE IT FOR YOU TO DO YOUR WORK, TAKE CARE OF THINGS AT HOME, OR GET ALONG WITH OTHER PEOPLE: EXTREMELY DIFFICULT
7. FEELING AFRAID AS IF SOMETHING AWFUL MIGHT HAPPEN: 3
4. TROUBLE RELAXING: 3
5. BEING SO RESTLESS THAT IT IS HARD TO SIT STILL: 3
1. FEELING NERVOUS, ANXIOUS, OR ON EDGE: 3
GAD7 TOTAL SCORE: 21
3. WORRYING TOO MUCH ABOUT DIFFERENT THINGS: 3

## 2023-08-29 ASSESSMENT — PATIENT HEALTH QUESTIONNAIRE - PHQ9
6. FEELING BAD ABOUT YOURSELF - OR THAT YOU ARE A FAILURE OR HAVE LET YOURSELF OR YOUR FAMILY DOWN: 3
2. FEELING DOWN, DEPRESSED OR HOPELESS: 3
7. TROUBLE CONCENTRATING ON THINGS, SUCH AS READING THE NEWSPAPER OR WATCHING TELEVISION: 2
SUM OF ALL RESPONSES TO PHQ QUESTIONS 1-9: 20
5. POOR APPETITE OR OVEREATING: 3
SUM OF ALL RESPONSES TO PHQ QUESTIONS 1-9: 20
3. TROUBLE FALLING OR STAYING ASLEEP: 3
SUM OF ALL RESPONSES TO PHQ9 QUESTIONS 1 & 2: 5
9. THOUGHTS THAT YOU WOULD BE BETTER OFF DEAD, OR OF HURTING YOURSELF: 1
SUM OF ALL RESPONSES TO PHQ QUESTIONS 1-9: 19
10. IF YOU CHECKED OFF ANY PROBLEMS, HOW DIFFICULT HAVE THESE PROBLEMS MADE IT FOR YOU TO DO YOUR WORK, TAKE CARE OF THINGS AT HOME, OR GET ALONG WITH OTHER PEOPLE: 3
4. FEELING TIRED OR HAVING LITTLE ENERGY: 3
1. LITTLE INTEREST OR PLEASURE IN DOING THINGS: 2
SUM OF ALL RESPONSES TO PHQ QUESTIONS 1-9: 20

## 2023-08-29 NOTE — PATIENT INSTRUCTIONS
Discontinue Zoloft 100mg  Start Prozac 40mg    In the event of suicidal ideations or psychiatric emergency you can:  Call 911 or go to the nearest emergency room  Call the suicide hotline at [phone]  Call the [facility] Team at [phone]

## 2023-08-29 NOTE — PROGRESS NOTES
Outpatient Psychiatry Consult  Initial Psychiatric Evaluation   [facility]     Patient name: Whit Waters  YOB: 1990  MRN: [id number]  Payor: Payor: Melissa Sr / Plan: [place] / Product Type: *No Product type* /   Day of Service: 8/29/2023      Referring Primary Care Clinician: Trae Ryan MD    Face to Face Time: 60 minutes      Chief Complaint:     Chief Complaint   Patient presents with    New Patient       Assessment and Plan:     Whit Waters is a 28 y.o. White (non-) female with a history of anxiety, depression, T1DM, epilepsy who presents to outpatient primary care psychiatry on 8/29/2023 for psychiatric evaluation. Currently many stressors are contributing to anxiety and depression, especially house arrest and ongoing legal issues (cannot leave hotel, cannot see her daughters). Has only tried Zoloft in the past, will try a different SSRI - Prozac. Patient on clonazepam for seizures. Also discussed nonpharmacological treatment such as deep breathing and behavioral activation. DSM 5 Diagnosis:     1. Moderate episode of recurrent major depressive disorder ([address])  2. ROME (generalized anxiety disorder)  - FLUoxetine (PROZAC) 40 MG capsule; Take 1 capsule by mouth daily  Dispense: 30 capsule; Refill: 3      Discussed all prescribed medications including risks/benefits/side effects/alternatives. Discussed nonpharmacologic treatment. Patient expressed understanding and agreement with the current treatment plan    An [place]AR[place] report was reviewed 8/29/23 and was consistent with appropriate use of controlled substances. Filling Vicodin, clonazepam, gabapentin    Safety    Overall the safety risk is low for future dangerousness because the patient: denies SI/HI, intent or plan, is clinically sober, future oriented, leonel for safety, has outpatient psychiatric follow-up established, has improved social support.  As such, the patient does not meet

## 2023-08-29 NOTE — PROGRESS NOTES
Guille Vargas (:  1990) is a 28 y.o. female,Established patient, here for evaluation of the following chief complaint(s):  Diabetes         ASSESSMENT/PLAN:  1. Type 1 diabetes mellitus with complication (HCC)  - has not been well-controlled, A1C recently was under 10  - continue insulin, renewed blood glucose monitor prescription, gave samples of Dexcom  -     blood glucose monitor kit and supplies; Use as directed for blood glucose monitoring, Disp-1 kit, R-0, Normal  -     acetone, urine, test strip; Use daily as needed for monitoring diabetes, Disp-100 strip, R-1Normal  -     CBC with Auto Differential; Future  -     Comprehensive Metabolic Panel; Future  2. Dental infection  - Dental issues finally able to be addressed. Renewed pain medication for short term, this should be the last prescription.  -     HYDROcodone-acetaminophen (NORCO) 5-325 MG per tablet; Take 1 tablet by mouth every 6 hours as needed for Pain for up to 5 days. Intended supply: 7 days. Take lowest dose possible to manage pain Max Daily Amount: 4 tablets, Disp-20 tablet, R-0Normal  3. Seizure disorder ([address])   - stable, still needs neuro follow up, continue current medication    Follow up in 1 mo as scheduled         Subjective   SUBJECTIVE/OBJECTIVE:  HPI    She just had two of the bad teeth pulled today, the infected tooth had a root canal on Saturday. Using ibuprofen but still having a lot of pain. Appetite is poor, and she is not able to eat much     She has her Omnipod but still having difficulty getting the Dexcom, she has not had it for a couple of days. She was not able to use her glucometer either, she has the right test strips but the glucometer isn't working. The glucometer is old so probably just needs a new one. No seizures since last visit, she has been taking her medications as prescribed. Review of Systems       Objective   Physical Exam  Vitals reviewed.    Constitutional:       General: She is not in acute

## 2023-09-01 ENCOUNTER — TELEPHONE (OUTPATIENT)
Dept: INTERNAL MEDICINE CLINIC | Age: 33
End: 2023-09-01

## 2023-09-05 ENCOUNTER — TELEPHONE (OUTPATIENT)
Dept: ORTHOPEDIC SURGERY | Age: 33
End: 2023-09-05

## 2023-09-05 NOTE — TELEPHONE ENCOUNTER
Submitted PA for [employer] Sensor  [employer]ia [employer] Key: [id number] STATUS: PENDING. Follow up done daily; if no response in three days we will refax for status check. If another three days goes by with no response we will call the insurance for status.

## 2023-09-05 NOTE — TELEPHONE ENCOUNTER
Submitted PA for Dexcom G7  device  [employer]ia [employer] Key: [id number] STATUS: PENDING. Follow up done daily; if no response in three days we will refax for status check. If another three days goes by with no response we will call the insurance for status.

## 2023-09-06 NOTE — TELEPHONE ENCOUNTER
Received APPROVAL for Dexcom G7  device through 09/05/2024; approval letter attached. If this requires a response please respond to the pool ( [facility] [address]). Thank you please advise patient.

## 2023-09-06 NOTE — TELEPHONE ENCOUNTER
Received APPROVAL for Dexcom G7 Sensor through 09/05/2024; approval letter attached. If this requires a response please respond to the pool ( [facility] [address]). Thank you please advise patient.

## 2023-09-07 RX ORDER — IBUPROFEN 800 MG/1
TABLET ORAL
Qty: 90 TABLET | Refills: 0 | Status: CANCELLED | OUTPATIENT
Start: 2023-09-07

## 2023-09-07 RX ORDER — IBUPROFEN 800 MG/1
TABLET ORAL
Qty: 90 TABLET | Refills: 0 | Status: SHIPPED | OUTPATIENT
Start: 2023-09-07

## 2023-09-07 NOTE — TELEPHONE ENCOUNTER
Patient needs refill:    ibuprofen (ADVIL;MOTRIN) 800 MG tablet    [facility], [address] [place] [address] [phone]      Pls advise.

## 2023-10-03 ENCOUNTER — TELEPHONE (OUTPATIENT)
Dept: INTERNAL MEDICINE CLINIC | Age: 33
End: 2023-10-03

## 2023-10-03 DIAGNOSIS — E10.8 TYPE 1 DIABETES MELLITUS WITH COMPLICATION (HCC): ICD-10-CM

## 2023-10-03 RX ORDER — INSULIN PUMP CONTROLLER
EACH MISCELLANEOUS
Qty: 10 EACH | Refills: 0 | Status: SHIPPED | OUTPATIENT
Start: 2023-10-03

## 2023-10-03 NOTE — TELEPHONE ENCOUNTER
Appointment Request From: Saintclair Ginger      With Provider: MD Laureen Brown IM [address]]      Preferred Date Range: 10/10/2023 - 10/31/2023      Preferred Times: Any Time      Reason for visit: Request an Appointment      Comments:   Check up after being in the hospital for 12 daysAnd they discovered new thi

## 2023-10-04 ENCOUNTER — TELEPHONE (OUTPATIENT)
Age: 33
End: 2023-10-04

## 2023-10-10 ENCOUNTER — TELEPHONE (OUTPATIENT)
Dept: INTERNAL MEDICINE CLINIC | Age: 33
End: 2023-10-10

## 2023-10-10 NOTE — TELEPHONE ENCOUNTER
----- Message from Roxie Richards, [facility] sent at 10/10/2023 11:01 AM EDT -----  Subject: Appointment Request    Reason for Call:     QUESTIONS    Reason for appointment request? Other - Provider to provider     Additional Information for Provider? Pts  called and stated that   the he would like to reschedule the appointments that the pt has with Dr. Dewitt Form Dr. Hari Cabrera for today 10/10/2023. Please call the pt back and   reschedule the appointments.    ---------------------------------------------------------------------------  --------------  Betsy ALTMAN  [phone]; OK to leave message on voicemail  ---------------------------------------------------------------------------  --------------  SCRIPT ANSWERS

## 2023-10-11 ENCOUNTER — TELEPHONE (OUTPATIENT)
Age: 33
End: 2023-10-11

## 2023-10-25 DIAGNOSIS — E11.42 DIABETIC PERIPHERAL NEUROPATHY (HCC): ICD-10-CM

## 2023-10-26 RX ORDER — GABAPENTIN 300 MG/1
CAPSULE ORAL
Qty: 270 CAPSULE | Refills: 2 | Status: SHIPPED | OUTPATIENT
Start: 2023-10-26 | End: 2024-01-24

## 2023-11-02 ENCOUNTER — TELEPHONE (OUTPATIENT)
Age: 33
End: 2023-11-02

## 2023-11-02 NOTE — TELEPHONE ENCOUNTER
Spoke with pt, scheduled to meet on  11/7 at 10:30, also scheduled her to meet with Dr. Mishel eDnny for follow up on 11/7 at 9:30.

## 2023-11-05 DIAGNOSIS — E10.8 TYPE 1 DIABETES MELLITUS WITH COMPLICATION (HCC): ICD-10-CM

## 2023-11-06 RX ORDER — INSULIN ASPART 100 [IU]/ML
INJECTION, SOLUTION INTRAVENOUS; SUBCUTANEOUS
Qty: 30 ML | Refills: 2 | Status: SHIPPED | OUTPATIENT
Start: 2023-11-06

## 2023-11-07 ENCOUNTER — CLINICAL DOCUMENTATION (OUTPATIENT)
Dept: PSYCHIATRY | Age: 33
End: 2023-11-07

## 2023-11-07 NOTE — PROGRESS NOTES
Patient no call/no show for follow-up appointment 11/7/23 @ 9:30am. Two no call/no shows in a row result in dismissal from the clinic.     Carli Suarez MD

## 2023-11-14 DIAGNOSIS — G40.909 SEIZURE DISORDER (HCC): ICD-10-CM

## 2023-11-15 RX ORDER — CLONAZEPAM 2 MG/1
2 TABLET ORAL 2 TIMES DAILY
Qty: 60 TABLET | Refills: 2 | OUTPATIENT
Start: 2023-11-15 | End: 2023-12-16

## 2023-11-15 RX ORDER — CLONAZEPAM 2 MG/1
2 TABLET ORAL 2 TIMES DAILY
Qty: 60 TABLET | Refills: 0 | Status: SHIPPED | OUTPATIENT
Start: 2023-11-15 | End: 2023-12-16

## 2023-11-21 DIAGNOSIS — E10.8 TYPE 1 DIABETES MELLITUS WITH COMPLICATION (HCC): ICD-10-CM

## 2023-11-21 DIAGNOSIS — F41.1 GAD (GENERALIZED ANXIETY DISORDER): ICD-10-CM

## 2023-11-21 DIAGNOSIS — F33.1 MODERATE EPISODE OF RECURRENT MAJOR DEPRESSIVE DISORDER (HCC): ICD-10-CM

## 2023-11-22 RX ORDER — FLUOXETINE HYDROCHLORIDE 40 MG/1
40 CAPSULE ORAL DAILY
Qty: 90 CAPSULE | Refills: 0 | Status: SHIPPED | OUTPATIENT
Start: 2023-11-22

## 2023-11-27 RX ORDER — ACYCLOVIR 400 MG/1
TABLET ORAL
Qty: 1 EACH | Refills: 0 | Status: SHIPPED | OUTPATIENT
Start: 2023-11-27

## 2023-11-27 RX ORDER — LEVETIRACETAM 500 MG/1
1000 TABLET ORAL 2 TIMES DAILY
Qty: 360 TABLET | Refills: 1 | Status: SHIPPED | OUTPATIENT
Start: 2023-11-27

## 2023-11-27 RX ORDER — IBUPROFEN 800 MG/1
TABLET ORAL
Qty: 90 TABLET | Refills: 0 | Status: SHIPPED | OUTPATIENT
Start: 2023-11-27

## 2023-11-27 RX ORDER — ONDANSETRON 4 MG/1
TABLET, FILM COATED ORAL
Qty: 90 TABLET | Refills: 3 | Status: SHIPPED | OUTPATIENT
Start: 2023-11-27

## 2023-11-27 RX ORDER — ACYCLOVIR 400 MG/1
TABLET ORAL
Qty: 9 EACH | Refills: 1 | Status: SHIPPED | OUTPATIENT
Start: 2023-11-27

## 2023-11-27 RX ORDER — INSULIN ASPART 100 [IU]/ML
INJECTION, SOLUTION INTRAVENOUS; SUBCUTANEOUS
Qty: 20 ML | Refills: 3 | Status: SHIPPED | OUTPATIENT
Start: 2023-11-27

## 2023-11-27 RX ORDER — INSULIN PUMP CONTROLLER
EACH MISCELLANEOUS
Qty: 10 EACH | Refills: 5 | Status: SHIPPED | OUTPATIENT
Start: 2023-11-27 | End: 2023-11-30

## 2023-11-27 RX ORDER — OMEPRAZOLE 20 MG/1
20 CAPSULE, DELAYED RELEASE ORAL
Qty: 90 CAPSULE | Refills: 1 | Status: SHIPPED | OUTPATIENT
Start: 2023-11-27

## 2023-11-27 RX ORDER — INSULIN ASPART 100 [IU]/ML
INJECTION, SOLUTION INTRAVENOUS; SUBCUTANEOUS
Qty: 30 ML | Refills: 5 | Status: SHIPPED | OUTPATIENT
Start: 2023-11-27

## 2023-11-30 DIAGNOSIS — E10.8 TYPE 1 DIABETES MELLITUS WITH COMPLICATION (HCC): ICD-10-CM

## 2023-11-30 RX ORDER — INSULIN PUMP CONTROLLER
EACH MISCELLANEOUS
Qty: 10 EACH | Refills: 2 | Status: SHIPPED | OUTPATIENT
Start: 2023-11-30

## 2023-12-04 DIAGNOSIS — E10.8 TYPE 1 DIABETES MELLITUS WITH COMPLICATION (HCC): ICD-10-CM

## 2023-12-05 RX ORDER — ACYCLOVIR 400 MG/1
TABLET ORAL
Qty: 9 EACH | Refills: 1 | Status: SHIPPED | OUTPATIENT
Start: 2023-12-05

## 2023-12-14 DIAGNOSIS — G40.909 SEIZURE DISORDER (HCC): ICD-10-CM

## 2023-12-14 RX ORDER — CLONAZEPAM 2 MG/1
2 TABLET ORAL 2 TIMES DAILY
Qty: 60 TABLET | Refills: 0 | Status: SHIPPED | OUTPATIENT
Start: 2023-12-14 | End: 2024-01-13

## 2023-12-14 RX ORDER — CLONAZEPAM 2 MG/1
2 TABLET ORAL 2 TIMES DAILY
Qty: 60 TABLET | Refills: 0 | OUTPATIENT
Start: 2023-12-14

## 2023-12-26 ENCOUNTER — TELEPHONE (OUTPATIENT)
Dept: INTERNAL MEDICINE CLINIC | Age: 33
End: 2023-12-26

## 2023-12-26 DIAGNOSIS — G40.909 SEIZURE DISORDER (HCC): ICD-10-CM

## 2023-12-26 NOTE — TELEPHONE ENCOUNTER
Patient is going to cancel her appointment for today due to having a seizure while taking a shower to get ready to come here.    Please call back to reschedule .

## 2023-12-27 RX ORDER — CLONAZEPAM 2 MG/1
TABLET ORAL
Qty: 60 TABLET | Refills: 0 | OUTPATIENT
Start: 2023-12-27 | End: 2024-01-26

## 2024-01-02 ENCOUNTER — OFFICE VISIT (OUTPATIENT)
Dept: INTERNAL MEDICINE CLINIC | Age: 34
End: 2024-01-02
Payer: COMMERCIAL

## 2024-01-02 VITALS
BODY MASS INDEX: 26.87 KG/M2 | DIASTOLIC BLOOD PRESSURE: 68 MMHG | HEIGHT: 58 IN | WEIGHT: 128 LBS | SYSTOLIC BLOOD PRESSURE: 120 MMHG

## 2024-01-02 DIAGNOSIS — E61.1 IRON DEFICIENCY: ICD-10-CM

## 2024-01-02 DIAGNOSIS — E11.42 DIABETIC PERIPHERAL NEUROPATHY (HCC): ICD-10-CM

## 2024-01-02 DIAGNOSIS — S91.101A OPEN WOUND OF RIGHT GREAT TOE, INITIAL ENCOUNTER: ICD-10-CM

## 2024-01-02 DIAGNOSIS — E53.8 B12 DEFICIENCY: ICD-10-CM

## 2024-01-02 DIAGNOSIS — G40.909 SEIZURE DISORDER (HCC): ICD-10-CM

## 2024-01-02 DIAGNOSIS — E10.8 TYPE 1 DIABETES MELLITUS WITH COMPLICATION (HCC): Primary | ICD-10-CM

## 2024-01-02 DIAGNOSIS — F33.1 MODERATE EPISODE OF RECURRENT MAJOR DEPRESSIVE DISORDER (HCC): ICD-10-CM

## 2024-01-02 DIAGNOSIS — F41.1 GAD (GENERALIZED ANXIETY DISORDER): ICD-10-CM

## 2024-01-02 DIAGNOSIS — G43.909 MIGRAINE WITHOUT STATUS MIGRAINOSUS, NOT INTRACTABLE, UNSPECIFIED MIGRAINE TYPE: ICD-10-CM

## 2024-01-02 PROBLEM — R65.10 SIRS (SYSTEMIC INFLAMMATORY RESPONSE SYNDROME) (HCC): Status: RESOLVED | Noted: 2018-02-13 | Resolved: 2024-01-02

## 2024-01-02 PROCEDURE — G8419 CALC BMI OUT NRM PARAM NOF/U: HCPCS | Performed by: INTERNAL MEDICINE

## 2024-01-02 PROCEDURE — 3046F HEMOGLOBIN A1C LEVEL >9.0%: CPT | Performed by: INTERNAL MEDICINE

## 2024-01-02 PROCEDURE — G8484 FLU IMMUNIZE NO ADMIN: HCPCS | Performed by: INTERNAL MEDICINE

## 2024-01-02 PROCEDURE — G8427 DOCREV CUR MEDS BY ELIG CLIN: HCPCS | Performed by: INTERNAL MEDICINE

## 2024-01-02 PROCEDURE — 2022F DILAT RTA XM EVC RTNOPTHY: CPT | Performed by: INTERNAL MEDICINE

## 2024-01-02 PROCEDURE — 1036F TOBACCO NON-USER: CPT | Performed by: INTERNAL MEDICINE

## 2024-01-02 PROCEDURE — 99214 OFFICE O/P EST MOD 30 MIN: CPT | Performed by: INTERNAL MEDICINE

## 2024-01-02 RX ORDER — VENLAFAXINE HYDROCHLORIDE 75 MG/1
75 CAPSULE, EXTENDED RELEASE ORAL DAILY
Qty: 30 CAPSULE | Refills: 5 | Status: SHIPPED | OUTPATIENT
Start: 2024-01-02

## 2024-01-02 ASSESSMENT — PATIENT HEALTH QUESTIONNAIRE - PHQ9
10. IF YOU CHECKED OFF ANY PROBLEMS, HOW DIFFICULT HAVE THESE PROBLEMS MADE IT FOR YOU TO DO YOUR WORK, TAKE CARE OF THINGS AT HOME, OR GET ALONG WITH OTHER PEOPLE: 0
SUM OF ALL RESPONSES TO PHQ QUESTIONS 1-9: 0
4. FEELING TIRED OR HAVING LITTLE ENERGY: 0
2. FEELING DOWN, DEPRESSED OR HOPELESS: 0
SUM OF ALL RESPONSES TO PHQ QUESTIONS 1-9: 0
6. FEELING BAD ABOUT YOURSELF - OR THAT YOU ARE A FAILURE OR HAVE LET YOURSELF OR YOUR FAMILY DOWN: 0
1. LITTLE INTEREST OR PLEASURE IN DOING THINGS: 0
SUM OF ALL RESPONSES TO PHQ9 QUESTIONS 1 & 2: 0
9. THOUGHTS THAT YOU WOULD BE BETTER OFF DEAD, OR OF HURTING YOURSELF: 0
7. TROUBLE CONCENTRATING ON THINGS, SUCH AS READING THE NEWSPAPER OR WATCHING TELEVISION: 0
3. TROUBLE FALLING OR STAYING ASLEEP: 0
5. POOR APPETITE OR OVEREATING: 0
8. MOVING OR SPEAKING SO SLOWLY THAT OTHER PEOPLE COULD HAVE NOTICED. OR THE OPPOSITE, BEING SO FIGETY OR RESTLESS THAT YOU HAVE BEEN MOVING AROUND A LOT MORE THAN USUAL: 0

## 2024-01-02 NOTE — PROGRESS NOTES
Maribeth Loera (:  1990) is a 33 y.o. female,Established patient, here for evaluation of the following chief complaint(s):  Annual Exam (Wanting medication for migraines, seizure are becoming weekly, /Needs tongue looked at and toe )         ASSESSMENT/PLAN:  1. Type 1 diabetes mellitus with complication (HCC)  -Sugar control is not optimal but has improved somewhat.  -     [facility]  -     Comprehensive Metabolic Panel; Future  -     Lipid Panel; Future  -     CBC with Auto Differential; Future  -     Hemoglobin A1C; Future  -     blood glucose monitor kit and supplies; Dispense sufficient amount for indicated testing frequency plus additional to accommodate PRN testing needs. Dispense all needed supplies to include: monitor, strips, lancing device, lancets, control solutions, alcohol swabs., Disp-1 kit, R-0, Normal  2. Open wound of right great toe, initial encounter  -Needs to see wound care, she is at high risk for significant issues with the wound given her peripheral neuropathy  -     [facility]  3. Diabetic peripheral neuropathy (HCC)  -Continue gabapentin  -     [facility]  4. B12 deficiency  -     Vitamin B12; Future  5. Iron deficiency  -     Iron and TIBC; Future  -     Ferritin; Future  6. Seizure disorder (HCC)   -Not well-controlled, needs to see neurology as an outpatient.  Continue levetiracetam 1000 mg twice daily, clonazepam 2 mg twice daily  7. Moderate episode of recurrent major depressive disorder (HCC)   -Change fluoxetine to venlafaxine 75 mg daily.  There is room to increase this.  SNRIs can be used as migraine prophylaxis as well so I am hoping that this may help with a couple things at the same time  8. ROME (generalized anxiety disorder)   -Start venlafaxine as above  9. Migraine without status migrainosus, not intractable, unspecified migraine type   -Start

## 2024-01-03 PROBLEM — E61.1 IRON DEFICIENCY: Status: ACTIVE | Noted: 2024-01-03

## 2024-01-03 PROBLEM — S91.101A OPEN WOUND OF RIGHT GREAT TOE: Status: ACTIVE | Noted: 2024-01-03

## 2024-01-03 PROBLEM — E53.8 B12 DEFICIENCY: Status: ACTIVE | Noted: 2024-01-03

## 2024-01-03 PROBLEM — G43.909 MIGRAINE WITHOUT STATUS MIGRAINOSUS, NOT INTRACTABLE: Status: ACTIVE | Noted: 2024-01-03

## 2024-01-03 PROBLEM — R63.4 WEIGHT LOSS, UNINTENTIONAL: Status: RESOLVED | Noted: 2021-01-27 | Resolved: 2024-01-03

## 2024-01-11 DIAGNOSIS — G40.909 SEIZURE DISORDER (HCC): ICD-10-CM

## 2024-01-12 RX ORDER — CLONAZEPAM 2 MG/1
2 TABLET ORAL 2 TIMES DAILY
Qty: 60 TABLET | Refills: 2 | Status: SHIPPED | OUTPATIENT
Start: 2024-01-12 | End: 2024-04-11

## 2024-01-23 DIAGNOSIS — E11.42 DIABETIC PERIPHERAL NEUROPATHY (HCC): ICD-10-CM

## 2024-01-23 RX ORDER — GABAPENTIN 300 MG/1
CAPSULE ORAL
Qty: 270 CAPSULE | Refills: 1 | Status: SHIPPED | OUTPATIENT
Start: 2024-01-23 | End: 2024-04-22

## 2024-02-13 DIAGNOSIS — G40.909 SEIZURE DISORDER (HCC): ICD-10-CM

## 2024-02-13 DIAGNOSIS — E11.42 DIABETIC PERIPHERAL NEUROPATHY (HCC): ICD-10-CM

## 2024-02-13 DIAGNOSIS — E10.8 TYPE 1 DIABETES MELLITUS WITH COMPLICATION (HCC): ICD-10-CM

## 2024-02-13 RX ORDER — ACYCLOVIR 400 MG/1
TABLET ORAL
Qty: 1 EACH | Refills: 0 | Status: SHIPPED | OUTPATIENT
Start: 2024-02-13

## 2024-02-13 RX ORDER — CLONAZEPAM 2 MG/1
2 TABLET ORAL 2 TIMES DAILY
Qty: 60 TABLET | Refills: 2 | Status: SHIPPED | OUTPATIENT
Start: 2024-02-13 | End: 2024-05-13

## 2024-02-13 RX ORDER — ONDANSETRON 4 MG/1
TABLET, FILM COATED ORAL
Qty: 90 TABLET | Refills: 3 | Status: SHIPPED | OUTPATIENT
Start: 2024-02-13

## 2024-02-13 RX ORDER — OMEPRAZOLE 20 MG/1
20 CAPSULE, DELAYED RELEASE ORAL
Qty: 90 CAPSULE | Refills: 2 | Status: SHIPPED | OUTPATIENT
Start: 2024-02-13

## 2024-02-13 RX ORDER — LEVETIRACETAM 500 MG/1
1000 TABLET ORAL 2 TIMES DAILY
Qty: 360 TABLET | Refills: 2 | Status: SHIPPED | OUTPATIENT
Start: 2024-02-13

## 2024-02-13 RX ORDER — INSULIN PUMP CONTROLLER
EACH MISCELLANEOUS
Qty: 10 EACH | Refills: 3 | Status: SHIPPED | OUTPATIENT
Start: 2024-02-13

## 2024-02-13 RX ORDER — ACYCLOVIR 400 MG/1
TABLET ORAL
Qty: 9 EACH | Refills: 2 | Status: SHIPPED | OUTPATIENT
Start: 2024-02-13

## 2024-02-13 RX ORDER — INSULIN ASPART 100 [IU]/ML
INJECTION, SOLUTION INTRAVENOUS; SUBCUTANEOUS
Qty: 20 ML | Refills: 3 | Status: SHIPPED | OUTPATIENT
Start: 2024-02-13

## 2024-02-13 RX ORDER — GABAPENTIN 300 MG/1
CAPSULE ORAL
Qty: 270 CAPSULE | Refills: 5 | Status: SHIPPED | OUTPATIENT
Start: 2024-02-13 | End: 2024-08-30

## 2024-02-22 RX ORDER — INSULIN ASPART 100 [IU]/ML
INJECTION, SOLUTION INTRAVENOUS; SUBCUTANEOUS
Qty: 20 ML | Refills: 2 | Status: SHIPPED | OUTPATIENT
Start: 2024-02-22

## 2024-02-23 DIAGNOSIS — E10.8 TYPE 1 DIABETES MELLITUS WITH COMPLICATION (HCC): ICD-10-CM

## 2024-02-23 RX ORDER — INSULIN ASPART 100 [IU]/ML
INJECTION, SOLUTION INTRAVENOUS; SUBCUTANEOUS
Qty: 30 ML | Refills: 2 | Status: SHIPPED | OUTPATIENT
Start: 2024-02-23

## 2024-03-04 DIAGNOSIS — E10.8 TYPE 1 DIABETES MELLITUS WITH COMPLICATION (HCC): ICD-10-CM

## 2024-03-04 DIAGNOSIS — E10.10 TYPE 1 DIABETES MELLITUS WITH KETOACIDOSIS WITHOUT COMA (HCC): ICD-10-CM

## 2024-03-04 RX ORDER — LANCETS 33 GAUGE
EACH MISCELLANEOUS
Qty: 200 EACH | Refills: 3 | Status: SHIPPED | OUTPATIENT
Start: 2024-03-04

## 2024-03-15 RX ORDER — ISOPROPYL ALCOHOL 0.75 G/1
SWAB TOPICAL
Qty: 200 EACH | Refills: 2 | Status: SHIPPED | OUTPATIENT
Start: 2024-03-15

## 2024-03-25 DIAGNOSIS — E11.42 DIABETIC PERIPHERAL NEUROPATHY (HCC): ICD-10-CM

## 2024-03-25 RX ORDER — IBUPROFEN 800 MG/1
TABLET ORAL
Qty: 90 TABLET | Refills: 0 | Status: SHIPPED | OUTPATIENT
Start: 2024-03-25

## 2024-03-25 RX ORDER — GABAPENTIN 300 MG/1
CAPSULE ORAL
Qty: 270 CAPSULE | Refills: 0 | Status: SHIPPED | OUTPATIENT
Start: 2024-03-25 | End: 2024-10-10

## 2024-04-02 ENCOUNTER — TELEPHONE (OUTPATIENT)
Dept: INTERNAL MEDICINE CLINIC | Age: 34
End: 2024-04-02

## 2024-04-02 DIAGNOSIS — E10.8 TYPE 1 DIABETES MELLITUS WITH COMPLICATION (HCC): ICD-10-CM

## 2024-04-02 RX ORDER — ACYCLOVIR 400 MG/1
TABLET ORAL
Qty: 1 EACH | Refills: 0 | OUTPATIENT
Start: 2024-04-02

## 2024-04-02 RX ORDER — INSULIN PUMP CONTROLLER
EACH MISCELLANEOUS
Qty: 10 EACH | Refills: 3 | Status: SHIPPED | OUTPATIENT
Start: 2024-04-02

## 2024-04-02 RX ORDER — ACYCLOVIR 400 MG/1
TABLET ORAL
Qty: 9 EACH | Refills: 2 | Status: SHIPPED | OUTPATIENT
Start: 2024-04-02

## 2024-04-02 NOTE — TELEPHONE ENCOUNTER
Needs RX for new dexcom   Also g7 applicators and omni pod      Continuous Blood Gluc  (DEXCOM G7 ) [address] - [address] [facility] - P [phone] - F [phone]

## 2024-04-05 ENCOUNTER — APPOINTMENT (OUTPATIENT)
Dept: GENERAL RADIOLOGY | Age: 34
DRG: 420 | End: 2024-04-05
Payer: COMMERCIAL

## 2024-04-05 ENCOUNTER — APPOINTMENT (OUTPATIENT)
Dept: CT IMAGING | Age: 34
DRG: 420 | End: 2024-04-05
Attending: EMERGENCY MEDICINE
Payer: COMMERCIAL

## 2024-04-05 ENCOUNTER — HOSPITAL ENCOUNTER (INPATIENT)
Age: 34
LOS: 4 days | Discharge: HOME | DRG: 420 | End: 2024-04-09
Attending: EMERGENCY MEDICINE | Admitting: STUDENT IN AN ORGANIZED HEALTH CARE EDUCATION/TRAINING PROGRAM
Payer: COMMERCIAL

## 2024-04-05 DIAGNOSIS — G93.41 ACUTE METABOLIC ENCEPHALOPATHY: ICD-10-CM

## 2024-04-05 DIAGNOSIS — E10.10 TYPE 1 DIABETES MELLITUS WITH KETOACIDOSIS WITHOUT COMA (HCC): Primary | ICD-10-CM

## 2024-04-05 PROBLEM — E10.8 TYPE 1 DIABETES MELLITUS WITH COMPLICATION (HCC): Status: RESOLVED | Noted: 2018-10-22 | Resolved: 2024-04-05

## 2024-04-05 PROBLEM — E10.9 TYPE 1 DIABETES MELLITUS (HCC): Status: ACTIVE | Noted: 2022-08-02

## 2024-04-05 LAB
ALBUMIN SERPL-MCNC: 4.9 G/DL (ref 3.4–5)
ALBUMIN/GLOB SERPL: 1.1 {RATIO} (ref 1.1–2.2)
ALP SERPL-CCNC: 170 U/L (ref 40–129)
ALT SERPL-CCNC: 252 U/L (ref 10–40)
AMPHETAMINES UR QL SCN>1000 NG/ML: NORMAL
AMYLASE SERPL-CCNC: 172 U/L (ref 25–115)
ANION GAP SERPL CALCULATED.3IONS-SCNC: 17 MMOL/L (ref 3–16)
ANION GAP SERPL CALCULATED.3IONS-SCNC: 27 MMOL/L (ref 3–16)
ANION GAP SERPL CALCULATED.3IONS-SCNC: 36 MMOL/L (ref 3–16)
ANION GAP SERPL CALCULATED.3IONS-SCNC: 45 MMOL/L (ref 3–16)
ANION GAP SERPL CALCULATED.3IONS-SCNC: 48 MMOL/L (ref 3–16)
AST SERPL-CCNC: 143 U/L (ref 15–37)
B-HCG SERPL EIA 3RD IS-ACNC: <5 MIU/ML
BACTERIA URNS QL MICRO: NORMAL /HPF
BARBITURATES UR QL SCN>200 NG/ML: NORMAL
BASE EXCESS BLDV CALC-SCNC: -30.1 MMOL/L (ref -3–3)
BENZODIAZ UR QL SCN>200 NG/ML: NORMAL
BETA-HYDROXYBUTYRATE: >8 MMOL/L (ref 0–0.27)
BILIRUB SERPL-MCNC: <0.2 MG/DL (ref 0–1)
BILIRUB UR QL STRIP.AUTO: NEGATIVE
BUN SERPL-MCNC: 14 MG/DL (ref 7–20)
BUN SERPL-MCNC: 18 MG/DL (ref 7–20)
BUN SERPL-MCNC: 27 MG/DL (ref 7–20)
BUN SERPL-MCNC: 30 MG/DL (ref 7–20)
BUN SERPL-MCNC: 30 MG/DL (ref 7–20)
CALCIUM SERPL-MCNC: 10.1 MG/DL (ref 8.3–10.6)
CALCIUM SERPL-MCNC: 8 MG/DL (ref 8.3–10.6)
CALCIUM SERPL-MCNC: 8.5 MG/DL (ref 8.3–10.6)
CALCIUM SERPL-MCNC: 8.5 MG/DL (ref 8.3–10.6)
CALCIUM SERPL-MCNC: 9 MG/DL (ref 8.3–10.6)
CANNABINOIDS UR QL SCN>50 NG/ML: NORMAL
CHLORIDE SERPL-SCNC: 108 MMOL/L (ref 99–110)
CHLORIDE SERPL-SCNC: 117 MMOL/L (ref 99–110)
CHLORIDE SERPL-SCNC: 119 MMOL/L (ref 99–110)
CHLORIDE SERPL-SCNC: 86 MMOL/L (ref 99–110)
CHLORIDE SERPL-SCNC: 86 MMOL/L (ref 99–110)
CK SERPL-CCNC: 60 U/L (ref 26–192)
CLARITY UR: CLEAR
CO2 BLDV-SCNC: 11 MMOL/L
CO2 SERPL-SCNC: 14 MMOL/L (ref 21–32)
CO2 SERPL-SCNC: 3 MMOL/L (ref 21–32)
CO2 SERPL-SCNC: 4 MMOL/L (ref 21–32)
CO2 SERPL-SCNC: 4 MMOL/L (ref 21–32)
CO2 SERPL-SCNC: 8 MMOL/L (ref 21–32)
COCAINE UR QL SCN: NORMAL
COHGB MFR BLDV: 2.2 % (ref 0–1.5)
COLOR UR: YELLOW
CREAT SERPL-MCNC: 0.9 MG/DL (ref 0.6–1.1)
CREAT SERPL-MCNC: 1 MG/DL (ref 0.6–1.1)
CREAT SERPL-MCNC: 1.3 MG/DL (ref 0.6–1.1)
CREAT SERPL-MCNC: 1.4 MG/DL (ref 0.6–1.1)
CREAT SERPL-MCNC: 1.5 MG/DL (ref 0.6–1.1)
DO-HGB MFR BLDV: 15 %
DRUG SCREEN COMMENT UR-IMP: NORMAL
EKG ATRIAL RATE: 127 BPM
EKG DIAGNOSIS: NORMAL
EKG P AXIS: 67 DEGREES
EKG P-R INTERVAL: 130 MS
EKG Q-T INTERVAL: 318 MS
EKG QRS DURATION: 70 MS
EKG QTC CALCULATION (BAZETT): 462 MS
EKG R AXIS: 82 DEGREES
EKG T AXIS: 37 DEGREES
EKG VENTRICULAR RATE: 127 BPM
EPI CELLS #/AREA URNS AUTO: 0 /HPF (ref 0–5)
ETHANOLAMINE SERPL-MCNC: NORMAL MG/DL (ref 0–0.08)
FENTANYL SCREEN, URINE: NORMAL
GFR SERPLBLD CREATININE-BSD FMLA CKD-EPI: 46 ML/MIN/{1.73_M2}
GFR SERPLBLD CREATININE-BSD FMLA CKD-EPI: 50 ML/MIN/{1.73_M2}
GFR SERPLBLD CREATININE-BSD FMLA CKD-EPI: 55 ML/MIN/{1.73_M2}
GFR SERPLBLD CREATININE-BSD FMLA CKD-EPI: 75 ML/MIN/{1.73_M2}
GFR SERPLBLD CREATININE-BSD FMLA CKD-EPI: 85 ML/MIN/{1.73_M2}
GLUCOSE BLD-MCNC: 141 MG/DL (ref 70–99)
GLUCOSE BLD-MCNC: 147 MG/DL (ref 70–99)
GLUCOSE BLD-MCNC: 160 MG/DL (ref 70–99)
GLUCOSE BLD-MCNC: 168 MG/DL (ref 70–99)
GLUCOSE BLD-MCNC: 171 MG/DL (ref 70–99)
GLUCOSE BLD-MCNC: 364 MG/DL (ref 70–99)
GLUCOSE BLD-MCNC: 410 MG/DL (ref 70–99)
GLUCOSE BLD-MCNC: >600 MG/DL (ref 70–99)
GLUCOSE SERPL-MCNC: 1239 MG/DL (ref 70–99)
GLUCOSE SERPL-MCNC: 1312 MG/DL (ref 70–99)
GLUCOSE SERPL-MCNC: 135 MG/DL (ref 70–99)
GLUCOSE SERPL-MCNC: 174 MG/DL (ref 70–99)
GLUCOSE SERPL-MCNC: 733 MG/DL (ref 70–99)
GLUCOSE UR STRIP.AUTO-MCNC: >=1000 MG/DL
HCO3 BLDV-SCNC: 4.2 MMOL/L (ref 23–29)
HGB UR QL STRIP.AUTO: NEGATIVE
HYALINE CASTS #/AREA URNS AUTO: 1 /LPF (ref 0–8)
KETONES UR STRIP.AUTO-MCNC: >=160 MG/DL
LACTATE BLD-SCNC: 7.65 MMOL/L (ref 0.4–2)
LACTATE BLDV-SCNC: 5.9 MMOL/L (ref 0.4–2)
LACTATE BLDV-SCNC: 6.6 MMOL/L (ref 0.4–1.9)
LACTATE BLDV-SCNC: 7.7 MMOL/L (ref 0.4–1.9)
LACTATE BLDV-SCNC: 7.7 MMOL/L (ref 0.4–2)
LEUKOCYTE ESTERASE UR QL STRIP.AUTO: NEGATIVE
LEVETIRACETAM SERPL-MCNC: <2 UG/ML (ref 6–46)
LIPASE SERPL-CCNC: 68 U/L (ref 13–60)
MAGNESIUM SERPL-MCNC: 2.2 MG/DL (ref 1.8–2.4)
MAGNESIUM SERPL-MCNC: 2.4 MG/DL (ref 1.8–2.4)
MAGNESIUM SERPL-MCNC: 2.6 MG/DL (ref 1.8–2.4)
MEDICATION DOSE-MCNC: ABNORMAL
METHADONE UR QL SCN>300 NG/ML: NORMAL
METHGB MFR BLDV: 1 %
NITRITE UR QL STRIP.AUTO: NEGATIVE
O2 CT VFR BLDV CALC: 17 VOL %
O2 THERAPY: ABNORMAL
OPIATES UR QL SCN>300 NG/ML: NORMAL
OXYCODONE UR QL SCN: NORMAL
PCO2 BLDV: 27.1 MMHG (ref 40–50)
PCP UR QL SCN>25 NG/ML: NORMAL
PERFORMED ON: ABNORMAL
PH BLDV: <6.818 [PH] (ref 7.35–7.45)
PH UR STRIP.AUTO: 5 [PH] (ref 5–8)
PH UR STRIP: 5 [PH]
PHOSPHATE SERPL-MCNC: 1.1 MG/DL (ref 2.5–4.9)
PHOSPHATE SERPL-MCNC: 1.8 MG/DL (ref 2.5–4.9)
PHOSPHATE SERPL-MCNC: 3.8 MG/DL (ref 2.5–4.9)
PO2 BLDV: 83.6 MMHG (ref 25–40)
POC SAMPLE TYPE: ABNORMAL
POTASSIUM SERPL-SCNC: 4 MMOL/L (ref 3.5–5.1)
POTASSIUM SERPL-SCNC: 4.4 MMOL/L (ref 3.5–5.1)
POTASSIUM SERPL-SCNC: 4.5 MMOL/L (ref 3.5–5.1)
POTASSIUM SERPL-SCNC: 6 MMOL/L (ref 3.5–5.1)
POTASSIUM SERPL-SCNC: 6.3 MMOL/L (ref 3.5–5.1)
PROT SERPL-MCNC: 9.3 G/DL (ref 6.4–8.2)
PROT UR STRIP.AUTO-MCNC: ABNORMAL MG/DL
RBC CLUMPS #/AREA URNS AUTO: 0 /HPF (ref 0–4)
SAO2 % BLDV: 85 %
SODIUM SERPL-SCNC: 135 MMOL/L (ref 136–145)
SODIUM SERPL-SCNC: 137 MMOL/L (ref 136–145)
SODIUM SERPL-SCNC: 148 MMOL/L (ref 136–145)
SODIUM SERPL-SCNC: 150 MMOL/L (ref 136–145)
SODIUM SERPL-SCNC: 152 MMOL/L (ref 136–145)
SP GR UR STRIP.AUTO: >=1.03 (ref 1–1.03)
TROPONIN, HIGH SENSITIVITY: 16 NG/L (ref 0–14)
UA COMPLETE W REFLEX CULTURE PNL UR: ABNORMAL
UA DIPSTICK W REFLEX MICRO PNL UR: YES
URN SPEC COLLECT METH UR: ABNORMAL
UROBILINOGEN UR STRIP-ACNC: 0.2 E.U./DL
WBC #/AREA URNS AUTO: 0 /HPF (ref 0–5)

## 2024-04-05 PROCEDURE — 80053 COMPREHEN METABOLIC PANEL: CPT

## 2024-04-05 PROCEDURE — 36556 INSERT NON-TUNNEL CV CATH: CPT

## 2024-04-05 PROCEDURE — 2500000003 HC RX 250 WO HCPCS: Performed by: EMERGENCY MEDICINE

## 2024-04-05 PROCEDURE — 71045 X-RAY EXAM CHEST 1 VIEW: CPT

## 2024-04-05 PROCEDURE — 82803 BLOOD GASES ANY COMBINATION: CPT

## 2024-04-05 PROCEDURE — 93005 ELECTROCARDIOGRAM TRACING: CPT | Performed by: EMERGENCY MEDICINE

## 2024-04-05 PROCEDURE — 96374 THER/PROPH/DIAG INJ IV PUSH: CPT

## 2024-04-05 PROCEDURE — 84484 ASSAY OF TROPONIN QUANT: CPT

## 2024-04-05 PROCEDURE — 6370000000 HC RX 637 (ALT 250 FOR IP): Performed by: NURSE PRACTITIONER

## 2024-04-05 PROCEDURE — 6370000000 HC RX 637 (ALT 250 FOR IP): Performed by: EMERGENCY MEDICINE

## 2024-04-05 PROCEDURE — 85025 COMPLETE CBC W/AUTO DIFF WBC: CPT

## 2024-04-05 PROCEDURE — 83605 ASSAY OF LACTIC ACID: CPT

## 2024-04-05 PROCEDURE — 2500000003 HC RX 250 WO HCPCS: Performed by: STUDENT IN AN ORGANIZED HEALTH CARE EDUCATION/TRAINING PROGRAM

## 2024-04-05 PROCEDURE — 6360000004 HC RX CONTRAST MEDICATION: Performed by: EMERGENCY MEDICINE

## 2024-04-05 PROCEDURE — 99285 EMERGENCY DEPT VISIT HI MDM: CPT

## 2024-04-05 PROCEDURE — 80177 DRUG SCRN QUAN LEVETIRACETAM: CPT

## 2024-04-05 PROCEDURE — 96375 TX/PRO/DX INJ NEW DRUG ADDON: CPT

## 2024-04-05 PROCEDURE — 6360000002 HC RX W HCPCS: Performed by: EMERGENCY MEDICINE

## 2024-04-05 PROCEDURE — 82150 ASSAY OF AMYLASE: CPT

## 2024-04-05 PROCEDURE — 6360000002 HC RX W HCPCS: Performed by: STUDENT IN AN ORGANIZED HEALTH CARE EDUCATION/TRAINING PROGRAM

## 2024-04-05 PROCEDURE — 81001 URINALYSIS AUTO W/SCOPE: CPT

## 2024-04-05 PROCEDURE — 80307 DRUG TEST PRSMV CHEM ANLYZR: CPT

## 2024-04-05 PROCEDURE — 2000000000 HC ICU R&B

## 2024-04-05 PROCEDURE — 82550 ASSAY OF CK (CPK): CPT

## 2024-04-05 PROCEDURE — 83690 ASSAY OF LIPASE: CPT

## 2024-04-05 PROCEDURE — 74177 CT ABD & PELVIS W/CONTRAST: CPT

## 2024-04-05 PROCEDURE — 84702 CHORIONIC GONADOTROPIN TEST: CPT

## 2024-04-05 PROCEDURE — 83036 HEMOGLOBIN GLYCOSYLATED A1C: CPT

## 2024-04-05 PROCEDURE — 84100 ASSAY OF PHOSPHORUS: CPT

## 2024-04-05 PROCEDURE — 36415 COLL VENOUS BLD VENIPUNCTURE: CPT

## 2024-04-05 PROCEDURE — 36592 COLLECT BLOOD FROM PICC: CPT

## 2024-04-05 PROCEDURE — 2580000003 HC RX 258: Performed by: EMERGENCY MEDICINE

## 2024-04-05 PROCEDURE — 83735 ASSAY OF MAGNESIUM: CPT

## 2024-04-05 PROCEDURE — 06HY33Z INSERTION OF INFUSION DEVICE INTO LOWER VEIN, PERCUTANEOUS APPROACH: ICD-10-PCS | Performed by: STUDENT IN AN ORGANIZED HEALTH CARE EDUCATION/TRAINING PROGRAM

## 2024-04-05 PROCEDURE — 82010 KETONE BODYS QUAN: CPT

## 2024-04-05 PROCEDURE — 82077 ASSAY SPEC XCP UR&BREATH IA: CPT

## 2024-04-05 PROCEDURE — 93010 ELECTROCARDIOGRAM REPORT: CPT | Performed by: INTERNAL MEDICINE

## 2024-04-05 PROCEDURE — 2580000003 HC RX 258: Performed by: INTERNAL MEDICINE

## 2024-04-05 PROCEDURE — C9113 INJ PANTOPRAZOLE SODIUM, VIA: HCPCS | Performed by: STUDENT IN AN ORGANIZED HEALTH CARE EDUCATION/TRAINING PROGRAM

## 2024-04-05 PROCEDURE — 70450 CT HEAD/BRAIN W/O DYE: CPT

## 2024-04-05 PROCEDURE — 87040 BLOOD CULTURE FOR BACTERIA: CPT

## 2024-04-05 PROCEDURE — 2580000003 HC RX 258: Performed by: STUDENT IN AN ORGANIZED HEALTH CARE EDUCATION/TRAINING PROGRAM

## 2024-04-05 RX ORDER — LEVETIRACETAM 500 MG/5ML
750 INJECTION, SOLUTION, CONCENTRATE INTRAVENOUS EVERY 12 HOURS
Status: DISCONTINUED | OUTPATIENT
Start: 2024-04-05 | End: 2024-04-06

## 2024-04-05 RX ORDER — DEXTROSE AND SODIUM CHLORIDE 5; .45 G/100ML; G/100ML
INJECTION, SOLUTION INTRAVENOUS CONTINUOUS PRN
Status: DISCONTINUED | OUTPATIENT
Start: 2024-04-05 | End: 2024-04-05

## 2024-04-05 RX ORDER — VENLAFAXINE HYDROCHLORIDE 37.5 MG/1
75 CAPSULE, EXTENDED RELEASE ORAL DAILY
Status: DISCONTINUED | OUTPATIENT
Start: 2024-04-05 | End: 2024-04-09 | Stop reason: HOSPADM

## 2024-04-05 RX ORDER — DEXTROSE AND SODIUM CHLORIDE 5; .45 G/100ML; G/100ML
INJECTION, SOLUTION INTRAVENOUS CONTINUOUS PRN
Status: DISCONTINUED | OUTPATIENT
Start: 2024-04-05 | End: 2024-04-09

## 2024-04-05 RX ORDER — POLYETHYLENE GLYCOL 3350 17 G/17G
17 POWDER, FOR SOLUTION ORAL DAILY PRN
Status: DISCONTINUED | OUTPATIENT
Start: 2024-04-05 | End: 2024-04-09 | Stop reason: HOSPADM

## 2024-04-05 RX ORDER — SODIUM CHLORIDE, SODIUM LACTATE, POTASSIUM CHLORIDE, CALCIUM CHLORIDE 600; 310; 30; 20 MG/100ML; MG/100ML; MG/100ML; MG/100ML
INJECTION, SOLUTION INTRAVENOUS CONTINUOUS
Status: DISCONTINUED | OUTPATIENT
Start: 2024-04-05 | End: 2024-04-08

## 2024-04-05 RX ORDER — MAGNESIUM SULFATE IN WATER 40 MG/ML
2000 INJECTION, SOLUTION INTRAVENOUS PRN
Status: DISCONTINUED | OUTPATIENT
Start: 2024-04-05 | End: 2024-04-05

## 2024-04-05 RX ORDER — POTASSIUM CHLORIDE 7.45 MG/ML
10 INJECTION INTRAVENOUS PRN
Status: DISCONTINUED | OUTPATIENT
Start: 2024-04-05 | End: 2024-04-09

## 2024-04-05 RX ORDER — ENOXAPARIN SODIUM 100 MG/ML
40 INJECTION SUBCUTANEOUS DAILY
Status: DISCONTINUED | OUTPATIENT
Start: 2024-04-05 | End: 2024-04-09 | Stop reason: HOSPADM

## 2024-04-05 RX ORDER — SODIUM CHLORIDE 9 MG/ML
INJECTION, SOLUTION INTRAVENOUS CONTINUOUS
Status: DISCONTINUED | OUTPATIENT
Start: 2024-04-05 | End: 2024-04-05

## 2024-04-05 RX ORDER — SODIUM CHLORIDE 9 MG/ML
INJECTION, SOLUTION INTRAVENOUS CONTINUOUS
Status: DISCONTINUED | OUTPATIENT
Start: 2024-04-05 | End: 2024-04-07

## 2024-04-05 RX ORDER — 0.9 % SODIUM CHLORIDE 0.9 %
1000 INTRAVENOUS SOLUTION INTRAVENOUS ONCE
Status: COMPLETED | OUTPATIENT
Start: 2024-04-05 | End: 2024-04-05

## 2024-04-05 RX ORDER — MAGNESIUM SULFATE IN WATER 40 MG/ML
2000 INJECTION, SOLUTION INTRAVENOUS PRN
Status: DISCONTINUED | OUTPATIENT
Start: 2024-04-05 | End: 2024-04-09

## 2024-04-05 RX ORDER — LEVETIRACETAM 500 MG/1
1000 TABLET ORAL 2 TIMES DAILY
Status: DISCONTINUED | OUTPATIENT
Start: 2024-04-05 | End: 2024-04-05 | Stop reason: ALTCHOICE

## 2024-04-05 RX ORDER — PANTOPRAZOLE SODIUM 40 MG/10ML
40 INJECTION, POWDER, LYOPHILIZED, FOR SOLUTION INTRAVENOUS DAILY
Status: DISCONTINUED | OUTPATIENT
Start: 2024-04-05 | End: 2024-04-09 | Stop reason: HOSPADM

## 2024-04-05 RX ORDER — SODIUM CHLORIDE, SODIUM LACTATE, POTASSIUM CHLORIDE, AND CALCIUM CHLORIDE .6; .31; .03; .02 G/100ML; G/100ML; G/100ML; G/100ML
2000 INJECTION, SOLUTION INTRAVENOUS ONCE
Status: COMPLETED | OUTPATIENT
Start: 2024-04-05 | End: 2024-04-05

## 2024-04-05 RX ORDER — POTASSIUM CHLORIDE 7.45 MG/ML
10 INJECTION INTRAVENOUS PRN
Status: DISCONTINUED | OUTPATIENT
Start: 2024-04-05 | End: 2024-04-05

## 2024-04-05 RX ORDER — POTASSIUM CHLORIDE 29.8 MG/ML
20 INJECTION INTRAVENOUS PRN
Status: DISCONTINUED | OUTPATIENT
Start: 2024-04-05 | End: 2024-04-09 | Stop reason: HOSPADM

## 2024-04-05 RX ADMIN — DEXTROSE AND SODIUM CHLORIDE: 5; 450 INJECTION, SOLUTION INTRAVENOUS at 16:34

## 2024-04-05 RX ADMIN — SODIUM CHLORIDE 18.24 UNITS/HR: 9 INJECTION, SOLUTION INTRAVENOUS at 15:05

## 2024-04-05 RX ADMIN — SODIUM CHLORIDE 10.82 UNITS/HR: 9 INJECTION, SOLUTION INTRAVENOUS at 09:00

## 2024-04-05 RX ADMIN — ENOXAPARIN SODIUM 40 MG: 100 INJECTION SUBCUTANEOUS at 13:03

## 2024-04-05 RX ADMIN — POTASSIUM CHLORIDE 20 MEQ: 29.8 INJECTION, SOLUTION INTRAVENOUS at 18:16

## 2024-04-05 RX ADMIN — SODIUM CHLORIDE 1000 ML: 9 INJECTION, SOLUTION INTRAVENOUS at 13:03

## 2024-04-05 RX ADMIN — POTASSIUM CHLORIDE 20 MEQ: 29.8 INJECTION, SOLUTION INTRAVENOUS at 22:15

## 2024-04-05 RX ADMIN — LEVETIRACETAM 750 MG: 100 INJECTION, SOLUTION INTRAVENOUS at 13:03

## 2024-04-05 RX ADMIN — SODIUM PHOSPHATE, MONOBASIC, MONOHYDRATE AND SODIUM PHOSPHATE, DIBASIC, ANHYDROUS 30 MMOL: 142; 276 INJECTION, SOLUTION INTRAVENOUS at 18:45

## 2024-04-05 RX ADMIN — DEXTROSE AND SODIUM CHLORIDE: 5; 450 INJECTION, SOLUTION INTRAVENOUS at 23:59

## 2024-04-05 RX ADMIN — SODIUM CHLORIDE: 9 INJECTION, SOLUTION INTRAVENOUS at 12:30

## 2024-04-05 RX ADMIN — IBUPROFEN 600 MG: 200 TABLET, FILM COATED ORAL at 23:07

## 2024-04-05 RX ADMIN — SODIUM CHLORIDE 1000 ML: 9 INJECTION, SOLUTION INTRAVENOUS at 10:57

## 2024-04-05 RX ADMIN — IOPAMIDOL 75 ML: 755 INJECTION, SOLUTION INTRAVENOUS at 08:43

## 2024-04-05 RX ADMIN — SODIUM BICARBONATE 50 MEQ: 84 INJECTION, SOLUTION INTRAVENOUS at 08:21

## 2024-04-05 RX ADMIN — LEVETIRACETAM 750 MG: 100 INJECTION, SOLUTION INTRAVENOUS at 22:15

## 2024-04-05 RX ADMIN — POTASSIUM CHLORIDE 10 MEQ: 7.46 INJECTION, SOLUTION INTRAVENOUS at 14:24

## 2024-04-05 RX ADMIN — SODIUM CHLORIDE, POTASSIUM CHLORIDE, SODIUM LACTATE AND CALCIUM CHLORIDE 2000 ML: 600; 310; 30; 20 INJECTION, SOLUTION INTRAVENOUS at 07:49

## 2024-04-05 RX ADMIN — PANTOPRAZOLE SODIUM 40 MG: 40 INJECTION, POWDER, FOR SOLUTION INTRAVENOUS at 13:03

## 2024-04-05 RX ADMIN — POTASSIUM CHLORIDE 20 MEQ: 29.8 INJECTION, SOLUTION INTRAVENOUS at 15:35

## 2024-04-05 ASSESSMENT — PAIN SCALES - GENERAL: PAINLEVEL_OUTOF10: 4

## 2024-04-05 ASSESSMENT — PAIN - FUNCTIONAL ASSESSMENT: PAIN_FUNCTIONAL_ASSESSMENT: 0-10

## 2024-04-05 NOTE — ED NOTES
Arrived by [facility] rt call for seizures; reported by boyfriend that pt seized twice at home for unknown amount of time. Also reported DM & possible pregnancy. BG reads high; pt moaning, breathing rapidly & not participating in triage. Monitors in place.

## 2024-04-05 NOTE — ED NOTES
Critical values- lactic 7.7, co2 3, k+ 5.9, BS 1239  reported to ICU RN Francy, pt enroute to floor

## 2024-04-05 NOTE — ED PROVIDER NOTES
EMERGENCY DEPARTMENT PROVIDER NOTE    Patient Identification  Pt Name: Maribeth Loera  MRN: [id number]  Birthdate 1990  Date of evaluation: 2024  Provider: Luther Adames DO  PCP: Tosha Moreno MD    Chief Complaint  Diabetic Ketoacidosis (Arrived by  EMS rt call for seizures; reported by boyfriend that pt seized twice at home for unknown amount of time.  Also reported DM & possible pregnancy.  BG reads high; pt moaning & not participating in triage. )      HPI  (History provided by EMS, record review)  This is a 34 y.o. female with pertinent past medical history of type 1 diabetes mellitus, seizure disorder who was brought in by EMS transportation for altered mental status.  Patient arrives obtunded and is unable to contribute anything meaningfully to history.  Per EMS, called by boyfriend due to concern for seizures at home this morning, on EMS arrival patient was minimally responsive with blood glucose reading high.  Boyfriend also reported patient may possibly be pregnant.  Unclear if patient has been taking her medications at home.  Patient with admission to [facility] on 2023 for diabetic ketoacidosis on record review..       I have reviewed the following nursing documentation:  Allergies: Patient has no known allergies.    Past medical history:   Past Medical History:   Diagnosis Date    Depression     Diabetes mellitus (HCC)     Diabetic ketoacidosis without coma associated with type 1 diabetes mellitus (HCC) 2022    Seizures (HCC)      Past surgical history:   Past Surgical History:   Procedure Laterality Date     SECTION      TUBAL LIGATION         Home medications:   Previous Medications    ACETONE, URINE, TEST STRIP    Use daily as needed for monitoring diabetes    ALCOHOL SWABS (B-D SINGLE USE SWABS REGULAR) PADS    USE AS DIRECTED    BLOOD GLUCOSE MONITOR KIT AND SUPPLIES    Dispense sufficient amount for indicated testing frequency plus  evidence of infection, suspect leukocytosis reactive, patient not septic and will defer antibiotics for the time being.  Patient received IV fluid resuscitation, will initiate on insulin infusion.  On reevaluation she remains ill-appearing, however in no distress, hemodynamically stable and protecting her airway.  Case discussed with internal medicine team and will plan on admission to ICU for close monitoring and treatment.            During the patient's ED course, the patient was given:  Medications   insulin regular (HUMULIN R;NOVOLIN R) 100 Units in sodium chloride 0.9 % 100 mL infusion (10.82 Units/hr IntraVENous New Bag 4/5/24 0900)   dextrose bolus 10% 125 mL (has no administration in time range)     Or   dextrose bolus 10% 250 mL (has no administration in time range)   potassium chloride 10 mEq/100 mL IVPB (Peripheral Line) (has no administration in time range)   magnesium sulfate 2000 mg in 50 mL IVPB premix (has no administration in time range)   sodium phosphate 15 mmol in sodium chloride 0.9 % 250 mL IVPB (has no administration in time range)   0.9 % sodium chloride infusion (has no administration in time range)   dextrose 5 % and 0.45 % sodium chloride infusion (has no administration in time range)   lactated ringers bolus 2,000 mL (2,000 mLs IntraVENous New Bag 4/5/24 0749)   sodium bicarbonate 8.4 % injection 50 mEq (50 mEq IntraVENous Given 4/5/24 0821)   iopamidol (ISOVUE-370) 76 % injection 75 mL (75 mLs IntraVENous Given 4/5/24 0843)        Consults:  None        History obtained from: EMS and Chart review (due to patient condition and lack of collateral history immediately available)    Pertinent social determinants of health: None applicable    Chronic conditions potentially affecting care:  Type 1 diabetes    Review of other records:  Inpatient notes from outside hospital admission from 9/22/2023    Reassessment:  See MDM for details of medications given and reassessment      I Dr. Adames am

## 2024-04-05 NOTE — H&P
Hospital Medicine History & Physical        Name:  Maribeth Loera  /Age/Sex: 1990  (34 y.o. female)  MRN & CSN:  [id number] & [id number]     PCP: Tosha Moreno MD    Date of Admission: 2024    Date of Service: Patient seen/examined on 2024    Patient Status:  Inpatient - Patient will most likely require more than 48 hours of treatment and requires intensive medical treatment/monitoring     Chief Complaint:    Chief Complaint   Patient presents with    Diabetic Ketoacidosis     Arrived by  EMS rt call for seizures; reported by boyfriend that pt seized twice at home for unknown amount of time.  Also reported DM & possible pregnancy.  BG reads high; pt moaning & not participating in triage.          History Of Present Illness:     34 y.o. female with PMHx of DM I, depression, seizures who presented to [facility] with complaints of DKA.    Patient is relatively obtunded on exam. Able to nod and shake her head to questions, but unable to answer more complex questions. Seems very dry and is tachycardic on exam.     No family at bedside.    Past Medical History:          Diagnosis Date    Depression     Diabetes mellitus (HCC)     Diabetic ketoacidosis without coma associated with type 1 diabetes mellitus (HCC) 2022    Seizures (HCC)        Past Surgical History:          Procedure Laterality Date     SECTION      TUBAL LIGATION         Medications Prior to Admission:      Prior to Admission medications    Medication Sig Start Date End Date Taking? Authorizing Provider   Continuous Blood Gluc Sensor (DEXCOM G7 SENSOR) MISC Apply every 10 days for blood glucose monitoring 24   Tosha Moreno MD   Insulin Disposable Pump (OMNIPOD DASH PODS, GEN 4,) MISC CONTINOUS PUMP, MAX DOSE OF 60 UNITS 24   Tosha Moreno MD   ibuprofen (ADVIL;MOTRIN) 800 MG tablet TAKE 1 TABLET BY MOUTH THREE TIMES DAILY AS NEEDED FOR PAIN 3/25/24   Karel Bianchi MD   gabapentin (NEURONTIN) 300 MG  potassium chloride 10 mEq/100 mL IVPB (Peripheral Line)  10 mEq IntraVENous PRN Luther Adames, DO        magnesium sulfate 2000 mg in 50 mL IVPB premix  2,000 mg IntraVENous PRN Luther Adames, DO        sodium phosphate 15 mmol in sodium chloride 0.9 % 250 mL IVPB  15 mmol IntraVENous PRN Luther Adames, DO        dextrose 5 % and 0.45 % sodium chloride infusion   IntraVENous Continuous PRN Luther Adames, DO        levETIRAcetam (KEPPRA) tablet 1,000 mg  1,000 mg Oral BID Doron Jimenez,         venlafaxine (EFFEXOR XR) extended release capsule 75 mg  75 mg Oral Daily Doron Jimenez DO        polyethylene glycol (GLYCOLAX) packet 17 g  17 g Oral Daily PRN Doron Jimenez,         enoxaparin (LOVENOX) injection 40 mg  40 mg SubCUTAneous Daily Doron Jimenez,         lactated ringers IV soln infusion   IntraVENous Continuous Doron Jimenez DO        pantoprazole (PROTONIX) injection 40 mg  40 mg IntraVENous Daily Doron Jimenez DO         Current Outpatient Medications   Medication Sig Dispense Refill    Continuous Blood Gluc Sensor (DEXCOM G7 SENSOR) MISC Apply every 10 days for blood glucose monitoring 9 each 2    Insulin Disposable Pump (OMNIPOD DASH PODS, GEN 4,) MISC CONTINOUS PUMP, MAX DOSE OF 60 UNITS 10 each 3    ibuprofen (ADVIL;MOTRIN) 800 MG tablet TAKE 1 TABLET BY MOUTH THREE TIMES DAILY AS NEEDED FOR PAIN 90 tablet 0    gabapentin (NEURONTIN) 300 MG capsule TAKE 3 CAPSULES BY MOUTH THREE TIMES DAILY 270 capsule 0    Alcohol Swabs (B-D SINGLE USE SWABS REGULAR) PADS USE AS DIRECTED 200 each 2    OneTouch Delica Lancets 33G MISC USE TO TEST 8 TIMES PER  each 3    NOVOLOG FLEXPEN 100 UNIT/ML injection pen INJECT 20 UNITS SUBCUTANEOUSLY WITH MEALS/SNACKS UP TP FIVE TIMES DAILY 30 mL 2    insulin aspart (NOVOLOG) 100 UNIT/ML injection vial INJECT 45 UNITS DAILY WITH OMNIPOD 20 mL 2    omeprazole (PRILOSEC) 20 MG delayed release capsule Take 1 capsule by mouth every morning (before

## 2024-04-05 NOTE — PROCEDURES
FEMORAL CENTRAL VENOUS CATHETER PROCEDURE    Name:  Maribeth Loera   /Age/Sex: 1990  (34 y.o. female)  MRN: [id number]   Room[facility]Bed: [facility]3909/3909-01   Admission Date/Time: 2024  7:08 AM    PRE-PROCEDURE    INDICATION: Central Venous Access  LATERALITY: Left  : Doron Jimenez DO  ASSIST:  CHIN Sen  CONSENT: Patient gave verbal consent to procedure  TIME OUT: Immediately prior to procedure a \"time out\" was called to verify the correct patient, procedure, equipment, support staff and site/side marked as required.    PROCEDURE    Appropriate monitoring equipment such as telemetry and pulse oximetry was in place during the procedure.  The skin over the vein was prepped with chlorhexidine and draped in a sterile fashion.  Local anesthesia was obtained by infiltration using 1% Lidocaine without epinephrine.  The vessel was non-pulsatile and easily compressible on ultrasound.  A 7F 20cm triple lumen catheter was then inserted into the center of the vessel using a modified Seldinger technique and advanced to a depth of 19cm.  Return of non-pulsatile venous blood was observed.  Flow was easily aspirated from each of the catheter lumens and then filled with sterile normal saline.  The line was securely fastened to the skin with sutures.  Then the site was sterilely dressed using an antimicrobial Bio-patch and adhesive bandage.    POST-PROCEDURE    The patient tolerated the procedure Well.    COMPLICATIONS: None  ESTIMATED BLOOD LOSS: Minimal.      Doron Jimenez DO 2024 11:25 AM

## 2024-04-05 NOTE — CARE COORDINATION
Discharge Planning Note:    Chart reviewed and it appears that patient has minimal needs for discharge at this time. Risk Score n/a %     Primary Care Physician is CARMEN LAINEZ   Primary insurance is [facility] Medicaid    Please notify case management if any discharge needs are identified.      Case management will continue to follow progress and update discharge plan as needed.

## 2024-04-06 ENCOUNTER — APPOINTMENT (OUTPATIENT)
Dept: GENERAL RADIOLOGY | Age: 34
DRG: 420 | End: 2024-04-06
Payer: COMMERCIAL

## 2024-04-06 LAB
ALBUMIN SERPL-MCNC: 3.5 G/DL (ref 3.4–5)
ALP SERPL-CCNC: 97 U/L (ref 40–129)
ALT SERPL-CCNC: 125 U/L (ref 10–40)
ANION GAP SERPL CALCULATED.3IONS-SCNC: 12 MMOL/L (ref 3–16)
ANION GAP SERPL CALCULATED.3IONS-SCNC: 13 MMOL/L (ref 3–16)
ANION GAP SERPL CALCULATED.3IONS-SCNC: 14 MMOL/L (ref 3–16)
ANION GAP SERPL CALCULATED.3IONS-SCNC: 16 MMOL/L (ref 3–16)
ANION GAP SERPL CALCULATED.3IONS-SCNC: 17 MMOL/L (ref 3–16)
ANION GAP SERPL CALCULATED.3IONS-SCNC: 19 MMOL/L (ref 3–16)
AST SERPL-CCNC: 55 U/L (ref 15–37)
BASE EXCESS BLDA CALC-SCNC: -10 MMOL/L (ref -3–3)
BASOPHILS # BLD: 0 K/UL (ref 0–0.2)
BASOPHILS # BLD: 0.2 K/UL (ref 0–0.2)
BASOPHILS NFR BLD: 0.1 %
BASOPHILS NFR BLD: 0.7 %
BILIRUB DIRECT SERPL-MCNC: <0.2 MG/DL (ref 0–0.3)
BILIRUB INDIRECT SERPL-MCNC: ABNORMAL MG/DL (ref 0–1)
BILIRUB SERPL-MCNC: <0.2 MG/DL (ref 0–1)
BUN SERPL-MCNC: 10 MG/DL (ref 7–20)
BUN SERPL-MCNC: 3 MG/DL (ref 7–20)
BUN SERPL-MCNC: 4 MG/DL (ref 7–20)
BUN SERPL-MCNC: 5 MG/DL (ref 7–20)
BUN SERPL-MCNC: 8 MG/DL (ref 7–20)
BUN SERPL-MCNC: <2 MG/DL (ref 7–20)
CALCIUM SERPL-MCNC: 6.7 MG/DL (ref 8.3–10.6)
CALCIUM SERPL-MCNC: 7 MG/DL (ref 8.3–10.6)
CALCIUM SERPL-MCNC: 7.2 MG/DL (ref 8.3–10.6)
CALCIUM SERPL-MCNC: 7.8 MG/DL (ref 8.3–10.6)
CALCIUM SERPL-MCNC: 7.8 MG/DL (ref 8.3–10.6)
CALCIUM SERPL-MCNC: 8 MG/DL (ref 8.3–10.6)
CHLORIDE SERPL-SCNC: 112 MMOL/L (ref 99–110)
CHLORIDE SERPL-SCNC: 115 MMOL/L (ref 99–110)
CHLORIDE SERPL-SCNC: 116 MMOL/L (ref 99–110)
CHLORIDE SERPL-SCNC: 117 MMOL/L (ref 99–110)
CHLORIDE SERPL-SCNC: 118 MMOL/L (ref 99–110)
CHLORIDE SERPL-SCNC: 118 MMOL/L (ref 99–110)
CO2 BLDA-SCNC: 31.8 MMOL/L
CO2 SERPL-SCNC: 13 MMOL/L (ref 21–32)
CO2 SERPL-SCNC: 14 MMOL/L (ref 21–32)
CO2 SERPL-SCNC: 14 MMOL/L (ref 21–32)
CO2 SERPL-SCNC: 15 MMOL/L (ref 21–32)
COHGB MFR BLDA: 0.9 % (ref 0–1.5)
CREAT SERPL-MCNC: 0.6 MG/DL (ref 0.6–1.1)
CREAT SERPL-MCNC: 0.6 MG/DL (ref 0.6–1.1)
CREAT SERPL-MCNC: 0.7 MG/DL (ref 0.6–1.1)
CREAT SERPL-MCNC: 0.7 MG/DL (ref 0.6–1.1)
CREAT SERPL-MCNC: 0.8 MG/DL (ref 0.6–1.1)
CREAT SERPL-MCNC: 0.9 MG/DL (ref 0.6–1.1)
CRP SERPL-MCNC: 33.1 MG/L (ref 0–5.1)
DEPRECATED RDW RBC AUTO: 14.7 % (ref 12.4–15.4)
DEPRECATED RDW RBC AUTO: 16.5 % (ref 12.4–15.4)
EOSINOPHIL # BLD: 0 K/UL (ref 0–0.6)
EOSINOPHIL # BLD: 0.1 K/UL (ref 0–0.6)
EOSINOPHIL NFR BLD: 0 %
EOSINOPHIL NFR BLD: 0.3 %
ERYTHROCYTE [SEDIMENTATION RATE] IN BLOOD BY WESTERGREN METHOD: 8 MM/HR (ref 0–20)
EST. AVERAGE GLUCOSE BLD GHB EST-MCNC: 226 MG/DL
GFR SERPLBLD CREATININE-BSD FMLA CKD-EPI: 85 ML/MIN/{1.73_M2}
GFR SERPLBLD CREATININE-BSD FMLA CKD-EPI: >90 ML/MIN/{1.73_M2}
GLUCOSE BLD-MCNC: 129 MG/DL (ref 70–99)
GLUCOSE BLD-MCNC: 133 MG/DL (ref 70–99)
GLUCOSE BLD-MCNC: 137 MG/DL (ref 70–99)
GLUCOSE BLD-MCNC: 140 MG/DL (ref 70–99)
GLUCOSE BLD-MCNC: 153 MG/DL (ref 70–99)
GLUCOSE BLD-MCNC: 154 MG/DL (ref 70–99)
GLUCOSE BLD-MCNC: 157 MG/DL (ref 70–99)
GLUCOSE BLD-MCNC: 165 MG/DL (ref 70–99)
GLUCOSE BLD-MCNC: 166 MG/DL (ref 70–99)
GLUCOSE BLD-MCNC: 167 MG/DL (ref 70–99)
GLUCOSE BLD-MCNC: 167 MG/DL (ref 70–99)
GLUCOSE BLD-MCNC: 169 MG/DL (ref 70–99)
GLUCOSE BLD-MCNC: 171 MG/DL (ref 70–99)
GLUCOSE BLD-MCNC: 181 MG/DL (ref 70–99)
GLUCOSE BLD-MCNC: 192 MG/DL (ref 70–99)
GLUCOSE BLD-MCNC: 194 MG/DL (ref 70–99)
GLUCOSE BLD-MCNC: 195 MG/DL (ref 70–99)
GLUCOSE BLD-MCNC: 195 MG/DL (ref 70–99)
GLUCOSE BLD-MCNC: 211 MG/DL (ref 70–99)
GLUCOSE BLD-MCNC: 222 MG/DL (ref 70–99)
GLUCOSE BLD-MCNC: 225 MG/DL (ref 70–99)
GLUCOSE SERPL-MCNC: 142 MG/DL (ref 70–99)
GLUCOSE SERPL-MCNC: 181 MG/DL (ref 70–99)
GLUCOSE SERPL-MCNC: 182 MG/DL (ref 70–99)
GLUCOSE SERPL-MCNC: 192 MG/DL (ref 70–99)
GLUCOSE SERPL-MCNC: 211 MG/DL (ref 70–99)
GLUCOSE SERPL-MCNC: 226 MG/DL (ref 70–99)
HBA1C MFR BLD: 9.5 %
HCO3 BLDA-SCNC: 13.5 MMOL/L (ref 21–29)
HCT VFR BLD AUTO: 32.7 % (ref 36–48)
HCT VFR BLD AUTO: 53.5 % (ref 36–48)
HGB BLD-MCNC: 11.1 G/DL (ref 12–16)
HGB BLD-MCNC: 14 G/DL (ref 12–16)
HGB BLDA-MCNC: 11.2 G/DL (ref 12–16)
LACTATE BLDV-SCNC: 3.4 MMOL/L (ref 0.4–2)
LACTATE BLDV-SCNC: 4.5 MMOL/L (ref 0.4–2)
LACTATE BLDV-SCNC: 5.3 MMOL/L (ref 0.4–2)
LACTATE BLDV-SCNC: 6 MMOL/L (ref 0.4–2)
LACTATE BLDV-SCNC: 6.2 MMOL/L (ref 0.4–2)
LACTATE BLDV-SCNC: 6.5 MMOL/L (ref 0.4–2)
LACTATE BLDV-SCNC: 7 MMOL/L (ref 0.4–2)
LACTATE BLDV-SCNC: 7.2 MMOL/L (ref 0.4–2)
LYMPHOCYTES # BLD: 1 K/UL (ref 1–5.1)
LYMPHOCYTES # BLD: 4.2 K/UL (ref 1–5.1)
LYMPHOCYTES NFR BLD: 16.8 %
LYMPHOCYTES NFR BLD: 7.8 %
MAGNESIUM SERPL-MCNC: 1.7 MG/DL (ref 1.8–2.4)
MAGNESIUM SERPL-MCNC: 1.9 MG/DL (ref 1.8–2.4)
MAGNESIUM SERPL-MCNC: 1.9 MG/DL (ref 1.8–2.4)
MAGNESIUM SERPL-MCNC: 2 MG/DL (ref 1.8–2.4)
MAGNESIUM SERPL-MCNC: 2.2 MG/DL (ref 1.8–2.4)
MAGNESIUM SERPL-MCNC: 2.2 MG/DL (ref 1.8–2.4)
MCH RBC QN AUTO: 29.8 PG (ref 26–34)
MCH RBC QN AUTO: 30.8 PG (ref 26–34)
MCHC RBC AUTO-ENTMCNC: 26.2 G/DL (ref 31–36)
MCHC RBC AUTO-ENTMCNC: 34.1 G/DL (ref 31–36)
MCV RBC AUTO: 113.8 FL (ref 80–100)
MCV RBC AUTO: 90.4 FL (ref 80–100)
METHGB MFR BLDA: 0.5 %
MONOCYTES # BLD: 1.7 K/UL (ref 0–1.3)
MONOCYTES # BLD: 2.1 K/UL (ref 0–1.3)
MONOCYTES NFR BLD: 12.9 %
MONOCYTES NFR BLD: 8.5 %
NEUTROPHILS # BLD: 10.7 K/UL (ref 1.7–7.7)
NEUTROPHILS # BLD: 18.5 K/UL (ref 1.7–7.7)
NEUTROPHILS NFR BLD: 73.7 %
NEUTROPHILS NFR BLD: 79.2 %
O2 THERAPY: ABNORMAL
PATH INTERP BLD-IMP: YES
PATH INTERP BLD-IMP: YES
PCO2 BLDA: 23 MMHG (ref 35–45)
PERFORMED ON: ABNORMAL
PH BLDA: 7.38 [PH] (ref 7.35–7.45)
PHOSPHATE SERPL-MCNC: 1.4 MG/DL (ref 2.5–4.9)
PHOSPHATE SERPL-MCNC: 2.1 MG/DL (ref 2.5–4.9)
PHOSPHATE SERPL-MCNC: 2.2 MG/DL (ref 2.5–4.9)
PHOSPHATE SERPL-MCNC: 2.3 MG/DL (ref 2.5–4.9)
PHOSPHATE SERPL-MCNC: 2.6 MG/DL (ref 2.5–4.9)
PHOSPHATE SERPL-MCNC: 4.1 MG/DL (ref 2.5–4.9)
PLATELET # BLD AUTO: 296 K/UL (ref 135–450)
PLATELET # BLD AUTO: 500 K/UL (ref 135–450)
PMV BLD AUTO: 7.8 FL (ref 5–10.5)
PMV BLD AUTO: 9.2 FL (ref 5–10.5)
PO2 BLDA: 124 MMHG (ref 75–108)
POTASSIUM SERPL-SCNC: 3.4 MMOL/L (ref 3.5–5.1)
POTASSIUM SERPL-SCNC: 3.5 MMOL/L (ref 3.5–5.1)
POTASSIUM SERPL-SCNC: 3.9 MMOL/L (ref 3.5–5.1)
POTASSIUM SERPL-SCNC: 3.9 MMOL/L (ref 3.5–5.1)
POTASSIUM SERPL-SCNC: 4 MMOL/L (ref 3.5–5.1)
POTASSIUM SERPL-SCNC: 4.1 MMOL/L (ref 3.5–5.1)
PROT SERPL-MCNC: 6.4 G/DL (ref 6.4–8.2)
RBC # BLD AUTO: 3.62 M/UL (ref 4–5.2)
RBC # BLD AUTO: 4.7 M/UL (ref 4–5.2)
SAO2 % BLDA: 99.3 %
SODIUM SERPL-SCNC: 141 MMOL/L (ref 136–145)
SODIUM SERPL-SCNC: 144 MMOL/L (ref 136–145)
SODIUM SERPL-SCNC: 145 MMOL/L (ref 136–145)
SODIUM SERPL-SCNC: 145 MMOL/L (ref 136–145)
SODIUM SERPL-SCNC: 149 MMOL/L (ref 136–145)
SODIUM SERPL-SCNC: 149 MMOL/L (ref 136–145)
WBC # BLD AUTO: 13.5 K/UL (ref 4–11)
WBC # BLD AUTO: 25.1 K/UL (ref 4–11)

## 2024-04-06 PROCEDURE — 0202U NFCT DS 22 TRGT SARS-COV-2: CPT

## 2024-04-06 PROCEDURE — 83735 ASSAY OF MAGNESIUM: CPT

## 2024-04-06 PROCEDURE — 84100 ASSAY OF PHOSPHORUS: CPT

## 2024-04-06 PROCEDURE — 6360000002 HC RX W HCPCS: Performed by: EMERGENCY MEDICINE

## 2024-04-06 PROCEDURE — 2580000003 HC RX 258: Performed by: EMERGENCY MEDICINE

## 2024-04-06 PROCEDURE — 6360000002 HC RX W HCPCS: Performed by: INTERNAL MEDICINE

## 2024-04-06 PROCEDURE — 80076 HEPATIC FUNCTION PANEL: CPT

## 2024-04-06 PROCEDURE — 82803 BLOOD GASES ANY COMBINATION: CPT

## 2024-04-06 PROCEDURE — 6360000002 HC RX W HCPCS: Performed by: STUDENT IN AN ORGANIZED HEALTH CARE EDUCATION/TRAINING PROGRAM

## 2024-04-06 PROCEDURE — 85652 RBC SED RATE AUTOMATED: CPT

## 2024-04-06 PROCEDURE — 6370000000 HC RX 637 (ALT 250 FOR IP): Performed by: INTERNAL MEDICINE

## 2024-04-06 PROCEDURE — C9113 INJ PANTOPRAZOLE SODIUM, VIA: HCPCS | Performed by: STUDENT IN AN ORGANIZED HEALTH CARE EDUCATION/TRAINING PROGRAM

## 2024-04-06 PROCEDURE — 85025 COMPLETE CBC W/AUTO DIFF WBC: CPT

## 2024-04-06 PROCEDURE — 86140 C-REACTIVE PROTEIN: CPT

## 2024-04-06 PROCEDURE — 2500000003 HC RX 250 WO HCPCS: Performed by: EMERGENCY MEDICINE

## 2024-04-06 PROCEDURE — 6360000002 HC RX W HCPCS: Performed by: NURSE PRACTITIONER

## 2024-04-06 PROCEDURE — 80069 RENAL FUNCTION PANEL: CPT

## 2024-04-06 PROCEDURE — 2580000003 HC RX 258: Performed by: INTERNAL MEDICINE

## 2024-04-06 PROCEDURE — 2000000000 HC ICU R&B

## 2024-04-06 PROCEDURE — 83605 ASSAY OF LACTIC ACID: CPT

## 2024-04-06 PROCEDURE — 71045 X-RAY EXAM CHEST 1 VIEW: CPT

## 2024-04-06 RX ORDER — 0.9 % SODIUM CHLORIDE 0.9 %
1000 INTRAVENOUS SOLUTION INTRAVENOUS ONCE
Status: COMPLETED | OUTPATIENT
Start: 2024-04-06 | End: 2024-04-06

## 2024-04-06 RX ORDER — LEVETIRACETAM 500 MG/5ML
1000 INJECTION, SOLUTION, CONCENTRATE INTRAVENOUS EVERY 12 HOURS
Status: DISCONTINUED | OUTPATIENT
Start: 2024-04-06 | End: 2024-04-09 | Stop reason: HOSPADM

## 2024-04-06 RX ORDER — HYDROMORPHONE HYDROCHLORIDE 1 MG/ML
0.5 INJECTION, SOLUTION INTRAMUSCULAR; INTRAVENOUS; SUBCUTANEOUS ONCE
Status: COMPLETED | OUTPATIENT
Start: 2024-04-06 | End: 2024-04-06

## 2024-04-06 RX ORDER — METOCLOPRAMIDE HYDROCHLORIDE 5 MG/ML
10 INJECTION INTRAMUSCULAR; INTRAVENOUS EVERY 6 HOURS
Status: DISCONTINUED | OUTPATIENT
Start: 2024-04-06 | End: 2024-04-09 | Stop reason: HOSPADM

## 2024-04-06 RX ORDER — SODIUM CHLORIDE 9 MG/ML
INJECTION, SOLUTION INTRAVENOUS PRN
Status: DISCONTINUED | OUTPATIENT
Start: 2024-04-06 | End: 2024-04-09 | Stop reason: HOSPADM

## 2024-04-06 RX ORDER — HYDROMORPHONE HYDROCHLORIDE 1 MG/ML
0.5 INJECTION, SOLUTION INTRAMUSCULAR; INTRAVENOUS; SUBCUTANEOUS ONCE
Status: DISCONTINUED | OUTPATIENT
Start: 2024-04-06 | End: 2024-04-09 | Stop reason: HOSPADM

## 2024-04-06 RX ORDER — ONDANSETRON 2 MG/ML
4 INJECTION INTRAMUSCULAR; INTRAVENOUS EVERY 6 HOURS PRN
Status: DISCONTINUED | OUTPATIENT
Start: 2024-04-06 | End: 2024-04-09 | Stop reason: HOSPADM

## 2024-04-06 RX ORDER — ACETAMINOPHEN 325 MG/1
650 TABLET ORAL EVERY 6 HOURS PRN
Status: DISCONTINUED | OUTPATIENT
Start: 2024-04-06 | End: 2024-04-09 | Stop reason: HOSPADM

## 2024-04-06 RX ADMIN — SODIUM CHLORIDE 1000 ML: 9 INJECTION, SOLUTION INTRAVENOUS at 11:28

## 2024-04-06 RX ADMIN — METOCLOPRAMIDE 10 MG: 5 INJECTION, SOLUTION INTRAMUSCULAR; INTRAVENOUS at 19:02

## 2024-04-06 RX ADMIN — ENOXAPARIN SODIUM 40 MG: 100 INJECTION SUBCUTANEOUS at 10:08

## 2024-04-06 RX ADMIN — POTASSIUM CHLORIDE 20 MEQ: 29.8 INJECTION, SOLUTION INTRAVENOUS at 06:22

## 2024-04-06 RX ADMIN — POTASSIUM CHLORIDE 10 MEQ: 7.46 INJECTION, SOLUTION INTRAVENOUS at 03:26

## 2024-04-06 RX ADMIN — SODIUM CHLORIDE 1000 ML: 9 INJECTION, SOLUTION INTRAVENOUS at 18:58

## 2024-04-06 RX ADMIN — PROMETHAZINE HYDROCHLORIDE 12.5 MG: 25 INJECTION INTRAMUSCULAR; INTRAVENOUS at 07:48

## 2024-04-06 RX ADMIN — ACETAMINOPHEN 650 MG: 325 TABLET ORAL at 12:57

## 2024-04-06 RX ADMIN — LEVETIRACETAM 1000 MG: 100 INJECTION, SOLUTION INTRAVENOUS at 23:07

## 2024-04-06 RX ADMIN — ONDANSETRON 4 MG: 2 INJECTION INTRAMUSCULAR; INTRAVENOUS at 12:56

## 2024-04-06 RX ADMIN — LEVETIRACETAM 1000 MG: 100 INJECTION, SOLUTION INTRAVENOUS at 11:18

## 2024-04-06 RX ADMIN — SODIUM PHOSPHATE, MONOBASIC, MONOHYDRATE AND SODIUM PHOSPHATE, DIBASIC, ANHYDROUS 15 MMOL: 142; 276 INJECTION, SOLUTION INTRAVENOUS at 06:27

## 2024-04-06 RX ADMIN — POTASSIUM CHLORIDE 10 MEQ: 7.46 INJECTION, SOLUTION INTRAVENOUS at 20:27

## 2024-04-06 RX ADMIN — DEXTROSE AND SODIUM CHLORIDE: 5; 450 INJECTION, SOLUTION INTRAVENOUS at 23:00

## 2024-04-06 RX ADMIN — POTASSIUM CHLORIDE 10 MEQ: 7.46 INJECTION, SOLUTION INTRAVENOUS at 14:17

## 2024-04-06 RX ADMIN — DEXTROSE AND SODIUM CHLORIDE: 5; 450 INJECTION, SOLUTION INTRAVENOUS at 14:11

## 2024-04-06 RX ADMIN — PANTOPRAZOLE SODIUM 40 MG: 40 INJECTION, POWDER, FOR SOLUTION INTRAVENOUS at 10:08

## 2024-04-06 RX ADMIN — POTASSIUM CHLORIDE 10 MEQ: 7.46 INJECTION, SOLUTION INTRAVENOUS at 12:39

## 2024-04-06 RX ADMIN — SODIUM PHOSPHATE, MONOBASIC, MONOHYDRATE AND SODIUM PHOSPHATE, DIBASIC, ANHYDROUS 15 MMOL: 142; 276 INJECTION, SOLUTION INTRAVENOUS at 19:14

## 2024-04-06 RX ADMIN — POTASSIUM CHLORIDE 20 MEQ: 29.8 INJECTION, SOLUTION INTRAVENOUS at 02:15

## 2024-04-06 RX ADMIN — POTASSIUM CHLORIDE 20 MEQ: 29.8 INJECTION, SOLUTION INTRAVENOUS at 15:30

## 2024-04-06 RX ADMIN — POTASSIUM CHLORIDE 20 MEQ: 29.8 INJECTION, SOLUTION INTRAVENOUS at 19:01

## 2024-04-06 RX ADMIN — SODIUM PHOSPHATE, MONOBASIC, MONOHYDRATE AND SODIUM PHOSPHATE, DIBASIC, ANHYDROUS 15 MMOL: 142; 276 INJECTION, SOLUTION INTRAVENOUS at 13:45

## 2024-04-06 RX ADMIN — POTASSIUM CHLORIDE 10 MEQ: 7.46 INJECTION, SOLUTION INTRAVENOUS at 08:23

## 2024-04-06 RX ADMIN — HYDROMORPHONE HYDROCHLORIDE 0.5 MG: 1 INJECTION, SOLUTION INTRAMUSCULAR; INTRAVENOUS; SUBCUTANEOUS at 00:12

## 2024-04-06 RX ADMIN — SODIUM CHLORIDE: 9 INJECTION, SOLUTION INTRAVENOUS at 19:14

## 2024-04-06 RX ADMIN — POTASSIUM CHLORIDE 20 MEQ: 29.8 INJECTION, SOLUTION INTRAVENOUS at 11:18

## 2024-04-06 RX ADMIN — SODIUM CHLORIDE, PRESERVATIVE FREE 2 MG: 5 INJECTION INTRAVENOUS at 08:58

## 2024-04-06 RX ADMIN — DEXTROSE AND SODIUM CHLORIDE: 5; 450 INJECTION, SOLUTION INTRAVENOUS at 06:25

## 2024-04-06 RX ADMIN — METOCLOPRAMIDE 10 MG: 5 INJECTION, SOLUTION INTRAMUSCULAR; INTRAVENOUS at 23:06

## 2024-04-06 ASSESSMENT — PAIN DESCRIPTION - LOCATION: LOCATION: ABDOMEN

## 2024-04-06 ASSESSMENT — PAIN SCALES - GENERAL
PAINLEVEL_OUTOF10: 0
PAINLEVEL_OUTOF10: 0
PAINLEVEL_OUTOF10: 5
PAINLEVEL_OUTOF10: 0
PAINLEVEL_OUTOF10: 7

## 2024-04-06 ASSESSMENT — PAIN DESCRIPTION - ORIENTATION: ORIENTATION: LEFT;RIGHT;LOWER

## 2024-04-06 NOTE — PLAN OF CARE
Problem: Discharge Planning  Goal: Discharge to home or other facility with appropriate resources  Outcome: Progressing  Flowsheets (Taken 4/6/2024 0800)  Discharge to home or other facility with appropriate resources:   Identify barriers to discharge with patient and caregiver   Arrange for needed discharge resources and transportation as appropriate     Problem: Pain  Goal: Verbalizes/displays adequate comfort level or baseline comfort level  Outcome: Progressing     Problem: Safety - Adult  Goal: Free from fall injury  Outcome: Progressing     Problem: Respiratory - Adult  Goal: Achieves optimal ventilation and oxygenation  Outcome: Progressing     Problem: Cardiovascular - Adult  Goal: Maintains optimal cardiac output and hemodynamic stability  Outcome: Progressing  Flowsheets (Taken 4/6/2024 0800)  Maintains optimal cardiac output and hemodynamic stability:   Monitor blood pressure and heart rate   Monitor urine output and notify Licensed Independent Practitioner for values outside of normal range   Assess for signs of decreased cardiac output   Administer fluid and/or volume expanders as ordered  Goal: Absence of cardiac dysrhythmias or at baseline  Outcome: Progressing  Flowsheets (Taken 4/6/2024 0800)  Absence of cardiac dysrhythmias or at baseline:   Monitor cardiac rate and rhythm   Assess for signs of decreased cardiac output   Administer antiarrhythmia medication and electrolyte replacement as ordered     Problem: Infection - Adult  Goal: Absence of infection at discharge  Outcome: Progressing  Flowsheets (Taken 4/6/2024 0800)  Absence of infection at discharge:   Assess and monitor for signs and symptoms of infection   Monitor lab/diagnostic results   Monitor all insertion sites i.e., indwelling lines, tubes and drains   Administer medications as ordered   Instruct and encourage patient and family to use good hand hygiene technique   Identify and instruct in appropriate isolation precautions for  Collaborate with multidisciplinary team to address chronic and comorbid conditions and prevent exacerbation or deterioration   Update acute care plan with appropriate goals if chronic or comorbid symptoms are exacerbated and prevent overall improvement and discharge

## 2024-04-07 LAB
ALBUMIN SERPL-MCNC: 2.9 G/DL (ref 3.4–5)
ALP SERPL-CCNC: 99 U/L (ref 40–129)
ALT SERPL-CCNC: 82 U/L (ref 10–40)
ANION GAP SERPL CALCULATED.3IONS-SCNC: 10 MMOL/L (ref 3–16)
ANION GAP SERPL CALCULATED.3IONS-SCNC: 11 MMOL/L (ref 3–16)
ANION GAP SERPL CALCULATED.3IONS-SCNC: 11 MMOL/L (ref 3–16)
ANION GAP SERPL CALCULATED.3IONS-SCNC: 13 MMOL/L (ref 3–16)
ANION GAP SERPL CALCULATED.3IONS-SCNC: 15 MMOL/L (ref 3–16)
AST SERPL-CCNC: 37 U/L (ref 15–37)
BASE EXCESS BLDA CALC-SCNC: -6.5 MMOL/L (ref -3–3)
BASOPHILS # BLD: 0.1 K/UL (ref 0–0.2)
BASOPHILS NFR BLD: 0.6 %
BILIRUB DIRECT SERPL-MCNC: <0.2 MG/DL (ref 0–0.3)
BILIRUB INDIRECT SERPL-MCNC: ABNORMAL MG/DL (ref 0–1)
BILIRUB SERPL-MCNC: <0.2 MG/DL (ref 0–1)
BUN SERPL-MCNC: <2 MG/DL (ref 7–20)
CALCIUM SERPL-MCNC: 7 MG/DL (ref 8.3–10.6)
CALCIUM SERPL-MCNC: 7.3 MG/DL (ref 8.3–10.6)
CALCIUM SERPL-MCNC: 7.3 MG/DL (ref 8.3–10.6)
CALCIUM SERPL-MCNC: 7.5 MG/DL (ref 8.3–10.6)
CALCIUM SERPL-MCNC: 7.6 MG/DL (ref 8.3–10.6)
CHLORIDE SERPL-SCNC: 109 MMOL/L (ref 99–110)
CHLORIDE SERPL-SCNC: 110 MMOL/L (ref 99–110)
CHLORIDE SERPL-SCNC: 111 MMOL/L (ref 99–110)
CO2 BLDA-SCNC: 42.9 MMOL/L
CO2 SERPL-SCNC: 15 MMOL/L (ref 21–32)
CO2 SERPL-SCNC: 16 MMOL/L (ref 21–32)
CO2 SERPL-SCNC: 17 MMOL/L (ref 21–32)
CO2 SERPL-SCNC: 18 MMOL/L (ref 21–32)
CO2 SERPL-SCNC: 19 MMOL/L (ref 21–32)
COHGB MFR BLDA: 1.2 % (ref 0–1.5)
CREAT SERPL-MCNC: 0.5 MG/DL (ref 0.6–1.1)
CREAT SERPL-MCNC: <0.5 MG/DL (ref 0.6–1.1)
DEPRECATED RDW RBC AUTO: 14.3 % (ref 12.4–15.4)
EOSINOPHIL # BLD: 0 K/UL (ref 0–0.6)
EOSINOPHIL NFR BLD: 0 %
GFR SERPLBLD CREATININE-BSD FMLA CKD-EPI: >90 ML/MIN/{1.73_M2}
GLUCOSE BLD-MCNC: 117 MG/DL (ref 70–99)
GLUCOSE BLD-MCNC: 132 MG/DL (ref 70–99)
GLUCOSE BLD-MCNC: 142 MG/DL (ref 70–99)
GLUCOSE BLD-MCNC: 144 MG/DL (ref 70–99)
GLUCOSE BLD-MCNC: 145 MG/DL (ref 70–99)
GLUCOSE BLD-MCNC: 154 MG/DL (ref 70–99)
GLUCOSE BLD-MCNC: 158 MG/DL (ref 70–99)
GLUCOSE BLD-MCNC: 159 MG/DL (ref 70–99)
GLUCOSE BLD-MCNC: 161 MG/DL (ref 70–99)
GLUCOSE BLD-MCNC: 161 MG/DL (ref 70–99)
GLUCOSE BLD-MCNC: 164 MG/DL (ref 70–99)
GLUCOSE BLD-MCNC: 165 MG/DL (ref 70–99)
GLUCOSE BLD-MCNC: 166 MG/DL (ref 70–99)
GLUCOSE BLD-MCNC: 169 MG/DL (ref 70–99)
GLUCOSE BLD-MCNC: 179 MG/DL (ref 70–99)
GLUCOSE BLD-MCNC: 193 MG/DL (ref 70–99)
GLUCOSE BLD-MCNC: 200 MG/DL (ref 70–99)
GLUCOSE BLD-MCNC: 209 MG/DL (ref 70–99)
GLUCOSE BLD-MCNC: 233 MG/DL (ref 70–99)
GLUCOSE BLD-MCNC: 240 MG/DL (ref 70–99)
GLUCOSE BLD-MCNC: 259 MG/DL (ref 70–99)
GLUCOSE SERPL-MCNC: 134 MG/DL (ref 70–99)
GLUCOSE SERPL-MCNC: 160 MG/DL (ref 70–99)
GLUCOSE SERPL-MCNC: 178 MG/DL (ref 70–99)
GLUCOSE SERPL-MCNC: 204 MG/DL (ref 70–99)
GLUCOSE SERPL-MCNC: 269 MG/DL (ref 70–99)
HCO3 BLDA-SCNC: 18.2 MMOL/L (ref 21–29)
HCT VFR BLD AUTO: 31.2 % (ref 36–48)
HGB BLD-MCNC: 10.2 G/DL (ref 12–16)
HGB BLDA-MCNC: 11.4 G/DL (ref 12–16)
LACTATE BLDV-SCNC: 1.5 MMOL/L (ref 0.4–2)
LACTATE BLDV-SCNC: 3.3 MMOL/L (ref 0.4–2)
LACTATE BLDV-SCNC: 4.1 MMOL/L (ref 0.4–2)
LACTATE BLDV-SCNC: 4.6 MMOL/L (ref 0.4–2)
LACTATE BLDV-SCNC: 5.8 MMOL/L (ref 0.4–2)
LYMPHOCYTES # BLD: 3.1 K/UL (ref 1–5.1)
LYMPHOCYTES NFR BLD: 21.7 %
MAGNESIUM SERPL-MCNC: 1.6 MG/DL (ref 1.8–2.4)
MAGNESIUM SERPL-MCNC: 1.9 MG/DL (ref 1.8–2.4)
MAGNESIUM SERPL-MCNC: 2 MG/DL (ref 1.8–2.4)
MAGNESIUM SERPL-MCNC: 2 MG/DL (ref 1.8–2.4)
MAGNESIUM SERPL-MCNC: 2.3 MG/DL (ref 1.8–2.4)
MCH RBC QN AUTO: 29.4 PG (ref 26–34)
MCHC RBC AUTO-ENTMCNC: 32.7 G/DL (ref 31–36)
MCV RBC AUTO: 89.7 FL (ref 80–100)
METHGB MFR BLDA: 0.8 %
MONOCYTES # BLD: 1.1 K/UL (ref 0–1.3)
MONOCYTES NFR BLD: 8.1 %
NEUTROPHILS # BLD: 9.8 K/UL (ref 1.7–7.7)
NEUTROPHILS NFR BLD: 69.6 %
O2 THERAPY: ABNORMAL
PCO2 BLDA: 32.6 MMHG (ref 35–45)
PERFORMED ON: ABNORMAL
PH BLDA: 7.36 [PH] (ref 7.35–7.45)
PHOSPHATE SERPL-MCNC: 1.7 MG/DL (ref 2.5–4.9)
PHOSPHATE SERPL-MCNC: 2.1 MG/DL (ref 2.5–4.9)
PHOSPHATE SERPL-MCNC: 2.4 MG/DL (ref 2.5–4.9)
PHOSPHATE SERPL-MCNC: 2.5 MG/DL (ref 2.5–4.9)
PHOSPHATE SERPL-MCNC: 2.6 MG/DL (ref 2.5–4.9)
PLATELET # BLD AUTO: 206 K/UL (ref 135–450)
PMV BLD AUTO: 8.3 FL (ref 5–10.5)
PO2 BLDA: ABNORMAL MMHG (ref 75–108)
POTASSIUM SERPL-SCNC: 3.9 MMOL/L (ref 3.5–5.1)
POTASSIUM SERPL-SCNC: 4 MMOL/L (ref 3.5–5.1)
POTASSIUM SERPL-SCNC: 4.1 MMOL/L (ref 3.5–5.1)
POTASSIUM SERPL-SCNC: 4.1 MMOL/L (ref 3.5–5.1)
POTASSIUM SERPL-SCNC: 4.5 MMOL/L (ref 3.5–5.1)
PROT SERPL-MCNC: 5.5 G/DL (ref 6.4–8.2)
RBC # BLD AUTO: 3.48 M/UL (ref 4–5.2)
REPORT: NORMAL
RESP PATH DNA+RNA PNL NPH NAA+NON-PROBE: NORMAL
SAO2 % BLDA: 56.5 %
SODIUM SERPL-SCNC: 137 MMOL/L (ref 136–145)
SODIUM SERPL-SCNC: 137 MMOL/L (ref 136–145)
SODIUM SERPL-SCNC: 138 MMOL/L (ref 136–145)
SODIUM SERPL-SCNC: 140 MMOL/L (ref 136–145)
SODIUM SERPL-SCNC: 141 MMOL/L (ref 136–145)
WBC # BLD AUTO: 14.1 K/UL (ref 4–11)

## 2024-04-07 PROCEDURE — 80069 RENAL FUNCTION PANEL: CPT

## 2024-04-07 PROCEDURE — 6360000002 HC RX W HCPCS: Performed by: EMERGENCY MEDICINE

## 2024-04-07 PROCEDURE — 6360000002 HC RX W HCPCS: Performed by: STUDENT IN AN ORGANIZED HEALTH CARE EDUCATION/TRAINING PROGRAM

## 2024-04-07 PROCEDURE — 83735 ASSAY OF MAGNESIUM: CPT

## 2024-04-07 PROCEDURE — 6370000000 HC RX 637 (ALT 250 FOR IP): Performed by: EMERGENCY MEDICINE

## 2024-04-07 PROCEDURE — 80076 HEPATIC FUNCTION PANEL: CPT

## 2024-04-07 PROCEDURE — C9113 INJ PANTOPRAZOLE SODIUM, VIA: HCPCS | Performed by: STUDENT IN AN ORGANIZED HEALTH CARE EDUCATION/TRAINING PROGRAM

## 2024-04-07 PROCEDURE — 84100 ASSAY OF PHOSPHORUS: CPT

## 2024-04-07 PROCEDURE — 6360000002 HC RX W HCPCS: Performed by: INTERNAL MEDICINE

## 2024-04-07 PROCEDURE — 2580000003 HC RX 258: Performed by: EMERGENCY MEDICINE

## 2024-04-07 PROCEDURE — 2580000003 HC RX 258: Performed by: INTERNAL MEDICINE

## 2024-04-07 PROCEDURE — 6370000000 HC RX 637 (ALT 250 FOR IP): Performed by: STUDENT IN AN ORGANIZED HEALTH CARE EDUCATION/TRAINING PROGRAM

## 2024-04-07 PROCEDURE — 82803 BLOOD GASES ANY COMBINATION: CPT

## 2024-04-07 PROCEDURE — 2500000003 HC RX 250 WO HCPCS: Performed by: EMERGENCY MEDICINE

## 2024-04-07 PROCEDURE — 2000000000 HC ICU R&B

## 2024-04-07 PROCEDURE — 83605 ASSAY OF LACTIC ACID: CPT

## 2024-04-07 PROCEDURE — 85025 COMPLETE CBC W/AUTO DIFF WBC: CPT

## 2024-04-07 RX ORDER — SODIUM CHLORIDE 9 MG/ML
INJECTION, SOLUTION INTRAVENOUS CONTINUOUS
Status: DISCONTINUED | OUTPATIENT
Start: 2024-04-07 | End: 2024-04-09 | Stop reason: HOSPADM

## 2024-04-07 RX ADMIN — SODIUM CHLORIDE: 9 INJECTION, SOLUTION INTRAVENOUS at 00:07

## 2024-04-07 RX ADMIN — POTASSIUM CHLORIDE 20 MEQ: 29.8 INJECTION, SOLUTION INTRAVENOUS at 07:01

## 2024-04-07 RX ADMIN — POTASSIUM CHLORIDE 20 MEQ: 29.8 INJECTION, SOLUTION INTRAVENOUS at 20:02

## 2024-04-07 RX ADMIN — DEXTROSE AND SODIUM CHLORIDE: 5; 450 INJECTION, SOLUTION INTRAVENOUS at 11:36

## 2024-04-07 RX ADMIN — VENLAFAXINE HYDROCHLORIDE 75 MG: 37.5 CAPSULE, EXTENDED RELEASE ORAL at 08:30

## 2024-04-07 RX ADMIN — SODIUM PHOSPHATE, MONOBASIC, MONOHYDRATE AND SODIUM PHOSPHATE, DIBASIC, ANHYDROUS 15 MMOL: 142; 276 INJECTION, SOLUTION INTRAVENOUS at 00:29

## 2024-04-07 RX ADMIN — MAGNESIUM SULFATE HEPTAHYDRATE 2000 MG: 40 INJECTION, SOLUTION INTRAVENOUS at 02:22

## 2024-04-07 RX ADMIN — SODIUM CHLORIDE: 9 INJECTION, SOLUTION INTRAVENOUS at 11:04

## 2024-04-07 RX ADMIN — POTASSIUM CHLORIDE 10 MEQ: 7.46 INJECTION, SOLUTION INTRAVENOUS at 01:09

## 2024-04-07 RX ADMIN — ENOXAPARIN SODIUM 40 MG: 100 INJECTION SUBCUTANEOUS at 08:30

## 2024-04-07 RX ADMIN — POTASSIUM CHLORIDE 10 MEQ: 7.46 INJECTION, SOLUTION INTRAVENOUS at 08:39

## 2024-04-07 RX ADMIN — METOCLOPRAMIDE 10 MG: 5 INJECTION, SOLUTION INTRAMUSCULAR; INTRAVENOUS at 11:24

## 2024-04-07 RX ADMIN — POTASSIUM CHLORIDE 10 MEQ: 7.46 INJECTION, SOLUTION INTRAVENOUS at 21:35

## 2024-04-07 RX ADMIN — METOCLOPRAMIDE 10 MG: 5 INJECTION, SOLUTION INTRAMUSCULAR; INTRAVENOUS at 05:25

## 2024-04-07 RX ADMIN — POTASSIUM CHLORIDE 20 MEQ: 29.8 INJECTION, SOLUTION INTRAVENOUS at 15:50

## 2024-04-07 RX ADMIN — PANTOPRAZOLE SODIUM 40 MG: 40 INJECTION, POWDER, FOR SOLUTION INTRAVENOUS at 08:30

## 2024-04-07 RX ADMIN — POTASSIUM CHLORIDE 10 MEQ: 7.46 INJECTION, SOLUTION INTRAVENOUS at 12:29

## 2024-04-07 RX ADMIN — SODIUM CHLORIDE: 9 INJECTION, SOLUTION INTRAVENOUS at 18:56

## 2024-04-07 RX ADMIN — METOCLOPRAMIDE 10 MG: 5 INJECTION, SOLUTION INTRAMUSCULAR; INTRAVENOUS at 23:40

## 2024-04-07 RX ADMIN — LEVETIRACETAM 1000 MG: 100 INJECTION, SOLUTION INTRAVENOUS at 11:24

## 2024-04-07 RX ADMIN — POTASSIUM CHLORIDE 20 MEQ: 29.8 INJECTION, SOLUTION INTRAVENOUS at 02:18

## 2024-04-07 RX ADMIN — POTASSIUM CHLORIDE 10 MEQ: 7.46 INJECTION, SOLUTION INTRAVENOUS at 04:10

## 2024-04-07 RX ADMIN — METOCLOPRAMIDE 10 MG: 5 INJECTION, SOLUTION INTRAMUSCULAR; INTRAVENOUS at 18:55

## 2024-04-07 RX ADMIN — SODIUM CHLORIDE 1.19 UNITS/HR: 9 INJECTION, SOLUTION INTRAVENOUS at 01:09

## 2024-04-07 RX ADMIN — POTASSIUM CHLORIDE 20 MEQ: 29.8 INJECTION, SOLUTION INTRAVENOUS at 11:15

## 2024-04-07 RX ADMIN — DEXTROSE AND SODIUM CHLORIDE: 5; 450 INJECTION, SOLUTION INTRAVENOUS at 18:29

## 2024-04-07 RX ADMIN — SODIUM PHOSPHATE, MONOBASIC, MONOHYDRATE AND SODIUM PHOSPHATE, DIBASIC, ANHYDROUS 15 MMOL: 142; 276 INJECTION, SOLUTION INTRAVENOUS at 08:38

## 2024-04-07 RX ADMIN — DEXTROSE AND SODIUM CHLORIDE: 5; 450 INJECTION, SOLUTION INTRAVENOUS at 04:09

## 2024-04-07 RX ADMIN — POTASSIUM CHLORIDE 20 MEQ: 29.8 INJECTION, SOLUTION INTRAVENOUS at 00:07

## 2024-04-07 RX ADMIN — SODIUM PHOSPHATE, MONOBASIC, MONOHYDRATE AND SODIUM PHOSPHATE, DIBASIC, ANHYDROUS 15 MMOL: 142; 276 INJECTION, SOLUTION INTRAVENOUS at 13:55

## 2024-04-07 RX ADMIN — LEVETIRACETAM 1000 MG: 100 INJECTION, SOLUTION INTRAVENOUS at 23:39

## 2024-04-07 RX ADMIN — SODIUM PHOSPHATE, MONOBASIC, MONOHYDRATE AND SODIUM PHOSPHATE, DIBASIC, ANHYDROUS 15 MMOL: 142; 276 INJECTION, SOLUTION INTRAVENOUS at 20:11

## 2024-04-07 ASSESSMENT — PAIN SCALES - GENERAL
PAINLEVEL_OUTOF10: 0

## 2024-04-07 NOTE — PLAN OF CARE
Problem: Discharge Planning  Goal: Discharge to home or other facility with appropriate resources  Outcome: Progressing  Flowsheets (Taken 4/7/2024 0800)  Discharge to home or other facility with appropriate resources:   Identify barriers to discharge with patient and caregiver   Arrange for needed discharge resources and transportation as appropriate   Refer to discharge planning if patient needs post-hospital services based on physician order or complex needs related to functional status, cognitive ability or social support system     Problem: Pain  Goal: Verbalizes/displays adequate comfort level or baseline comfort level  Outcome: Progressing     Problem: Safety - Adult  Goal: Free from fall injury  Outcome: Progressing     Problem: Respiratory - Adult  Goal: Achieves optimal ventilation and oxygenation  Outcome: Progressing     Problem: Cardiovascular - Adult  Goal: Maintains optimal cardiac output and hemodynamic stability  Outcome: Progressing  Flowsheets (Taken 4/7/2024 0800)  Maintains optimal cardiac output and hemodynamic stability:   Monitor blood pressure and heart rate   Monitor urine output and notify Licensed Independent Practitioner for values outside of normal range   Assess for signs of decreased cardiac output   Administer fluid and/or volume expanders as ordered  Goal: Absence of cardiac dysrhythmias or at baseline  Outcome: Progressing  Flowsheets (Taken 4/7/2024 0800)  Absence of cardiac dysrhythmias or at baseline:   Monitor cardiac rate and rhythm   Assess for signs of decreased cardiac output   Administer antiarrhythmia medication and electrolyte replacement as ordered     Problem: Infection - Adult  Goal: Absence of infection at discharge  Outcome: Progressing  Flowsheets (Taken 4/7/2024 0800)  Absence of infection at discharge:   Assess and monitor for signs and symptoms of infection   Monitor lab/diagnostic results   Monitor all insertion sites i.e., indwelling lines, tubes and

## 2024-04-08 LAB
ANION GAP SERPL CALCULATED.3IONS-SCNC: 10 MMOL/L (ref 3–16)
ANION GAP SERPL CALCULATED.3IONS-SCNC: 11 MMOL/L (ref 3–16)
ANION GAP SERPL CALCULATED.3IONS-SCNC: 12 MMOL/L (ref 3–16)
BASOPHILS # BLD: 0 K/UL (ref 0–0.2)
BASOPHILS NFR BLD: 0.4 %
BUN SERPL-MCNC: <2 MG/DL (ref 7–20)
CALCIUM SERPL-MCNC: 7.6 MG/DL (ref 8.3–10.6)
CALCIUM SERPL-MCNC: 7.9 MG/DL (ref 8.3–10.6)
CALCIUM SERPL-MCNC: 8 MG/DL (ref 8.3–10.6)
CHLORIDE SERPL-SCNC: 107 MMOL/L (ref 99–110)
CHLORIDE SERPL-SCNC: 109 MMOL/L (ref 99–110)
CHLORIDE SERPL-SCNC: 110 MMOL/L (ref 99–110)
CO2 SERPL-SCNC: 19 MMOL/L (ref 21–32)
CO2 SERPL-SCNC: 20 MMOL/L (ref 21–32)
CO2 SERPL-SCNC: 20 MMOL/L (ref 21–32)
CREAT SERPL-MCNC: <0.5 MG/DL (ref 0.6–1.1)
DEPRECATED RDW RBC AUTO: 14.1 % (ref 12.4–15.4)
EOSINOPHIL # BLD: 0 K/UL (ref 0–0.6)
EOSINOPHIL NFR BLD: 0.1 %
GFR SERPLBLD CREATININE-BSD FMLA CKD-EPI: >90 ML/MIN/{1.73_M2}
GLUCOSE BLD-MCNC: 126 MG/DL (ref 70–99)
GLUCOSE BLD-MCNC: 133 MG/DL (ref 70–99)
GLUCOSE BLD-MCNC: 135 MG/DL (ref 70–99)
GLUCOSE BLD-MCNC: 136 MG/DL (ref 70–99)
GLUCOSE BLD-MCNC: 165 MG/DL (ref 70–99)
GLUCOSE BLD-MCNC: 166 MG/DL (ref 70–99)
GLUCOSE BLD-MCNC: 182 MG/DL (ref 70–99)
GLUCOSE BLD-MCNC: 184 MG/DL (ref 70–99)
GLUCOSE BLD-MCNC: 189 MG/DL (ref 70–99)
GLUCOSE BLD-MCNC: 194 MG/DL (ref 70–99)
GLUCOSE BLD-MCNC: 207 MG/DL (ref 70–99)
GLUCOSE BLD-MCNC: 211 MG/DL (ref 70–99)
GLUCOSE BLD-MCNC: 236 MG/DL (ref 70–99)
GLUCOSE BLD-MCNC: 238 MG/DL (ref 70–99)
GLUCOSE BLD-MCNC: 241 MG/DL (ref 70–99)
GLUCOSE BLD-MCNC: 262 MG/DL (ref 70–99)
GLUCOSE BLD-MCNC: 54 MG/DL (ref 70–99)
GLUCOSE BLD-MCNC: 63 MG/DL (ref 70–99)
GLUCOSE BLD-MCNC: 86 MG/DL (ref 70–99)
GLUCOSE SERPL-MCNC: 135 MG/DL (ref 70–99)
GLUCOSE SERPL-MCNC: 177 MG/DL (ref 70–99)
GLUCOSE SERPL-MCNC: 178 MG/DL (ref 70–99)
HCT VFR BLD AUTO: 28.7 % (ref 36–48)
HGB BLD-MCNC: 9.7 G/DL (ref 12–16)
LACTATE BLDV-SCNC: 1.7 MMOL/L (ref 0.4–2)
LACTATE BLDV-SCNC: 2.9 MMOL/L (ref 0.4–2)
LACTATE BLDV-SCNC: 3.1 MMOL/L (ref 0.4–2)
LACTATE BLDV-SCNC: 3.1 MMOL/L (ref 0.4–2)
LACTATE BLDV-SCNC: 3.9 MMOL/L (ref 0.4–2)
LYMPHOCYTES # BLD: 1.7 K/UL (ref 1–5.1)
LYMPHOCYTES NFR BLD: 16 %
MAGNESIUM SERPL-MCNC: 1.8 MG/DL (ref 1.8–2.4)
MAGNESIUM SERPL-MCNC: 1.8 MG/DL (ref 1.8–2.4)
MAGNESIUM SERPL-MCNC: 1.9 MG/DL (ref 1.8–2.4)
MCH RBC QN AUTO: 30.2 PG (ref 26–34)
MCHC RBC AUTO-ENTMCNC: 33.9 G/DL (ref 31–36)
MCV RBC AUTO: 89.1 FL (ref 80–100)
MONOCYTES # BLD: 0.7 K/UL (ref 0–1.3)
MONOCYTES NFR BLD: 6.8 %
NEUTROPHILS # BLD: 8.3 K/UL (ref 1.7–7.7)
NEUTROPHILS NFR BLD: 76.7 %
PATH INTERP BLD-IMP: NORMAL
PATH INTERP BLD-IMP: NORMAL
PERFORMED ON: ABNORMAL
PERFORMED ON: NORMAL
PHOSPHATE SERPL-MCNC: 2.5 MG/DL (ref 2.5–4.9)
PHOSPHATE SERPL-MCNC: 3.2 MG/DL (ref 2.5–4.9)
PHOSPHATE SERPL-MCNC: 3.3 MG/DL (ref 2.5–4.9)
PLATELET # BLD AUTO: 176 K/UL (ref 135–450)
PMV BLD AUTO: 8 FL (ref 5–10.5)
POTASSIUM SERPL-SCNC: 4.2 MMOL/L (ref 3.5–5.1)
POTASSIUM SERPL-SCNC: 4.3 MMOL/L (ref 3.5–5.1)
POTASSIUM SERPL-SCNC: 4.5 MMOL/L (ref 3.5–5.1)
RBC # BLD AUTO: 3.22 M/UL (ref 4–5.2)
SODIUM SERPL-SCNC: 137 MMOL/L (ref 136–145)
SODIUM SERPL-SCNC: 140 MMOL/L (ref 136–145)
SODIUM SERPL-SCNC: 141 MMOL/L (ref 136–145)
WBC # BLD AUTO: 10.8 K/UL (ref 4–11)

## 2024-04-08 PROCEDURE — 2000000000 HC ICU R&B

## 2024-04-08 PROCEDURE — 2580000003 HC RX 258: Performed by: INTERNAL MEDICINE

## 2024-04-08 PROCEDURE — 83605 ASSAY OF LACTIC ACID: CPT

## 2024-04-08 PROCEDURE — 6370000000 HC RX 637 (ALT 250 FOR IP): Performed by: NURSE PRACTITIONER

## 2024-04-08 PROCEDURE — 85025 COMPLETE CBC W/AUTO DIFF WBC: CPT

## 2024-04-08 PROCEDURE — 6370000000 HC RX 637 (ALT 250 FOR IP): Performed by: STUDENT IN AN ORGANIZED HEALTH CARE EDUCATION/TRAINING PROGRAM

## 2024-04-08 PROCEDURE — 84100 ASSAY OF PHOSPHORUS: CPT

## 2024-04-08 PROCEDURE — 6360000002 HC RX W HCPCS: Performed by: INTERNAL MEDICINE

## 2024-04-08 PROCEDURE — 80048 BASIC METABOLIC PNL TOTAL CA: CPT

## 2024-04-08 PROCEDURE — 2580000003 HC RX 258: Performed by: EMERGENCY MEDICINE

## 2024-04-08 PROCEDURE — 6360000002 HC RX W HCPCS: Performed by: STUDENT IN AN ORGANIZED HEALTH CARE EDUCATION/TRAINING PROGRAM

## 2024-04-08 PROCEDURE — C9113 INJ PANTOPRAZOLE SODIUM, VIA: HCPCS | Performed by: STUDENT IN AN ORGANIZED HEALTH CARE EDUCATION/TRAINING PROGRAM

## 2024-04-08 PROCEDURE — 2500000003 HC RX 250 WO HCPCS: Performed by: EMERGENCY MEDICINE

## 2024-04-08 PROCEDURE — 6370000000 HC RX 637 (ALT 250 FOR IP): Performed by: INTERNAL MEDICINE

## 2024-04-08 PROCEDURE — 83735 ASSAY OF MAGNESIUM: CPT

## 2024-04-08 RX ORDER — LOPERAMIDE HYDROCHLORIDE 2 MG/1
2 CAPSULE ORAL 4 TIMES DAILY PRN
Status: DISCONTINUED | OUTPATIENT
Start: 2024-04-08 | End: 2024-04-09 | Stop reason: HOSPADM

## 2024-04-08 RX ORDER — INSULIN GLARGINE 100 [IU]/ML
15 INJECTION, SOLUTION SUBCUTANEOUS DAILY
Status: DISCONTINUED | OUTPATIENT
Start: 2024-04-09 | End: 2024-04-09 | Stop reason: HOSPADM

## 2024-04-08 RX ORDER — DEXTROSE MONOHYDRATE 100 MG/ML
INJECTION, SOLUTION INTRAVENOUS CONTINUOUS PRN
Status: DISCONTINUED | OUTPATIENT
Start: 2024-04-08 | End: 2024-04-09 | Stop reason: HOSPADM

## 2024-04-08 RX ORDER — INSULIN LISPRO 100 [IU]/ML
0-16 INJECTION, SOLUTION INTRAVENOUS; SUBCUTANEOUS
Status: DISCONTINUED | OUTPATIENT
Start: 2024-04-08 | End: 2024-04-09 | Stop reason: HOSPADM

## 2024-04-08 RX ORDER — INSULIN GLARGINE 100 [IU]/ML
20 INJECTION, SOLUTION SUBCUTANEOUS DAILY
Status: DISCONTINUED | OUTPATIENT
Start: 2024-04-08 | End: 2024-04-08

## 2024-04-08 RX ORDER — GLUCAGON 1 MG/ML
1 KIT INJECTION PRN
Status: DISCONTINUED | OUTPATIENT
Start: 2024-04-08 | End: 2024-04-09 | Stop reason: HOSPADM

## 2024-04-08 RX ORDER — INSULIN LISPRO 100 [IU]/ML
0-4 INJECTION, SOLUTION INTRAVENOUS; SUBCUTANEOUS NIGHTLY
Status: DISCONTINUED | OUTPATIENT
Start: 2024-04-08 | End: 2024-04-08

## 2024-04-08 RX ADMIN — METOCLOPRAMIDE 10 MG: 5 INJECTION, SOLUTION INTRAMUSCULAR; INTRAVENOUS at 11:49

## 2024-04-08 RX ADMIN — DEXTROSE AND SODIUM CHLORIDE: 5; 450 INJECTION, SOLUTION INTRAVENOUS at 09:00

## 2024-04-08 RX ADMIN — SODIUM CHLORIDE: 9 INJECTION, SOLUTION INTRAVENOUS at 02:12

## 2024-04-08 RX ADMIN — METOCLOPRAMIDE 10 MG: 5 INJECTION, SOLUTION INTRAMUSCULAR; INTRAVENOUS at 06:09

## 2024-04-08 RX ADMIN — ENOXAPARIN SODIUM 40 MG: 100 INJECTION SUBCUTANEOUS at 08:22

## 2024-04-08 RX ADMIN — POTASSIUM CHLORIDE 20 MEQ: 29.8 INJECTION, SOLUTION INTRAVENOUS at 00:44

## 2024-04-08 RX ADMIN — VENLAFAXINE HYDROCHLORIDE 75 MG: 37.5 CAPSULE, EXTENDED RELEASE ORAL at 08:22

## 2024-04-08 RX ADMIN — SODIUM CHLORIDE: 9 INJECTION, SOLUTION INTRAVENOUS at 09:01

## 2024-04-08 RX ADMIN — LEVETIRACETAM 1000 MG: 100 INJECTION, SOLUTION INTRAVENOUS at 11:49

## 2024-04-08 RX ADMIN — DEXTROSE AND SODIUM CHLORIDE: 5; 450 INJECTION, SOLUTION INTRAVENOUS at 01:37

## 2024-04-08 RX ADMIN — POTASSIUM CHLORIDE 20 MEQ: 29.8 INJECTION, SOLUTION INTRAVENOUS at 05:43

## 2024-04-08 RX ADMIN — PANTOPRAZOLE SODIUM 40 MG: 40 INJECTION, POWDER, FOR SOLUTION INTRAVENOUS at 08:22

## 2024-04-08 RX ADMIN — INSULIN LISPRO 4 UNITS: 100 INJECTION, SOLUTION INTRAVENOUS; SUBCUTANEOUS at 14:37

## 2024-04-08 RX ADMIN — INSULIN GLARGINE 20 UNITS: 100 INJECTION, SOLUTION SUBCUTANEOUS at 10:57

## 2024-04-08 RX ADMIN — LOPERAMIDE HYDROCHLORIDE 2 MG: 2 CAPSULE ORAL at 00:32

## 2024-04-08 RX ADMIN — LEVETIRACETAM 1000 MG: 100 INJECTION, SOLUTION INTRAVENOUS at 23:00

## 2024-04-08 RX ADMIN — SODIUM CHLORIDE: 9 INJECTION, SOLUTION INTRAVENOUS at 16:11

## 2024-04-08 RX ADMIN — SODIUM PHOSPHATE, MONOBASIC, MONOHYDRATE AND SODIUM PHOSPHATE, DIBASIC, ANHYDROUS 15 MMOL: 142; 276 INJECTION, SOLUTION INTRAVENOUS at 06:11

## 2024-04-08 RX ADMIN — METOCLOPRAMIDE 10 MG: 5 INJECTION, SOLUTION INTRAMUSCULAR; INTRAVENOUS at 18:10

## 2024-04-08 ASSESSMENT — PAIN SCALES - GENERAL
PAINLEVEL_OUTOF10: 0

## 2024-04-08 NOTE — PLAN OF CARE
Problem: Discharge Planning  Goal: Discharge to home or other facility with appropriate resources  Outcome: Progressing  Flowsheets (Taken 4/8/2024 0800)  Discharge to home or other facility with appropriate resources:   Identify barriers to discharge with patient and caregiver   Arrange for needed discharge resources and transportation as appropriate     Problem: Pain  Goal: Verbalizes/displays adequate comfort level or baseline comfort level  Outcome: Progressing     Problem: Safety - Adult  Goal: Free from fall injury  Outcome: Progressing     Problem: Respiratory - Adult  Goal: Achieves optimal ventilation and oxygenation  Outcome: Progressing     Problem: Cardiovascular - Adult  Goal: Maintains optimal cardiac output and hemodynamic stability  Outcome: Progressing  Flowsheets (Taken 4/8/2024 0800)  Maintains optimal cardiac output and hemodynamic stability:   Monitor blood pressure and heart rate   Monitor urine output and notify Licensed Independent Practitioner for values outside of normal range   Assess for signs of decreased cardiac output  Goal: Absence of cardiac dysrhythmias or at baseline  Outcome: Progressing  Flowsheets (Taken 4/8/2024 0800)  Absence of cardiac dysrhythmias or at baseline:   Monitor cardiac rate and rhythm   Assess for signs of decreased cardiac output   Administer antiarrhythmia medication and electrolyte replacement as ordered     Problem: Cardiovascular - Adult  Goal: Absence of cardiac dysrhythmias or at baseline  Outcome: Progressing  Flowsheets (Taken 4/8/2024 0800)  Absence of cardiac dysrhythmias or at baseline:   Monitor cardiac rate and rhythm   Assess for signs of decreased cardiac output   Administer antiarrhythmia medication and electrolyte replacement as ordered     Problem: Infection - Adult  Goal: Absence of infection at discharge  Outcome: Progressing  Flowsheets (Taken 4/8/2024 0800)  Absence of infection at discharge:   Assess and monitor for signs and symptoms of

## 2024-04-08 NOTE — PLAN OF CARE
Problem: Discharge Planning  Goal: Discharge to home or other facility with appropriate resources  4/7/2024 2018 by Antolin Clark RN  Outcome: Progressing  Discharge to home or other facility with appropriate resources:   Identify barriers to discharge with patient and caregiver   Arrange for needed discharge resources and transportation as appropriate   Refer to discharge planning if patient needs post-hospital services based on physician order or complex needs related to functional status, cognitive ability or social support system     Problem: Pain  Goal: Verbalizes/displays adequate comfort level or baseline comfort level  4/7/2024 2018 by Antolin Clark RN  Outcome: Progressing     Problem: Safety - Adult  Goal: Free from fall injury  4/7/2024 2018 by Antolin Clark RN  Outcome: Progressing     Problem: Respiratory - Adult  Goal: Achieves optimal ventilation and oxygenation  4/7/2024 2018 by Antolin Clark RN  Outcome: Progressing     Problem: Cardiovascular - Adult  Goal: Maintains optimal cardiac output and hemodynamic stability  4/7/2024 2018 by Antolin Clark RN  Outcome: Progressing    Maintains optimal cardiac output and hemodynamic stability:   Monitor blood pressure and heart rate   Monitor urine output and notify Licensed Independent Practitioner for values outside of normal range   Assess for signs of decreased cardiac output   Administer fluid and/or volume expanders as ordered  Goal: Absence of cardiac dysrhythmias or at baseline  4/7/2024 2018 by Antolin Clark RN  Outcome: Progressing    Absence of cardiac dysrhythmias or at baseline:   Monitor cardiac rate and rhythm   Assess for signs of decreased cardiac output   Administer antiarrhythmia medication and electrolyte replacement as ordered     Problem: Infection - Adult  Goal: Absence of infection at discharge  4/7/2024 2018 by Antolin Clark RN  Outcome: Progressing    Absence of infection at discharge:   Assess and monitor for signs and symptoms of

## 2024-04-09 VITALS
SYSTOLIC BLOOD PRESSURE: 100 MMHG | WEIGHT: 119.93 LBS | OXYGEN SATURATION: 98 % | TEMPERATURE: 97.1 F | HEIGHT: 59 IN | DIASTOLIC BLOOD PRESSURE: 78 MMHG | BODY MASS INDEX: 24.18 KG/M2 | RESPIRATION RATE: 13 BRPM | HEART RATE: 99 BPM

## 2024-04-09 PROBLEM — E10.10 DKA, TYPE 1, NOT AT GOAL (HCC): Status: RESOLVED | Noted: 2024-04-05 | Resolved: 2024-04-09

## 2024-04-09 PROBLEM — N17.9 AKI (ACUTE KIDNEY INJURY) (HCC): Status: RESOLVED | Noted: 2023-08-08 | Resolved: 2024-04-09

## 2024-04-09 LAB
ALBUMIN SERPL-MCNC: 2.9 G/DL (ref 3.4–5)
ANION GAP SERPL CALCULATED.3IONS-SCNC: 7 MMOL/L (ref 3–16)
BACTERIA BLD CULT ORG #2: NORMAL
BACTERIA BLD CULT: NORMAL
BUN SERPL-MCNC: 4 MG/DL (ref 7–20)
CALCIUM SERPL-MCNC: 8.6 MG/DL (ref 8.3–10.6)
CHLORIDE SERPL-SCNC: 109 MMOL/L (ref 99–110)
CO2 SERPL-SCNC: 25 MMOL/L (ref 21–32)
CREAT SERPL-MCNC: <0.5 MG/DL (ref 0.6–1.1)
DEPRECATED RDW RBC AUTO: 13.9 % (ref 12.4–15.4)
GFR SERPLBLD CREATININE-BSD FMLA CKD-EPI: >90 ML/MIN/{1.73_M2}
GLUCOSE BLD-MCNC: 118 MG/DL (ref 70–99)
GLUCOSE BLD-MCNC: 123 MG/DL (ref 70–99)
GLUCOSE BLD-MCNC: 214 MG/DL (ref 70–99)
GLUCOSE SERPL-MCNC: 163 MG/DL (ref 70–99)
HCT VFR BLD AUTO: 28.9 % (ref 36–48)
HGB BLD-MCNC: 9.5 G/DL (ref 12–16)
LACTATE BLDV-SCNC: 1.2 MMOL/L (ref 0.4–2)
LACTATE BLDV-SCNC: 2.3 MMOL/L (ref 0.4–2)
MAGNESIUM SERPL-MCNC: 1.9 MG/DL (ref 1.8–2.4)
MCH RBC QN AUTO: 29.5 PG (ref 26–34)
MCHC RBC AUTO-ENTMCNC: 33 G/DL (ref 31–36)
MCV RBC AUTO: 89.4 FL (ref 80–100)
PERFORMED ON: ABNORMAL
PHOSPHATE SERPL-MCNC: 3.6 MG/DL (ref 2.5–4.9)
PLATELET # BLD AUTO: 181 K/UL (ref 135–450)
PMV BLD AUTO: 8.7 FL (ref 5–10.5)
POTASSIUM SERPL-SCNC: 3.9 MMOL/L (ref 3.5–5.1)
RBC # BLD AUTO: 3.23 M/UL (ref 4–5.2)
SODIUM SERPL-SCNC: 141 MMOL/L (ref 136–145)
WBC # BLD AUTO: 7.6 K/UL (ref 4–11)

## 2024-04-09 PROCEDURE — 85027 COMPLETE CBC AUTOMATED: CPT

## 2024-04-09 PROCEDURE — 83735 ASSAY OF MAGNESIUM: CPT

## 2024-04-09 PROCEDURE — 6360000002 HC RX W HCPCS: Performed by: INTERNAL MEDICINE

## 2024-04-09 PROCEDURE — 80069 RENAL FUNCTION PANEL: CPT

## 2024-04-09 PROCEDURE — 6370000000 HC RX 637 (ALT 250 FOR IP): Performed by: INTERNAL MEDICINE

## 2024-04-09 PROCEDURE — 6360000002 HC RX W HCPCS: Performed by: STUDENT IN AN ORGANIZED HEALTH CARE EDUCATION/TRAINING PROGRAM

## 2024-04-09 PROCEDURE — 6370000000 HC RX 637 (ALT 250 FOR IP): Performed by: STUDENT IN AN ORGANIZED HEALTH CARE EDUCATION/TRAINING PROGRAM

## 2024-04-09 PROCEDURE — 36415 COLL VENOUS BLD VENIPUNCTURE: CPT

## 2024-04-09 PROCEDURE — 83605 ASSAY OF LACTIC ACID: CPT

## 2024-04-09 PROCEDURE — C9113 INJ PANTOPRAZOLE SODIUM, VIA: HCPCS | Performed by: STUDENT IN AN ORGANIZED HEALTH CARE EDUCATION/TRAINING PROGRAM

## 2024-04-09 RX ORDER — LIDOCAINE HYDROCHLORIDE 20 MG/ML
15 SOLUTION OROPHARYNGEAL PRN
Qty: 100 ML | Refills: 0 | Status: SHIPPED | OUTPATIENT
Start: 2024-04-09

## 2024-04-09 RX ADMIN — METOCLOPRAMIDE 10 MG: 5 INJECTION, SOLUTION INTRAMUSCULAR; INTRAVENOUS at 00:14

## 2024-04-09 RX ADMIN — INSULIN GLARGINE 15 UNITS: 100 INJECTION, SOLUTION SUBCUTANEOUS at 08:31

## 2024-04-09 RX ADMIN — METOCLOPRAMIDE 10 MG: 5 INJECTION, SOLUTION INTRAMUSCULAR; INTRAVENOUS at 13:00

## 2024-04-09 RX ADMIN — PANTOPRAZOLE SODIUM 40 MG: 40 INJECTION, POWDER, FOR SOLUTION INTRAVENOUS at 08:31

## 2024-04-09 RX ADMIN — METOCLOPRAMIDE 10 MG: 5 INJECTION, SOLUTION INTRAMUSCULAR; INTRAVENOUS at 05:52

## 2024-04-09 RX ADMIN — VENLAFAXINE HYDROCHLORIDE 75 MG: 37.5 CAPSULE, EXTENDED RELEASE ORAL at 08:31

## 2024-04-09 RX ADMIN — ENOXAPARIN SODIUM 40 MG: 100 INJECTION SUBCUTANEOUS at 08:31

## 2024-04-09 RX ADMIN — LEVETIRACETAM 1000 MG: 100 INJECTION, SOLUTION INTRAVENOUS at 12:59

## 2024-04-09 NOTE — DISCHARGE INSTRUCTIONS
Diabetic Ketoacidosis (DKA): Care Instructions  Overview  Diabetic ketoacidosis (DKA) happens when the body does not have enough insulin and can't use the sugar it needs for energy. When the body can't use sugar for energy, it starts to use fat for energy. This process makes fatty acids called ketones. The ketones build up in the blood and change the chemical balance in your body.  This problem can be very dangerous and needs to be treated. Without treatment, it can lead to a coma or death.  DKA occurs most often in people with type 1 diabetes. But people with type 2 diabetes also can get it. DKA can be caused by many things. It can happen if you don't take enough insulin. It can also happen if you have an infection or illness like the flu. Sometimes it happens if you are very dehydrated.  DKA can only be treated in a hospital with insulin and fluids. These are often given in a vein (I.V.).  Follow-up care is a key part of your treatment and safety. Be sure to make and go to all appointments, and call your doctor if you are having problems. It's also a good idea to know your test results and keep a list of the medicines you take.  How can you care for yourself at home?  To reduce your chance of ketoacidosis:  Take your insulin and other diabetes medicines on time and in the right dose.  If an infection caused your DKA and your doctor prescribed antibiotics, take them as directed. Do not stop taking them just because you feel better. You need to take the full course of antibiotics.  Test your blood sugar before meals and at bedtime or as often as your doctor advises. This is the best way to know when your blood sugar is high so you can treat it early. Watching for symptoms is not as helpful. This is because you may not have symptoms until your blood sugar is very high. Or you may not notice them.  Teach others at work and at home how to check your blood sugar. Make sure that someone else knows how to do it in  insulin and your doctor has not told you how much fast-acting insulin to take based on your blood sugar level, call your doctor.  Drink extra water or sugar-free drinks to prevent dehydration.  Wait 30 minutes after you take extra insulin or missed medicines. Then check your blood sugar again.  If symptoms of high blood sugar get worse or your blood sugar level keeps rising, call your doctor. If you start to feel sleepy or confused, call 911.  When should you call for help?   Call 911 anytime you think you may need emergency care. For example, call if:    You passed out (lost consciousness).     You are confused or cannot think clearly.     Your blood sugar is very high or very low.   Watch closely for changes in your health, and be sure to contact your doctor if:    Your blood sugar stays outside the level your doctor set for you.     You have any problems.   Where can you learn more?  Go to https://www.[employer].net/patientEd and enter J216 to learn more about \"Diabetic Ketoacidosis (DKA): Care Instructions.\"  Current as of: October 2, 2023               Content Version: 14.0  © 2006-2024 [employer].   Care instructions adapted under license by [employer]. If you have questions about a medical condition or this instruction, always ask your healthcare professional. [employer] disclaims any warranty or liability for your use of this information.

## 2024-04-09 NOTE — DISCHARGE INSTR - DIET

## 2024-04-09 NOTE — PLAN OF CARE
Problem: Discharge Planning  Goal: Discharge to home or other facility with appropriate resources  4/9/2024 0138 by Antolin Clark RN  Outcome: Progressing  Discharge to home or other facility with appropriate resources:   Identify barriers to discharge with patient and caregiver   Arrange for needed discharge resources and transportation as appropriate     Problem: Pain  Goal: Verbalizes/displays adequate comfort level or baseline comfort level  4/9/2024 0138 by Antolin Clark RN  Outcome: Progressing  Problem: Safety - Adult  Goal: Free from fall injury  4/9/2024 0138 by Antolin Clark RN  Outcome: Progressing     Problem: Respiratory - Adult  Goal: Achieves optimal ventilation and oxygenation  4/9/2024 0138 by Antolin Clark RN  Outcome: Progressing   Problem: Cardiovascular - Adult  Goal: Maintains optimal cardiac output and hemodynamic stability  4/9/2024 0138 by Antolin Clark RN  Outcome: Progressing  Maintains optimal cardiac output and hemodynamic stability:   Monitor blood pressure and heart rate   Monitor urine output and notify Licensed Independent Practitioner for values outside of normal range   Assess for signs of decreased cardiac output  Goal: Absence of cardiac dysrhythmias or at baseline  4/9/2024 0138 by Antolin Clark RN  Outcome: Progressing  Absence of cardiac dysrhythmias or at baseline:   Monitor cardiac rate and rhythm   Assess for signs of decreased cardiac output   Administer antiarrhythmia medication and electrolyte replacement as ordered     Problem: Infection - Adult  Goal: Absence of infection at discharge  4/9/2024 0138 by Antolin Clark RN  Outcome: Progressing  Absence of infection at discharge:   Assess and monitor for signs and symptoms of infection   Monitor lab/diagnostic results   Monitor all insertion sites i.e., indwelling lines, tubes and drains  Goal: Absence of infection during hospitalization  4/9/2024 0138 by Antolin Clark RN  Outcome: Progressing  Absence of infection during

## 2024-04-10 NOTE — PROGRESS NOTES
Hospitalist Progress Note    Name:  Maribeth Loera    /Age/Sex: 1990  (34 y.o. female)  MRN & CSN:  [id number] & [id number]    PCP: Tosha Moreno MD    Date of Admission: 2024    Patient Status:  Inpatient     Chief Complaint:   Chief Complaint   Patient presents with    Diabetic Ketoacidosis     Arrived by  EMS rt call for seizures; reported by boyfriend that pt seized twice at home for unknown amount of time.  Also reported DM & possible pregnancy.  BG reads high; pt moaning & not participating in triage.        Hospital Course: 34 y.o. female with PMHx of DM I, depression, seizures who presented to [facility] with complaints of DKA.     Patient is relatively obtunded on exam. Able to nod and shake her head to questions, but unable to answer more complex questions. Seems very dry and is tachycardic on exam.         Subjective:  Today is:  Hospital Day: 3.  Patient seen and examined in ICU-3909/3909-.     She is calm today  Seems more alert.  Remains on gtt but getting better.        Medications:  Reviewed    Infusion Medications    sodium chloride 150 mL/hr at 24 1104    sodium chloride Stopped (24 0007)    insulin 0.5 Units/hr (24 1511)    dextrose 5 % and 0.45 % NaCl 150 mL/hr at 24 1136    lactated ringers IV soln       Scheduled Medications    levETIRAcetam  1,000 mg IntraVENous Q12H    metoclopramide  10 mg IntraVENous Q6H    HYDROmorphone  0.5 mg IntraVENous Once    venlafaxine  75 mg Oral Daily    enoxaparin  40 mg SubCUTAneous Daily    pantoprazole  40 mg IntraVENous Daily     PRN Meds: ondansetron, promethazine (PHENERGAN) 12.5 mg in sodium chloride 0.9 % 50 mL IVPB, acetaminophen, sodium chloride, dextrose bolus **OR** dextrose bolus, potassium chloride, magnesium sulfate, sodium phosphate 15 mmol in sodium chloride 0.9 % 250 mL IVPB, dextrose 5 % and 0.45 % NaCl, polyethylene glycol, potassium chloride      Intake/Output Summary (Last 24 hours) at 
      Hospitalist Progress Note    Name:  Maribeth Loera    /Age/Sex: 1990  (34 y.o. female)  MRN & CSN:  [id number] & [id number]    PCP: Tosha Moreno MD    Date of Admission: 2024    Patient Status:  Inpatient     Chief Complaint:   Chief Complaint   Patient presents with    Diabetic Ketoacidosis     Arrived by [facility] rt call for seizures; reported by boyfriend that pt seized twice at home for unknown amount of time.  Also reported DM & possible pregnancy.  BG reads high; pt moaning & not participating in triage.        Hospital Course: 34 y.o. female with PMHx of DM I, depression, seizures who presented to [facility] with complaints of DKA.     Patient is relatively obtunded on exam. Able to nod and shake her head to questions, but unable to answer more complex questions. Seems very dry and is tachycardic on exam.         Subjective:  Today is:  Hospital Day: 4.  Patient seen and examined in ICU-3909/3909-.     She is calm today  Seems more alert.  Remains on gtt but getting better.        Medications:  Reviewed    Infusion Medications    dextrose      sodium chloride 150 mL/hr at 24 1810    sodium chloride Stopped (24 1254)    insulin Stopped (24 1252)    dextrose 5 % and 0.45 % NaCl Stopped (24 1252)     Scheduled Medications    insulin glargine  20 Units SubCUTAneous Daily    insulin lispro  0-16 Units SubCUTAneous TID WC    insulin lispro  0-4 Units SubCUTAneous Nightly    levETIRAcetam  1,000 mg IntraVENous Q12H    metoclopramide  10 mg IntraVENous Q6H    HYDROmorphone  0.5 mg IntraVENous Once    venlafaxine  75 mg Oral Daily    enoxaparin  40 mg SubCUTAneous Daily    pantoprazole  40 mg IntraVENous Daily     PRN Meds: loperamide, glucagon (rDNA), dextrose, ondansetron, promethazine (PHENERGAN) 12.5 mg in sodium chloride 0.9 % 50 mL IVPB, acetaminophen, sodium chloride, dextrose bolus **OR** dextrose bolus, potassium chloride, magnesium sulfate, sodium phosphate 15 
  Physician Progress Note      PATIENT:               ANTHONY GARCIA  CSN #:                  [id number]  :                       1990  ADMIT DATE:       2024 7:08 AM  DISCH DATE:        2024 4:11 PM  RESPONDING  PROVIDER #:        Doron Jimenez DO          QUERY TEXT:    Patient admitted with DKA and noted to have tachycardia, fever, and PRANEETH.  If   possible, please document in progress notes and discharge summary if you are   evaluating and/or treating any of the following:    The medical record reflects the following:  Risk Factors: DKA  Clinical Indicators: PRANEETH, HR , T 101  Treatment: IVF resuscitation, serial labs, supportive care    Thank you,  Alessia Akers RN, CDS  [email or web address]  Options provided:  -- SIRS of non-infectious origin due to DKA with PRANEETH  -- SIRS of non-infectious origin due to DKA without acute organ dysfunction  -- Other - I will add my own diagnosis  -- Disagree - Not applicable / Not valid  -- Disagree - Clinically unable to determine / Unknown  -- Refer to Clinical Documentation Reviewer    PROVIDER RESPONSE TEXT:    This patient has SIRS of non-infectious origin due to DKA with PRANEETH.    Query created by: Alessia Akers on 4/10/2024 4:11 PM      Electronically signed by:  Doron Jimenez DO 4/10/2024 4:14 PM          
 came to see patient, but she was sleeping. He states that this has happened to her before where she began seizing and was admitted for it with DKA. He states that after her seizures she wakes up very scared, but is not aggressive. States that she is the sweetest person and is very soft-spoken and timid at baseline.   
4 Eyes Skin Assessment     NAME:  Maribeth Loera  YOB: 1990  MEDICAL RECORD NUMBER:  [id number]    The patient is being assessed for  Admission    I agree that at least one RN has performed a thorough Head to Toe Skin Assessment on the patient. ALL assessment sites listed below have been assessed.      Areas assessed by both nurses:    Head, Face, Ears, Shoulders, Back, Chest, Arms, Elbows, Hands, Sacrum. Buttock, Coccyx, Ischium, Legs. Feet and Heels, and Under Medical Devices         Does the Patient have a Wound? No noted wound(s)       Luis Daniel Prevention initiated by RN: No  Wound Care Orders initiated by RN: No    Pressure Injury (Stage 3,4, Unstageable, DTI, NWPT, and Complex wounds) if present, place Wound referral order by RN under : No    New Ostomies, if present place, Ostomy referral order under : No     Nurse 1 eSignature: Electronically signed by NESTOR LOVING RN on 4/5/24 at 6:57 PM EDT    **SHARE this note so that the co-signing nurse can place an eSignature**    Nurse 2 eSignature: Electronically signed by HILLARY Gaytna RN on 4/5/24 at 7:00 PM EDT   
After verbal consent obtained & time out called, Dr Jimenez placed Left femoral CVC without difficulty.  Sterile technique maintained.  Pt tolerated well without any problems.  
CLINICAL PHARMACY NOTE: MEDS TO BEDS    Total # of Prescriptions Filled: 1   The following medications were delivered to the patient:  LIDOCAINE VISCOUS HCL 2% SOLN    Additional Documentation: Francy NEELY approved to deliver medication to  pt room=signed  [facility] Tech  
[facility]  Diabetes Education   Progress Note       NAME:  Maribeth Loera  MEDICAL RECORD NUMBER:  [id number]  AGE: 34 y.o.   GENDER: female  : 1990  TODAY'S DATE:  2024    Subjective   Reason for Diabetes Education Evaluation and Assessment: DKA    Visit Type: evaluation      Maribeth Loera is a 34 y.o. female referred by:  [x] Physician  [] Nursing  [] Chart Review   [] Other:     Pt denies needs r/t DM at this time, states she has testing supplies and insulin at home.  Plans to resume Dexcom and Omnipod soon after discharge.    PAST MEDICAL HISTORY        Diagnosis Date    Depression     Diabetes mellitus (HCC)     Diabetic ketoacidosis without coma associated with type 1 diabetes mellitus (HCC) 2022    Seizures (HCC)        PAST SURGICAL HISTORY    Past Surgical History:   Procedure Laterality Date     SECTION      TUBAL LIGATION         FAMILY HISTORY    Family History   Problem Relation Age of Onset    Asthma Mother     Allergies Mother         sun allergy    Diabetes Type 1  Father     Diabetes Type 1  Maternal Grandfather     Diabetes Type 1  Cousin     Diabetes Type 1  Cousin     Diabetes Type 1  Maternal Uncle     Diabetes Type 1  Maternal Uncle     Diabetes Type 1  Maternal Aunt        SOCIAL HISTORY    Social History     Tobacco Use    Smoking status: Never    Smokeless tobacco: Never   Vaping Use    Vaping Use: Never used   Substance Use Topics    Alcohol use: No    Drug use: No       ALLERGIES    No Known Allergies    MEDICATIONS     insulin lispro  0-16 Units SubCUTAneous TID WC    insulin glargine  15 Units SubCUTAneous Daily    levETIRAcetam  1,000 mg IntraVENous Q12H    metoclopramide  10 mg IntraVENous Q6H    HYDROmorphone  0.5 mg IntraVENous Once    venlafaxine  75 mg Oral Daily    enoxaparin  40 mg SubCUTAneous Daily    pantoprazole  40 mg IntraVENous Daily       Objective        Patient Active Problem List   Diagnosis Code    Lactic acidosis 
Labs drawn & resulted on epoc as blood glucose to high to measure on meter.  Blood glucose is now 644 & LA is 9.30  Dr Jimenez made aware, continue current orders  
Patient discharged home with family.She will resume all previous medications with no changes.An appointment is scheduled for follow up with her PCP.Patient voiced understanding of all.  
Patient in seizure precautions and DKA protocol. She is lethargic, guarded, and not interactive. Confused and oriented to self only. Soft spoken, speech slurred, and mostly repeating the same things over again although they are incomprehensible. Unable to carry conversation. Asked me if I went to school with her at [employer], told me that her  is not melchor, and wants to know where uncle Pepe is. Able to follow commands. Nods/gestures appropriately. Sating high 90s on RA. ST on monitor. Bowel sounds active x4. Skin intact with no evidence of edema. Able to turn self in bed. Educated on the importance of frequent reposition for the prevention of pressure ulcer formation. No evidence of learning.  
Patient is A&Ox4. Still quiet and soft spoken, but carries conversation when asked questions. Appears to still have expressive aphasia, but states that it is just hard to talk because she bit her tongue during her seizures. Reports that she use to have an insulin pump, but insurance no longer covered it, so she now uses the insulin pens. States that she was taking her insulin and Keppra as prescribed. Sating high 90s on RA. ST on monitor. Bowel sounds active x4. Skin intact with no evidence of edema. Skin and richardson care provided as patient is on her menstrual cycle. Able to turn self in bed. Educated on the importance of frequent reposition for the prevention of pressure ulcer formation.    
Patient is alert and oriented x 4, pleasant and soft spoken. She is tachycardic but other vitals signs are within defined limits. No complaints of pain, nausea or vomiting. She remains on the DKA protocol per order with electrolytes being replaced. Stoddard draining and patent with clear yellow urine. Will continue to monitor.  
Patient met lab criteria. DKA protocol discontinued and Lantus given.   
Patient remains confused, but is now oriented x4. Remains in DKA with GAP open. Insulin gtt and electrolyte replacements given per order, see MAR.   
Patient screaming out from room. Restless, kicking legs, and smacking the bed rail screaming, \"No, no, no!\" Inconsolable. 1x dose of Ativan given.  
Patient's BS 54. Given juice and up to 63. Given second juice and up to 86. After dinner 136.   
Pt continues to moan on pain. When asked if she is on pain pt nods head ye but does not answer where she is hurting. Np perfect served. Order received for 1 time dose of dilaudid.   
Pt had a fever of 100.7. Np perfect served. Order received for ibuprofen. Pt unable to take ibuprofen as pt started having large emesis during administration. NP aware.   
Pt into 3909 from ER with DKA.  Insulin drip infusing.  Pt A/Ox3, following simple commands.  Pt mostly nonverbal, moaning & making squeaking noises.  VSS  except 's-140's, sinus tach & Resp tachypnic upper 20's shallow & labored.  Lungs clear, RA sat high 90's to 100%.  Pt denies any SOB  Abd soft with +BSx4  Skin cool & dry with palp pulses.  Skin intact.  Stoddard in place draining clear yellow urine. Oriented to room & unit routine, call light in reach.  Fluid bolus infusing as ordered.    
Pt reassessed, no acute changes.  Pt continues awake, alert with minimal interaction with nurse.  Resp now normal, unlabored.  Temp rising now 98.7 temporal.  Continues to deny any c/o.  Insulin drip continues per DKA protocol, IVF's infusing as ordered.    
Shift assessment completed ( see flow sheets for details). Pt alert. Able to say her name but does not answers other orientation questions. Nods head  to yes/no questions but does not answer any questions verbally. Unable to move extremities properly due to weakness. Stays tachycardic with heart rate up to 130's. Stays on insulin gtt per protocol. All safety measures stay active.   
Therapeutic DKA perimeters for patient per MD:  Gap: <12  Bicarb: >20   
labs; trend creatinine   - labs better today.    Seizures  -Continue home keppra     Depression  -Continue home effexor             Drugs that require monitoring for toxicity include: Subq Insulin and the method of monitoring was/is BG    DVT ppx: Lovenox  GI ppx: PPI  Diet: Diet NPO Exceptions are: Sips of Clear Liquids, Sips of Water with Meds  Code Status: Full Code    PT/OT Eval Status: Eval and treat     Disposition:  Continue current treament    Total critical care time was 34 minutes, excluding separately reportable procedures. Services included in critical care time were chart data review, documentation time, obtaining information from patient, review of nursing notes, and vital sign assessment. There was a high probability of clinically significant life-threatening deterioration in the patient's condition, which required my urgent intervention.               Clarence Solis MD  4/6/2024  3:15 PM

## 2024-04-13 DIAGNOSIS — G40.909 SEIZURE DISORDER (HCC): ICD-10-CM

## 2024-04-15 RX ORDER — CLONAZEPAM 2 MG/1
2 TABLET ORAL 2 TIMES DAILY
Qty: 60 TABLET | Refills: 2 | Status: SHIPPED | OUTPATIENT
Start: 2024-04-15 | End: 2024-07-14

## 2024-04-22 SDOH — ECONOMIC STABILITY: HOUSING INSECURITY
IN THE LAST 12 MONTHS, WAS THERE A TIME WHEN YOU DID NOT HAVE A STEADY PLACE TO SLEEP OR SLEPT IN A SHELTER (INCLUDING NOW)?: YES

## 2024-04-22 SDOH — ECONOMIC STABILITY: INCOME INSECURITY: HOW HARD IS IT FOR YOU TO PAY FOR THE VERY BASICS LIKE FOOD, HOUSING, MEDICAL CARE, AND HEATING?: VERY HARD

## 2024-04-22 SDOH — ECONOMIC STABILITY: FOOD INSECURITY: WITHIN THE PAST 12 MONTHS, YOU WORRIED THAT YOUR FOOD WOULD RUN OUT BEFORE YOU GOT MONEY TO BUY MORE.: OFTEN TRUE

## 2024-04-22 SDOH — ECONOMIC STABILITY: FOOD INSECURITY: WITHIN THE PAST 12 MONTHS, THE FOOD YOU BOUGHT JUST DIDN'T LAST AND YOU DIDN'T HAVE MONEY TO GET MORE.: OFTEN TRUE

## 2024-04-22 SDOH — ECONOMIC STABILITY: TRANSPORTATION INSECURITY
IN THE PAST 12 MONTHS, HAS LACK OF TRANSPORTATION KEPT YOU FROM MEETINGS, WORK, OR FROM GETTING THINGS NEEDED FOR DAILY LIVING?: YES

## 2024-04-23 ENCOUNTER — OFFICE VISIT (OUTPATIENT)
Dept: INTERNAL MEDICINE CLINIC | Age: 34
End: 2024-04-23
Payer: COMMERCIAL

## 2024-04-23 VITALS
DIASTOLIC BLOOD PRESSURE: 62 MMHG | HEIGHT: 59 IN | BODY MASS INDEX: 22.98 KG/M2 | SYSTOLIC BLOOD PRESSURE: 118 MMHG | WEIGHT: 114 LBS

## 2024-04-23 DIAGNOSIS — E11.42 DIABETIC PERIPHERAL NEUROPATHY (HCC): Primary | ICD-10-CM

## 2024-04-23 DIAGNOSIS — E10.42 TYPE 1 DIABETES MELLITUS WITH DIABETIC POLYNEUROPATHY (HCC): ICD-10-CM

## 2024-04-23 DIAGNOSIS — F33.1 MODERATE EPISODE OF RECURRENT MAJOR DEPRESSIVE DISORDER (HCC): ICD-10-CM

## 2024-04-23 DIAGNOSIS — G43.709 CHRONIC MIGRAINE WITHOUT AURA WITHOUT STATUS MIGRAINOSUS, NOT INTRACTABLE: ICD-10-CM

## 2024-04-23 DIAGNOSIS — G40.909 SEIZURE DISORDER (HCC): ICD-10-CM

## 2024-04-23 PROCEDURE — 99214 OFFICE O/P EST MOD 30 MIN: CPT | Performed by: INTERNAL MEDICINE

## 2024-04-23 PROCEDURE — 1036F TOBACCO NON-USER: CPT | Performed by: INTERNAL MEDICINE

## 2024-04-23 PROCEDURE — G8427 DOCREV CUR MEDS BY ELIG CLIN: HCPCS | Performed by: INTERNAL MEDICINE

## 2024-04-23 PROCEDURE — 1111F DSCHRG MED/CURRENT MED MERGE: CPT | Performed by: INTERNAL MEDICINE

## 2024-04-23 PROCEDURE — 3046F HEMOGLOBIN A1C LEVEL >9.0%: CPT | Performed by: INTERNAL MEDICINE

## 2024-04-23 PROCEDURE — G8420 CALC BMI NORM PARAMETERS: HCPCS | Performed by: INTERNAL MEDICINE

## 2024-04-23 PROCEDURE — 2022F DILAT RTA XM EVC RTNOPTHY: CPT | Performed by: INTERNAL MEDICINE

## 2024-04-23 RX ORDER — ACYCLOVIR 400 MG/1
TABLET ORAL
Qty: 1 EACH | Refills: 3 | Status: SHIPPED | COMMUNITY
Start: 2024-04-23

## 2024-04-23 RX ORDER — MAGNESIUM OXIDE 400 MG/1
400 TABLET ORAL DAILY
Qty: 30 TABLET | Refills: 2 | Status: SHIPPED | OUTPATIENT
Start: 2024-04-23

## 2024-04-23 RX ORDER — ONDANSETRON HYDROCHLORIDE 8 MG/1
8 TABLET, FILM COATED ORAL EVERY 8 HOURS PRN
Qty: 90 TABLET | Refills: 3 | Status: SHIPPED | OUTPATIENT
Start: 2024-04-23

## 2024-04-23 RX ORDER — VENLAFAXINE HYDROCHLORIDE 150 MG/1
150 CAPSULE, EXTENDED RELEASE ORAL DAILY
Qty: 30 CAPSULE | Refills: 5 | Status: SHIPPED | OUTPATIENT
Start: 2024-04-23

## 2024-04-23 NOTE — PROGRESS NOTES
Maribeth Loera (:  1990) is a 34 y.o. female,Established patient, here for evaluation of the following chief complaint(s):  Diabetes      Assessment & Plan   1. Diabetic peripheral neuropathy (HCC)   - continue gabapentin 900mg daily, venlafaxine 150mg daily  2. Chronic migraine without aura without status migrainosus, not intractable   - likely multifactorial worsening. Will not adjust medication for right now, continue venlafaxine, magnesium 400mg nightly  3. Seizure disorder (HCC)   - uncontrolled, emphasized that I would like her to follow up with neurology. For now continue levetiracetam 1000mg twice daily  4. Type 1 diabetes mellitus with diabetic polyneuropathy (HCC)   - will get back on omnipod and dexcom then reassess  5. Moderate episode of recurrent major depressive disorder (HCC)   - social stressors contributing. Continue venlafaxine 150mg twice daily, clonazepam 2mg twice daily. Would not taper clonazepam at this point due to continued increase in seizure frequency.    Return in about 3 months (around 2024) for DM follow up, seizures.       Subjective   HPI    Having seizures every couple weeks. This is increased from her baseline. She has not seen the neurologist.    Diabetes - currently using injections. Trying to get back on the omnipod and dexcom - issues with . Sugar has been running high. Having cramps, numbness in feet/hands at night    Migraines have been an issue, having headaches multiple times per day    Having nausea from gastroparesis - zofran 4 mg hasn't helped    Review of Systems       Objective   Physical Exam  Vitals reviewed.   Constitutional:       General: She is not in acute distress.     Appearance: Normal appearance. She is well-developed.   HENT:      Head: Normocephalic and atraumatic.   Cardiovascular:      Rate and Rhythm: Normal rate and regular rhythm.      Heart sounds: Normal heart sounds.   Pulmonary:      Effort: Pulmonary effort is normal. No

## 2024-04-23 NOTE — PATIENT INSTRUCTIONS
listed on the back of the insurance card.  **Schedule 3 full business days in advance to ensure transportation.           Insurance Contacts  [employer] [facility]/ [employer]     Phone: [phone]       Website:[employer]/[facility] [employer]   Phone: [phone]                  Website:[email or web address]    [employer]    Phone: [phone]       Website:FohBoh/oh    [employer]   Phone: [phone]      Website:[email or web address]    [employer]    Phone: [phone]      Website: [employer]    Phone: [phone]      Website: [employer]/    [place]   Phone: [phone]  Website:[employer]/medicaid/[facility]    Phone: [phone]    Website:[email or web address]               Traditional Medicare   Does not pay for transportation for non-emergency medical appointments   Medicare Advantage Plans  Some plans may provide transportation.  Members should contact the Member Services or Transportation number on the back of their insurance card for more information or to schedule transportation.       All [employer] Services are Private Pay   Schedule 3 business days in advance     Provider Phone   [facility] [phone]   [facility] [phone]   [facility] [phone]   [facility] [phone]   [facility] [phone]   [facility] [phone]   [facility] [phone]   [facility] [phone]   [facility] [phone]   [facility] [phone]   [facility] [phone]

## 2024-04-25 PROBLEM — Z86.59 HISTORY OF ANOREXIA NERVOSA: Status: ACTIVE | Noted: 2018-12-11

## 2024-05-20 RX ORDER — OMEPRAZOLE 20 MG/1
CAPSULE, DELAYED RELEASE ORAL
Qty: 90 CAPSULE | Refills: 1 | Status: ON HOLD | OUTPATIENT
Start: 2024-05-20

## 2024-05-25 ENCOUNTER — APPOINTMENT (OUTPATIENT)
Dept: GENERAL RADIOLOGY | Age: 34
DRG: 420 | End: 2024-05-25
Payer: COMMERCIAL

## 2024-05-25 ENCOUNTER — HOSPITAL ENCOUNTER (INPATIENT)
Age: 34
LOS: 3 days | Discharge: HOME | DRG: 420 | End: 2024-05-28
Attending: EMERGENCY MEDICINE | Admitting: HOSPITALIST
Payer: COMMERCIAL

## 2024-05-25 ENCOUNTER — APPOINTMENT (OUTPATIENT)
Dept: CT IMAGING | Age: 34
DRG: 420 | End: 2024-05-25
Payer: COMMERCIAL

## 2024-05-25 DIAGNOSIS — N17.9 AKI (ACUTE KIDNEY INJURY) (HCC): ICD-10-CM

## 2024-05-25 DIAGNOSIS — E10.8 TYPE 1 DIABETES MELLITUS WITH COMPLICATION (HCC): ICD-10-CM

## 2024-05-25 DIAGNOSIS — S09.90XA INJURY OF HEAD, INITIAL ENCOUNTER: ICD-10-CM
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
## 2024-05-25 DIAGNOSIS — R00.0 TACHYCARDIA: ICD-10-CM

## 2024-05-25 DIAGNOSIS — E10.10 TYPE 1 DIABETES MELLITUS WITH KETOACIDOSIS WITHOUT COMA (HCC): Primary | ICD-10-CM

## 2024-05-25 LAB
ALBUMIN SERPL-MCNC: 4.9 G/DL (ref 3.4–5)
ALBUMIN/GLOB SERPL: 1.1 {RATIO} (ref 1.1–2.2)
ALP SERPL-CCNC: 95 U/L (ref 40–129)
ALT SERPL-CCNC: 15 U/L (ref 10–40)
ANION GAP SERPL CALCULATED.3IONS-SCNC: 27 MMOL/L (ref 3–16)
AST SERPL-CCNC: 24 U/L (ref 15–37)
BASE EXCESS BLDV CALC-SCNC: -14.1 MMOL/L (ref -3–3)
BASOPHILS # BLD: 0 K/UL (ref 0–0.2)
BASOPHILS NFR BLD: 0.2 %
BETA-HYDROXYBUTYRATE: 4.7 MMOL/L (ref 0–0.27)
BILIRUB SERPL-MCNC: 0.7 MG/DL (ref 0–1)
BUN SERPL-MCNC: 20 MG/DL (ref 7–20)
CALCIUM SERPL-MCNC: 10.6 MG/DL (ref 8.3–10.6)
CHLORIDE SERPL-SCNC: 92 MMOL/L (ref 99–110)
CO2 BLDV-SCNC: 22 MMOL/L
CO2 SERPL-SCNC: 9 MMOL/L (ref 21–32)
COHGB MFR BLDV: 6.1 % (ref 0–1.5)
CREAT SERPL-MCNC: 1.4 MG/DL (ref 0.6–1.1)
DEPRECATED RDW RBC AUTO: 13.8 % (ref 12.4–15.4)
DO-HGB MFR BLDV: 6 %
EOSINOPHIL # BLD: 0 K/UL (ref 0–0.6)
EOSINOPHIL NFR BLD: 0 %
GFR SERPLBLD CREATININE-BSD FMLA CKD-EPI: 50 ML/MIN/{1.73_M2}
GLUCOSE BLD-MCNC: 142 MG/DL (ref 70–99)
GLUCOSE BLD-MCNC: 170 MG/DL (ref 70–99)
GLUCOSE BLD-MCNC: 185 MG/DL (ref 70–99)
GLUCOSE BLD-MCNC: 295 MG/DL (ref 70–99)
GLUCOSE SERPL-MCNC: 339 MG/DL (ref 70–99)
HCG SERPL QL: NEGATIVE
HCO3 BLDV-SCNC: 9.4 MMOL/L (ref 23–29)
HCT VFR BLD AUTO: 46.8 % (ref 36–48)
HGB BLD-MCNC: 15.7 G/DL (ref 12–16)
LACTATE BLDV-SCNC: 3.5 MMOL/L (ref 0.4–2)
LIPASE SERPL-CCNC: 11 U/L (ref 13–60)
LYMPHOCYTES # BLD: 1 K/UL (ref 1–5.1)
LYMPHOCYTES NFR BLD: 9.5 %
MCH RBC QN AUTO: 30.2 PG (ref 26–34)
MCHC RBC AUTO-ENTMCNC: 33.5 G/DL (ref 31–36)
MCV RBC AUTO: 90 FL (ref 80–100)
METHGB MFR BLDV: 0.4 %
MONOCYTES # BLD: 0.8 K/UL (ref 0–1.3)
MONOCYTES NFR BLD: 7.1 %
NEUTROPHILS # BLD: 9 K/UL (ref 1.7–7.7)
NEUTROPHILS NFR BLD: 83.2 %
O2 CT VFR BLDV CALC: 20 VOL %
O2 THERAPY: ABNORMAL
PCO2 BLDV: 18.9 MMHG (ref 40–50)
PERFORMED ON: ABNORMAL
PH BLDV: 7.31 [PH] (ref 7.35–7.45)
PLATELET # BLD AUTO: 264 K/UL (ref 135–450)
PMV BLD AUTO: 8.8 FL (ref 5–10.5)
PO2 BLDV: 66.5 MMHG (ref 25–40)
POTASSIUM SERPL-SCNC: 4.1 MMOL/L (ref 3.5–5.1)
PROT SERPL-MCNC: 9.4 G/DL (ref 6.4–8.2)
RBC # BLD AUTO: 5.2 M/UL (ref 4–5.2)
SAO2 % BLDV: 94 %
SODIUM SERPL-SCNC: 128 MMOL/L (ref 136–145)
WBC # BLD AUTO: 10.8 K/UL (ref 4–11)

## 2024-05-25 PROCEDURE — 80053 COMPREHEN METABOLIC PANEL: CPT

## 2024-05-25 PROCEDURE — 83036 HEMOGLOBIN GLYCOSYLATED A1C: CPT

## 2024-05-25 PROCEDURE — 73060 X-RAY EXAM OF HUMERUS: CPT

## 2024-05-25 PROCEDURE — 80177 DRUG SCRN QUAN LEVETIRACETAM: CPT

## 2024-05-25 PROCEDURE — 96374 THER/PROPH/DIAG INJ IV PUSH: CPT

## 2024-05-25 PROCEDURE — 83690 ASSAY OF LIPASE: CPT

## 2024-05-25 PROCEDURE — 73080 X-RAY EXAM OF ELBOW: CPT

## 2024-05-25 PROCEDURE — 84703 CHORIONIC GONADOTROPIN ASSAY: CPT

## 2024-05-25 PROCEDURE — 99285 EMERGENCY DEPT VISIT HI MDM: CPT

## 2024-05-25 PROCEDURE — 36415 COLL VENOUS BLD VENIPUNCTURE: CPT

## 2024-05-25 PROCEDURE — 82803 BLOOD GASES ANY COMBINATION: CPT

## 2024-05-25 PROCEDURE — 93005 ELECTROCARDIOGRAM TRACING: CPT | Performed by: EMERGENCY MEDICINE

## 2024-05-25 PROCEDURE — 82010 KETONE BODYS QUAN: CPT

## 2024-05-25 PROCEDURE — 85025 COMPLETE CBC W/AUTO DIFF WBC: CPT

## 2024-05-25 PROCEDURE — 6370000000 HC RX 637 (ALT 250 FOR IP): Performed by: EMERGENCY MEDICINE

## 2024-05-25 PROCEDURE — 2000000000 HC ICU R&B

## 2024-05-25 PROCEDURE — 2580000003 HC RX 258: Performed by: EMERGENCY MEDICINE

## 2024-05-25 PROCEDURE — 83605 ASSAY OF LACTIC ACID: CPT

## 2024-05-25 PROCEDURE — 70450 CT HEAD/BRAIN W/O DYE: CPT

## 2024-05-25 RX ORDER — DEXTROSE MONOHYDRATE AND SODIUM CHLORIDE 5; .45 G/100ML; G/100ML
INJECTION, SOLUTION INTRAVENOUS CONTINUOUS PRN
Status: DISCONTINUED | OUTPATIENT
Start: 2024-05-25 | End: 2024-05-25

## 2024-05-25 RX ORDER — MAGNESIUM SULFATE IN WATER 40 MG/ML
2000 INJECTION, SOLUTION INTRAVENOUS PRN
Status: DISCONTINUED | OUTPATIENT
Start: 2024-05-25 | End: 2024-05-25

## 2024-05-25 RX ORDER — ONDANSETRON 2 MG/ML
4 INJECTION INTRAMUSCULAR; INTRAVENOUS EVERY 6 HOURS PRN
Status: DISCONTINUED | OUTPATIENT
Start: 2024-05-25 | End: 2024-05-28 | Stop reason: HOSPADM

## 2024-05-25 RX ORDER — POTASSIUM CHLORIDE 20 MEQ/1
40 TABLET, EXTENDED RELEASE ORAL PRN
Status: DISCONTINUED | OUTPATIENT
Start: 2024-05-25 | End: 2024-05-28 | Stop reason: HOSPADM

## 2024-05-25 RX ORDER — POTASSIUM CHLORIDE 7.45 MG/ML
10 INJECTION INTRAVENOUS PRN
Status: DISCONTINUED | OUTPATIENT
Start: 2024-05-25 | End: 2024-05-25

## 2024-05-25 RX ORDER — SODIUM CHLORIDE 9 MG/ML
INJECTION, SOLUTION INTRAVENOUS PRN
Status: DISCONTINUED | OUTPATIENT
Start: 2024-05-25 | End: 2024-05-28 | Stop reason: HOSPADM

## 2024-05-25 RX ORDER — POTASSIUM CHLORIDE 7.45 MG/ML
10 INJECTION INTRAVENOUS PRN
Status: DISCONTINUED | OUTPATIENT
Start: 2024-05-25 | End: 2024-05-26

## 2024-05-25 RX ORDER — MAGNESIUM SULFATE IN WATER 40 MG/ML
2000 INJECTION, SOLUTION INTRAVENOUS PRN
Status: DISCONTINUED | OUTPATIENT
Start: 2024-05-25 | End: 2024-05-28 | Stop reason: HOSPADM

## 2024-05-25 RX ORDER — CLONAZEPAM 1 MG/1
2 TABLET ORAL 2 TIMES DAILY
Status: DISCONTINUED | OUTPATIENT
Start: 2024-05-26 | End: 2024-05-28 | Stop reason: HOSPADM

## 2024-05-25 RX ORDER — SODIUM CHLORIDE 0.9 % (FLUSH) 0.9 %
5-40 SYRINGE (ML) INJECTION PRN
Status: DISCONTINUED | OUTPATIENT
Start: 2024-05-25 | End: 2024-05-28 | Stop reason: HOSPADM

## 2024-05-25 RX ORDER — ACETAMINOPHEN 325 MG/1
650 TABLET ORAL EVERY 6 HOURS PRN
Status: DISCONTINUED | OUTPATIENT
Start: 2024-05-25 | End: 2024-05-28 | Stop reason: HOSPADM

## 2024-05-25 RX ORDER — ONDANSETRON 4 MG/1
8 TABLET, ORALLY DISINTEGRATING ORAL EVERY 8 HOURS PRN
Status: DISCONTINUED | OUTPATIENT
Start: 2024-05-25 | End: 2024-05-28 | Stop reason: HOSPADM

## 2024-05-25 RX ORDER — HEPARIN SODIUM 5000 [USP'U]/ML
5000 INJECTION, SOLUTION INTRAVENOUS; SUBCUTANEOUS EVERY 8 HOURS SCHEDULED
Status: DISCONTINUED | OUTPATIENT
Start: 2024-05-26 | End: 2024-05-28 | Stop reason: HOSPADM

## 2024-05-25 RX ORDER — VENLAFAXINE HYDROCHLORIDE 37.5 MG/1
150 CAPSULE, EXTENDED RELEASE ORAL DAILY
Status: DISCONTINUED | OUTPATIENT
Start: 2024-05-26 | End: 2024-05-28 | Stop reason: HOSPADM

## 2024-05-25 RX ORDER — PANTOPRAZOLE SODIUM 40 MG/1
40 TABLET, DELAYED RELEASE ORAL
Status: DISCONTINUED | OUTPATIENT
Start: 2024-05-26 | End: 2024-05-27

## 2024-05-25 RX ORDER — SODIUM CHLORIDE 9 MG/ML
INJECTION, SOLUTION INTRAVENOUS CONTINUOUS
Status: DISCONTINUED | OUTPATIENT
Start: 2024-05-25 | End: 2024-05-26

## 2024-05-25 RX ORDER — ONDANSETRON 4 MG/1
4 TABLET, ORALLY DISINTEGRATING ORAL EVERY 8 HOURS PRN
Status: DISCONTINUED | OUTPATIENT
Start: 2024-05-25 | End: 2024-05-25

## 2024-05-25 RX ORDER — 0.9 % SODIUM CHLORIDE 0.9 %
2000 INTRAVENOUS SOLUTION INTRAVENOUS ONCE
Status: COMPLETED | OUTPATIENT
Start: 2024-05-25 | End: 2024-05-25

## 2024-05-25 RX ORDER — 0.9 % SODIUM CHLORIDE 0.9 %
1000 INTRAVENOUS SOLUTION INTRAVENOUS ONCE
Status: DISCONTINUED | OUTPATIENT
Start: 2024-05-25 | End: 2024-05-25

## 2024-05-25 RX ORDER — SODIUM CHLORIDE 9 MG/ML
25 INJECTION, SOLUTION INTRAVENOUS PRN
Status: DISCONTINUED | OUTPATIENT
Start: 2024-05-25 | End: 2024-05-28 | Stop reason: HOSPADM

## 2024-05-25 RX ORDER — ONDANSETRON 2 MG/ML
4 INJECTION INTRAMUSCULAR; INTRAVENOUS EVERY 6 HOURS PRN
Status: DISCONTINUED | OUTPATIENT
Start: 2024-05-25 | End: 2024-05-25

## 2024-05-25 RX ORDER — DEXTROSE MONOHYDRATE AND SODIUM CHLORIDE 5; .45 G/100ML; G/100ML
INJECTION, SOLUTION INTRAVENOUS CONTINUOUS PRN
Status: DISCONTINUED | OUTPATIENT
Start: 2024-05-25 | End: 2024-05-28 | Stop reason: HOSPADM

## 2024-05-25 RX ORDER — LIDOCAINE HYDROCHLORIDE 10 MG/ML
5 INJECTION, SOLUTION EPIDURAL; INFILTRATION; INTRACAUDAL; PERINEURAL ONCE
Status: DISCONTINUED | OUTPATIENT
Start: 2024-05-25 | End: 2024-05-28 | Stop reason: HOSPADM

## 2024-05-25 RX ORDER — SODIUM CHLORIDE 0.9 % (FLUSH) 0.9 %
5-40 SYRINGE (ML) INJECTION EVERY 12 HOURS SCHEDULED
Status: DISCONTINUED | OUTPATIENT
Start: 2024-05-26 | End: 2024-05-28 | Stop reason: HOSPADM

## 2024-05-25 RX ORDER — SODIUM CHLORIDE 0.9 % (FLUSH) 0.9 %
5-40 SYRINGE (ML) INJECTION EVERY 12 HOURS SCHEDULED
Status: DISCONTINUED | OUTPATIENT
Start: 2024-05-25 | End: 2024-05-28 | Stop reason: HOSPADM

## 2024-05-25 RX ORDER — SODIUM CHLORIDE 9 MG/ML
INJECTION, SOLUTION INTRAVENOUS CONTINUOUS
Status: DISCONTINUED | OUTPATIENT
Start: 2024-05-25 | End: 2024-05-28 | Stop reason: HOSPADM

## 2024-05-25 RX ORDER — LEVETIRACETAM 500 MG/5ML
1000 INJECTION, SOLUTION, CONCENTRATE INTRAVENOUS EVERY 12 HOURS
Status: DISCONTINUED | OUTPATIENT
Start: 2024-05-26 | End: 2024-05-28 | Stop reason: HOSPADM

## 2024-05-25 RX ORDER — ACETAMINOPHEN 650 MG/1
650 SUPPOSITORY RECTAL EVERY 6 HOURS PRN
Status: DISCONTINUED | OUTPATIENT
Start: 2024-05-25 | End: 2024-05-28 | Stop reason: HOSPADM

## 2024-05-25 RX ORDER — POLYETHYLENE GLYCOL 3350 17 G/17G
17 POWDER, FOR SOLUTION ORAL DAILY PRN
Status: DISCONTINUED | OUTPATIENT
Start: 2024-05-25 | End: 2024-05-28 | Stop reason: HOSPADM

## 2024-05-25 RX ADMIN — DEXTROSE AND SODIUM CHLORIDE: 5; 450 INJECTION, SOLUTION INTRAVENOUS at 21:48

## 2024-05-25 RX ADMIN — SODIUM CHLORIDE 4.7 UNITS/HR: 9 INJECTION, SOLUTION INTRAVENOUS at 20:48

## 2024-05-25 RX ADMIN — SODIUM CHLORIDE 2000 ML: 9 INJECTION, SOLUTION INTRAVENOUS at 20:19

## 2024-05-25 ASSESSMENT — PAIN - FUNCTIONAL ASSESSMENT: PAIN_FUNCTIONAL_ASSESSMENT: 0-10

## 2024-05-25 ASSESSMENT — LIFESTYLE VARIABLES
HOW OFTEN DO YOU HAVE A DRINK CONTAINING ALCOHOL: NEVER
HOW MANY STANDARD DRINKS CONTAINING ALCOHOL DO YOU HAVE ON A TYPICAL DAY: PATIENT DOES NOT DRINK

## 2024-05-25 ASSESSMENT — PAIN SCALES - GENERAL: PAINLEVEL_OUTOF10: 7

## 2024-05-25 NOTE — ED PROVIDER NOTES
[facility] EMERGENCY DEPARTMENT  EMERGENCY DEPARTMENT ENCOUNTER        Pt Name: Maribeth Loera  MRN: [id number]  Birthdate 1990  Date of evaluation: 5/25/2024  Provider: EMMA Herndon  PCP: Tosha Moreno MD  Note Started: 6:21 PM EDT 5/25/24       I have seen and evaluated this patient with my supervising physician John Sánchez DO.      CHIEF COMPLAINT       Chief Complaint   Patient presents with    Illness     FF EMS from [facility] due to illness of weakness, emesis x 5-6 days. HX of DKA, type 1 DM. States last time she was like this she ended up in DKA in the ICU. Pt diaphoretic, pale, EMS . History of seizures, took her medication this morning. Tachy upon triage       HISTORY OF PRESENT ILLNESS: 1 or more Elements     History from : Patient    Limitations to history : None    Maribeth Loera is a 34 y.o. female who presents to the emerged part due to nausea and vomiting with generalized abdominal pain that has been going on for the past 6 days.  Patient states that she has not been able to eat or drink for the past 6 days because of this.  She does have a history of type 1 diabetes and does have a history of DKA and states that she feels similar to when she had DKA in the past.  She also has a history of seizures she was able to take her medications this morning.  She has noted to have tachycardia on arrival.  Patient also states that she was having a vomiting episode when she fell backwards in the back of her head and also left arm pain.    Nursing Notes were all reviewed and agreed with or any disagreements were addressed in the HPI.    REVIEW OF SYSTEMS :      Review of Systems   Constitutional:  Negative for chills, diaphoresis and fever.   HENT:  Negative for congestion, rhinorrhea and sore throat.    Eyes:  Negative for pain and visual disturbance.   Respiratory:  Negative for cough and shortness of breath.    Cardiovascular:  Negative for chest pain and leg swelling.  107/88 (!) 112/90 115/86 113/80   Pulse: (!) 133 (!) 137 (!) 124 (!) 114   Resp: 16 10 16 15   Temp:       TempSrc:       SpO2: 97% 98%     Weight:           Patient was given the following medications:  Medications   0.9 % sodium chloride infusion (has no administration in time range)   insulin regular (HUMULIN R;NOVOLIN R) 100 Units in sodium chloride 0.9 % 100 mL infusion (1.1 Units/hr IntraVENous Rate/Dose Change 5/25/24 2144)   dextrose bolus 10% 125 mL (has no administration in time range)     Or   dextrose bolus 10% 250 mL (has no administration in time range)   potassium chloride 10 mEq/100 mL IVPB (Peripheral Line) (has no administration in time range)   magnesium sulfate 2000 mg in 50 mL IVPB premix (has no administration in time range)   sodium phosphate 15 mmol in sodium chloride 0.9 % 250 mL IVPB (has no administration in time range)   dextrose 5 % and 0.45 % sodium chloride infusion ( IntraVENous New Bag 5/25/24 2148)   0.9 % sodium chloride infusion (has no administration in time range)   lidocaine PF 1 % injection 5 mL (has no administration in time range)   sodium chloride flush 0.9 % injection 5-40 mL (has no administration in time range)   sodium chloride flush 0.9 % injection 5-40 mL (has no administration in time range)   0.9 % sodium chloride infusion (has no administration in time range)   sodium chloride 0.9 % bolus 2,000 mL (2,000 mLs IntraVENous New Bag 5/25/24 2019)       ED Course as of 05/25/24 2158   Sat May 25, 2024   1925 Corrected sodium 134 mEq/L [PE]      ED Course User Index  [PE] Adam Lloyd PA        Is this patient to be included in the SEP-1 Core Measure due to severe sepsis or septic shock?   No   Exclusion criteria - the patient is NOT to be included for SEP-1 Core Measure due to:  Infection is not suspected    CONSULTS: (Who and What was discussed)  None  Discussion with Other Profesionals : None    Social Determinants : None    Records Reviewed : None    CC/HPI

## 2024-05-25 NOTE — ED NOTES
Upon starting IV, this RN noticed a lump on her upper left arm when the blood pressure cuff was off. Pt states she fell today before the EMS got there.pt unsure of events, states her  was with her in the bathroom when this \"fall\" happened. Pt states it may have been a seizure but unsure.  EMMA Ugalde aware.

## 2024-05-26 LAB
ANION GAP SERPL CALCULATED.3IONS-SCNC: 13 MMOL/L (ref 3–16)
ANION GAP SERPL CALCULATED.3IONS-SCNC: 18 MMOL/L (ref 3–16)
ANION GAP SERPL CALCULATED.3IONS-SCNC: 20 MMOL/L (ref 3–16)
ANION GAP SERPL CALCULATED.3IONS-SCNC: 22 MMOL/L (ref 3–16)
ANION GAP SERPL CALCULATED.3IONS-SCNC: 25 MMOL/L (ref 3–16)
BACTERIA URNS QL MICRO: ABNORMAL /HPF
BILIRUB UR QL STRIP.AUTO: NEGATIVE
BUN SERPL-MCNC: 10 MG/DL (ref 7–20)
BUN SERPL-MCNC: 15 MG/DL (ref 7–20)
BUN SERPL-MCNC: 17 MG/DL (ref 7–20)
BUN SERPL-MCNC: 6 MG/DL (ref 7–20)
BUN SERPL-MCNC: 8 MG/DL (ref 7–20)
CALCIUM SERPL-MCNC: 8.2 MG/DL (ref 8.3–10.6)
CALCIUM SERPL-MCNC: 8.2 MG/DL (ref 8.3–10.6)
CALCIUM SERPL-MCNC: 8.3 MG/DL (ref 8.3–10.6)
CALCIUM SERPL-MCNC: 8.5 MG/DL (ref 8.3–10.6)
CALCIUM SERPL-MCNC: 8.7 MG/DL (ref 8.3–10.6)
CHLORIDE SERPL-SCNC: 100 MMOL/L (ref 99–110)
CHLORIDE SERPL-SCNC: 101 MMOL/L (ref 99–110)
CHLORIDE SERPL-SCNC: 101 MMOL/L (ref 99–110)
CHLORIDE SERPL-SCNC: 106 MMOL/L (ref 99–110)
CHLORIDE SERPL-SCNC: 106 MMOL/L (ref 99–110)
CLARITY UR: CLEAR
CO2 SERPL-SCNC: 11 MMOL/L (ref 21–32)
CO2 SERPL-SCNC: 12 MMOL/L (ref 21–32)
CO2 SERPL-SCNC: 14 MMOL/L (ref 21–32)
CO2 SERPL-SCNC: 17 MMOL/L (ref 21–32)
CO2 SERPL-SCNC: 8 MMOL/L (ref 21–32)
COLOR UR: YELLOW
CREAT SERPL-MCNC: 0.6 MG/DL (ref 0.6–1.1)
CREAT SERPL-MCNC: 0.6 MG/DL (ref 0.6–1.1)
CREAT SERPL-MCNC: 0.8 MG/DL (ref 0.6–1.1)
CREAT SERPL-MCNC: 0.9 MG/DL (ref 0.6–1.1)
CREAT SERPL-MCNC: 0.9 MG/DL (ref 0.6–1.1)
DEPRECATED RDW RBC AUTO: 13.9 % (ref 12.4–15.4)
EKG ATRIAL RATE: 138 BPM
EKG DIAGNOSIS: NORMAL
EKG P AXIS: 64 DEGREES
EKG P-R INTERVAL: 128 MS
EKG Q-T INTERVAL: 360 MS
EKG QRS DURATION: 64 MS
EKG QTC CALCULATION (BAZETT): 545 MS
EKG R AXIS: 76 DEGREES
EKG T AXIS: 56 DEGREES
EKG VENTRICULAR RATE: 138 BPM
EPI CELLS #/AREA URNS AUTO: 6 /HPF (ref 0–5)
EST. AVERAGE GLUCOSE BLD GHB EST-MCNC: 188.6 MG/DL
GFR SERPLBLD CREATININE-BSD FMLA CKD-EPI: 85 ML/MIN/{1.73_M2}
GFR SERPLBLD CREATININE-BSD FMLA CKD-EPI: 85 ML/MIN/{1.73_M2}
GFR SERPLBLD CREATININE-BSD FMLA CKD-EPI: >90 ML/MIN/{1.73_M2}
GLUCOSE BLD-MCNC: 106 MG/DL (ref 70–99)
GLUCOSE BLD-MCNC: 109 MG/DL (ref 70–99)
GLUCOSE BLD-MCNC: 115 MG/DL (ref 70–99)
GLUCOSE BLD-MCNC: 115 MG/DL (ref 70–99)
GLUCOSE BLD-MCNC: 122 MG/DL (ref 70–99)
GLUCOSE BLD-MCNC: 123 MG/DL (ref 70–99)
GLUCOSE BLD-MCNC: 129 MG/DL (ref 70–99)
GLUCOSE BLD-MCNC: 130 MG/DL (ref 70–99)
GLUCOSE BLD-MCNC: 130 MG/DL (ref 70–99)
GLUCOSE BLD-MCNC: 151 MG/DL (ref 70–99)
GLUCOSE BLD-MCNC: 191 MG/DL (ref 70–99)
GLUCOSE BLD-MCNC: 201 MG/DL (ref 70–99)
GLUCOSE BLD-MCNC: 217 MG/DL (ref 70–99)
GLUCOSE BLD-MCNC: 273 MG/DL (ref 70–99)
GLUCOSE BLD-MCNC: 276 MG/DL (ref 70–99)
GLUCOSE BLD-MCNC: 306 MG/DL (ref 70–99)
GLUCOSE BLD-MCNC: 310 MG/DL (ref 70–99)
GLUCOSE BLD-MCNC: 317 MG/DL (ref 70–99)
GLUCOSE BLD-MCNC: 346 MG/DL (ref 70–99)
GLUCOSE BLD-MCNC: 352 MG/DL (ref 70–99)
GLUCOSE SERPL-MCNC: 122 MG/DL (ref 70–99)
GLUCOSE SERPL-MCNC: 139 MG/DL (ref 70–99)
GLUCOSE SERPL-MCNC: 262 MG/DL (ref 70–99)
GLUCOSE SERPL-MCNC: 336 MG/DL (ref 70–99)
GLUCOSE SERPL-MCNC: 438 MG/DL (ref 70–99)
GLUCOSE UR STRIP.AUTO-MCNC: >=1000 MG/DL
HBA1C MFR BLD: 8.2 %
HCT VFR BLD AUTO: 34.6 % (ref 36–48)
HGB BLD-MCNC: 11.8 G/DL (ref 12–16)
HGB UR QL STRIP.AUTO: NEGATIVE
HYALINE CASTS #/AREA URNS AUTO: 40 /LPF (ref 0–8)
KETONES UR STRIP.AUTO-MCNC: 40 MG/DL
LACTATE BLDV-SCNC: 0.7 MMOL/L (ref 0.4–1.9)
LACTATE BLDV-SCNC: 0.9 MMOL/L (ref 0.4–2)
LACTATE BLDV-SCNC: 3.3 MMOL/L (ref 0.4–1.9)
LEUKOCYTE ESTERASE UR QL STRIP.AUTO: NEGATIVE
LEVETIRACETAM SERPL-MCNC: <2 UG/ML (ref 6–46)
MAGNESIUM SERPL-MCNC: 1.6 MG/DL (ref 1.8–2.4)
MAGNESIUM SERPL-MCNC: 1.6 MG/DL (ref 1.8–2.4)
MAGNESIUM SERPL-MCNC: 1.8 MG/DL (ref 1.8–2.4)
MAGNESIUM SERPL-MCNC: 2.1 MG/DL (ref 1.8–2.4)
MAGNESIUM SERPL-MCNC: 2.1 MG/DL (ref 1.8–2.4)
MCH RBC QN AUTO: 29.9 PG (ref 26–34)
MCHC RBC AUTO-ENTMCNC: 34.1 G/DL (ref 31–36)
MCV RBC AUTO: 87.8 FL (ref 80–100)
MEDICATION DOSE-MCNC: ABNORMAL
NITRITE UR QL STRIP.AUTO: NEGATIVE
PERFORMED ON: ABNORMAL
PH UR STRIP.AUTO: 6 [PH] (ref 5–8)
PHOSPHATE SERPL-MCNC: 0.8 MG/DL (ref 2.5–4.9)
PHOSPHATE SERPL-MCNC: 2 MG/DL (ref 2.5–4.9)
PHOSPHATE SERPL-MCNC: 2.3 MG/DL (ref 2.5–4.9)
PHOSPHATE SERPL-MCNC: 3.2 MG/DL (ref 2.5–4.9)
PHOSPHATE SERPL-MCNC: 3.5 MG/DL (ref 2.5–4.9)
PLATELET # BLD AUTO: 206 K/UL (ref 135–450)
PMV BLD AUTO: 8.9 FL (ref 5–10.5)
POTASSIUM SERPL-SCNC: 2.7 MMOL/L (ref 3.5–5.1)
POTASSIUM SERPL-SCNC: 3.3 MMOL/L (ref 3.5–5.1)
POTASSIUM SERPL-SCNC: 3.8 MMOL/L (ref 3.5–5.1)
PROT UR STRIP.AUTO-MCNC: 100 MG/DL
RBC # BLD AUTO: 3.94 M/UL (ref 4–5.2)
RBC CLUMPS #/AREA URNS AUTO: 1 /HPF (ref 0–4)
SODIUM SERPL-SCNC: 133 MMOL/L (ref 136–145)
SODIUM SERPL-SCNC: 133 MMOL/L (ref 136–145)
SODIUM SERPL-SCNC: 134 MMOL/L (ref 136–145)
SODIUM SERPL-SCNC: 136 MMOL/L (ref 136–145)
SODIUM SERPL-SCNC: 138 MMOL/L (ref 136–145)
SP GR UR STRIP.AUTO: 1.02 (ref 1–1.03)
UA COMPLETE W REFLEX CULTURE PNL UR: ABNORMAL
UA DIPSTICK W REFLEX MICRO PNL UR: YES
URN SPEC COLLECT METH UR: ABNORMAL
UROBILINOGEN UR STRIP-ACNC: 1 E.U./DL
WBC # BLD AUTO: 8.5 K/UL (ref 4–11)
WBC #/AREA URNS AUTO: 2 /HPF (ref 0–5)

## 2024-05-26 PROCEDURE — 6360000002 HC RX W HCPCS: Performed by: INTERNAL MEDICINE

## 2024-05-26 PROCEDURE — 2000000000 HC ICU R&B

## 2024-05-26 PROCEDURE — 83605 ASSAY OF LACTIC ACID: CPT

## 2024-05-26 PROCEDURE — C1751 CATH, INF, PER/CENT/MIDLINE: HCPCS

## 2024-05-26 PROCEDURE — 05HY33Z INSERTION OF INFUSION DEVICE INTO UPPER VEIN, PERCUTANEOUS APPROACH: ICD-10-PCS | Performed by: INTERNAL MEDICINE

## 2024-05-26 PROCEDURE — 2580000003 HC RX 258: Performed by: INTERNAL MEDICINE

## 2024-05-26 PROCEDURE — 84100 ASSAY OF PHOSPHORUS: CPT

## 2024-05-26 PROCEDURE — 81001 URINALYSIS AUTO W/SCOPE: CPT

## 2024-05-26 PROCEDURE — 36569 INSJ PICC 5 YR+ W/O IMAGING: CPT

## 2024-05-26 PROCEDURE — 85027 COMPLETE CBC AUTOMATED: CPT

## 2024-05-26 PROCEDURE — 2580000003 HC RX 258: Performed by: NURSE PRACTITIONER

## 2024-05-26 PROCEDURE — 2500000003 HC RX 250 WO HCPCS: Performed by: INTERNAL MEDICINE

## 2024-05-26 PROCEDURE — 80048 BASIC METABOLIC PNL TOTAL CA: CPT

## 2024-05-26 PROCEDURE — 93010 ELECTROCARDIOGRAM REPORT: CPT | Performed by: INTERNAL MEDICINE

## 2024-05-26 PROCEDURE — 83735 ASSAY OF MAGNESIUM: CPT

## 2024-05-26 PROCEDURE — 6360000002 HC RX W HCPCS: Performed by: NURSE PRACTITIONER

## 2024-05-26 PROCEDURE — 6370000000 HC RX 637 (ALT 250 FOR IP): Performed by: INTERNAL MEDICINE

## 2024-05-26 PROCEDURE — 99291 CRITICAL CARE FIRST HOUR: CPT | Performed by: INTERNAL MEDICINE

## 2024-05-26 PROCEDURE — 6370000000 HC RX 637 (ALT 250 FOR IP): Performed by: NURSE PRACTITIONER

## 2024-05-26 PROCEDURE — 2500000003 HC RX 250 WO HCPCS: Performed by: NURSE PRACTITIONER

## 2024-05-26 RX ORDER — SODIUM CHLORIDE, SODIUM LACTATE, POTASSIUM CHLORIDE, AND CALCIUM CHLORIDE .6; .31; .03; .02 G/100ML; G/100ML; G/100ML; G/100ML
1000 INJECTION, SOLUTION INTRAVENOUS ONCE
Status: COMPLETED | OUTPATIENT
Start: 2024-05-26 | End: 2024-05-26

## 2024-05-26 RX ORDER — POTASSIUM CHLORIDE 29.8 MG/ML
20 INJECTION INTRAVENOUS ONCE
Status: DISCONTINUED | OUTPATIENT
Start: 2024-05-26 | End: 2024-05-26

## 2024-05-26 RX ORDER — POTASSIUM CHLORIDE 29.8 MG/ML
20 INJECTION INTRAVENOUS PRN
Status: DISCONTINUED | OUTPATIENT
Start: 2024-05-26 | End: 2024-05-28 | Stop reason: HOSPADM

## 2024-05-26 RX ORDER — SODIUM CHLORIDE, SODIUM LACTATE, POTASSIUM CHLORIDE, AND CALCIUM CHLORIDE .6; .31; .03; .02 G/100ML; G/100ML; G/100ML; G/100ML
500 INJECTION, SOLUTION INTRAVENOUS ONCE
Status: COMPLETED | OUTPATIENT
Start: 2024-05-26 | End: 2024-05-26

## 2024-05-26 RX ADMIN — Medication 30 ML: at 11:41

## 2024-05-26 RX ADMIN — DEXTROSE AND SODIUM CHLORIDE: 5; 450 INJECTION, SOLUTION INTRAVENOUS at 22:38

## 2024-05-26 RX ADMIN — POTASSIUM CHLORIDE 10 MEQ: 7.46 INJECTION, SOLUTION INTRAVENOUS at 07:17

## 2024-05-26 RX ADMIN — ACETAMINOPHEN 650 MG: 325 TABLET ORAL at 20:30

## 2024-05-26 RX ADMIN — POTASSIUM CHLORIDE 20 MEQ: 29.8 INJECTION, SOLUTION INTRAVENOUS at 19:17

## 2024-05-26 RX ADMIN — PANTOPRAZOLE SODIUM 40 MG: 40 TABLET, DELAYED RELEASE ORAL at 05:09

## 2024-05-26 RX ADMIN — LEVETIRACETAM 1000 MG: 100 INJECTION, SOLUTION INTRAVENOUS at 11:41

## 2024-05-26 RX ADMIN — VENLAFAXINE HYDROCHLORIDE 150 MG: 37.5 CAPSULE, EXTENDED RELEASE ORAL at 09:34

## 2024-05-26 RX ADMIN — CLONAZEPAM 2 MG: 1 TABLET ORAL at 21:25

## 2024-05-26 RX ADMIN — POTASSIUM CHLORIDE 20 MEQ: 29.8 INJECTION, SOLUTION INTRAVENOUS at 17:24

## 2024-05-26 RX ADMIN — DEXTROSE AND SODIUM CHLORIDE: 5; 450 INJECTION, SOLUTION INTRAVENOUS at 05:09

## 2024-05-26 RX ADMIN — SODIUM CHLORIDE, PRESERVATIVE FREE 10 ML: 5 INJECTION INTRAVENOUS at 21:26

## 2024-05-26 RX ADMIN — HEPARIN SODIUM 5000 UNITS: 5000 INJECTION INTRAVENOUS; SUBCUTANEOUS at 05:10

## 2024-05-26 RX ADMIN — POTASSIUM CHLORIDE 10 MEQ: 7.46 INJECTION, SOLUTION INTRAVENOUS at 08:19

## 2024-05-26 RX ADMIN — SODIUM CHLORIDE, POTASSIUM CHLORIDE, SODIUM LACTATE AND CALCIUM CHLORIDE 500 ML: 600; 310; 30; 20 INJECTION, SOLUTION INTRAVENOUS at 09:34

## 2024-05-26 RX ADMIN — DEXTROSE AND SODIUM CHLORIDE: 5; 450 INJECTION, SOLUTION INTRAVENOUS at 15:54

## 2024-05-26 RX ADMIN — HEPARIN SODIUM 5000 UNITS: 5000 INJECTION INTRAVENOUS; SUBCUTANEOUS at 01:11

## 2024-05-26 RX ADMIN — ONDANSETRON 8 MG: 4 TABLET, ORALLY DISINTEGRATING ORAL at 15:27

## 2024-05-26 RX ADMIN — MAGNESIUM SULFATE HEPTAHYDRATE 2000 MG: 40 INJECTION, SOLUTION INTRAVENOUS at 20:33

## 2024-05-26 RX ADMIN — ACETAMINOPHEN 650 MG: 325 TABLET ORAL at 05:21

## 2024-05-26 RX ADMIN — POTASSIUM PHOSPHATE, MONOBASIC POTASSIUM PHOSPHATE, DIBASIC 15 MMOL: 224; 236 INJECTION, SOLUTION, CONCENTRATE INTRAVENOUS at 06:02

## 2024-05-26 RX ADMIN — MAGNESIUM SULFATE HEPTAHYDRATE 2000 MG: 40 INJECTION, SOLUTION INTRAVENOUS at 03:38

## 2024-05-26 RX ADMIN — POTASSIUM CHLORIDE 20 MEQ: 29.8 INJECTION, SOLUTION INTRAVENOUS at 22:39

## 2024-05-26 RX ADMIN — ACETAMINOPHEN 650 MG: 325 TABLET ORAL at 12:42

## 2024-05-26 RX ADMIN — POTASSIUM CHLORIDE 10 MEQ: 7.46 INJECTION, SOLUTION INTRAVENOUS at 13:49

## 2024-05-26 RX ADMIN — ONDANSETRON 4 MG: 2 INJECTION INTRAMUSCULAR; INTRAVENOUS at 20:38

## 2024-05-26 RX ADMIN — HEPARIN SODIUM 5000 UNITS: 5000 INJECTION INTRAVENOUS; SUBCUTANEOUS at 13:48

## 2024-05-26 RX ADMIN — POTASSIUM PHOSPHATE, MONOBASIC AND POTASSIUM PHOSPHATE, DIBASIC 15 MMOL: 224; 236 INJECTION, SOLUTION INTRAVENOUS at 10:54

## 2024-05-26 RX ADMIN — POTASSIUM CHLORIDE 10 MEQ: 7.46 INJECTION, SOLUTION INTRAVENOUS at 10:25

## 2024-05-26 RX ADMIN — SODIUM CHLORIDE, POTASSIUM CHLORIDE, SODIUM LACTATE AND CALCIUM CHLORIDE 1000 ML: 600; 310; 30; 20 INJECTION, SOLUTION INTRAVENOUS at 12:14

## 2024-05-26 RX ADMIN — ONDANSETRON 8 MG: 4 TABLET, ORALLY DISINTEGRATING ORAL at 09:35

## 2024-05-26 RX ADMIN — CLONAZEPAM 2 MG: 1 TABLET ORAL at 09:34

## 2024-05-26 RX ADMIN — HEPARIN SODIUM 5000 UNITS: 5000 INJECTION INTRAVENOUS; SUBCUTANEOUS at 21:30

## 2024-05-26 RX ADMIN — LEVETIRACETAM 1000 MG: 100 INJECTION, SOLUTION INTRAVENOUS at 01:10

## 2024-05-26 RX ADMIN — POTASSIUM PHOSPHATE, MONOBASIC POTASSIUM PHOSPHATE, DIBASIC 15 MMOL: 224; 236 INJECTION, SOLUTION, CONCENTRATE INTRAVENOUS at 22:16

## 2024-05-26 RX ADMIN — POTASSIUM CHLORIDE 10 MEQ: 7.46 INJECTION, SOLUTION INTRAVENOUS at 06:05

## 2024-05-26 RX ADMIN — ONDANSETRON 4 MG: 2 INJECTION INTRAMUSCULAR; INTRAVENOUS at 01:10

## 2024-05-26 ASSESSMENT — PAIN DESCRIPTION - LOCATION: LOCATION: HEAD

## 2024-05-26 ASSESSMENT — PAIN SCALES - GENERAL
PAINLEVEL_OUTOF10: 3
PAINLEVEL_OUTOF10: 3
PAINLEVEL_OUTOF10: 1
PAINLEVEL_OUTOF10: 0
PAINLEVEL_OUTOF10: 8
PAINLEVEL_OUTOF10: 8

## 2024-05-26 NOTE — PLAN OF CARE
Monitor electrolytes   Monitor blood sugar   Problem: Discharge Planning  Goal: Discharge to home or other facility with appropriate resources  Outcome: Progressing     Problem: Pain  Goal: Verbalizes/displays adequate comfort level or baseline comfort level  Outcome: Progressing     Problem: Safety - Adult  Goal: Free from fall injury  Outcome: Progressing     Problem: Risk for Elopement  Goal: Patient will not exit the unit/facility without proper excort  Outcome: Progressing

## 2024-05-26 NOTE — PROGRESS NOTES
4 Eyes Skin Assessment     NAME:  Maribeth Loera  YOB: 1990  MEDICAL RECORD NUMBER:  [id number]    The patient is being assessed for  Admission    I agree that at least one RN has performed a thorough Head to Toe Skin Assessment on the patient. ALL assessment sites listed below have been assessed.      Areas assessed by both nurses:    Head, Face, Ears, Shoulders, Back, Chest, Arms, Elbows, Hands, Sacrum. Buttock, Coccyx, Ischium, and Legs. Feet and Heels        Does the Patient have a Wound? No noted wound(s)       Luis Daniel Prevention initiated by RN: No  Wound Care Orders initiated by RN: No    Pressure Injury (Stage 3,4, Unstageable, DTI, NWPT, and Complex wounds) if present, place Wound referral order by RN under : Yes    New Ostomies, if present place, Ostomy referral order under : No     Nurse 1 eSignature: Electronically signed by LULA RUBIN RN on 5/26/24 at 6:27 AM EDT    **SHARE this note so that the co-signing nurse can place an eSignature**    Nurse 2 eSignature: Electronically signed by Bijal Whitman RN on 5/27/24 at 5:14 AM EDT

## 2024-05-26 NOTE — PROGRESS NOTES
Arrived to place PICC line with bedside RN Will. Pre-procedure and timeout done with RN, discussed limitations of placement and allergies. Consent confirmed. Vital signs stable. Labs, allergies, medications, and code status reviewed. No contraindications noted.     Procedure explained to pt, including the risk and benefits of the procedure. All questions answered. Pt verbalizes understanding of the procedure and states no more questions.       Pt's basilic, brachial, and cephalic are all easily collapsible with no indication for a clot. Vein selected is large enough for catheter. Pt tolerated sterile procedure well, with no difficulty accessing basilic vein, when accessed - blood was free flowing and non-pulsatile. Guidewire, introducer, and catheter went in smoothly. PICC line verified with 3CG technology with peaked P-waves (please see image below).      Nurses:  OK to use PICC.    Please replace all existing IV tubing with new IV tubing prior to using the PICC for current IV infusions.  Please remove any PIVs from PICC arm.  All of the above may be sources of infection or an increase chance of a clot.      Post procedure - reorganized pt table, placed pt in lowest position, with call light and educated on line care. Instructed pt/RN not to use arm for at least 30min to avoid bleeding. Reported off to bedside RN.      If you have any questions please call number below and dispatch will direct you to the PICC RN that is on call.    [phone]

## 2024-05-26 NOTE — H&P
V2.0  History and Physical      Name:  Maribeth Loera /Age/Sex: 1990  (34 y.o. female)   MRN & CSN:  [id number] & [id number] Encounter Date/Time: 2024 10:26 PM EDT   Location:  ED- PCP: Tosha Moreno MD       Hospital Day: 1    Assessment and Plan:   Maribeth Loera is a 34 y.o. female with a pmh of type 1 diabetes, seizure disorder, ROME, depression, and gastroparesis who presents with nausea and vomiting of 5 to 6 days duration.  Found to be in DKA.  Hospital Problems             Last Modified POA    * (Principal) Diabetic ketoacidosis without coma associated with type 1 diabetes mellitus (HCC) 2024 Yes       Plan:  # Diabetic ketoacidosis in type I diabetic.   # Intractable nausea and vomiting due to above.  Reports compliance with her insulins.  She states she had an insulin pump that was removed in April and is scheduled for insulin pump placement on Monday.  -Admit to ICU.  -Start DKA insulin drip protocol.  -BMP, mag, Phos every 4 hours.  -Consult intensivist.  -Replace electrolytes as needed.    # Pseudohyponatremia due to above.  Sodium of 128.  Corrected sodium of 134.  Will improve with DKA correction and IV fluids.  Continue BMP every 4 hours.    # Lactic acidosis.  Likely due to anxiety.  Continue IV fluids and trend lactate.    # Acute kidney injury.  Likely due to volume depletion  Nausea and vomiting and DKA.  Given bolus fluids.  Continue maintenance IV fluids and repeat labs in AM.  If with worsening renal function consider nephrology consultation.    # Fall, initial encounter.  Reports hitting her head on the table.  No loss of consciousness.  CT head without acute findings.  Fall precautions.  Neurochecks every 4 hours.    # Left elbow pain due to fall.  X-ray left elbow and left humerus without acute fractures or dislocations.  Pain control.    # Sinus tachycardia.  Likely due to volume depletion.  Continue IV fluids.  Telemetry.      Disposition:   Current Living

## 2024-05-26 NOTE — ED PROVIDER NOTES
In addition to the advanced practice provider, I personally saw Maribeth Loera and performed a substantive portion of the visit including all aspects of the medical decision making. I made/approved the management plan and take responsibility for the patient management    Briefly, this is a 34 y.o. female here for generalized weakness and malaise.  Patient with history of diabetes and frequent hospital admissions for DKA.  Patient reports diabetic gastroparesis as well with crampy diffuse abdominal pain and vomiting.  She reports adherence to her insulin.    On exam, patient afebrile, ill-appearing however nontoxic. No theodore painful or respiratory distress.  Mucous membranes parched heart tachycardic, regular rhythm. Lungs CTAB. Abdomen soft, nondistended, mild tenderness palpation diffusely without focal findings.  No rebound, no rigidity, no guarding.  Alert, fully oriented.  Moves all extremities spontaneously.    EKG  EKG was reviewed by emergency department physician in the absence of a cardiologist    Narrow complex sinus rhythm, rate 138, normal axis, normal DC and QRS intervals, prolonged Qtc, no specific ST elevations or depressions, normal t-wave morphology, impression sinus tachycardia with prolonged QTc, no STEMI      Screenings   [place] Coma Scale  Eye Opening: Spontaneous  Best Verbal Response: Oriented  Best Motor Response: Obeys commands  [place] Coma Scale Score: 15        MDM    Patient afebrile however ill-appearing, clinically appears severely volume depleted.  She is alert and protecting her airway.  Hemodynamically stable.  EKG with sinus tachycardia, no STEMI or malignant dysrhythmia.  Labs workup with severe metabolic acidosis and hyperglycemia, suspect likely DKA.  No clear findings to suggest infection.  Patient received IV fluid resuscitation and will initiate on insulin infusion.  No clinical findings to suggest cerebral edema, however patient did report head injury and received CT head

## 2024-05-26 NOTE — PROGRESS NOTES
V2.0    [facility] Progress Note      Name:  Maribeth Loera /Age/Sex: 1990  (34 y.o. female)   MRN & CSN:  [id number] & [id number] Encounter Date/Time: 2024 6:02 PM EDT   Location:  ICU-3906/3906-01 PCP: Tosha Moreno MD     Attending:Herlinda Maciel MD       Hospital Day: 2    Assessment and Recommendations   Maribeth Loera is a 34 y.o. female with pmh of type 1 diabetes, seizure disorder, ROME, depression, and gastroparesis who presented following a fall at home with c/o generalized weakness, nausea and vomiting of 5 to 6 days duration. Found to be in DKA with severe dehydration. Reported compliance with her insulins. She previously had an insulin pump that was removed in April and is scheduled for insulin pump placement on Monday.  She was started on IVF, insulin drip and admitted to ICU.    Plan:     Diabetic ketoacidosis in type I diabetic:  Continue DKA protocol with IVF and insulin drip.  Continue NPO  Monitor BMP and electrolytes every 4 hours.  Will discharge on basal/prandial insulin with outpatient follow-up with endocrinologist for insulin pump placement.      History of DM gastroparesis with intractable nausea and vomiting:  Continue Reglan as needed.     Lactic acidosis: Resolved. Continue IVF.    Acute kidney injury: Resolved.   Likely due to volume depletion.  Peak SCr 1.4.  Baseline Scr < 0.5.      Fall, initial encounter:  Reports hitting her head on the table.    No loss of consciousness. CT head without acute findings.    Fall precautions.         Left elbow pain due to fall:  X-ray left elbow and left humerus without acute fractures or dislocations.  Pain control.       Sinus tachycardia: Improved  Likely due to volume depletion.   Continue IV fluids.     Seizure disorder: Continue Keppra.  Seizure precautions.    GERD: Continue PPI.    Anxiety: Stable on home medications.      Diet Diet NPO   DVT Prophylaxis [x] Lovenox, []  Heparin, [] SCDs, [] Ambulation,  [] Eliquis, []  condition->Emergency Medical Condition (MA) FINDINGS: BRAIN/VENTRICLES: No mass effect or midline shift.  No hydrocephalus.  Gray matter white matter differentiation is preserved.  No definite acute hemorrhage.  Left basal ganglia calcification, similar to prior. ORBITS: The visualized portion of the orbits demonstrate no acute abnormality. SINUSES: The visualized paranasal sinuses and mastoid air cells demonstrate no acute abnormality. SOFT TISSUES/SKULL:  Extensive soft tissue emphysema is demonstrated of soft tissues of the nasopharynx and right parapharyngeal soft tissues on the inferior-most images.     Soft tissue emphysema of the posterior wall of the nasopharynx and right parapharyngeal soft tissues, incompletely imaged.  This could be due to local trauma or could be tracking from the neck or chest.  Soft tissue infection is another differential consideration.     XR ELBOW LEFT (MIN 3 VIEWS)    Result Date: 5/25/2024  EXAMINATION: TWO XRAY VIEWS OF THE LEFT HUMERUS; THREE XRAY VIEWS OF THE LEFT ELBOW 5/25/2024 7:30 pm COMPARISON: None. HISTORY: ORDERING SYSTEM PROVIDED HISTORY: distal humerus pain and swelling from fall TECHNOLOGIST PROVIDED HISTORY: Reason for exam:->distal humerus pain and swelling from fall Reason for Exam: fall, pain; ORDERING SYSTEM PROVIDED HISTORY: pain and swelling TECHNOLOGIST PROVIDED HISTORY: Reason for exam:->pain and swelling Reason for Exam: Pain FINDINGS: No acute fracture or dislocation of the left humerus or elbow was identified. The visualized shoulder and elbow joints are maintained.  No elbow joint effusion is identified.  Soft tissue swelling is noted about the mid/distal humeral shaft.     No acute fracture or dislocation of the humerus or elbow is identified.     XR HUMERUS LEFT (MIN 2 VIEWS)    Result Date: 5/25/2024  EXAMINATION: TWO XRAY VIEWS OF THE LEFT HUMERUS; THREE XRAY VIEWS OF THE LEFT ELBOW 5/25/2024 7:30 pm COMPARISON: None. HISTORY: ORDERING SYSTEM

## 2024-05-26 NOTE — CONSULTS
PULMONARY AND CRITICAL CARE MEDICINE CONSULT NOTE      Name: Maribeth Loera  Sex: female  : 1990  MRN: [id number]  Admission Date: 2024  Admission Diagnosis: Tachycardia [R00.0]  PRANEETH (acute kidney injury) (HCC) [N17.9]  Injury of head, initial encounter [S09.90XA]  Fall, initial encounter [W19.XXXA]  Diabetic ketoacidosis without coma associated with type 1 diabetes mellitus (HCC) [E10.10]  Type 1 diabetes mellitus with ketoacidosis without coma (HCC) [E10.10]      HPI: Patient is a 34 y.o. female with past medical history significant for seizure disorder, type 1 diabetes mellitus, anxiety/depression, recent dental infection who was brought in by EMS for nausea and nonbloody vomiting for last 5 to 6 days.  Patient is well-known to [facility] as she has had frequent admissions in the last several months.  Found to be in DKA in the ER.  Extremely dehydrated.  Also had a fall at home and hit her head on the table.  No trauma incurred.  Unclear if she has been taking her insulin regularly.   Patient was noted to be tachycardic and tachypneic.  Lab work was suggestive of diabetic ketoacidosis with blood sugars closer to 350..  Patient was started on insulin drip and admitted to ICU.    On my evaluation, lying in bed in no apparent respiratory distress.  Reports that she is still feeling nauseated and has some abdominal discomfort.  Has not had any vomiting.  Remains on room air.  Mentation close to her baseline.  Does feel thirsty.    8 point review of system is negative except that mentioned in the HPI.      Past Medical History:   Diagnosis Date    Depression     Diabetes mellitus (HCC)     Diabetic ketoacidosis without coma associated with type 1 diabetes mellitus (HCC) 2022    Seizures (HCC)      Past Surgical History:   Procedure Laterality Date     SECTION      TUBAL LIGATION       Social History     Socioeconomic History    Marital status:      Spouse  all quadrants, non-distended, without hepatosplenomegaly.   GENITOURINARY: Deferred.   MUSCULOSKELETAL: No tenderness to palpation of the axial skeleton. There is no clubbing. No cyanosis. No edema of the lower extremities.   SKIN OF BODY: No rash or jaundice.   PSYCHIATRIC EVALUATION: Appears emotionally flat.  HEMATOLOGIC/LYMPHATIC/ IMMUNOLOGIC: No palpable lymphadenopathy.  NEUROLOGIC: Altered and lethargic, does not follow commands.Groslly non-focal. Motor strength is 5+/5 in all muscle groups. The patient has a normal sensorium globally.     LABS:    Recent Labs     05/25/24 1827 05/26/24  1039   WBC 10.8 8.5   RBC 5.20 3.94*   HGB 15.7 11.8*   HCT 46.8 34.6*   MCH 30.2 29.9   MCHC 33.5 34.1   RDW 13.8 13.9    206   MPV 8.8 8.9   NEUTOPHILPCT 83.2  --    LYMPHS 1.0  --    MONOPCT 7.1  --    BASOPCT 0.2  --      Recent Labs     05/25/24 1827 05/26/24  0139 05/26/24  0512 05/26/24  1052   * 134* 138 133*   K 4.1 3.8 2.7* 3.8   CL 92* 101 106 100   ANIONGAP 27* 25* 18* 22*   CO2 9* 8* 14* 11*   BUN 20 17 15 10   CREATININE 1.4* 0.9 0.9 0.8   CALCIUM 10.6 8.3 8.7 8.5   ALBUMIN 4.9  --   --   --    BILITOT 0.7  --   --   --    ALKPHOS 95  --   --   --    ALT 15  --   --   --    AST 24  --   --   --    GLUCOSE 339* 438* 139* 262*     No results for input(s): \"PHART\", \"WTP9PTH\", \"PO2ART\", \"QDB6EPM\", \"K1KZDMLG\", \"BEART\", \"K0RNDNGQ\" in the last 72 hours.      IMAGING:  ONE XRAY VIEW OF THE CHEST 4/6/2024 4:52 pm   IMPRESSION:  No acute lung disease      IMPRESSION:    Diabetic ketoacidosis  Severe metabolic acidosis  Seizure disorder  Anxiety/depression  Type 1 diabetes  Hypovolemia  Hypokalemia  Hypophosphatemia      PLAN:    -Patient presented with  diabetic ketoacidosis with blood sugars closer to 300 and metabolic acidosis.  -Patient has been initiated on insulin drip per DKA protocol.    -Will aggressively fluid resuscitate the patient.  Give her 2 L of LR back-to-back.    -Continue on D5 half NS at

## 2024-05-27 LAB
ANION GAP SERPL CALCULATED.3IONS-SCNC: 10 MMOL/L (ref 3–16)
ANION GAP SERPL CALCULATED.3IONS-SCNC: 13 MMOL/L (ref 3–16)
ANION GAP SERPL CALCULATED.3IONS-SCNC: 14 MMOL/L (ref 3–16)
BUN SERPL-MCNC: 3 MG/DL (ref 7–20)
BUN SERPL-MCNC: 3 MG/DL (ref 7–20)
BUN SERPL-MCNC: 4 MG/DL (ref 7–20)
BUN SERPL-MCNC: <2 MG/DL (ref 7–20)
CALCIUM SERPL-MCNC: 7.9 MG/DL (ref 8.3–10.6)
CALCIUM SERPL-MCNC: 7.9 MG/DL (ref 8.3–10.6)
CALCIUM SERPL-MCNC: 8 MG/DL (ref 8.3–10.6)
CALCIUM SERPL-MCNC: 8 MG/DL (ref 8.3–10.6)
CALCIUM SERPL-MCNC: 8.3 MG/DL (ref 8.3–10.6)
CALCIUM SERPL-MCNC: 8.5 MG/DL (ref 8.3–10.6)
CHLORIDE SERPL-SCNC: 101 MMOL/L (ref 99–110)
CHLORIDE SERPL-SCNC: 103 MMOL/L (ref 99–110)
CHLORIDE SERPL-SCNC: 105 MMOL/L (ref 99–110)
CHLORIDE SERPL-SCNC: 105 MMOL/L (ref 99–110)
CHLORIDE SERPL-SCNC: 106 MMOL/L (ref 99–110)
CHLORIDE SERPL-SCNC: 106 MMOL/L (ref 99–110)
CO2 SERPL-SCNC: 17 MMOL/L (ref 21–32)
CO2 SERPL-SCNC: 18 MMOL/L (ref 21–32)
CO2 SERPL-SCNC: 19 MMOL/L (ref 21–32)
CO2 SERPL-SCNC: 19 MMOL/L (ref 21–32)
CO2 SERPL-SCNC: 20 MMOL/L (ref 21–32)
CO2 SERPL-SCNC: 23 MMOL/L (ref 21–32)
CREAT SERPL-MCNC: 0.6 MG/DL (ref 0.6–1.1)
CREAT SERPL-MCNC: <0.5 MG/DL (ref 0.6–1.1)
GFR SERPLBLD CREATININE-BSD FMLA CKD-EPI: >90 ML/MIN/{1.73_M2}
GLUCOSE BLD-MCNC: 103 MG/DL (ref 70–99)
GLUCOSE BLD-MCNC: 111 MG/DL (ref 70–99)
GLUCOSE BLD-MCNC: 115 MG/DL (ref 70–99)
GLUCOSE BLD-MCNC: 123 MG/DL (ref 70–99)
GLUCOSE BLD-MCNC: 127 MG/DL (ref 70–99)
GLUCOSE BLD-MCNC: 131 MG/DL (ref 70–99)
GLUCOSE BLD-MCNC: 133 MG/DL (ref 70–99)
GLUCOSE BLD-MCNC: 152 MG/DL (ref 70–99)
GLUCOSE BLD-MCNC: 152 MG/DL (ref 70–99)
GLUCOSE BLD-MCNC: 153 MG/DL (ref 70–99)
GLUCOSE BLD-MCNC: 155 MG/DL (ref 70–99)
GLUCOSE BLD-MCNC: 180 MG/DL (ref 70–99)
GLUCOSE BLD-MCNC: 190 MG/DL (ref 70–99)
GLUCOSE BLD-MCNC: 190 MG/DL (ref 70–99)
GLUCOSE BLD-MCNC: 195 MG/DL (ref 70–99)
GLUCOSE BLD-MCNC: 199 MG/DL (ref 70–99)
GLUCOSE BLD-MCNC: 201 MG/DL (ref 70–99)
GLUCOSE BLD-MCNC: 211 MG/DL (ref 70–99)
GLUCOSE BLD-MCNC: 230 MG/DL (ref 70–99)
GLUCOSE BLD-MCNC: 241 MG/DL (ref 70–99)
GLUCOSE BLD-MCNC: 243 MG/DL (ref 70–99)
GLUCOSE BLD-MCNC: 251 MG/DL (ref 70–99)
GLUCOSE BLD-MCNC: 97 MG/DL (ref 70–99)
GLUCOSE SERPL-MCNC: 128 MG/DL (ref 70–99)
GLUCOSE SERPL-MCNC: 139 MG/DL (ref 70–99)
GLUCOSE SERPL-MCNC: 164 MG/DL (ref 70–99)
GLUCOSE SERPL-MCNC: 265 MG/DL (ref 70–99)
GLUCOSE SERPL-MCNC: 308 MG/DL (ref 70–99)
GLUCOSE SERPL-MCNC: 83 MG/DL (ref 70–99)
MAGNESIUM SERPL-MCNC: 1.6 MG/DL (ref 1.8–2.4)
MAGNESIUM SERPL-MCNC: 1.7 MG/DL (ref 1.8–2.4)
MAGNESIUM SERPL-MCNC: 1.9 MG/DL (ref 1.8–2.4)
MAGNESIUM SERPL-MCNC: 1.9 MG/DL (ref 1.8–2.4)
MAGNESIUM SERPL-MCNC: 2.2 MG/DL (ref 1.8–2.4)
MAGNESIUM SERPL-MCNC: 2.3 MG/DL (ref 1.8–2.4)
PERFORMED ON: ABNORMAL
PERFORMED ON: NORMAL
PHOSPHATE SERPL-MCNC: 2.2 MG/DL (ref 2.5–4.9)
PHOSPHATE SERPL-MCNC: 2.7 MG/DL (ref 2.5–4.9)
PHOSPHATE SERPL-MCNC: 3.2 MG/DL (ref 2.5–4.9)
PHOSPHATE SERPL-MCNC: 3.8 MG/DL (ref 2.5–4.9)
PHOSPHATE SERPL-MCNC: 3.8 MG/DL (ref 2.5–4.9)
PHOSPHATE SERPL-MCNC: 4 MG/DL (ref 2.5–4.9)
POTASSIUM SERPL-SCNC: 3.6 MMOL/L (ref 3.5–5.1)
POTASSIUM SERPL-SCNC: 4.1 MMOL/L (ref 3.5–5.1)
POTASSIUM SERPL-SCNC: 4.1 MMOL/L (ref 3.5–5.1)
POTASSIUM SERPL-SCNC: 4.3 MMOL/L (ref 3.5–5.1)
POTASSIUM SERPL-SCNC: 4.4 MMOL/L (ref 3.5–5.1)
POTASSIUM SERPL-SCNC: 5 MMOL/L (ref 3.5–5.1)
SODIUM SERPL-SCNC: 133 MMOL/L (ref 136–145)
SODIUM SERPL-SCNC: 134 MMOL/L (ref 136–145)
SODIUM SERPL-SCNC: 136 MMOL/L (ref 136–145)
SODIUM SERPL-SCNC: 138 MMOL/L (ref 136–145)
SODIUM SERPL-SCNC: 138 MMOL/L (ref 136–145)
SODIUM SERPL-SCNC: 139 MMOL/L (ref 136–145)

## 2024-05-27 PROCEDURE — 6360000002 HC RX W HCPCS: Performed by: INTERNAL MEDICINE

## 2024-05-27 PROCEDURE — 99291 CRITICAL CARE FIRST HOUR: CPT | Performed by: INTERNAL MEDICINE

## 2024-05-27 PROCEDURE — 94760 N-INVAS EAR/PLS OXIMETRY 1: CPT

## 2024-05-27 PROCEDURE — 2580000003 HC RX 258: Performed by: NURSE PRACTITIONER

## 2024-05-27 PROCEDURE — 80048 BASIC METABOLIC PNL TOTAL CA: CPT

## 2024-05-27 PROCEDURE — 2580000003 HC RX 258: Performed by: INTERNAL MEDICINE

## 2024-05-27 PROCEDURE — 2000000000 HC ICU R&B

## 2024-05-27 PROCEDURE — 6370000000 HC RX 637 (ALT 250 FOR IP): Performed by: INTERNAL MEDICINE

## 2024-05-27 PROCEDURE — 6360000002 HC RX W HCPCS: Performed by: NURSE PRACTITIONER

## 2024-05-27 PROCEDURE — 84100 ASSAY OF PHOSPHORUS: CPT

## 2024-05-27 PROCEDURE — 83735 ASSAY OF MAGNESIUM: CPT

## 2024-05-27 PROCEDURE — 2500000003 HC RX 250 WO HCPCS: Performed by: INTERNAL MEDICINE

## 2024-05-27 PROCEDURE — 6370000000 HC RX 637 (ALT 250 FOR IP): Performed by: NURSE PRACTITIONER

## 2024-05-27 RX ORDER — KETOROLAC TROMETHAMINE 30 MG/ML
30 INJECTION, SOLUTION INTRAMUSCULAR; INTRAVENOUS EVERY 6 HOURS PRN
Status: DISCONTINUED | OUTPATIENT
Start: 2024-05-27 | End: 2024-05-28 | Stop reason: HOSPADM

## 2024-05-27 RX ORDER — PANTOPRAZOLE SODIUM 40 MG/10ML
40 INJECTION, POWDER, LYOPHILIZED, FOR SOLUTION INTRAVENOUS DAILY
Status: DISCONTINUED | OUTPATIENT
Start: 2024-05-28 | End: 2024-05-28 | Stop reason: HOSPADM

## 2024-05-27 RX ORDER — SCOLOPAMINE TRANSDERMAL SYSTEM 1 MG/1
1 PATCH, EXTENDED RELEASE TRANSDERMAL
Status: DISCONTINUED | OUTPATIENT
Start: 2024-05-27 | End: 2024-05-28 | Stop reason: HOSPADM

## 2024-05-27 RX ORDER — SODIUM CHLORIDE, SODIUM LACTATE, POTASSIUM CHLORIDE, AND CALCIUM CHLORIDE .6; .31; .03; .02 G/100ML; G/100ML; G/100ML; G/100ML
1000 INJECTION, SOLUTION INTRAVENOUS ONCE
Status: COMPLETED | OUTPATIENT
Start: 2024-05-27 | End: 2024-05-27

## 2024-05-27 RX ADMIN — POTASSIUM CHLORIDE 20 MEQ: 29.8 INJECTION, SOLUTION INTRAVENOUS at 21:29

## 2024-05-27 RX ADMIN — SODIUM CHLORIDE, PRESERVATIVE FREE 10 ML: 5 INJECTION INTRAVENOUS at 09:12

## 2024-05-27 RX ADMIN — DEXTROSE AND SODIUM CHLORIDE: 5; 450 INJECTION, SOLUTION INTRAVENOUS at 19:43

## 2024-05-27 RX ADMIN — SODIUM CHLORIDE 2.8 UNITS/HR: 9 INJECTION, SOLUTION INTRAVENOUS at 05:37

## 2024-05-27 RX ADMIN — PANTOPRAZOLE SODIUM 40 MG: 40 TABLET, DELAYED RELEASE ORAL at 06:31

## 2024-05-27 RX ADMIN — HEPARIN SODIUM 5000 UNITS: 5000 INJECTION INTRAVENOUS; SUBCUTANEOUS at 06:30

## 2024-05-27 RX ADMIN — POTASSIUM CHLORIDE 20 MEQ: 29.8 INJECTION, SOLUTION INTRAVENOUS at 06:42

## 2024-05-27 RX ADMIN — HEPARIN SODIUM 5000 UNITS: 5000 INJECTION INTRAVENOUS; SUBCUTANEOUS at 14:50

## 2024-05-27 RX ADMIN — HEPARIN SODIUM 5000 UNITS: 5000 INJECTION INTRAVENOUS; SUBCUTANEOUS at 21:33

## 2024-05-27 RX ADMIN — SODIUM CHLORIDE, POTASSIUM CHLORIDE, SODIUM LACTATE AND CALCIUM CHLORIDE 1000 ML: 600; 310; 30; 20 INJECTION, SOLUTION INTRAVENOUS at 09:22

## 2024-05-27 RX ADMIN — MAGNESIUM SULFATE HEPTAHYDRATE 2000 MG: 40 INJECTION, SOLUTION INTRAVENOUS at 16:06

## 2024-05-27 RX ADMIN — CLONAZEPAM 2 MG: 1 TABLET ORAL at 08:20

## 2024-05-27 RX ADMIN — POTASSIUM CHLORIDE 20 MEQ: 29.8 INJECTION, SOLUTION INTRAVENOUS at 14:52

## 2024-05-27 RX ADMIN — LEVETIRACETAM 1000 MG: 100 INJECTION, SOLUTION INTRAVENOUS at 00:30

## 2024-05-27 RX ADMIN — POTASSIUM CHLORIDE 20 MEQ: 29.8 INJECTION, SOLUTION INTRAVENOUS at 13:31

## 2024-05-27 RX ADMIN — CLONAZEPAM 2 MG: 1 TABLET ORAL at 21:30

## 2024-05-27 RX ADMIN — POTASSIUM CHLORIDE 20 MEQ: 29.8 INJECTION, SOLUTION INTRAVENOUS at 18:27

## 2024-05-27 RX ADMIN — POTASSIUM PHOSPHATE, MONOBASIC POTASSIUM PHOSPHATE, DIBASIC 20 MMOL: 224; 236 INJECTION, SOLUTION, CONCENTRATE INTRAVENOUS at 10:20

## 2024-05-27 RX ADMIN — MAGNESIUM SULFATE HEPTAHYDRATE 2000 MG: 40 INJECTION, SOLUTION INTRAVENOUS at 13:29

## 2024-05-27 RX ADMIN — DEXTROSE AND SODIUM CHLORIDE: 5; 450 INJECTION, SOLUTION INTRAVENOUS at 12:56

## 2024-05-27 RX ADMIN — POTASSIUM CHLORIDE 20 MEQ: 29.8 INJECTION, SOLUTION INTRAVENOUS at 05:41

## 2024-05-27 RX ADMIN — ONDANSETRON 4 MG: 2 INJECTION INTRAMUSCULAR; INTRAVENOUS at 03:55

## 2024-05-27 RX ADMIN — ONDANSETRON 4 MG: 2 INJECTION INTRAMUSCULAR; INTRAVENOUS at 14:03

## 2024-05-27 RX ADMIN — LEVETIRACETAM 1000 MG: 100 INJECTION, SOLUTION INTRAVENOUS at 12:05

## 2024-05-27 RX ADMIN — VENLAFAXINE HYDROCHLORIDE 150 MG: 37.5 CAPSULE, EXTENDED RELEASE ORAL at 08:21

## 2024-05-27 RX ADMIN — DEXTROSE AND SODIUM CHLORIDE: 5; 450 INJECTION, SOLUTION INTRAVENOUS at 05:24

## 2024-05-27 ASSESSMENT — PAIN SCALES - GENERAL
PAINLEVEL_OUTOF10: 0
PAINLEVEL_OUTOF10: 0

## 2024-05-27 NOTE — PLAN OF CARE
Problem: Discharge Planning  Goal: Discharge to home or other facility with appropriate resources  Outcome: Progressing  Flowsheets (Taken 5/26/2024 2000)  Discharge to home or other facility with appropriate resources:   Identify barriers to discharge with patient and caregiver   Arrange for needed discharge resources and transportation as appropriate   Identify discharge learning needs (meds, wound care, etc)   Refer to discharge planning if patient needs post-hospital services based on physician order or complex needs related to functional status, cognitive ability or social support system     Problem: Pain  Goal: Verbalizes/displays adequate comfort level or baseline comfort level  Outcome: Progressing     Problem: Safety - Adult  Goal: Free from fall injury  Outcome: Progressing     Problem: Risk for Elopement  Goal: Patient will not exit the unit/facility without proper excort  Outcome: Progressing

## 2024-05-27 NOTE — PROGRESS NOTES
PULMONARY AND CRITICAL CARE MEDICINE PROGRESS NOTE      Subjective: Patient lying in bed in no apparent respiratory distress.  Reports feeling slightly better.  Abdominal pain and nausea is decreasing.  Feeling hungry.    8 point review of system is negative except that mentioned in the HPI.      Past Medical History:   Diagnosis Date    Depression     Diabetes mellitus (HCC)     Diabetic ketoacidosis without coma associated with type 1 diabetes mellitus (HCC) 2022    Seizures (HCC)      Past Surgical History:   Procedure Laterality Date     SECTION      TUBAL LIGATION       Social History     Socioeconomic History    Marital status:      Spouse name: Not on file    Number of children: 2    Years of education: Not on file    Highest education level: Not on file   Occupational History    Not on file   Tobacco Use    Smoking status: Never    Smokeless tobacco: Never   Vaping Use    Vaping Use: Never used   Substance and Sexual Activity    Alcohol use: No    Drug use: No    Sexual activity: Yes     Partners: Male   Other Topics Concern    Not on file   Social History Narrative    Not on file     Social Determinants of Health     Financial Resource Strain: High Risk (2024)    Overall Financial Resource Strain (CARDIA)     Difficulty of Paying Living Expenses: Very hard   Food Insecurity: No Food Insecurity (2024)    Hunger Vital Sign     Worried About Running Out of Food in the Last Year: Never true     Ran Out of Food in the Last Year: Never true   Recent Concern: Food Insecurity - Food Insecurity Present (2024)    Hunger Vital Sign     Worried About Running Out of Food in the Last Year: Often true     Ran Out of Food in the Last Year: Often true   Transportation Needs: No Transportation Needs (2024)    PRAPARE - Transportation     Lack of Transportation (Medical): No     Lack of Transportation (Non-Medical): No   Recent Concern: Transportation Needs - Unmet  Transportation Needs (4/22/2024)    PRAPARE - Transportation     Lack of Transportation (Medical): Not on file     Lack of Transportation (Non-Medical): Yes   Physical Activity: Not on file   Stress: Not on file   Social Connections: Not on file   Intimate Partner Violence: Not on file   Housing Stability: High Risk (5/25/2024)    Housing Stability Vital Sign     Unable to Pay for Housing in the Last Year: No     Number of Places Lived in the Last Year: 2     Unstable Housing in the Last Year: Yes     Family History   Problem Relation Age of Onset    Asthma Mother     Allergies Mother         sun allergy    Diabetes Type 1  Father     Diabetes Type 1  Maternal Grandfather     Diabetes Type 1  Cousin     Diabetes Type 1  Cousin     Diabetes Type 1  Maternal Uncle     Diabetes Type 1  Maternal Uncle     Diabetes Type 1  Maternal Aunt      No Known Allergies    MEDICATIONS:     Scheduled Meds:     potassium phosphate IVPB (CENTRAL LINE)  20 mmol IntraVENous Once    scopolamine  1 patch TransDERmal Q72H    [START ON 5/28/2024] pantoprazole  40 mg IntraVENous Daily    lidocaine 1 % injection  5 mL IntraDERmal Once    sodium chloride flush  5-40 mL IntraVENous 2 times per day    clonazePAM  2 mg Oral BID    levETIRAcetam  1,000 mg IntraVENous Q12H    venlafaxine  150 mg Oral Daily    sodium chloride flush  5-40 mL IntraVENous 2 times per day    heparin (porcine)  5,000 Units SubCUTAneous 3 times per day     Current Infusions:    insulin 1.9 Units/hr (05/27/24 1122)    dextrose 5 % and 0.45 % NaCl 150 mL/hr at 05/27/24 0524    sodium chloride      sodium chloride      sodium chloride         PRN meds:  ketorolac, bismuth subsalicylate, potassium chloride, dextrose bolus **OR** dextrose bolus, sodium phosphate 15 mmol in sodium chloride 0.9 % 250 mL IVPB, dextrose 5 % and 0.45 % NaCl, sodium chloride flush, sodium chloride, ondansetron, sodium chloride flush, sodium chloride, potassium chloride **OR** potassium alternative

## 2024-05-27 NOTE — PROGRESS NOTES
Lab didn't run the 0035 test until 0330, there was downtime and miscommunication. 0400 labs sent awaiting results. Care continues.

## 2024-05-27 NOTE — PROGRESS NOTES
Pharmacy spoke with RN because they are out of sodium phos. Phos is being replaced with k phos. Pt also needed k replacement as according to order/protocol, 40 mEq worth. Per pharmacy RN should give one bag 20mEq K because the k phos has about 20 mEq in it. RN gave 1 bag K and is running k phos according to order in MAR.  Pt BS have been in the 120s so insulin pump paused for 1 hour. Care continues.

## 2024-05-27 NOTE — PROGRESS NOTES
V2.0    [facility] Progress Note      Name:  Maribeth Loera /Age/Sex: 1990  (34 y.o. female)   MRN & CSN:  [id number] & [id number] Encounter Date/Time: 2024 6:02 PM EDT   Location:  ICU-3906/3906-01 PCP: Tosha Moreno MD     Attending:Herlinda Maciel MD       Hospital Day: 3    Assessment and Recommendations   Maribeth Loera is a 34 y.o. female with pmh of type 1 diabetes, seizure disorder, ROME, depression, and gastroparesis who presented following a fall at home with c/o generalized weakness, nausea and vomiting of 5 to 6 days duration. Found to be in DKA with severe dehydration. Reported compliance with her insulins. She previously had an insulin pump that was removed in April and is scheduled for insulin pump placement on Monday.  She was started on IVF, insulin drip and admitted to ICU.    Plan:     Diabetic ketoacidosis in type I diabetic:  Continue DKA protocol with IVF and insulin drip.  Continue NPO, transition to SC insulin when DKA resolves per protocol.  Monitor BMP and electrolytes every 4 hours.  Will discharge on basal/prandial insulin with outpatient follow-up with endocrinologist for insulin pump placement.      History of DM gastroparesis with intractable nausea and vomiting:  Continue scopolamine patch and Reglan as needed.     Lactic acidosis: Resolved. Continue IVF.    Acute kidney injury: Resolved.   Likely due to volume depletion.  Peak SCr 1.4.  Baseline Scr < 0.5.      Fall, initial encounter:  Reports hitting her head on the table.    No loss of consciousness. CT head without acute findings.    Fall precautions.         Left elbow pain due to fall:  X-ray left elbow and left humerus without acute fractures or dislocations.  Pain control.       Sinus tachycardia: Improved  Likely due to volume depletion.   Continue IV fluids.     Seizure disorder: Continue Keppra.  Seizure precautions.    GERD: Continue PPI.    Anxiety: Stable on home medications.      Diet Diet NPO   DVT

## 2024-05-28 VITALS
TEMPERATURE: 97 F | RESPIRATION RATE: 18 BRPM | SYSTOLIC BLOOD PRESSURE: 103 MMHG | HEART RATE: 104 BPM | WEIGHT: 112.21 LBS | BODY MASS INDEX: 22.66 KG/M2 | OXYGEN SATURATION: 99 % | DIASTOLIC BLOOD PRESSURE: 78 MMHG

## 2024-05-28 LAB
ANION GAP SERPL CALCULATED.3IONS-SCNC: 11 MMOL/L (ref 3–16)
ANION GAP SERPL CALCULATED.3IONS-SCNC: 9 MMOL/L (ref 3–16)
BUN SERPL-MCNC: <2 MG/DL (ref 7–20)
BUN SERPL-MCNC: <2 MG/DL (ref 7–20)
CALCIUM SERPL-MCNC: 8.3 MG/DL (ref 8.3–10.6)
CALCIUM SERPL-MCNC: 8.8 MG/DL (ref 8.3–10.6)
CHLORIDE SERPL-SCNC: 102 MMOL/L (ref 99–110)
CHLORIDE SERPL-SCNC: 103 MMOL/L (ref 99–110)
CO2 SERPL-SCNC: 23 MMOL/L (ref 21–32)
CO2 SERPL-SCNC: 25 MMOL/L (ref 21–32)
CREAT SERPL-MCNC: <0.5 MG/DL (ref 0.6–1.1)
CREAT SERPL-MCNC: <0.5 MG/DL (ref 0.6–1.1)
DEPRECATED RDW RBC AUTO: 13.5 % (ref 12.4–15.4)
EST. AVERAGE GLUCOSE BLD GHB EST-MCNC: 180 MG/DL
GFR SERPLBLD CREATININE-BSD FMLA CKD-EPI: >90 ML/MIN/{1.73_M2}
GFR SERPLBLD CREATININE-BSD FMLA CKD-EPI: >90 ML/MIN/{1.73_M2}
GLUCOSE BLD-MCNC: 119 MG/DL (ref 70–99)
GLUCOSE BLD-MCNC: 134 MG/DL (ref 70–99)
GLUCOSE BLD-MCNC: 195 MG/DL (ref 70–99)
GLUCOSE BLD-MCNC: 268 MG/DL (ref 70–99)
GLUCOSE BLD-MCNC: 50 MG/DL (ref 70–99)
GLUCOSE BLD-MCNC: 75 MG/DL (ref 70–99)
GLUCOSE SERPL-MCNC: 105 MG/DL (ref 70–99)
GLUCOSE SERPL-MCNC: 273 MG/DL (ref 70–99)
HBA1C MFR BLD: 7.9 %
HCT VFR BLD AUTO: 29.3 % (ref 36–48)
HGB BLD-MCNC: 10 G/DL (ref 12–16)
MAGNESIUM SERPL-MCNC: 2.1 MG/DL (ref 1.8–2.4)
MAGNESIUM SERPL-MCNC: 2.3 MG/DL (ref 1.8–2.4)
MCH RBC QN AUTO: 29.6 PG (ref 26–34)
MCHC RBC AUTO-ENTMCNC: 34.1 G/DL (ref 31–36)
MCV RBC AUTO: 86.9 FL (ref 80–100)
PERFORMED ON: ABNORMAL
PERFORMED ON: NORMAL
PHOSPHATE SERPL-MCNC: 2.8 MG/DL (ref 2.5–4.9)
PHOSPHATE SERPL-MCNC: 3.1 MG/DL (ref 2.5–4.9)
PLATELET # BLD AUTO: 162 K/UL (ref 135–450)
PMV BLD AUTO: 9 FL (ref 5–10.5)
POTASSIUM SERPL-SCNC: 4.2 MMOL/L (ref 3.5–5.1)
POTASSIUM SERPL-SCNC: 4.9 MMOL/L (ref 3.5–5.1)
RBC # BLD AUTO: 3.37 M/UL (ref 4–5.2)
SODIUM SERPL-SCNC: 134 MMOL/L (ref 136–145)
SODIUM SERPL-SCNC: 139 MMOL/L (ref 136–145)
WBC # BLD AUTO: 5.6 K/UL (ref 4–11)

## 2024-05-28 PROCEDURE — 2580000003 HC RX 258: Performed by: NURSE PRACTITIONER

## 2024-05-28 PROCEDURE — 84100 ASSAY OF PHOSPHORUS: CPT

## 2024-05-28 PROCEDURE — 36592 COLLECT BLOOD FROM PICC: CPT

## 2024-05-28 PROCEDURE — 6370000000 HC RX 637 (ALT 250 FOR IP): Performed by: NURSE PRACTITIONER

## 2024-05-28 PROCEDURE — 6360000002 HC RX W HCPCS: Performed by: NURSE PRACTITIONER

## 2024-05-28 PROCEDURE — C9113 INJ PANTOPRAZOLE SODIUM, VIA: HCPCS | Performed by: INTERNAL MEDICINE

## 2024-05-28 PROCEDURE — 85027 COMPLETE CBC AUTOMATED: CPT

## 2024-05-28 PROCEDURE — 6360000002 HC RX W HCPCS: Performed by: INTERNAL MEDICINE

## 2024-05-28 PROCEDURE — 80048 BASIC METABOLIC PNL TOTAL CA: CPT

## 2024-05-28 PROCEDURE — 6370000000 HC RX 637 (ALT 250 FOR IP): Performed by: STUDENT IN AN ORGANIZED HEALTH CARE EDUCATION/TRAINING PROGRAM

## 2024-05-28 PROCEDURE — 83036 HEMOGLOBIN GLYCOSYLATED A1C: CPT

## 2024-05-28 PROCEDURE — 83735 ASSAY OF MAGNESIUM: CPT

## 2024-05-28 RX ORDER — GLUCAGON 1 MG/ML
1 KIT INJECTION PRN
Status: DISCONTINUED | OUTPATIENT
Start: 2024-05-28 | End: 2024-05-28 | Stop reason: HOSPADM

## 2024-05-28 RX ORDER — INSULIN LISPRO 100 [IU]/ML
0-4 INJECTION, SOLUTION INTRAVENOUS; SUBCUTANEOUS NIGHTLY
Status: DISCONTINUED | OUTPATIENT
Start: 2024-05-28 | End: 2024-05-28 | Stop reason: HOSPADM

## 2024-05-28 RX ORDER — INSULIN GLARGINE 100 [IU]/ML
13 INJECTION, SOLUTION SUBCUTANEOUS NIGHTLY
Qty: 5 ADJUSTABLE DOSE PRE-FILLED PEN SYRINGE | Refills: 0 | Status: ON HOLD | OUTPATIENT
Start: 2024-05-28

## 2024-05-28 RX ORDER — INSULIN LISPRO 100 [IU]/ML
0-8 INJECTION, SOLUTION INTRAVENOUS; SUBCUTANEOUS
Status: DISCONTINUED | OUTPATIENT
Start: 2024-05-28 | End: 2024-05-28 | Stop reason: HOSPADM

## 2024-05-28 RX ORDER — INSULIN GLARGINE 100 [IU]/ML
0.25 INJECTION, SOLUTION SUBCUTANEOUS NIGHTLY
Status: DISCONTINUED | OUTPATIENT
Start: 2024-05-28 | End: 2024-05-28 | Stop reason: HOSPADM

## 2024-05-28 RX ORDER — GABAPENTIN 300 MG/1
900 CAPSULE ORAL 3 TIMES DAILY
Status: DISCONTINUED | OUTPATIENT
Start: 2024-05-28 | End: 2024-05-28 | Stop reason: HOSPADM

## 2024-05-28 RX ORDER — INSULIN LISPRO 100 [IU]/ML
0.08 INJECTION, SOLUTION INTRAVENOUS; SUBCUTANEOUS
Status: DISCONTINUED | OUTPATIENT
Start: 2024-05-28 | End: 2024-05-28 | Stop reason: HOSPADM

## 2024-05-28 RX ORDER — INSULIN ASPART 100 [IU]/ML
INJECTION, SOLUTION INTRAVENOUS; SUBCUTANEOUS
Qty: 30 ML | Refills: 2 | Status: ON HOLD | OUTPATIENT
Start: 2024-05-28

## 2024-05-28 RX ORDER — DEXTROSE MONOHYDRATE 100 MG/ML
INJECTION, SOLUTION INTRAVENOUS CONTINUOUS PRN
Status: DISCONTINUED | OUTPATIENT
Start: 2024-05-28 | End: 2024-05-28 | Stop reason: HOSPADM

## 2024-05-28 RX ORDER — INSULIN GLARGINE 100 [IU]/ML
0.25 INJECTION, SOLUTION SUBCUTANEOUS NIGHTLY
Status: DISCONTINUED | OUTPATIENT
Start: 2024-05-28 | End: 2024-05-28

## 2024-05-28 RX ADMIN — LEVETIRACETAM 1000 MG: 100 INJECTION, SOLUTION INTRAVENOUS at 00:25

## 2024-05-28 RX ADMIN — PANTOPRAZOLE SODIUM 40 MG: 40 INJECTION, POWDER, FOR SOLUTION INTRAVENOUS at 08:34

## 2024-05-28 RX ADMIN — DEXTROSE MONOHYDRATE 125 ML: 100 INJECTION, SOLUTION INTRAVENOUS at 08:42

## 2024-05-28 RX ADMIN — GABAPENTIN 900 MG: 300 CAPSULE ORAL at 05:58

## 2024-05-28 RX ADMIN — LEVETIRACETAM 1000 MG: 100 INJECTION, SOLUTION INTRAVENOUS at 12:11

## 2024-05-28 RX ADMIN — VENLAFAXINE HYDROCHLORIDE 150 MG: 37.5 CAPSULE, EXTENDED RELEASE ORAL at 08:34

## 2024-05-28 RX ADMIN — SODIUM CHLORIDE, PRESERVATIVE FREE 10 ML: 5 INJECTION INTRAVENOUS at 08:42

## 2024-05-28 RX ADMIN — HEPARIN SODIUM 5000 UNITS: 5000 INJECTION INTRAVENOUS; SUBCUTANEOUS at 05:34

## 2024-05-28 RX ADMIN — CLONAZEPAM 2 MG: 1 TABLET ORAL at 08:34

## 2024-05-28 RX ADMIN — INSULIN GLARGINE 13 UNITS: 100 INJECTION, SOLUTION SUBCUTANEOUS at 01:58

## 2024-05-28 ASSESSMENT — PAIN SCALES - GENERAL: PAINLEVEL_OUTOF10: 0

## 2024-05-28 NOTE — PROGRESS NOTES
CLINICAL PHARMACY NOTE: MEDS TO BEDS    Total # of Prescriptions Filled: 4   The following medications were delivered to the patient:  LANTUS SOLOSTAR 100UNIT/ML  NOVOLOG FLEXPEN 100UNIT/ML  PEN NEEDLES 32G X 4 MM MISC  ONETOUCH VERIO TEST STRIPS    Additional Documentation: Alexus NEELY picked up=signed  [facility] Tech

## 2024-05-31 NOTE — PROGRESS NOTES
Physician Progress Note      PATIENT:               ANTHONY GARCIA  CSN #:                  [id number]  :                       1990  ADMIT DATE:       2024 6:09 PM  DISCH DATE:        2024 2:02 PM  RESPONDING  PROVIDER #:        Clarence Solis MD          QUERY TEXT:    Patient admitted with DKA with PRANEETH and noted to have Temp 96.7, , RR   8-26. If possible, please document in progress notes and discharge summary if   you are evaluating and/or treating any of the following:    The medical record reflects the following:  Risk Factors: DM1 with DKA, PRANEETH    Clinical Indicators: PRANEETH  On  at 1821 - RR 10-16, -138, PRANEETH  on  at 0400- Temp 96.7, , RR 21    Treatment: IVF, serial labs, insulin drip, ICU supportive care  Options provided:  -- SIRS of non-infectious origin due to DKA with PRANEETH  -- Other - I will add my own diagnosis  -- Disagree - Not applicable / Not valid  -- Disagree - Clinically unable to determine / Unknown  -- Refer to Clinical Documentation Reviewer    PROVIDER RESPONSE TEXT:    This patient has SIRS of non-infectious origin due to DKA with PRANEETH.    Query created by: Bree Lewis on 2024 1:06 PM      Electronically signed by:  Clarence Solis MD 2024 10:12 AM

## 2024-06-09 ENCOUNTER — APPOINTMENT (OUTPATIENT)
Dept: GENERAL RADIOLOGY | Age: 34
DRG: 053 | End: 2024-06-09
Payer: COMMERCIAL

## 2024-06-09 ENCOUNTER — HOSPITAL ENCOUNTER (INPATIENT)
Age: 34
LOS: 3 days | Discharge: HOME | DRG: 053 | End: 2024-06-12
Attending: EMERGENCY MEDICINE | Admitting: INTERNAL MEDICINE
Payer: COMMERCIAL

## 2024-06-09 ENCOUNTER — APPOINTMENT (OUTPATIENT)
Dept: CT IMAGING | Age: 34
DRG: 053 | End: 2024-06-09
Payer: COMMERCIAL

## 2024-06-09 DIAGNOSIS — R56.9 SEIZURE (HCC): Primary | ICD-10-CM

## 2024-06-09 DIAGNOSIS — E10.8 TYPE 1 DIABETES MELLITUS WITH COMPLICATION (HCC): ICD-10-CM

## 2024-06-09 DIAGNOSIS — I95.9 HYPOTENSION, UNSPECIFIED HYPOTENSION TYPE: ICD-10-CM

## 2024-06-09 LAB
ALBUMIN SERPL-MCNC: 4 G/DL (ref 3.4–5)
ALBUMIN/GLOB SERPL: 1.3 {RATIO} (ref 1.1–2.2)
ALP SERPL-CCNC: 77 U/L (ref 40–129)
ALT SERPL-CCNC: 16 U/L (ref 10–40)
ANION GAP SERPL CALCULATED.3IONS-SCNC: 15 MMOL/L (ref 3–16)
AST SERPL-CCNC: 32 U/L (ref 15–37)
BASOPHILS # BLD: 0.1 K/UL (ref 0–0.2)
BASOPHILS NFR BLD: 0.4 %
BILIRUB SERPL-MCNC: <0.2 MG/DL (ref 0–1)
BUN SERPL-MCNC: 5 MG/DL (ref 7–20)
CALCIUM SERPL-MCNC: 9.1 MG/DL (ref 8.3–10.6)
CHLORIDE SERPL-SCNC: 103 MMOL/L (ref 99–110)
CO2 SERPL-SCNC: 21 MMOL/L (ref 21–32)
CREAT SERPL-MCNC: <0.5 MG/DL (ref 0.6–1.1)
D-DIMER QUANTITATIVE: 0.45 UG/ML FEU (ref 0–0.6)
DEPRECATED RDW RBC AUTO: 14.8 % (ref 12.4–15.4)
EOSINOPHIL # BLD: 0 K/UL (ref 0–0.6)
EOSINOPHIL NFR BLD: 0.3 %
GFR SERPLBLD CREATININE-BSD FMLA CKD-EPI: >90 ML/MIN/{1.73_M2}
GLUCOSE BLD-MCNC: 118 MG/DL (ref 70–99)
GLUCOSE BLD-MCNC: 204 MG/DL (ref 70–99)
GLUCOSE BLD-MCNC: 360 MG/DL (ref 70–99)
GLUCOSE SERPL-MCNC: 190 MG/DL (ref 70–99)
HCT VFR BLD AUTO: 36.9 % (ref 36–48)
HGB BLD-MCNC: 11.4 G/DL (ref 12–16)
LACTATE BLDV-SCNC: 1.7 MMOL/L (ref 0.4–2)
LACTATE BLDV-SCNC: 2.7 MMOL/L (ref 0.4–1.9)
LACTATE BLDV-SCNC: 7.5 MMOL/L (ref 0.4–1.9)
LYMPHOCYTES # BLD: 0.7 K/UL (ref 1–5.1)
LYMPHOCYTES NFR BLD: 6.1 %
MAGNESIUM SERPL-MCNC: 2.1 MG/DL (ref 1.8–2.4)
MCH RBC QN AUTO: 29.5 PG (ref 26–34)
MCHC RBC AUTO-ENTMCNC: 30.9 G/DL (ref 31–36)
MCV RBC AUTO: 95.2 FL (ref 80–100)
MEDICATION DOSE-MCNC: NORMAL
MONOCYTES # BLD: 0.7 K/UL (ref 0–1.3)
MONOCYTES NFR BLD: 6.2 %
NEUTROPHILS # BLD: 10.1 K/UL (ref 1.7–7.7)
NEUTROPHILS NFR BLD: 87 %
PERFORMED ON: ABNORMAL
PHOSPHATE SERPL-MCNC: 2.2 MG/DL (ref 2.5–4.9)
PLATELET # BLD AUTO: 297 K/UL (ref 135–450)
PMV BLD AUTO: 8.6 FL (ref 5–10.5)
POTASSIUM SERPL-SCNC: 4.3 MMOL/L (ref 3.5–5.1)
PROT SERPL-MCNC: 7.1 G/DL (ref 6.4–8.2)
RBC # BLD AUTO: 3.88 M/UL (ref 4–5.2)
SODIUM SERPL-SCNC: 139 MMOL/L (ref 136–145)
WBC # BLD AUTO: 11.6 K/UL (ref 4–11)

## 2024-06-09 PROCEDURE — 2580000003 HC RX 258: Performed by: INTERNAL MEDICINE

## 2024-06-09 PROCEDURE — 83735 ASSAY OF MAGNESIUM: CPT

## 2024-06-09 PROCEDURE — 6360000002 HC RX W HCPCS: Performed by: INTERNAL MEDICINE

## 2024-06-09 PROCEDURE — 80177 DRUG SCRN QUAN LEVETIRACETAM: CPT

## 2024-06-09 PROCEDURE — 83605 ASSAY OF LACTIC ACID: CPT

## 2024-06-09 PROCEDURE — 96374 THER/PROPH/DIAG INJ IV PUSH: CPT

## 2024-06-09 PROCEDURE — 96375 TX/PRO/DX INJ NEW DRUG ADDON: CPT

## 2024-06-09 PROCEDURE — 99285 EMERGENCY DEPT VISIT HI MDM: CPT

## 2024-06-09 PROCEDURE — 6370000000 HC RX 637 (ALT 250 FOR IP): Performed by: INTERNAL MEDICINE

## 2024-06-09 PROCEDURE — 85379 FIBRIN DEGRADATION QUANT: CPT

## 2024-06-09 PROCEDURE — 2580000003 HC RX 258: Performed by: EMERGENCY MEDICINE

## 2024-06-09 PROCEDURE — 36415 COLL VENOUS BLD VENIPUNCTURE: CPT

## 2024-06-09 PROCEDURE — 6360000002 HC RX W HCPCS: Performed by: EMERGENCY MEDICINE

## 2024-06-09 PROCEDURE — 80053 COMPREHEN METABOLIC PANEL: CPT

## 2024-06-09 PROCEDURE — 2060000000 HC ICU INTERMEDIATE R&B

## 2024-06-09 PROCEDURE — A4216 STERILE WATER/SALINE, 10 ML: HCPCS | Performed by: EMERGENCY MEDICINE

## 2024-06-09 PROCEDURE — 84100 ASSAY OF PHOSPHORUS: CPT

## 2024-06-09 PROCEDURE — 71045 X-RAY EXAM CHEST 1 VIEW: CPT

## 2024-06-09 PROCEDURE — 85025 COMPLETE CBC W/AUTO DIFF WBC: CPT

## 2024-06-09 RX ORDER — ACETAMINOPHEN 650 MG/1
650 SUPPOSITORY RECTAL EVERY 6 HOURS PRN
Status: DISCONTINUED | OUTPATIENT
Start: 2024-06-09 | End: 2024-06-12 | Stop reason: HOSPADM

## 2024-06-09 RX ORDER — POTASSIUM CHLORIDE 20 MEQ/1
40 TABLET, EXTENDED RELEASE ORAL PRN
Status: DISCONTINUED | OUTPATIENT
Start: 2024-06-09 | End: 2024-06-12 | Stop reason: HOSPADM

## 2024-06-09 RX ORDER — POLYETHYLENE GLYCOL 3350 17 G/17G
17 POWDER, FOR SOLUTION ORAL DAILY PRN
Status: DISCONTINUED | OUTPATIENT
Start: 2024-06-09 | End: 2024-06-12 | Stop reason: HOSPADM

## 2024-06-09 RX ORDER — MAGNESIUM SULFATE IN WATER 40 MG/ML
2000 INJECTION, SOLUTION INTRAVENOUS PRN
Status: DISCONTINUED | OUTPATIENT
Start: 2024-06-09 | End: 2024-06-12 | Stop reason: HOSPADM

## 2024-06-09 RX ORDER — 0.9 % SODIUM CHLORIDE 0.9 %
1000 INTRAVENOUS SOLUTION INTRAVENOUS ONCE
Status: COMPLETED | OUTPATIENT
Start: 2024-06-09 | End: 2024-06-09

## 2024-06-09 RX ORDER — SODIUM CHLORIDE, SODIUM LACTATE, POTASSIUM CHLORIDE, AND CALCIUM CHLORIDE .6; .31; .03; .02 G/100ML; G/100ML; G/100ML; G/100ML
1000 INJECTION, SOLUTION INTRAVENOUS ONCE
Status: COMPLETED | OUTPATIENT
Start: 2024-06-09 | End: 2024-06-09

## 2024-06-09 RX ORDER — GABAPENTIN 300 MG/1
900 CAPSULE ORAL EVERY 8 HOURS
Status: DISCONTINUED | OUTPATIENT
Start: 2024-06-09 | End: 2024-06-12 | Stop reason: HOSPADM

## 2024-06-09 RX ORDER — ENOXAPARIN SODIUM 100 MG/ML
30 INJECTION SUBCUTANEOUS DAILY
Status: DISCONTINUED | OUTPATIENT
Start: 2024-06-09 | End: 2024-06-12 | Stop reason: HOSPADM

## 2024-06-09 RX ORDER — LANOLIN ALCOHOL/MO/W.PET/CERES
400 CREAM (GRAM) TOPICAL DAILY
Status: DISCONTINUED | OUTPATIENT
Start: 2024-06-09 | End: 2024-06-12 | Stop reason: HOSPADM

## 2024-06-09 RX ORDER — POTASSIUM CHLORIDE 7.45 MG/ML
10 INJECTION INTRAVENOUS PRN
Status: DISCONTINUED | OUTPATIENT
Start: 2024-06-09 | End: 2024-06-12 | Stop reason: HOSPADM

## 2024-06-09 RX ORDER — SODIUM CHLORIDE 9 MG/ML
INJECTION, SOLUTION INTRAVENOUS PRN
Status: DISCONTINUED | OUTPATIENT
Start: 2024-06-09 | End: 2024-06-12 | Stop reason: HOSPADM

## 2024-06-09 RX ORDER — METOCLOPRAMIDE HYDROCHLORIDE 5 MG/ML
10 INJECTION INTRAMUSCULAR; INTRAVENOUS EVERY 6 HOURS PRN
Status: DISCONTINUED | OUTPATIENT
Start: 2024-06-09 | End: 2024-06-12 | Stop reason: HOSPADM

## 2024-06-09 RX ORDER — LEVETIRACETAM 500 MG/5ML
1000 INJECTION, SOLUTION, CONCENTRATE INTRAVENOUS EVERY 12 HOURS
Status: DISCONTINUED | OUTPATIENT
Start: 2024-06-09 | End: 2024-06-09

## 2024-06-09 RX ORDER — DEXTROSE MONOHYDRATE 100 MG/ML
INJECTION, SOLUTION INTRAVENOUS CONTINUOUS PRN
Status: DISCONTINUED | OUTPATIENT
Start: 2024-06-09 | End: 2024-06-12 | Stop reason: HOSPADM

## 2024-06-09 RX ORDER — ONDANSETRON 4 MG/1
4 TABLET, ORALLY DISINTEGRATING ORAL EVERY 8 HOURS PRN
Status: DISCONTINUED | OUTPATIENT
Start: 2024-06-09 | End: 2024-06-12 | Stop reason: HOSPADM

## 2024-06-09 RX ORDER — ONDANSETRON 2 MG/ML
4 INJECTION INTRAMUSCULAR; INTRAVENOUS EVERY 6 HOURS PRN
Status: DISCONTINUED | OUTPATIENT
Start: 2024-06-09 | End: 2024-06-12 | Stop reason: HOSPADM

## 2024-06-09 RX ORDER — CHLORHEXIDINE GLUCONATE ORAL RINSE 1.2 MG/ML
15 SOLUTION DENTAL 2 TIMES DAILY
Status: DISCONTINUED | OUTPATIENT
Start: 2024-06-09 | End: 2024-06-12 | Stop reason: HOSPADM

## 2024-06-09 RX ORDER — CLONAZEPAM 1 MG/1
2 TABLET ORAL 2 TIMES DAILY
Status: DISCONTINUED | OUTPATIENT
Start: 2024-06-09 | End: 2024-06-12 | Stop reason: HOSPADM

## 2024-06-09 RX ORDER — INSULIN GLARGINE 100 [IU]/ML
15 INJECTION, SOLUTION SUBCUTANEOUS NIGHTLY
Status: DISCONTINUED | OUTPATIENT
Start: 2024-06-09 | End: 2024-06-10

## 2024-06-09 RX ORDER — GLUCAGON 1 MG/ML
1 KIT INJECTION PRN
Status: DISCONTINUED | OUTPATIENT
Start: 2024-06-09 | End: 2024-06-12 | Stop reason: HOSPADM

## 2024-06-09 RX ORDER — SODIUM CHLORIDE 0.9 % (FLUSH) 0.9 %
5-40 SYRINGE (ML) INJECTION PRN
Status: DISCONTINUED | OUTPATIENT
Start: 2024-06-09 | End: 2024-06-12 | Stop reason: HOSPADM

## 2024-06-09 RX ORDER — SODIUM CHLORIDE, SODIUM LACTATE, POTASSIUM CHLORIDE, CALCIUM CHLORIDE 600; 310; 30; 20 MG/100ML; MG/100ML; MG/100ML; MG/100ML
INJECTION, SOLUTION INTRAVENOUS CONTINUOUS
Status: DISCONTINUED | OUTPATIENT
Start: 2024-06-09 | End: 2024-06-11

## 2024-06-09 RX ORDER — PANTOPRAZOLE SODIUM 40 MG/1
40 TABLET, DELAYED RELEASE ORAL
Status: DISCONTINUED | OUTPATIENT
Start: 2024-06-10 | End: 2024-06-09 | Stop reason: ALTCHOICE

## 2024-06-09 RX ORDER — INSULIN LISPRO 100 [IU]/ML
0-4 INJECTION, SOLUTION INTRAVENOUS; SUBCUTANEOUS NIGHTLY
Status: DISCONTINUED | OUTPATIENT
Start: 2024-06-09 | End: 2024-06-12

## 2024-06-09 RX ORDER — LEVETIRACETAM 500 MG/5ML
1000 INJECTION, SOLUTION, CONCENTRATE INTRAVENOUS EVERY 12 HOURS
Status: DISCONTINUED | OUTPATIENT
Start: 2024-06-09 | End: 2024-06-10

## 2024-06-09 RX ORDER — ACETAMINOPHEN 325 MG/1
650 TABLET ORAL EVERY 6 HOURS PRN
Status: DISCONTINUED | OUTPATIENT
Start: 2024-06-09 | End: 2024-06-12 | Stop reason: HOSPADM

## 2024-06-09 RX ORDER — SODIUM CHLORIDE 0.9 % (FLUSH) 0.9 %
5-40 SYRINGE (ML) INJECTION EVERY 12 HOURS SCHEDULED
Status: DISCONTINUED | OUTPATIENT
Start: 2024-06-09 | End: 2024-06-12 | Stop reason: HOSPADM

## 2024-06-09 RX ORDER — VENLAFAXINE HYDROCHLORIDE 150 MG/1
150 CAPSULE, EXTENDED RELEASE ORAL DAILY
Status: DISCONTINUED | OUTPATIENT
Start: 2024-06-09 | End: 2024-06-09 | Stop reason: ALTCHOICE

## 2024-06-09 RX ORDER — INSULIN LISPRO 100 [IU]/ML
5 INJECTION, SOLUTION INTRAVENOUS; SUBCUTANEOUS
Status: DISCONTINUED | OUTPATIENT
Start: 2024-06-09 | End: 2024-06-10

## 2024-06-09 RX ORDER — INSULIN LISPRO 100 [IU]/ML
0-8 INJECTION, SOLUTION INTRAVENOUS; SUBCUTANEOUS
Status: DISCONTINUED | OUTPATIENT
Start: 2024-06-09 | End: 2024-06-12

## 2024-06-09 RX ADMIN — GABAPENTIN 900 MG: 300 CAPSULE ORAL at 15:15

## 2024-06-09 RX ADMIN — ENOXAPARIN SODIUM 30 MG: 100 INJECTION SUBCUTANEOUS at 13:22

## 2024-06-09 RX ADMIN — INSULIN LISPRO 2 UNITS: 100 INJECTION, SOLUTION INTRAVENOUS; SUBCUTANEOUS at 16:39

## 2024-06-09 RX ADMIN — INSULIN LISPRO 8 UNITS: 100 INJECTION, SOLUTION INTRAVENOUS; SUBCUTANEOUS at 13:35

## 2024-06-09 RX ADMIN — INSULIN GLARGINE 15 UNITS: 100 INJECTION, SOLUTION SUBCUTANEOUS at 21:10

## 2024-06-09 RX ADMIN — SODIUM CHLORIDE 1000 ML: 9 INJECTION, SOLUTION INTRAVENOUS at 09:17

## 2024-06-09 RX ADMIN — METOCLOPRAMIDE 10 MG: 5 INJECTION, SOLUTION INTRAMUSCULAR; INTRAVENOUS at 14:04

## 2024-06-09 RX ADMIN — Medication 400 MG: at 15:15

## 2024-06-09 RX ADMIN — SODIUM CHLORIDE, POTASSIUM CHLORIDE, SODIUM LACTATE AND CALCIUM CHLORIDE 1000 ML: 600; 310; 30; 20 INJECTION, SOLUTION INTRAVENOUS at 13:16

## 2024-06-09 RX ADMIN — CHLORHEXIDINE GLUCONATE 15 ML: 1.2 RINSE ORAL at 13:22

## 2024-06-09 RX ADMIN — SODIUM CHLORIDE 1 MG: 9 INJECTION INTRAMUSCULAR; INTRAVENOUS; SUBCUTANEOUS at 10:13

## 2024-06-09 RX ADMIN — SODIUM CHLORIDE, POTASSIUM CHLORIDE, SODIUM LACTATE AND CALCIUM CHLORIDE: 600; 310; 30; 20 INJECTION, SOLUTION INTRAVENOUS at 15:16

## 2024-06-09 RX ADMIN — LEVETIRACETAM 1000 MG: 100 INJECTION, SOLUTION INTRAVENOUS at 21:10

## 2024-06-09 RX ADMIN — ONDANSETRON 4 MG: 2 INJECTION INTRAMUSCULAR; INTRAVENOUS at 12:12

## 2024-06-09 RX ADMIN — INSULIN LISPRO 5 UNITS: 100 INJECTION, SOLUTION INTRAVENOUS; SUBCUTANEOUS at 13:45

## 2024-06-09 RX ADMIN — LEVETIRACETAM 1000 MG: 100 INJECTION, SOLUTION, CONCENTRATE INTRAVENOUS at 10:18

## 2024-06-09 ASSESSMENT — PAIN - FUNCTIONAL ASSESSMENT: PAIN_FUNCTIONAL_ASSESSMENT: NONE - DENIES PAIN

## 2024-06-09 NOTE — PLAN OF CARE
Problem: Neurosensory - Adult  Goal: Achieves stable or improved neurological status  Outcome: Progressing  Flowsheets (Taken 6/9/2024 1300)  Achieves stable or improved neurological status:   Initiate measures to prevent increased intracranial pressure   Assess for and report changes in neurological status   Maintain blood pressure and fluid volume within ordered parameters to optimize cerebral perfusion and minimize risk of hemorrhage   Monitor temperature, glucose, and sodium. Initiate appropriate interventions as ordered  Goal: Absence of seizures  Outcome: Progressing  Flowsheets (Taken 6/9/2024 1300)  Absence of seizures:   Monitor for seizure activity.  If seizure occurs, document type and location of movements and any associated apnea   If seizure occurs, turn head to side and suction secretions as needed   Administer anticonvulsants as ordered   Support airway/breathing, administer oxygen as needed   Diagnostic studies as ordered  Goal: Remains free of injury related to seizures activity  Outcome: Progressing  Flowsheets (Taken 6/9/2024 1300)  Remains free of injury related to seizure activity:   Maintain airway, patient safety  and administer oxygen as ordered   Monitor patient for seizure activity, document and report duration and description of seizure to Licensed Independent Practitioner   If seizure occurs, turn patient to side and suction secretions as needed   Reorient patient post seizure   Seizure pads on all 4 side rails   Instruct patient/family to notify RN of any seizure activity   Instruct patient/family to call for assistance with activity based on assessment  Goal: Achieves maximal functionality and self care  Outcome: Progressing  Flowsheets (Taken 6/9/2024 1300)  Achieves maximal functionality and self care:   Monitor swallowing and airway patency with patient fatigue and changes in neurological status   Encourage and assist patient to increase activity and self care with guidance from

## 2024-06-09 NOTE — ED NOTES
ED TO INPATIENT SBAR HANDOFF    Patient Name: Maribeth Loera   :  1990  34 y.o.   MRN:  [id number]  Preferred Name  Maribeth   ED Room #:  ED-0011/11  Family/Caregiver Present no   Restraints no   Sitter no   Sepsis Risk Score Sepsis Risk Score: 2.18    Situation  Code Status: Prior No additional code details.    Allergies: Patient has no known allergies.  Weight: Patient Vitals for the past 96 hrs (Last 3 readings):   Weight   24 0739 44.5 kg (98 lb)     Arrived from: home  Chief Complaint:   Chief Complaint   Patient presents with    Seizures     Ff ems FROM [facility] due to witnessed seizure this morning by  that lasted about 30 sec-1 min. Pt alert and oriented. Pt states she did take her keppra this morning      Hospital Problem/Diagnosis:  Principal Problem:    Seizure (HCC)  Resolved Problems:    * No resolved hospital problems. *    Imaging:   XR CHEST PORTABLE    (Results Pending)     Abnormal labs:   Abnormal Labs Reviewed   CBC WITH AUTO DIFFERENTIAL - Abnormal; Notable for the following components:       Result Value    WBC 11.6 (*)     RBC 3.88 (*)     Hemoglobin 11.4 (*)     MCHC 30.9 (*)     Neutrophils Absolute 10.1 (*)     Lymphocytes Absolute 0.7 (*)     All other components within normal limits   COMPREHENSIVE METABOLIC PANEL W/ REFLEX TO MG FOR LOW K - Abnormal; Notable for the following components:    Glucose 190 (*)     BUN 5 (*)     Creatinine <0.5 (*)     All other components within normal limits   LACTATE, SEPSIS - Abnormal; Notable for the following components:    Lactic Acid, Sepsis 2.7 (*)     All other components within normal limits   LACTATE, SEPSIS - Abnormal; Notable for the following components:    Lactic Acid, Sepsis 7.5 (*)     All other components within normal limits    Narrative:     CALL  [place]  SFER tel. [phone],  Chemistry results called to and read back by CHIN Coburn, 2024  10:52, by KANA     Critical values: no     Abnormal Assessment

## 2024-06-09 NOTE — H&P
V2.0  History and Physical      Name:  Maribeth Loera /Age/Sex: 1990  (34 y.o. female)   MRN & CSN:  [id number] & [id number] Encounter Date/Time: 2024 12:19 PM EDT   Location:  [facility] PCP: Tosha Moreno MD       Hospital Day: 1    Assessment and Plan:   Maribeth Loera is a 34 y.o. female with a pmh of seizures on Keppra, DM on insulin, who presents with with pmh of type 1 diabetes, ROME, depression, and gastroparesis, frequent admissions for DKA brought in after a witnessed Seizure (HCC)    Hospital Problems             Last Modified POA    * (Principal) Seizure (HCC) 2024 Yes       Plan:  Breakthrough seizures:  Follow up CT head, Keppra level.  Patient denies missing doses of Keppra.  Seizure precautions  Neurology consulted.  Continue Keppra 1000 mg twice daily.    Uncontrolled Type I DM with gastroparesis:  A1c 7.9% 2024   Continue basal insulin and SSI.    Lactic acidosis: Continue IVF.  Serial lactic acid levels.    Sinus tachycardia: Continue monitoring with IV fluids.    GERD: Continue PPI.    Anxiety: Stable on home medications.    Disposition:   Current Living situation: Home  Expected Disposition: Home  Estimated D/C:     Diet ADULT DIET; Regular; 4 carb choices (60 gm/meal)   DVT Prophylaxis [x] Lovenox, []  Heparin, [] SCDs, [] Ambulation,  [] Eliquis, [] Xarelto, [] Coumadin   Code Status Prior   Surrogate Decision Maker/ POA Yonny Atkinson (Spouse)   [phone]      Personally reviewed Lab Studies and Imaging     EKG: None on this admission    Chest x-ray pending.    History from:     patient, electronic medical record    History of Present Illness:     Chief Complaint: Seizures    Maribeth Loera is a 34 y.o. female with pmh of seizures on Keppra, DM on insulin, who presents with with pmh of type 1 diabetes, ROME, depression, and gastroparesis, frequent admissions for DKA brought in after a witnessed seizure by her  today. GTC, lasting ~30 secs, no bowel or bladder

## 2024-06-09 NOTE — ED PROVIDER NOTES
physician with the below findings:    See below    Interpretation per the Radiologist below, if available at the time ofthis note:    All incidental findings were discussed with the patient.    No orders to display         ED BEDSIDE ULTRASOUND:   Performed by ED Physician - none    LABS:  Labs Reviewed   CBC WITH AUTO DIFFERENTIAL - Abnormal; Notable for the following components:       Result Value    WBC 11.6 (*)     RBC 3.88 (*)     Hemoglobin 11.4 (*)     MCHC 30.9 (*)     Neutrophils Absolute 10.1 (*)     Lymphocytes Absolute 0.7 (*)     All other components within normal limits   COMPREHENSIVE METABOLIC PANEL W/ REFLEX TO MG FOR LOW K - Abnormal; Notable for the following components:    Glucose 190 (*)     BUN 5 (*)     Creatinine <0.5 (*)     All other components within normal limits   LACTATE, SEPSIS - Abnormal; Notable for the following components:    Lactic Acid, Sepsis 2.7 (*)     All other components within normal limits   LACTATE, SEPSIS - Abnormal; Notable for the following components:    Lactic Acid, Sepsis 7.5 (*)     All other components within normal limits    Narrative:     CALL  [place]  SFERF tel. [phone],  Chemistry results called to and read back by CHIN Coburn, 06/09/2024  10:52, by KANA RIZVIDIMER, QUANTITATIVE       All other labs were within normal range or not returned as of this dictation.    EMERGENCY DEPARTMENT COURSE and DIFFERENTIAL DIAGNOSIS/MDM:   Vitals:    Vitals:    06/09/24 0746 06/09/24 0800 06/09/24 0902 06/09/24 0957   BP: 114/78 111/76 116/83 108/76   Pulse: 96 (!) 106 (!) 106 (!) 112   Resp: 15 14 15 14   Temp:       TempSrc:       SpO2: 100% 100% 98% 98%   Weight:               MDM      The patient has remained stable throughout her hospital course.  Initially the patient was alert and oriented and not postictal.  Initial medical workup on the patient was unremarkable.  However, the patient had another seizure while here in the emergency department.  Ativan and

## 2024-06-09 NOTE — PLAN OF CARE
Problem: Discharge Planning  Goal: Discharge to home or other facility with appropriate resources  Outcome: Progressing  Flowsheets (Taken 6/9/2024 1300)  Discharge to home or other facility with appropriate resources: Identify barriers to discharge with patient and caregiver     Problem: Safety - Adult  Goal: Free from fall injury  Outcome: Progressing     Problem: Cardiovascular - Adult  Goal: Maintains optimal cardiac output and hemodynamic stability  Outcome: Progressing  Flowsheets (Taken 6/9/2024 1300)  Maintains optimal cardiac output and hemodynamic stability:   Monitor urine output and notify Licensed Independent Practitioner for values outside of normal range   Monitor blood pressure and heart rate   Administer fluid and/or volume expanders as ordered   Assess for signs of decreased cardiac output  Goal: Absence of cardiac dysrhythmias or at baseline  Outcome: Progressing  Flowsheets (Taken 6/9/2024 1300)  Absence of cardiac dysrhythmias or at baseline:   Monitor cardiac rate and rhythm   Assess for signs of decreased cardiac output   Administer antiarrhythmia medication and electrolyte replacement as ordered     Problem: Gastrointestinal - Adult  Goal: Minimal or absence of nausea and vomiting  Outcome: Progressing  Flowsheets (Taken 6/9/2024 1300)  Minimal or absence of nausea and vomiting:   Administer IV fluids as ordered to ensure adequate hydration   Provide nonpharmacologic comfort measures as appropriate   Advance diet as tolerated, if ordered   Nutrition consult to assist patient with adequate nutrition and appropriate food choices   Administer ordered antiemetic medications as needed  Goal: Maintains or returns to baseline bowel function  Outcome: Progressing  Flowsheets (Taken 6/9/2024 1300)  Maintains or returns to baseline bowel function:   Assess bowel function   Encourage oral fluids to ensure adequate hydration   Administer IV fluids as ordered to ensure adequate hydration   Administer

## 2024-06-09 NOTE — ED NOTES
Pt had a seizure like episode witnessed by RN while trying to get blood. Pt bit lip, pt post-ictal at this time. Dr. Emmanuel aware. Sitter placed at bedside, pt trying to get out of bed and pulling out lines.

## 2024-06-10 ENCOUNTER — APPOINTMENT (OUTPATIENT)
Dept: GENERAL RADIOLOGY | Age: 34
DRG: 053 | End: 2024-06-10
Payer: COMMERCIAL

## 2024-06-10 ENCOUNTER — APPOINTMENT (OUTPATIENT)
Dept: CT IMAGING | Age: 34
DRG: 053 | End: 2024-06-10
Payer: COMMERCIAL

## 2024-06-10 PROBLEM — E13.8 DM (DIABETES MELLITUS), SECONDARY, WITH COMPLICATIONS (HCC): Status: ACTIVE | Noted: 2024-06-10

## 2024-06-10 PROBLEM — G40.109 EPILEPSY, FOCAL (HCC): Status: ACTIVE | Noted: 2024-06-10

## 2024-06-10 PROBLEM — G93.40 ACUTE ENCEPHALOPATHY: Status: ACTIVE | Noted: 2023-01-24

## 2024-06-10 LAB
25(OH)D3 SERPL-MCNC: 10 NG/ML
ALBUMIN SERPL-MCNC: 3.3 G/DL (ref 3.4–5)
ALBUMIN/GLOB SERPL: 1.3 {RATIO} (ref 1.1–2.2)
ALP SERPL-CCNC: 74 U/L (ref 40–129)
ALT SERPL-CCNC: 10 U/L (ref 10–40)
AMPHETAMINES UR QL SCN>1000 NG/ML: NORMAL
ANION GAP SERPL CALCULATED.3IONS-SCNC: 11 MMOL/L (ref 3–16)
AST SERPL-CCNC: 13 U/L (ref 15–37)
BACTERIA URNS QL MICRO: ABNORMAL /HPF
BARBITURATES UR QL SCN>200 NG/ML: NORMAL
BASOPHILS # BLD: 0.1 K/UL (ref 0–0.2)
BASOPHILS NFR BLD: 0.6 %
BENZODIAZ UR QL SCN>200 NG/ML: NORMAL
BILIRUB SERPL-MCNC: <0.2 MG/DL (ref 0–1)
BILIRUB UR QL STRIP.AUTO: NEGATIVE
BUN SERPL-MCNC: 5 MG/DL (ref 7–20)
CALCIUM SERPL-MCNC: 8.8 MG/DL (ref 8.3–10.6)
CANNABINOIDS UR QL SCN>50 NG/ML: NORMAL
CHLORIDE SERPL-SCNC: 105 MMOL/L (ref 99–110)
CLARITY UR: ABNORMAL
CO2 SERPL-SCNC: 23 MMOL/L (ref 21–32)
COCAINE UR QL SCN: NORMAL
COLOR UR: ABNORMAL
CREAT SERPL-MCNC: <0.5 MG/DL (ref 0.6–1.1)
CRP SERPL-MCNC: 27.6 MG/L (ref 0–5.1)
DEPRECATED RDW RBC AUTO: 14 % (ref 12.4–15.4)
DRUG SCREEN COMMENT UR-IMP: NORMAL
EOSINOPHIL # BLD: 0 K/UL (ref 0–0.6)
EOSINOPHIL NFR BLD: 0.3 %
EPI CELLS #/AREA URNS AUTO: 2 /HPF (ref 0–5)
EST. AVERAGE GLUCOSE BLD GHB EST-MCNC: 194.4 MG/DL
FENTANYL SCREEN, URINE: NORMAL
FERRITIN SERPL IA-MCNC: 34.1 NG/ML (ref 15–150)
FOLATE SERPL-MCNC: 8.82 NG/ML (ref 4.78–24.2)
GFR SERPLBLD CREATININE-BSD FMLA CKD-EPI: >90 ML/MIN/{1.73_M2}
GLUCOSE BLD-MCNC: 144 MG/DL (ref 70–99)
GLUCOSE BLD-MCNC: 166 MG/DL (ref 70–99)
GLUCOSE BLD-MCNC: 175 MG/DL (ref 70–99)
GLUCOSE BLD-MCNC: 184 MG/DL (ref 70–99)
GLUCOSE BLD-MCNC: 41 MG/DL (ref 70–99)
GLUCOSE BLD-MCNC: 43 MG/DL (ref 70–99)
GLUCOSE BLD-MCNC: 63 MG/DL (ref 70–99)
GLUCOSE SERPL-MCNC: 241 MG/DL (ref 70–99)
GLUCOSE UR STRIP.AUTO-MCNC: 250 MG/DL
HBA1C MFR BLD: 8.4 %
HCG UR QL: NEGATIVE
HCT VFR BLD AUTO: 29.9 % (ref 36–48)
HGB BLD-MCNC: 10 G/DL (ref 12–16)
HGB UR QL STRIP.AUTO: ABNORMAL
HYALINE CASTS #/AREA URNS AUTO: 0 /LPF (ref 0–8)
IRON SATN MFR SERPL: 9 % (ref 15–50)
IRON SERPL-MCNC: 22 UG/DL (ref 37–145)
KETONES UR STRIP.AUTO-MCNC: NEGATIVE MG/DL
LACTATE BLDV-SCNC: 1.5 MMOL/L (ref 0.4–2)
LEUKOCYTE ESTERASE UR QL STRIP.AUTO: ABNORMAL
LYMPHOCYTES # BLD: 2.6 K/UL (ref 1–5.1)
LYMPHOCYTES NFR BLD: 23.7 %
MAGNESIUM SERPL-MCNC: 2.1 MG/DL (ref 1.8–2.4)
MCH RBC QN AUTO: 29.9 PG (ref 26–34)
MCHC RBC AUTO-ENTMCNC: 33.5 G/DL (ref 31–36)
MCV RBC AUTO: 89.4 FL (ref 80–100)
METHADONE UR QL SCN>300 NG/ML: NORMAL
MONOCYTES # BLD: 0.7 K/UL (ref 0–1.3)
MONOCYTES NFR BLD: 6 %
NEUTROPHILS # BLD: 7.6 K/UL (ref 1.7–7.7)
NEUTROPHILS NFR BLD: 69.4 %
NITRITE UR QL STRIP.AUTO: NEGATIVE
OPIATES UR QL SCN>300 NG/ML: NORMAL
OXYCODONE UR QL SCN: NORMAL
PCP UR QL SCN>25 NG/ML: NORMAL
PERFORMED ON: ABNORMAL
PH UR STRIP.AUTO: 8 [PH] (ref 5–8)
PH UR STRIP: 8 [PH]
PHOSPHATE SERPL-MCNC: 3.2 MG/DL (ref 2.5–4.9)
PLATELET # BLD AUTO: 297 K/UL (ref 135–450)
PMV BLD AUTO: 8.1 FL (ref 5–10.5)
POTASSIUM SERPL-SCNC: 4.3 MMOL/L (ref 3.5–5.1)
PROCALCITONIN SERPL IA-MCNC: 0.12 NG/ML (ref 0–0.15)
PROT SERPL-MCNC: 5.8 G/DL (ref 6.4–8.2)
PROT UR STRIP.AUTO-MCNC: 30 MG/DL
RBC # BLD AUTO: 3.35 M/UL (ref 4–5.2)
RBC CLUMPS #/AREA URNS AUTO: 1476 /HPF (ref 0–4)
SODIUM SERPL-SCNC: 139 MMOL/L (ref 136–145)
SP GR UR STRIP.AUTO: 1.01 (ref 1–1.03)
TIBC SERPL-MCNC: 256 UG/DL (ref 260–445)
TROPONIN, HIGH SENSITIVITY: 8 NG/L (ref 0–14)
UA COMPLETE W REFLEX CULTURE PNL UR: YES
UA DIPSTICK W REFLEX MICRO PNL UR: YES
URN SPEC COLLECT METH UR: ABNORMAL
UROBILINOGEN UR STRIP-ACNC: 0.2 E.U./DL
VIT B12 SERPL-MCNC: 197 PG/ML (ref 211–911)
WBC # BLD AUTO: 10.9 K/UL (ref 4–11)
WBC #/AREA URNS AUTO: 69 /HPF (ref 0–5)

## 2024-06-10 PROCEDURE — 80053 COMPREHEN METABOLIC PANEL: CPT

## 2024-06-10 PROCEDURE — 6370000000 HC RX 637 (ALT 250 FOR IP)

## 2024-06-10 PROCEDURE — 85025 COMPLETE CBC W/AUTO DIFF WBC: CPT

## 2024-06-10 PROCEDURE — 82306 VITAMIN D 25 HYDROXY: CPT

## 2024-06-10 PROCEDURE — 93005 ELECTROCARDIOGRAM TRACING: CPT | Performed by: INTERNAL MEDICINE

## 2024-06-10 PROCEDURE — 84484 ASSAY OF TROPONIN QUANT: CPT

## 2024-06-10 PROCEDURE — 83735 ASSAY OF MAGNESIUM: CPT

## 2024-06-10 PROCEDURE — 84100 ASSAY OF PHOSPHORUS: CPT

## 2024-06-10 PROCEDURE — 83036 HEMOGLOBIN GLYCOSYLATED A1C: CPT

## 2024-06-10 PROCEDURE — 82607 VITAMIN B-12: CPT

## 2024-06-10 PROCEDURE — 2580000003 HC RX 258: Performed by: INTERNAL MEDICINE

## 2024-06-10 PROCEDURE — 87040 BLOOD CULTURE FOR BACTERIA: CPT

## 2024-06-10 PROCEDURE — 82746 ASSAY OF FOLIC ACID SERUM: CPT

## 2024-06-10 PROCEDURE — 86140 C-REACTIVE PROTEIN: CPT

## 2024-06-10 PROCEDURE — 71046 X-RAY EXAM CHEST 2 VIEWS: CPT

## 2024-06-10 PROCEDURE — 95819 EEG AWAKE AND ASLEEP: CPT

## 2024-06-10 PROCEDURE — 82728 ASSAY OF FERRITIN: CPT

## 2024-06-10 PROCEDURE — 81001 URINALYSIS AUTO W/SCOPE: CPT

## 2024-06-10 PROCEDURE — 99223 1ST HOSP IP/OBS HIGH 75: CPT | Performed by: PSYCHIATRY & NEUROLOGY

## 2024-06-10 PROCEDURE — 83550 IRON BINDING TEST: CPT

## 2024-06-10 PROCEDURE — 80307 DRUG TEST PRSMV CHEM ANLYZR: CPT

## 2024-06-10 PROCEDURE — 6370000000 HC RX 637 (ALT 250 FOR IP): Performed by: INTERNAL MEDICINE

## 2024-06-10 PROCEDURE — APPSS60 APP SPLIT SHARED TIME 46-60 MINUTES

## 2024-06-10 PROCEDURE — APPNB30 APP NON BILLABLE TIME 0-30 MINS

## 2024-06-10 PROCEDURE — 2060000000 HC ICU INTERMEDIATE R&B

## 2024-06-10 PROCEDURE — 87086 URINE CULTURE/COLONY COUNT: CPT

## 2024-06-10 PROCEDURE — 84703 CHORIONIC GONADOTROPIN ASSAY: CPT

## 2024-06-10 PROCEDURE — 84145 PROCALCITONIN (PCT): CPT

## 2024-06-10 PROCEDURE — 70450 CT HEAD/BRAIN W/O DYE: CPT

## 2024-06-10 PROCEDURE — 36415 COLL VENOUS BLD VENIPUNCTURE: CPT

## 2024-06-10 PROCEDURE — 95816 EEG AWAKE AND DROWSY: CPT | Performed by: PSYCHIATRY & NEUROLOGY

## 2024-06-10 PROCEDURE — 83605 ASSAY OF LACTIC ACID: CPT

## 2024-06-10 PROCEDURE — 6360000002 HC RX W HCPCS: Performed by: INTERNAL MEDICINE

## 2024-06-10 PROCEDURE — 83540 ASSAY OF IRON: CPT

## 2024-06-10 RX ORDER — INSULIN LISPRO 100 [IU]/ML
7 INJECTION, SOLUTION INTRAVENOUS; SUBCUTANEOUS
Status: DISCONTINUED | OUTPATIENT
Start: 2024-06-10 | End: 2024-06-11

## 2024-06-10 RX ORDER — MIDODRINE HYDROCHLORIDE 5 MG/1
10 TABLET ORAL
Status: DISCONTINUED | OUTPATIENT
Start: 2024-06-10 | End: 2024-06-12 | Stop reason: HOSPADM

## 2024-06-10 RX ORDER — LEVETIRACETAM 250 MG/1
250 TABLET ORAL ONCE
Status: COMPLETED | OUTPATIENT
Start: 2024-06-10 | End: 2024-06-10

## 2024-06-10 RX ORDER — INSULIN GLARGINE 100 [IU]/ML
20 INJECTION, SOLUTION SUBCUTANEOUS NIGHTLY
Status: DISCONTINUED | OUTPATIENT
Start: 2024-06-10 | End: 2024-06-11

## 2024-06-10 RX ADMIN — DEXTROSE MONOHYDRATE 250 ML: 100 INJECTION, SOLUTION INTRAVENOUS at 22:01

## 2024-06-10 RX ADMIN — GABAPENTIN 900 MG: 300 CAPSULE ORAL at 08:39

## 2024-06-10 RX ADMIN — CHLORHEXIDINE GLUCONATE 15 ML: 1.2 RINSE ORAL at 08:41

## 2024-06-10 RX ADMIN — MIDODRINE HYDROCHLORIDE 10 MG: 5 TABLET ORAL at 12:58

## 2024-06-10 RX ADMIN — CLONAZEPAM 2 MG: 1 TABLET ORAL at 21:32

## 2024-06-10 RX ADMIN — LEVETIRACETAM 1250 MG: 500 TABLET, FILM COATED ORAL at 21:33

## 2024-06-10 RX ADMIN — MIDODRINE HYDROCHLORIDE 10 MG: 5 TABLET ORAL at 15:50

## 2024-06-10 RX ADMIN — CLONAZEPAM 2 MG: 1 TABLET ORAL at 08:39

## 2024-06-10 RX ADMIN — GABAPENTIN 900 MG: 300 CAPSULE ORAL at 22:31

## 2024-06-10 RX ADMIN — SODIUM CHLORIDE, POTASSIUM CHLORIDE, SODIUM LACTATE AND CALCIUM CHLORIDE: 600; 310; 30; 20 INJECTION, SOLUTION INTRAVENOUS at 00:14

## 2024-06-10 RX ADMIN — GABAPENTIN 900 MG: 300 CAPSULE ORAL at 00:09

## 2024-06-10 RX ADMIN — INSULIN LISPRO 7 UNITS: 100 INJECTION, SOLUTION INTRAVENOUS; SUBCUTANEOUS at 15:50

## 2024-06-10 RX ADMIN — CHLORHEXIDINE GLUCONATE 15 ML: 1.2 RINSE ORAL at 22:02

## 2024-06-10 RX ADMIN — GABAPENTIN 900 MG: 300 CAPSULE ORAL at 15:50

## 2024-06-10 RX ADMIN — LEVETIRACETAM 250 MG: 250 TABLET, FILM COATED ORAL at 13:20

## 2024-06-10 RX ADMIN — LEVETIRACETAM 1000 MG: 100 INJECTION, SOLUTION INTRAVENOUS at 08:39

## 2024-06-10 RX ADMIN — INSULIN LISPRO 7 UNITS: 100 INJECTION, SOLUTION INTRAVENOUS; SUBCUTANEOUS at 08:39

## 2024-06-10 RX ADMIN — Medication 400 MG: at 08:39

## 2024-06-10 ASSESSMENT — PAIN SCALES - GENERAL: PAINLEVEL_OUTOF10: 3

## 2024-06-10 ASSESSMENT — PAIN DESCRIPTION - LOCATION: LOCATION: HEAD

## 2024-06-10 NOTE — PROGRESS NOTES
Urine sample sent to lab. Patient stated that she in on her period, started today. Blood was seen in urine when sample was gotten.

## 2024-06-10 NOTE — PROGRESS NOTES
2057 Messaged Juliet regarding pts bp 89/53, hr 110. Asymptomatic. No new orders at this time.Electronically signed by Sahra Velasquez RN on 6/9/2024 at 9:46 PM

## 2024-06-10 NOTE — PROCEDURES
Patient: Maribeth Loera    MR Number: [id number]  YOB: 1990  Date of Visit: 6/10/2024    Clinical History:  The patient is a 34 y.o. years old female with history of epilepsy and recurrent seizures.  Medications reviewed.      Method:  The EEG was performed utilizing the international 10/20 of electrode placements of both referential and bipolar montages. The patient was awake and drowsy through out the recording.  Photic stimulation was performed.    Findings:  The background of the EEG showed normal alpha posterior background of -9-10 HZ and amplitude of 20-40 UV. This background was symmetric, waxing and waning, and reactive with eye opening and closure. As the patient became drowsy, generalized diffuse slowing was seen through recording at 6-7 HZ.  This generalized slowing was symmetric, non rhythmical, and continuous. No spike or sharp waves were seen.  Photic stimulation did not activate EEG.     Impression:  This EEG  is within normal limits. There is no evidence of epileptiform discharges, focal, or lateralizing abnormalities.      Umesh Noble MD      Board certified in clinical neurophysiology

## 2024-06-10 NOTE — PROGRESS NOTES
Pt taken to restroom twice this shift and still has not given the ordered urine sample and hat has been in the toilet all shift as well as on day shift per RN on days.

## 2024-06-10 NOTE — PLAN OF CARE
Problem: Discharge Planning  Goal: Discharge to home or other facility with appropriate resources  6/10/2024 0120 by Shakira Chong RN  Outcome: Progressing  6/9/2024 1556 by Sahra Velasquez RN  Outcome: Progressing  Flowsheets (Taken 6/9/2024 1300)  Discharge to home or other facility with appropriate resources: Identify barriers to discharge with patient and caregiver     Problem: Safety - Adult  Goal: Free from fall injury  6/10/2024 0120 by Shakira Chong RN  Outcome: Progressing  6/9/2024 1556 by Sahra Velasquez RN  Outcome: Progressing     Problem: Cardiovascular - Adult  Goal: Maintains optimal cardiac output and hemodynamic stability  6/10/2024 0120 by Shakira Chong RN  Outcome: Progressing  6/9/2024 1556 by Sahra Velasquez RN  Outcome: Progressing  Flowsheets (Taken 6/9/2024 1300)  Maintains optimal cardiac output and hemodynamic stability:   Monitor urine output and notify Licensed Independent Practitioner for values outside of normal range   Monitor blood pressure and heart rate   Administer fluid and/or volume expanders as ordered   Assess for signs of decreased cardiac output  Goal: Absence of cardiac dysrhythmias or at baseline  6/10/2024 0120 by Shakira Chong RN  Outcome: Progressing  6/9/2024 1556 by Sahra Velasquez RN  Outcome: Progressing  Flowsheets (Taken 6/9/2024 1300)  Absence of cardiac dysrhythmias or at baseline:   Monitor cardiac rate and rhythm   Assess for signs of decreased cardiac output   Administer antiarrhythmia medication and electrolyte replacement as ordered     Problem: Gastrointestinal - Adult  Goal: Minimal or absence of nausea and vomiting  6/10/2024 0120 by Shakira Chong RN  Outcome: Progressing  6/9/2024 1556 by Sahra Velasquez RN  Outcome: Progressing  Flowsheets (Taken 6/9/2024 1300)  Minimal or absence of nausea and vomiting:   Administer IV fluids as ordered to ensure adequate hydration   Provide nonpharmacologic comfort measures as appropriate

## 2024-06-10 NOTE — PROGRESS NOTES
V2.0    [facility] Progress Note      Name:  Maribeth Loera /Age/Sex: 1990  (34 y.o. female)   MRN & CSN:  [id number] & [id number] Encounter Date/Time: 6/10/2024 12:41 PM EDT   Location:  [address] [place], [place] [id number] PCP: Tosha Moreno MD     Attending:Herlinda Maciel MD       Hospital Day: 2    Assessment and Recommendations   Maribeth Loera is a 34 y.o. female with a pmh of seizures on Keppra, DM on insulin, who presents with with pmh of type 1 diabetes, ROME, depression, and gastroparesis, frequent admissions for DKA brought in after a witnessed Seizure.      Plan:     Breakthrough seizures:  CT head unremarkable.   Follow-up Keppra level and EEG.  Patient denies missing doses of Keppra.  Seizure precautions  Neurology consult appreciated.  No driving for at least 3 months, episode free and needs clearance from her physician.  Needs to follow-up with her primary neurologist at .  Keppra increased to 1250 mg twice daily.     Uncontrolled Type I DM with gastroparesis:  A1c 7.9% 2024   Continue basal insulin and SSI.     Lactic acidosis: Resolved with IVF.     Hypotension/sinus tachycardia:   Continue monitoring with IV fluids.  Portable chest x-ray unremarkable.  Follow-up repeat two-view CXR, UA and culture, blood cultures, ECHO.  Started on midodrine     GERD: Continue PPI.     Anxiety: Stable on home medications.    Anemia: Follow-up workup.    Diet ADULT DIET; Regular; 4 carb choices (60 gm/meal)   DVT Prophylaxis [x] Lovenox, []  Heparin, [] SCDs, [] Ambulation,  [] Eliquis, [] Xarelto  [] Coumadin   Code Status Full Code   Disposition From: Home  Expected Disposition: Home  Estimated Date of Discharge:   Patient requires continued admission due to Hypotension, seizures   Surrogate Decision Maker/ Yonny Gardner (Spouse)  [phone]      Personally reviewed Lab Studies and Imaging     Subjective:     Chief Complaint: Seizures    Patient seen and examined.   Patient noted to be hypotensive this

## 2024-06-10 NOTE — PLAN OF CARE
Problem: Discharge Planning  Goal: Discharge to home or other facility with appropriate resources  6/10/2024 1415 by Olivia Hernandez RN  Outcome: Progressing  Flowsheets (Taken 6/10/2024 1415)  Discharge to home or other facility with appropriate resources:   Identify barriers to discharge with patient and caregiver   Arrange for needed discharge resources and transportation as appropriate   Identify discharge learning needs (meds, wound care, etc)   Arrange for interpreters to assist at discharge as needed     Problem: Safety - Adult  Goal: Free from fall injury  6/10/2024 1415 by Olivia Hernandez RN  Outcome: Progressing  Flowsheets (Taken 6/10/2024 1415)  Free From Fall Injury: Instruct family/caregiver on patient safety     Problem: Cardiovascular - Adult  Goal: Maintains optimal cardiac output and hemodynamic stability  6/10/2024 1415 by Olivia Hernandez RN  Outcome: Progressing     Problem: Neurosensory - Adult  Goal: Absence of seizures  6/10/2024 1415 by Olivia Hernandez RN  Outcome: Progressing  Flowsheets (Taken 6/10/2024 1415)  Absence of seizures:   Monitor for seizure activity.  If seizure occurs, document type and location of movements and any associated apnea   Diagnostic studies as ordered

## 2024-06-10 NOTE — CONSULTS
made to edit the dictation for accuracy, there may be errors in the  transcription that are not intended

## 2024-06-11 ENCOUNTER — APPOINTMENT (OUTPATIENT)
Age: 34
DRG: 053 | End: 2024-06-11
Attending: INTERNAL MEDICINE
Payer: COMMERCIAL

## 2024-06-11 LAB
BACTERIA UR CULT: NORMAL
ECHO AO ASC DIAM: 2.4 CM
ECHO AO ASCENDING AORTA INDEX: 1.67 CM/M2
ECHO AO ROOT DIAM: 2.1 CM
ECHO AO ROOT INDEX: 1.46 CM/M2
ECHO AV AREA PEAK VELOCITY: 1.6 CM2
ECHO AV AREA VTI: 1.7 CM2
ECHO AV AREA/BSA PEAK VELOCITY: 1.1 CM2/M2
ECHO AV AREA/BSA VTI: 1.2 CM2/M2
ECHO AV MEAN GRADIENT: 4 MMHG
ECHO AV MEAN VELOCITY: 0.9 M/S
ECHO AV PEAK GRADIENT: 6 MMHG
ECHO AV PEAK VELOCITY: 1.3 M/S
ECHO AV VELOCITY RATIO: 0.92
ECHO AV VTI: 24.8 CM
ECHO BSA: 1.35 M2
ECHO LA AREA 2C: 12.6 CM2
ECHO LA AREA 4C: 10.9 CM2
ECHO LA DIAMETER INDEX: 2.22 CM/M2
ECHO LA DIAMETER: 3.2 CM
ECHO LA MAJOR AXIS: 4.9 CM
ECHO LA MINOR AXIS: 4.6 CM
ECHO LA TO AORTIC ROOT RATIO: 1.52
ECHO LA VOL BP: 24 ML (ref 22–52)
ECHO LA VOL MOD A2C: 28 ML (ref 22–52)
ECHO LA VOL MOD A4C: 19 ML (ref 22–52)
ECHO LA VOL/BSA BIPLANE: 17 ML/M2 (ref 16–34)
ECHO LA VOLUME INDEX MOD A2C: 19 ML/M2 (ref 16–34)
ECHO LA VOLUME INDEX MOD A4C: 13 ML/M2 (ref 16–34)
ECHO LV E' LATERAL VELOCITY: 13 CM/S
ECHO LV E' SEPTAL VELOCITY: 14 CM/S
ECHO LV EDV A2C: 61 ML
ECHO LV EDV A4C: 58 ML
ECHO LV EDV INDEX A4C: 40 ML/M2
ECHO LV EDV NDEX A2C: 42 ML/M2
ECHO LV EJECTION FRACTION A2C: 56 %
ECHO LV EJECTION FRACTION A4C: 54 %
ECHO LV EJECTION FRACTION BIPLANE: 55 % (ref 55–100)
ECHO LV ESV A2C: 27 ML
ECHO LV ESV A4C: 27 ML
ECHO LV ESV INDEX A2C: 19 ML/M2
ECHO LV ESV INDEX A4C: 19 ML/M2
ECHO LV FRACTIONAL SHORTENING: 33 % (ref 28–44)
ECHO LV INTERNAL DIMENSION DIASTOLE INDEX: 2.29 CM/M2
ECHO LV INTERNAL DIMENSION DIASTOLIC: 3.3 CM (ref 3.9–5.3)
ECHO LV INTERNAL DIMENSION SYSTOLIC INDEX: 1.53 CM/M2
ECHO LV INTERNAL DIMENSION SYSTOLIC: 2.2 CM
ECHO LV IVSD: 0.8 CM (ref 0.6–0.9)
ECHO LV MASS 2D: 87.7 G (ref 67–162)
ECHO LV MASS INDEX 2D: 60.9 G/M2 (ref 43–95)
ECHO LV POSTERIOR WALL DIASTOLIC: 1.1 CM (ref 0.6–0.9)
ECHO LV RELATIVE WALL THICKNESS RATIO: 0.67
ECHO LVOT AREA: 1.8 CM2
ECHO LVOT AV VTI INDEX: 0.96
ECHO LVOT DIAM: 1.5 CM
ECHO LVOT PEAK GRADIENT: 5 MMHG
ECHO LVOT PEAK VELOCITY: 1.2 M/S
ECHO LVOT STROKE VOLUME INDEX: 29.2 ML/M2
ECHO LVOT SV: 42 ML
ECHO LVOT VTI: 23.8 CM
ECHO MV E VELOCITY: 1.13 M/S
ECHO MV E/A RATIO: 1.85
ECHO MV E/E' LATERAL: 8.69
ECHO MV E/E' RATIO (AVERAGED): 8.38
ECHO MV E/E' SEPTAL: 8.07
ECHO PV MAX VELOCITY: 0.9 M/S
ECHO PV PEAK GRADIENT: 4 MMHG
ECHO RA AREA 4C: 11.1 CM2
ECHO RA END SYSTOLIC VOLUME APICAL 4 CHAMBER INDEX BSA: 15 ML/M2
ECHO RA VOLUME: 21 ML
ECHO RV BASAL DIMENSION: 2.6 CM
ECHO RV FREE WALL PEAK S': 14 CM/S
ECHO RV LONGITUDINAL DIMENSION: 6.5 CM
ECHO RV MID DIMENSION: 1.7 CM
ECHO RV TAPSE: 2.4 CM (ref 1.7–?)
EKG DIAGNOSIS: NORMAL
EKG P AXIS: 51 DEGREES
EKG P-R INTERVAL: 124 MS
EKG Q-T INTERVAL: 378 MS
EKG QRS DURATION: 66 MS
EKG QTC CALCULATION (BAZETT): 435 MS
EKG R AXIS: 57 DEGREES
EKG T AXIS: 34 DEGREES
EKG VENTRICULAR RATE: 80 BPM
GLUCOSE BLD-MCNC: 133 MG/DL (ref 70–99)
GLUCOSE BLD-MCNC: 177 MG/DL (ref 70–99)
GLUCOSE BLD-MCNC: 187 MG/DL (ref 70–99)
GLUCOSE BLD-MCNC: 190 MG/DL (ref 70–99)
GLUCOSE BLD-MCNC: 230 MG/DL (ref 70–99)
GLUCOSE BLD-MCNC: 273 MG/DL (ref 70–99)
PERFORMED ON: ABNORMAL

## 2024-06-11 PROCEDURE — 6360000002 HC RX W HCPCS: Performed by: INTERNAL MEDICINE

## 2024-06-11 PROCEDURE — 6370000000 HC RX 637 (ALT 250 FOR IP)

## 2024-06-11 PROCEDURE — 6370000000 HC RX 637 (ALT 250 FOR IP): Performed by: STUDENT IN AN ORGANIZED HEALTH CARE EDUCATION/TRAINING PROGRAM

## 2024-06-11 PROCEDURE — 2060000000 HC ICU INTERMEDIATE R&B

## 2024-06-11 PROCEDURE — 6370000000 HC RX 637 (ALT 250 FOR IP): Performed by: INTERNAL MEDICINE

## 2024-06-11 PROCEDURE — 2580000003 HC RX 258: Performed by: INTERNAL MEDICINE

## 2024-06-11 PROCEDURE — 93306 TTE W/DOPPLER COMPLETE: CPT

## 2024-06-11 RX ORDER — INSULIN GLARGINE 100 [IU]/ML
10 INJECTION, SOLUTION SUBCUTANEOUS NIGHTLY
Status: DISCONTINUED | OUTPATIENT
Start: 2024-06-11 | End: 2024-06-12 | Stop reason: HOSPADM

## 2024-06-11 RX ORDER — INSULIN LISPRO 100 [IU]/ML
4 INJECTION, SOLUTION INTRAVENOUS; SUBCUTANEOUS
Status: DISCONTINUED | OUTPATIENT
Start: 2024-06-11 | End: 2024-06-12

## 2024-06-11 RX ADMIN — INSULIN LISPRO 4 UNITS: 100 INJECTION, SOLUTION INTRAVENOUS; SUBCUTANEOUS at 19:20

## 2024-06-11 RX ADMIN — INSULIN GLARGINE 10 UNITS: 100 INJECTION, SOLUTION SUBCUTANEOUS at 22:47

## 2024-06-11 RX ADMIN — MIDODRINE HYDROCHLORIDE 10 MG: 5 TABLET ORAL at 19:21

## 2024-06-11 RX ADMIN — GABAPENTIN 900 MG: 300 CAPSULE ORAL at 14:21

## 2024-06-11 RX ADMIN — ENOXAPARIN SODIUM 30 MG: 100 INJECTION SUBCUTANEOUS at 09:44

## 2024-06-11 RX ADMIN — SODIUM CHLORIDE, POTASSIUM CHLORIDE, SODIUM LACTATE AND CALCIUM CHLORIDE: 600; 310; 30; 20 INJECTION, SOLUTION INTRAVENOUS at 01:57

## 2024-06-11 RX ADMIN — INSULIN LISPRO 7 UNITS: 100 INJECTION, SOLUTION INTRAVENOUS; SUBCUTANEOUS at 09:43

## 2024-06-11 RX ADMIN — CHLORHEXIDINE GLUCONATE 15 ML: 1.2 RINSE ORAL at 20:28

## 2024-06-11 RX ADMIN — CHLORHEXIDINE GLUCONATE 15 ML: 1.2 RINSE ORAL at 09:53

## 2024-06-11 RX ADMIN — MIDODRINE HYDROCHLORIDE 10 MG: 5 TABLET ORAL at 09:44

## 2024-06-11 RX ADMIN — LEVETIRACETAM 1250 MG: 500 TABLET, FILM COATED ORAL at 09:44

## 2024-06-11 RX ADMIN — CLONAZEPAM 2 MG: 1 TABLET ORAL at 09:45

## 2024-06-11 RX ADMIN — CLONAZEPAM 2 MG: 1 TABLET ORAL at 20:26

## 2024-06-11 RX ADMIN — GABAPENTIN 900 MG: 300 CAPSULE ORAL at 06:24

## 2024-06-11 RX ADMIN — Medication 400 MG: at 09:44

## 2024-06-11 RX ADMIN — LEVETIRACETAM 1250 MG: 500 TABLET, FILM COATED ORAL at 20:26

## 2024-06-11 RX ADMIN — GABAPENTIN 900 MG: 300 CAPSULE ORAL at 22:47

## 2024-06-11 RX ADMIN — MIDODRINE HYDROCHLORIDE 10 MG: 5 TABLET ORAL at 14:21

## 2024-06-11 RX ADMIN — SODIUM CHLORIDE, PRESERVATIVE FREE 10 ML: 5 INJECTION INTRAVENOUS at 09:43

## 2024-06-11 RX ADMIN — SODIUM CHLORIDE, PRESERVATIVE FREE 10 ML: 5 INJECTION INTRAVENOUS at 20:26

## 2024-06-11 NOTE — PROGRESS NOTES
V2.0    [facility] Progress Note      Name:  Maribeth Loera /Age/Sex: 1990  (34 y.o. female)   MRN & CSN:  [id number] & [id number] Encounter Date/Time: 2024 12:41 PM EDT   Location:  [address] [place], [place] [id number]- PCP: Tosha Moreno MD     Attending:Callum Mei MD       Hospital Day: 3    Assessment and Recommendations   Maribeth Loera is a 34 y.o. female with a pmh of seizures on Keppra, DM on insulin, who presents with with pmh of type 1 diabetes, ROME, depression, and gastroparesis, frequent admissions for DKA brought in after a witnessed Seizure.      Plan:     Breakthrough seizures:  CT head unremarkable.   Keppra level wnl , EEG without seizure spikes patient denies missing doses of Keppra.  Seizure precautions  Neurology consult appreciated.  No driving for at least 3 months, episode free and needs clearance from her physician.  Needs to follow-up with her primary neurologist at .  Keppra increased to 1250 mg twice daily.     Uncontrolled Type I DM with gastroparesis:  A1c 7.9% 2024   Continue basal insulin and SSI.  It is unclear how much patient takes at home  Episode of low blood sugar yesterday  Decrease Lantus to 10 units and lispro to 4 units with meals along with low-dose sliding scale     Lactic acidosis: Resolved with IVF.     Hypotension/sinus tachycardia:   Continue monitoring with IV fluids.  Portable chest x-ray unremarkable.  repeat two-view CXR, UA and culture, blood cultures, ECHO without any acute findings.  Likely noncardiogenic  Started on midodrine     GERD: Continue PPI.     Anxiety: Stable on home medications.    Anemia: Follow-up workup.    Diet ADULT DIET; Regular; 4 carb choices (60 gm/meal)   DVT Prophylaxis [x] Lovenox, []  Heparin, [] SCDs, [] Ambulation,  [] Eliquis, [] Xarelto  [] Coumadin   Code Status Full Code   Disposition From: Home  Expected Disposition: Home  Estimated Date of Discharge:   Patient requires continued admission due to Hypotension, seizures

## 2024-06-11 NOTE — PROGRESS NOTES
Low Risk Nutrition Note     Reason for Visit:    MST 2 for wt loss & poor appetite    Nutrition Assessment:    Nutrition intervention triggered for wt loss & poor po intake. Pt states  lb; standing scale wt 116 lb; no significant changes per hx. Receives a CCC diet & reports appetite is not great d/t dislike for food in hospital. Suggested ONS but pt declined & wants to try to eat better at meals.  No further nutrition concerns expressed @ this time.     Current Nutrition Therapies:    ADULT DIET; Regular; 4 carb choices (60 gm/meal)    Anthropometrics:   Current Height: 147.3 cm (4' 10\")  Current Weight - Scale: 52.6 kg (116 lb)      Monitoring and Evaluation:  No nutrition diagnosis. Patient will be monitored per nutrition standards of care.     Consult Dietitian if nutrition intervention essential to patient care is needed.     Discharge Planning:  No needs    Paige Louis, RD, LD

## 2024-06-11 NOTE — PLAN OF CARE
Problem: Discharge Planning  Goal: Discharge to home or other facility with appropriate resources  6/10/2024 2144 by Shakira Chong RN  Outcome: Progressing  Flowsheets (Taken 6/10/2024 2127)  Discharge to home or other facility with appropriate resources:   Identify barriers to discharge with patient and caregiver   Arrange for needed discharge resources and transportation as appropriate   Identify discharge learning needs (meds, wound care, etc)   Refer to discharge planning if patient needs post-hospital services based on physician order or complex needs related to functional status, cognitive ability or social support system  6/10/2024 1415 by Olivia Hernandez, RN  Outcome: Progressing  Flowsheets (Taken 6/10/2024 1415)  Discharge to home or other facility with appropriate resources:   Identify barriers to discharge with patient and caregiver   Arrange for needed discharge resources and transportation as appropriate   Identify discharge learning needs (meds, wound care, etc)   Arrange for interpreters to assist at discharge as needed     Problem: Safety - Adult  Goal: Free from fall injury  6/10/2024 2144 by Shakira Chong RN  Outcome: Progressing  6/10/2024 1415 by Olivia Hernandez RN  Outcome: Progressing  Flowsheets (Taken 6/10/2024 1415)  Free From Fall Injury: Instruct family/caregiver on patient safety     Problem: Cardiovascular - Adult  Goal: Maintains optimal cardiac output and hemodynamic stability  6/10/2024 2144 by Shakira Chong RN  Outcome: Progressing  Flowsheets (Taken 6/10/2024 2127)  Maintains optimal cardiac output and hemodynamic stability:   Monitor blood pressure and heart rate   Assess for signs of decreased cardiac output  6/10/2024 1415 by Olivia Hernandez, RN  Outcome: Progressing  Goal: Absence of cardiac dysrhythmias or at baseline  Outcome: Progressing  Flowsheets (Taken 6/10/2024 2127)  Absence of cardiac dysrhythmias or at baseline:   Monitor cardiac rate and rhythm   Assess for

## 2024-06-11 NOTE — PROGRESS NOTES
[facility]  Hypoglycemia Event and Prevention Plan      NAME: Maribeth Loera  MEDICAL RECORD NUMBER:  [id number]  AGE: 34 y.o.   GENDER: female  : 1990  EPISODE DATE:  2024     Data     Pt had hypoglycemia event last night with BG of 41.    Recent Labs     06/10/24  2152 06/10/24  2234 06/10/24  2255 24  0155 24  0747 24  1059   POCGLU 43* 144* 166* 177* 187* 133*       Medications  Scheduled Medications:   insulin glargine  20 Units SubCUTAneous Nightly    insulin lispro  7 Units SubCUTAneous TID WC    levETIRAcetam  1,250 mg Oral BID    midodrine  10 mg Oral TID WC    sodium chloride flush  5-40 mL IntraVENous 2 times per day    enoxaparin  30 mg SubCUTAneous Daily    clonazePAM  2 mg Oral BID    gabapentin  900 mg Oral q8h    magnesium oxide  400 mg Oral Daily    insulin lispro  0-8 Units SubCUTAneous TID WC    insulin lispro  0-4 Units SubCUTAneous Nightly    chlorhexidine  15 mL Mouth/Throat BID       Diet  Current diet/supplement order: ADULT DIET; Regular; 4 carb choices (60 gm/meal)     Action      Treatment (jacquie all that apply): Per RN Note  []   4 ounces juice   [x]   8 ounces juice   []   4 ounces regular soda  []   8 ounces regular soda  []   1 tube glucose gel  []   Glucagon 1 mg IM   [x]   D10 IVPB  []   D5W started at 100 cc/hr  []   Other (please describe)    Physician Notified of event: Secure message sent to Dr. Mei 24 1211 by this RN    Consults (jacquie all that apply):  []   Pharmacy   []   Dietitian    [x]   Diabetes Educator  []   Other (please describe)    Response     Achieved POCT Blood Glucose greater than 70 mg/dl: Yes      Medication plan updated: Yes, insulin dosages decreased by hospitalist    Electronically signed by Karlie Naik RN on 2024 at 12:12 PM

## 2024-06-11 NOTE — PROGRESS NOTES
Pt HS glucose was 63. Pt given 2 peanut butter cups, crackers, and juice to drink. Pt recheck was 43 then done again and it was 41, choice then made to give whole bolus as patient had ingested enough cabs to have given her an increase which did not happen. Pt checked after bolus is now at 144. One more recheck will be done then again at 0200. If more issues arise more messages will be sent regarding patient instability.

## 2024-06-12 VITALS
HEIGHT: 58 IN | OXYGEN SATURATION: 97 % | TEMPERATURE: 97.9 F | DIASTOLIC BLOOD PRESSURE: 94 MMHG | SYSTOLIC BLOOD PRESSURE: 152 MMHG | HEART RATE: 58 BPM | RESPIRATION RATE: 18 BRPM | BODY MASS INDEX: 24.35 KG/M2 | WEIGHT: 116 LBS

## 2024-06-12 LAB
ANION GAP SERPL CALCULATED.3IONS-SCNC: 9 MMOL/L (ref 3–16)
BUN SERPL-MCNC: 7 MG/DL (ref 7–20)
CALCIUM SERPL-MCNC: 8.7 MG/DL (ref 8.3–10.6)
CHLORIDE SERPL-SCNC: 105 MMOL/L (ref 99–110)
CO2 SERPL-SCNC: 25 MMOL/L (ref 21–32)
CREAT SERPL-MCNC: <0.5 MG/DL (ref 0.6–1.1)
GFR SERPLBLD CREATININE-BSD FMLA CKD-EPI: >90 ML/MIN/{1.73_M2}
GLUCOSE BLD-MCNC: 258 MG/DL (ref 70–99)
GLUCOSE BLD-MCNC: 267 MG/DL (ref 70–99)
GLUCOSE BLD-MCNC: 313 MG/DL (ref 70–99)
GLUCOSE SERPL-MCNC: 258 MG/DL (ref 70–99)
PERFORMED ON: ABNORMAL
POTASSIUM SERPL-SCNC: 4.1 MMOL/L (ref 3.5–5.1)
SODIUM SERPL-SCNC: 139 MMOL/L (ref 136–145)

## 2024-06-12 PROCEDURE — 80048 BASIC METABOLIC PNL TOTAL CA: CPT

## 2024-06-12 PROCEDURE — 6370000000 HC RX 637 (ALT 250 FOR IP)

## 2024-06-12 PROCEDURE — 2580000003 HC RX 258: Performed by: INTERNAL MEDICINE

## 2024-06-12 PROCEDURE — 36415 COLL VENOUS BLD VENIPUNCTURE: CPT

## 2024-06-12 PROCEDURE — 6360000002 HC RX W HCPCS: Performed by: INTERNAL MEDICINE

## 2024-06-12 PROCEDURE — 6370000000 HC RX 637 (ALT 250 FOR IP): Performed by: INTERNAL MEDICINE

## 2024-06-12 PROCEDURE — 6370000000 HC RX 637 (ALT 250 FOR IP): Performed by: STUDENT IN AN ORGANIZED HEALTH CARE EDUCATION/TRAINING PROGRAM

## 2024-06-12 RX ORDER — INSULIN LISPRO 100 [IU]/ML
5 INJECTION, SOLUTION INTRAVENOUS; SUBCUTANEOUS
Status: DISCONTINUED | OUTPATIENT
Start: 2024-06-12 | End: 2024-06-12 | Stop reason: HOSPADM

## 2024-06-12 RX ORDER — INSULIN LISPRO 100 [IU]/ML
0-4 INJECTION, SOLUTION INTRAVENOUS; SUBCUTANEOUS
Status: DISCONTINUED | OUTPATIENT
Start: 2024-06-12 | End: 2024-06-12 | Stop reason: HOSPADM

## 2024-06-12 RX ORDER — MIDODRINE HYDROCHLORIDE 5 MG/1
5 TABLET ORAL 3 TIMES DAILY
Qty: 90 TABLET | Refills: 1 | Status: SHIPPED | OUTPATIENT
Start: 2024-06-12

## 2024-06-12 RX ORDER — INSULIN LISPRO 100 [IU]/ML
0-4 INJECTION, SOLUTION INTRAVENOUS; SUBCUTANEOUS NIGHTLY
Status: DISCONTINUED | OUTPATIENT
Start: 2024-06-12 | End: 2024-06-12 | Stop reason: HOSPADM

## 2024-06-12 RX ORDER — INSULIN ASPART 100 [IU]/ML
INJECTION, SOLUTION INTRAVENOUS; SUBCUTANEOUS
Qty: 30 ML | Refills: 2 | Status: SHIPPED | OUTPATIENT
Start: 2024-06-12

## 2024-06-12 RX ADMIN — MIDODRINE HYDROCHLORIDE 10 MG: 5 TABLET ORAL at 13:07

## 2024-06-12 RX ADMIN — INSULIN LISPRO 5 UNITS: 100 INJECTION, SOLUTION INTRAVENOUS; SUBCUTANEOUS at 13:07

## 2024-06-12 RX ADMIN — INSULIN LISPRO 3 UNITS: 100 INJECTION, SOLUTION INTRAVENOUS; SUBCUTANEOUS at 18:42

## 2024-06-12 RX ADMIN — ENOXAPARIN SODIUM 30 MG: 100 INJECTION SUBCUTANEOUS at 09:20

## 2024-06-12 RX ADMIN — INSULIN LISPRO 2 UNITS: 100 INJECTION, SOLUTION INTRAVENOUS; SUBCUTANEOUS at 13:07

## 2024-06-12 RX ADMIN — Medication 400 MG: at 09:19

## 2024-06-12 RX ADMIN — GABAPENTIN 900 MG: 300 CAPSULE ORAL at 06:36

## 2024-06-12 RX ADMIN — CHLORHEXIDINE GLUCONATE 15 ML: 1.2 RINSE ORAL at 13:09

## 2024-06-12 RX ADMIN — MIDODRINE HYDROCHLORIDE 10 MG: 5 TABLET ORAL at 09:19

## 2024-06-12 RX ADMIN — INSULIN LISPRO 5 UNITS: 100 INJECTION, SOLUTION INTRAVENOUS; SUBCUTANEOUS at 18:42

## 2024-06-12 RX ADMIN — INSULIN LISPRO 1 UNITS: 100 INJECTION, SOLUTION INTRAVENOUS; SUBCUTANEOUS at 09:20

## 2024-06-12 RX ADMIN — GABAPENTIN 900 MG: 300 CAPSULE ORAL at 18:43

## 2024-06-12 RX ADMIN — LEVETIRACETAM 1250 MG: 500 TABLET, FILM COATED ORAL at 09:19

## 2024-06-12 RX ADMIN — SODIUM CHLORIDE, PRESERVATIVE FREE 10 ML: 5 INJECTION INTRAVENOUS at 09:18

## 2024-06-12 RX ADMIN — CLONAZEPAM 2 MG: 1 TABLET ORAL at 09:19

## 2024-06-12 ASSESSMENT — PAIN SCALES - GENERAL: PAINLEVEL_OUTOF10: 0

## 2024-06-12 NOTE — PLAN OF CARE
Problem: Discharge Planning  Goal: Discharge to home or other facility with appropriate resources  Outcome: Progressing     Problem: Safety - Adult  Goal: Free from fall injury  Outcome: Progressing     Problem: Cardiovascular - Adult  Goal: Maintains optimal cardiac output and hemodynamic stability  Outcome: Progressing  Goal: Absence of cardiac dysrhythmias or at baseline  Outcome: Progressing     Problem: Gastrointestinal - Adult  Goal: Minimal or absence of nausea and vomiting  Outcome: Progressing  Goal: Maintains or returns to baseline bowel function  Outcome: Progressing  Goal: Maintains adequate nutritional intake  Outcome: Progressing     Problem: Neurosensory - Adult  Goal: Achieves stable or improved neurological status  Outcome: Progressing  Goal: Absence of seizures  Outcome: Progressing  Goal: Remains free of injury related to seizures activity  Outcome: Progressing  Goal: Achieves maximal functionality and self care  Outcome: Progressing     Problem: Pain  Goal: Verbalizes/displays adequate comfort level or baseline comfort level  Outcome: Progressing

## 2024-06-12 NOTE — CARE COORDINATION
06/10/24 0923   Readmission Assessment   Number of Days since last admission? 8-30 days   Previous Disposition Home with Family   Who is being Interviewed Patient   What was the patient's/caregiver's perception as to why they think they needed to return back to the hospital? Other (Comment)  (Patient reports that she was having seizures at night.)   Did you visit your Primary Care Physician after you left the hospital, before you returned this time? Yes  (Patient reports that she saw Ciarra Moreno PCP)   Did you see a specialist, such as Cardiac, Pulmonary, Orthopedic Physician, etc. after you left the hospital? No   Who advised the patient to return to the hospital? Self-referral   Does the patient report anything that got in the way of taking their medications? No   In our efforts to provide the best possible care to you and others like you, can you think of anything that we could have done to help you after you left the hospital the first time, so that you might not have needed to return so soon? Other (Comment)  (Patient reports that there was nothing [facility] could have done to prevent re-admission.)       
Discharge Planning:     (CM) spoke with patient regarding Home Health Care ([facility]) nursing services. Patient agreeable to [facility] nursing services and reported no preference.     CM/YEMI, Sahra, spoke with [facility], [place], [phone] who is agreeable to service patient at discharge. [place] reported they are unable to start services till Monday. CM agreeable.         Electronically signed by ANAYELI CHU on 6/12/2024 at 3:23 PM   
Pt has received information and a pamphlet regarding [facility] Transitional Care Program that will follow up with pt after discharge.  Pt has agreed to this service and [facility] will be contacting patient or patient's family to schedule an after discharge follow up visit at the patient's home.  Pt denied questions at this time and was receptive to this discharge planning intervention. Pamphlet left with patient.     Electronically signed by Cathleen Posada on 6/10/2024 at 4:09 PM   
for Transition of Care is related to the following treatment goals of Seizure (HCC) [R56.9]    IF APPLICABLE: The Patient and/or patient representative Maribeth and her family were provided with a choice of provider and agrees with the discharge plan. Freedom of choice list with basic dialogue that supports the patient's individualized plan of care/goals and shares the quality data associated with the providers was provided to:     Patient Representative Name:       The Patient and/or Patient Representative Agree with the Discharge Plan?      ILSA YING  Case Management Department  Ph: [phone] Fax: ,

## 2024-06-12 NOTE — PLAN OF CARE
Problem: Discharge Planning  Goal: Discharge to home or other facility with appropriate resources  6/12/2024 1057 by Celeste Workman RN  Outcome: Progressing     Problem: Safety - Adult  Goal: Free from fall injury  6/12/2024 1057 by Celeste Workman RN  Outcome: Progressing     Problem: Cardiovascular - Adult  Goal: Maintains optimal cardiac output and hemodynamic stability  6/12/2024 1057 by Celeste Workman RN  Outcome: Progressing     Problem: Cardiovascular - Adult  Goal: Absence of cardiac dysrhythmias or at baseline  6/12/2024 1057 by Celeste Workman RN  Outcome: Progressing     Problem: Gastrointestinal - Adult  Goal: Minimal or absence of nausea and vomiting  6/12/2024 1057 by Celeste Workman RN  Outcome: Progressing     Problem: Gastrointestinal - Adult  Goal: Maintains or returns to baseline bowel function  6/12/2024 1057 by Celeste Workman RN  Outcome: Progressing     Problem: Gastrointestinal - Adult  Goal: Maintains adequate nutritional intake  6/12/2024 1057 by Celeste Workman RN  Outcome: Progressing     Problem: Neurosensory - Adult  Goal: Achieves stable or improved neurological status  6/12/2024 1057 by Celeste Workman RN  Outcome: Progressing     Problem: Neurosensory - Adult  Goal: Absence of seizures  6/12/2024 1057 by Celeste Workman RN  Outcome: Progressing     Problem: Neurosensory - Adult  Goal: Remains free of injury related to seizures activity  6/12/2024 1057 by Celeste Workman RN  Outcome: Progressing     Problem: Neurosensory - Adult  Goal: Achieves maximal functionality and self care  6/12/2024 1057 by Celeste Workman RN  Outcome: Progressing     Problem: Pain  Goal: Verbalizes/displays adequate comfort level or baseline comfort level  6/12/2024 1057 by Celeste Workman RN  Outcome: Progressing

## 2024-06-12 NOTE — DISCHARGE INSTR - COC
Continuity of Care Form    Patient Name: Maribeth Loera   :  1990  MRN:  [id number]    Admit date:  2024  Discharge date:        Code Status Order: Full Code   Advance Directives:     Admitting Physician:  Herlinda Maciel MD  PCP: Tosha Moreno MD    Discharging Nurse: SARAH Workman RN  Discharging Hospital Unit/Room#: 3TN-3370/3370-01  Discharging Unit Phone Number: [phone]    Emergency Contact:   Extended Emergency Contact Information  Primary Emergency Contact: Yonny Atkinson   [facility]  Home Phone: [phone]  Mobile Phone: [phone]  Relation: Spouse  Secondary Emergency Contact: Natalie Kerns   [facility]  Home Phone: [phone]  Mobile Phone: [phone]  Relation: Parent    Past Surgical History:  Past Surgical History:   Procedure Laterality Date     SECTION      TUBAL LIGATION         Immunization History:   Immunization History   Administered Date(s) Administered    COVID-19, PFIZER GRAY top, DO NOT Dilute, (age 12 y+), IM, 30 mcg/0.3 mL 2022    Influenza Virus Vaccine 2020    Influenza, FLUBLOK, (age 18 y+), PF, 0.5mL 10/22/2018       Active Problems:  Patient Active Problem List   Diagnosis Code    Lactic acidosis E87.20    Seizure disorder (HCC) G40.909    ROME (generalized anxiety disorder) F41.1    Moderate episode of recurrent major depressive disorder (HCC) F33.1    History of anorexia nervosa Z86.59    Diabetic peripheral neuropathy (HCC) E11.42    Gastroparesis K31.84    Tachycardia R00.0    Nausea and vomiting R11.2    Elevated liver enzymes R74.8    Neutrophilia D72.9    Elevated platelet count R79.89    Weakness R53.1    MRSA bacteremia R78.81, B95.62    Type 1 diabetes mellitus (HCC) E10.9    Fatigue R53.83    Anemia D64.9    Non-adherence to medical treatment Z91.199    Acute encephalopathy G93.40    B12 deficiency E53.8    Iron deficiency E61.1    Open wound of right great toe S91.101A    Migraine without status

## 2024-06-12 NOTE — PROGRESS NOTES
CLINICAL PHARMACY NOTE: MEDS TO BEDS    Total # of Prescriptions Filled: 1   The following medications were delivered to the patient:  Midodrine 5mg    Additional Documentation:     CHIN Alonso approved medication delivery    Medication was delivered to patient's room and patient signed for    Bree Arriaza CPhT

## 2024-06-13 NOTE — PROGRESS NOTES
Data- discharge order received, pt verbalized agreement to discharge, needs for Home Health Care with [facility], TAMMY reviewed and signed by MD, to be completed by RN.    Action- AVS prepared, discharge instructions prepared and given to patient, medication information packet given r/t NEW or CHANGED prescriptions, pt verbalized understanding further self-review.   D/C instruction summary: Diet- carb control, Activity- as tolerated, follow up with Primary Care Physician Tosha Moreno [phone] appointment within 1 week, medications prescriptions to be filled [facility]. Contact information provided to above agencies used.    Response- Case Management/ reported faxing completed TAMMY and AVS to needed HHC/DME services stated above.   Pt belongings gathered, IV removed, pt dressed. Disposition is home with HHC/DME as stated above, transported with personal belongings, taken to lobby via w/c, no complications.     1. WEIGHT: Admit Weight - Scale: 44.5 kg (98 lb) (06/09/24 0739)        Today  Weight - Scale: 52.6 kg (116 lb) (06/11/24 0757)       2. O2 SAT.: SpO2: 97 % (06/12/24 1617) .

## 2024-06-14 LAB
BACTERIA BLD CULT ORG #2: NORMAL
BACTERIA BLD CULT: NORMAL

## 2024-06-15 NOTE — DISCHARGE SUMMARY
Hospitalist Discharge Summary     Maribeth Loera  : 1990  MRN: [id number]    Admit date: 2024  Discharge date: 2024    Admitting Physician: Herlinda Maciel MD    Discharge Diagnoses:   Patient Active Problem List   Diagnosis    Lactic acidosis    Seizure disorder (HCC)    ROME (generalized anxiety disorder)    Moderate episode of recurrent major depressive disorder (HCC)    History of anorexia nervosa    Diabetic peripheral neuropathy (HCC)    Gastroparesis    Tachycardia    Nausea and vomiting    Elevated liver enzymes    Neutrophilia    Elevated platelet count    Weakness    MRSA bacteremia    Type 1 diabetes mellitus (HCC)    Fatigue    Anemia    Non-adherence to medical treatment    Acute encephalopathy    B12 deficiency    Iron deficiency    Open wound of right great toe    Migraine without status migrainosus, not intractable    Diabetic ketoacidosis without coma associated with type 1 diabetes mellitus (HCC)    Seizure (HCC)    Epilepsy, focal (HCC)    DM (diabetes mellitus), secondary, with complications (HCC)       Admission Condition: fair    Discharged Condition: good    Discharge Exam:  VITALS:  BP (!) 152/94   Pulse 58   Temp 97.9 °F (36.6 °C) (Oral)   Resp 18   Ht 1.473 m (4' 10\")   Wt 52.6 kg (116 lb)   SpO2 97%   BMI 24.24 kg/m²   CONSTITUTIONAL:  awake, alert, cooperative, no apparent distress, and appears stated age  LUNGS:  clear to auscultation bilaterally, No increased work of breathing, good air exchange, no crackles or wheezing  CARDIOVASCULAR:  Regular rate and rhythm, normal S1 and S2, no S3 or S4, and no significant murmurs, rubs or gallops noted.   ABDOMEN:  Normal active bowel sounds, soft, non-tender, non-distended, no masses palpated,  MUSCULOSKELETAL:  Full range of motion noted.     NEUROLOGIC:  Awake, alert, oriented to name, place and time.  Cranial nerves II-XII are grossly intact.    SKIN:  normal skin color, texture, turgor for age.    Hospital Course:    35
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
## 2024-06-21 RX ORDER — ISOPROPYL ALCOHOL 0.75 G/1
SWAB TOPICAL
Qty: 200 EACH | Refills: 3 | Status: SHIPPED | OUTPATIENT
Start: 2024-06-21

## 2024-06-25 ENCOUNTER — OFFICE VISIT (OUTPATIENT)
Dept: INTERNAL MEDICINE CLINIC | Age: 34
End: 2024-06-25
Payer: COMMERCIAL

## 2024-06-25 VITALS — DIASTOLIC BLOOD PRESSURE: 68 MMHG | WEIGHT: 105 LBS | SYSTOLIC BLOOD PRESSURE: 120 MMHG | BODY MASS INDEX: 21.95 KG/M2

## 2024-06-25 DIAGNOSIS — E10.8 TYPE 1 DIABETES MELLITUS WITH COMPLICATION (HCC): ICD-10-CM

## 2024-06-25 DIAGNOSIS — G40.909 SEIZURE DISORDER (HCC): Primary | ICD-10-CM

## 2024-06-25 DIAGNOSIS — E10.42 TYPE 1 DIABETES MELLITUS WITH DIABETIC POLYNEUROPATHY (HCC): ICD-10-CM

## 2024-06-25 DIAGNOSIS — I95.9 HYPOTENSION, UNSPECIFIED HYPOTENSION TYPE: ICD-10-CM

## 2024-06-25 DIAGNOSIS — E55.9 VITAMIN D DEFICIENCY: ICD-10-CM

## 2024-06-25 DIAGNOSIS — E53.8 B12 DEFICIENCY: ICD-10-CM

## 2024-06-25 DIAGNOSIS — E11.42 DIABETIC PERIPHERAL NEUROPATHY (HCC): ICD-10-CM

## 2024-06-25 PROCEDURE — 1111F DSCHRG MED/CURRENT MED MERGE: CPT | Performed by: INTERNAL MEDICINE

## 2024-06-25 PROCEDURE — 1036F TOBACCO NON-USER: CPT | Performed by: INTERNAL MEDICINE

## 2024-06-25 PROCEDURE — 3052F HG A1C>EQUAL 8.0%<EQUAL 9.0%: CPT | Performed by: INTERNAL MEDICINE

## 2024-06-25 PROCEDURE — 2022F DILAT RTA XM EVC RTNOPTHY: CPT | Performed by: INTERNAL MEDICINE

## 2024-06-25 PROCEDURE — 99214 OFFICE O/P EST MOD 30 MIN: CPT | Performed by: INTERNAL MEDICINE

## 2024-06-25 PROCEDURE — G8420 CALC BMI NORM PARAMETERS: HCPCS | Performed by: INTERNAL MEDICINE

## 2024-06-25 PROCEDURE — G8427 DOCREV CUR MEDS BY ELIG CLIN: HCPCS | Performed by: INTERNAL MEDICINE

## 2024-06-25 RX ORDER — LANOLIN ALCOHOL/MO/W.PET/CERES
1000 CREAM (GRAM) TOPICAL DAILY
Qty: 30 TABLET | Refills: 3 | Status: SHIPPED | OUTPATIENT
Start: 2024-06-25

## 2024-06-25 RX ORDER — INSULIN GLARGINE 100 [IU]/ML
13 INJECTION, SOLUTION SUBCUTANEOUS NIGHTLY
Qty: 5 ADJUSTABLE DOSE PRE-FILLED PEN SYRINGE | Refills: 2 | Status: SHIPPED | OUTPATIENT
Start: 2024-06-25

## 2024-06-25 RX ORDER — INSULIN ASPART 100 [IU]/ML
INJECTION, SOLUTION INTRAVENOUS; SUBCUTANEOUS
Qty: 30 ML | Refills: 2 | Status: SHIPPED | OUTPATIENT
Start: 2024-06-25

## 2024-06-25 RX ORDER — BLOOD SUGAR DIAGNOSTIC
1 STRIP MISCELLANEOUS
Qty: 300 EACH | Refills: 5 | Status: SHIPPED | OUTPATIENT
Start: 2024-06-25

## 2024-06-25 RX ORDER — ACYCLOVIR 400 MG/1
TABLET ORAL
Qty: 1 EACH | Refills: 2 | Status: SHIPPED | COMMUNITY
Start: 2024-06-25

## 2024-06-25 RX ORDER — ERGOCALCIFEROL 1.25 MG/1
50000 CAPSULE ORAL WEEKLY
Qty: 12 CAPSULE | Refills: 1 | Status: SHIPPED | OUTPATIENT
Start: 2024-06-25

## 2024-06-25 NOTE — TELEPHONE ENCOUNTER
Pt also needs refill on insulin glargine (LANTUS SOLOSTAR) 100 UNIT/ML injection pen [[id number]]    Pharmacy said [facility] gave her a 3 month refill, but she did not get that.       Please send refill to:  [facility] [address] [place] [address] [address] [facility] - P [phone] -  [phone] [[phone]]

## 2024-06-25 NOTE — TELEPHONE ENCOUNTER
Pt requesting refill on NOVOLOG FLEXPEN 100 UNIT/ML injection pen [[id number]]          [place] [id number] [facility] - P [phone] - F [phone] [[id number]]

## 2024-06-25 NOTE — PROGRESS NOTES
Maribeth Loera (:  1990) is a 34 y.o. female,Established patient, here for evaluation of the following chief complaint(s):  Diabetes      Assessment & Plan   1. Seizure disorder (HCC)  - still not well controlled. Continue keppra 1000mg twice daily, needs to see neurology  -     diazePAM 10 MG/0.1ML LIQD; Spray 10mg intranasal as needed for seizure. May repeat once based on response and tolerability after =4 hours. Maximum dose: Two doses per episode. Do not use for more than 1 episode every 5 days or more than 5 episodes per month., Disp-1 each, R-5Normal  2. Type 1 diabetes mellitus with diabetic polyneuropathy (HCC)  - uncontrolled, discussed diet, continue omnipod and CGM  -     Glucagon 1 MG/0.2ML SOAJ; Inject 1 mg; may repeat in 15 minutes as needed., Disp-2 each, R-5Normal  -     blood glucose test strips (ONETOUCH ULTRA TEST) strip; 1 each by In Vitro route 6 times daily As needed., Disp-300 each, R-5Normal  3. Hypotension, unspecified hypotension type  - discontinue midodrine  4. Diabetic peripheral neuropathy (HCC)  - continue gabapentin 900mg three times daily  5. B12 deficiency  - vitamin B12 1000 mcg daily  6. Vitamin D deficiency  - vitamin D3 50,000 IU weekly    Return in about 1 month (around 2024) for Establish care.       Subjective   HPI    Only taking midodrine once a day, does not like to take it. She gets palpitations and does not feel well when she takes the medication. She has not taken midodrine today.    She was admitted for seizure, keppra dose was not adjusted. She states that the inpatient neurologist suggested having diazepam nasal spray as a rescue for home, the reason she went to the hospital for that seizure is her sugar was going low (in the 50s) and she was still post-ictal so she was not able to eat or drink anything to improve the sugar. They need a glucagon pen for home. Sugar has been up and down, having more frequent lows. She does not usually eat breakfast,

## 2024-06-26 PROBLEM — E13.8 DM (DIABETES MELLITUS), SECONDARY, WITH COMPLICATIONS (HCC): Status: RESOLVED | Noted: 2024-06-10 | Resolved: 2024-06-26

## 2024-06-26 PROBLEM — R78.81 MRSA BACTEREMIA: Status: RESOLVED | Noted: 2022-08-02 | Resolved: 2024-06-26

## 2024-06-26 PROBLEM — D72.9 NEUTROPHILIA: Status: RESOLVED | Noted: 2022-07-29 | Resolved: 2024-06-26

## 2024-06-26 PROBLEM — B95.62 MRSA BACTEREMIA: Status: RESOLVED | Noted: 2022-08-02 | Resolved: 2024-06-26

## 2024-06-26 PROBLEM — R79.89 ELEVATED PLATELET COUNT: Status: RESOLVED | Noted: 2022-07-30 | Resolved: 2024-06-26

## 2024-06-26 PROBLEM — D72.828 NEUTROPHILIA: Status: RESOLVED | Noted: 2022-07-29 | Resolved: 2024-06-26

## 2024-06-26 PROBLEM — R56.9 SEIZURE (HCC): Status: RESOLVED | Noted: 2024-06-09 | Resolved: 2024-06-26

## 2024-06-26 PROBLEM — G93.40 ACUTE ENCEPHALOPATHY: Status: RESOLVED | Noted: 2023-01-24 | Resolved: 2024-06-26

## 2024-06-26 PROBLEM — E10.10 DIABETIC KETOACIDOSIS WITHOUT COMA ASSOCIATED WITH TYPE 1 DIABETES MELLITUS (HCC): Status: RESOLVED | Noted: 2024-05-25 | Resolved: 2024-06-26

## 2024-06-26 PROBLEM — E87.20 LACTIC ACIDOSIS: Status: RESOLVED | Noted: 2018-02-13 | Resolved: 2024-06-26

## 2024-07-23 ENCOUNTER — HOSPITAL ENCOUNTER (EMERGENCY)
Age: 34
Discharge: HOME | End: 2024-07-24
Attending: INTERNAL MEDICINE
Payer: COMMERCIAL

## 2024-07-23 VITALS
SYSTOLIC BLOOD PRESSURE: 110 MMHG | TEMPERATURE: 97.5 F | RESPIRATION RATE: 13 BRPM | DIASTOLIC BLOOD PRESSURE: 73 MMHG | HEART RATE: 104 BPM | OXYGEN SATURATION: 98 %

## 2024-07-23 DIAGNOSIS — G40.909 SEIZURE DISORDER (HCC): ICD-10-CM

## 2024-07-23 DIAGNOSIS — E10.42 TYPE 1 DIABETES MELLITUS WITH DIABETIC POLYNEUROPATHY (HCC): ICD-10-CM

## 2024-07-23 DIAGNOSIS — E10.8 TYPE 1 DIABETES MELLITUS WITH COMPLICATION (HCC): ICD-10-CM

## 2024-07-23 DIAGNOSIS — E10.10 TYPE 1 DIABETES MELLITUS WITH KETOACIDOSIS WITHOUT COMA (HCC): ICD-10-CM

## 2024-07-23 DIAGNOSIS — G40.919 BREAKTHROUGH SEIZURE (HCC): Primary | ICD-10-CM

## 2024-07-23 DIAGNOSIS — E11.42 DIABETIC PERIPHERAL NEUROPATHY (HCC): ICD-10-CM

## 2024-07-23 LAB
ALBUMIN SERPL-MCNC: 4.4 G/DL (ref 3.4–5)
ALP SERPL-CCNC: 81 U/L (ref 40–129)
ALT SERPL-CCNC: 17 U/L (ref 10–40)
AMPHETAMINES UR QL SCN>1000 NG/ML: ABNORMAL
AST SERPL-CCNC: 28 U/L (ref 15–37)
BARBITURATES UR QL SCN>200 NG/ML: ABNORMAL
BASOPHILS # BLD: 0.1 K/UL (ref 0–0.2)
BASOPHILS NFR BLD: 1 %
BENZODIAZ UR QL SCN>200 NG/ML: POSITIVE
BETA-HYDROXYBUTYRATE: 0.2 MMOL/L (ref 0–0.27)
BILIRUB DIRECT SERPL-MCNC: <0.2 MG/DL (ref 0–0.3)
BILIRUB INDIRECT SERPL-MCNC: NORMAL MG/DL (ref 0–1)
BILIRUB SERPL-MCNC: <0.2 MG/DL (ref 0–1)
BILIRUB UR QL STRIP.AUTO: NEGATIVE
CANNABINOIDS UR QL SCN>50 NG/ML: POSITIVE
CLARITY UR: CLEAR
COCAINE UR QL SCN: ABNORMAL
COLOR UR: YELLOW
DEPRECATED RDW RBC AUTO: 14.3 % (ref 12.4–15.4)
DRUG SCREEN COMMENT UR-IMP: ABNORMAL
EOSINOPHIL # BLD: 0.1 K/UL (ref 0–0.6)
EOSINOPHIL NFR BLD: 1.5 %
ETHANOLAMINE SERPL-MCNC: NORMAL MG/DL (ref 0–0.08)
FENTANYL SCREEN, URINE: ABNORMAL
GLUCOSE BLD-MCNC: 203 MG/DL (ref 70–99)
GLUCOSE UR STRIP.AUTO-MCNC: >=1000 MG/DL
HCG UR QL: NEGATIVE
HCT VFR BLD AUTO: 41.4 % (ref 36–48)
HGB BLD-MCNC: 13.3 G/DL (ref 12–16)
HGB UR QL STRIP.AUTO: NEGATIVE
KETONES UR STRIP.AUTO-MCNC: 15 MG/DL
LACTATE BLDV-SCNC: 6.5 MMOL/L (ref 0.4–2)
LEUKOCYTE ESTERASE UR QL STRIP.AUTO: NEGATIVE
LYMPHOCYTES # BLD: 1.9 K/UL (ref 1–5.1)
LYMPHOCYTES NFR BLD: 23.7 %
MCH RBC QN AUTO: 27.3 PG (ref 26–34)
MCHC RBC AUTO-ENTMCNC: 32.1 G/DL (ref 31–36)
MCV RBC AUTO: 85.1 FL (ref 80–100)
METHADONE UR QL SCN>300 NG/ML: ABNORMAL
MONOCYTES # BLD: 0.6 K/UL (ref 0–1.3)
MONOCYTES NFR BLD: 7.2 %
NEUTROPHILS # BLD: 5.3 K/UL (ref 1.7–7.7)
NEUTROPHILS NFR BLD: 66.6 %
NITRITE UR QL STRIP.AUTO: NEGATIVE
OPIATES UR QL SCN>300 NG/ML: ABNORMAL
OXYCODONE UR QL SCN: ABNORMAL
PCP UR QL SCN>25 NG/ML: ABNORMAL
PERFORMED ON: ABNORMAL
PH UR STRIP.AUTO: 6 [PH] (ref 5–8)
PH UR STRIP: 5 [PH]
PLATELET # BLD AUTO: 322 K/UL (ref 135–450)
PMV BLD AUTO: 9.2 FL (ref 5–10.5)
PROT SERPL-MCNC: 7.7 G/DL (ref 6.4–8.2)
PROT UR STRIP.AUTO-MCNC: NEGATIVE MG/DL
RBC # BLD AUTO: 4.87 M/UL (ref 4–5.2)
SP GR UR STRIP.AUTO: 1.02 (ref 1–1.03)
UA COMPLETE W REFLEX CULTURE PNL UR: ABNORMAL
UA DIPSTICK W REFLEX MICRO PNL UR: ABNORMAL
URN SPEC COLLECT METH UR: ABNORMAL
UROBILINOGEN UR STRIP-ACNC: 0.2 E.U./DL
WBC # BLD AUTO: 8 K/UL (ref 4–11)

## 2024-07-23 PROCEDURE — 99284 EMERGENCY DEPT VISIT MOD MDM: CPT

## 2024-07-23 PROCEDURE — 82010 KETONE BODYS QUAN: CPT

## 2024-07-23 PROCEDURE — 96361 HYDRATE IV INFUSION ADD-ON: CPT

## 2024-07-23 PROCEDURE — 36415 COLL VENOUS BLD VENIPUNCTURE: CPT

## 2024-07-23 PROCEDURE — 84703 CHORIONIC GONADOTROPIN ASSAY: CPT

## 2024-07-23 PROCEDURE — 80307 DRUG TEST PRSMV CHEM ANLYZR: CPT

## 2024-07-23 PROCEDURE — 83605 ASSAY OF LACTIC ACID: CPT

## 2024-07-23 PROCEDURE — 80076 HEPATIC FUNCTION PANEL: CPT

## 2024-07-23 PROCEDURE — 96360 HYDRATION IV INFUSION INIT: CPT

## 2024-07-23 PROCEDURE — 81003 URINALYSIS AUTO W/O SCOPE: CPT

## 2024-07-23 PROCEDURE — 82077 ASSAY SPEC XCP UR&BREATH IA: CPT

## 2024-07-23 PROCEDURE — 93005 ELECTROCARDIOGRAM TRACING: CPT | Performed by: INTERNAL MEDICINE

## 2024-07-23 PROCEDURE — 85025 COMPLETE CBC W/AUTO DIFF WBC: CPT

## 2024-07-23 PROCEDURE — 80048 BASIC METABOLIC PNL TOTAL CA: CPT

## 2024-07-23 PROCEDURE — 2580000003 HC RX 258: Performed by: INTERNAL MEDICINE

## 2024-07-23 RX ORDER — 0.9 % SODIUM CHLORIDE 0.9 %
1000 INTRAVENOUS SOLUTION INTRAVENOUS ONCE
Status: COMPLETED | OUTPATIENT
Start: 2024-07-23 | End: 2024-07-24

## 2024-07-23 RX ADMIN — SODIUM CHLORIDE 1000 ML: 9 INJECTION, SOLUTION INTRAVENOUS at 23:00

## 2024-07-24 LAB
ANION GAP SERPL CALCULATED.3IONS-SCNC: 17 MMOL/L (ref 3–16)
BUN SERPL-MCNC: 5 MG/DL (ref 7–20)
CALCIUM SERPL-MCNC: 9.4 MG/DL (ref 8.3–10.6)
CHLORIDE SERPL-SCNC: 105 MMOL/L (ref 99–110)
CO2 SERPL-SCNC: 23 MMOL/L (ref 21–32)
CREAT SERPL-MCNC: 0.6 MG/DL (ref 0.6–1.1)
EKG ATRIAL RATE: 109 BPM
EKG DIAGNOSIS: NORMAL
EKG P AXIS: 54 DEGREES
EKG P-R INTERVAL: 130 MS
EKG Q-T INTERVAL: 338 MS
EKG QRS DURATION: 64 MS
EKG QTC CALCULATION (BAZETT): 455 MS
EKG R AXIS: 70 DEGREES
EKG T AXIS: 42 DEGREES
EKG VENTRICULAR RATE: 109 BPM
GFR SERPLBLD CREATININE-BSD FMLA CKD-EPI: >90 ML/MIN/{1.73_M2}
GLUCOSE SERPL-MCNC: 222 MG/DL (ref 70–99)
POTASSIUM SERPL-SCNC: 4.5 MMOL/L (ref 3.5–5.1)
SODIUM SERPL-SCNC: 145 MMOL/L (ref 136–145)

## 2024-07-24 PROCEDURE — 93010 ELECTROCARDIOGRAM REPORT: CPT | Performed by: INTERNAL MEDICINE

## 2024-07-24 RX ORDER — IBUPROFEN 800 MG/1
TABLET ORAL
Qty: 90 TABLET | Refills: 0 | Status: SHIPPED | OUTPATIENT
Start: 2024-07-24

## 2024-07-24 RX ORDER — GABAPENTIN 300 MG/1
CAPSULE ORAL
Qty: 270 CAPSULE | Refills: 0 | Status: SHIPPED | OUTPATIENT
Start: 2024-07-24 | End: 2025-02-07

## 2024-07-24 NOTE — ED PROVIDER NOTES
Beta-hydroxybutyrate was not elevated.  I have low suspicion for HHS and DKA.  She did not need any benzodiazepines or Keppra here in the ER.  Patient states she has prescriptions for Keppra and is working with her doctor to get her pottery loaded with insulin.  Patient is okay with the plan.  Patient is stable for discharge.  Patient is encouraged to return if symptoms worsen or change.      Critical Care Time: 0 Minutes  This excludes separately billable procedures.      Final Impression  1. Breakthrough seizure (HCC)        Blood pressure 110/73, pulse (!) 104, temperature 97.5 °F (36.4 °C), temperature source Oral, resp. rate 13, SpO2 98 %, not currently breastfeeding.     Disposition:  DISPOSITION Decision To Discharge 07/24/2024 12:58:12 AM      Patient Referrals:  Tosha Moreno MD  [address] [facility] [address]  [phone]    Schedule an appointment as soon as possible for a visit         Discharge Medications:  New Prescriptions    No medications on file       This chart was generated using the Dragon dictation system. I created this record but it may contain dictation errors given the limitations of this technology.        Christian Sweeney, DO  07/24/24 0147

## 2024-07-25 RX ORDER — LANCETS 33 GAUGE
EACH MISCELLANEOUS
Qty: 200 EACH | Refills: 0 | Status: SHIPPED | OUTPATIENT
Start: 2024-07-25

## 2024-07-25 RX ORDER — ACYCLOVIR 400 MG/1
TABLET ORAL
Qty: 9 EACH | Refills: 0 | Status: SHIPPED | OUTPATIENT
Start: 2024-07-25

## 2024-07-25 RX ORDER — ONDANSETRON HYDROCHLORIDE 8 MG/1
8 TABLET, FILM COATED ORAL EVERY 8 HOURS PRN
Qty: 90 TABLET | Refills: 0 | Status: SHIPPED | OUTPATIENT
Start: 2024-07-25

## 2024-07-25 RX ORDER — ACYCLOVIR 400 MG/1
TABLET ORAL
Qty: 1 EACH | Refills: 0 | OUTPATIENT
Start: 2024-07-25

## 2024-07-25 RX ORDER — ACYCLOVIR 400 MG/1
TABLET ORAL
Qty: 1 EACH | Refills: 0 | Status: SHIPPED | OUTPATIENT
Start: 2024-07-25

## 2024-07-25 RX ORDER — INSULIN PUMP CONTROLLER
EACH MISCELLANEOUS
Qty: 10 EACH | Refills: 0 | Status: SHIPPED | OUTPATIENT
Start: 2024-07-25

## 2024-07-25 RX ORDER — INSULIN ASPART 100 [IU]/ML
INJECTION, SOLUTION INTRAVENOUS; SUBCUTANEOUS
Qty: 30 ML | Refills: 0 | Status: SHIPPED | OUTPATIENT
Start: 2024-07-25

## 2024-07-25 RX ORDER — LEVETIRACETAM 500 MG/1
1000 TABLET ORAL 2 TIMES DAILY
Qty: 360 TABLET | Refills: 0 | Status: SHIPPED | OUTPATIENT
Start: 2024-07-25

## 2024-07-25 RX ORDER — BLOOD SUGAR DIAGNOSTIC
1 STRIP MISCELLANEOUS
Qty: 300 EACH | Refills: 0 | Status: SHIPPED | OUTPATIENT
Start: 2024-07-25

## 2024-07-25 RX ORDER — CLONAZEPAM 2 MG/1
2 TABLET ORAL 2 TIMES DAILY
Qty: 60 TABLET | Refills: 0 | Status: SHIPPED | OUTPATIENT
Start: 2024-07-25 | End: 2024-10-23

## 2024-07-25 RX ORDER — INSULIN GLARGINE 100 [IU]/ML
13 INJECTION, SOLUTION SUBCUTANEOUS NIGHTLY
Qty: 5 ADJUSTABLE DOSE PRE-FILLED PEN SYRINGE | Refills: 0 | Status: SHIPPED | OUTPATIENT
Start: 2024-07-25

## 2024-08-08 RX ORDER — IBUPROFEN 800 MG/1
TABLET ORAL
Qty: 90 TABLET | Refills: 0 | OUTPATIENT
Start: 2024-08-08

## 2024-08-26 RX ORDER — IBUPROFEN 800 MG/1
TABLET, FILM COATED ORAL
Qty: 90 TABLET | Refills: 0 | OUTPATIENT
Start: 2024-08-26

## 2024-08-26 NOTE — TELEPHONE ENCOUNTER
Previous Dr Moreno patient.   Pt  does not have an establish care apt with any of the providers in the office.   Refusing.   Must call to schedule.     Note same refill request was refused by [facility]as  on 8/7/24

## 2024-08-29 RX ORDER — IBUPROFEN 800 MG/1
TABLET, FILM COATED ORAL
Qty: 90 TABLET | Refills: 0 | OUTPATIENT
Start: 2024-08-29

## 2024-08-29 NOTE — TELEPHONE ENCOUNTER
Previous pt of Dr Moreno. No appointment scheduled fo establish care with a new provider. RX request has been denied.

## 2024-09-11 RX ORDER — IBUPROFEN 800 MG/1
TABLET, FILM COATED ORAL
Qty: 90 TABLET | Refills: 0 | OUTPATIENT
Start: 2024-09-11

## 2024-09-13 RX ORDER — IBUPROFEN 800 MG/1
TABLET, FILM COATED ORAL
Qty: 90 TABLET | Refills: 0 | OUTPATIENT
Start: 2024-09-13

## 2024-09-16 ENCOUNTER — TELEPHONE (OUTPATIENT)
Dept: INTERNAL MEDICINE CLINIC | Age: 34
End: 2024-09-16

## 2024-09-17 RX ORDER — IBUPROFEN 800 MG/1
TABLET, FILM COATED ORAL
Qty: 90 TABLET | Refills: 0 | OUTPATIENT
Start: 2024-09-17

## 2024-09-17 NOTE — TELEPHONE ENCOUNTER
Previous Dr Moreno pt.   Had and no showed new pt apt with DR Trujillo on 7/23/24  Refused.  Not to pharmacy pt must establish with new PCP

## 2024-10-02 ENCOUNTER — HOSPITAL ENCOUNTER (EMERGENCY)
Age: 34
Discharge: HOME | End: 2024-10-03
Attending: STUDENT IN AN ORGANIZED HEALTH CARE EDUCATION/TRAINING PROGRAM
Payer: COMMERCIAL

## 2024-10-02 DIAGNOSIS — G40.919 BREAKTHROUGH SEIZURE (HCC): Primary | ICD-10-CM

## 2024-10-02 DIAGNOSIS — Z91.199 MEDICAL NON-COMPLIANCE: ICD-10-CM

## 2024-10-02 LAB
GLUCOSE BLD-MCNC: 106 MG/DL (ref 70–99)
PERFORMED ON: ABNORMAL

## 2024-10-02 PROCEDURE — 96374 THER/PROPH/DIAG INJ IV PUSH: CPT

## 2024-10-02 PROCEDURE — 99284 EMERGENCY DEPT VISIT MOD MDM: CPT

## 2024-10-02 ASSESSMENT — PAIN DESCRIPTION - LOCATION: LOCATION: FACE;HEAD

## 2024-10-02 ASSESSMENT — PAIN SCALES - GENERAL: PAINLEVEL_OUTOF10: 7

## 2024-10-03 VITALS
OXYGEN SATURATION: 99 % | RESPIRATION RATE: 14 BRPM | TEMPERATURE: 97.9 F | BODY MASS INDEX: 24.8 KG/M2 | HEART RATE: 96 BPM | SYSTOLIC BLOOD PRESSURE: 100 MMHG | HEIGHT: 58 IN | WEIGHT: 118.17 LBS | DIASTOLIC BLOOD PRESSURE: 75 MMHG

## 2024-10-03 LAB
GLUCOSE BLD-MCNC: 106 MG/DL (ref 70–99)
PERFORMED ON: ABNORMAL

## 2024-10-03 PROCEDURE — 6360000002 HC RX W HCPCS: Performed by: STUDENT IN AN ORGANIZED HEALTH CARE EDUCATION/TRAINING PROGRAM

## 2024-10-03 PROCEDURE — 2580000003 HC RX 258: Performed by: STUDENT IN AN ORGANIZED HEALTH CARE EDUCATION/TRAINING PROGRAM

## 2024-10-03 PROCEDURE — 96374 THER/PROPH/DIAG INJ IV PUSH: CPT

## 2024-10-03 RX ORDER — LEVETIRACETAM 500 MG/5ML
1000 INJECTION, SOLUTION, CONCENTRATE INTRAVENOUS ONCE
Status: COMPLETED | OUTPATIENT
Start: 2024-10-03 | End: 2024-10-03

## 2024-10-03 RX ORDER — 0.9 % SODIUM CHLORIDE 0.9 %
1000 INTRAVENOUS SOLUTION INTRAVENOUS ONCE
Status: COMPLETED | OUTPATIENT
Start: 2024-10-03 | End: 2024-10-03

## 2024-10-03 RX ADMIN — SODIUM CHLORIDE 1000 ML: 9 INJECTION, SOLUTION INTRAVENOUS at 00:32

## 2024-10-03 RX ADMIN — LEVETIRACETAM 1000 MG: 100 INJECTION INTRAVENOUS at 00:36

## 2024-10-03 NOTE — DISCHARGE INSTRUCTIONS
Follow-up with your neurologist.  Continue take your medications as prescribed.  Return if you develop any new or worsening symptoms.   Patient is to eat three meals a day 4-5 hrs apart. Exercise for 30 min dayPt fasting bs this morning was 176 no medications

## 2024-10-03 NOTE — ED PROVIDER NOTES
indicated that the status of her father is unknown. She indicated that the status of her maternal grandfather is unknown. She indicated that the status of her maternal aunt is unknown.       SOCIAL HISTORY       Social History     Tobacco Use    Smoking status: Never    Smokeless tobacco: Never   Vaping Use    Vaping status: Never Used   Substance Use Topics    Alcohol use: No    Drug use: No       PHYSICAL EXAM       ED Triage Vitals   BP Systolic BP Percentile Diastolic BP Percentile Temp Temp src Pulse Resp SpO2   -- -- -- -- -- -- -- --      Height Weight         -- --           Vitals:    10/03/24 0130   BP: 100/75   Pulse: 96   Resp: 14   Temp:    SpO2: 99%         Physical Exam  Vitals and nursing note reviewed.   Constitutional:       General: She is not in acute distress.     Appearance: She is not ill-appearing, toxic-appearing or diaphoretic.   HENT:      Head: Normocephalic and atraumatic.      Right Ear: External ear normal.      Left Ear: External ear normal.      Nose: Nose normal.   Eyes:      General: No visual field deficit.     Conjunctiva/sclera: Conjunctivae normal.   Neck:      Comments: No C-spine tenderness midline no step-offs or deformities noted.  Cardiovascular:      Rate and Rhythm: Normal rate and regular rhythm.      Heart sounds: Normal heart sounds.   Pulmonary:      Effort: Pulmonary effort is normal. No respiratory distress.      Breath sounds: No wheezing or rales.   Abdominal:      General: There is no distension.      Palpations: Abdomen is soft.      Tenderness: There is no abdominal tenderness. There is no guarding.   Musculoskeletal:      Cervical back: Normal range of motion and neck supple. No rigidity or tenderness.   Lymphadenopathy:      Cervical: No cervical adenopathy.   Skin:     General: Skin is warm and dry.      Capillary Refill: Capillary refill takes less than 2 seconds.   Neurological:      Mental Status: She is alert.      GCS: GCS eye subscore is 4. GCS verbal  (None if blank)        DISCHARGE PRESCRIPTIONS: (None if blank)  Discharge Medication List as of 10/3/2024  1:49 AM            I am the primary clinician of record.     FINAL IMPRESSION      1. Breakthrough seizure (HCC)    2. Medical non-compliance            DISPOSITION/PLAN   DISPOSITION Decision To Discharge 10/03/2024 01:50:26 AM  Condition at Disposition: Data Unavailable    Condition on disposition: Stable    OUTPATIENT FOLLOW UP THE PATIENT:  Tosha Moreno MD  [address] [facility] [address]  [phone]      Call to set up an appointment in the next few days        Electronically Signed: Joseluis Fuentes MD, 10/03/24, 4:55 AM    This report has been produced using speech recognition software and may contain errors related to that system including errors in grammar, punctuation, and spelling, as well as words and phrases that may be inappropriate. If there are any questions or concerns please feel free to contact the dictating provider for clarification.        Joseluis Fuentes MD  10/03/24 5520

## 2024-10-07 DIAGNOSIS — E10.8 TYPE 1 DIABETES MELLITUS WITH COMPLICATION (HCC): ICD-10-CM

## 2024-10-07 RX ORDER — INSULIN ASPART 100 [IU]/ML
INJECTION, SOLUTION INTRAVENOUS; SUBCUTANEOUS
Qty: 30 ML | Refills: 0 | Status: SHIPPED | OUTPATIENT
Start: 2024-10-07

## 2024-10-07 NOTE — TELEPHONE ENCOUNTER
Livan from [facility] called asking for medication refill below    [phone] (Livan [facility])      NOVOLOG FLEXPEN 100 UNIT/ML injection pen [[id number]]    [facility] [address] [place], [place] [id number] [address] - P [phone] - F [phone]

## 2024-10-08 DIAGNOSIS — G40.909 SEIZURE DISORDER (HCC): ICD-10-CM

## 2024-10-09 RX ORDER — CLONAZEPAM 2 MG/1
TABLET ORAL
Qty: 60 TABLET | Refills: 0 | OUTPATIENT
Start: 2024-10-09

## 2024-10-11 DIAGNOSIS — G40.909 SEIZURE DISORDER (HCC): ICD-10-CM

## 2024-10-11 DIAGNOSIS — E10.8 TYPE 1 DIABETES MELLITUS WITH COMPLICATION (HCC): ICD-10-CM

## 2024-10-11 DIAGNOSIS — E10.42 TYPE 1 DIABETES MELLITUS WITH DIABETIC POLYNEUROPATHY (HCC): ICD-10-CM

## 2024-10-11 NOTE — TELEPHONE ENCOUNTER
Pt has her NTP appointment set for 12/3/24. Pt is completely out of her medication below and is requesting a refill on it. Pt was recently in hospital for having a seizure on 10/2/24.    [phone]      clonazePAM (KLONOPIN) 2 MG tablet [[id number]]         [facility] [address] [place] [address] [address] [facility] - P [phone] -  [phone]

## 2024-10-14 DIAGNOSIS — G40.909 SEIZURE DISORDER (HCC): ICD-10-CM

## 2024-10-14 RX ORDER — BLOOD SUGAR DIAGNOSTIC
1 STRIP MISCELLANEOUS
Qty: 300 EACH | Refills: 0 | OUTPATIENT
Start: 2024-10-14

## 2024-10-14 RX ORDER — INSULIN PUMP CONTROLLER
EACH MISCELLANEOUS
Qty: 10 EACH | Refills: 0 | OUTPATIENT
Start: 2024-10-14

## 2024-10-14 RX ORDER — CLONAZEPAM 2 MG/1
2 TABLET ORAL 2 TIMES DAILY
Qty: 60 TABLET | Refills: 0 | OUTPATIENT
Start: 2024-10-14 | End: 2025-01-12

## 2024-10-14 RX ORDER — IBUPROFEN 800 MG/1
TABLET, FILM COATED ORAL
Qty: 90 TABLET | Refills: 0 | OUTPATIENT
Start: 2024-10-14

## 2024-10-14 RX ORDER — ACYCLOVIR 400 MG/1
TABLET ORAL
Qty: 9 EACH | Refills: 0 | OUTPATIENT
Start: 2024-10-14

## 2024-10-14 RX ORDER — ONDANSETRON 8 MG/1
8 TABLET, FILM COATED ORAL EVERY 8 HOURS PRN
Qty: 90 TABLET | Refills: 0 | OUTPATIENT
Start: 2024-10-14

## 2024-10-14 NOTE — TELEPHONE ENCOUNTER
Pt has appointment for 12/3/24 will DR. Trujillo refill any medications for her ?    Pt called to check status of her refills.    [phone] if pt can not have refills please notify pt per pts request

## 2024-10-15 RX ORDER — CLONAZEPAM 2 MG/1
2 TABLET ORAL 2 TIMES DAILY
Qty: 14 TABLET | Refills: 0 | Status: SHIPPED | OUTPATIENT
Start: 2024-10-15 | End: 2024-10-22

## 2024-10-15 NOTE — TELEPHONE ENCOUNTER
Let patient know that I sent over a 7-day supply of her medication.  She needs to be seen for any further refills as this is a controlled substance.  She can be seen by any provider for this.

## 2024-10-16 RX ORDER — CLONAZEPAM 2 MG/1
TABLET ORAL
Qty: 60 TABLET | Refills: 0 | OUTPATIENT
Start: 2024-10-16

## 2024-10-16 RX ORDER — CLONAZEPAM 2 MG/1
2 TABLET ORAL 2 TIMES DAILY
Qty: 60 TABLET | Refills: 0 | OUTPATIENT
Start: 2024-10-16 | End: 2025-01-14

## 2024-11-04 ENCOUNTER — OFFICE VISIT (OUTPATIENT)
Dept: FAMILY MEDICINE CLINIC | Age: 34
End: 2024-11-04

## 2024-11-04 VITALS
BODY MASS INDEX: 24.35 KG/M2 | SYSTOLIC BLOOD PRESSURE: 128 MMHG | HEART RATE: 122 BPM | OXYGEN SATURATION: 99 % | WEIGHT: 116 LBS | DIASTOLIC BLOOD PRESSURE: 78 MMHG | HEIGHT: 58 IN | TEMPERATURE: 96.9 F

## 2024-11-04 DIAGNOSIS — G40.909 SEIZURE DISORDER (HCC): ICD-10-CM

## 2024-11-04 DIAGNOSIS — E11.42 DIABETIC PERIPHERAL NEUROPATHY (HCC): ICD-10-CM

## 2024-11-04 DIAGNOSIS — E10.42 TYPE 1 DIABETES MELLITUS WITH DIABETIC POLYNEUROPATHY (HCC): ICD-10-CM

## 2024-11-04 DIAGNOSIS — G40.109 EPILEPSY, FOCAL (HCC): Primary | ICD-10-CM

## 2024-11-04 DIAGNOSIS — E10.8 TYPE 1 DIABETES MELLITUS WITH COMPLICATION (HCC): ICD-10-CM

## 2024-11-04 RX ORDER — CLONAZEPAM 2 MG/1
2 TABLET ORAL 2 TIMES DAILY
Qty: 180 TABLET | Refills: 0 | Status: SHIPPED | OUTPATIENT
Start: 2024-11-04 | End: 2025-02-02

## 2024-11-04 RX ORDER — LEVETIRACETAM 500 MG/1
1000 TABLET ORAL 2 TIMES DAILY
Qty: 360 TABLET | Refills: 0 | Status: SHIPPED | OUTPATIENT
Start: 2024-11-04

## 2024-11-04 RX ORDER — MAGNESIUM OXIDE 400 MG/1
400 TABLET ORAL DAILY
Qty: 90 TABLET | Refills: 1 | Status: SHIPPED | OUTPATIENT
Start: 2024-11-04

## 2024-11-04 RX ORDER — LANOLIN ALCOHOL/MO/W.PET/CERES
1000 CREAM (GRAM) TOPICAL DAILY
Qty: 90 TABLET | Refills: 1 | Status: SHIPPED | OUTPATIENT
Start: 2024-11-04

## 2024-11-04 RX ORDER — ERGOCALCIFEROL 1.25 MG/1
50000 CAPSULE, LIQUID FILLED ORAL WEEKLY
Qty: 12 CAPSULE | Refills: 1 | Status: SHIPPED | OUTPATIENT
Start: 2024-11-04

## 2024-11-04 RX ORDER — VENLAFAXINE HYDROCHLORIDE 150 MG/1
150 CAPSULE, EXTENDED RELEASE ORAL DAILY
Qty: 90 CAPSULE | Refills: 1 | Status: SHIPPED | OUTPATIENT
Start: 2024-11-04

## 2024-11-04 RX ORDER — GABAPENTIN 300 MG/1
CAPSULE ORAL
Qty: 270 CAPSULE | Refills: 0 | Status: SHIPPED | OUTPATIENT
Start: 2024-11-04 | End: 2025-05-21

## 2024-11-04 RX ORDER — INSULIN ASPART 100 [IU]/ML
INJECTION, SOLUTION INTRAVENOUS; SUBCUTANEOUS
Qty: 30 ML | Refills: 0 | Status: SHIPPED | OUTPATIENT
Start: 2024-11-04

## 2024-11-04 RX ORDER — ONDANSETRON 8 MG/1
8 TABLET, FILM COATED ORAL EVERY 8 HOURS PRN
Qty: 90 TABLET | Refills: 1 | Status: SHIPPED | OUTPATIENT
Start: 2024-11-04

## 2024-11-04 RX ORDER — INSULIN PUMP CONTROLLER
EACH MISCELLANEOUS
Qty: 10 EACH | Refills: 0 | Status: SHIPPED | OUTPATIENT
Start: 2024-11-04

## 2024-11-04 RX ORDER — ACYCLOVIR 400 MG/1
TABLET ORAL
Qty: 9 EACH | Refills: 0 | Status: SHIPPED | OUTPATIENT
Start: 2024-11-04

## 2024-11-04 ASSESSMENT — ENCOUNTER SYMPTOMS
RESPIRATORY NEGATIVE: 1
GASTROINTESTINAL NEGATIVE: 1

## 2024-11-04 NOTE — ASSESSMENT & PLAN NOTE
Chronic, at goal (stable), continue current treatment plan    Orders:    gabapentin (NEURONTIN) 300 MG capsule; TAKE 3 CAPSULES BY MOUTH THREE TIMES DAILY

## 2024-11-04 NOTE — ASSESSMENT & PLAN NOTE
Chronic, at goal (stable), continue current treatment plan    Orders:    Glucagon 1 MG/0.2ML SOAJ; Inject 1 mg; may repeat in 15 minutes as needed.

## 2024-11-04 NOTE — PROGRESS NOTES
Maribeth Loera (:  1990) is a 34 y.o. female,Established patient, here for evaluation of the following chief complaint(s):  Medication Refill and New Patient         Assessment & Plan  Seizure disorder (HCC)   Stable will refill meds as recorded    Orders:    clonazePAM (KLONOPIN) 2 MG tablet; Take 1 tablet by mouth 2 times daily for 90 days. Max Daily Amount: 4 mg    diazePAM 10 MG/0.1ML LIQD; Spray 10mg intranasal as needed for seizure. May repeat once based on response and tolerability after =4 hours. Maximum dose: Two doses per episode. Do not use for more than 1 episode every 5 days or more than 5 episodes per month.    Type 1 diabetes mellitus with complication (HCC)   Chronic, at goal (stable), managed with Omnipod and corrections    Orders:    Continuous Glucose Sensor (DEXCOM G7 SENSOR) MISC; Apply every 10 days for blood glucose monitoring    Insulin Disposable Pump (OMNIPOD DASH PODS, GEN 4,) MISC; CONTINOUS PUMP, MAX DOSE OF 60 UNITS    NOVOLOG FLEXPEN 100 UNIT/ML injection pen; INJECT 4UNITS SUBCUTANEOUSLY WITH MEALS/SNACKS UP TO FIVE TIMES DAILY. Also use sliding scale insulin IN ADDITION to  scheduled dose as follows Corrective Low Dose Algorithm** Glucose:Dose:  No Insulin, 200-249 1 Unit , 250-299 2 Units, 300-349 3 Units, Over 349 4 Units and call you doctor    Diabetic peripheral neuropathy (HCC)   Chronic, at goal (stable), continue current treatment plan    Orders:    gabapentin (NEURONTIN) 300 MG capsule; TAKE 3 CAPSULES BY MOUTH THREE TIMES DAILY    Type 1 diabetes mellitus with diabetic polyneuropathy (HCC)   Chronic, at goal (stable), continue current treatment plan    Orders:    Glucagon 1 MG/0.2ML SOAJ; Inject 1 mg; may repeat in 15 minutes as needed.      No follow-ups on file.       Subjective   New patient to [facility] care.  Previous md left practice..  Was unable to get meds filled and had breakthrough GM seizure. Typically controlled with Klonopin . Last seizure on

## 2024-11-04 NOTE — ASSESSMENT & PLAN NOTE
Stable will refill meds as recorded    Orders:    clonazePAM (KLONOPIN) 2 MG tablet; Take 1 tablet by mouth 2 times daily for 90 days. Max Daily Amount: 4 mg    diazePAM 10 MG/0.1ML LIQD; Spray 10mg intranasal as needed for seizure. May repeat once based on response and tolerability after =4 hours. Maximum dose: Two doses per episode. Do not use for more than 1 episode every 5 days or more than 5 episodes per month.

## 2024-11-05 PROBLEM — R00.0 TACHYCARDIA: Status: RESOLVED | Noted: 2021-09-13 | Resolved: 2024-11-05

## 2024-11-05 PROBLEM — Z91.199 NON-ADHERENCE TO MEDICAL TREATMENT: Status: RESOLVED | Noted: 2022-08-14 | Resolved: 2024-11-05

## 2024-11-05 PROBLEM — G43.909 MIGRAINE WITHOUT STATUS MIGRAINOSUS, NOT INTRACTABLE: Status: RESOLVED | Noted: 2024-01-03 | Resolved: 2024-11-05

## 2024-11-05 PROBLEM — R53.1 WEAKNESS: Status: RESOLVED | Noted: 2022-07-31 | Resolved: 2024-11-05

## 2024-11-05 PROBLEM — D64.9 ANEMIA: Status: RESOLVED | Noted: 2022-08-14 | Resolved: 2024-11-05

## 2024-11-05 PROBLEM — R53.83 FATIGUE: Status: RESOLVED | Noted: 2022-08-13 | Resolved: 2024-11-05

## 2024-11-05 PROBLEM — R74.8 ELEVATED LIVER ENZYMES: Status: RESOLVED | Noted: 2022-06-09 | Resolved: 2024-11-05

## 2024-11-05 PROBLEM — E61.1 IRON DEFICIENCY: Status: RESOLVED | Noted: 2024-01-03 | Resolved: 2024-11-05

## 2024-11-05 PROBLEM — R11.2 NAUSEA AND VOMITING: Status: RESOLVED | Noted: 2022-05-25 | Resolved: 2024-11-05

## 2024-11-05 PROBLEM — S91.101A OPEN WOUND OF RIGHT GREAT TOE: Status: RESOLVED | Noted: 2024-01-03 | Resolved: 2024-11-05

## 2024-11-05 PROBLEM — K31.84 GASTROPARESIS: Status: RESOLVED | Noted: 2019-02-14 | Resolved: 2024-11-05

## 2024-11-06 DIAGNOSIS — E11.42 DIABETIC PERIPHERAL NEUROPATHY (HCC): Primary | ICD-10-CM

## 2024-11-06 DIAGNOSIS — F41.1 GAD (GENERALIZED ANXIETY DISORDER): ICD-10-CM

## 2024-11-06 DIAGNOSIS — F33.1 MODERATE EPISODE OF RECURRENT MAJOR DEPRESSIVE DISORDER (HCC): ICD-10-CM

## 2024-11-07 RX ORDER — FLUOXETINE 40 MG/1
40 CAPSULE ORAL DAILY
Qty: 90 CAPSULE | Refills: 0 | OUTPATIENT
Start: 2024-11-07

## 2024-11-07 NOTE — TELEPHONE ENCOUNTER
Last appointment: 11/4/2024  Next appointment: 2/4/2025  Last refill:    Last ordered: 7 months ago (3/25/2024) by Karel Bianchi MD

## 2024-11-08 RX ORDER — IBUPROFEN 800 MG/1
TABLET, FILM COATED ORAL
Qty: 90 TABLET | Refills: 0 | OUTPATIENT
Start: 2024-11-08

## 2024-11-19 PROBLEM — F33.1 MODERATE EPISODE OF RECURRENT MAJOR DEPRESSIVE DISORDER (HCC): Status: RESOLVED | Noted: 2018-11-13 | Resolved: 2024-11-19

## 2024-12-05 DIAGNOSIS — G40.909 SEIZURE DISORDER (HCC): ICD-10-CM

## 2024-12-05 NOTE — TELEPHONE ENCOUNTER
Requested Prescriptions     Pending Prescriptions Disp Refills    diazePAM 10 MG/0.1ML LIQD 1 each 0     Sig: Spray 10mg intranasal as needed for seizure. May repeat once based on response and tolerability after =4 hours. Maximum dose: Two doses per episode. Do not use for more than 1 episode every 5 days or more than 5 episodes per month.          Last Office Visit: 11/4/2024     Next Office Visit: 2/4/2025

## 2024-12-22 ENCOUNTER — E-VISIT (OUTPATIENT)
Dept: PRIMARY CARE CLINIC | Age: 34
End: 2024-12-22

## 2024-12-22 DIAGNOSIS — R30.0 DYSURIA: Primary | ICD-10-CM

## 2024-12-22 RX ORDER — NITROFURANTOIN 25; 75 MG/1; MG/1
100 CAPSULE ORAL 2 TIMES DAILY
Qty: 10 CAPSULE | Refills: 0 | Status: SHIPPED | OUTPATIENT
Start: 2024-12-22 | End: 2024-12-27

## 2024-12-27 ENCOUNTER — HOSPITAL ENCOUNTER (INPATIENT)
Age: 34
LOS: 9 days | Discharge: HOME | DRG: 720 | End: 2025-01-06
Attending: EMERGENCY MEDICINE | Admitting: INTERNAL MEDICINE
Payer: COMMERCIAL

## 2024-12-27 ENCOUNTER — APPOINTMENT (OUTPATIENT)
Dept: GENERAL RADIOLOGY | Age: 34
DRG: 720 | End: 2024-12-27
Payer: COMMERCIAL

## 2024-12-27 ENCOUNTER — APPOINTMENT (OUTPATIENT)
Dept: CT IMAGING | Age: 34
DRG: 720 | End: 2024-12-27
Payer: COMMERCIAL

## 2024-12-27 DIAGNOSIS — R31.9 URINARY TRACT INFECTION WITH HEMATURIA, SITE UNSPECIFIED: ICD-10-CM

## 2024-12-27 DIAGNOSIS — E86.0 DEHYDRATION: ICD-10-CM

## 2024-12-27 DIAGNOSIS — D69.6 THROMBOCYTOPENIA (HCC): ICD-10-CM

## 2024-12-27 DIAGNOSIS — R17 JAUNDICE: ICD-10-CM

## 2024-12-27 DIAGNOSIS — E10.10 DIABETIC KETOACIDOSIS WITHOUT COMA ASSOCIATED WITH TYPE 1 DIABETES MELLITUS (HCC): Primary | ICD-10-CM

## 2024-12-27 DIAGNOSIS — N39.0 URINARY TRACT INFECTION WITH HEMATURIA, SITE UNSPECIFIED: ICD-10-CM

## 2024-12-27 DIAGNOSIS — A41.9 SEPTIC SHOCK (HCC): ICD-10-CM

## 2024-12-27 DIAGNOSIS — N17.9 AKI (ACUTE KIDNEY INJURY) (HCC): ICD-10-CM

## 2024-12-27 DIAGNOSIS — R65.21 SEPTIC SHOCK (HCC): ICD-10-CM

## 2024-12-27 DIAGNOSIS — A48.3: ICD-10-CM

## 2024-12-27 LAB
ALBUMIN SERPL-MCNC: 2.5 G/DL (ref 3.4–5)
ALBUMIN/GLOB SERPL: 0.6 {RATIO} (ref 1.1–2.2)
ALP SERPL-CCNC: 380 U/L (ref 40–129)
ALT SERPL-CCNC: 20 U/L (ref 10–40)
ANION GAP SERPL CALCULATED.3IONS-SCNC: 24 MMOL/L (ref 3–16)
AST SERPL-CCNC: 50 U/L (ref 15–37)
BACTERIA URNS QL MICRO: ABNORMAL /HPF
BASE EXCESS BLDV CALC-SCNC: -12.6 MMOL/L
BASOPHILS # BLD: 0 K/UL (ref 0–0.2)
BASOPHILS NFR BLD: 0.1 %
BETA-HYDROXYBUTYRATE: 4.03 MMOL/L (ref 0–0.27)
BILIRUB SERPL-MCNC: 3.7 MG/DL (ref 0–1)
BILIRUB UR QL STRIP.AUTO: ABNORMAL
BUN SERPL-MCNC: 66 MG/DL (ref 7–20)
CALCIUM SERPL-MCNC: 8.2 MG/DL (ref 8.3–10.6)
CHLORIDE SERPL-SCNC: 84 MMOL/L (ref 99–110)
CLARITY UR: ABNORMAL
CO2 BLDV-SCNC: 15 MMOL/L
CO2 SERPL-SCNC: 13 MMOL/L (ref 21–32)
COHGB MFR BLDV: 0.8 %
COLOR UR: ABNORMAL
CREAT SERPL-MCNC: 4.7 MG/DL (ref 0.6–1.1)
DEPRECATED RDW RBC AUTO: 16.8 % (ref 12.4–15.4)
EOSINOPHIL # BLD: 0.2 K/UL (ref 0–0.6)
EOSINOPHIL NFR BLD: 0.9 %
EPI CELLS #/AREA URNS AUTO: 4 /HPF (ref 0–5)
FLUAV + FLUBV AG NOSE IA.RAPID: NOT DETECTED
FLUAV + FLUBV AG NOSE IA.RAPID: NOT DETECTED
GFR SERPLBLD CREATININE-BSD FMLA CKD-EPI: 12 ML/MIN/{1.73_M2}
GLUCOSE BLD-MCNC: 437 MG/DL (ref 70–99)
GLUCOSE SERPL-MCNC: 430 MG/DL (ref 70–99)
GLUCOSE UR STRIP.AUTO-MCNC: 250 MG/DL
HCG SERPL QL: NEGATIVE
HCO3 BLDV-SCNC: 14 MMOL/L (ref 23–29)
HCT VFR BLD AUTO: 33.2 % (ref 36–48)
HGB BLD-MCNC: 10.5 G/DL (ref 12–16)
HGB UR QL STRIP.AUTO: ABNORMAL
HYALINE CASTS #/AREA URNS AUTO: 8 /LPF (ref 0–8)
KETONES UR STRIP.AUTO-MCNC: 15 MG/DL
LACTATE BLDV-SCNC: 1.9 MMOL/L (ref 0.4–1.9)
LEUKOCYTE ESTERASE UR QL STRIP.AUTO: ABNORMAL
LYMPHOCYTES # BLD: 0.8 K/UL (ref 1–5.1)
LYMPHOCYTES NFR BLD: 3.3 %
MCH RBC QN AUTO: 26.3 PG (ref 26–34)
MCHC RBC AUTO-ENTMCNC: 31.7 G/DL (ref 31–36)
MCV RBC AUTO: 82.9 FL (ref 80–100)
METHGB MFR BLDV: 1 %
MONOCYTES # BLD: 1.2 K/UL (ref 0–1.3)
MONOCYTES NFR BLD: 4.8 %
NEUTROPHILS # BLD: 22 K/UL (ref 1.7–7.7)
NEUTROPHILS NFR BLD: 90.9 %
NITRITE UR QL STRIP.AUTO: NEGATIVE
O2 THERAPY: ABNORMAL
PCO2 BLDV: 34.7 MMHG (ref 40–50)
PERFORMED ON: ABNORMAL
PH BLDV: 7.22 [PH] (ref 7.35–7.45)
PH UR STRIP.AUTO: 5.5 [PH] (ref 5–8)
PLATELET # BLD AUTO: 117 K/UL (ref 135–450)
PMV BLD AUTO: 9.2 FL (ref 5–10.5)
PO2 BLDV: 38 MMHG
POTASSIUM SERPL-SCNC: 5 MMOL/L (ref 3.5–5.1)
PROT SERPL-MCNC: 6.4 G/DL (ref 6.4–8.2)
PROT UR STRIP.AUTO-MCNC: 300 MG/DL
RBC # BLD AUTO: 4.01 M/UL (ref 4–5.2)
RBC CLUMPS #/AREA URNS AUTO: 105 /HPF (ref 0–4)
SAO2 % BLDV: 57 %
SARS-COV-2 RDRP RESP QL NAA+PROBE: NOT DETECTED
SODIUM SERPL-SCNC: 121 MMOL/L (ref 136–145)
SP GR UR STRIP.AUTO: 1.01 (ref 1–1.03)
UA COMPLETE W REFLEX CULTURE PNL UR: YES
UA DIPSTICK W REFLEX MICRO PNL UR: YES
URN SPEC COLLECT METH UR: ABNORMAL
UROBILINOGEN UR STRIP-ACNC: 1 E.U./DL
WBC # BLD AUTO: 24.2 K/UL (ref 4–11)
WBC #/AREA URNS AUTO: 854 /HPF (ref 0–5)

## 2024-12-27 PROCEDURE — 6360000002 HC RX W HCPCS: Performed by: EMERGENCY MEDICINE

## 2024-12-27 PROCEDURE — 84703 CHORIONIC GONADOTROPIN ASSAY: CPT

## 2024-12-27 PROCEDURE — 71045 X-RAY EXAM CHEST 1 VIEW: CPT

## 2024-12-27 PROCEDURE — 93005 ELECTROCARDIOGRAM TRACING: CPT | Performed by: EMERGENCY MEDICINE

## 2024-12-27 PROCEDURE — 2500000003 HC RX 250 WO HCPCS: Performed by: EMERGENCY MEDICINE

## 2024-12-27 PROCEDURE — 96365 THER/PROPH/DIAG IV INF INIT: CPT

## 2024-12-27 PROCEDURE — 87040 BLOOD CULTURE FOR BACTERIA: CPT

## 2024-12-27 PROCEDURE — 3E043XZ INTRODUCTION OF VASOPRESSOR INTO CENTRAL VEIN, PERCUTANEOUS APPROACH: ICD-10-PCS | Performed by: STUDENT IN AN ORGANIZED HEALTH CARE EDUCATION/TRAINING PROGRAM

## 2024-12-27 PROCEDURE — 87186 SC STD MICRODIL/AGAR DIL: CPT

## 2024-12-27 PROCEDURE — 96361 HYDRATE IV INFUSION ADD-ON: CPT

## 2024-12-27 PROCEDURE — 83605 ASSAY OF LACTIC ACID: CPT

## 2024-12-27 PROCEDURE — 2580000003 HC RX 258: Performed by: EMERGENCY MEDICINE

## 2024-12-27 PROCEDURE — 80053 COMPREHEN METABOLIC PANEL: CPT

## 2024-12-27 PROCEDURE — 87502 INFLUENZA DNA AMP PROBE: CPT

## 2024-12-27 PROCEDURE — 87635 SARS-COV-2 COVID-19 AMP PRB: CPT

## 2024-12-27 PROCEDURE — 82803 BLOOD GASES ANY COMBINATION: CPT

## 2024-12-27 PROCEDURE — 99285 EMERGENCY DEPT VISIT HI MDM: CPT

## 2024-12-27 PROCEDURE — 81001 URINALYSIS AUTO W/SCOPE: CPT

## 2024-12-27 PROCEDURE — 96375 TX/PRO/DX INJ NEW DRUG ADDON: CPT

## 2024-12-27 PROCEDURE — 85025 COMPLETE CBC W/AUTO DIFF WBC: CPT

## 2024-12-27 PROCEDURE — 82010 KETONE BODYS QUAN: CPT

## 2024-12-27 PROCEDURE — 87086 URINE CULTURE/COLONY COUNT: CPT

## 2024-12-27 PROCEDURE — 87077 CULTURE AEROBIC IDENTIFY: CPT

## 2024-12-27 RX ORDER — DEXTROSE MONOHYDRATE AND SODIUM CHLORIDE 5; .45 G/100ML; G/100ML
INJECTION, SOLUTION INTRAVENOUS CONTINUOUS PRN
Status: DISCONTINUED | OUTPATIENT
Start: 2024-12-27 | End: 2025-01-06 | Stop reason: HOSPADM

## 2024-12-27 RX ORDER — MAGNESIUM SULFATE IN WATER 40 MG/ML
2000 INJECTION, SOLUTION INTRAVENOUS PRN
Status: DISCONTINUED | OUTPATIENT
Start: 2024-12-27 | End: 2024-12-28

## 2024-12-27 RX ORDER — 0.9 % SODIUM CHLORIDE 0.9 %
1000 INTRAVENOUS SOLUTION INTRAVENOUS ONCE
Status: COMPLETED | OUTPATIENT
Start: 2024-12-27 | End: 2024-12-28

## 2024-12-27 RX ORDER — ONDANSETRON 2 MG/ML
4 INJECTION INTRAMUSCULAR; INTRAVENOUS ONCE
Status: COMPLETED | OUTPATIENT
Start: 2024-12-27 | End: 2024-12-27

## 2024-12-27 RX ORDER — NOREPINEPHRINE BITARTRATE 0.06 MG/ML
1-100 INJECTION, SOLUTION INTRAVENOUS CONTINUOUS
Status: DISCONTINUED | OUTPATIENT
Start: 2024-12-27 | End: 2024-12-30

## 2024-12-27 RX ORDER — POTASSIUM CHLORIDE 7.45 MG/ML
10 INJECTION INTRAVENOUS PRN
Status: DISCONTINUED | OUTPATIENT
Start: 2024-12-27 | End: 2024-12-28

## 2024-12-27 RX ORDER — SODIUM CHLORIDE 9 MG/ML
INJECTION, SOLUTION INTRAVENOUS CONTINUOUS
Status: DISCONTINUED | OUTPATIENT
Start: 2024-12-27 | End: 2024-12-30

## 2024-12-27 RX ORDER — MORPHINE SULFATE 4 MG/ML
4 INJECTION, SOLUTION INTRAMUSCULAR; INTRAVENOUS ONCE
Status: COMPLETED | OUTPATIENT
Start: 2024-12-27 | End: 2024-12-27

## 2024-12-27 RX ORDER — LEVETIRACETAM 500 MG/5ML
1000 INJECTION, SOLUTION, CONCENTRATE INTRAVENOUS ONCE
Status: COMPLETED | OUTPATIENT
Start: 2024-12-27 | End: 2024-12-27

## 2024-12-27 RX ORDER — SODIUM CHLORIDE, SODIUM LACTATE, POTASSIUM CHLORIDE, AND CALCIUM CHLORIDE .6; .31; .03; .02 G/100ML; G/100ML; G/100ML; G/100ML
500 INJECTION, SOLUTION INTRAVENOUS ONCE
Status: COMPLETED | OUTPATIENT
Start: 2024-12-27 | End: 2024-12-28

## 2024-12-27 RX ADMIN — ONDANSETRON 4 MG: 2 INJECTION, SOLUTION INTRAMUSCULAR; INTRAVENOUS at 20:47

## 2024-12-27 RX ADMIN — WATER 1000 MG: 1 INJECTION INTRAMUSCULAR; INTRAVENOUS; SUBCUTANEOUS at 21:18

## 2024-12-27 RX ADMIN — MORPHINE SULFATE 4 MG: 4 INJECTION, SOLUTION INTRAMUSCULAR; INTRAVENOUS at 21:24

## 2024-12-27 RX ADMIN — NOREPINEPHRINE BITARTRATE 5 MCG/MIN: 64 SOLUTION INTRAVENOUS at 22:38

## 2024-12-27 RX ADMIN — SODIUM CHLORIDE 1000 ML: 9 INJECTION, SOLUTION INTRAVENOUS at 22:15

## 2024-12-27 RX ADMIN — SODIUM CHLORIDE, POTASSIUM CHLORIDE, SODIUM LACTATE AND CALCIUM CHLORIDE 500 ML: 600; 310; 30; 20 INJECTION, SOLUTION INTRAVENOUS at 22:57

## 2024-12-27 RX ADMIN — LEVETIRACETAM 1000 MG: 100 INJECTION INTRAVENOUS at 20:48

## 2024-12-27 RX ADMIN — SODIUM CHLORIDE 1000 ML: 9 INJECTION, SOLUTION INTRAVENOUS at 20:50

## 2024-12-27 ASSESSMENT — PAIN - FUNCTIONAL ASSESSMENT: PAIN_FUNCTIONAL_ASSESSMENT: 0-10

## 2024-12-27 ASSESSMENT — PAIN DESCRIPTION - LOCATION: LOCATION: CHEST;FOOT;LEG

## 2024-12-27 ASSESSMENT — PAIN SCALES - GENERAL
PAINLEVEL_OUTOF10: 8
PAINLEVEL_OUTOF10: 8
PAINLEVEL_OUTOF10: 7

## 2024-12-27 ASSESSMENT — PAIN DESCRIPTION - ORIENTATION
ORIENTATION: RIGHT;LEFT

## 2024-12-27 ASSESSMENT — PAIN DESCRIPTION - DESCRIPTORS: DESCRIPTORS: SHARP;SHOOTING

## 2024-12-28 ENCOUNTER — APPOINTMENT (OUTPATIENT)
Dept: GENERAL RADIOLOGY | Age: 34
DRG: 720 | End: 2024-12-28
Payer: COMMERCIAL

## 2024-12-28 ENCOUNTER — APPOINTMENT (OUTPATIENT)
Dept: ULTRASOUND IMAGING | Age: 34
DRG: 720 | End: 2024-12-28
Payer: COMMERCIAL

## 2024-12-28 ENCOUNTER — APPOINTMENT (OUTPATIENT)
Dept: CT IMAGING | Age: 34
DRG: 720 | End: 2024-12-28
Payer: COMMERCIAL

## 2024-12-28 PROBLEM — N30.00 ACUTE CYSTITIS WITHOUT HEMATURIA: Status: ACTIVE | Noted: 2024-12-28

## 2024-12-28 PROBLEM — E86.0 DEHYDRATION: Status: ACTIVE | Noted: 2024-12-28

## 2024-12-28 PROBLEM — R31.9 URINARY TRACT INFECTION WITH HEMATURIA: Status: ACTIVE | Noted: 2024-12-28

## 2024-12-28 PROBLEM — R65.21 SEPTIC SHOCK (HCC): Status: ACTIVE | Noted: 2024-12-28

## 2024-12-28 PROBLEM — G93.41 ACUTE METABOLIC ENCEPHALOPATHY: Status: ACTIVE | Noted: 2024-12-28

## 2024-12-28 PROBLEM — E10.65 POORLY CONTROLLED TYPE 1 DIABETES MELLITUS (HCC): Status: ACTIVE | Noted: 2022-08-02

## 2024-12-28 PROBLEM — R93.89 ABNORMAL CT OF THE CHEST: Status: ACTIVE | Noted: 2024-12-28

## 2024-12-28 PROBLEM — A48.3: Status: ACTIVE | Noted: 2024-12-28

## 2024-12-28 PROBLEM — R17 JAUNDICE: Status: ACTIVE | Noted: 2024-12-28

## 2024-12-28 PROBLEM — E87.1 HYPONATREMIA: Status: ACTIVE | Noted: 2024-12-28

## 2024-12-28 PROBLEM — E80.6 HYPERBILIRUBINEMIA: Status: ACTIVE | Noted: 2024-12-28

## 2024-12-28 PROBLEM — B95.8: Status: ACTIVE | Noted: 2024-12-28

## 2024-12-28 PROBLEM — A41.9 SEPTIC SHOCK (HCC): Status: ACTIVE | Noted: 2024-12-28

## 2024-12-28 PROBLEM — N39.0 URINARY TRACT INFECTION WITH HEMATURIA: Status: ACTIVE | Noted: 2024-12-28

## 2024-12-28 PROBLEM — D69.6 THROMBOCYTOPENIA (HCC): Status: ACTIVE | Noted: 2024-12-28

## 2024-12-28 LAB
AMPHETAMINES UR QL SCN>1000 NG/ML: ABNORMAL
ANION GAP SERPL CALCULATED.3IONS-SCNC: 15 MMOL/L (ref 3–16)
ANION GAP SERPL CALCULATED.3IONS-SCNC: 16 MMOL/L (ref 3–16)
ANION GAP SERPL CALCULATED.3IONS-SCNC: 17 MMOL/L (ref 3–16)
ANION GAP SERPL CALCULATED.3IONS-SCNC: 22 MMOL/L (ref 3–16)
BARBITURATES UR QL SCN>200 NG/ML: ABNORMAL
BENZODIAZ UR QL SCN>200 NG/ML: ABNORMAL
BUN SERPL-MCNC: 58 MG/DL (ref 7–20)
BUN SERPL-MCNC: 60 MG/DL (ref 7–20)
BUN SERPL-MCNC: 60 MG/DL (ref 7–20)
BUN SERPL-MCNC: 62 MG/DL (ref 7–20)
BUN SERPL-MCNC: 62 MG/DL (ref 7–20)
BUN SERPL-MCNC: 64 MG/DL (ref 7–20)
CALCIUM SERPL-MCNC: 7.3 MG/DL (ref 8.3–10.6)
CALCIUM SERPL-MCNC: 7.3 MG/DL (ref 8.3–10.6)
CALCIUM SERPL-MCNC: 7.6 MG/DL (ref 8.3–10.6)
CALCIUM SERPL-MCNC: 7.8 MG/DL (ref 8.3–10.6)
CALCIUM SERPL-MCNC: 7.9 MG/DL (ref 8.3–10.6)
CALCIUM SERPL-MCNC: 8 MG/DL (ref 8.3–10.6)
CANNABINOIDS UR QL SCN>50 NG/ML: ABNORMAL
CHLORIDE SERPL-SCNC: 96 MMOL/L (ref 99–110)
CHLORIDE SERPL-SCNC: 98 MMOL/L (ref 99–110)
CHLORIDE SERPL-SCNC: 99 MMOL/L (ref 99–110)
CK SERPL-CCNC: 16 U/L (ref 26–192)
CO2 SERPL-SCNC: 12 MMOL/L (ref 21–32)
CO2 SERPL-SCNC: 14 MMOL/L (ref 21–32)
CO2 SERPL-SCNC: 15 MMOL/L (ref 21–32)
COCAINE UR QL SCN: ABNORMAL
CREAT SERPL-MCNC: 3.5 MG/DL (ref 0.6–1.1)
CREAT SERPL-MCNC: 3.6 MG/DL (ref 0.6–1.1)
CREAT SERPL-MCNC: 3.7 MG/DL (ref 0.6–1.1)
CREAT SERPL-MCNC: 3.9 MG/DL (ref 0.6–1.1)
CREAT SERPL-MCNC: 4 MG/DL (ref 0.6–1.1)
CREAT SERPL-MCNC: 4.1 MG/DL (ref 0.6–1.1)
DRUG SCREEN COMMENT UR-IMP: ABNORMAL
EKG ATRIAL RATE: 120 BPM
EKG DIAGNOSIS: NORMAL
EKG P AXIS: 64 DEGREES
EKG P-R INTERVAL: 138 MS
EKG Q-T INTERVAL: 328 MS
EKG QRS DURATION: 74 MS
EKG QTC CALCULATION (BAZETT): 463 MS
EKG R AXIS: 82 DEGREES
EKG T AXIS: 42 DEGREES
EKG VENTRICULAR RATE: 120 BPM
EST. AVERAGE GLUCOSE BLD GHB EST-MCNC: 254.7 MG/DL
FENTANYL SCREEN, URINE: ABNORMAL
GFR SERPLBLD CREATININE-BSD FMLA CKD-EPI: 14 ML/MIN/{1.73_M2}
GFR SERPLBLD CREATININE-BSD FMLA CKD-EPI: 14 ML/MIN/{1.73_M2}
GFR SERPLBLD CREATININE-BSD FMLA CKD-EPI: 15 ML/MIN/{1.73_M2}
GFR SERPLBLD CREATININE-BSD FMLA CKD-EPI: 16 ML/MIN/{1.73_M2}
GFR SERPLBLD CREATININE-BSD FMLA CKD-EPI: 16 ML/MIN/{1.73_M2}
GFR SERPLBLD CREATININE-BSD FMLA CKD-EPI: 17 ML/MIN/{1.73_M2}
GLUCOSE BLD-MCNC: 114 MG/DL (ref 70–99)
GLUCOSE BLD-MCNC: 129 MG/DL (ref 70–99)
GLUCOSE BLD-MCNC: 131 MG/DL (ref 70–99)
GLUCOSE BLD-MCNC: 131 MG/DL (ref 70–99)
GLUCOSE BLD-MCNC: 136 MG/DL (ref 70–99)
GLUCOSE BLD-MCNC: 143 MG/DL (ref 70–99)
GLUCOSE BLD-MCNC: 153 MG/DL (ref 70–99)
GLUCOSE BLD-MCNC: 167 MG/DL (ref 70–99)
GLUCOSE BLD-MCNC: 178 MG/DL (ref 70–99)
GLUCOSE BLD-MCNC: 178 MG/DL (ref 70–99)
GLUCOSE BLD-MCNC: 179 MG/DL (ref 70–99)
GLUCOSE BLD-MCNC: 196 MG/DL (ref 70–99)
GLUCOSE BLD-MCNC: 197 MG/DL (ref 70–99)
GLUCOSE BLD-MCNC: 203 MG/DL (ref 70–99)
GLUCOSE BLD-MCNC: 207 MG/DL (ref 70–99)
GLUCOSE BLD-MCNC: 208 MG/DL (ref 70–99)
GLUCOSE BLD-MCNC: 234 MG/DL (ref 70–99)
GLUCOSE BLD-MCNC: 235 MG/DL (ref 70–99)
GLUCOSE BLD-MCNC: 256 MG/DL (ref 70–99)
GLUCOSE BLD-MCNC: 369 MG/DL (ref 70–99)
GLUCOSE BLD-MCNC: 413 MG/DL (ref 70–99)
GLUCOSE SERPL-MCNC: 115 MG/DL (ref 70–99)
GLUCOSE SERPL-MCNC: 136 MG/DL (ref 70–99)
GLUCOSE SERPL-MCNC: 136 MG/DL (ref 70–99)
GLUCOSE SERPL-MCNC: 208 MG/DL (ref 70–99)
GLUCOSE SERPL-MCNC: 232 MG/DL (ref 70–99)
GLUCOSE SERPL-MCNC: 239 MG/DL (ref 70–99)
HBA1C MFR BLD: 10.5 %
LACTATE BLDV-SCNC: 0.9 MMOL/L (ref 0.4–1.9)
LACTATE BLDV-SCNC: 1.6 MMOL/L (ref 0.4–1.9)
LIPASE SERPL-CCNC: 17 U/L (ref 13–60)
MAGNESIUM SERPL-MCNC: 1.99 MG/DL (ref 1.8–2.4)
MAGNESIUM SERPL-MCNC: 2 MG/DL (ref 1.8–2.4)
MAGNESIUM SERPL-MCNC: 2.07 MG/DL (ref 1.8–2.4)
MAGNESIUM SERPL-MCNC: 2.1 MG/DL (ref 1.8–2.4)
MAGNESIUM SERPL-MCNC: 2.14 MG/DL (ref 1.8–2.4)
MAGNESIUM SERPL-MCNC: 2.16 MG/DL (ref 1.8–2.4)
METHADONE UR QL SCN>300 NG/ML: ABNORMAL
MRSA DNA SPEC QL NAA+PROBE: ABNORMAL
OPIATES UR QL SCN>300 NG/ML: POSITIVE
ORGANISM: ABNORMAL
OSMOLALITY SERPL: 301 MOSM/KG (ref 275–295)
OSMOLALITY UR: 321 MOSM/KG (ref 390–1070)
OXYCODONE UR QL SCN: ABNORMAL
PCP UR QL SCN>25 NG/ML: ABNORMAL
PERFORMED ON: ABNORMAL
PH UR STRIP: 5.5 [PH]
PHOSPHATE SERPL-MCNC: 6.1 MG/DL (ref 2.5–4.9)
PHOSPHATE SERPL-MCNC: 6.5 MG/DL (ref 2.5–4.9)
PHOSPHATE SERPL-MCNC: 6.5 MG/DL (ref 2.5–4.9)
PHOSPHATE SERPL-MCNC: 6.7 MG/DL (ref 2.5–4.9)
PHOSPHATE SERPL-MCNC: 6.9 MG/DL (ref 2.5–4.9)
PHOSPHATE SERPL-MCNC: 7.1 MG/DL (ref 2.5–4.9)
POTASSIUM SERPL-SCNC: 3.8 MMOL/L (ref 3.5–5.1)
POTASSIUM SERPL-SCNC: 4 MMOL/L (ref 3.5–5.1)
POTASSIUM SERPL-SCNC: 4.1 MMOL/L (ref 3.5–5.1)
POTASSIUM SERPL-SCNC: 4.3 MMOL/L (ref 3.5–5.1)
PROCALCITONIN SERPL IA-MCNC: 29.7 NG/ML (ref 0–0.15)
REPORT: NORMAL
RESP PATH DNA+RNA PNL NPH NAA+NON-PROBE: NORMAL
SODIUM SERPL-SCNC: 127 MMOL/L (ref 136–145)
SODIUM SERPL-SCNC: 129 MMOL/L (ref 136–145)
SODIUM SERPL-SCNC: 130 MMOL/L (ref 136–145)
SODIUM SERPL-SCNC: 130 MMOL/L (ref 136–145)
SODIUM UR-SCNC: 38 MMOL/L

## 2024-12-28 PROCEDURE — 96365 THER/PROPH/DIAG IV INF INIT: CPT

## 2024-12-28 PROCEDURE — 2580000003 HC RX 258: Performed by: INTERNAL MEDICINE

## 2024-12-28 PROCEDURE — 83690 ASSAY OF LIPASE: CPT

## 2024-12-28 PROCEDURE — 6360000002 HC RX W HCPCS: Performed by: NURSE PRACTITIONER

## 2024-12-28 PROCEDURE — 2500000003 HC RX 250 WO HCPCS: Performed by: INTERNAL MEDICINE

## 2024-12-28 PROCEDURE — 84100 ASSAY OF PHOSPHORUS: CPT

## 2024-12-28 PROCEDURE — 84145 PROCALCITONIN (PCT): CPT

## 2024-12-28 PROCEDURE — 83735 ASSAY OF MAGNESIUM: CPT

## 2024-12-28 PROCEDURE — 2580000003 HC RX 258: Performed by: EMERGENCY MEDICINE

## 2024-12-28 PROCEDURE — 6370000000 HC RX 637 (ALT 250 FOR IP): Performed by: EMERGENCY MEDICINE

## 2024-12-28 PROCEDURE — 6370000000 HC RX 637 (ALT 250 FOR IP): Performed by: INTERNAL MEDICINE

## 2024-12-28 PROCEDURE — 93010 ELECTROCARDIOGRAM REPORT: CPT | Performed by: INTERNAL MEDICINE

## 2024-12-28 PROCEDURE — 99291 CRITICAL CARE FIRST HOUR: CPT | Performed by: INTERNAL MEDICINE

## 2024-12-28 PROCEDURE — 84300 ASSAY OF URINE SODIUM: CPT

## 2024-12-28 PROCEDURE — 36556 INSERT NON-TUNNEL CV CATH: CPT

## 2024-12-28 PROCEDURE — 6360000002 HC RX W HCPCS: Performed by: INTERNAL MEDICINE

## 2024-12-28 PROCEDURE — 71045 X-RAY EXAM CHEST 1 VIEW: CPT

## 2024-12-28 PROCEDURE — 6360000002 HC RX W HCPCS: Performed by: EMERGENCY MEDICINE

## 2024-12-28 PROCEDURE — 6370000000 HC RX 637 (ALT 250 FOR IP): Performed by: NURSE PRACTITIONER

## 2024-12-28 PROCEDURE — 87641 MR-STAPH DNA AMP PROBE: CPT

## 2024-12-28 PROCEDURE — 0202U NFCT DS 22 TRGT SARS-COV-2: CPT

## 2024-12-28 PROCEDURE — 83930 ASSAY OF BLOOD OSMOLALITY: CPT

## 2024-12-28 PROCEDURE — 82550 ASSAY OF CK (CPK): CPT

## 2024-12-28 PROCEDURE — 76705 ECHO EXAM OF ABDOMEN: CPT

## 2024-12-28 PROCEDURE — 96375 TX/PRO/DX INJ NEW DRUG ADDON: CPT

## 2024-12-28 PROCEDURE — 87040 BLOOD CULTURE FOR BACTERIA: CPT

## 2024-12-28 PROCEDURE — 83605 ASSAY OF LACTIC ACID: CPT

## 2024-12-28 PROCEDURE — 02HV33Z INSERTION OF INFUSION DEVICE INTO SUPERIOR VENA CAVA, PERCUTANEOUS APPROACH: ICD-10-PCS | Performed by: STUDENT IN AN ORGANIZED HEALTH CARE EDUCATION/TRAINING PROGRAM

## 2024-12-28 PROCEDURE — 94760 N-INVAS EAR/PLS OXIMETRY 1: CPT

## 2024-12-28 PROCEDURE — 83036 HEMOGLOBIN GLYCOSYLATED A1C: CPT

## 2024-12-28 PROCEDURE — 96366 THER/PROPH/DIAG IV INF ADDON: CPT

## 2024-12-28 PROCEDURE — 2000000000 HC ICU R&B

## 2024-12-28 PROCEDURE — 71250 CT THORAX DX C-: CPT

## 2024-12-28 PROCEDURE — 36415 COLL VENOUS BLD VENIPUNCTURE: CPT

## 2024-12-28 PROCEDURE — 96367 TX/PROPH/DG ADDL SEQ IV INF: CPT

## 2024-12-28 PROCEDURE — 80307 DRUG TEST PRSMV CHEM ANLYZR: CPT

## 2024-12-28 PROCEDURE — 83935 ASSAY OF URINE OSMOLALITY: CPT

## 2024-12-28 PROCEDURE — 51702 INSERT TEMP BLADDER CATH: CPT

## 2024-12-28 PROCEDURE — 80048 BASIC METABOLIC PNL TOTAL CA: CPT

## 2024-12-28 RX ORDER — CLONAZEPAM 1 MG/1
2 TABLET ORAL 2 TIMES DAILY
Status: DISCONTINUED | OUTPATIENT
Start: 2024-12-28 | End: 2025-01-06 | Stop reason: HOSPADM

## 2024-12-28 RX ORDER — PROCHLORPERAZINE EDISYLATE 5 MG/ML
5 INJECTION INTRAMUSCULAR; INTRAVENOUS EVERY 6 HOURS PRN
Status: DISCONTINUED | OUTPATIENT
Start: 2024-12-28 | End: 2025-01-06 | Stop reason: HOSPADM

## 2024-12-28 RX ORDER — CLINDAMYCIN PHOSPHATE 600 MG/50ML
600 INJECTION, SOLUTION INTRAVENOUS EVERY 8 HOURS
Status: DISCONTINUED | OUTPATIENT
Start: 2024-12-28 | End: 2024-12-29

## 2024-12-28 RX ORDER — CALCIUM GLUCONATE 20 MG/ML
2000 INJECTION, SOLUTION INTRAVENOUS ONCE
Status: COMPLETED | OUTPATIENT
Start: 2024-12-28 | End: 2024-12-28

## 2024-12-28 RX ORDER — LINEZOLID 2 MG/ML
600 INJECTION, SOLUTION INTRAVENOUS EVERY 12 HOURS
Status: DISCONTINUED | OUTPATIENT
Start: 2024-12-28 | End: 2024-12-29

## 2024-12-28 RX ORDER — PANTOPRAZOLE SODIUM 40 MG/1
40 TABLET, DELAYED RELEASE ORAL
Status: DISCONTINUED | OUTPATIENT
Start: 2024-12-28 | End: 2025-01-06 | Stop reason: HOSPADM

## 2024-12-28 RX ORDER — LEVETIRACETAM 500 MG/1
1000 TABLET ORAL 2 TIMES DAILY
Status: DISCONTINUED | OUTPATIENT
Start: 2024-12-28 | End: 2025-01-06 | Stop reason: HOSPADM

## 2024-12-28 RX ORDER — ACETAMINOPHEN 650 MG/1
650 SUPPOSITORY RECTAL EVERY 6 HOURS PRN
Status: DISCONTINUED | OUTPATIENT
Start: 2024-12-28 | End: 2025-01-06 | Stop reason: HOSPADM

## 2024-12-28 RX ORDER — GABAPENTIN 100 MG/1
100 CAPSULE ORAL 3 TIMES DAILY
Status: DISCONTINUED | OUTPATIENT
Start: 2024-12-28 | End: 2025-01-02

## 2024-12-28 RX ORDER — ACETAMINOPHEN 325 MG/1
650 TABLET ORAL EVERY 6 HOURS PRN
Status: DISCONTINUED | OUTPATIENT
Start: 2024-12-28 | End: 2025-01-06 | Stop reason: HOSPADM

## 2024-12-28 RX ORDER — HYDROCORTISONE SODIUM SUCCINATE 100 MG/2ML
50 INJECTION INTRAMUSCULAR; INTRAVENOUS EVERY 6 HOURS
Status: DISCONTINUED | OUTPATIENT
Start: 2024-12-28 | End: 2024-12-29

## 2024-12-28 RX ORDER — HEPARIN SODIUM 5000 [USP'U]/ML
5000 INJECTION, SOLUTION INTRAVENOUS; SUBCUTANEOUS EVERY 8 HOURS SCHEDULED
Status: DISCONTINUED | OUTPATIENT
Start: 2024-12-28 | End: 2025-01-06 | Stop reason: HOSPADM

## 2024-12-28 RX ORDER — SODIUM CHLORIDE 9 MG/ML
INJECTION, SOLUTION INTRAVENOUS PRN
Status: DISCONTINUED | OUTPATIENT
Start: 2024-12-28 | End: 2025-01-06 | Stop reason: HOSPADM

## 2024-12-28 RX ORDER — SODIUM CHLORIDE 0.9 % (FLUSH) 0.9 %
5-40 SYRINGE (ML) INJECTION PRN
Status: DISCONTINUED | OUTPATIENT
Start: 2024-12-28 | End: 2025-01-06 | Stop reason: HOSPADM

## 2024-12-28 RX ORDER — VANCOMYCIN 1 G/200ML
1000 INJECTION, SOLUTION INTRAVENOUS ONCE
Status: COMPLETED | OUTPATIENT
Start: 2024-12-28 | End: 2024-12-28

## 2024-12-28 RX ORDER — CLINDAMYCIN PHOSPHATE 600 MG/50ML
600 INJECTION, SOLUTION INTRAVENOUS ONCE
Status: COMPLETED | OUTPATIENT
Start: 2024-12-28 | End: 2024-12-28

## 2024-12-28 RX ORDER — VENLAFAXINE HYDROCHLORIDE 75 MG/1
150 CAPSULE, EXTENDED RELEASE ORAL DAILY
Status: DISCONTINUED | OUTPATIENT
Start: 2024-12-28 | End: 2025-01-06 | Stop reason: HOSPADM

## 2024-12-28 RX ORDER — ERGOCALCIFEROL 1.25 MG/1
50000 CAPSULE, LIQUID FILLED ORAL WEEKLY
Status: DISCONTINUED | OUTPATIENT
Start: 2025-01-03 | End: 2025-01-06 | Stop reason: HOSPADM

## 2024-12-28 RX ORDER — SODIUM CHLORIDE 0.9 % (FLUSH) 0.9 %
5-40 SYRINGE (ML) INJECTION EVERY 12 HOURS SCHEDULED
Status: DISCONTINUED | OUTPATIENT
Start: 2024-12-28 | End: 2025-01-06 | Stop reason: HOSPADM

## 2024-12-28 RX ORDER — LANOLIN ALCOHOL/MO/W.PET/CERES
1000 CREAM (GRAM) TOPICAL DAILY
Status: DISCONTINUED | OUTPATIENT
Start: 2024-12-28 | End: 2025-01-06 | Stop reason: HOSPADM

## 2024-12-28 RX ORDER — GABAPENTIN 300 MG/1
300 CAPSULE ORAL 3 TIMES DAILY
Status: DISCONTINUED | OUTPATIENT
Start: 2024-12-28 | End: 2024-12-28

## 2024-12-28 RX ADMIN — CALCIUM GLUCONATE 2000 MG: 20 INJECTION, SOLUTION INTRAVENOUS at 10:11

## 2024-12-28 RX ADMIN — DEXTROSE AND SODIUM CHLORIDE: 5; 450 INJECTION, SOLUTION INTRAVENOUS at 12:34

## 2024-12-28 RX ADMIN — SODIUM CHLORIDE: 9 INJECTION, SOLUTION INTRAVENOUS at 06:05

## 2024-12-28 RX ADMIN — HEPARIN SODIUM 5000 UNITS: 5000 INJECTION INTRAVENOUS; SUBCUTANEOUS at 14:01

## 2024-12-28 RX ADMIN — GABAPENTIN 300 MG: 300 CAPSULE ORAL at 08:20

## 2024-12-28 RX ADMIN — SODIUM CHLORIDE, PRESERVATIVE FREE 10 ML: 5 INJECTION INTRAVENOUS at 07:05

## 2024-12-28 RX ADMIN — LINEZOLID 600 MG: 600 INJECTION, SOLUTION INTRAVENOUS at 12:39

## 2024-12-28 RX ADMIN — HEPARIN SODIUM 5000 UNITS: 5000 INJECTION INTRAVENOUS; SUBCUTANEOUS at 06:09

## 2024-12-28 RX ADMIN — CLONAZEPAM 2 MG: 1 TABLET ORAL at 08:20

## 2024-12-28 RX ADMIN — PANTOPRAZOLE SODIUM 40 MG: 40 TABLET, DELAYED RELEASE ORAL at 06:09

## 2024-12-28 RX ADMIN — CLINDAMYCIN PHOSPHATE 600 MG: 600 INJECTION, SOLUTION INTRAVENOUS at 11:39

## 2024-12-28 RX ADMIN — LEVETIRACETAM 1000 MG: 500 TABLET, FILM COATED ORAL at 21:06

## 2024-12-28 RX ADMIN — DEXTROSE AND SODIUM CHLORIDE: 5; 450 INJECTION, SOLUTION INTRAVENOUS at 19:15

## 2024-12-28 RX ADMIN — SODIUM CHLORIDE: 9 INJECTION, SOLUTION INTRAVENOUS at 00:07

## 2024-12-28 RX ADMIN — CLINDAMYCIN PHOSPHATE 600 MG: 600 INJECTION, SOLUTION INTRAVENOUS at 03:00

## 2024-12-28 RX ADMIN — HYDROCORTISONE SODIUM SUCCINATE 50 MG: 100 INJECTION, POWDER, FOR SOLUTION INTRAMUSCULAR; INTRAVENOUS at 21:06

## 2024-12-28 RX ADMIN — DEXTROSE AND SODIUM CHLORIDE: 5; 450 INJECTION, SOLUTION INTRAVENOUS at 05:48

## 2024-12-28 RX ADMIN — VANCOMYCIN 1000 MG: 1 INJECTION, SOLUTION INTRAVENOUS at 03:33

## 2024-12-28 RX ADMIN — CLONAZEPAM 2 MG: 1 TABLET ORAL at 21:06

## 2024-12-28 RX ADMIN — GABAPENTIN 100 MG: 100 CAPSULE ORAL at 14:01

## 2024-12-28 RX ADMIN — CLINDAMYCIN PHOSPHATE 600 MG: 600 INJECTION, SOLUTION INTRAVENOUS at 19:02

## 2024-12-28 RX ADMIN — SODIUM CHLORIDE: 9 INJECTION, SOLUTION INTRAVENOUS at 05:22

## 2024-12-28 RX ADMIN — MEROPENEM 500 MG: 500 INJECTION, POWDER, FOR SOLUTION INTRAVENOUS at 19:01

## 2024-12-28 RX ADMIN — LEVETIRACETAM 1000 MG: 500 TABLET, FILM COATED ORAL at 08:19

## 2024-12-28 RX ADMIN — HYDROCORTISONE SODIUM SUCCINATE 50 MG: 100 INJECTION, POWDER, FOR SOLUTION INTRAMUSCULAR; INTRAVENOUS at 10:11

## 2024-12-28 RX ADMIN — HYDROCORTISONE SODIUM SUCCINATE 50 MG: 100 INJECTION, POWDER, FOR SOLUTION INTRAMUSCULAR; INTRAVENOUS at 16:15

## 2024-12-28 RX ADMIN — HYDROMORPHONE HYDROCHLORIDE 0.5 MG: 1 INJECTION, SOLUTION INTRAMUSCULAR; INTRAVENOUS; SUBCUTANEOUS at 07:04

## 2024-12-28 RX ADMIN — SODIUM CHLORIDE, PRESERVATIVE FREE 10 ML: 5 INJECTION INTRAVENOUS at 21:13

## 2024-12-28 RX ADMIN — HEPARIN SODIUM 5000 UNITS: 5000 INJECTION INTRAVENOUS; SUBCUTANEOUS at 21:06

## 2024-12-28 RX ADMIN — INSULIN HUMAN 7.5 UNITS/HR: 1 INJECTION, SOLUTION INTRAVENOUS at 00:06

## 2024-12-28 RX ADMIN — CEFEPIME 2000 MG: 2 INJECTION, POWDER, FOR SOLUTION INTRAVENOUS at 10:48

## 2024-12-28 RX ADMIN — GABAPENTIN 100 MG: 100 CAPSULE ORAL at 21:06

## 2024-12-28 RX ADMIN — CYANOCOBALAMIN TAB 1000 MCG 1000 MCG: 1000 TAB at 08:20

## 2024-12-28 RX ADMIN — VENLAFAXINE HYDROCHLORIDE 150 MG: 75 CAPSULE, EXTENDED RELEASE ORAL at 08:19

## 2024-12-28 RX ADMIN — HYDROCORTISONE SODIUM SUCCINATE 50 MG: 100 INJECTION, POWDER, FOR SOLUTION INTRAMUSCULAR; INTRAVENOUS at 06:08

## 2024-12-28 RX ADMIN — PIPERACILLIN AND TAZOBACTAM 4500 MG: 4; .5 INJECTION, POWDER, FOR SOLUTION INTRAVENOUS at 06:08

## 2024-12-28 ASSESSMENT — PAIN - FUNCTIONAL ASSESSMENT
PAIN_FUNCTIONAL_ASSESSMENT: PREVENTS OR INTERFERES WITH MANY ACTIVE NOT PASSIVE ACTIVITIES
PAIN_FUNCTIONAL_ASSESSMENT: PREVENTS OR INTERFERES WITH MANY ACTIVE NOT PASSIVE ACTIVITIES

## 2024-12-28 ASSESSMENT — PAIN DESCRIPTION - FREQUENCY
FREQUENCY: CONTINUOUS
FREQUENCY: CONTINUOUS

## 2024-12-28 ASSESSMENT — PAIN DESCRIPTION - ORIENTATION
ORIENTATION: RIGHT;MID
ORIENTATION: RIGHT;MID

## 2024-12-28 ASSESSMENT — PAIN DESCRIPTION - DESCRIPTORS
DESCRIPTORS: SHARP
DESCRIPTORS: SHARP

## 2024-12-28 ASSESSMENT — PAIN SCALES - GENERAL
PAINLEVEL_OUTOF10: 7
PAINLEVEL_OUTOF10: 7

## 2024-12-28 ASSESSMENT — PAIN DESCRIPTION - LOCATION
LOCATION: ABDOMEN
LOCATION: ABDOMEN

## 2024-12-28 ASSESSMENT — PAIN DESCRIPTION - ONSET: ONSET: ON-GOING

## 2024-12-28 ASSESSMENT — PAIN DESCRIPTION - PAIN TYPE
TYPE: ACUTE PAIN
TYPE: ACUTE PAIN

## 2024-12-28 NOTE — ED PROVIDER NOTES
ED Reassessment Note:     Maribeth Loera is a 34 y.o. female who was initially seen by an off going provider and the care has been turned over to me.  Please see the original providers note for details regarding the initial history, physical exam and ED course.      In summary, this is a 34-year-old woman with history of type 1 diabetes and prior DKA, gastroparesis presenting with several days of nausea vomiting.  Appears to have UTI and dehydration  CT chest abdomen pelvis pending     Given fluids, pain meds, rocephin       At the time of turnover the following steps in the patient's evaluation were pending:   CT abdomen and pelvis, reassessment and disposition.    Patient was found to have acute renal failure with creatinine of 4.7, bicarb of 13, potassium of 5.0.  Low bicarb is multifactorial, related to DKA as well.  She also has elevated bilirubin of 3.7.  Will refrain from giving IV contrast given acute renal failure.  CTs will be obtained noncontrast.    Patient has become increasingly hypotensive.  Levophed initiated.  She has had over 3 L of fluids and is still hypotensive.  On my exam, patient had disclamation of hands and feet suggestive of toxic shock syndrome.  She has a tampon in place which was removed.  I performed a pelvic exam and she did not have any evidence of retained tampon on speculum exam.  She did not have purulent discharge.  Had minimal vaginal bleeding.  Levophed has been uptitrated to 26.  Right IJ central line placed after informed consent.      XR CHEST 1 VIEW   Final Result   Bilateral peribronchial thickening, which can be seen in the setting of   bronchitis or reactive airway disease.         CT CHEST ABDOMEN PELVIS WO CONTRAST Additional Contrast? None    (Results Pending)   XR CHEST PORTABLE    (Results Pending)     Labs Reviewed   CBC WITH AUTO DIFFERENTIAL - Abnormal; Notable for the following components:       Result Value    WBC 24.2 (*)     Hemoglobin 10.5 (*)

## 2024-12-28 NOTE — ED TRIAGE NOTES
Pt from home via EMS.     She has been \"feeling crummy\" for about 2 weeks, with generalized weakness, a burning sensation in her chest, and N/V/D. Pt came in today because walking got too difficult from weakness and she has a loss of appetite. States her heart feels like it is racing. Family all had COVID during the holidays. She has a h/x of type 1 DM and epilepsy. Last seizure was 2 days ago. She states she has been unable to take her seizure meds for a couple days due to not being able to keep anything down.  for EMS    Pt a&o x4.

## 2024-12-28 NOTE — ED NOTES
Pt placed on 4L NC due to desatting as low as 76% on RA.   
Xray at bedside    
other components within normal limits   POCT GLUCOSE - Abnormal; Notable for the following components:    POC Glucose 413 (*)     All other components within normal limits   POCT GLUCOSE - Abnormal; Notable for the following components:    POC Glucose 369 (*)     All other components within normal limits   POCT GLUCOSE - Abnormal; Notable for the following components:    POC Glucose 256 (*)     All other components within normal limits       Background  Allergies: No Known Allergies  History:   Past Medical History:   Diagnosis Date    Depression     Diabetic ketoacidosis without coma associated with type 1 diabetes mellitus (HCC) 07/29/2022    MRSA bacteremia 08/02/2022    Seizures ([facility])        Assessment  Vitals: MEWS Score: 3  Level of Consciousness: Alert (0)   Vitals:    12/28/24 0425 12/28/24 0430 12/28/24 0435 12/28/24 0440   BP: 111/81 110/85 107/88 112/80   Pulse: (!) 115 (!) 114 (!) 113 (!) 113   Resp:       Temp:       TempSrc:       SpO2: 92% 91% 91% 92%   Weight:       Height:         Deterioration Index (DI): Deterioration Index: 47.16  Deterioration Index (DI) Interventions Performed:    O2 Flow Rate: O2 Flow Rate (L/min): 4 L/min  O2 Device: O2 Device: None (Room air)  Cardiac Rhythm:    Critical Lab Results: [unfilled]  Cultures: Cultures:Blood  NIH Score: NIH     Active LDA's:   Peripheral IV 12/27/24 Right Antecubital (Active)       Peripheral IV 12/27/24 Left Antecubital (Active)   Site Assessment Clean, dry & intact 12/27/24 2221   Line Status Blood return noted 12/27/24 2221   Dressing Intervention New 12/27/24 2221     Active Central Lines:   Triple Lumen IJ                        Active Wounds:    Active Stoddard's:    Active Feeding Tubes:      Administered Medications:   Medications   norepinephrine (LEVOPHED) 16 mg in sodium chloride 0.9 % 250 mL infusion (26 mcg/min IntraVENous Rate/Dose Change 12/28/24 0232)   insulin regular (MYXREDLIN) 100 units in sodium chloride 0.9 % 100 ml infusion (5.9

## 2024-12-28 NOTE — H&P
Results   Component Value Date/Time    BLOODCULT2 No Growth after 4 days of incubation. 06/10/2024 01:07 PM     Organism:   Lab Results   Component Value Date/Time    ORG Staph aureus MRSA DNA Detected 07/29/2022 11:26 PM    ORG Staph aureus MRSA 07/29/2022 11:26 PM       Imaging/Diagnostics Last 24 Hours   CT CHEST ABDOMEN PELVIS WO CONTRAST Additional Contrast? None    Result Date: 12/28/2024  1. Trace left pleural effusion with bibasilar opacities favoring pneumonia over atelectasis. 2. Mild wall thickening throughout the colon may represent colitis. 3. Hepatomegaly with steatosis.     XR CHEST PORTABLE    Result Date: 12/28/2024  1. Right internal jugular central venous catheter tip terminates at the caval atrial junction. 2. Bibasilar opacities are favored to represent infection over atelectasis.     XR CHEST 1 VIEW    Result Date: 12/27/2024  EXAMINATION: ONE XRAY VIEW OF THE CHEST 12/27/2024 7:34 pm COMPARISON: CXR dated 06/10/2024 HISTORY: ORDERING SYSTEM PROVIDED HISTORY: Shortness of Breath TECHNOLOGIST PROVIDED HISTORY: Reason for exam:->Shortness of Breath FINDINGS: Medical devices: None. Mediastinum/Heart: The mediastinal contours are normal. The heart appears normal in size. Lungs: Bilateral peribronchial thickening.  No focal opacities. Pleura: No pleural effusion. No pneumothorax.     Bilateral peribronchial thickening, which can be seen in the setting of bronchitis or reactive airway disease.         Electronically signed by Bell Watkins MD on 12/28/2024 at 4:00 AM

## 2024-12-28 NOTE — ED PROVIDER NOTES
Emergency Department Attending Physician Note  Location: [facility] EMERGENCY DEPARTMENT  12/27/2024       Pt Name: Maribeth Loera  MRN: [id number]  Birthdate 1990    Date of evaluation: 12/27/2024  Provider: ESAU TORRES DO  PCP: Kathy Davis, MARI - CNP    Note Started: 8:54 PM EST 12/27/24    CHIEF COMPLAINT  Chief Complaint   Patient presents with    Illness     She has been \"feeling crummy\" for about 2 weeks, with generalized weakness, a burning sensation in her chest, and N/V/D. Pt came in today because walking got too difficult from weakness and she has a loss of appetite. States her heart feels like it is racing. Family all had COVID during the holidays. She has a h/x of type 1 DM and epilepsy. Last seizure was 2 days ago. She states she has been unable to take her seizure meds for a couple days due to not being able to keep anything down.  for EMS       ROOM: B33     HISTORY OF PRESENT ILLNESS:  History obtained by patient. Limitations to history : None.     Maribeth Loera is a 34 y.o. female with a significant PMHx of type I diabetes, history of DKA, and seizure disorder on Kera, here in the emergency department today with nausea, vomiting, and is tachycardic and hypotensive on arrival.  Her lips are dry and cracked.  She says she has not been able to take her seizure medicine in 3 days.  She says she is having lower right chest and right upper abdomen pain, and it feels like it is very bloated it sound like her entire family has COVID-19.  She says she just feels horrible.  She has muscle cramps.  She has had DKA before, and she is worried she is going into that.  No lightheadedness or dizziness.  She said also that her skin is starting to peel on her hands and feet, but nothing in her mouth or on her trunk.  No falls or injuries.  No syncope, but says she feels very lightheaded when she stands up.  Overall, patient arrives to emergency department today quite

## 2024-12-29 LAB
ALBUMIN SERPL-MCNC: 2.1 G/DL (ref 3.4–5)
ALBUMIN/GLOB SERPL: 0.7 {RATIO} (ref 1.1–2.2)
ALP SERPL-CCNC: 327 U/L (ref 40–129)
ALT SERPL-CCNC: 29 U/L (ref 10–40)
ANION GAP SERPL CALCULATED.3IONS-SCNC: 14 MMOL/L (ref 3–16)
ANION GAP SERPL CALCULATED.3IONS-SCNC: 14 MMOL/L (ref 3–16)
ANION GAP SERPL CALCULATED.3IONS-SCNC: 15 MMOL/L (ref 3–16)
ANION GAP SERPL CALCULATED.3IONS-SCNC: 15 MMOL/L (ref 3–16)
ANION GAP SERPL CALCULATED.3IONS-SCNC: 17 MMOL/L (ref 3–16)
AST SERPL-CCNC: 150 U/L (ref 15–37)
BACTERIA UR CULT: ABNORMAL
BETA-HYDROXYBUTYRATE: 0.03 MMOL/L (ref 0–0.27)
BILIRUB SERPL-MCNC: 4 MG/DL (ref 0–1)
BUN SERPL-MCNC: 51 MG/DL (ref 7–20)
BUN SERPL-MCNC: 52 MG/DL (ref 7–20)
BUN SERPL-MCNC: 53 MG/DL (ref 7–20)
BUN SERPL-MCNC: 54 MG/DL (ref 7–20)
BUN SERPL-MCNC: 55 MG/DL (ref 7–20)
CALCIUM SERPL-MCNC: 8 MG/DL (ref 8.3–10.6)
CALCIUM SERPL-MCNC: 8.1 MG/DL (ref 8.3–10.6)
CALCIUM SERPL-MCNC: 8.2 MG/DL (ref 8.3–10.6)
CALCIUM SERPL-MCNC: 8.3 MG/DL (ref 8.3–10.6)
CALCIUM SERPL-MCNC: 8.3 MG/DL (ref 8.3–10.6)
CHLORIDE SERPL-SCNC: 100 MMOL/L (ref 99–110)
CHLORIDE SERPL-SCNC: 97 MMOL/L (ref 99–110)
CHLORIDE SERPL-SCNC: 97 MMOL/L (ref 99–110)
CHLORIDE SERPL-SCNC: 98 MMOL/L (ref 99–110)
CHLORIDE SERPL-SCNC: 98 MMOL/L (ref 99–110)
CO2 SERPL-SCNC: 13 MMOL/L (ref 21–32)
CO2 SERPL-SCNC: 13 MMOL/L (ref 21–32)
CO2 SERPL-SCNC: 14 MMOL/L (ref 21–32)
CREAT SERPL-MCNC: 2.7 MG/DL (ref 0.6–1.1)
CREAT SERPL-MCNC: 2.8 MG/DL (ref 0.6–1.1)
CREAT SERPL-MCNC: 2.9 MG/DL (ref 0.6–1.1)
CREAT SERPL-MCNC: 3.1 MG/DL (ref 0.6–1.1)
CREAT SERPL-MCNC: 3.3 MG/DL (ref 0.6–1.1)
DEPRECATED RDW RBC AUTO: 16.5 % (ref 12.4–15.4)
GFR SERPLBLD CREATININE-BSD FMLA CKD-EPI: 18 ML/MIN/{1.73_M2}
GFR SERPLBLD CREATININE-BSD FMLA CKD-EPI: 19 ML/MIN/{1.73_M2}
GFR SERPLBLD CREATININE-BSD FMLA CKD-EPI: 21 ML/MIN/{1.73_M2}
GFR SERPLBLD CREATININE-BSD FMLA CKD-EPI: 22 ML/MIN/{1.73_M2}
GFR SERPLBLD CREATININE-BSD FMLA CKD-EPI: 23 ML/MIN/{1.73_M2}
GLUCOSE BLD-MCNC: 110 MG/DL (ref 70–99)
GLUCOSE BLD-MCNC: 118 MG/DL (ref 70–99)
GLUCOSE BLD-MCNC: 126 MG/DL (ref 70–99)
GLUCOSE BLD-MCNC: 140 MG/DL (ref 70–99)
GLUCOSE BLD-MCNC: 140 MG/DL (ref 70–99)
GLUCOSE BLD-MCNC: 142 MG/DL (ref 70–99)
GLUCOSE BLD-MCNC: 147 MG/DL (ref 70–99)
GLUCOSE BLD-MCNC: 150 MG/DL (ref 70–99)
GLUCOSE BLD-MCNC: 153 MG/DL (ref 70–99)
GLUCOSE BLD-MCNC: 154 MG/DL (ref 70–99)
GLUCOSE BLD-MCNC: 165 MG/DL (ref 70–99)
GLUCOSE BLD-MCNC: 166 MG/DL (ref 70–99)
GLUCOSE BLD-MCNC: 182 MG/DL (ref 70–99)
GLUCOSE BLD-MCNC: 190 MG/DL (ref 70–99)
GLUCOSE BLD-MCNC: 190 MG/DL (ref 70–99)
GLUCOSE BLD-MCNC: 202 MG/DL (ref 70–99)
GLUCOSE BLD-MCNC: 204 MG/DL (ref 70–99)
GLUCOSE BLD-MCNC: 206 MG/DL (ref 70–99)
GLUCOSE BLD-MCNC: 215 MG/DL (ref 70–99)
GLUCOSE BLD-MCNC: 220 MG/DL (ref 70–99)
GLUCOSE BLD-MCNC: 223 MG/DL (ref 70–99)
GLUCOSE BLD-MCNC: 226 MG/DL (ref 70–99)
GLUCOSE BLD-MCNC: 240 MG/DL (ref 70–99)
GLUCOSE SERPL-MCNC: 123 MG/DL (ref 70–99)
GLUCOSE SERPL-MCNC: 137 MG/DL (ref 70–99)
GLUCOSE SERPL-MCNC: 194 MG/DL (ref 70–99)
GLUCOSE SERPL-MCNC: 205 MG/DL (ref 70–99)
GLUCOSE SERPL-MCNC: 219 MG/DL (ref 70–99)
HCT VFR BLD AUTO: 25.2 % (ref 36–48)
HGB BLD-MCNC: 8.2 G/DL (ref 12–16)
LACTATE BLDV-SCNC: 2.1 MMOL/L (ref 0.4–2)
MAGNESIUM SERPL-MCNC: 1.77 MG/DL (ref 1.8–2.4)
MAGNESIUM SERPL-MCNC: 1.81 MG/DL (ref 1.8–2.4)
MAGNESIUM SERPL-MCNC: 1.84 MG/DL (ref 1.8–2.4)
MAGNESIUM SERPL-MCNC: 1.9 MG/DL (ref 1.8–2.4)
MCH RBC QN AUTO: 26.2 PG (ref 26–34)
MCHC RBC AUTO-ENTMCNC: 32.5 G/DL (ref 31–36)
MCV RBC AUTO: 80.7 FL (ref 80–100)
ORGANISM: ABNORMAL
PERFORMED ON: ABNORMAL
PHOSPHATE SERPL-MCNC: 5.6 MG/DL (ref 2.5–4.9)
PHOSPHATE SERPL-MCNC: 5.6 MG/DL (ref 2.5–4.9)
PHOSPHATE SERPL-MCNC: 5.9 MG/DL (ref 2.5–4.9)
PHOSPHATE SERPL-MCNC: 5.9 MG/DL (ref 2.5–4.9)
PLATELET # BLD AUTO: 65 K/UL (ref 135–450)
PLATELET BLD QL SMEAR: ADEQUATE
PMV BLD AUTO: 8.4 FL (ref 5–10.5)
POTASSIUM SERPL-SCNC: 3.2 MMOL/L (ref 3.5–5.1)
POTASSIUM SERPL-SCNC: 3.6 MMOL/L (ref 3.5–5.1)
POTASSIUM SERPL-SCNC: 3.7 MMOL/L (ref 3.5–5.1)
POTASSIUM SERPL-SCNC: 3.7 MMOL/L (ref 3.5–5.1)
POTASSIUM SERPL-SCNC: 3.8 MMOL/L (ref 3.5–5.1)
PROCALCITONIN SERPL IA-MCNC: 20.5 NG/ML (ref 0–0.15)
PROT SERPL-MCNC: 5.2 G/DL (ref 6.4–8.2)
RBC # BLD AUTO: 3.13 M/UL (ref 4–5.2)
SODIUM SERPL-SCNC: 125 MMOL/L (ref 136–145)
SODIUM SERPL-SCNC: 126 MMOL/L (ref 136–145)
SODIUM SERPL-SCNC: 126 MMOL/L (ref 136–145)
SODIUM SERPL-SCNC: 127 MMOL/L (ref 136–145)
SODIUM SERPL-SCNC: 129 MMOL/L (ref 136–145)
WBC # BLD AUTO: 17.7 K/UL (ref 4–11)

## 2024-12-29 PROCEDURE — 6360000002 HC RX W HCPCS: Performed by: INTERNAL MEDICINE

## 2024-12-29 PROCEDURE — 94760 N-INVAS EAR/PLS OXIMETRY 1: CPT

## 2024-12-29 PROCEDURE — 85027 COMPLETE CBC AUTOMATED: CPT

## 2024-12-29 PROCEDURE — 82010 KETONE BODYS QUAN: CPT

## 2024-12-29 PROCEDURE — 80053 COMPREHEN METABOLIC PANEL: CPT

## 2024-12-29 PROCEDURE — 2000000000 HC ICU R&B

## 2024-12-29 PROCEDURE — 86701 HIV-1ANTIBODY: CPT

## 2024-12-29 PROCEDURE — 36592 COLLECT BLOOD FROM PICC: CPT

## 2024-12-29 PROCEDURE — 6370000000 HC RX 637 (ALT 250 FOR IP): Performed by: REGISTERED NURSE

## 2024-12-29 PROCEDURE — 99233 SBSQ HOSP IP/OBS HIGH 50: CPT | Performed by: INTERNAL MEDICINE

## 2024-12-29 PROCEDURE — 2580000003 HC RX 258: Performed by: INTERNAL MEDICINE

## 2024-12-29 PROCEDURE — 2500000003 HC RX 250 WO HCPCS: Performed by: INTERNAL MEDICINE

## 2024-12-29 PROCEDURE — 6360000002 HC RX W HCPCS: Performed by: REGISTERED NURSE

## 2024-12-29 PROCEDURE — 6370000000 HC RX 637 (ALT 250 FOR IP): Performed by: STUDENT IN AN ORGANIZED HEALTH CARE EDUCATION/TRAINING PROGRAM

## 2024-12-29 PROCEDURE — 87390 HIV-1 AG IA: CPT

## 2024-12-29 PROCEDURE — 86702 HIV-2 ANTIBODY: CPT

## 2024-12-29 PROCEDURE — 84145 PROCALCITONIN (PCT): CPT

## 2024-12-29 PROCEDURE — 6370000000 HC RX 637 (ALT 250 FOR IP): Performed by: NURSE PRACTITIONER

## 2024-12-29 PROCEDURE — 83605 ASSAY OF LACTIC ACID: CPT

## 2024-12-29 PROCEDURE — 6370000000 HC RX 637 (ALT 250 FOR IP): Performed by: INTERNAL MEDICINE

## 2024-12-29 PROCEDURE — 83735 ASSAY OF MAGNESIUM: CPT

## 2024-12-29 PROCEDURE — 84100 ASSAY OF PHOSPHORUS: CPT

## 2024-12-29 PROCEDURE — 2580000003 HC RX 258: Performed by: EMERGENCY MEDICINE

## 2024-12-29 RX ORDER — POTASSIUM CHLORIDE 1500 MG/1
40 TABLET, EXTENDED RELEASE ORAL ONCE
Status: COMPLETED | OUTPATIENT
Start: 2024-12-29 | End: 2024-12-29

## 2024-12-29 RX ORDER — HYDROCORTISONE SODIUM SUCCINATE 100 MG/2ML
25 INJECTION INTRAMUSCULAR; INTRAVENOUS EVERY 6 HOURS
Status: DISCONTINUED | OUTPATIENT
Start: 2024-12-29 | End: 2024-12-30

## 2024-12-29 RX ORDER — CITRIC ACID/SODIUM CITRATE 334-500MG
30 SOLUTION, ORAL ORAL 2 TIMES DAILY
Status: COMPLETED | OUTPATIENT
Start: 2024-12-29 | End: 2024-12-29

## 2024-12-29 RX ORDER — MAGNESIUM SULFATE IN WATER 40 MG/ML
2000 INJECTION, SOLUTION INTRAVENOUS ONCE
Status: COMPLETED | OUTPATIENT
Start: 2024-12-29 | End: 2024-12-30

## 2024-12-29 RX ORDER — METRONIDAZOLE 500 MG/100ML
500 INJECTION, SOLUTION INTRAVENOUS EVERY 8 HOURS
Status: COMPLETED | OUTPATIENT
Start: 2024-12-29 | End: 2025-01-05

## 2024-12-29 RX ADMIN — CYANOCOBALAMIN TAB 1000 MCG 1000 MCG: 1000 TAB at 08:45

## 2024-12-29 RX ADMIN — CLINDAMYCIN PHOSPHATE 600 MG: 600 INJECTION, SOLUTION INTRAVENOUS at 11:44

## 2024-12-29 RX ADMIN — PANTOPRAZOLE SODIUM 40 MG: 40 TABLET, DELAYED RELEASE ORAL at 08:45

## 2024-12-29 RX ADMIN — CLONAZEPAM 2 MG: 1 TABLET ORAL at 08:45

## 2024-12-29 RX ADMIN — GABAPENTIN 100 MG: 100 CAPSULE ORAL at 08:45

## 2024-12-29 RX ADMIN — DEXTROSE AND SODIUM CHLORIDE: 5; 450 INJECTION, SOLUTION INTRAVENOUS at 10:00

## 2024-12-29 RX ADMIN — MEROPENEM 500 MG: 500 INJECTION, POWDER, FOR SOLUTION INTRAVENOUS at 05:56

## 2024-12-29 RX ADMIN — SODIUM CITRATE AND CITRIC ACID MONOHYDRATE 30 ML: 334; 500 SOLUTION ORAL at 16:56

## 2024-12-29 RX ADMIN — HYDROCORTISONE SODIUM SUCCINATE 25 MG: 100 INJECTION, POWDER, FOR SOLUTION INTRAMUSCULAR; INTRAVENOUS at 16:29

## 2024-12-29 RX ADMIN — CLINDAMYCIN PHOSPHATE 600 MG: 600 INJECTION, SOLUTION INTRAVENOUS at 03:51

## 2024-12-29 RX ADMIN — MAGNESIUM SULFATE HEPTAHYDRATE 2000 MG: 2 INJECTION, SOLUTION INTRAVENOUS at 23:25

## 2024-12-29 RX ADMIN — LINEZOLID 600 MG: 600 INJECTION, SOLUTION INTRAVENOUS at 12:55

## 2024-12-29 RX ADMIN — HEPARIN SODIUM 5000 UNITS: 5000 INJECTION INTRAVENOUS; SUBCUTANEOUS at 14:02

## 2024-12-29 RX ADMIN — GABAPENTIN 100 MG: 100 CAPSULE ORAL at 14:02

## 2024-12-29 RX ADMIN — CEFTAROLINE FOSAMIL 300 MG: 600 POWDER, FOR SOLUTION INTRAVENOUS at 18:09

## 2024-12-29 RX ADMIN — HEPARIN SODIUM 5000 UNITS: 5000 INJECTION INTRAVENOUS; SUBCUTANEOUS at 05:59

## 2024-12-29 RX ADMIN — LINEZOLID 600 MG: 600 INJECTION, SOLUTION INTRAVENOUS at 00:19

## 2024-12-29 RX ADMIN — HYDROCORTISONE SODIUM SUCCINATE 50 MG: 100 INJECTION, POWDER, FOR SOLUTION INTRAMUSCULAR; INTRAVENOUS at 03:51

## 2024-12-29 RX ADMIN — SODIUM CHLORIDE, PRESERVATIVE FREE 10 ML: 5 INJECTION INTRAVENOUS at 21:07

## 2024-12-29 RX ADMIN — SODIUM CHLORIDE, PRESERVATIVE FREE 10 ML: 5 INJECTION INTRAVENOUS at 08:46

## 2024-12-29 RX ADMIN — LEVETIRACETAM 1000 MG: 500 TABLET, FILM COATED ORAL at 08:45

## 2024-12-29 RX ADMIN — SODIUM CITRATE AND CITRIC ACID MONOHYDRATE 30 ML: 334; 500 SOLUTION ORAL at 21:06

## 2024-12-29 RX ADMIN — HYDROCORTISONE SODIUM SUCCINATE 25 MG: 100 INJECTION, POWDER, FOR SOLUTION INTRAMUSCULAR; INTRAVENOUS at 09:56

## 2024-12-29 RX ADMIN — GABAPENTIN 100 MG: 100 CAPSULE ORAL at 21:06

## 2024-12-29 RX ADMIN — CLONAZEPAM 2 MG: 1 TABLET ORAL at 21:06

## 2024-12-29 RX ADMIN — LEVETIRACETAM 1000 MG: 500 TABLET, FILM COATED ORAL at 21:06

## 2024-12-29 RX ADMIN — VENLAFAXINE HYDROCHLORIDE 150 MG: 75 CAPSULE, EXTENDED RELEASE ORAL at 08:45

## 2024-12-29 RX ADMIN — METRONIDAZOLE 500 MG: 500 INJECTION, SOLUTION INTRAVENOUS at 16:29

## 2024-12-29 RX ADMIN — POTASSIUM CHLORIDE 40 MEQ: 1500 TABLET, EXTENDED RELEASE ORAL at 23:19

## 2024-12-29 RX ADMIN — DEXTROSE AND SODIUM CHLORIDE: 5; 450 INJECTION, SOLUTION INTRAVENOUS at 17:06

## 2024-12-29 RX ADMIN — HYDROCORTISONE SODIUM SUCCINATE 25 MG: 100 INJECTION, POWDER, FOR SOLUTION INTRAMUSCULAR; INTRAVENOUS at 21:06

## 2024-12-29 RX ADMIN — DEXTROSE AND SODIUM CHLORIDE: 5; 450 INJECTION, SOLUTION INTRAVENOUS at 02:46

## 2024-12-29 ASSESSMENT — PAIN SCALES - GENERAL: PAINLEVEL_OUTOF10: 0

## 2024-12-30 ENCOUNTER — HOSPITAL ENCOUNTER (INPATIENT)
Dept: VASCULAR LAB | Age: 34
Discharge: HOME | DRG: 720 | End: 2025-01-01
Attending: INTERNAL MEDICINE
Payer: COMMERCIAL

## 2024-12-30 PROBLEM — B96.20 E. COLI UTI (URINARY TRACT INFECTION): Status: ACTIVE | Noted: 2024-12-28

## 2024-12-30 PROBLEM — Z22.322 MRSA (METHICILLIN RESISTANT STAPHYLOCOCCUS AUREUS) COLONIZATION: Status: ACTIVE | Noted: 2024-12-30

## 2024-12-30 LAB
ALBUMIN SERPL-MCNC: 2.1 G/DL (ref 3.4–5)
ALBUMIN SERPL-MCNC: 2.1 G/DL (ref 3.4–5)
ALBUMIN/GLOB SERPL: 0.8 {RATIO} (ref 1.1–2.2)
ALP SERPL-CCNC: 285 U/L (ref 40–129)
ALP SERPL-CCNC: 291 U/L (ref 40–129)
ALT SERPL-CCNC: 21 U/L (ref 10–40)
ALT SERPL-CCNC: 21 U/L (ref 10–40)
ANION GAP SERPL CALCULATED.3IONS-SCNC: 12 MMOL/L (ref 3–16)
ANION GAP SERPL CALCULATED.3IONS-SCNC: 14 MMOL/L (ref 3–16)
ANION GAP SERPL CALCULATED.3IONS-SCNC: 15 MMOL/L (ref 3–16)
AST SERPL-CCNC: 39 U/L (ref 15–37)
AST SERPL-CCNC: 40 U/L (ref 15–37)
BASOPHILS # BLD: 0 K/UL (ref 0–0.2)
BASOPHILS NFR BLD: 0 %
BETA-HYDROXYBUTYRATE: 0.07 MMOL/L (ref 0–0.27)
BILIRUB DIRECT SERPL-MCNC: 1.3 MG/DL (ref 0–0.3)
BILIRUB DIRECT SERPL-MCNC: 1.3 MG/DL (ref 0–0.3)
BILIRUB INDIRECT SERPL-MCNC: 0.4 MG/DL (ref 0–1)
BILIRUB INDIRECT SERPL-MCNC: 0.4 MG/DL (ref 0–1)
BILIRUB SERPL-MCNC: 1.7 MG/DL (ref 0–1)
BILIRUB SERPL-MCNC: 1.7 MG/DL (ref 0–1)
BUN SERPL-MCNC: 46 MG/DL (ref 7–20)
BUN SERPL-MCNC: 49 MG/DL (ref 7–20)
BUN SERPL-MCNC: 50 MG/DL (ref 7–20)
CALCIUM SERPL-MCNC: 7.8 MG/DL (ref 8.3–10.6)
CALCIUM SERPL-MCNC: 8.3 MG/DL (ref 8.3–10.6)
CALCIUM SERPL-MCNC: 8.3 MG/DL (ref 8.3–10.6)
CHLORIDE SERPL-SCNC: 101 MMOL/L (ref 99–110)
CHLORIDE SERPL-SCNC: 102 MMOL/L (ref 99–110)
CHLORIDE SERPL-SCNC: 102 MMOL/L (ref 99–110)
CO2 SERPL-SCNC: 14 MMOL/L (ref 21–32)
CO2 SERPL-SCNC: 15 MMOL/L (ref 21–32)
CO2 SERPL-SCNC: 15 MMOL/L (ref 21–32)
CREAT SERPL-MCNC: 2.4 MG/DL (ref 0.6–1.1)
CREAT SERPL-MCNC: 2.5 MG/DL (ref 0.6–1.1)
CREAT SERPL-MCNC: 2.6 MG/DL (ref 0.6–1.1)
DEPRECATED RDW RBC AUTO: 16.7 % (ref 12.4–15.4)
EOSINOPHIL # BLD: 0 K/UL (ref 0–0.6)
EOSINOPHIL NFR BLD: 0 %
GFR SERPLBLD CREATININE-BSD FMLA CKD-EPI: 24 ML/MIN/{1.73_M2}
GFR SERPLBLD CREATININE-BSD FMLA CKD-EPI: 25 ML/MIN/{1.73_M2}
GFR SERPLBLD CREATININE-BSD FMLA CKD-EPI: 26 ML/MIN/{1.73_M2}
GLUCOSE BLD-MCNC: 129 MG/DL (ref 70–99)
GLUCOSE BLD-MCNC: 133 MG/DL (ref 70–99)
GLUCOSE BLD-MCNC: 137 MG/DL (ref 70–99)
GLUCOSE BLD-MCNC: 142 MG/DL (ref 70–99)
GLUCOSE BLD-MCNC: 164 MG/DL (ref 70–99)
GLUCOSE BLD-MCNC: 170 MG/DL (ref 70–99)
GLUCOSE BLD-MCNC: 173 MG/DL (ref 70–99)
GLUCOSE BLD-MCNC: 177 MG/DL (ref 70–99)
GLUCOSE BLD-MCNC: 182 MG/DL (ref 70–99)
GLUCOSE BLD-MCNC: 195 MG/DL (ref 70–99)
GLUCOSE BLD-MCNC: 200 MG/DL (ref 70–99)
GLUCOSE BLD-MCNC: 208 MG/DL (ref 70–99)
GLUCOSE BLD-MCNC: 208 MG/DL (ref 70–99)
GLUCOSE BLD-MCNC: 211 MG/DL (ref 70–99)
GLUCOSE BLD-MCNC: 214 MG/DL (ref 70–99)
GLUCOSE BLD-MCNC: 234 MG/DL (ref 70–99)
GLUCOSE BLD-MCNC: 249 MG/DL (ref 70–99)
GLUCOSE BLD-MCNC: 259 MG/DL (ref 70–99)
GLUCOSE SERPL-MCNC: 139 MG/DL (ref 70–99)
GLUCOSE SERPL-MCNC: 167 MG/DL (ref 70–99)
GLUCOSE SERPL-MCNC: 283 MG/DL (ref 70–99)
HCT VFR BLD AUTO: 27.5 % (ref 36–48)
HGB BLD-MCNC: 9.1 G/DL (ref 12–16)
HIV 1+2 AB+HIV1 P24 AG SERPL QL IA: NORMAL
HIV 2 AB SERPL QL IA: NORMAL
HIV1 AB SERPL QL IA: NORMAL
HIV1 P24 AG SERPL QL IA: NORMAL
HYPOCHROMIA BLD QL SMEAR: ABNORMAL
LACTATE BLDV-SCNC: 1 MMOL/L (ref 0.4–1.9)
LYMPHOCYTES # BLD: 0.6 K/UL (ref 1–5.1)
LYMPHOCYTES NFR BLD: 4 %
MAGNESIUM SERPL-MCNC: 1.92 MG/DL (ref 1.8–2.4)
MAGNESIUM SERPL-MCNC: 2.02 MG/DL (ref 1.8–2.4)
MAGNESIUM SERPL-MCNC: 2.1 MG/DL (ref 1.8–2.4)
MAGNESIUM SERPL-MCNC: 2.19 MG/DL (ref 1.8–2.4)
MAGNESIUM SERPL-MCNC: 2.35 MG/DL (ref 1.8–2.4)
MCH RBC QN AUTO: 26.3 PG (ref 26–34)
MCHC RBC AUTO-ENTMCNC: 33.1 G/DL (ref 31–36)
MCV RBC AUTO: 79.4 FL (ref 80–100)
MICROCYTES BLD QL SMEAR: ABNORMAL
MONOCYTES # BLD: 0.4 K/UL (ref 0–1.3)
MONOCYTES NFR BLD: 3 %
NEUTROPHILS # BLD: 13.5 K/UL (ref 1.7–7.7)
NEUTROPHILS NFR BLD: 88 %
NEUTS BAND NFR BLD MANUAL: 5 % (ref 0–7)
PERFORMED ON: ABNORMAL
PHOSPHATE SERPL-MCNC: 3.8 MG/DL (ref 2.5–4.9)
PHOSPHATE SERPL-MCNC: 4.1 MG/DL (ref 2.5–4.9)
PHOSPHATE SERPL-MCNC: 4.2 MG/DL (ref 2.5–4.9)
PHOSPHATE SERPL-MCNC: 5.1 MG/DL (ref 2.5–4.9)
PHOSPHATE SERPL-MCNC: 5.5 MG/DL (ref 2.5–4.9)
PLATELET # BLD AUTO: 69 K/UL (ref 135–450)
PLATELET BLD QL SMEAR: ABNORMAL
PMV BLD AUTO: 8.5 FL (ref 5–10.5)
POIKILOCYTOSIS BLD QL SMEAR: ABNORMAL
POTASSIUM SERPL-SCNC: 3.2 MMOL/L (ref 3.5–5.1)
POTASSIUM SERPL-SCNC: 3.2 MMOL/L (ref 3.5–5.1)
POTASSIUM SERPL-SCNC: 3.3 MMOL/L (ref 3.5–5.1)
POTASSIUM SERPL-SCNC: 3.5 MMOL/L (ref 3.5–5.1)
PROCALCITONIN SERPL IA-MCNC: 11 NG/ML (ref 0–0.15)
PROT SERPL-MCNC: 4.9 G/DL (ref 6.4–8.2)
PROT SERPL-MCNC: 4.9 G/DL (ref 6.4–8.2)
RBC # BLD AUTO: 3.46 M/UL (ref 4–5.2)
REASON FOR REJECTION: NORMAL
REJECTED TEST: NORMAL
SLIDE REVIEW: ABNORMAL
SODIUM SERPL-SCNC: 129 MMOL/L (ref 136–145)
SODIUM SERPL-SCNC: 130 MMOL/L (ref 136–145)
SODIUM SERPL-SCNC: 131 MMOL/L (ref 136–145)
TARGETS BLD QL SMEAR: ABNORMAL
TOXIC GRANULES BLD QL SMEAR: PRESENT
WBC # BLD AUTO: 14.5 K/UL (ref 4–11)

## 2024-12-30 PROCEDURE — 6360000002 HC RX W HCPCS: Performed by: INTERNAL MEDICINE

## 2024-12-30 PROCEDURE — 6370000000 HC RX 637 (ALT 250 FOR IP): Performed by: INTERNAL MEDICINE

## 2024-12-30 PROCEDURE — 2500000003 HC RX 250 WO HCPCS: Performed by: INTERNAL MEDICINE

## 2024-12-30 PROCEDURE — 6370000000 HC RX 637 (ALT 250 FOR IP): Performed by: STUDENT IN AN ORGANIZED HEALTH CARE EDUCATION/TRAINING PROGRAM

## 2024-12-30 PROCEDURE — 2580000003 HC RX 258: Performed by: STUDENT IN AN ORGANIZED HEALTH CARE EDUCATION/TRAINING PROGRAM

## 2024-12-30 PROCEDURE — 84145 PROCALCITONIN (PCT): CPT

## 2024-12-30 PROCEDURE — 2060000000 HC ICU INTERMEDIATE R&B

## 2024-12-30 PROCEDURE — 83605 ASSAY OF LACTIC ACID: CPT

## 2024-12-30 PROCEDURE — 84100 ASSAY OF PHOSPHORUS: CPT

## 2024-12-30 PROCEDURE — 82248 BILIRUBIN DIRECT: CPT

## 2024-12-30 PROCEDURE — 2580000003 HC RX 258: Performed by: INTERNAL MEDICINE

## 2024-12-30 PROCEDURE — 2580000003 HC RX 258: Performed by: EMERGENCY MEDICINE

## 2024-12-30 PROCEDURE — 94760 N-INVAS EAR/PLS OXIMETRY 1: CPT

## 2024-12-30 PROCEDURE — APPNB15 APP NON BILLABLE TIME 0-15 MINS: Performed by: NURSE PRACTITIONER

## 2024-12-30 PROCEDURE — 83735 ASSAY OF MAGNESIUM: CPT

## 2024-12-30 PROCEDURE — 82010 KETONE BODYS QUAN: CPT

## 2024-12-30 PROCEDURE — 99233 SBSQ HOSP IP/OBS HIGH 50: CPT | Performed by: INTERNAL MEDICINE

## 2024-12-30 PROCEDURE — 80053 COMPREHEN METABOLIC PANEL: CPT

## 2024-12-30 PROCEDURE — 36592 COLLECT BLOOD FROM PICC: CPT

## 2024-12-30 PROCEDURE — 99232 SBSQ HOSP IP/OBS MODERATE 35: CPT | Performed by: INTERNAL MEDICINE

## 2024-12-30 PROCEDURE — 6370000000 HC RX 637 (ALT 250 FOR IP): Performed by: NURSE PRACTITIONER

## 2024-12-30 PROCEDURE — 93975 VASCULAR STUDY: CPT

## 2024-12-30 PROCEDURE — 85025 COMPLETE CBC W/AUTO DIFF WBC: CPT

## 2024-12-30 PROCEDURE — 6370000000 HC RX 637 (ALT 250 FOR IP): Performed by: EMERGENCY MEDICINE

## 2024-12-30 RX ORDER — INSULIN LISPRO 100 [IU]/ML
0-4 INJECTION, SOLUTION INTRAVENOUS; SUBCUTANEOUS
Status: DISCONTINUED | OUTPATIENT
Start: 2024-12-30 | End: 2025-01-06 | Stop reason: HOSPADM

## 2024-12-30 RX ORDER — GLUCAGON 1 MG/ML
1 KIT INJECTION PRN
Status: DISCONTINUED | OUTPATIENT
Start: 2024-12-30 | End: 2025-01-06 | Stop reason: HOSPADM

## 2024-12-30 RX ORDER — SODIUM CHLORIDE, SODIUM LACTATE, POTASSIUM CHLORIDE, CALCIUM CHLORIDE 600; 310; 30; 20 MG/100ML; MG/100ML; MG/100ML; MG/100ML
INJECTION, SOLUTION INTRAVENOUS CONTINUOUS
Status: ACTIVE | OUTPATIENT
Start: 2024-12-30 | End: 2024-12-31

## 2024-12-30 RX ORDER — INSULIN LISPRO 100 [IU]/ML
0.08 INJECTION, SOLUTION INTRAVENOUS; SUBCUTANEOUS
Status: DISCONTINUED | OUTPATIENT
Start: 2024-12-30 | End: 2024-12-31

## 2024-12-30 RX ORDER — DEXTROSE MONOHYDRATE 100 MG/ML
INJECTION, SOLUTION INTRAVENOUS CONTINUOUS PRN
Status: DISCONTINUED | OUTPATIENT
Start: 2024-12-30 | End: 2025-01-06 | Stop reason: HOSPADM

## 2024-12-30 RX ORDER — POTASSIUM CHLORIDE 1500 MG/1
40 TABLET, EXTENDED RELEASE ORAL ONCE
Status: COMPLETED | OUTPATIENT
Start: 2024-12-30 | End: 2024-12-30

## 2024-12-30 RX ORDER — POTASSIUM CHLORIDE 1500 MG/1
20 TABLET, EXTENDED RELEASE ORAL ONCE
Status: COMPLETED | OUTPATIENT
Start: 2024-12-30 | End: 2024-12-30

## 2024-12-30 RX ORDER — CITRIC ACID/SODIUM CITRATE 334-500MG
30 SOLUTION, ORAL ORAL 2 TIMES DAILY
Status: COMPLETED | OUTPATIENT
Start: 2024-12-30 | End: 2024-12-30

## 2024-12-30 RX ORDER — INSULIN GLARGINE 100 [IU]/ML
23 INJECTION, SOLUTION SUBCUTANEOUS DAILY
Status: DISCONTINUED | OUTPATIENT
Start: 2024-12-30 | End: 2024-12-31

## 2024-12-30 RX ADMIN — SODIUM CHLORIDE, PRESERVATIVE FREE 10 ML: 5 INJECTION INTRAVENOUS at 21:00

## 2024-12-30 RX ADMIN — LEVETIRACETAM 1000 MG: 500 TABLET, FILM COATED ORAL at 21:00

## 2024-12-30 RX ADMIN — SODIUM CHLORIDE 5 ML/HR: 9 INJECTION, SOLUTION INTRAVENOUS at 16:53

## 2024-12-30 RX ADMIN — HYDROCORTISONE SODIUM SUCCINATE 25 MG: 100 INJECTION, POWDER, FOR SOLUTION INTRAMUSCULAR; INTRAVENOUS at 09:15

## 2024-12-30 RX ADMIN — HYDROCORTISONE SODIUM SUCCINATE 25 MG: 100 INJECTION, POWDER, FOR SOLUTION INTRAMUSCULAR; INTRAVENOUS at 04:04

## 2024-12-30 RX ADMIN — PANTOPRAZOLE SODIUM 40 MG: 40 TABLET, DELAYED RELEASE ORAL at 07:55

## 2024-12-30 RX ADMIN — HEPARIN SODIUM 5000 UNITS: 5000 INJECTION INTRAVENOUS; SUBCUTANEOUS at 06:05

## 2024-12-30 RX ADMIN — CYANOCOBALAMIN TAB 1000 MCG 1000 MCG: 1000 TAB at 09:05

## 2024-12-30 RX ADMIN — DEXTROSE AND SODIUM CHLORIDE: 5; 450 INJECTION, SOLUTION INTRAVENOUS at 00:02

## 2024-12-30 RX ADMIN — INSULIN LISPRO 2 UNITS: 100 INJECTION, SOLUTION INTRAVENOUS; SUBCUTANEOUS at 21:00

## 2024-12-30 RX ADMIN — CLONAZEPAM 2 MG: 1 TABLET ORAL at 08:59

## 2024-12-30 RX ADMIN — METRONIDAZOLE 500 MG: 500 INJECTION, SOLUTION INTRAVENOUS at 16:10

## 2024-12-30 RX ADMIN — VENLAFAXINE HYDROCHLORIDE 150 MG: 75 CAPSULE, EXTENDED RELEASE ORAL at 09:04

## 2024-12-30 RX ADMIN — LEVETIRACETAM 1000 MG: 500 TABLET, FILM COATED ORAL at 09:02

## 2024-12-30 RX ADMIN — GABAPENTIN 100 MG: 100 CAPSULE ORAL at 09:05

## 2024-12-30 RX ADMIN — SODIUM CHLORIDE, PRESERVATIVE FREE 10 ML: 5 INJECTION INTRAVENOUS at 09:08

## 2024-12-30 RX ADMIN — INSULIN LISPRO 1 UNITS: 100 INJECTION, SOLUTION INTRAVENOUS; SUBCUTANEOUS at 16:55

## 2024-12-30 RX ADMIN — HEPARIN SODIUM 5000 UNITS: 5000 INJECTION INTRAVENOUS; SUBCUTANEOUS at 21:01

## 2024-12-30 RX ADMIN — SODIUM CITRATE AND CITRIC ACID MONOHYDRATE 30 ML: 334; 500 SOLUTION ORAL at 20:59

## 2024-12-30 RX ADMIN — POTASSIUM CHLORIDE 20 MEQ: 1500 TABLET, EXTENDED RELEASE ORAL at 10:19

## 2024-12-30 RX ADMIN — INSULIN LISPRO 5 UNITS: 100 INJECTION, SOLUTION INTRAVENOUS; SUBCUTANEOUS at 13:13

## 2024-12-30 RX ADMIN — CEFTAROLINE FOSAMIL 300 MG: 600 POWDER, FOR SOLUTION INTRAVENOUS at 05:09

## 2024-12-30 RX ADMIN — DEXTROSE AND SODIUM CHLORIDE: 5; 450 INJECTION, SOLUTION INTRAVENOUS at 22:53

## 2024-12-30 RX ADMIN — SODIUM CHLORIDE, POTASSIUM CHLORIDE, SODIUM LACTATE AND CALCIUM CHLORIDE: 600; 310; 30; 20 INJECTION, SOLUTION INTRAVENOUS at 13:53

## 2024-12-30 RX ADMIN — DEXTROSE AND SODIUM CHLORIDE: 5; 450 INJECTION, SOLUTION INTRAVENOUS at 14:43

## 2024-12-30 RX ADMIN — INSULIN HUMAN 1.4 UNITS/HR: 1 INJECTION, SOLUTION INTRAVENOUS at 08:06

## 2024-12-30 RX ADMIN — CEFTAROLINE FOSAMIL 300 MG: 600 POWDER, FOR SOLUTION INTRAVENOUS at 16:53

## 2024-12-30 RX ADMIN — INSULIN LISPRO 1 UNITS: 100 INJECTION, SOLUTION INTRAVENOUS; SUBCUTANEOUS at 13:12

## 2024-12-30 RX ADMIN — GABAPENTIN 100 MG: 100 CAPSULE ORAL at 21:00

## 2024-12-30 RX ADMIN — INSULIN LISPRO 5 UNITS: 100 INJECTION, SOLUTION INTRAVENOUS; SUBCUTANEOUS at 16:56

## 2024-12-30 RX ADMIN — DEXTROSE AND SODIUM CHLORIDE: 5; 450 INJECTION, SOLUTION INTRAVENOUS at 06:56

## 2024-12-30 RX ADMIN — ACETAMINOPHEN 650 MG: 325 TABLET ORAL at 21:00

## 2024-12-30 RX ADMIN — HEPARIN SODIUM 5000 UNITS: 5000 INJECTION INTRAVENOUS; SUBCUTANEOUS at 13:36

## 2024-12-30 RX ADMIN — SODIUM CITRATE AND CITRIC ACID MONOHYDRATE 30 ML: 334; 500 SOLUTION ORAL at 12:22

## 2024-12-30 RX ADMIN — METRONIDAZOLE 500 MG: 500 INJECTION, SOLUTION INTRAVENOUS at 08:00

## 2024-12-30 RX ADMIN — GABAPENTIN 100 MG: 100 CAPSULE ORAL at 13:36

## 2024-12-30 RX ADMIN — POTASSIUM CHLORIDE 40 MEQ: 1500 TABLET, EXTENDED RELEASE ORAL at 16:57

## 2024-12-30 RX ADMIN — CLONAZEPAM 2 MG: 1 TABLET ORAL at 21:00

## 2024-12-30 RX ADMIN — INSULIN GLARGINE 23 UNITS: 100 INJECTION, SOLUTION SUBCUTANEOUS at 10:16

## 2024-12-30 RX ADMIN — METRONIDAZOLE 500 MG: 500 INJECTION, SOLUTION INTRAVENOUS at 00:02

## 2024-12-30 ASSESSMENT — PAIN SCALES - GENERAL
PAINLEVEL_OUTOF10: 0
PAINLEVEL_OUTOF10: 0
PAINLEVEL_OUTOF10: 3

## 2024-12-30 ASSESSMENT — PAIN DESCRIPTION - FREQUENCY: FREQUENCY: CONTINUOUS

## 2024-12-30 ASSESSMENT — PAIN SCALES - WONG BAKER: WONGBAKER_NUMERICALRESPONSE: NO HURT

## 2024-12-30 ASSESSMENT — PAIN DESCRIPTION - ONSET: ONSET: ON-GOING

## 2024-12-31 LAB
ANION GAP SERPL CALCULATED.3IONS-SCNC: 11 MMOL/L (ref 3–16)
ANION GAP SERPL CALCULATED.3IONS-SCNC: 12 MMOL/L (ref 3–16)
ANISOCYTOSIS BLD QL SMEAR: ABNORMAL
BASOPHILS # BLD: 0 K/UL (ref 0–0.2)
BASOPHILS NFR BLD: 0 %
BUN SERPL-MCNC: 38 MG/DL (ref 7–20)
BUN SERPL-MCNC: 43 MG/DL (ref 7–20)
CALCIUM SERPL-MCNC: 7.6 MG/DL (ref 8.3–10.6)
CALCIUM SERPL-MCNC: 8.8 MG/DL (ref 8.3–10.6)
CHLORIDE SERPL-SCNC: 102 MMOL/L (ref 99–110)
CHLORIDE SERPL-SCNC: 112 MMOL/L (ref 99–110)
CO2 SERPL-SCNC: 16 MMOL/L (ref 21–32)
CO2 SERPL-SCNC: 18 MMOL/L (ref 21–32)
CREAT SERPL-MCNC: 1.7 MG/DL (ref 0.6–1.1)
CREAT SERPL-MCNC: 2 MG/DL (ref 0.6–1.1)
DEPRECATED RDW RBC AUTO: 16.4 % (ref 12.4–15.4)
ECHO BSA: 1.47 M2
EOSINOPHIL # BLD: 0 K/UL (ref 0–0.6)
EOSINOPHIL NFR BLD: 0 %
EST. AVERAGE GLUCOSE BLD GHB EST-MCNC: 234.6 MG/DL
GFR SERPLBLD CREATININE-BSD FMLA CKD-EPI: 33 ML/MIN/{1.73_M2}
GFR SERPLBLD CREATININE-BSD FMLA CKD-EPI: 40 ML/MIN/{1.73_M2}
GLUCOSE BLD-MCNC: 109 MG/DL (ref 70–99)
GLUCOSE BLD-MCNC: 142 MG/DL (ref 70–99)
GLUCOSE BLD-MCNC: 142 MG/DL (ref 70–99)
GLUCOSE BLD-MCNC: 190 MG/DL (ref 70–99)
GLUCOSE BLD-MCNC: 225 MG/DL (ref 70–99)
GLUCOSE BLD-MCNC: 43 MG/DL (ref 70–99)
GLUCOSE BLD-MCNC: 50 MG/DL (ref 70–99)
GLUCOSE BLD-MCNC: 60 MG/DL (ref 70–99)
GLUCOSE BLD-MCNC: 67 MG/DL (ref 70–99)
GLUCOSE BLD-MCNC: 76 MG/DL (ref 70–99)
GLUCOSE BLD-MCNC: 78 MG/DL (ref 70–99)
GLUCOSE BLD-MCNC: 78 MG/DL (ref 70–99)
GLUCOSE BLD-MCNC: 86 MG/DL (ref 70–99)
GLUCOSE BLD-MCNC: 87 MG/DL (ref 70–99)
GLUCOSE SERPL-MCNC: 266 MG/DL (ref 70–99)
GLUCOSE SERPL-MCNC: 78 MG/DL (ref 70–99)
HBA1C MFR BLD: 9.8 %
HCT VFR BLD AUTO: 26.7 % (ref 36–48)
HGB BLD-MCNC: 8.9 G/DL (ref 12–16)
HYPOCHROMIA BLD QL SMEAR: ABNORMAL
LYMPHOCYTES # BLD: 1.2 K/UL (ref 1–5.1)
LYMPHOCYTES NFR BLD: 8 %
MAGNESIUM SERPL-MCNC: 1.82 MG/DL (ref 1.8–2.4)
MAGNESIUM SERPL-MCNC: 1.91 MG/DL (ref 1.8–2.4)
MCH RBC QN AUTO: 26.8 PG (ref 26–34)
MCHC RBC AUTO-ENTMCNC: 33.4 G/DL (ref 31–36)
MCV RBC AUTO: 80.2 FL (ref 80–100)
MONOCYTES # BLD: 1.6 K/UL (ref 0–1.3)
MONOCYTES NFR BLD: 11 %
MYELOCYTES NFR BLD MANUAL: 1 %
NEUTROPHILS # BLD: 12 K/UL (ref 1.7–7.7)
NEUTROPHILS NFR BLD: 80 %
PATH INTERP BLD-IMP: NO
PERFORMED ON: ABNORMAL
PERFORMED ON: NORMAL
PHOSPHATE SERPL-MCNC: 3.4 MG/DL (ref 2.5–4.9)
PHOSPHATE SERPL-MCNC: 3.6 MG/DL (ref 2.5–4.9)
PLATELET # BLD AUTO: 75 K/UL (ref 135–450)
PLATELET BLD QL SMEAR: ABNORMAL
PMV BLD AUTO: 9.5 FL (ref 5–10.5)
POTASSIUM SERPL-SCNC: 3.2 MMOL/L (ref 3.5–5.1)
POTASSIUM SERPL-SCNC: 3.5 MMOL/L (ref 3.5–5.1)
PROCALCITONIN SERPL IA-MCNC: 4.15 NG/ML (ref 0–0.15)
RBC # BLD AUTO: 3.32 M/UL (ref 4–5.2)
SODIUM SERPL-SCNC: 130 MMOL/L (ref 136–145)
SODIUM SERPL-SCNC: 141 MMOL/L (ref 136–145)
TOXIC GRANULES BLD QL SMEAR: PRESENT
WBC # BLD AUTO: 14.8 K/UL (ref 4–11)

## 2024-12-31 PROCEDURE — 2580000003 HC RX 258: Performed by: STUDENT IN AN ORGANIZED HEALTH CARE EDUCATION/TRAINING PROGRAM

## 2024-12-31 PROCEDURE — 2580000003 HC RX 258: Performed by: EMERGENCY MEDICINE

## 2024-12-31 PROCEDURE — 6370000000 HC RX 637 (ALT 250 FOR IP): Performed by: STUDENT IN AN ORGANIZED HEALTH CARE EDUCATION/TRAINING PROGRAM

## 2024-12-31 PROCEDURE — 6360000002 HC RX W HCPCS: Performed by: INTERNAL MEDICINE

## 2024-12-31 PROCEDURE — 83735 ASSAY OF MAGNESIUM: CPT

## 2024-12-31 PROCEDURE — 85025 COMPLETE CBC W/AUTO DIFF WBC: CPT

## 2024-12-31 PROCEDURE — 83036 HEMOGLOBIN GLYCOSYLATED A1C: CPT

## 2024-12-31 PROCEDURE — 84100 ASSAY OF PHOSPHORUS: CPT

## 2024-12-31 PROCEDURE — 2060000000 HC ICU INTERMEDIATE R&B

## 2024-12-31 PROCEDURE — 93975 VASCULAR STUDY: CPT | Performed by: SURGERY

## 2024-12-31 PROCEDURE — 84145 PROCALCITONIN (PCT): CPT

## 2024-12-31 PROCEDURE — 6370000000 HC RX 637 (ALT 250 FOR IP): Performed by: NURSE PRACTITIONER

## 2024-12-31 PROCEDURE — 6360000002 HC RX W HCPCS: Performed by: STUDENT IN AN ORGANIZED HEALTH CARE EDUCATION/TRAINING PROGRAM

## 2024-12-31 PROCEDURE — 2500000003 HC RX 250 WO HCPCS: Performed by: INTERNAL MEDICINE

## 2024-12-31 PROCEDURE — 36415 COLL VENOUS BLD VENIPUNCTURE: CPT

## 2024-12-31 PROCEDURE — 6370000000 HC RX 637 (ALT 250 FOR IP): Performed by: INTERNAL MEDICINE

## 2024-12-31 PROCEDURE — 80048 BASIC METABOLIC PNL TOTAL CA: CPT

## 2024-12-31 PROCEDURE — 2580000003 HC RX 258: Performed by: INTERNAL MEDICINE

## 2024-12-31 PROCEDURE — 99233 SBSQ HOSP IP/OBS HIGH 50: CPT | Performed by: INTERNAL MEDICINE

## 2024-12-31 RX ORDER — LORAZEPAM 2 MG/ML
1 INJECTION INTRAMUSCULAR EVERY 6 HOURS PRN
Status: DISCONTINUED | OUTPATIENT
Start: 2024-12-31 | End: 2025-01-06 | Stop reason: HOSPADM

## 2024-12-31 RX ORDER — INSULIN LISPRO 100 [IU]/ML
7 INJECTION, SOLUTION INTRAVENOUS; SUBCUTANEOUS
Status: DISCONTINUED | OUTPATIENT
Start: 2024-12-31 | End: 2024-12-31

## 2024-12-31 RX ORDER — INSULIN GLARGINE 100 [IU]/ML
11 INJECTION, SOLUTION SUBCUTANEOUS DAILY
Status: DISCONTINUED | OUTPATIENT
Start: 2025-01-01 | End: 2025-01-01

## 2024-12-31 RX ORDER — SODIUM CHLORIDE, SODIUM LACTATE, POTASSIUM CHLORIDE, CALCIUM CHLORIDE 600; 310; 30; 20 MG/100ML; MG/100ML; MG/100ML; MG/100ML
INJECTION, SOLUTION INTRAVENOUS CONTINUOUS
Status: DISCONTINUED | OUTPATIENT
Start: 2024-12-31 | End: 2025-01-01

## 2024-12-31 RX ORDER — POTASSIUM CHLORIDE 1500 MG/1
40 TABLET, EXTENDED RELEASE ORAL ONCE
Status: COMPLETED | OUTPATIENT
Start: 2024-12-31 | End: 2024-12-31

## 2024-12-31 RX ORDER — CITRIC ACID/SODIUM CITRATE 334-500MG
30 SOLUTION, ORAL ORAL 2 TIMES DAILY
Status: COMPLETED | OUTPATIENT
Start: 2024-12-31 | End: 2024-12-31

## 2024-12-31 RX ORDER — CALCIUM GLUCONATE 20 MG/ML
2000 INJECTION, SOLUTION INTRAVENOUS ONCE
Status: COMPLETED | OUTPATIENT
Start: 2024-12-31 | End: 2024-12-31

## 2024-12-31 RX ORDER — INSULIN LISPRO 100 [IU]/ML
5 INJECTION, SOLUTION INTRAVENOUS; SUBCUTANEOUS
Status: DISCONTINUED | OUTPATIENT
Start: 2024-12-31 | End: 2024-12-31

## 2024-12-31 RX ORDER — INSULIN LISPRO 100 [IU]/ML
3 INJECTION, SOLUTION INTRAVENOUS; SUBCUTANEOUS
Status: DISCONTINUED | OUTPATIENT
Start: 2025-01-01 | End: 2025-01-01

## 2024-12-31 RX ADMIN — CALCIUM GLUCONATE 2000 MG: 20 INJECTION, SOLUTION INTRAVENOUS at 13:07

## 2024-12-31 RX ADMIN — SODIUM CHLORIDE, PRESERVATIVE FREE 10 ML: 5 INJECTION INTRAVENOUS at 21:26

## 2024-12-31 RX ADMIN — DEXTROSE MONOHYDRATE 125 ML: 100 INJECTION, SOLUTION INTRAVENOUS at 20:36

## 2024-12-31 RX ADMIN — SODIUM CITRATE AND CITRIC ACID MONOHYDRATE 30 ML: 334; 500 SOLUTION ORAL at 12:05

## 2024-12-31 RX ADMIN — Medication 16 G: at 20:05

## 2024-12-31 RX ADMIN — DEXTROSE MONOHYDRATE 125 ML: 100 INJECTION, SOLUTION INTRAVENOUS at 17:02

## 2024-12-31 RX ADMIN — GABAPENTIN 100 MG: 100 CAPSULE ORAL at 21:26

## 2024-12-31 RX ADMIN — METRONIDAZOLE 500 MG: 500 INJECTION, SOLUTION INTRAVENOUS at 23:34

## 2024-12-31 RX ADMIN — CLONAZEPAM 2 MG: 1 TABLET ORAL at 08:58

## 2024-12-31 RX ADMIN — CEFTAROLINE FOSAMIL 300 MG: 600 POWDER, FOR SOLUTION INTRAVENOUS at 17:17

## 2024-12-31 RX ADMIN — CLONAZEPAM 2 MG: 1 TABLET ORAL at 21:26

## 2024-12-31 RX ADMIN — HEPARIN SODIUM 5000 UNITS: 5000 INJECTION INTRAVENOUS; SUBCUTANEOUS at 14:14

## 2024-12-31 RX ADMIN — VENLAFAXINE HYDROCHLORIDE 150 MG: 75 CAPSULE, EXTENDED RELEASE ORAL at 08:58

## 2024-12-31 RX ADMIN — GABAPENTIN 100 MG: 100 CAPSULE ORAL at 08:58

## 2024-12-31 RX ADMIN — INSULIN LISPRO 7 UNITS: 100 INJECTION, SOLUTION INTRAVENOUS; SUBCUTANEOUS at 09:56

## 2024-12-31 RX ADMIN — METRONIDAZOLE 500 MG: 500 INJECTION, SOLUTION INTRAVENOUS at 00:08

## 2024-12-31 RX ADMIN — Medication 30 ML: at 17:27

## 2024-12-31 RX ADMIN — METRONIDAZOLE 500 MG: 500 INJECTION, SOLUTION INTRAVENOUS at 15:35

## 2024-12-31 RX ADMIN — CYANOCOBALAMIN TAB 1000 MCG 1000 MCG: 1000 TAB at 08:58

## 2024-12-31 RX ADMIN — GABAPENTIN 100 MG: 100 CAPSULE ORAL at 14:13

## 2024-12-31 RX ADMIN — CEFTAROLINE FOSAMIL 300 MG: 600 POWDER, FOR SOLUTION INTRAVENOUS at 05:21

## 2024-12-31 RX ADMIN — SODIUM CHLORIDE, POTASSIUM CHLORIDE, SODIUM LACTATE AND CALCIUM CHLORIDE: 600; 310; 30; 20 INJECTION, SOLUTION INTRAVENOUS at 10:09

## 2024-12-31 RX ADMIN — SODIUM CHLORIDE, POTASSIUM CHLORIDE, SODIUM LACTATE AND CALCIUM CHLORIDE: 600; 310; 30; 20 INJECTION, SOLUTION INTRAVENOUS at 00:55

## 2024-12-31 RX ADMIN — PANTOPRAZOLE SODIUM 40 MG: 40 TABLET, DELAYED RELEASE ORAL at 05:21

## 2024-12-31 RX ADMIN — POTASSIUM CHLORIDE 40 MEQ: 1500 TABLET, EXTENDED RELEASE ORAL at 09:55

## 2024-12-31 RX ADMIN — LEVETIRACETAM 1000 MG: 500 TABLET, FILM COATED ORAL at 08:58

## 2024-12-31 RX ADMIN — METRONIDAZOLE 500 MG: 500 INJECTION, SOLUTION INTRAVENOUS at 08:57

## 2024-12-31 RX ADMIN — INSULIN GLARGINE 23 UNITS: 100 INJECTION, SOLUTION SUBCUTANEOUS at 09:04

## 2024-12-31 RX ADMIN — LEVETIRACETAM 1000 MG: 500 TABLET, FILM COATED ORAL at 21:26

## 2024-12-31 RX ADMIN — HEPARIN SODIUM 5000 UNITS: 5000 INJECTION INTRAVENOUS; SUBCUTANEOUS at 21:30

## 2024-12-31 RX ADMIN — INSULIN LISPRO 1 UNITS: 100 INJECTION, SOLUTION INTRAVENOUS; SUBCUTANEOUS at 09:57

## 2024-12-31 RX ADMIN — DEXTROSE AND SODIUM CHLORIDE: 5; 450 INJECTION, SOLUTION INTRAVENOUS at 05:38

## 2024-12-31 RX ADMIN — SODIUM CITRATE AND CITRIC ACID MONOHYDRATE 30 ML: 334; 500 SOLUTION ORAL at 21:26

## 2024-12-31 RX ADMIN — HEPARIN SODIUM 5000 UNITS: 5000 INJECTION INTRAVENOUS; SUBCUTANEOUS at 05:21

## 2024-12-31 ASSESSMENT — PAIN SCALES - GENERAL
PAINLEVEL_OUTOF10: 0

## 2024-12-31 NOTE — CARE COORDINATION
Case Management Assessment  Initial Evaluation    Date/Time of Evaluation: 12/31/2024 5:05 PM  Assessment Completed by: Sushma Akers RN    If patient is discharged prior to next notation, then this note serves as note for discharge by case management.    Patient Name: Maribeth Loera                   YOB: 1990  Diagnosis: Dehydration [E86.0]  Thrombocytopenia (HCC) [D69.6]  Toxic shock syndrome (TSS) (HCC) [A48.3]  PRANEETH (acute kidney injury) (HCC) [N17.9]  Septic shock (HCC) [A41.9, R65.21]  Urinary tract infection with hematuria, site unspecified [N39.0, R31.9]  Diabetic ketoacidosis without coma associated with type 1 diabetes mellitus (HCC) [E10.10]                   Date / Time: 12/27/2024  7:07 PM    Patient Admission Status: Inpatient   Readmission Risk (Low < 19, Mod (19-27), High > 27): Readmission Risk Score: 20.5    Current PCP: Kathy Davis, MARI - CNP  PCP verified by CM? Yes    Chart Reviewed: Yes      History Provided by: Patient  Patient Orientation: Alert and Oriented    Patient Cognition: Alert    Hospitalization in the last 30 days (Readmission):  No    If yes, Readmission Assessment in CM Navigator will be completed.    Advance Directives:      Code Status: Full Code   Patient's Primary Decision Maker is: Legal Next of Kin    Primary Decision Maker: AlicejazmineYonny - Spouse - [phone]    Secondary Decision Maker: Natalie Kerns - Parent - [phone]    Discharge Planning:    Patient lives with: Spouse/Significant Other, Children Type of Home: Apartment  Primary Care Giver: Self  Patient Support Systems include: Spouse/Significant Other, Parent, Children   Current Financial resources: Medicaid  Current community resources: None  Current services prior to admission: None, Durable Medical Equipment            Current DME: Glucometer            Type of Home Care services:  None    ADLS  Prior functional level: Independent in ADLs/IADLs  Current functional level: Independent in

## 2025-01-01 LAB
ANION GAP SERPL CALCULATED.3IONS-SCNC: 9 MMOL/L (ref 3–16)
BACTERIA BLD CULT ORG #2: NORMAL
BACTERIA BLD CULT: NORMAL
BASOPHILS # BLD: 0 K/UL (ref 0–0.2)
BASOPHILS NFR BLD: 0 %
BUN SERPL-MCNC: 34 MG/DL (ref 7–20)
CALCIUM SERPL-MCNC: 8.3 MG/DL (ref 8.3–10.6)
CHLORIDE SERPL-SCNC: 114 MMOL/L (ref 99–110)
CO2 SERPL-SCNC: 20 MMOL/L (ref 21–32)
CREAT SERPL-MCNC: 1.5 MG/DL (ref 0.6–1.1)
DEPRECATED RDW RBC AUTO: 16.2 % (ref 12.4–15.4)
DOHLE BOD BLD QL SMEAR: PRESENT
EOSINOPHIL # BLD: 0 K/UL (ref 0–0.6)
EOSINOPHIL NFR BLD: 0 %
GFR SERPLBLD CREATININE-BSD FMLA CKD-EPI: 46 ML/MIN/{1.73_M2}
GLUCOSE BLD-MCNC: 106 MG/DL (ref 70–99)
GLUCOSE BLD-MCNC: 114 MG/DL (ref 70–99)
GLUCOSE BLD-MCNC: 116 MG/DL (ref 70–99)
GLUCOSE BLD-MCNC: 145 MG/DL (ref 70–99)
GLUCOSE SERPL-MCNC: 124 MG/DL (ref 70–99)
HCT VFR BLD AUTO: 24.9 % (ref 36–48)
HGB BLD-MCNC: 8.1 G/DL (ref 12–16)
HYPOCHROMIA BLD QL SMEAR: ABNORMAL
LYMPHOCYTES # BLD: 2.9 K/UL (ref 1–5.1)
LYMPHOCYTES NFR BLD: 17 %
MAGNESIUM SERPL-MCNC: 1.58 MG/DL (ref 1.8–2.4)
MCH RBC QN AUTO: 26.3 PG (ref 26–34)
MCHC RBC AUTO-ENTMCNC: 32.7 G/DL (ref 31–36)
MCV RBC AUTO: 80.5 FL (ref 80–100)
METAMYELOCYTES NFR BLD MANUAL: 1 %
MICROCYTES BLD QL SMEAR: ABNORMAL
MONOCYTES # BLD: 0.5 K/UL (ref 0–1.3)
MONOCYTES NFR BLD: 3 %
MYELOCYTES NFR BLD MANUAL: 1 %
NEUTROPHILS # BLD: 13.8 K/UL (ref 1.7–7.7)
NEUTROPHILS NFR BLD: 72 %
NEUTS BAND NFR BLD MANUAL: 6 % (ref 0–7)
PERFORMED ON: ABNORMAL
PHOSPHATE SERPL-MCNC: 3.3 MG/DL (ref 2.5–4.9)
PLATELET # BLD AUTO: 84 K/UL (ref 135–450)
PLATELET BLD QL SMEAR: ABNORMAL
PMV BLD AUTO: 9.4 FL (ref 5–10.5)
POIKILOCYTOSIS BLD QL SMEAR: ABNORMAL
POTASSIUM SERPL-SCNC: 3.4 MMOL/L (ref 3.5–5.1)
RBC # BLD AUTO: 3.09 M/UL (ref 4–5.2)
SLIDE REVIEW: ABNORMAL
SODIUM SERPL-SCNC: 143 MMOL/L (ref 136–145)
STOMATOCYTES BLD QL SMEAR: ABNORMAL
TOXIC GRANULES BLD QL SMEAR: PRESENT
WBC # BLD AUTO: 17.2 K/UL (ref 4–11)

## 2025-01-01 PROCEDURE — 94760 N-INVAS EAR/PLS OXIMETRY 1: CPT

## 2025-01-01 PROCEDURE — 97162 PT EVAL MOD COMPLEX 30 MIN: CPT

## 2025-01-01 PROCEDURE — 6370000000 HC RX 637 (ALT 250 FOR IP): Performed by: STUDENT IN AN ORGANIZED HEALTH CARE EDUCATION/TRAINING PROGRAM

## 2025-01-01 PROCEDURE — 2500000003 HC RX 250 WO HCPCS: Performed by: INTERNAL MEDICINE

## 2025-01-01 PROCEDURE — 2580000003 HC RX 258: Performed by: INTERNAL MEDICINE

## 2025-01-01 PROCEDURE — 6360000002 HC RX W HCPCS: Performed by: INTERNAL MEDICINE

## 2025-01-01 PROCEDURE — 6370000000 HC RX 637 (ALT 250 FOR IP): Performed by: INTERNAL MEDICINE

## 2025-01-01 PROCEDURE — 6370000000 HC RX 637 (ALT 250 FOR IP): Performed by: NURSE PRACTITIONER

## 2025-01-01 PROCEDURE — 97530 THERAPEUTIC ACTIVITIES: CPT

## 2025-01-01 PROCEDURE — 1200000000 HC SEMI PRIVATE

## 2025-01-01 PROCEDURE — 99233 SBSQ HOSP IP/OBS HIGH 50: CPT | Performed by: INTERNAL MEDICINE

## 2025-01-01 PROCEDURE — 97166 OT EVAL MOD COMPLEX 45 MIN: CPT

## 2025-01-01 PROCEDURE — 85025 COMPLETE CBC W/AUTO DIFF WBC: CPT

## 2025-01-01 PROCEDURE — 80048 BASIC METABOLIC PNL TOTAL CA: CPT

## 2025-01-01 PROCEDURE — 6360000002 HC RX W HCPCS: Performed by: REGISTERED NURSE

## 2025-01-01 PROCEDURE — 36415 COLL VENOUS BLD VENIPUNCTURE: CPT

## 2025-01-01 PROCEDURE — 6360000002 HC RX W HCPCS: Performed by: STUDENT IN AN ORGANIZED HEALTH CARE EDUCATION/TRAINING PROGRAM

## 2025-01-01 PROCEDURE — 84100 ASSAY OF PHOSPHORUS: CPT

## 2025-01-01 PROCEDURE — 83735 ASSAY OF MAGNESIUM: CPT

## 2025-01-01 RX ORDER — LACTOBACILLUS RHAMNOSUS GG 10B CELL
1 CAPSULE ORAL 2 TIMES DAILY WITH MEALS
Status: DISCONTINUED | OUTPATIENT
Start: 2025-01-01 | End: 2025-01-06 | Stop reason: HOSPADM

## 2025-01-01 RX ORDER — INSULIN GLARGINE 100 [IU]/ML
10 INJECTION, SOLUTION SUBCUTANEOUS DAILY
Status: DISCONTINUED | OUTPATIENT
Start: 2025-01-01 | End: 2025-01-02

## 2025-01-01 RX ORDER — MAGNESIUM SULFATE IN WATER 40 MG/ML
2000 INJECTION, SOLUTION INTRAVENOUS ONCE
Status: COMPLETED | OUTPATIENT
Start: 2025-01-01 | End: 2025-01-01

## 2025-01-01 RX ORDER — VANCOMYCIN HYDROCHLORIDE 125 MG/1
250 CAPSULE ORAL 2 TIMES DAILY
Status: DISCONTINUED | OUTPATIENT
Start: 2025-01-01 | End: 2025-01-03

## 2025-01-01 RX ORDER — POTASSIUM CHLORIDE 1500 MG/1
40 TABLET, EXTENDED RELEASE ORAL ONCE
Status: COMPLETED | OUTPATIENT
Start: 2025-01-01 | End: 2025-01-01

## 2025-01-01 RX ADMIN — CLONAZEPAM 2 MG: 1 TABLET ORAL at 20:44

## 2025-01-01 RX ADMIN — CLONAZEPAM 2 MG: 1 TABLET ORAL at 08:43

## 2025-01-01 RX ADMIN — LEVETIRACETAM 1000 MG: 500 TABLET, FILM COATED ORAL at 20:44

## 2025-01-01 RX ADMIN — HEPARIN SODIUM 5000 UNITS: 5000 INJECTION INTRAVENOUS; SUBCUTANEOUS at 14:52

## 2025-01-01 RX ADMIN — VANCOMYCIN HYDROCHLORIDE 250 MG: 125 CAPSULE ORAL at 20:44

## 2025-01-01 RX ADMIN — SODIUM CHLORIDE, PRESERVATIVE FREE 10 ML: 5 INJECTION INTRAVENOUS at 08:45

## 2025-01-01 RX ADMIN — HEPARIN SODIUM 5000 UNITS: 5000 INJECTION INTRAVENOUS; SUBCUTANEOUS at 05:31

## 2025-01-01 RX ADMIN — Medication 1 CAPSULE: at 20:44

## 2025-01-01 RX ADMIN — CEFTAROLINE FOSAMIL 300 MG: 600 POWDER, FOR SOLUTION INTRAVENOUS at 17:43

## 2025-01-01 RX ADMIN — GABAPENTIN 100 MG: 100 CAPSULE ORAL at 08:43

## 2025-01-01 RX ADMIN — INSULIN GLARGINE 10 UNITS: 100 INJECTION, SOLUTION SUBCUTANEOUS at 08:48

## 2025-01-01 RX ADMIN — METRONIDAZOLE 500 MG: 500 INJECTION, SOLUTION INTRAVENOUS at 08:40

## 2025-01-01 RX ADMIN — GABAPENTIN 100 MG: 100 CAPSULE ORAL at 20:44

## 2025-01-01 RX ADMIN — MAGNESIUM SULFATE HEPTAHYDRATE 2000 MG: 40 INJECTION, SOLUTION INTRAVENOUS at 08:48

## 2025-01-01 RX ADMIN — METRONIDAZOLE 500 MG: 500 INJECTION, SOLUTION INTRAVENOUS at 16:04

## 2025-01-01 RX ADMIN — PANTOPRAZOLE SODIUM 40 MG: 40 TABLET, DELAYED RELEASE ORAL at 08:43

## 2025-01-01 RX ADMIN — POTASSIUM CHLORIDE 40 MEQ: 20 TABLET, EXTENDED RELEASE ORAL at 08:43

## 2025-01-01 RX ADMIN — VENLAFAXINE HYDROCHLORIDE 150 MG: 75 CAPSULE, EXTENDED RELEASE ORAL at 08:42

## 2025-01-01 RX ADMIN — HEPARIN SODIUM 5000 UNITS: 5000 INJECTION INTRAVENOUS; SUBCUTANEOUS at 22:17

## 2025-01-01 RX ADMIN — PROCHLORPERAZINE EDISYLATE 5 MG: 5 INJECTION INTRAMUSCULAR; INTRAVENOUS at 20:49

## 2025-01-01 RX ADMIN — LEVETIRACETAM 1000 MG: 500 TABLET, FILM COATED ORAL at 08:43

## 2025-01-01 RX ADMIN — GABAPENTIN 100 MG: 100 CAPSULE ORAL at 14:53

## 2025-01-01 RX ADMIN — CEFTAROLINE FOSAMIL 300 MG: 600 POWDER, FOR SOLUTION INTRAVENOUS at 05:26

## 2025-01-01 RX ADMIN — CYANOCOBALAMIN TAB 1000 MCG 1000 MCG: 1000 TAB at 08:43

## 2025-01-01 RX ADMIN — SODIUM CHLORIDE, PRESERVATIVE FREE 10 ML: 5 INJECTION INTRAVENOUS at 20:47

## 2025-01-01 ASSESSMENT — PAIN SCALES - GENERAL
PAINLEVEL_OUTOF10: 0

## 2025-01-01 ASSESSMENT — PAIN SCALES - WONG BAKER: WONGBAKER_NUMERICALRESPONSE: NO HURT

## 2025-01-02 ENCOUNTER — APPOINTMENT (OUTPATIENT)
Dept: CT IMAGING | Age: 35
DRG: 720 | End: 2025-01-02
Payer: COMMERCIAL

## 2025-01-02 PROBLEM — K85.90 ACUTE PANCREATITIS: Status: ACTIVE | Noted: 2025-01-02

## 2025-01-02 LAB
ABO + RH BLD: NORMAL
ALBUMIN SERPL-MCNC: 2.4 G/DL (ref 3.4–5)
ALP SERPL-CCNC: 233 U/L (ref 40–129)
ALT SERPL-CCNC: 12 U/L (ref 10–40)
ANION GAP SERPL CALCULATED.3IONS-SCNC: 12 MMOL/L (ref 3–16)
ANISOCYTOSIS BLD QL SMEAR: ABNORMAL
AST SERPL-CCNC: 22 U/L (ref 15–37)
BASOPHILS # BLD: 0 K/UL (ref 0–0.2)
BASOPHILS NFR BLD: 0 %
BILIRUB DIRECT SERPL-MCNC: 0.6 MG/DL (ref 0–0.3)
BILIRUB INDIRECT SERPL-MCNC: 0.3 MG/DL (ref 0–1)
BILIRUB SERPL-MCNC: 0.9 MG/DL (ref 0–1)
BLD GP AB SCN SERPL QL: NORMAL
BUN SERPL-MCNC: 23 MG/DL (ref 7–20)
CALCIUM SERPL-MCNC: 8.2 MG/DL (ref 8.3–10.6)
CHLORIDE SERPL-SCNC: 110 MMOL/L (ref 99–110)
CO2 SERPL-SCNC: 22 MMOL/L (ref 21–32)
CREAT SERPL-MCNC: 1.1 MG/DL (ref 0.6–1.1)
DEPRECATED RDW RBC AUTO: 15.9 % (ref 12.4–15.4)
EOSINOPHIL # BLD: 0 K/UL (ref 0–0.6)
EOSINOPHIL NFR BLD: 0 %
GFR SERPLBLD CREATININE-BSD FMLA CKD-EPI: 67 ML/MIN/{1.73_M2}
GLUCOSE BLD-MCNC: 107 MG/DL (ref 70–99)
GLUCOSE BLD-MCNC: 121 MG/DL (ref 70–99)
GLUCOSE BLD-MCNC: 142 MG/DL (ref 70–99)
GLUCOSE BLD-MCNC: 201 MG/DL (ref 70–99)
GLUCOSE SERPL-MCNC: 95 MG/DL (ref 70–99)
HCT VFR BLD AUTO: 22.5 % (ref 36–48)
HGB BLD-MCNC: 7.2 G/DL (ref 12–16)
LIPASE SERPL-CCNC: 68 U/L (ref 13–60)
LYMPHOCYTES # BLD: 2.7 K/UL (ref 1–5.1)
LYMPHOCYTES NFR BLD: 15 %
MAGNESIUM SERPL-MCNC: 1.77 MG/DL (ref 1.8–2.4)
MCH RBC QN AUTO: 26 PG (ref 26–34)
MCHC RBC AUTO-ENTMCNC: 32 G/DL (ref 31–36)
MCV RBC AUTO: 81.1 FL (ref 80–100)
MONOCYTES # BLD: 1.1 K/UL (ref 0–1.3)
MONOCYTES NFR BLD: 6 %
NEUTROPHILS # BLD: 14.4 K/UL (ref 1.7–7.7)
NEUTROPHILS NFR BLD: 78 %
NEUTS BAND NFR BLD MANUAL: 1 % (ref 0–7)
PERFORMED ON: ABNORMAL
PHOSPHATE SERPL-MCNC: 4.2 MG/DL (ref 2.5–4.9)
PLATELET # BLD AUTO: 152 K/UL (ref 135–450)
PMV BLD AUTO: 9.3 FL (ref 5–10.5)
POTASSIUM SERPL-SCNC: 3.2 MMOL/L (ref 3.5–5.1)
PROCALCITONIN SERPL IA-MCNC: 1.04 NG/ML (ref 0–0.15)
PROT SERPL-MCNC: 5.2 G/DL (ref 6.4–8.2)
RBC # BLD AUTO: 2.77 M/UL (ref 4–5.2)
REASON FOR REJECTION: NORMAL
REJECTED TEST: NORMAL
SODIUM SERPL-SCNC: 144 MMOL/L (ref 136–145)
WBC # BLD AUTO: 18.2 K/UL (ref 4–11)

## 2025-01-02 PROCEDURE — 2500000003 HC RX 250 WO HCPCS: Performed by: INTERNAL MEDICINE

## 2025-01-02 PROCEDURE — 86850 RBC ANTIBODY SCREEN: CPT

## 2025-01-02 PROCEDURE — 6370000000 HC RX 637 (ALT 250 FOR IP): Performed by: STUDENT IN AN ORGANIZED HEALTH CARE EDUCATION/TRAINING PROGRAM

## 2025-01-02 PROCEDURE — 83735 ASSAY OF MAGNESIUM: CPT

## 2025-01-02 PROCEDURE — 84145 PROCALCITONIN (PCT): CPT

## 2025-01-02 PROCEDURE — 99233 SBSQ HOSP IP/OBS HIGH 50: CPT | Performed by: INTERNAL MEDICINE

## 2025-01-02 PROCEDURE — 84100 ASSAY OF PHOSPHORUS: CPT

## 2025-01-02 PROCEDURE — 80048 BASIC METABOLIC PNL TOTAL CA: CPT

## 2025-01-02 PROCEDURE — 6360000002 HC RX W HCPCS: Performed by: INTERNAL MEDICINE

## 2025-01-02 PROCEDURE — 6360000002 HC RX W HCPCS: Performed by: REGISTERED NURSE

## 2025-01-02 PROCEDURE — 36415 COLL VENOUS BLD VENIPUNCTURE: CPT

## 2025-01-02 PROCEDURE — P9016 RBC LEUKOCYTES REDUCED: HCPCS

## 2025-01-02 PROCEDURE — 6370000000 HC RX 637 (ALT 250 FOR IP): Performed by: INTERNAL MEDICINE

## 2025-01-02 PROCEDURE — 94760 N-INVAS EAR/PLS OXIMETRY 1: CPT

## 2025-01-02 PROCEDURE — 97535 SELF CARE MNGMENT TRAINING: CPT

## 2025-01-02 PROCEDURE — 86901 BLOOD TYPING SEROLOGIC RH(D): CPT

## 2025-01-02 PROCEDURE — 2580000003 HC RX 258: Performed by: INTERNAL MEDICINE

## 2025-01-02 PROCEDURE — 86923 COMPATIBILITY TEST ELECTRIC: CPT

## 2025-01-02 PROCEDURE — 30233N1 TRANSFUSION OF NONAUTOLOGOUS RED BLOOD CELLS INTO PERIPHERAL VEIN, PERCUTANEOUS APPROACH: ICD-10-PCS | Performed by: STUDENT IN AN ORGANIZED HEALTH CARE EDUCATION/TRAINING PROGRAM

## 2025-01-02 PROCEDURE — 6360000002 HC RX W HCPCS: Performed by: STUDENT IN AN ORGANIZED HEALTH CARE EDUCATION/TRAINING PROGRAM

## 2025-01-02 PROCEDURE — 83690 ASSAY OF LIPASE: CPT

## 2025-01-02 PROCEDURE — 71250 CT THORAX DX C-: CPT

## 2025-01-02 PROCEDURE — 1200000000 HC SEMI PRIVATE

## 2025-01-02 PROCEDURE — 6370000000 HC RX 637 (ALT 250 FOR IP): Performed by: NURSE PRACTITIONER

## 2025-01-02 PROCEDURE — 80076 HEPATIC FUNCTION PANEL: CPT

## 2025-01-02 PROCEDURE — 85025 COMPLETE CBC W/AUTO DIFF WBC: CPT

## 2025-01-02 PROCEDURE — 97530 THERAPEUTIC ACTIVITIES: CPT

## 2025-01-02 PROCEDURE — 86900 BLOOD TYPING SEROLOGIC ABO: CPT

## 2025-01-02 RX ORDER — INSULIN GLARGINE 100 [IU]/ML
7 INJECTION, SOLUTION SUBCUTANEOUS DAILY
Status: DISCONTINUED | OUTPATIENT
Start: 2025-01-02 | End: 2025-01-05

## 2025-01-02 RX ORDER — MAGNESIUM SULFATE IN WATER 40 MG/ML
2000 INJECTION, SOLUTION INTRAVENOUS ONCE
Status: COMPLETED | OUTPATIENT
Start: 2025-01-02 | End: 2025-01-02

## 2025-01-02 RX ORDER — DEXTROSE MONOHYDRATE, SODIUM CHLORIDE, AND POTASSIUM CHLORIDE 50; 1.49; 4.5 G/1000ML; G/1000ML; G/1000ML
INJECTION, SOLUTION INTRAVENOUS CONTINUOUS
Status: DISCONTINUED | OUTPATIENT
Start: 2025-01-02 | End: 2025-01-03

## 2025-01-02 RX ORDER — GABAPENTIN 300 MG/1
300 CAPSULE ORAL 3 TIMES DAILY
Status: DISCONTINUED | OUTPATIENT
Start: 2025-01-02 | End: 2025-01-06 | Stop reason: HOSPADM

## 2025-01-02 RX ORDER — POTASSIUM CHLORIDE 1500 MG/1
40 TABLET, EXTENDED RELEASE ORAL EVERY 4 HOURS
Status: COMPLETED | OUTPATIENT
Start: 2025-01-02 | End: 2025-01-02

## 2025-01-02 RX ADMIN — LEVETIRACETAM 1000 MG: 500 TABLET, FILM COATED ORAL at 20:53

## 2025-01-02 RX ADMIN — CEFTAROLINE FOSAMIL 400 MG: 400 POWDER, FOR SOLUTION INTRAVENOUS at 05:42

## 2025-01-02 RX ADMIN — CYANOCOBALAMIN TAB 1000 MCG 1000 MCG: 1000 TAB at 08:52

## 2025-01-02 RX ADMIN — CEFTAROLINE FOSAMIL 600 MG: 600 POWDER, FOR SOLUTION INTRAVENOUS at 17:59

## 2025-01-02 RX ADMIN — POTASSIUM CHLORIDE 40 MEQ: 1500 TABLET, EXTENDED RELEASE ORAL at 08:57

## 2025-01-02 RX ADMIN — HEPARIN SODIUM 5000 UNITS: 5000 INJECTION INTRAVENOUS; SUBCUTANEOUS at 13:13

## 2025-01-02 RX ADMIN — POTASSIUM CHLORIDE 40 MEQ: 1500 TABLET, EXTENDED RELEASE ORAL at 13:13

## 2025-01-02 RX ADMIN — VENLAFAXINE HYDROCHLORIDE 150 MG: 75 CAPSULE, EXTENDED RELEASE ORAL at 08:52

## 2025-01-02 RX ADMIN — GABAPENTIN 300 MG: 300 CAPSULE ORAL at 20:53

## 2025-01-02 RX ADMIN — POTASSIUM CHLORIDE, DEXTROSE MONOHYDRATE AND SODIUM CHLORIDE: 150; 5; 450 INJECTION, SOLUTION INTRAVENOUS at 13:14

## 2025-01-02 RX ADMIN — PANTOPRAZOLE SODIUM 40 MG: 40 TABLET, DELAYED RELEASE ORAL at 05:43

## 2025-01-02 RX ADMIN — Medication 1 CAPSULE: at 08:52

## 2025-01-02 RX ADMIN — VANCOMYCIN HYDROCHLORIDE 250 MG: 125 CAPSULE ORAL at 20:53

## 2025-01-02 RX ADMIN — CLONAZEPAM 2 MG: 1 TABLET ORAL at 08:52

## 2025-01-02 RX ADMIN — METRONIDAZOLE 500 MG: 500 INJECTION, SOLUTION INTRAVENOUS at 09:15

## 2025-01-02 RX ADMIN — VANCOMYCIN HYDROCHLORIDE 250 MG: 125 CAPSULE ORAL at 08:51

## 2025-01-02 RX ADMIN — METRONIDAZOLE 500 MG: 500 INJECTION, SOLUTION INTRAVENOUS at 16:14

## 2025-01-02 RX ADMIN — GABAPENTIN 100 MG: 100 CAPSULE ORAL at 08:52

## 2025-01-02 RX ADMIN — PROCHLORPERAZINE EDISYLATE 5 MG: 5 INJECTION INTRAMUSCULAR; INTRAVENOUS at 16:09

## 2025-01-02 RX ADMIN — LEVETIRACETAM 1000 MG: 500 TABLET, FILM COATED ORAL at 08:52

## 2025-01-02 RX ADMIN — CLONAZEPAM 2 MG: 1 TABLET ORAL at 20:53

## 2025-01-02 RX ADMIN — PROCHLORPERAZINE EDISYLATE 5 MG: 5 INJECTION INTRAMUSCULAR; INTRAVENOUS at 05:50

## 2025-01-02 RX ADMIN — Medication 1 CAPSULE: at 16:09

## 2025-01-02 RX ADMIN — MAGNESIUM SULFATE HEPTAHYDRATE 2000 MG: 40 INJECTION, SOLUTION INTRAVENOUS at 09:15

## 2025-01-02 RX ADMIN — GABAPENTIN 300 MG: 300 CAPSULE ORAL at 13:13

## 2025-01-02 RX ADMIN — INSULIN GLARGINE 7 UNITS: 100 INJECTION, SOLUTION SUBCUTANEOUS at 08:52

## 2025-01-02 RX ADMIN — HEPARIN SODIUM 5000 UNITS: 5000 INJECTION INTRAVENOUS; SUBCUTANEOUS at 05:46

## 2025-01-02 RX ADMIN — HEPARIN SODIUM 5000 UNITS: 5000 INJECTION INTRAVENOUS; SUBCUTANEOUS at 22:14

## 2025-01-02 RX ADMIN — SODIUM CHLORIDE, PRESERVATIVE FREE 10 ML: 5 INJECTION INTRAVENOUS at 09:15

## 2025-01-02 RX ADMIN — METRONIDAZOLE 500 MG: 500 INJECTION, SOLUTION INTRAVENOUS at 00:23

## 2025-01-02 RX ADMIN — INSULIN LISPRO 1 UNITS: 100 INJECTION, SOLUTION INTRAVENOUS; SUBCUTANEOUS at 17:33

## 2025-01-02 ASSESSMENT — PAIN SCALES - GENERAL: PAINLEVEL_OUTOF10: 0

## 2025-01-02 ASSESSMENT — PAIN SCALES - WONG BAKER: WONGBAKER_NUMERICALRESPONSE: NO HURT

## 2025-01-03 LAB
ANION GAP SERPL CALCULATED.3IONS-SCNC: 13 MMOL/L (ref 3–16)
ANISOCYTOSIS BLD QL SMEAR: ABNORMAL
BASOPHILS # BLD: 0 K/UL (ref 0–0.2)
BASOPHILS NFR BLD: 0 %
BUN SERPL-MCNC: 12 MG/DL (ref 7–20)
CALCIUM SERPL-MCNC: 8.4 MG/DL (ref 8.3–10.6)
CHLORIDE SERPL-SCNC: 104 MMOL/L (ref 99–110)
CO2 SERPL-SCNC: 25 MMOL/L (ref 21–32)
CREAT SERPL-MCNC: 0.9 MG/DL (ref 0.6–1.1)
DEPRECATED RDW RBC AUTO: 16 % (ref 12.4–15.4)
DOHLE BOD BLD QL SMEAR: PRESENT
EOSINOPHIL # BLD: 0.2 K/UL (ref 0–0.6)
EOSINOPHIL NFR BLD: 1 %
GFR SERPLBLD CREATININE-BSD FMLA CKD-EPI: 85 ML/MIN/{1.73_M2}
GLUCOSE BLD-MCNC: 150 MG/DL (ref 70–99)
GLUCOSE BLD-MCNC: 218 MG/DL (ref 70–99)
GLUCOSE BLD-MCNC: 238 MG/DL (ref 70–99)
GLUCOSE BLD-MCNC: 273 MG/DL (ref 70–99)
GLUCOSE BLD-MCNC: 276 MG/DL (ref 70–99)
GLUCOSE SERPL-MCNC: 266 MG/DL (ref 70–99)
HCT VFR BLD AUTO: 22.9 % (ref 36–48)
HGB BLD-MCNC: 7.2 G/DL (ref 12–16)
LYMPHOCYTES # BLD: 0.9 K/UL (ref 1–5.1)
LYMPHOCYTES NFR BLD: 6 %
MAGNESIUM SERPL-MCNC: 1.52 MG/DL (ref 1.8–2.4)
MCH RBC QN AUTO: 26.1 PG (ref 26–34)
MCHC RBC AUTO-ENTMCNC: 31.6 G/DL (ref 31–36)
MCV RBC AUTO: 82.7 FL (ref 80–100)
MONOCYTES # BLD: 0.2 K/UL (ref 0–1.3)
MONOCYTES NFR BLD: 1 %
NEUTROPHILS # BLD: 14.2 K/UL (ref 1.7–7.7)
NEUTROPHILS NFR BLD: 89 %
NEUTS BAND NFR BLD MANUAL: 3 % (ref 0–7)
PERFORMED ON: ABNORMAL
PHOSPHATE SERPL-MCNC: 3.8 MG/DL (ref 2.5–4.9)
PLATELET # BLD AUTO: 242 K/UL (ref 135–450)
PLATELET BLD QL SMEAR: ABNORMAL
PMV BLD AUTO: 9.4 FL (ref 5–10.5)
POIKILOCYTOSIS BLD QL SMEAR: ABNORMAL
POLYCHROMASIA BLD QL SMEAR: ABNORMAL
POTASSIUM SERPL-SCNC: 4.1 MMOL/L (ref 3.5–5.1)
RBC # BLD AUTO: 2.77 M/UL (ref 4–5.2)
SLIDE REVIEW: ABNORMAL
SODIUM SERPL-SCNC: 142 MMOL/L (ref 136–145)
STOMATOCYTES BLD QL SMEAR: ABNORMAL
TARGETS BLD QL SMEAR: ABNORMAL
TOXIC GRANULES BLD QL SMEAR: PRESENT
WBC # BLD AUTO: 15.4 K/UL (ref 4–11)

## 2025-01-03 PROCEDURE — 97530 THERAPEUTIC ACTIVITIES: CPT

## 2025-01-03 PROCEDURE — 36415 COLL VENOUS BLD VENIPUNCTURE: CPT

## 2025-01-03 PROCEDURE — 6370000000 HC RX 637 (ALT 250 FOR IP): Performed by: INTERNAL MEDICINE

## 2025-01-03 PROCEDURE — 1200000000 HC SEMI PRIVATE

## 2025-01-03 PROCEDURE — 6360000002 HC RX W HCPCS: Performed by: INTERNAL MEDICINE

## 2025-01-03 PROCEDURE — 97535 SELF CARE MNGMENT TRAINING: CPT

## 2025-01-03 PROCEDURE — 85025 COMPLETE CBC W/AUTO DIFF WBC: CPT

## 2025-01-03 PROCEDURE — 6360000002 HC RX W HCPCS: Performed by: REGISTERED NURSE

## 2025-01-03 PROCEDURE — 99232 SBSQ HOSP IP/OBS MODERATE 35: CPT | Performed by: INTERNAL MEDICINE

## 2025-01-03 PROCEDURE — 84100 ASSAY OF PHOSPHORUS: CPT

## 2025-01-03 PROCEDURE — 2580000003 HC RX 258: Performed by: INTERNAL MEDICINE

## 2025-01-03 PROCEDURE — 94760 N-INVAS EAR/PLS OXIMETRY 1: CPT

## 2025-01-03 PROCEDURE — 2500000003 HC RX 250 WO HCPCS: Performed by: INTERNAL MEDICINE

## 2025-01-03 PROCEDURE — 83735 ASSAY OF MAGNESIUM: CPT

## 2025-01-03 PROCEDURE — 80048 BASIC METABOLIC PNL TOTAL CA: CPT

## 2025-01-03 PROCEDURE — 9990000010 HC NO CHARGE VISIT

## 2025-01-03 PROCEDURE — 6370000000 HC RX 637 (ALT 250 FOR IP): Performed by: STUDENT IN AN ORGANIZED HEALTH CARE EDUCATION/TRAINING PROGRAM

## 2025-01-03 RX ORDER — MAGNESIUM SULFATE IN WATER 40 MG/ML
2000 INJECTION, SOLUTION INTRAVENOUS ONCE
Status: COMPLETED | OUTPATIENT
Start: 2025-01-03 | End: 2025-01-03

## 2025-01-03 RX ORDER — VANCOMYCIN HYDROCHLORIDE 125 MG/1
250 CAPSULE ORAL 4 TIMES DAILY
Status: DISCONTINUED | OUTPATIENT
Start: 2025-01-03 | End: 2025-01-06

## 2025-01-03 RX ORDER — MUPIROCIN 20 MG/G
OINTMENT TOPICAL 2 TIMES DAILY
Status: DISCONTINUED | OUTPATIENT
Start: 2025-01-03 | End: 2025-01-06 | Stop reason: HOSPADM

## 2025-01-03 RX ADMIN — VANCOMYCIN HYDROCHLORIDE 250 MG: 125 CAPSULE ORAL at 08:40

## 2025-01-03 RX ADMIN — GABAPENTIN 300 MG: 300 CAPSULE ORAL at 08:40

## 2025-01-03 RX ADMIN — SODIUM CHLORIDE, PRESERVATIVE FREE 10 ML: 5 INJECTION INTRAVENOUS at 21:17

## 2025-01-03 RX ADMIN — INSULIN LISPRO 2 UNITS: 100 INJECTION, SOLUTION INTRAVENOUS; SUBCUTANEOUS at 16:34

## 2025-01-03 RX ADMIN — VANCOMYCIN HYDROCHLORIDE 250 MG: 125 CAPSULE ORAL at 16:34

## 2025-01-03 RX ADMIN — Medication 1 CAPSULE: at 16:34

## 2025-01-03 RX ADMIN — INSULIN GLARGINE 7 UNITS: 100 INJECTION, SOLUTION SUBCUTANEOUS at 08:40

## 2025-01-03 RX ADMIN — METRONIDAZOLE 500 MG: 500 INJECTION, SOLUTION INTRAVENOUS at 00:02

## 2025-01-03 RX ADMIN — GABAPENTIN 300 MG: 300 CAPSULE ORAL at 14:04

## 2025-01-03 RX ADMIN — VENLAFAXINE HYDROCHLORIDE 150 MG: 75 CAPSULE, EXTENDED RELEASE ORAL at 08:41

## 2025-01-03 RX ADMIN — MAGNESIUM SULFATE HEPTAHYDRATE 2000 MG: 40 INJECTION, SOLUTION INTRAVENOUS at 10:55

## 2025-01-03 RX ADMIN — CLONAZEPAM 2 MG: 1 TABLET ORAL at 08:40

## 2025-01-03 RX ADMIN — LEVETIRACETAM 1000 MG: 500 TABLET, FILM COATED ORAL at 08:41

## 2025-01-03 RX ADMIN — PANTOPRAZOLE SODIUM 40 MG: 40 TABLET, DELAYED RELEASE ORAL at 05:47

## 2025-01-03 RX ADMIN — METRONIDAZOLE 500 MG: 500 INJECTION, SOLUTION INTRAVENOUS at 16:42

## 2025-01-03 RX ADMIN — GABAPENTIN 300 MG: 300 CAPSULE ORAL at 21:16

## 2025-01-03 RX ADMIN — CLONAZEPAM 2 MG: 1 TABLET ORAL at 21:16

## 2025-01-03 RX ADMIN — METRONIDAZOLE 500 MG: 500 INJECTION, SOLUTION INTRAVENOUS at 08:50

## 2025-01-03 RX ADMIN — HEPARIN SODIUM 5000 UNITS: 5000 INJECTION INTRAVENOUS; SUBCUTANEOUS at 14:04

## 2025-01-03 RX ADMIN — INSULIN LISPRO 2 UNITS: 100 INJECTION, SOLUTION INTRAVENOUS; SUBCUTANEOUS at 08:40

## 2025-01-03 RX ADMIN — MUPIROCIN: 20 OINTMENT TOPICAL at 16:35

## 2025-01-03 RX ADMIN — INSULIN LISPRO 1 UNITS: 100 INJECTION, SOLUTION INTRAVENOUS; SUBCUTANEOUS at 21:39

## 2025-01-03 RX ADMIN — HEPARIN SODIUM 5000 UNITS: 5000 INJECTION INTRAVENOUS; SUBCUTANEOUS at 05:46

## 2025-01-03 RX ADMIN — PROCHLORPERAZINE EDISYLATE 5 MG: 5 INJECTION INTRAMUSCULAR; INTRAVENOUS at 08:39

## 2025-01-03 RX ADMIN — ERGOCALCIFEROL 50000 UNITS: 1.25 CAPSULE ORAL at 08:40

## 2025-01-03 RX ADMIN — MUPIROCIN: 20 OINTMENT TOPICAL at 21:17

## 2025-01-03 RX ADMIN — HEPARIN SODIUM 5000 UNITS: 5000 INJECTION INTRAVENOUS; SUBCUTANEOUS at 21:17

## 2025-01-03 RX ADMIN — CEFTAROLINE FOSAMIL 600 MG: 600 POWDER, FOR SOLUTION INTRAVENOUS at 05:44

## 2025-01-03 RX ADMIN — LEVETIRACETAM 1000 MG: 500 TABLET, FILM COATED ORAL at 21:16

## 2025-01-03 RX ADMIN — Medication 1 CAPSULE: at 08:40

## 2025-01-03 RX ADMIN — VANCOMYCIN HYDROCHLORIDE 250 MG: 125 CAPSULE ORAL at 21:16

## 2025-01-03 RX ADMIN — CYANOCOBALAMIN TAB 1000 MCG 1000 MCG: 1000 TAB at 08:40

## 2025-01-04 DIAGNOSIS — E11.42 DIABETIC PERIPHERAL NEUROPATHY (HCC): ICD-10-CM

## 2025-01-04 LAB
ALBUMIN SERPL-MCNC: 2.4 G/DL (ref 3.4–5)
ALP SERPL-CCNC: 218 U/L (ref 40–129)
ALT SERPL-CCNC: 7 U/L (ref 10–40)
ANION GAP SERPL CALCULATED.3IONS-SCNC: 10 MMOL/L (ref 3–16)
AST SERPL-CCNC: 14 U/L (ref 15–37)
BASOPHILS # BLD: 0.1 K/UL (ref 0–0.2)
BASOPHILS NFR BLD: 0.7 %
BILIRUB DIRECT SERPL-MCNC: 0.4 MG/DL (ref 0–0.3)
BILIRUB INDIRECT SERPL-MCNC: 0.2 MG/DL (ref 0–1)
BILIRUB SERPL-MCNC: 0.6 MG/DL (ref 0–1)
BUN SERPL-MCNC: 6 MG/DL (ref 7–20)
CALCIUM SERPL-MCNC: 8.4 MG/DL (ref 8.3–10.6)
CHLORIDE SERPL-SCNC: 104 MMOL/L (ref 99–110)
CHOLEST SERPL-MCNC: 154 MG/DL (ref 0–199)
CO2 SERPL-SCNC: 25 MMOL/L (ref 21–32)
CREAT SERPL-MCNC: 0.9 MG/DL (ref 0.6–1.1)
DEPRECATED RDW RBC AUTO: 15.9 % (ref 12.4–15.4)
EOSINOPHIL # BLD: 0.3 K/UL (ref 0–0.6)
EOSINOPHIL NFR BLD: 2.1 %
GFR SERPLBLD CREATININE-BSD FMLA CKD-EPI: 85 ML/MIN/{1.73_M2}
GLUCOSE BLD-MCNC: 132 MG/DL (ref 70–99)
GLUCOSE BLD-MCNC: 161 MG/DL (ref 70–99)
GLUCOSE BLD-MCNC: 201 MG/DL (ref 70–99)
GLUCOSE BLD-MCNC: 308 MG/DL (ref 70–99)
GLUCOSE SERPL-MCNC: 306 MG/DL (ref 70–99)
HCT VFR BLD AUTO: 22.1 % (ref 36–48)
HDLC SERPL-MCNC: 25 MG/DL (ref 40–60)
HGB BLD-MCNC: 7.1 G/DL (ref 12–16)
LDLC SERPL CALC-MCNC: 83 MG/DL
LIPASE SERPL-CCNC: 130 U/L (ref 13–60)
LYMPHOCYTES # BLD: 2.4 K/UL (ref 1–5.1)
LYMPHOCYTES NFR BLD: 19.6 %
MCH RBC QN AUTO: 26.8 PG (ref 26–34)
MCHC RBC AUTO-ENTMCNC: 32.3 G/DL (ref 31–36)
MCV RBC AUTO: 83 FL (ref 80–100)
MONOCYTES # BLD: 0.7 K/UL (ref 0–1.3)
MONOCYTES NFR BLD: 6.1 %
NEUTROPHILS # BLD: 8.8 K/UL (ref 1.7–7.7)
NEUTROPHILS NFR BLD: 71.5 %
PERFORMED ON: ABNORMAL
PLATELET # BLD AUTO: 300 K/UL (ref 135–450)
PMV BLD AUTO: 9.4 FL (ref 5–10.5)
POTASSIUM SERPL-SCNC: 3.8 MMOL/L (ref 3.5–5.1)
PROT SERPL-MCNC: 5.2 G/DL (ref 6.4–8.2)
RBC # BLD AUTO: 2.66 M/UL (ref 4–5.2)
SODIUM SERPL-SCNC: 139 MMOL/L (ref 136–145)
TRIGL SERPL-MCNC: 231 MG/DL (ref 0–150)
VLDLC SERPL CALC-MCNC: 46 MG/DL
WBC # BLD AUTO: 12.3 K/UL (ref 4–11)

## 2025-01-04 PROCEDURE — 85025 COMPLETE CBC W/AUTO DIFF WBC: CPT

## 2025-01-04 PROCEDURE — 94760 N-INVAS EAR/PLS OXIMETRY 1: CPT

## 2025-01-04 PROCEDURE — 6360000002 HC RX W HCPCS: Performed by: INTERNAL MEDICINE

## 2025-01-04 PROCEDURE — 6370000000 HC RX 637 (ALT 250 FOR IP): Performed by: STUDENT IN AN ORGANIZED HEALTH CARE EDUCATION/TRAINING PROGRAM

## 2025-01-04 PROCEDURE — 80048 BASIC METABOLIC PNL TOTAL CA: CPT

## 2025-01-04 PROCEDURE — 1200000000 HC SEMI PRIVATE

## 2025-01-04 PROCEDURE — 6370000000 HC RX 637 (ALT 250 FOR IP): Performed by: INTERNAL MEDICINE

## 2025-01-04 PROCEDURE — 83690 ASSAY OF LIPASE: CPT

## 2025-01-04 PROCEDURE — 2580000003 HC RX 258: Performed by: STUDENT IN AN ORGANIZED HEALTH CARE EDUCATION/TRAINING PROGRAM

## 2025-01-04 PROCEDURE — 80061 LIPID PANEL: CPT

## 2025-01-04 PROCEDURE — 80076 HEPATIC FUNCTION PANEL: CPT

## 2025-01-04 PROCEDURE — 36415 COLL VENOUS BLD VENIPUNCTURE: CPT

## 2025-01-04 PROCEDURE — 2500000003 HC RX 250 WO HCPCS: Performed by: INTERNAL MEDICINE

## 2025-01-04 RX ORDER — SODIUM CHLORIDE 9 MG/ML
INJECTION, SOLUTION INTRAVENOUS CONTINUOUS
Status: DISCONTINUED | OUTPATIENT
Start: 2025-01-04 | End: 2025-01-05

## 2025-01-04 RX ORDER — INSULIN LISPRO 100 [IU]/ML
2 INJECTION, SOLUTION INTRAVENOUS; SUBCUTANEOUS
Status: DISCONTINUED | OUTPATIENT
Start: 2025-01-04 | End: 2025-01-06 | Stop reason: HOSPADM

## 2025-01-04 RX ADMIN — METRONIDAZOLE 500 MG: 500 INJECTION, SOLUTION INTRAVENOUS at 17:19

## 2025-01-04 RX ADMIN — SODIUM CHLORIDE, PRESERVATIVE FREE 10 ML: 5 INJECTION INTRAVENOUS at 21:22

## 2025-01-04 RX ADMIN — MUPIROCIN: 20 OINTMENT TOPICAL at 09:39

## 2025-01-04 RX ADMIN — VANCOMYCIN HYDROCHLORIDE 250 MG: 125 CAPSULE ORAL at 13:42

## 2025-01-04 RX ADMIN — VANCOMYCIN HYDROCHLORIDE 250 MG: 125 CAPSULE ORAL at 21:21

## 2025-01-04 RX ADMIN — HEPARIN SODIUM 5000 UNITS: 5000 INJECTION INTRAVENOUS; SUBCUTANEOUS at 07:06

## 2025-01-04 RX ADMIN — VANCOMYCIN HYDROCHLORIDE 250 MG: 125 CAPSULE ORAL at 09:16

## 2025-01-04 RX ADMIN — INSULIN GLARGINE 7 UNITS: 100 INJECTION, SOLUTION SUBCUTANEOUS at 08:56

## 2025-01-04 RX ADMIN — GABAPENTIN 300 MG: 300 CAPSULE ORAL at 21:21

## 2025-01-04 RX ADMIN — INSULIN LISPRO 1 UNITS: 100 INJECTION, SOLUTION INTRAVENOUS; SUBCUTANEOUS at 13:35

## 2025-01-04 RX ADMIN — SODIUM CHLORIDE: 9 INJECTION, SOLUTION INTRAVENOUS at 09:10

## 2025-01-04 RX ADMIN — CLONAZEPAM 2 MG: 1 TABLET ORAL at 21:21

## 2025-01-04 RX ADMIN — LEVETIRACETAM 1000 MG: 500 TABLET, FILM COATED ORAL at 21:21

## 2025-01-04 RX ADMIN — CYANOCOBALAMIN TAB 1000 MCG 1000 MCG: 1000 TAB at 09:16

## 2025-01-04 RX ADMIN — GABAPENTIN 300 MG: 300 CAPSULE ORAL at 13:42

## 2025-01-04 RX ADMIN — LEVETIRACETAM 1000 MG: 500 TABLET, FILM COATED ORAL at 09:16

## 2025-01-04 RX ADMIN — MUPIROCIN: 20 OINTMENT TOPICAL at 21:22

## 2025-01-04 RX ADMIN — INSULIN LISPRO 2 UNITS: 100 INJECTION, SOLUTION INTRAVENOUS; SUBCUTANEOUS at 13:35

## 2025-01-04 RX ADMIN — HEPARIN SODIUM 5000 UNITS: 5000 INJECTION INTRAVENOUS; SUBCUTANEOUS at 21:21

## 2025-01-04 RX ADMIN — VENLAFAXINE HYDROCHLORIDE 150 MG: 75 CAPSULE, EXTENDED RELEASE ORAL at 09:17

## 2025-01-04 RX ADMIN — SODIUM CHLORIDE, PRESERVATIVE FREE 10 ML: 5 INJECTION INTRAVENOUS at 09:04

## 2025-01-04 RX ADMIN — CLONAZEPAM 2 MG: 1 TABLET ORAL at 09:15

## 2025-01-04 RX ADMIN — HEPARIN SODIUM 5000 UNITS: 5000 INJECTION INTRAVENOUS; SUBCUTANEOUS at 13:37

## 2025-01-04 RX ADMIN — Medication 1 CAPSULE: at 09:16

## 2025-01-04 RX ADMIN — METRONIDAZOLE 500 MG: 500 INJECTION, SOLUTION INTRAVENOUS at 00:58

## 2025-01-04 RX ADMIN — METRONIDAZOLE 500 MG: 500 INJECTION, SOLUTION INTRAVENOUS at 09:43

## 2025-01-04 RX ADMIN — Medication 1 CAPSULE: at 17:10

## 2025-01-04 RX ADMIN — PANTOPRAZOLE SODIUM 40 MG: 40 TABLET, DELAYED RELEASE ORAL at 07:06

## 2025-01-04 RX ADMIN — INSULIN LISPRO 2 UNITS: 100 INJECTION, SOLUTION INTRAVENOUS; SUBCUTANEOUS at 17:08

## 2025-01-04 RX ADMIN — INSULIN LISPRO 2 UNITS: 100 INJECTION, SOLUTION INTRAVENOUS; SUBCUTANEOUS at 08:55

## 2025-01-04 RX ADMIN — VANCOMYCIN HYDROCHLORIDE 250 MG: 125 CAPSULE ORAL at 17:10

## 2025-01-04 RX ADMIN — GABAPENTIN 300 MG: 300 CAPSULE ORAL at 09:16

## 2025-01-04 RX ADMIN — INSULIN LISPRO 3 UNITS: 100 INJECTION, SOLUTION INTRAVENOUS; SUBCUTANEOUS at 08:56

## 2025-01-04 ASSESSMENT — PAIN SCALES - GENERAL: PAINLEVEL_OUTOF10: 0

## 2025-01-04 NOTE — CONSULTS
Inpatient Note    CC: weakness, N/V  Summary:   Maribeth Loera is being seen by nephrology for Acute kidney injury (PRANEETH). She is a 34 y.o. female with a PMH significant for T1DM, partial complex seizures, hx of MRSA bacteremia who presented to the ED 12/27/2024 with 2 weeks of N/V diarrhea, abdominal pain. She was noted to be hypotensive and tachycardic in the ED requiring levo. BG was 437, creatinine was 4.7 and bicarb was 13 with an elevated anion gap.    Interval History  Seen at bedside  /70, off levo  500 mL urine yesterday  On insulin infusion for DKA  Cr improving, down to 3.9 today from 4.7  Acidosis is barely improved.  K 4.1    Plan:   - continue aggressive IVF and insulin infusion per DKA protocol. Will hold off on starting bicarb infusion since addressing DKA will help address the acidosis.  - BP improving. Continue hemodynamic support.  - no acute indication for RRT at this time.  - blood and urine cx pending. Continue empiric abx  - check urine Na and urine osm, and serum osm    Assessment:   PRANEETH:  - baseline Cr < 0.5  - Cr on admission 4.7  - associated with hypotension, septic shock, DKA  - UA with large leukocytes and blood and bacteria    Acute metabolic acidosis:  - bicarb 13 on admission, BHB 4.03. lactate normal  - acidosis 2/2 PRANEETH and DKA    Hyponatremia:  - Na 121 on admission but glucose was 430.  - Na still at 129 with better BG control.    Septic shock:  - concern for TSST.        Jennifer Khalil MD  [facility]  Office: [phone]          PE:   Vitals:    12/28/24 1215   BP: 109/70   Pulse: (!) 112   Resp: 10   Temp: 98.7 °F (37.1 °C)   SpO2: 94%       General appearance: in NAD  Respiratory: Respiratory effort appears normal, bilateral equal chest rise, no wheeze, no crackles  Cardiovascular: Ausculation shows RRR no edema  Abdomen: No visible mass or tenderness, non distended.           
    Infectious Diseases   Consult Note        Admission Date: 12/27/2024  Hospital Day: Hospital Day: 2   Attending: Clarence Rosen MD  Date of service: 12/28/24     Reason for admission: Dehydration [E86.0]  Thrombocytopenia (HCC) [D69.6]  Toxic shock syndrome (TSS) ([facility]) [A48.3]  PRANEETH (acute kidney injury) ([facility]) [N17.9]  Septic shock ([facility]) [A41.9, R65.21]  Urinary tract infection with hematuria, site unspecified [N39.0, R31.9]  Diabetic ketoacidosis without coma associated with type 1 diabetes mellitus (HCC) [E10.10]    Chief complaint/ Reason for consult: Septic shock      Microbiology:        I have reviewed allavailable micro lab data and cultures    Results       Procedure Component Value Units Date/Time    Respiratory Panel, Molecular, with COVID-19 (Restricted: peds pts or suitable admitted adults) [[id number]]     Order Status: No result Specimen: Nasopharyngeal     MRSA DNA Probe, Nasal [[id number]] Collected: 12/28/24 0827    Order Status: Sent Specimen: Nares Updated: 12/28/24 0951    Blood Culture 2 [[id number]] Collected: 12/28/24 0722    Order Status: Sent Specimen: Blood Updated: 12/28/24 0738    Blood Culture 1 [[id number]] Resulted: 12/27/24 2212    Order Status: Sent Specimen: Blood Updated: 12/27/24 2220    Culture, Urine [[id number]] Collected: 12/27/24 2010    Order Status: No result Updated: 12/27/24 2043    Rapid influenza A/B antigens [[id number]] Collected: 12/27/24 1957    Order Status: Completed Specimen: Nasopharyngeal Updated: 12/27/24 2037     Influenza A, RUSSELL Not Detected     Influenza B, RUSSELL Not Detected    COVID-19, Rapid [[id number]] Collected: 12/27/24 1956    Order Status: Completed Specimen: Nasopharyngeal Swab Updated: 12/27/24 2023     SARS-CoV-2, NAAT Not Detected             No results found for the last 90 days.         Antibiotics and immunizations:       Current antibiotics: All antibiotics and their doses were reviewed by me    Recent Abx Admin                     
Clinical Pharmacy Note  Vancomycin Consult    Pharmacy consult received for one-time dose of vancomycin in the Emergency Department per Dr. Saucedo.    Ht Readings from Last 1 Encounters:   12/27/24 1.473 m (4' 10\")        Wt Readings from Last 1 Encounters:   12/27/24 53.5 kg (117 lb 15.1 oz)         Assessment/Plan:  Vancomycin 1000 mg x 1 in ED.  If vancomycin is to continue on admission and pharmacy is to manage dosing, please re-consult with admission orders.    Brando Cloud, PharmD  
Consult received.  Labs and notes were reviewed.  Case was discussed with the staff.    Full note to follow.    Thanks  Nephrology  [address] [place] [address] [id number]  [place] [address]  Office: [phone]  Cell: [phone]  Fax: [phone]    
See progress note from today   
dextrose 5 % and 0.45 % NaCl 150 mL/hr at 12/28/24 0928    sodium chloride Stopped (12/28/24 0544)       PRN Meds:  sodium chloride flush, sodium chloride, acetaminophen **OR** acetaminophen, dextrose bolus **OR** dextrose bolus, dextrose 5 % and 0.45 % NaCl    ALLERGIES:  Patient has No Known Allergies.    REVIEW OF SYSTEMS:  Constitutional: Negative for fever or chills  HENT: Negative for sore throat  Eyes: Negative for redness   Respiratory:SOB  Cardiovascular: Negative for chest pain  Gastrointestinal: Ab pain, diarrhea   Genitourinary: Negative for hematuria, negative for dysuria  Musculoskeletal: Negative for arthralgias   Skin: Negative for rash  Neurological: Negative for syncope  Hematological: Negative for adenopathy  Extremities:  Negative for swelling    PHYSICAL EXAM:  Blood pressure 113/72, pulse (!) 114, temperature 99.1 °F (37.3 °C), resp. rate (!) 9, height 1.473 m (4' 10\"), weight 52.9 kg (116 lb 10 oz), last menstrual period 12/24/2024, SpO2 92%, not currently breastfeeding.'  Gen: Confused, Child like   Eyes: PERRL. No sclera icterus. No conjunctival injection.   ENT: No discharge. Pharynx clear.   Neck: Trachea midline. No obvious mass.    Resp: No accessory muscle use. No crackles. No wheezes. No rhonchi.   CV: Regular rate. Regular rhythm. No murmur or rub.  GI: Non-tender. Non-distended. No hernia. BS present.  Skin: Pale  Lymph: No cervical LAD. No supraclavicular LAD.   M/S: No cyanosis. No joint deformity.   Neuro: Confused  EXT:   no edema, no clubbing    LABS:  CBC:   Recent Labs     12/27/24 2054   WBC 24.2*   HGB 10.5*   HCT 33.2*   MCV 82.9   *     BMP:   Recent Labs     12/27/24 2054 12/28/24  0418 12/28/24  0721   * 130* 130*   K 5.0 3.8 4.0   CL 84* 96* 98*   CO2 13* 12* 15*   PHOS  --  6.9* 6.1*   BUN 66* 64* 62*   CREATININE 4.7* 4.1* 4.0*     LIVER PROFILE:   Recent Labs     12/27/24 2054 12/28/24  0721   AST 50*  --    ALT 20  --    LIPASE  --  17.0   BILITOT 
  POCT Glucose    Collection Time: 12/30/24  5:06 AM   Result Value Ref Range    POC Glucose 208 (H) 70 - 99 mg/dl    Performed on ACCU-CHEK    Comprehensive Metabolic Panel w/ Reflex to MG    Collection Time: 12/30/24  6:01 AM   Result Value Ref Range    Sodium 131 (L) 136 - 145 mmol/L    Potassium reflex Magnesium 3.2 (L) 3.5 - 5.1 mmol/L    Chloride 102 99 - 110 mmol/L    CO2 15 (L) 21 - 32 mmol/L    Anion Gap 14 3 - 16    Glucose 167 (H) 70 - 99 mg/dL    BUN 49 (H) 7 - 20 mg/dL    Creatinine 2.5 (H) 0.6 - 1.1 mg/dL    Est, Glom Filt Rate 25 (A) >60    Calcium 8.3 8.3 - 10.6 mg/dL    Total Protein 4.9 (L) 6.4 - 8.2 g/dL    Albumin 2.1 (L) 3.4 - 5.0 g/dL    Albumin/Globulin Ratio 0.8 (L) 1.1 - 2.2    Total Bilirubin 1.7 (H) 0.0 - 1.0 mg/dL    Alkaline Phosphatase 285 (H) 40 - 129 U/L    ALT 21 10 - 40 U/L    AST 40 (H) 15 - 37 U/L   Procalcitonin    Collection Time: 12/30/24  6:01 AM   Result Value Ref Range    Procalcitonin 11.00 (H) 0.00 - 0.15 ng/mL   Basic Metabolic Panel    Collection Time: 12/30/24  6:01 AM   Result Value Ref Range    Potassium 3.2 (L) 3.5 - 5.1 mmol/L   Magnesium    Collection Time: 12/30/24  6:01 AM   Result Value Ref Range    Magnesium 2.35 1.80 - 2.40 mg/dL   Phosphorus    Collection Time: 12/30/24  6:01 AM   Result Value Ref Range    Phosphorus 5.1 (H) 2.5 - 4.9 mg/dL   Hepatic Function Panel    Collection Time: 12/30/24  6:01 AM   Result Value Ref Range    Total Protein 4.9 (L) 6.4 - 8.2 g/dL    Albumin 2.1 (L) 3.4 - 5.0 g/dL    Alkaline Phosphatase 291 (H) 40 - 129 U/L    ALT 21 10 - 40 U/L    AST 39 (H) 15 - 37 U/L    Total Bilirubin 1.7 (H) 0.0 - 1.0 mg/dL    Bilirubin, Direct 1.3 (H) 0.0 - 0.3 mg/dL    Bilirubin, Indirect 0.4 0.0 - 1.0 mg/dL   Bilirubin Total Direct & Indirect    Collection Time: 12/30/24  6:01 AM   Result Value Ref Range    Bilirubin, Direct 1.3 (H) 0.0 - 0.3 mg/dL    Bilirubin, Indirect 0.4 0.0 - 1.0 mg/dL   POCT Glucose    Collection Time: 12/30/24  6:06 AM

## 2025-01-05 LAB
ANION GAP SERPL CALCULATED.3IONS-SCNC: 12 MMOL/L (ref 3–16)
BASOPHILS # BLD: 0.1 K/UL (ref 0–0.2)
BASOPHILS NFR BLD: 1 %
BLOOD BANK DISPENSE STATUS: NORMAL
BLOOD BANK PRODUCT CODE: NORMAL
BPU ID: NORMAL
BUN SERPL-MCNC: 5 MG/DL (ref 7–20)
CALCIUM SERPL-MCNC: 8.5 MG/DL (ref 8.3–10.6)
CHLORIDE SERPL-SCNC: 101 MMOL/L (ref 99–110)
CO2 SERPL-SCNC: 27 MMOL/L (ref 21–32)
CREAT SERPL-MCNC: 0.8 MG/DL (ref 0.6–1.1)
DEPRECATED RDW RBC AUTO: 16.1 % (ref 12.4–15.4)
DESCRIPTION BLOOD BANK: NORMAL
EOSINOPHIL # BLD: 0.2 K/UL (ref 0–0.6)
EOSINOPHIL NFR BLD: 1.8 %
GFR SERPLBLD CREATININE-BSD FMLA CKD-EPI: >90 ML/MIN/{1.73_M2}
GLUCOSE BLD-MCNC: 163 MG/DL (ref 70–99)
GLUCOSE BLD-MCNC: 271 MG/DL (ref 70–99)
GLUCOSE BLD-MCNC: 278 MG/DL (ref 70–99)
GLUCOSE BLD-MCNC: 73 MG/DL (ref 70–99)
GLUCOSE SERPL-MCNC: 242 MG/DL (ref 70–99)
HCT VFR BLD AUTO: 21.6 % (ref 36–48)
HCT VFR BLD AUTO: 33.3 % (ref 36–48)
HGB BLD-MCNC: 11 G/DL (ref 12–16)
HGB BLD-MCNC: 7 G/DL (ref 12–16)
LYMPHOCYTES # BLD: 2.4 K/UL (ref 1–5.1)
LYMPHOCYTES NFR BLD: 20.8 %
MCH RBC QN AUTO: 26.7 PG (ref 26–34)
MCHC RBC AUTO-ENTMCNC: 32.3 G/DL (ref 31–36)
MCV RBC AUTO: 82.7 FL (ref 80–100)
MONOCYTES # BLD: 0.9 K/UL (ref 0–1.3)
MONOCYTES NFR BLD: 7.5 %
NEUTROPHILS # BLD: 7.9 K/UL (ref 1.7–7.7)
NEUTROPHILS NFR BLD: 68.9 %
PERFORMED ON: ABNORMAL
PERFORMED ON: NORMAL
PLATELET # BLD AUTO: 382 K/UL (ref 135–450)
PMV BLD AUTO: 9.1 FL (ref 5–10.5)
POTASSIUM SERPL-SCNC: 3.3 MMOL/L (ref 3.5–5.1)
RBC # BLD AUTO: 2.62 M/UL (ref 4–5.2)
SODIUM SERPL-SCNC: 140 MMOL/L (ref 136–145)
WBC # BLD AUTO: 11.5 K/UL (ref 4–11)

## 2025-01-05 PROCEDURE — 6370000000 HC RX 637 (ALT 250 FOR IP): Performed by: INTERNAL MEDICINE

## 2025-01-05 PROCEDURE — 85018 HEMOGLOBIN: CPT

## 2025-01-05 PROCEDURE — 85025 COMPLETE CBC W/AUTO DIFF WBC: CPT

## 2025-01-05 PROCEDURE — 80048 BASIC METABOLIC PNL TOTAL CA: CPT

## 2025-01-05 PROCEDURE — 36415 COLL VENOUS BLD VENIPUNCTURE: CPT

## 2025-01-05 PROCEDURE — 94760 N-INVAS EAR/PLS OXIMETRY 1: CPT

## 2025-01-05 PROCEDURE — 6360000002 HC RX W HCPCS: Performed by: INTERNAL MEDICINE

## 2025-01-05 PROCEDURE — 1200000000 HC SEMI PRIVATE

## 2025-01-05 PROCEDURE — 85014 HEMATOCRIT: CPT

## 2025-01-05 PROCEDURE — 36430 TRANSFUSION BLD/BLD COMPNT: CPT

## 2025-01-05 PROCEDURE — 2500000003 HC RX 250 WO HCPCS: Performed by: INTERNAL MEDICINE

## 2025-01-05 PROCEDURE — 6370000000 HC RX 637 (ALT 250 FOR IP): Performed by: STUDENT IN AN ORGANIZED HEALTH CARE EDUCATION/TRAINING PROGRAM

## 2025-01-05 RX ORDER — MAGNESIUM SULFATE IN WATER 40 MG/ML
2000 INJECTION, SOLUTION INTRAVENOUS PRN
Status: DISCONTINUED | OUTPATIENT
Start: 2025-01-05 | End: 2025-01-06 | Stop reason: HOSPADM

## 2025-01-05 RX ORDER — INSULIN GLARGINE 100 [IU]/ML
8 INJECTION, SOLUTION SUBCUTANEOUS DAILY
Status: DISCONTINUED | OUTPATIENT
Start: 2025-01-05 | End: 2025-01-06 | Stop reason: HOSPADM

## 2025-01-05 RX ORDER — SODIUM CHLORIDE 9 MG/ML
INJECTION, SOLUTION INTRAVENOUS PRN
Status: DISCONTINUED | OUTPATIENT
Start: 2025-01-05 | End: 2025-01-06 | Stop reason: HOSPADM

## 2025-01-05 RX ORDER — POTASSIUM CHLORIDE 7.45 MG/ML
10 INJECTION INTRAVENOUS PRN
Status: DISCONTINUED | OUTPATIENT
Start: 2025-01-05 | End: 2025-01-06 | Stop reason: HOSPADM

## 2025-01-05 RX ORDER — POTASSIUM CHLORIDE 1500 MG/1
40 TABLET, EXTENDED RELEASE ORAL PRN
Status: DISCONTINUED | OUTPATIENT
Start: 2025-01-05 | End: 2025-01-06 | Stop reason: HOSPADM

## 2025-01-05 RX ADMIN — HEPARIN SODIUM 5000 UNITS: 5000 INJECTION INTRAVENOUS; SUBCUTANEOUS at 06:12

## 2025-01-05 RX ADMIN — LEVETIRACETAM 1000 MG: 500 TABLET, FILM COATED ORAL at 21:36

## 2025-01-05 RX ADMIN — VANCOMYCIN HYDROCHLORIDE 250 MG: 125 CAPSULE ORAL at 21:36

## 2025-01-05 RX ADMIN — HEPARIN SODIUM 5000 UNITS: 5000 INJECTION INTRAVENOUS; SUBCUTANEOUS at 21:36

## 2025-01-05 RX ADMIN — CLONAZEPAM 2 MG: 1 TABLET ORAL at 21:36

## 2025-01-05 RX ADMIN — CLONAZEPAM 2 MG: 1 TABLET ORAL at 09:34

## 2025-01-05 RX ADMIN — PANTOPRAZOLE SODIUM 40 MG: 40 TABLET, DELAYED RELEASE ORAL at 06:12

## 2025-01-05 RX ADMIN — VENLAFAXINE HYDROCHLORIDE 150 MG: 75 CAPSULE, EXTENDED RELEASE ORAL at 09:35

## 2025-01-05 RX ADMIN — VANCOMYCIN HYDROCHLORIDE 250 MG: 125 CAPSULE ORAL at 09:35

## 2025-01-05 RX ADMIN — CYANOCOBALAMIN TAB 1000 MCG 1000 MCG: 1000 TAB at 09:34

## 2025-01-05 RX ADMIN — MUPIROCIN: 20 OINTMENT TOPICAL at 21:35

## 2025-01-05 RX ADMIN — VANCOMYCIN HYDROCHLORIDE 250 MG: 125 CAPSULE ORAL at 12:17

## 2025-01-05 RX ADMIN — GABAPENTIN 300 MG: 300 CAPSULE ORAL at 09:35

## 2025-01-05 RX ADMIN — POTASSIUM CHLORIDE 40 MEQ: 1500 TABLET, EXTENDED RELEASE ORAL at 09:37

## 2025-01-05 RX ADMIN — INSULIN LISPRO 2 UNITS: 100 INJECTION, SOLUTION INTRAVENOUS; SUBCUTANEOUS at 07:52

## 2025-01-05 RX ADMIN — Medication 1 CAPSULE: at 09:34

## 2025-01-05 RX ADMIN — HEPARIN SODIUM 5000 UNITS: 5000 INJECTION INTRAVENOUS; SUBCUTANEOUS at 15:42

## 2025-01-05 RX ADMIN — METRONIDAZOLE 500 MG: 500 INJECTION, SOLUTION INTRAVENOUS at 09:59

## 2025-01-05 RX ADMIN — INSULIN LISPRO 2 UNITS: 100 INJECTION, SOLUTION INTRAVENOUS; SUBCUTANEOUS at 12:15

## 2025-01-05 RX ADMIN — ACETAMINOPHEN 650 MG: 325 TABLET ORAL at 09:50

## 2025-01-05 RX ADMIN — INSULIN GLARGINE 8 UNITS: 100 INJECTION, SOLUTION SUBCUTANEOUS at 07:50

## 2025-01-05 RX ADMIN — INSULIN LISPRO 2 UNITS: 100 INJECTION, SOLUTION INTRAVENOUS; SUBCUTANEOUS at 21:36

## 2025-01-05 RX ADMIN — GABAPENTIN 300 MG: 300 CAPSULE ORAL at 21:36

## 2025-01-05 RX ADMIN — METRONIDAZOLE 500 MG: 500 INJECTION, SOLUTION INTRAVENOUS at 01:16

## 2025-01-05 RX ADMIN — MUPIROCIN: 20 OINTMENT TOPICAL at 09:42

## 2025-01-05 RX ADMIN — LEVETIRACETAM 1000 MG: 500 TABLET, FILM COATED ORAL at 09:34

## 2025-01-05 RX ADMIN — SODIUM CHLORIDE, PRESERVATIVE FREE 10 ML: 5 INJECTION INTRAVENOUS at 21:35

## 2025-01-05 RX ADMIN — SODIUM CHLORIDE, PRESERVATIVE FREE 10 ML: 5 INJECTION INTRAVENOUS at 09:57

## 2025-01-05 RX ADMIN — GABAPENTIN 300 MG: 300 CAPSULE ORAL at 15:07

## 2025-01-05 ASSESSMENT — PAIN SCALES - GENERAL
PAINLEVEL_OUTOF10: 6
PAINLEVEL_OUTOF10: 0

## 2025-01-05 ASSESSMENT — PAIN DESCRIPTION - DESCRIPTORS: DESCRIPTORS: ACHING

## 2025-01-05 ASSESSMENT — PAIN DESCRIPTION - LOCATION: LOCATION: HEAD

## 2025-01-05 ASSESSMENT — PAIN DESCRIPTION - ORIENTATION: ORIENTATION: ANTERIOR

## 2025-01-05 NOTE — CONSENT
Informed Consent for Blood Component Transfusion Note    I have discussed with the patient the rationale for blood component transfusion; its benefits in treating or preventing fatigue, organ damage, or death; and its risk which includes mild transfusion reactions, rare risk of blood borne infection, or more serious but rare reactions. I have discussed the alternatives to transfusion, including the risk and consequences of not receiving transfusion. The patient had an opportunity to ask questions and had agreed to proceed with transfusion of blood components.    Electronically signed by Sharan Kapoor DO on 1/5/25 at 9:40 AM EST

## 2025-01-06 ENCOUNTER — APPOINTMENT (OUTPATIENT)
Dept: ULTRASOUND IMAGING | Age: 35
DRG: 720 | End: 2025-01-06
Payer: COMMERCIAL

## 2025-01-06 VITALS
SYSTOLIC BLOOD PRESSURE: 146 MMHG | HEIGHT: 58 IN | OXYGEN SATURATION: 97 % | RESPIRATION RATE: 18 BRPM | HEART RATE: 60 BPM | WEIGHT: 121.69 LBS | TEMPERATURE: 98 F | BODY MASS INDEX: 25.54 KG/M2 | DIASTOLIC BLOOD PRESSURE: 87 MMHG

## 2025-01-06 DIAGNOSIS — E11.42 DIABETIC PERIPHERAL NEUROPATHY (HCC): ICD-10-CM

## 2025-01-06 LAB
ANION GAP SERPL CALCULATED.3IONS-SCNC: 12 MMOL/L (ref 3–16)
BASOPHILS # BLD: 0.1 K/UL (ref 0–0.2)
BASOPHILS NFR BLD: 1.1 %
BUN SERPL-MCNC: 7 MG/DL (ref 7–20)
CALCIUM SERPL-MCNC: 8.6 MG/DL (ref 8.3–10.6)
CHLORIDE SERPL-SCNC: 102 MMOL/L (ref 99–110)
CO2 SERPL-SCNC: 26 MMOL/L (ref 21–32)
CREAT SERPL-MCNC: 0.8 MG/DL (ref 0.6–1.1)
DEPRECATED RDW RBC AUTO: 15.6 % (ref 12.4–15.4)
EOSINOPHIL # BLD: 0.1 K/UL (ref 0–0.6)
EOSINOPHIL NFR BLD: 1 %
GFR SERPLBLD CREATININE-BSD FMLA CKD-EPI: >90 ML/MIN/{1.73_M2}
GLUCOSE BLD-MCNC: 194 MG/DL (ref 70–99)
GLUCOSE BLD-MCNC: 276 MG/DL (ref 70–99)
GLUCOSE BLD-MCNC: 97 MG/DL (ref 70–99)
GLUCOSE SERPL-MCNC: 254 MG/DL (ref 70–99)
HCT VFR BLD AUTO: 29.4 % (ref 36–48)
HGB BLD-MCNC: 9.5 G/DL (ref 12–16)
LYMPHOCYTES # BLD: 2.5 K/UL (ref 1–5.1)
LYMPHOCYTES NFR BLD: 23.4 %
MCH RBC QN AUTO: 27.2 PG (ref 26–34)
MCHC RBC AUTO-ENTMCNC: 32.4 G/DL (ref 31–36)
MCV RBC AUTO: 83.7 FL (ref 80–100)
MONOCYTES # BLD: 0.8 K/UL (ref 0–1.3)
MONOCYTES NFR BLD: 7.8 %
NEUTROPHILS # BLD: 7.2 K/UL (ref 1.7–7.7)
NEUTROPHILS NFR BLD: 66.7 %
PERFORMED ON: ABNORMAL
PERFORMED ON: ABNORMAL
PERFORMED ON: NORMAL
PLATELET # BLD AUTO: 405 K/UL (ref 135–450)
PMV BLD AUTO: 9.1 FL (ref 5–10.5)
POTASSIUM SERPL-SCNC: 3.7 MMOL/L (ref 3.5–5.1)
RBC # BLD AUTO: 3.51 M/UL (ref 4–5.2)
SODIUM SERPL-SCNC: 140 MMOL/L (ref 136–145)
WBC # BLD AUTO: 10.8 K/UL (ref 4–11)

## 2025-01-06 PROCEDURE — 6360000002 HC RX W HCPCS: Performed by: INTERNAL MEDICINE

## 2025-01-06 PROCEDURE — 6370000000 HC RX 637 (ALT 250 FOR IP): Performed by: INTERNAL MEDICINE

## 2025-01-06 PROCEDURE — 85025 COMPLETE CBC W/AUTO DIFF WBC: CPT

## 2025-01-06 PROCEDURE — 94760 N-INVAS EAR/PLS OXIMETRY 1: CPT

## 2025-01-06 PROCEDURE — 76856 US EXAM PELVIC COMPLETE: CPT

## 2025-01-06 PROCEDURE — 36415 COLL VENOUS BLD VENIPUNCTURE: CPT

## 2025-01-06 PROCEDURE — 6370000000 HC RX 637 (ALT 250 FOR IP): Performed by: STUDENT IN AN ORGANIZED HEALTH CARE EDUCATION/TRAINING PROGRAM

## 2025-01-06 PROCEDURE — 80048 BASIC METABOLIC PNL TOTAL CA: CPT

## 2025-01-06 PROCEDURE — 2500000003 HC RX 250 WO HCPCS: Performed by: INTERNAL MEDICINE

## 2025-01-06 RX ORDER — GABAPENTIN 300 MG/1
CAPSULE ORAL
Qty: 270 CAPSULE | Refills: 0 | Status: SHIPPED | OUTPATIENT
Start: 2025-01-06 | End: 2025-02-05

## 2025-01-06 RX ADMIN — CLONAZEPAM 2 MG: 1 TABLET ORAL at 09:22

## 2025-01-06 RX ADMIN — GABAPENTIN 300 MG: 300 CAPSULE ORAL at 09:22

## 2025-01-06 RX ADMIN — VENLAFAXINE HYDROCHLORIDE 150 MG: 75 CAPSULE, EXTENDED RELEASE ORAL at 09:22

## 2025-01-06 RX ADMIN — INSULIN LISPRO 2 UNITS: 100 INJECTION, SOLUTION INTRAVENOUS; SUBCUTANEOUS at 09:23

## 2025-01-06 RX ADMIN — VANCOMYCIN HYDROCHLORIDE 250 MG: 125 CAPSULE ORAL at 12:58

## 2025-01-06 RX ADMIN — MUPIROCIN: 20 OINTMENT TOPICAL at 09:23

## 2025-01-06 RX ADMIN — Medication 1 CAPSULE: at 09:22

## 2025-01-06 RX ADMIN — PANTOPRAZOLE SODIUM 40 MG: 40 TABLET, DELAYED RELEASE ORAL at 05:45

## 2025-01-06 RX ADMIN — HEPARIN SODIUM 5000 UNITS: 5000 INJECTION INTRAVENOUS; SUBCUTANEOUS at 05:45

## 2025-01-06 RX ADMIN — GABAPENTIN 300 MG: 300 CAPSULE ORAL at 12:58

## 2025-01-06 RX ADMIN — VANCOMYCIN HYDROCHLORIDE 250 MG: 125 CAPSULE ORAL at 09:22

## 2025-01-06 RX ADMIN — INSULIN LISPRO 2 UNITS: 100 INJECTION, SOLUTION INTRAVENOUS; SUBCUTANEOUS at 12:58

## 2025-01-06 RX ADMIN — LEVETIRACETAM 1000 MG: 500 TABLET, FILM COATED ORAL at 09:22

## 2025-01-06 RX ADMIN — INSULIN GLARGINE 8 UNITS: 100 INJECTION, SOLUTION SUBCUTANEOUS at 09:22

## 2025-01-06 RX ADMIN — CYANOCOBALAMIN TAB 1000 MCG 1000 MCG: 1000 TAB at 09:22

## 2025-01-06 RX ADMIN — SODIUM CHLORIDE, PRESERVATIVE FREE 10 ML: 5 INJECTION INTRAVENOUS at 09:23

## 2025-01-06 ASSESSMENT — PAIN SCALES - GENERAL: PAINLEVEL_OUTOF10: 0

## 2025-01-06 NOTE — DISCHARGE INSTR - COC
Continuity of Care Form    Patient Name: Maribeth Loera   :  1990  MRN:  [id number]    Admit date:  2024  Discharge date:  ***    Code Status Order: Full Code   Advance Directives:   Advance Care Flowsheet Documentation             Admitting Physician:  Blel Watkins MD  PCP: Kathy Davis, APRN - CNP    Discharging Nurse: ***  Discharging Hospital Unit/Room#: J1E-0453/3107-01  Discharging Unit Phone Number: ***    Emergency Contact:   Extended Emergency Contact Information  Primary Emergency Contact: Yonny Atkinson   [facility]  Home Phone: [phone]  Mobile Phone: [phone]  Relation: Spouse  Secondary Emergency Contact: Natalie Kerns   [facility]  Home Phone: [phone]  Mobile Phone: [phone]  Relation: Parent    Past Surgical History:  Past Surgical History:   Procedure Laterality Date     SECTION      TUBAL LIGATION         Immunization History:   Immunization History   Administered Date(s) Administered    COVID-19, PFIZER GRAY top, DO NOT Dilute, (age 12 y+), IM, 30 mcg/0.3 mL 2022    Influenza Virus Vaccine 2020    Influenza, FLUBLOK, (age 18 y+), Quadv PF, 0.5mL 10/22/2018    Influenza, FLUCELVAX, (age 6 mo+), MDCK, Quadv MDV, 0.5mL 10/01/2024       Active Problems:  Patient Active Problem List   Diagnosis Code    Seizure disorder (HCC) G40.909    Vitamin D deficiency E55.9    ROME (generalized anxiety disorder) F41.1    History of anorexia nervosa Z86.59    Diabetic peripheral neuropathy (HCC) E11.42    Diabetes education, encounter for Z71.89    Alkaline phosphatase elevation R74.8    Diabetic ketoacidosis without coma associated with type 1 diabetes mellitus (HCC) E10.10    Poorly controlled type 1 diabetes mellitus (HCC) E10.65    PRANEETH (acute kidney injury) (HCC) N17.9    B12 deficiency E53.8    Epilepsy, focal (HCC) G40.109    Septic shock (HCC) A41.9, R65.21    Staphylococcal toxic shock syndrome (HCC) A48.3, B95.8

## 2025-01-06 NOTE — CARE COORDINATION
Continue to follow for discharge needs. Patient is from home. No ongoing therapy needs per PT/OT.    Electronically signed by ILSA Armstrong, ROXANNEW, Case Management on 1/6/2025 at 11:46 AM  [facility] [place] [phone]

## 2025-01-06 NOTE — DISCHARGE INSTRUCTIONS
Follow-up with your primary care physician as soon as possible in regards to your abnormal uterine bleeding.  You should get a CBC within a week.  You may need a referral to Gynecology for further evaluation.    It is also recommended that you follow-up with Gastroenterology as an outpatient in regards to your hepatic steatosis

## 2025-01-06 NOTE — CARE COORDINATION
CASE MANAGEMENT DISCHARGE SUMMARY:    DISCHARGE DATE: 1/6/2025    DISCHARGED TO HOME               COMMENTS: Patient will discharge to home.     Electronically signed by ILSA Armstrong, LISW, Case Management on 1/6/2025 at 12:28 PM  [place] [phone]

## 2025-01-06 NOTE — DISCHARGE SUMMARY
V2.0  Discharge Summary    Name:  Maribeth Loera /Age/Sex: 1990 (34 y.o. female)   Admit Date: 2024  Discharge Date: 25    MRN & CSN:  [id number] & [id number] Encounter Date and Time 25 12:08 PM EST    Attending:  Sharan Kapoor DO Discharging Provider: Sharan Kapoor DO       Hospital Course:     Brief HPI: Maribeth Loera is a 34 y.o. female with a pertinent PMHx of type 1 diabetes, gastroparesis, seizure disorder, MRSA bacteremia who presented complaining of being sick with 2 weeks of nausea, vomiting, diarrhea, abdominal pain and was admitted with concerns for septic shock from urinary tract infection versus toxic shock syndrome from retained tampon.  She was started on broad-spectrum empiric IV antibiotics and infectious diseases was consulted.  She completed an empiric course of antibiotics for both urinary tract infection and possible toxic shock syndrome.    On admission she was also found to be in diabetic ketoacidosis.  She was given aggressive IV fluid resuscitation and started on an IV insulin drip.  This was later transitioned to subcutaneous insulin once her anion gap had closed and her beta hydroxybutyrate had resolved.    She had a significant acute kidney injury on admission nephrology was consulted.  She also had a metabolic acidosis likely secondary to the acute renal failure.  She had aggressive IV fluid resuscitation she had progressive return to normal renal function and resolution of her acidosis.    She did have issues with persistent diarrhea during admission and there was concern for C. difficile therefore she was on oral vancomycin however her diarrhea resolved before C. difficile sample can ever be collected.  Gastroenterology was also consulted for slight transaminitis which had resolved after fluids and she had a right upper quadrant ultrasound which did show concern for hepatic steatosis.  Gastroenterology recommended outpatient follow-up.    She did receive

## 2025-01-06 NOTE — PROGRESS NOTES
Infectious Disease Follow up Notes    CC :  sepsis, possible TSS      Antibiotics:  Ceftaroline 600 q12   Culturelle   Flagyl 500 IV q8   Vanc 250 BID      Admit Date:   12/27/2024  Hospital Day: 8    Subjective:   She remains AF on RA   Continued watery diarrhea and abd pain per patient     Objective:     Patient Vitals for the past 8 hrs:   BP Temp Temp src Pulse Resp SpO2   01/03/25 0713 119/75 97.8 °F (36.6 °C) Oral 81 17 99 %       EXAM:  General:   lethargic, rouses to voice, NAD     HEENT:   NCAT, PERRL, sclera anicteric   MMM, no thrush     NECK:   Supple      LUNGS:  CTA aiden    CV:   RRR   ABD:  Soft, mildly tender, no R/G  BS present     EXT: No focal rash          LINE:   PIV in place         Scheduled Meds:   insulin glargine  7 Units SubCUTAneous Daily    ceftaroline fosamil (TEFLARO) IVPB  600 mg IntraVENous Q12H    gabapentin  300 mg Oral TID    lactobacillus  1 capsule Oral BID WC    vancomycin  250 mg Oral BID    insulin lispro  0-4 Units SubCUTAneous 4x Daily WC    metroNIDAZOLE  500 mg IntraVENous Q8H    sodium chloride flush  5-40 mL IntraVENous 2 times per day    heparin (porcine)  5,000 Units SubCUTAneous 3 times per day    clonazePAM  2 mg Oral BID    levETIRAcetam  1,000 mg Oral BID    pantoprazole  40 mg Oral QAM AC    venlafaxine  150 mg Oral Daily    vitamin B-12  1,000 mcg Oral Daily    vitamin D  50,000 Units Oral Weekly       Continuous Infusions:   dextrose      sodium chloride Stopped (12/31/24 0856)    dextrose 5 % and 0.45 % NaCl Stopped (12/31/24 0739)          Data Review:    Lab Results   Component Value Date    WBC 15.4 (H) 01/03/2025    HGB 7.2 (L) 01/03/2025    HCT 22.9 (L) 01/03/2025    MCV 82.7 01/03/2025     01/03/2025     Lab Results   Component Value Date    CREATININE 0.9 01/03/2025    BUN 12 01/03/2025     01/03/2025    K 4.1 01/03/2025     01/03/2025    CO2 25 01/03/2025 
                                               Inpatient Note    CC: weakness, N/V  Summary:   Maribeth Loera is being seen by nephrology for Acute kidney injury (PRANEETH). She is a 34 y.o. female with a PMH significant for T1DM, partial complex seizures, hx of MRSA bacteremia who presented to the ED 12/27/2024 with 2 weeks of N/V diarrhea, abdominal pain. She was noted to be hypotensive and tachycardic in the ED requiring levo. BG was 437, creatinine was 4.7 and bicarb was 13 with an elevated anion gap.    Interval History    Having nausea still   No dysuria, no trouble voiding     Na 144   K 3.2   Mag 1.7   Bicarb 22    Plan:   - continue bicitra today   -Supplement potassium  - not taking PO and becoming hypernatremic. Add 1/2 NS 20 kcl at 75 cc/hr         Assessment:   PRANEETH:  - baseline Cr < 0.5  - Cr on admission 4.7  - associated with hypotension, septic shock, DKA  - UA with large leukocytes and blood and bacteria    Acute metabolic acidosis:  - bicarb 13 on admission, BHB 4.03. lactate normal  - acidosis 2/2 PRANEETH and DKA    Hyponatremia:  - Na 121 on admission but glucose was 430.  - Na still at 129 with better BG control.    Septic shock:  - concern for TSST.        Tosha Barragan MD  [facility]  Office: [phone]          PE:   Vitals:    01/02/25 0829   BP:    Pulse:    Resp: 16   Temp:    SpO2: 100%       General appearance: in NAD, AAO x 3  Respiratory: Respiratory effort appears normal. On room air.   Cardiovascular:  Trace edema  Abdomen: Soft, non tender, non distended.           
                                               Inpatient Note    CC: weakness, N/V  Summary:   Maribeth Loera is being seen by nephrology for Acute kidney injury (PRANEETH). She is a 34 y.o. female with a PMH significant for T1DM, partial complex seizures, hx of MRSA bacteremia who presented to the ED 12/27/2024 with 2 weeks of N/V diarrhea, abdominal pain. She was noted to be hypotensive and tachycardic in the ED requiring levo. BG was 437, creatinine was 4.7 and bicarb was 13 with an elevated anion gap.    Interval History    Seen at bedside.   Awake and alert   No complaints   Taking PO    /75  On room air       Na 142 k 4.1   Cr 0.9      Plan:   - PRANEETH resolved  - no acute electrolyte issues.   Mag replacement.     Will sign off         Assessment:   PRANEETH:  - baseline Cr < 0.5  - Cr on admission 4.7  - associated with hypotension, septic shock, DKA  - UA with large leukocytes and blood and bacteria    Acute metabolic acidosis:  - bicarb 13 on admission, BHB 4.03. lactate normal  - acidosis 2/2 PRANEETH and DKA    Hyponatremia:  - Na 121 on admission but glucose was 430.  - Na still at 129 with better BG control.    Septic shock:  - concern for TSST.        Tosha Barragan MD  [facility] Nephrology  Office: [phone]          PE:   Vitals:    01/03/25 0713   BP: 119/75   Pulse: 81   Resp: 17   Temp: 97.8 °F (36.6 °C)   SpO2: 99%       General appearance: in NAD, AAO x 3  Respiratory: Respiratory effort appears normal. On room air.   Cardiovascular:  Trace edema  Abdomen: Soft, non tender, non distended.           
                                               Inpatient Note    CC: weakness, N/V  Summary:   Maribeth Loera is being seen by nephrology for Acute kidney injury (PRANEETH). She is a 34 y.o. female with a PMH significant for T1DM, partial complex seizures, hx of MRSA bacteremia who presented to the ED 12/27/2024 with 2 weeks of N/V diarrhea, abdominal pain. She was noted to be hypotensive and tachycardic in the ED requiring levo. BG was 437, creatinine was 4.7 and bicarb was 13 with an elevated anion gap.    Interval History  Her blood pressure is good  Slightly tachycardic  On room air  On Bicitra p.o. twice daily  Creatinine- down to 1.5 from   Peak of 4.7  Out of ICU    Plan:   - continue bicitra  -Supplement potassium        Assessment:   PRANEETH:  - baseline Cr < 0.5  - Cr on admission 4.7  - associated with hypotension, septic shock, DKA  - UA with large leukocytes and blood and bacteria    Acute metabolic acidosis:  - bicarb 13 on admission, BHB 4.03. lactate normal  - acidosis 2/2 PRANEETH and DKA    Hyponatremia:  - Na 121 on admission but glucose was 430.  - Na still at 129 with better BG control.    Septic shock:  - concern for TSST.        Agus Smith MD  [facility] Nephrology  Office: [phone]          PE:   Vitals:    01/01/25 1055   BP: 117/86   Pulse: (!) 103   Resp: 15   Temp: 97.4 °F (36.3 °C)   SpO2: 100%       General appearance: in NAD, AAO x 3  Respiratory: Respiratory effort appears normal. On room air.   Cardiovascular:  Trace edema  Abdomen: Soft, non tender, non distended.           
                                               Inpatient Note    CC: weakness, N/V  Summary:   Maribeth Loera is being seen by nephrology for Acute kidney injury (PRANEETH). She is a 34 y.o. female with a PMH significant for T1DM, partial complex seizures, hx of MRSA bacteremia who presented to the ED 12/27/2024 with 2 weeks of N/V diarrhea, abdominal pain. She was noted to be hypotensive and tachycardic in the ED requiring levo. BG was 437, creatinine was 4.7 and bicarb was 13 with an elevated anion gap.    Interval History  Seen at bedside  /69, off levo  1480 mL urine yesterday  On insulin infusion for DKA and on D5 half NS at 150 mL/h  Cr improving, down to 2.9 today from 3.9  Acidosis is barely improved. Bicarb atill 13  K 3.6  Na 126,   Urine Na 38, urine osm 321    Plan:   - sodium likely dropping due to hypotonic IVF  - acidosis is not improving despite insulin infusion  - will give PO bicitra 30 mL BID for the acidosis today  - pt remains NPO.    Assessment:   PRANEETH:  - baseline Cr < 0.5  - Cr on admission 4.7  - associated with hypotension, septic shock, DKA  - UA with large leukocytes and blood and bacteria    Acute metabolic acidosis:  - bicarb 13 on admission, BHB 4.03. lactate normal  - acidosis 2/2 PRANEETH and DKA    Hyponatremia:  - Na 121 on admission but glucose was 430.  - Na still at 129 with better BG control.    Septic shock:  - concern for TSST.        Jennifer Khalil MD  [facility] Nephrology  Office: [phone]          PE:   Vitals:    12/29/24 1000   BP: 106/69   Pulse: 100   Resp: 15   Temp: 97.7 °F (36.5 °C)   SpO2: 95%       General appearance: in NAD  Respiratory: Respiratory effort appears normal, bilateral equal chest rise, no wheeze, no crackles  Cardiovascular: Ausculation shows RRR no edema  Abdomen: No visible mass or tenderness, non distended.           
                                               Inpatient Note    CC: weakness, N/V  Summary:   Maribeth Loera is being seen by nephrology for Acute kidney injury (PRANEETH). She is a 34 y.o. female with a PMH significant for T1DM, partial complex seizures, hx of MRSA bacteremia who presented to the ED 12/27/2024 with 2 weeks of N/V diarrhea, abdominal pain. She was noted to be hypotensive and tachycardic in the ED requiring levo. BG was 437, creatinine was 4.7 and bicarb was 13 with an elevated anion gap.    Interval History  Seen at bedside with nurse  BP  101/74  On room air.   Urine output 3375 ml  Cr improving 2.5   Na 131  K 3.2  Bicarbonate 15 , not improving.   Patient NPO          Plan:   - PO bicitra 30 mL BID for today  - Check beta hydroxybutyrate and lactic acid  - Replete lytes PRN  - Monitoring renal panel closely.   -  On IVF D 5 0.45 saline with insulin infusion per DKA protocol. Once off of current fluid, start ringer lactate.       D/W RN and patient.           Assessment:   PRANEETH:  - baseline Cr < 0.5  - Cr on admission 4.7  - associated with hypotension, septic shock, DKA  - UA with large leukocytes and blood and bacteria    Acute metabolic acidosis:  - bicarb 13 on admission, BHB 4.03. lactate normal  - acidosis 2/2 PRANEETH and DKA    Hyponatremia:  - Na 121 on admission but glucose was 430.  - Na still at 129 with better BG control.    Septic shock:  - concern for TSST.        Roberto Cortez MD  [facility] Nephrology  Office: [phone]          PE:   Vitals:    12/30/24 0900   BP: 101/74   Pulse: 91   Resp: 13   Temp: 97.6 °F (36.4 °C)   SpO2: 97%       General appearance: in NAD, AAO x 3  Respiratory: Respiratory effort appears normal. On room air.   Cardiovascular:  no edema  Abdomen: Soft, non tender, non distended.           
                                               Inpatient Note    CC: weakness, N/V  Summary:   Maribeth Loera is being seen by nephrology for Acute kidney injury (PRANEETH). She is a 34 y.o. female with a PMH significant for T1DM, partial complex seizures, hx of MRSA bacteremia who presented to the ED 12/27/2024 with 2 weeks of N/V diarrhea, abdominal pain. She was noted to be hypotensive and tachycardic in the ED requiring levo. BG was 437, creatinine was 4.7 and bicarb was 13 with an elevated anion gap.    Interval History  Seen at bedside with nurse  BP  118/78  On room air.   Urine output 1735  ml  Cr improving 2.50  Na 130 with blood glucose 266  K 3.2  Bicarbonate 15 >  16  Lactic acid and beta hydroxybutyrate was normal yesterday.   Now started oral diet.           Plan:   - PO bicitra 30 mL BID for today  - Replete lytes PRN  -  Monitoring renal panel closely.   -  IV ringer lactate 1 liter today.       D/W patient.           Assessment:   PRANEETH:  - baseline Cr < 0.5  - Cr on admission 4.7  - associated with hypotension, septic shock, DKA  - UA with large leukocytes and blood and bacteria    Acute metabolic acidosis:  - bicarb 13 on admission, BHB 4.03. lactate normal  - acidosis 2/2 PRANEETH and DKA    Hyponatremia:  - Na 121 on admission but glucose was 430.  - Na still at 129 with better BG control.    Septic shock:  - concern for TSST.        Roberto Cortez MD  [facility] Nephrology  Office: [phone]          PE:   Vitals:    12/31/24 0843   BP: 118/78   Pulse: 74   Resp: 13   Temp: 97.6 °F (36.4 °C)   SpO2: 98%       General appearance: in NAD, AAO x 3  Respiratory: Respiratory effort appears normal. On room air.   Cardiovascular:  Trace edema  Abdomen: Soft, non tender, non distended.           
    Gastroenterology Progress Note    Maribeth Loera is a 34 y.o. female patient.  Hospitalization Day:7    SUBJECTIVE:    No acute events  Patient states that her diarrhea is improving at this time   Abdominal pain is improving as well.     ROS:  Gastrointestinal ROS: positive for - abdominal pain and diarrhea  negative for - blood in stools    Physical    VITALS:  /76   Pulse 96   Temp 98.5 °F (36.9 °C) (Oral)   Resp 17   Ht 1.473 m (4' 10\")   Wt 54.1 kg (119 lb 4.3 oz)   LMP 2024   SpO2 96%   BMI 24.93 kg/m²   TEMPERATURE:  Current - Temp: 98.5 °F (36.9 °C); Max - Temp  Av.4 °F (36.9 °C)  Min: 98.3 °F (36.8 °C)  Max: 98.5 °F (36.9 °C)    General:  Alert and oriented.   Skin- without jaundice  Eyes: anicteric sclera  Cardiac: RRR, Nl s1s2, without murmurs  Lungs CTA Bilaterally, normal effort  Abdomen soft, ND, NT, no HSM, Bowel sounds normal  Ext: without edema  Neuro: no asterixis     Data    Data Review:    Recent Labs     25  0531 25  043   WBC 18.2* 15.4* 12.3*   HGB 7.2* 7.2* 7.1*   HCT 22.5* 22.9* 22.1*   MCV 81.1 82.7 83.0    242 300     Recent Labs     25  0531 25  043    142 139   K 3.2* 4.1 3.8    104 104   CO2    PHOS 4.2 3.8  --    BUN 23* 12 6*   CREATININE 1.1 0.9 0.9     Recent Labs     2521 25  043   AST 22 14*   ALT 12 7*   BILIDIR 0.6* 0.4*   BILITOT 0.9 0.6   ALKPHOS 233* 218*     Recent Labs     2521 25  043   LIPASE 68.0* 130.0*     No results for input(s): \"PROTIME\", \"INR\" in the last 72 hours.    Radiology Review:    CT CHEST ABDOMEN PELVIS WO CONTRAST Additional Contrast? Radiologist Recommendation   Final Result   Chest      Increased pleural effusions compared to prior.  There is adjacent   consolidation of the lung bases.  Atelectasis is favored over pneumonia      Abdomen and pelvis      Subtle injection of the fat surrounding the pancreas, 
    Infectious Diseases   Progress Note      Admission Date: 12/27/2024  Hospital Day: Hospital Day: 4   Attending: Sharan Kapoor DO  Date of service: 12/30/2024     Chief complaint/ Reason for consult:     Septic shock on admission with leukocytosis, tachycardia, tachypnea, hypotension  Concern for tampon related toxic shock syndrome  Hyponatremia serum sodium 130  Thrombocytopenia in setting of sepsis with platelet count of 117,000 on 12/27/2024  Concern for complicated urinary tract infection with large leukocyte esterase and 4+ bacteria on urinalysis done on 12/27/2024  Nausea vomiting and diarrhea in setting of sepsis on admission  Acute kidney injury     Microbiology:      I have reviewed allavailable micro lab data and cultures    Results       Procedure Component Value Units Date/Time    Respiratory Panel, Molecular, with COVID-19 (Restricted: peds pts or suitable admitted adults) [[id number]] Collected: 12/28/24 1306    Order Status: Completed Specimen: Nasopharyngeal Updated: 12/28/24 1505     Respiratory Panel PCR --     Respiratory Pathogens Panel PCR Result: Not Detected  See additional report for complete Respiratory Pathogens Panel      Narrative:      ORDER#: [id number]                          ORDERED BY: VINNY VILLEGAS  SOURCE: Nasopharyngeal                     COLLECTED:  12/28/24 13:06  ANTIBIOTICS AT [facility].:                      RECEIVED :  12/28/24 13:17    Respiratory Panel Film Array Report [[id number]] Collected: 12/28/24 1306    Order Status: Completed Updated: 12/28/24 1507     Report SEE IMAGE    MRSA DNA Probe, Nasal [[id number]]  (Abnormal) Collected: 12/28/24 0881    Order Status: Completed Specimen: Nares Updated: 12/28/24 2312     Organism Staph aureus MRSA     MRSA SCREEN RT-PCR --     POSITIVE for  Normal Range: Not detected  CONTACT PRECAUTIONS INDICATED      Narrative:      ORDER#: [id number]                          ORDERED BY: JORDON CRUZ  SOURCE: Nares                      
    Infectious Diseases   Progress Note      Admission Date: 12/27/2024  Hospital Day: Hospital Day: 6   Attending: Sharan Kapoor DO  Date of service: 1/1/2025     Chief complaint/ Reason for consult:     Septic shock on admission with leukocytosis, tachycardia, tachypnea, hypotension  Concern for tampon related toxic shock syndrome  Hyponatremia serum sodium 130  Thrombocytopenia in setting of sepsis with platelet count of 117,000 on 12/27/2024  Concern for complicated urinary tract infection with large leukocyte esterase and 4+ bacteria on urinalysis done on 12/27/2024  Nausea vomiting and diarrhea in setting of sepsis on admission  Acute kidney injury     Microbiology:      I have reviewed allavailable micro lab data and cultures    Results       Procedure Component Value Units Date/Time    Respiratory Panel, Molecular, with COVID-19 (Restricted: peds pts or suitable admitted adults) [[id number]] Collected: 12/28/24 1306    Order Status: Completed Specimen: Nasopharyngeal Updated: 12/28/24 1505     Respiratory Panel PCR --     Respiratory Pathogens Panel PCR Result: Not Detected  See additional report for complete Respiratory Pathogens Panel      Narrative:      ORDER#: [id number]                          ORDERED BY: VINNY VILLEGAS  SOURCE: Nasopharyngeal                     COLLECTED:  12/28/24 13:06  ANTIBIOTICS AT [facility].:                      RECEIVED :  12/28/24 13:17    Respiratory Panel Film Array Report [[id number]] Collected: 12/28/24 1306    Order Status: Completed Updated: 12/28/24 1507     Report SEE IMAGE    MRSA DNA Probe, Nasal [[id number]]  (Abnormal) Collected: 12/28/24 0896    Order Status: Completed Specimen: Nares Updated: 12/28/24 2315     Organism Staph aureus MRSA     MRSA SCREEN RT-PCR --     POSITIVE for  Normal Range: Not detected  CONTACT PRECAUTIONS INDICATED      Narrative:      ORDER#: [id number]                          ORDERED BY: JORDON CRUZ  SOURCE: Nares                        
    V2.0    [facility] [place]      Name:  Maribeth Loera /Age/Sex: 1990  (34 y.o. female)   MRN & CSN:  [id number] & [id number] Encounter Date/Time: 1/3/2025 7:45 AM EST   Location:  [id number]- PCP: Kathy Davis, MARI - CNP     Attending:Sharan Kapoor DO       Hospital Day: 8  Brief Hospital Course  Maribeth Loera is a 34 y.o. female with pertinent PMHx of type 1 diabetes, gastroparesis, seizure disorder, MRSA bacteremia who presented complaining of being sick with 2 weeks of nausea, vomiting, diarrhea, abdominal pain and was admitted with septic shock from UTI versus retained tampon as well as diabetic ketoacidosis.  Was started on broad-spectrum empiric IV antibiotics as well as insulin drip admitted to the ICU for further management.  She was transitioned off the insulin drip after anion gap and beta hydroxybutyrate had resolved however she has remained with a metabolic acidosis likely secondary to renal failure.  Assessment & Plan     Septic shock, resolved  Acute cystitis  Toxic shock syndrome  -Had retained tampon for few days, suspected etiology  -Urine culture with growth of pansensitive E. Coli, blood cultures no growth to date, MRSA nares positive  -Continue IV antibiotic coverage with Teflaro and Flagyl  -Leukocytosis stable today  -Infectious diseases following, note reviewed from yesterday 2, started on oral vancomycin for C. difficile prophylaxis    Diabetic ketoacidosis, resolved  Type 1 diabetes  -Had issues with hypoglycemia yesterday likely secondary to discontinuation of steroids, appetite poor  -Continue Lantus 7 units daily  -low-dose sliding scale correctional insulin 4 times daily with meals and nightly  -Check point-of-care glucose 4 times daily with meals and nightly to monitor for hypoglycemia from insulin toxicity  -Diabetes education consulted    Acute pancreatitis  -Meets criteria with CT imaging findings and epigastric abdominal pain, lipase only mildly 
    V2.0    [facility] [place] Progress Note      Name:  Maribeth Loera /Age/Sex: 1990  (34 y.o. female)   MRN & CSN:  [id number] & [id number] Encounter Date/Time: 2025 7:45 AM EST   Location:  [id number] PCP: Kathy Davis, MARI - CNP     Attending:Sharan Kapoor DO       Hospital Day: 9  Brief Hospital Course  Maribeth Loera is a 34 y.o. female with pertinent PMHx of type 1 diabetes, gastroparesis, seizure disorder, MRSA bacteremia who presented complaining of being sick with 2 weeks of nausea, vomiting, diarrhea, abdominal pain and was admitted with septic shock from UTI versus retained tampon as well as diabetic ketoacidosis.  Was started on broad-spectrum empiric IV antibiotics as well as insulin drip admitted to the ICU for further management.  She was transitioned off the insulin drip after anion gap and beta hydroxybutyrate had resolved however she has remained with a metabolic acidosis likely secondary to renal failure.  Assessment & Plan     Septic shock, resolved  Acute cystitis, resolved  Toxic shock syndrome  -Had retained tampon for few days, suspected etiology  -Urine culture with growth of pansensitive E. Coli, blood cultures were without growth  -Completed course of Teflaro  -Continue IV Flagyl  -Oral vancomycin for possible C. difficile  -Infectious diseases following    Diabetic ketoacidosis, resolved  Type 1 diabetes  -Having hypoglycemia occasionally now  -Continue Lantus 7 units daily  -Start lispro 2 units 3 times daily with meals  -low-dose sliding scale correctional insulin 4 times daily with meals and nightly  -Check point-of-care glucose 4 times daily with meals and nightly to monitor for hypoglycemia from insulin toxicity  -Diabetes education consulted    Acute pancreatitis  -Appears idiopathic  -Lipase remains elevated  -Lipid profile within acceptable limits  -Continue supportive care    Diarrhea  -Persistent, Gastroenterology consulted, note reviewed today , recommend 
    V2.0    [facility] [place]      Name:  Maribeth Loera /Age/Sex: 1990  (34 y.o. female)   MRN & CSN:  [id number] & [id number] Encounter Date/Time: 2024 7:45 AM EST   Location:  [id number] PCP: Kathy Davis, MARI - CNP     Attending:Sharan Kapoor DO       Hospital Day: 5  Brief Hospital Course  Maribeth Loera is a 34 y.o. female with pertinent PMHx of type 1 diabetes, gastroparesis, seizure disorder, MRSA bacteremia who presented complaining of being sick with 2 weeks of nausea, vomiting, diarrhea, abdominal pain and was admitted with septic shock from UTI versus retained tampon as well as diabetic ketoacidosis.  Was started on broad-spectrum empiric IV antibiotics as well as insulin drip admitted to the ICU for further management.  She was transitioned off the insulin drip after anion gap and beta hydroxybutyrate had resolved however she has remained with a metabolic acidosis likely secondary to renal failure.  Assessment & Plan     Septic shock, resolved  Acute cystitis  Toxic shock syndrome  -Had retained tampon for few days  -Procalcitonin downtrending  -Urine culture with growth of pansensitive E. Coli, blood cultures no growth to date, MRSA nares positive  -Continue IV antibiotic coverage with Teflaro and Flagyl  -Infectious diseases following, note reviewed from yesterday  recommending continue IV Teflaro and Flagyl    Diabetic ketoacidosis, resolved  Type 1 diabetes  -Lantus 23 units daily  -5 units lispro 3 times daily with meals  -low-dose sliding scale correctional insulin 4 times daily with meals and nightly  -Steroids have been discontinued therefore insulin requirements may decrease  -Check point-of-care glucose 4 times daily with meals and nightly to monitor for hypoglycemia from insulin toxicity  -Diabetes education consulted    Metabolic acidosis  -Likely secondary to acute renal failure  -Continue Bicitra    Acute kidney injury  -Slowly improved  -Gentle IV fluid 
  Physical Therapy      Maribeth Loera  1/3/2025    -chart reviewed and discussed with OT   -she is ambulating in room with staff supervision   -multiple trips to the bathroom with stand by assist   -anticipate discharge to home with family assist   -will check on Monday to re-assess as needed  Electronically signed by JOSIAS MCNAMARA, PT on 1/3/2025 at 4:18 PM        
 Patient in bed resting in bed at this time.Patient is  alert and oriented x 4. Able to make all needs known. PT complained of pain. Prn given per md order. Assessment completed. VS WNL.. IV capped and flushed. Fall precautions in place. Call light within reach.    
 Patient in bed resting in bed at this time.Patient is  alert and oriented x 4. Able to make all needs known. PT complained of pain. Prn given per md order. Assessment completed. VS WNL.. IV capped and flushed. Fall precautions in place. Call light within reach.    
 Patient in bed resting in bed at this time.Patient is  alert and oriented x 4. Able to make all needs known. PT denies pain. Assessment completed. VS WNL.. IV capped and flushed. Fall precautions in place. Call light within reach.    
20:00- Pt BG 60. Pt given 8oz juice and glucagon tablets. Pt unable to tolerate glucagon tablets.    20:17-Pt BG rechecked after 4oz of juice. Pt BG 76. Pt alert and instructed to finish remaining 4oz of juice.     20:35- Repeat BG 67. D10 bolus started.     20:51- Repeat  after D10 bolus.    21:30-Repeat .  
Admission: Pt admitted into room 2113 from ED via stretcher. Vital signs stable upon arrival, weaing down Levophed. , fluids changed to D51/2NS. Nephrology consult called, Dr. Cortez ordered richardson catheter to be placed, Dr Barragan will see later today. Dr. Kapoor from critical care also notified of pt.     Thuy Lyle, BSN, CCRN  
Blood administration started. This RN now observing pt.   
Blood transfusion completed. Pt tolerated well. Repeat lab ordered.   
Clinical Pharmacy Note  Renal Dose Adjustment    Maribeth Loera is receiving Cefepime.  This medication is renally eliminated.  Based on the patient's Estimated Creatinine Clearance: 15 mL/min (A) (based on SCr of 4 mg/dL (H)). and sepsis indication, the dose has been adjusted to 1000mg q12h ext inf per protocol.    Pharmacy will continue to monitor and adjust dose as needed for changes in renal function.      Inderjit Vargas [facility], 12/28/2024 10:10 AM    
Clinical Pharmacy Note  Renal Dose Adjustment    Maribeth Loera is receiving ceftaroline.  This medication is renally eliminated.  Based on the patient's Estimated Creatinine Clearance: 43 mL/min (A) (based on SCr of 1.5 mg/dL (H)). and urine output, the dose has been adjusted to ceftaroline 400 mg IV Q12H per protocol.    Pharmacy will continue to monitor and adjust dose as needed for changes in renal function.      Jennifer Chavez, PharmD, BCPS  1/1/2025 2:08 PM    
Clinical Pharmacy Note  Renal Dose Adjustment    Maribeth Loera is receiving ceftaroline.  This medication is renally eliminated.  Based on the patient's Estimated Creatinine Clearance: 57 mL/min (based on SCr of 1.1 mg/dL). and dx TSS, the dose has been adjusted to 600 gm Q12H per protocol.    Pharmacy will continue to monitor and adjust dose as needed for changes in renal function.      Chiara Anderson RPH, PharmD 1/2/2025 8:58 AM  
Comprehensive Nutrition Assessment    Type and Reason for Visit:  Initial, Positive nutrition screen    Nutrition Recommendations/Plan:   Continue 4 carb diet, monitor intakes/ additional nutrition needs     Malnutrition Assessment:  Malnutrition Status:  No malnutrition (12/31/24 1412)      Nutrition Assessment:    MST 3. Pt presenting w/ septic shock. No recent hx of significant wt loss pta. Pt NPO on admit, eating well since diet advance 12/30. Nutrition related labs reviewed. Current diet order appropriate for pt.    Nutrition Related Findings:    78 glucose, 130 Na Wound Type: None       Current Nutrition Intake & Therapies:    Average Meal Intake: 51-75%  Average Supplements Intake: None Ordered  ADULT DIET; Regular; 4 carb choices (60 gm/meal)    Anthropometric Measures:  Height: 147.3 cm (4' 10\")  Ideal Body Weight (IBW): 90 lbs (41 kg)       Current Body Weight: 63 kg (138 lb 14.2 oz),   IBW.    Current BMI (kg/m2): 29                                  Estimated Daily Nutrient Needs:  Energy Requirements Based On: Kcal/kg  Weight Used for Energy Requirements: Current  Energy (kcal/day): 1260 - 1575 (20 - 25 kcal/kg)  Weight Used for Protein Requirements: Ideal  Protein (g/day): 49 - 61 (1.2 - 1.5 g/kg IBW)  Method Used for Fluid Requirements: 1 ml/kcal  Fluid (ml/day): or per provider    Nutrition Diagnosis:   No nutrition diagnosis at this time     Nutrition Interventions:   Food and/or Nutrient Delivery: Continue Current Diet  Nutrition Education/Counseling: No recommendation at this time  Coordination of Nutrition Care: No recommendation at this time       Goals:  Goals: Meet at least 75% of estimated needs, by next RD assessment  Type of Goal: New goal  Previous Goal Met: New Goal    Nutrition Monitoring and Evaluation:   Behavioral-Environmental Outcomes: None Identified  Food/Nutrient Intake Outcomes: Food and Nutrient Intake  Physical Signs/Symptoms Outcomes: Biochemical Data, Weight, Nutrition 
Consent for blood administration signed with pt and placed in pt's soft chart.   
D: potassium was 3.3; prn orders in place A: this RN replaced with 40 meq po R: will recheck potassium lab as ordered.   
D: pt brought up the fact that she is still experiencing vaginal bleeding. As I am discussing blood administration with pt, she mentioned that she feels as if her drop in blood is from vaginal bleeding. Her menstual cycle is now on the 12th day and she is still experiencing heavy bleeding 2-3 times per day. this is not normal for her menstrual cycle. pt was shy to mention this earlier, but was thinking about why she needs the blood transfusion. Is this part of the toxic shock snydrome? A: this RN sent communication findings to Dr. Kapoor R: will continue to monitor pt  
D: pt is experiencing blood loss and is on heparin A: this RN sent secure message to Dr. Kapoor, asking if we should hold heparin at this time R: waiting on response from physician  
Dr. Kapoor to discuss vaginal bleeding with pt tomorrow. Nursing staff to continue monitoring pt. Pt ok to receive heparin subq for prevention of blood clots. Plan of care discussed with pt at bedside.   
Dr. Roberto Cortez, nephrology, marc with richardson catheter removal. Catheter removed at this time without complication.   
Dr. Sharan Kapoor notified pt's potassium was 3.2 MD ordered oral replacement. MD notified that Ca was 7.6 MD ordered IV replacement.   
Labs reviewed   Full consult to follow 
NAME:  Maribeth Loera  YOB: 1990  MEDICAL RECORD NUMBER:  [id number]    Shift Summary: Pt hypoglycemic at beginning of shift. Treated with D10 bolus. Pt BG stable the rest of shift.     Family updated: No. Pt able to update family herself.     Rhythm: Normal Sinus Rhythm     Most recent vitals:   Visit Vitals  /89   Pulse (!) 104   Temp 97.7 °F (36.5 °C) (Axillary)   Resp 15   Ht 1.473 m (4' 10\")   Wt 60.4 kg (133 lb 2.5 oz)   SpO2 99%   BMI 27.83 kg/m²           No data found.    No data found.      Respiratory support needed (if any):  - RA    Admission weight Weight - Scale: 53.5 kg (117 lb 15.1 oz) (12/27/24 1918)    Today's weight    Wt Readings from Last 1 Encounters:   01/01/25 60.4 kg (133 lb 2.5 oz)        Richardson need assessed each shift: N/A - no richardson present  UOP >30ml/hr: YES  Last documented BM (in last 48 hrs):  Patient Vitals for the past 48 hrs:   Last BM (including prior to admit) Stool Occurrence   12/30/24 2300 12/31/24 --   12/31/24 0144 12/31/24 --   12/31/24 0400 12/31/24 --   12/31/24 0600 12/31/24 --   12/31/24 0800 12/31/24 1   12/31/24 1030 12/31/24 1   12/31/24 1845 12/31/24 1   12/31/24 2114 -- 1   12/31/24 2212 12/31/24 1   01/01/25 0435 -- 1                Restraints (in use currently or dc'd in last 12 hrs): No    Order current and documentation up to date? No    Lines/Drains reviewed @ bedside.  Peripheral IV 12/27/24 Right Antecubital (Active)   Number of days: 4         Drip rates at handoff:    dextrose      sodium chloride Stopped (12/31/24 0856)    dextrose 5 % and 0.45 % NaCl Stopped (12/31/24 0739)       Lab Data:   CBC:   Recent Labs     12/31/24  0545 01/01/25  0435   WBC 14.8* 17.2*   HGB 8.9* 8.1*   HCT 26.7* 24.9*   MCV 80.2 80.5   PLT 75* 84*     BMP:    Recent Labs     12/31/24  1906 01/01/25  0435    143   K 3.5 3.4*   CO2 18* 20*   BUN 38* 34*   CREATININE 1.7* 1.5*     LIVR:   Recent Labs     12/30/24  0601   AST 40*  39*   ALT 21  21 
NAME:  Maribeth Loera  YOB: 1990  MEDICAL RECORD NUMBER:  [id number]    Shift Summary: Pt remains on insulin gtt. Electrolytes replaced.     Family updated: No-Patient able to update family herself    Rhythm: Normal Sinus Rhythm     Most recent vitals:   Visit Vitals  /73   Pulse 94   Temp 97.4 °F (36.3 °C)   Resp 14   Ht 1.473 m (4' 10\")   Wt 58.9 kg (129 lb 13.6 oz)   SpO2 97%   BMI 27.14 kg/m²           No data found.    No data found.      Respiratory support needed (if any):  - RA    Admission weight Weight - Scale: 53.5 kg (117 lb 15.1 oz) (12/27/24 1918)    Today's weight    Wt Readings from Last 1 Encounters:   12/30/24 58.9 kg (129 lb 13.6 oz)        Richardson need assessed each shift: N/A - no richardson present and YES -  - continue richardson r/t - monitoring urine output  UOP >30ml/hr: YES  Last documented BM (in last 48 hrs):  Patient Vitals for the past 48 hrs:   Last BM (including prior to admit)   12/28/24 2000 12/27/24 12/29/24 0800 12/27/24                Restraints (in use currently or dc'd in last 12 hrs): No    Order current and documentation up to date? No    Lines/Drains reviewed @ bedside.  CVC  12/28/24 Right Internal jugular (Active)   Number of days: 2       Peripheral IV 12/27/24 Right Antecubital (Active)   Number of days: 2       Peripheral IV 12/27/24 Left Antecubital (Active)   Number of days: 2       Urinary Catheter 12/28/24 Richardson-Temperature (Active)   Number of days: 2         Drip rates at handoff:    sodium chloride Stopped (12/28/24 2340)    norepinephrine Stopped (12/28/24 1117)    insulin 3.3 Units/hr (12/30/24 0607)    dextrose 5 % and 0.45 % NaCl 150 mL/hr at 12/30/24 0002    sodium chloride Stopped (12/28/24 0544)       Lab Data:   CBC:   Recent Labs     12/27/24 2054 12/29/24  1400   WBC 24.2* 17.7*   HGB 10.5* 8.2*   HCT 33.2* 25.2*   MCV 82.9 80.7   * 65*     BMP:    Recent Labs     12/29/24  2200 12/30/24  0200   * 130*   K 3.2* 3.5   CO2 14* 
NAME:  Maribeth Loera  YOB: 1990  MEDICAL RECORD NUMBER:  [id number]    Shift Summary:  Pt admitted to ICU @ 0540 for DKA and septic shock. Weaning down Levophed steadily.     Family updated: Yes:  updated by pt with personal cell phone, went home from ED.    Rhythm: Sinus Tachycardia     Most recent vitals:   Visit Vitals  /72   Pulse (!) 112   Temp 98.5 °F (36.9 °C)   Resp 13   Ht 1.473 m (4' 10\")   Wt 52.9 kg (116 lb 10 oz)   SpO2 93%   BMI 24.37 kg/m²        Respiratory support needed (if any):  - RA    Admission weight Weight - Scale: 53.5 kg (117 lb 15.1 oz) (12/27/24 1918)    Today's weight    Wt Readings from Last 1 Encounters:   12/28/24 52.9 kg (116 lb 10 oz)        Richardson need assessed each shift: YES -  - continue richardson r/t - placed per nephrology  UOP >30ml/hr: YES initial dump of 500 ml with placement.  Last documented BM (in last 48 hrs):  No data found.             Restraints (in use currently or dc'd in last 12 hrs): No        Lines/Drains reviewed @ bedside.  Peripheral IV 12/27/24 Left Antecubital (Active)   Number of days: 0       Urinary Catheter 12/28/24 Richardson-Temperature (Active)   Number of days: 0         Drip rates at handoff:    sodium chloride Stopped (12/28/24 0608)    norepinephrine 20 mcg/min (12/28/24 0612)    insulin 0.7 Units/hr (12/28/24 0701)    dextrose 5 % and 0.45 % NaCl 150 mL/hr at 12/28/24 0612    sodium chloride Stopped (12/28/24 0544)       Lab Data:   CBC:   Recent Labs     12/27/24 2054   WBC 24.2*   HGB 10.5*   HCT 33.2*   MCV 82.9   *     BMP:    Recent Labs     12/27/24 2054 12/28/24 0418   * 130*   K 5.0 3.8   CO2 13* 12*   BUN 66* 64*   CREATININE 4.7* 4.1*     LIVR:   Recent Labs     12/27/24 2054   AST 50*   ALT 20     PT/INR: No results for input(s): \"INR\" in the last 72 hours.    Invalid input(s): \"PROT\"  APTT: No results for input(s): \"APTT\" in the last 72 hours.  ABG: No results for input(s): \"PHART\", \"OSQ6XVX\", \"PO2ART\" 
NAME:  Maribeth Loera  YOB: 1990  MEDICAL RECORD NUMBER:  [id number]    Shift Summary: Report called to CHIN Man on 3W. Pt transferred to room 3107on 3W. Bedside handoff completed with Chin Man and all questions answered at this time.      Family updated: No    Rhythm: Normal Sinus Rhythm     Most recent vitals:   Visit Vitals  /83   Pulse 96   Temp 98.4 °F (36.9 °C) (Axillary)   Resp 14   Ht 1.473 m (4' 10\")   Wt 60.4 kg (133 lb 2.5 oz)   SpO2 100%   BMI 27.83 kg/m²           No data found.    No data found.      Respiratory support needed (if any):  - RA    Admission weight Weight - Scale: 53.5 kg (117 lb 15.1 oz) (12/27/24 1918)    Today's weight    Wt Readings from Last 1 Encounters:   01/01/25 60.4 kg (133 lb 2.5 oz)        Richardson need assessed each shift: N/A - no richardson present  UOP >30ml/hr: YES  Last documented BM (in last 48 hrs):  Patient Vitals for the past 48 hrs:   Last BM (including prior to admit) Stool Occurrence   12/30/24 2300 12/31/24 --   12/31/24 0144 12/31/24 --   12/31/24 0400 12/31/24 --   12/31/24 0600 12/31/24 --   12/31/24 0800 12/31/24 1   12/31/24 1030 12/31/24 1   12/31/24 1845 12/31/24 1   12/31/24 2114 -- 1   12/31/24 2212 12/31/24 1   01/01/25 0435 -- 1                Restraints (in use currently or dc'd in last 12 hrs): No      Lines/Drains reviewed @ bedside.  Peripheral IV 12/27/24 Right Antecubital (Active)   Number of days: 4       Peripheral IV 01/01/25 Left;Ventral Cephalic (Active)   Number of days: 0         Drip rates at handoff:    dextrose      sodium chloride Stopped (12/31/24 0856)    dextrose 5 % and 0.45 % NaCl Stopped (12/31/24 0739)       Lab Data:   CBC:   Recent Labs     12/31/24  0545 01/01/25  0435   WBC 14.8* 17.2*   HGB 8.9* 8.1*   HCT 26.7* 24.9*   MCV 80.2 80.5   PLT 75* 84*     BMP:    Recent Labs     12/31/24  1906 01/01/25  0435    143   K 3.5 3.4*   CO2 18* 20*   BUN 38* 34*   CREATININE 1.7* 1.5*     LIVR:   Recent Labs     
NAME:  Maribeth Loera  YOB: 1990  MEDICAL RECORD NUMBER:  [id number]    Shift Summary: Pt remains on insulin gtt. BMP still showing open gap and acidosis. Levo weaned off. Antibiotics changed.     Family updated: Yes:  mother (Natalie)    Rhythm: Sinus Tachycardia     Most recent vitals:   Visit Vitals  /73   Pulse (!) 110   Temp 98.4 °F (36.9 °C)   Resp 14   Ht 1.473 m (4' 10\")   Wt 52.9 kg (116 lb 10 oz)   SpO2 95%   BMI 24.37 kg/m²           No data found.    No data found.      Respiratory support needed (if any):  - RA    Admission weight Weight - Scale: 53.5 kg (117 lb 15.1 oz) (12/27/24 1918)    Today's weight    Wt Readings from Last 1 Encounters:   12/28/24 52.9 kg (116 lb 10 oz)        Richardson need assessed each shift: YES -  - continue richardson r/t - critically ill  UOP >30ml/hr: YES  Last documented BM (in last 48 hrs):  Patient Vitals for the past 48 hrs:   Last BM (including prior to admit)   12/28/24 0540 12/27/24                Restraints (in use currently or dc'd in last 12 hrs): No    Order current and documentation up to date? No    Lines/Drains reviewed @ bedside.  CVC  12/28/24 Right Internal jugular (Active)   Number of days: 0       Peripheral IV 12/27/24 Right Antecubital (Active)   Number of days: 0       Peripheral IV 12/27/24 Left Antecubital (Active)   Number of days: 0       Urinary Catheter 12/28/24 Richardson-Temperature (Active)   Number of days: 0         Drip rates at handoff:    sodium chloride 5 mL/hr at 12/28/24 1811    norepinephrine Stopped (12/28/24 1117)    insulin 2.9 Units/hr (12/28/24 1811)    dextrose 5 % and 0.45 % NaCl 150 mL/hr at 12/28/24 1811    sodium chloride Stopped (12/28/24 0544)       Lab Data:   CBC:   Recent Labs     12/27/24  2054   WBC 24.2*   HGB 10.5*   HCT 33.2*   MCV 82.9   *     BMP:    Recent Labs     12/28/24  1130 12/28/24  1500   * 129*   K 4.1 4.3   CO2 14* 14*   BUN 62* 60*   CREATININE 3.9* 3.7*     LIVR:   Recent Labs 
NAME:  Maribeth Loera  YOB: 1990  MEDICAL RECORD NUMBER:  [id number]    Shift Summary: off insulin gtt, moved to sliding scale.no N/V. Patient eating again.A+O x4. PCU downgrade    Family updated: No    Rhythm: Normal Sinus Rhythm     Most recent vitals:   Visit Vitals  /72   Pulse 92   Temp 97.6 °F (36.4 °C) (Bladder)   Resp 12   Ht 1.473 m (4' 10\")   Wt 58.9 kg (129 lb 13.6 oz)   SpO2 97%   BMI 27.14 kg/m²           No data found.    No data found.      Respiratory support needed (if any):  - RA    Admission weight Weight - Scale: 53.5 kg (117 lb 15.1 oz) (12/27/24 1918)    Today's weight    Wt Readings from Last 1 Encounters:   12/30/24 58.9 kg (129 lb 13.6 oz)        Richardson need assessed each shift: YES -  - continue richardson r/t - .  UOP >30ml/hr: YES  Last documented BM (in last 48 hrs):  Patient Vitals for the past 48 hrs:   Last BM (including prior to admit)   12/28/24 2000 12/27/24 12/29/24 0800 12/27/24                Restraints (in use currently or dc'd in last 12 hrs): No    Order current and documentation up to date? No    Lines/Drains reviewed @ bedside.  CVC  12/28/24 Right Internal jugular (Active)   Number of days: 2       Peripheral IV 12/27/24 Right Antecubital (Active)   Number of days: 2       Peripheral IV 12/27/24 Left Antecubital (Active)   Number of days: 2       Urinary Catheter 12/28/24 Richardson-Temperature (Active)   Number of days: 2         Drip rates at handoff:    dextrose      lactated ringers 100 mL/hr at 12/30/24 1353    sodium chloride 5 mL/hr (12/30/24 1653)    dextrose 5 % and 0.45 % NaCl 150 mL/hr at 12/30/24 1443       Lab Data:   CBC:   Recent Labs     12/29/24  1400 12/30/24  0827   WBC 17.7* 14.5*   HGB 8.2* 9.1*   HCT 25.2* 27.5*   MCV 80.7 79.4*   PLT 65* 69*     BMP:    Recent Labs     12/30/24  0601 12/30/24  1457   * 129*   K 3.2*  3.2* 3.3*   CO2 15* 15*   BUN 49* 46*   CREATININE 2.5* 2.4*     LIVR:   Recent Labs     12/29/24  0600 
NAME:  Maribeth Loera  YOB: 1990  MEDICAL RECORD NUMBER:  [id number]    Shift Summary: Pt's CVC and richardson catheter removed. Hypoglycemia and MD modified orders.     Family updated: No, pt phone at bedside.    Rhythm: Normal Sinus Rhythm     Most recent vitals:   Visit Vitals  BP (!) 123/93   Pulse 86   Temp 97.7 °F (36.5 °C) (Axillary)   Resp 14   Ht 1.473 m (4' 10\")   Wt 63 kg (138 lb 14.2 oz)   SpO2 99%   BMI 29.03 kg/m²           No data found.    No data found.      Respiratory support needed (if any):  - RA    Admission weight Weight - Scale: 53.5 kg (117 lb 15.1 oz) (12/27/24 1918)    Today's weight    Wt Readings from Last 1 Encounters:   12/31/24 63 kg (138 lb 14.2 oz)        Richardson need assessed each shift: N/A - no richardson present  UOP >30ml/hr: YES  Last documented BM (in last 48 hrs):  Patient Vitals for the past 48 hrs:   Last BM (including prior to admit) Stool Occurrence   12/30/24 2300 12/31/24 --   12/31/24 0144 12/31/24 --   12/31/24 0400 12/31/24 --   12/31/24 0600 12/31/24 --   12/31/24 0800 12/31/24 1   12/31/24 1030 12/31/24 1                Restraints (in use currently or dc'd in last 12 hrs): No      Lines/Drains reviewed @ bedside.  Peripheral IV 12/27/24 Right Antecubital (Active)   Number of days: 3         Drip rates at handoff:    lactated ringers Stopped (12/31/24 1009)    dextrose      sodium chloride Stopped (12/31/24 0856)    dextrose 5 % and 0.45 % NaCl Stopped (12/31/24 0739)       Lab Data:   CBC:   Recent Labs     12/30/24  0827 12/31/24  0545   WBC 14.5* 14.8*   HGB 9.1* 8.9*   HCT 27.5* 26.7*   MCV 79.4* 80.2   PLT 69* 75*     BMP:    Recent Labs     12/30/24  1457 12/31/24  0546   * 130*   K 3.3* 3.2*   CO2 15* 16*   BUN 46* 43*   CREATININE 2.4* 2.0*     LIVR:   Recent Labs     12/29/24  0600 12/30/24  0601   * 40*  39*   ALT 29 21  21     PT/INR: No results for input(s): \"INR\" in the last 72 hours.    Invalid input(s): \"PROT\"  APTT: No results for 
NAME:  Maribeth Loera  YOB: 1990  MEDICAL RECORD NUMBER:  [id number]    Shift Summary:  Pt remains on insulin gtt. BMP still showing open gap and acidosis. GI consulted for elevated LFTs.     Family updated: Yes:  mother    Rhythm: Normal Sinus Rhythm     Most recent vitals:   Visit Vitals  /77   Pulse 96   Temp 97 °F (36.1 °C)   Resp 14   Ht 1.473 m (4' 10\")   Wt 53 kg (116 lb 13.5 oz)   SpO2 97%   BMI 24.42 kg/m²           No data found.    No data found.      Respiratory support needed (if any):  - RA    Admission weight Weight - Scale: 53.5 kg (117 lb 15.1 oz) (12/27/24 1918)    Today's weight    Wt Readings from Last 1 Encounters:   12/29/24 53 kg (116 lb 13.5 oz)        Richardson need assessed each shift: YES -  - continue richardson r/t - monitoring urine output  UOP >30ml/hr: YES  Last documented BM (in last 48 hrs):  Patient Vitals for the past 48 hrs:   Last BM (including prior to admit)   12/28/24 0540 12/27/24 12/28/24 2000 12/27/24 12/29/24 0800 12/27/24                Restraints (in use currently or dc'd in last 12 hrs): No    Order current and documentation up to date? No    Lines/Drains reviewed @ bedside.  CVC  12/28/24 Right Internal jugular (Active)   Number of days: 1       Peripheral IV 12/27/24 Right Antecubital (Active)   Number of days: 1       Peripheral IV 12/27/24 Left Antecubital (Active)   Number of days: 1       Urinary Catheter 12/28/24 Richardson-Temperature (Active)   Number of days: 1         Drip rates at handoff:    sodium chloride Stopped (12/28/24 2340)    norepinephrine Stopped (12/28/24 1117)    insulin 0.8 Units/hr (12/29/24 1812)    dextrose 5 % and 0.45 % NaCl 150 mL/hr at 12/29/24 1812    sodium chloride Stopped (12/28/24 0544)       Lab Data:   CBC:   Recent Labs     12/27/24  2054 12/29/24  1400   WBC 24.2* 17.7*   HGB 10.5* 8.2*   HCT 33.2* 25.2*   MCV 82.9 80.7   * 65*     BMP:    Recent Labs     12/29/24  1000 12/29/24  1400   * 125*   K 3.6 
No bowel movement during this shift. Will continue to monitor pt.   
Occupational Therapy  Facility/Department: [address] ORTHOPEDICS  Occupational Therapy Treatment Note    Name: Maribeth Loera  : 1990  MRN: [id number]  Date of Service: 1/3/2025    Discharge Recommendations:  24 hour supervision or assist  OT Equipment Recommendations  Other: TBD     Maribeth Loera scored a / on the AM-PAC ADL Inpatient form.  At this time, no further OT is recommended upon discharge. Recommend patient returns to prior setting with prior services.      Patient Diagnosis(es): The primary encounter diagnosis was Diabetic ketoacidosis without coma associated with type 1 diabetes mellitus (HCC). Diagnoses of Jaundice, Dehydration, Urinary tract infection with hematuria, site unspecified, Septic shock (HCC), PRANEETH (acute kidney injury) (HCC), Toxic shock syndrome (TSS) (HCC), and Thrombocytopenia (HCC) were also pertinent to this visit.  Past Medical History:  has a past medical history of Depression, Diabetic ketoacidosis without coma associated with type 1 diabetes mellitus (HCC), MRSA bacteremia, and Seizures (HCC).  Past Surgical History:  has a past surgical history that includes  section () and Tubal ligation.    Treatment Diagnosis: impaired ADL status, strength/ROM, fxl mobility    Assessment  Performance deficits / Impairments: Decreased functional mobility ;Decreased ADL status;Decreased ROM;Decreased strength;Decreased cognition;Decreased endurance;Decreased balance;Decreased high-level IADLs;Decreased sensation  Assessment: PTA, pt lives with family and IND with ADL, iADL and fxl mobility with no AD. Today, pt tolerates session fair and progressing well. Pt SUP for bed mobility and SBA for fxl tx, fxl mobility no AD household distances, and sink side grooming x8 min in stance. Cont acute OT to address above deficits. Anticipate pt is safe for d/c home when medically stable with the assist of family.  Treatment Diagnosis: impaired ADL status, strength/ROM, fxl 
Occupational Therapy  Facility/Department: [address] ORTHOPEDICS  Occupational Therapy Initial Assessment    Name: Maribeth Loera  : 1990  MRN: [id number]  Date of Service: 2025    Discharge Recommendations:  24 hour supervision or assist  OT Equipment Recommendations  Other: TBD     Maribeth Loera scored a 19/24 on the AM-PAC ADL Inpatient form. Current research shows that an AM-PAC score of 18 or greater is typically associated with a discharge to the patient's home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 LARGE 12/27/2024 08:10 PM    UROBILINOGEN 1.0 12/27/2024 08:10 PM    BILIRUBINUR MODERATE 12/27/2024 08:10 PM    BLOODU LARGE 12/27/2024 08:10 PM    GLUCOSEU 250 12/27/2024 08:10 PM    GLUCOSEU >=1000 01/17/2011 09:15 AM    KETUA 15 12/27/2024 08:10 PM     Urine Cultures:   Lab Results   Component Value Date/Time    LABURIN >100,000 CFU/ml 12/27/2024 08:10 PM     Blood Cultures:   Lab Results   Component Value Date/Time    BC  12/27/2024 10:08 PM     No Growth to date.  Any change in status will be called.     Lab Results   Component Value Date/Time    BLOODCULT2 No Growth after 4 days of incubation. 06/10/2024 01:07 PM     Organism:   Lab Results   Component Value Date/Time    ORG Staph aureus MRSA 12/28/2024 08:27 AM         Electronically signed by Sharan Kapoor DO on 12/29/2024 at 7:45 AM    
XRAY VIEW OF THE CHEST 12/27/2024 7:34 pm COMPARISON: CXR dated 06/10/2024 HISTORY: ORDERING SYSTEM PROVIDED HISTORY: Shortness of Breath TECHNOLOGIST PROVIDED HISTORY: Reason for exam:->Shortness of Breath FINDINGS: Medical devices: None. Mediastinum/Heart: The mediastinal contours are normal. The heart appears normal in size. Lungs: Bilateral peribronchial thickening.  No focal opacities. Pleura: No pleural effusion. No pneumothorax.     Bilateral peribronchial thickening, which can be seen in the setting of bronchitis or reactive airway disease.       CBC:   Recent Labs     01/03/25  0531 01/04/25  0436 01/05/25 0544   WBC 15.4* 12.3* 11.5*   HGB 7.2* 7.1* 7.0*    300 382     BMP:    Recent Labs     01/03/25  0531 01/04/25  0436 01/05/25  0544    139 140   K 4.1 3.8 3.3*    104 101   CO2 25 25 27   BUN 12 6* 5*   CREATININE 0.9 0.9 0.8   GLUCOSE 266* 306* 242*     Hepatic:   Recent Labs     01/04/25 0436   AST 14*   ALT 7*   BILITOT 0.6   ALKPHOS 218*       Lipids:   Lab Results   Component Value Date/Time    CHOL 154 01/04/2025 04:36 AM    HDL 25 01/04/2025 04:36 AM    HDL 50 01/18/2011 11:35 AM    TRIG 231 01/04/2025 04:36 AM     Hemoglobin A1C:   Lab Results   Component Value Date/Time    LABA1C 9.8 12/31/2024 05:45 AM     TSH:   Lab Results   Component Value Date/Time    TSH 3.06 04/19/2023 12:19 PM     Troponin: No results found for: \"TROPONINT\"  Lactic Acid:   No results for input(s): \"LACTA\" in the last 72 hours.    BNP: No results for input(s): \"PROBNP\" in the last 72 hours.  UA:  Lab Results   Component Value Date/Time    NITRU Negative 12/27/2024 08:10 PM    COLORU DARK YELLOW 12/27/2024 08:10 PM    PHUR 5.5 12/28/2024 01:07 PM    PHUR 5.0 04/05/2024 07:29 AM    PHUR 5.0 04/05/2024 07:29 AM    WBCUA 854 12/27/2024 08:10 PM    RBCUA 105 12/27/2024 08:10 PM    MUCUS Rare 05/24/2022 08:41 PM    YEAST Present 01/24/2023 04:38 PM    BACTERIA 4+ 12/27/2024 08:10 PM    CLARITYU TURBID 
  Final Result   1. Trace left pleural effusion with bibasilar opacities favoring pneumonia   over atelectasis.   2. Mild wall thickening throughout the colon may represent colitis.   3. Hepatomegaly with steatosis.         XR CHEST PORTABLE   Final Result   1. Right internal jugular central venous catheter tip terminates at the caval   atrial junction.   2. Bibasilar opacities are favored to represent infection over atelectasis.         XR CHEST 1 VIEW   Final Result   Bilateral peribronchial thickening, which can be seen in the setting of   bronchitis or reactive airway disease.             Medications: All current and past medications were reviewed.     citric acid-sodium citrate  30 mL Oral BID    insulin lispro  5 Units SubCUTAneous TID WC    insulin glargine  23 Units SubCUTAneous Daily    insulin lispro  0-4 Units SubCUTAneous 4x Daily WC    ceftaroline fosamil (TEFLARO) IVPB  300 mg IntraVENous Q12H    metroNIDAZOLE  500 mg IntraVENous Q8H    sodium chloride flush  5-40 mL IntraVENous 2 times per day    heparin (porcine)  5,000 Units SubCUTAneous 3 times per day    clonazePAM  2 mg Oral BID    levETIRAcetam  1,000 mg Oral BID    pantoprazole  40 mg Oral QAM AC    venlafaxine  150 mg Oral Daily    vitamin B-12  1,000 mcg Oral Daily    [START ON 1/3/2025] vitamin D  50,000 Units Oral Weekly    gabapentin  100 mg Oral TID        lactated ringers Stopped (12/31/24 1009)    dextrose      sodium chloride Stopped (12/31/24 0856)    dextrose 5 % and 0.45 % NaCl Stopped (12/31/24 0739)       bismuth subsalicylate, LORazepam, glucose, dextrose bolus **OR** dextrose bolus, glucagon (rDNA), dextrose, sodium chloride flush, sodium chloride, acetaminophen **OR** acetaminophen, prochlorperazine, dextrose 5 % and 0.45 % NaCl      Problem list:       Patient Active Problem List   Diagnosis Code    Seizure disorder (HCC) G40.909    Vitamin D deficiency E55.9    ROME (generalized anxiety disorder) F41.1    History of anorexia 
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 Although every effort was made to ensure the accuracy of this automated transcription, some errors in transcription may have occurred inadvertently. If you may need any clarification, please do not hesitate to contact me through EPIC or at the phone number provided below with my electronic signature.  Any pictures or media included in this note were obtained after taking informed verbal consent from the patient and with their approval to include those in the patient's medical record.        Don Tate MD, MPH, FACP, [facility]  1/2/2025, 6:59 PM  Central Office Phone: [phone]  Central Office Fax: [phone]    [facility]   [address], [address] ([address])  [place] [address]  [facility] days:  Tuesday & Thursday AM    [facility]  [address] , [address] (Medical office Building)  [place], [place], [address]  [facility] days: Wednesday AM

## 2025-01-06 NOTE — PLAN OF CARE
Problem: Chronic Conditions and Co-morbidities  Goal: Patient's chronic conditions and co-morbidity symptoms are monitored and maintained or improved  1/1/2025 0903 by Nani Mendoza, RN  Outcome: Progressing  Flowsheets (Taken 1/1/2025 0845)  Care Plan - Patient's Chronic Conditions and Co-Morbidity Symptoms are Monitored and Maintained or Improved:   Monitor and assess patient's chronic conditions and comorbid symptoms for stability, deterioration, or improvement   Collaborate with multidisciplinary team to address chronic and comorbid conditions and prevent exacerbation or deterioration   Update acute care plan with appropriate goals if chronic or comorbid symptoms are exacerbated and prevent overall improvement and discharge     Problem: Discharge Planning  Goal: Discharge to home or other facility with appropriate resources  1/1/2025 0903 by Nani Mendoza, RN  Outcome: Progressing  Flowsheets (Taken 1/1/2025 0845)  Discharge to home or other facility with appropriate resources:   Identify barriers to discharge with patient and caregiver   Arrange for needed discharge resources and transportation as appropriate     Problem: Pain  Goal: Verbalizes/displays adequate comfort level or baseline comfort level  1/1/2025 0903 by Nani Mendoza, RN  Outcome: Progressing     Problem: Safety - Adult  Goal: Free from fall injury  1/1/2025 0903 by Nani Mendoza, RN  Outcome: Progressing     Problem: ABCDS Injury Assessment  Goal: Absence of physical injury  1/1/2025 0903 by Nani Mendoza, RN  Outcome: Progressing  Flowsheets (Taken 1/1/2025 0900)  Absence of Physical Injury: Implement safety measures based on patient assessment     
  Problem: Chronic Conditions and Co-morbidities  Goal: Patient's chronic conditions and co-morbidity symptoms are monitored and maintained or improved  1/1/2025 1223 by Lisa Henry RN  Outcome: Progressing  Flowsheets (Taken 1/1/2025 0845 by Nani Mendoza, RN)  Care Plan - Patient's Chronic Conditions and Co-Morbidity Symptoms are Monitored and Maintained or Improved:   Monitor and assess patient's chronic conditions and comorbid symptoms for stability, deterioration, or improvement   Collaborate with multidisciplinary team to address chronic and comorbid conditions and prevent exacerbation or deterioration   Update acute care plan with appropriate goals if chronic or comorbid symptoms are exacerbated and prevent overall improvement and discharge  1/1/2025 0903 by Nani Mendoza RN  Outcome: Progressing  Flowsheets (Taken 1/1/2025 0845)  Care Plan - Patient's Chronic Conditions and Co-Morbidity Symptoms are Monitored and Maintained or Improved:   Monitor and assess patient's chronic conditions and comorbid symptoms for stability, deterioration, or improvement   Collaborate with multidisciplinary team to address chronic and comorbid conditions and prevent exacerbation or deterioration   Update acute care plan with appropriate goals if chronic or comorbid symptoms are exacerbated and prevent overall improvement and discharge  1/1/2025 0346 by Nica Akers RN  Outcome: Progressing     Problem: Discharge Planning  Goal: Discharge to home or other facility with appropriate resources  1/1/2025 1223 by Lisa Henry RN  Outcome: Progressing  Flowsheets (Taken 1/1/2025 0845 by aNni Mendoza, RN)  Discharge to home or other facility with appropriate resources:   Identify barriers to discharge with patient and caregiver   Arrange for needed discharge resources and transportation as appropriate  1/1/2025 0903 by Nani Mendoza, RN  Outcome: Progressing  Flowsheets (Taken 1/1/2025 0845)  Discharge to home or other 
  Problem: Chronic Conditions and Co-morbidities  Goal: Patient's chronic conditions and co-morbidity symptoms are monitored and maintained or improved  1/1/2025 1957 by Anna Marie Francisco RN  Outcome: Progressing  Flowsheets (Taken 1/1/2025 0845 by Nani Mendoza, RN)  Care Plan - Patient's Chronic Conditions and Co-Morbidity Symptoms are Monitored and Maintained or Improved:   Monitor and assess patient's chronic conditions and comorbid symptoms for stability, deterioration, or improvement   Collaborate with multidisciplinary team to address chronic and comorbid conditions and prevent exacerbation or deterioration   Update acute care plan with appropriate goals if chronic or comorbid symptoms are exacerbated and prevent overall improvement and discharge  1/1/2025 1223 by Lisa Henry RN  Outcome: Progressing  Flowsheets (Taken 1/1/2025 0845 by Nani Mendoza RN)  Care Plan - Patient's Chronic Conditions and Co-Morbidity Symptoms are Monitored and Maintained or Improved:   Monitor and assess patient's chronic conditions and comorbid symptoms for stability, deterioration, or improvement   Collaborate with multidisciplinary team to address chronic and comorbid conditions and prevent exacerbation or deterioration   Update acute care plan with appropriate goals if chronic or comorbid symptoms are exacerbated and prevent overall improvement and discharge  1/1/2025 0903 by Nani Mendoza, RN  Outcome: Progressing  Flowsheets (Taken 1/1/2025 0845)  Care Plan - Patient's Chronic Conditions and Co-Morbidity Symptoms are Monitored and Maintained or Improved:   Monitor and assess patient's chronic conditions and comorbid symptoms for stability, deterioration, or improvement   Collaborate with multidisciplinary team to address chronic and comorbid conditions and prevent exacerbation or deterioration   Update acute care plan with appropriate goals if chronic or comorbid symptoms are exacerbated and prevent overall improvement and 
  Problem: Chronic Conditions and Co-morbidities  Goal: Patient's chronic conditions and co-morbidity symptoms are monitored and maintained or improved  1/2/2025 0722 by Lisa Henry RN  Outcome: Progressing  Flowsheets (Taken 1/1/2025 0845 by Nani Mendoza, RN)  Care Plan - Patient's Chronic Conditions and Co-Morbidity Symptoms are Monitored and Maintained or Improved:   Monitor and assess patient's chronic conditions and comorbid symptoms for stability, deterioration, or improvement   Collaborate with multidisciplinary team to address chronic and comorbid conditions and prevent exacerbation or deterioration   Update acute care plan with appropriate goals if chronic or comorbid symptoms are exacerbated and prevent overall improvement and discharge  1/1/2025 1957 by Anna Marie Francisco RN  Outcome: Progressing  Flowsheets (Taken 1/1/2025 0845 by Nani Mendoza, RN)  Care Plan - Patient's Chronic Conditions and Co-Morbidity Symptoms are Monitored and Maintained or Improved:   Monitor and assess patient's chronic conditions and comorbid symptoms for stability, deterioration, or improvement   Collaborate with multidisciplinary team to address chronic and comorbid conditions and prevent exacerbation or deterioration   Update acute care plan with appropriate goals if chronic or comorbid symptoms are exacerbated and prevent overall improvement and discharge     Problem: Discharge Planning  Goal: Discharge to home or other facility with appropriate resources  1/2/2025 0722 by Lisa Henry RN  Outcome: Progressing  Flowsheets (Taken 1/2/2025 0722)  Discharge to home or other facility with appropriate resources:   Identify barriers to discharge with patient and caregiver   Arrange for needed discharge resources and transportation as appropriate   Identify discharge learning needs (meds, wound care, etc)  1/1/2025 1957 by Anna Marie Francisco RN  Outcome: Progressing  Flowsheets (Taken 1/1/2025 0845 by Nani Mendoza, CHIN)  Discharge to 
  Problem: Chronic Conditions and Co-morbidities  Goal: Patient's chronic conditions and co-morbidity symptoms are monitored and maintained or improved  1/2/2025 2001 by Anna Marie Francisco RN  Outcome: Progressing  Flowsheets (Taken 1/1/2025 0845 by Nani Mendoza, RN)  Care Plan - Patient's Chronic Conditions and Co-Morbidity Symptoms are Monitored and Maintained or Improved:   Monitor and assess patient's chronic conditions and comorbid symptoms for stability, deterioration, or improvement   Collaborate with multidisciplinary team to address chronic and comorbid conditions and prevent exacerbation or deterioration   Update acute care plan with appropriate goals if chronic or comorbid symptoms are exacerbated and prevent overall improvement and discharge  1/2/2025 0722 by Lisa Henry RN  Outcome: Progressing  Flowsheets (Taken 1/1/2025 0845 by Nani Mendoza, RN)  Care Plan - Patient's Chronic Conditions and Co-Morbidity Symptoms are Monitored and Maintained or Improved:   Monitor and assess patient's chronic conditions and comorbid symptoms for stability, deterioration, or improvement   Collaborate with multidisciplinary team to address chronic and comorbid conditions and prevent exacerbation or deterioration   Update acute care plan with appropriate goals if chronic or comorbid symptoms are exacerbated and prevent overall improvement and discharge     Problem: Discharge Planning  Goal: Discharge to home or other facility with appropriate resources  1/2/2025 2001 by Anna Marie Francisco RN  Outcome: Progressing  Flowsheets (Taken 1/2/2025 0722 by Lisa Henry, RN)  Discharge to home or other facility with appropriate resources:   Identify barriers to discharge with patient and caregiver   Arrange for needed discharge resources and transportation as appropriate   Identify discharge learning needs (meds, wound care, etc)  1/2/2025 0722 by Lisa Henry, RN  Outcome: Progressing  Flowsheets (Taken 1/2/2025 0722)  Discharge to 
  Problem: Chronic Conditions and Co-morbidities  Goal: Patient's chronic conditions and co-morbidity symptoms are monitored and maintained or improved  1/3/2025 0738 by Marian Zavala RN  Outcome: Progressing  Flowsheets (Taken 1/3/2025 0738)  Care Plan - Patient's Chronic Conditions and Co-Morbidity Symptoms are Monitored and Maintained or Improved:   Monitor and assess patient's chronic conditions and comorbid symptoms for stability, deterioration, or improvement   Collaborate with multidisciplinary team to address chronic and comorbid conditions and prevent exacerbation or deterioration   Update acute care plan with appropriate goals if chronic or comorbid symptoms are exacerbated and prevent overall improvement and discharge  1/2/2025 2001 by Anna Marie Francisco RN  Outcome: Progressing  Flowsheets (Taken 1/1/2025 0845 by Nani Mendoza, RN)  Care Plan - Patient's Chronic Conditions and Co-Morbidity Symptoms are Monitored and Maintained or Improved:   Monitor and assess patient's chronic conditions and comorbid symptoms for stability, deterioration, or improvement   Collaborate with multidisciplinary team to address chronic and comorbid conditions and prevent exacerbation or deterioration   Update acute care plan with appropriate goals if chronic or comorbid symptoms are exacerbated and prevent overall improvement and discharge     Problem: Discharge Planning  Goal: Discharge to home or other facility with appropriate resources  1/3/2025 0738 by Marian Zavala RN  Outcome: Progressing  Flowsheets (Taken 1/3/2025 0738)  Discharge to home or other facility with appropriate resources:   Identify barriers to discharge with patient and caregiver   Identify discharge learning needs (meds, wound care, etc)  1/2/2025 2001 by Anna Marie Francisco RN  Outcome: Progressing  Flowsheets (Taken 1/2/2025 0722 by Lisa Henry, RN)  Discharge to home or other facility with appropriate resources:   Identify barriers to discharge with 
  Problem: Chronic Conditions and Co-morbidities  Goal: Patient's chronic conditions and co-morbidity symptoms are monitored and maintained or improved  1/5/2025 0031 by Naa Longoria RN  Outcome: Progressing  1/4/2025 1730 by Sparkle Cross RN  Outcome: Progressing     Problem: Discharge Planning  Goal: Discharge to home or other facility with appropriate resources  1/5/2025 0031 by Naa Longoria RN  Outcome: Progressing  1/4/2025 1730 by Sparkle Cross RN  Outcome: Progressing     Problem: Pain  Goal: Verbalizes/displays adequate comfort level or baseline comfort level  1/5/2025 0031 by Naa Longoria RN  Outcome: Progressing  1/4/2025 1730 by Sparkle Cross RN  Outcome: Progressing     Problem: Safety - Adult  Goal: Free from fall injury  1/5/2025 0031 by Naa Longoria RN  Outcome: Progressing  Flowsheets (Taken 1/4/2025 1928)  Free From Fall Injury: Instruct family/caregiver on patient safety  1/4/2025 1730 by Sparkle Cross RN  Outcome: Progressing     Problem: ABCDS Injury Assessment  Goal: Absence of physical injury  1/5/2025 0031 by Naa Longoria RN  Outcome: Progressing  1/4/2025 1730 by Sparkle Cross RN  Outcome: Progressing     
  Problem: Chronic Conditions and Co-morbidities  Goal: Patient's chronic conditions and co-morbidity symptoms are monitored and maintained or improved  1/5/2025 1049 by Sparkle Cross RN  Outcome: Progressing  
  Problem: Chronic Conditions and Co-morbidities  Goal: Patient's chronic conditions and co-morbidity symptoms are monitored and maintained or improved  12/29/2024 0937 by Kerley, Jessica L, RN  Outcome: Progressing  Flowsheets (Taken 12/29/2024 0800)  Care Plan - Patient's Chronic Conditions and Co-Morbidity Symptoms are Monitored and Maintained or Improved: Monitor and assess patient's chronic conditions and comorbid symptoms for stability, deterioration, or improvement  12/28/2024 2027 by Connie Holden RN  Outcome: Progressing  Flowsheets (Taken 12/28/2024 2000)  Care Plan - Patient's Chronic Conditions and Co-Morbidity Symptoms are Monitored and Maintained or Improved: Monitor and assess patient's chronic conditions and comorbid symptoms for stability, deterioration, or improvement     Problem: Discharge Planning  Goal: Discharge to home or other facility with appropriate resources  12/29/2024 0937 by Kerley, Jessica L, RN  Outcome: Progressing  Flowsheets (Taken 12/29/2024 0800)  Discharge to home or other facility with appropriate resources: Identify barriers to discharge with patient and caregiver  12/28/2024 2027 by Connie Holden RN  Outcome: Progressing  Flowsheets (Taken 12/28/2024 2000)  Discharge to home or other facility with appropriate resources: Identify barriers to discharge with patient and caregiver     Problem: Pain  Goal: Verbalizes/displays adequate comfort level or baseline comfort level  12/29/2024 0937 by Kerley, Jessica L, RN  Outcome: Progressing  12/28/2024 2027 by Connie Holden RN  Outcome: Progressing  Flowsheets (Taken 12/28/2024 0800 by Kerley, Jessica L, RN)  Verbalizes/displays adequate comfort level or baseline comfort level:   Encourage patient to monitor pain and request assistance   Administer analgesics based on type and severity of pain and evaluate response   Assess pain using appropriate pain scale     Problem: Safety - Adult  Goal: Free from fall injury  12/29/2024 0937 by 
  Problem: Chronic Conditions and Co-morbidities  Goal: Patient's chronic conditions and co-morbidity symptoms are monitored and maintained or improved  12/30/2024 1238 by Allen Montgomery RN  Outcome: Progressing  12/30/2024 0520 by Nica Akers RN  Outcome: Progressing  Flowsheets (Taken 12/29/2024 2000)  Care Plan - Patient's Chronic Conditions and Co-Morbidity Symptoms are Monitored and Maintained or Improved: Monitor and assess patient's chronic conditions and comorbid symptoms for stability, deterioration, or improvement     Problem: Discharge Planning  Goal: Discharge to home or other facility with appropriate resources  12/30/2024 1238 by Allen Montgomery RN  Outcome: Progressing  12/30/2024 0520 by Nica Akers RN  Outcome: Progressing  Flowsheets  Taken 12/30/2024 0520  Discharge to home or other facility with appropriate resources: Identify barriers to discharge with patient and caregiver  Taken 12/29/2024 2000  Discharge to home or other facility with appropriate resources: Identify barriers to discharge with patient and caregiver     Problem: Pain  Goal: Verbalizes/displays adequate comfort level or baseline comfort level  12/30/2024 1238 by Allen Montgomery RN  Outcome: Progressing  12/30/2024 0520 by Nica Akers RN  Outcome: Progressing  Flowsheets (Taken 12/30/2024 0000)  Verbalizes/displays adequate comfort level or baseline comfort level:   Encourage patient to monitor pain and request assistance   Assess pain using appropriate pain scale     Problem: Safety - Adult  Goal: Free from fall injury  12/30/2024 1238 by Allen Montgomery RN  Outcome: Progressing  12/30/2024 0520 by Nica Akers RN  Outcome: Progressing  Flowsheets (Taken 12/30/2024 0519)  Free From Fall Injury: Instruct family/caregiver on patient safety     Problem: ABCDS Injury Assessment  Goal: Absence of physical injury  12/30/2024 1238 by Allen Montgomery RN  Outcome: Progressing  12/30/2024 0520 by Nica Akers RN  Outcome: 
  Problem: Chronic Conditions and Co-morbidities  Goal: Patient's chronic conditions and co-morbidity symptoms are monitored and maintained or improved  Outcome: Progressing     
  Problem: Chronic Conditions and Co-morbidities  Goal: Patient's chronic conditions and co-morbidity symptoms are monitored and maintained or improved  Outcome: Progressing     Problem: Discharge Planning  Goal: Discharge to home or other facility with appropriate resources  Outcome: Progressing     Problem: Pain  Goal: Verbalizes/displays adequate comfort level or baseline comfort level  Outcome: Progressing     Problem: Safety - Adult  Goal: Free from fall injury  Outcome: Progressing  Flowsheets (Taken 1/3/2025 2349)  Free From Fall Injury: Instruct family/caregiver on patient safety     Problem: ABCDS Injury Assessment  Goal: Absence of physical injury  Outcome: Progressing  Flowsheets (Taken 1/3/2025 2348)  Absence of Physical Injury: Implement safety measures based on patient assessment     
  Problem: Chronic Conditions and Co-morbidities  Goal: Patient's chronic conditions and co-morbidity symptoms are monitored and maintained or improved  Outcome: Progressing     Problem: Discharge Planning  Goal: Discharge to home or other facility with appropriate resources  Outcome: Progressing     Problem: Pain  Goal: Verbalizes/displays adequate comfort level or baseline comfort level  Outcome: Progressing  Flowsheets  Taken 1/1/2025 0030  Verbalizes/displays adequate comfort level or baseline comfort level: Encourage patient to monitor pain and request assistance  Taken 12/31/2024 2000  Verbalizes/displays adequate comfort level or baseline comfort level: Encourage patient to monitor pain and request assistance     Problem: Safety - Adult  Goal: Free from fall injury  Outcome: Progressing  Flowsheets (Taken 1/1/2025 0341)  Free From Fall Injury: Based on caregiver fall risk screen, instruct family/caregiver to ask for assistance with transferring infant if caregiver noted to have fall risk factors     Problem: ABCDS Injury Assessment  Goal: Absence of physical injury  Outcome: Progressing  Flowsheets (Taken 1/1/2025 0341)  Absence of Physical Injury: Implement safety measures based on patient assessment     
  Problem: Chronic Conditions and Co-morbidities  Goal: Patient's chronic conditions and co-morbidity symptoms are monitored and maintained or improved  Outcome: Progressing     Problem: Discharge Planning  Goal: Discharge to home or other facility with appropriate resources  Outcome: Progressing     Problem: Pain  Goal: Verbalizes/displays adequate comfort level or baseline comfort level  Outcome: Progressing  Flowsheets (Taken 12/28/2024 0800 by Kerley, Jessica L, RN)  Verbalizes/displays adequate comfort level or baseline comfort level:   Encourage patient to monitor pain and request assistance   Administer analgesics based on type and severity of pain and evaluate response   Assess pain using appropriate pain scale     Problem: Safety - Adult  Goal: Free from fall injury  Outcome: Progressing     Problem: ABCDS Injury Assessment  Goal: Absence of physical injury  Outcome: Progressing     
  Problem: Chronic Conditions and Co-morbidities  Goal: Patient's chronic conditions and co-morbidity symptoms are monitored and maintained or improved  Outcome: Progressing  Flowsheets (Taken 1/5/2025 2040)  Care Plan - Patient's Chronic Conditions and Co-Morbidity Symptoms are Monitored and Maintained or Improved: Monitor and assess patient's chronic conditions and comorbid symptoms for stability, deterioration, or improvement     Problem: Discharge Planning  Goal: Discharge to home or other facility with appropriate resources  Outcome: Progressing  Flowsheets (Taken 1/5/2025 2040)  Discharge to home or other facility with appropriate resources:   Identify barriers to discharge with patient and caregiver   Arrange for needed discharge resources and transportation as appropriate   Identify discharge learning needs (meds, wound care, etc)   Refer to discharge planning if patient needs post-hospital services based on physician order or complex needs related to functional status, cognitive ability or social support system     Problem: Pain  Goal: Verbalizes/displays adequate comfort level or baseline comfort level  Outcome: Progressing     Problem: Safety - Adult  Goal: Free from fall injury  Outcome: Progressing  Flowsheets (Taken 1/6/2025 0048)  Free From Fall Injury: Instruct family/caregiver on patient safety     Problem: ABCDS Injury Assessment  Goal: Absence of physical injury  Outcome: Progressing  Flowsheets (Taken 1/6/2025 0048)  Absence of Physical Injury: Implement safety measures based on patient assessment     
  Problem: Chronic Conditions and Co-morbidities  Goal: Patient's chronic conditions and co-morbidity symptoms are monitored and maintained or improved  Outcome: Progressing  Flowsheets (Taken 12/29/2024 2000)  Care Plan - Patient's Chronic Conditions and Co-Morbidity Symptoms are Monitored and Maintained or Improved: Monitor and assess patient's chronic conditions and comorbid symptoms for stability, deterioration, or improvement     Problem: Discharge Planning  Goal: Discharge to home or other facility with appropriate resources  Outcome: Progressing  Flowsheets  Taken 12/30/2024 0520  Discharge to home or other facility with appropriate resources: Identify barriers to discharge with patient and caregiver  Taken 12/29/2024 2000  Discharge to home or other facility with appropriate resources: Identify barriers to discharge with patient and caregiver     Problem: Pain  Goal: Verbalizes/displays adequate comfort level or baseline comfort level  Outcome: Progressing  Flowsheets (Taken 12/30/2024 0000)  Verbalizes/displays adequate comfort level or baseline comfort level:   Encourage patient to monitor pain and request assistance   Assess pain using appropriate pain scale     Problem: Safety - Adult  Goal: Free from fall injury  Outcome: Progressing  Flowsheets (Taken 12/30/2024 0519)  Free From Fall Injury: Instruct family/caregiver on patient safety     Problem: ABCDS Injury Assessment  Goal: Absence of physical injury  Outcome: Progressing  Flowsheets (Taken 12/30/2024 0519)  Absence of Physical Injury: Implement safety measures based on patient assessment     
  Problem: Chronic Conditions and Co-morbidities  Goal: Patient's chronic conditions and co-morbidity symptoms are monitored and maintained or improved  Outcome: Progressing  Flowsheets (Taken 12/31/2024 0815)  Care Plan - Patient's Chronic Conditions and Co-Morbidity Symptoms are Monitored and Maintained or Improved:   Monitor and assess patient's chronic conditions and comorbid symptoms for stability, deterioration, or improvement   Collaborate with multidisciplinary team to address chronic and comorbid conditions and prevent exacerbation or deterioration   Update acute care plan with appropriate goals if chronic or comorbid symptoms are exacerbated and prevent overall improvement and discharge     Problem: Discharge Planning  Goal: Discharge to home or other facility with appropriate resources  Outcome: Progressing  Flowsheets (Taken 12/31/2024 0815)  Discharge to home or other facility with appropriate resources:   Identify barriers to discharge with patient and caregiver   Arrange for needed discharge resources and transportation as appropriate     Problem: Pain  Goal: Verbalizes/displays adequate comfort level or baseline comfort level  Outcome: Progressing    Problem: Safety - Adult  Goal: Free from fall injury  Outcome: Progressing     Problem: ABCDS Injury Assessment  Goal: Absence of physical injury  Outcome: Progressing  Flowsheets (Taken 12/31/2024 1036)  Absence of Physical Injury: Implement safety measures based on patient assessment     
injury  1/6/2025 1136 by Susan Moulton, RN  Outcome: Progressing  1/6/2025 0049 by Dexter Salinas RN  Outcome: Progressing  Flowsheets (Taken 1/6/2025 0048)  Free From Fall Injury: Instruct family/caregiver on patient safety     Problem: ABCDS Injury Assessment  Goal: Absence of physical injury  1/6/2025 1136 by Susan Moulton RN  Outcome: Progressing  1/6/2025 0049 by Dexter Salinas RN  Outcome: Progressing  Flowsheets (Taken 1/6/2025 0048)  Absence of Physical Injury: Implement safety measures based on patient assessment

## 2025-01-07 ENCOUNTER — CARE COORDINATION (OUTPATIENT)
Dept: CASE MANAGEMENT | Age: 35
End: 2025-01-07

## 2025-01-07 RX ORDER — IBUPROFEN 800 MG/1
TABLET, FILM COATED ORAL
Qty: 90 TABLET | Refills: 0 | Status: SHIPPED | OUTPATIENT
Start: 2025-01-07

## 2025-01-07 RX ORDER — GABAPENTIN 300 MG/1
CAPSULE ORAL
Qty: 270 CAPSULE | Refills: 0 | OUTPATIENT
Start: 2025-01-07

## 2025-01-07 NOTE — CARE COORDINATION
Care Transitions Note    Initial Call - Call within 2 business days of discharge: Yes    Attempted to reach patient for transitions of care follow up. Unable to reach patient.    Outreach Attempts:   HIPAA compliant voicemail left for patient.     Patient: Maribeth Loera    Patient : 1990   MRN: [id number]    Reason for Admission: UTI DM type 1   Discharge Date: 25  RURS: Readmission Risk Score: 17.4    Last Discharge Facility       Date Complaint Diagnosis Description Type Department Provider    24 Illness Diabetic ketoacidosis without coma associated with type 1 diabetes mellitus (HCC) ... ED to Hosp-Admission (Discharged) (ADMITTED) [facility] Sharan Kapoor, DO; Bao Dunham...            Was this an external facility discharge? No    Follow Up Appointment:   Patient does not have a follow up appointment scheduled at time of call.  CTN unable to reach patient.   Future Appointments         Provider Specialty Dept Phone    2025 11:20 AM Kathy Davis, APRN - CNP Family Medicine [phone]            Plan for follow-up on next business day.      Sun Mcqueen, MSN, RN, [facility]  Care Transition Nurse  [phone] mobile

## 2025-01-08 ENCOUNTER — CARE COORDINATION (OUTPATIENT)
Dept: CASE MANAGEMENT | Age: 35
End: 2025-01-08

## 2025-01-08 NOTE — CARE COORDINATION
Care Transitions Note    Initial Call - Call within 2 business days of discharge: Yes    Attempted to reach patient for transitions of care follow up. Unable to reach patient.    Outreach Attempts:   HIPAA compliant voicemail left for patient.     Patient: Maribeth Loera    Patient : 1990   MRN: [id number]    Reason for Admission: DM type1   Discharge Date: 25  RURS: Readmission Risk Score: 17.4    Last Discharge Facility       Date Complaint Diagnosis Description Type Department Provider    24 Illness Diabetic ketoacidosis without coma associated with type 1 diabetes mellitus (HCC) ... ED to Hosp-Admission (Discharged) (ADMITTED) [facility] Sharan Kapoor, DO; Bao Dunham...            Was this an external facility discharge? No    Follow Up Appointment:   Patient does not have a follow up appointment scheduled at time of call.  Unable to reach patient, CTN will send message to PCP office.   Future Appointments         Provider Specialty Dept Phone    2025 11:20 AM Kathy Davis, APRN - CNP Family Medicine [phone]            No further follow-up call indicated     Sun Mcqueen, MSN, RN, [facility]  Care Transition Nurse  [phone] mobile

## 2025-01-27 PROBLEM — E86.0 DEHYDRATION: Status: RESOLVED | Noted: 2024-12-28 | Resolved: 2025-01-27

## 2025-02-05 ENCOUNTER — TELEMEDICINE (OUTPATIENT)
Dept: FAMILY MEDICINE CLINIC | Age: 35
End: 2025-02-05

## 2025-02-05 DIAGNOSIS — G40.909 SEIZURE DISORDER (HCC): ICD-10-CM

## 2025-02-05 DIAGNOSIS — E10.65 POORLY CONTROLLED TYPE 1 DIABETES MELLITUS (HCC): Primary | ICD-10-CM

## 2025-02-05 DIAGNOSIS — E10.8 TYPE 1 DIABETES MELLITUS WITH COMPLICATION (HCC): ICD-10-CM

## 2025-02-05 DIAGNOSIS — F41.1 GAD (GENERALIZED ANXIETY DISORDER): ICD-10-CM

## 2025-02-05 DIAGNOSIS — E11.42 DIABETIC PERIPHERAL NEUROPATHY (HCC): ICD-10-CM

## 2025-02-05 DIAGNOSIS — E10.10 DIABETIC KETOACIDOSIS WITHOUT COMA ASSOCIATED WITH TYPE 1 DIABETES MELLITUS (HCC): ICD-10-CM

## 2025-02-05 DIAGNOSIS — G40.109 EPILEPSY, FOCAL (HCC): ICD-10-CM

## 2025-02-05 PROBLEM — K85.90 ACUTE PANCREATITIS: Status: RESOLVED | Noted: 2025-01-02 | Resolved: 2025-02-05

## 2025-02-05 PROBLEM — A41.9 SEPTIC SHOCK (HCC): Status: RESOLVED | Noted: 2024-12-28 | Resolved: 2025-02-05

## 2025-02-05 PROBLEM — N30.00 ACUTE CYSTITIS WITHOUT HEMATURIA: Status: RESOLVED | Noted: 2024-12-28 | Resolved: 2025-02-05

## 2025-02-05 PROBLEM — N17.9 AKI (ACUTE KIDNEY INJURY) (HCC): Status: RESOLVED | Noted: 2023-08-08 | Resolved: 2025-02-05

## 2025-02-05 PROBLEM — R17 JAUNDICE: Status: RESOLVED | Noted: 2024-12-28 | Resolved: 2025-02-05

## 2025-02-05 PROBLEM — E87.1 HYPONATREMIA: Status: RESOLVED | Noted: 2024-12-28 | Resolved: 2025-02-05

## 2025-02-05 PROBLEM — R74.8 ALKALINE PHOSPHATASE ELEVATION: Status: RESOLVED | Noted: 2022-06-09 | Resolved: 2025-02-05

## 2025-02-05 PROBLEM — A48.3: Status: RESOLVED | Noted: 2024-12-28 | Resolved: 2025-02-05

## 2025-02-05 PROBLEM — G93.41 ACUTE METABOLIC ENCEPHALOPATHY: Status: RESOLVED | Noted: 2024-12-28 | Resolved: 2025-02-05

## 2025-02-05 PROBLEM — Z71.89 DIABETES EDUCATION, ENCOUNTER FOR: Status: RESOLVED | Noted: 2019-04-19 | Resolved: 2025-02-05

## 2025-02-05 PROBLEM — D69.6 THROMBOCYTOPENIA: Status: RESOLVED | Noted: 2024-12-28 | Resolved: 2025-02-05

## 2025-02-05 PROBLEM — B95.8: Status: RESOLVED | Noted: 2024-12-28 | Resolved: 2025-02-05

## 2025-02-05 PROBLEM — E80.6 HYPERBILIRUBINEMIA: Status: RESOLVED | Noted: 2024-12-28 | Resolved: 2025-02-05

## 2025-02-05 PROBLEM — N39.0 URINARY TRACT INFECTION WITH HEMATURIA: Status: RESOLVED | Noted: 2024-12-28 | Resolved: 2025-02-05

## 2025-02-05 PROBLEM — R65.21 SEPTIC SHOCK (HCC): Status: RESOLVED | Noted: 2024-12-28 | Resolved: 2025-02-05

## 2025-02-05 PROBLEM — R31.9 URINARY TRACT INFECTION WITH HEMATURIA: Status: RESOLVED | Noted: 2024-12-28 | Resolved: 2025-02-05

## 2025-02-05 RX ORDER — ERGOCALCIFEROL 1.25 MG/1
50000 CAPSULE, LIQUID FILLED ORAL WEEKLY
Qty: 12 CAPSULE | Refills: 1 | Status: SHIPPED | OUTPATIENT
Start: 2025-02-05

## 2025-02-05 RX ORDER — GABAPENTIN 300 MG/1
CAPSULE ORAL
Qty: 270 CAPSULE | Refills: 0 | Status: SHIPPED | OUTPATIENT
Start: 2025-02-05 | End: 2025-03-07

## 2025-02-05 RX ORDER — ACYCLOVIR 400 MG/1
TABLET ORAL
Qty: 9 EACH | Refills: 0 | Status: SHIPPED | OUTPATIENT
Start: 2025-02-05

## 2025-02-05 RX ORDER — ONDANSETRON 8 MG/1
4 TABLET, FILM COATED ORAL EVERY 8 HOURS PRN
Qty: 30 TABLET | Refills: 2 | Status: SHIPPED | OUTPATIENT
Start: 2025-02-05

## 2025-02-05 RX ORDER — LEVETIRACETAM 500 MG/1
1000 TABLET ORAL 2 TIMES DAILY
Qty: 360 TABLET | Refills: 0 | Status: SHIPPED | OUTPATIENT
Start: 2025-02-05

## 2025-02-05 RX ORDER — CLONAZEPAM 2 MG/1
2 TABLET ORAL 2 TIMES DAILY
Qty: 180 TABLET | Refills: 0 | Status: SHIPPED | OUTPATIENT
Start: 2025-02-05 | End: 2025-05-06

## 2025-02-05 RX ORDER — VENLAFAXINE HYDROCHLORIDE 150 MG/1
150 CAPSULE, EXTENDED RELEASE ORAL DAILY
Qty: 90 CAPSULE | Refills: 1 | Status: SHIPPED | OUTPATIENT
Start: 2025-02-05

## 2025-02-05 RX ORDER — INSULIN ASPART 100 [IU]/ML
INJECTION, SOLUTION INTRAVENOUS; SUBCUTANEOUS
Qty: 30 ML | Refills: 0 | Status: SHIPPED | OUTPATIENT
Start: 2025-02-05

## 2025-02-05 RX ORDER — MAGNESIUM OXIDE 400 MG/1
400 TABLET ORAL DAILY
Qty: 90 TABLET | Refills: 1 | Status: SHIPPED | OUTPATIENT
Start: 2025-02-05

## 2025-02-05 RX ORDER — LANOLIN ALCOHOL/MO/W.PET/CERES
1000 CREAM (GRAM) TOPICAL DAILY
Qty: 90 TABLET | Refills: 1 | Status: SHIPPED | OUTPATIENT
Start: 2025-02-05

## 2025-02-05 NOTE — PROGRESS NOTES
Maribeth Loera (:  1990) is a 34 y.o. female,Established patient, here for evaluation of the following chief complaint(s):  3 Month Follow-Up (Refills on all medications)      ASSESSMENT/PLAN:  Assessment & Plan  1. Medication management.  Refills for all her medications have been processed and sent to [facility] on [address]. She is currently taking gabapentin 900 mg three times a day, Klonopin 2 mg twice a day, Keppra 1000 mg twice a day, magnesium oxide 400 mg daily, Prilosec 20 mg daily, Effexor 150 mg daily, vitamin B12 1 tablet daily, and vitamin D 1 capsule weekly. She also uses the NovoLog FlexPen as needed when experiencing issues with the OmniPod and requires refills for the NovoLog FlexPen.    2. Seizure disorder.  She recently had a seizure last week. No adjustments were made to her medications at that time. She is advised to find a neurologist closer to her location in [place] and inform via [employer] message so a referral can be facilitated.    3. Type 1 diabetes mellitus.  She uses the Dexcom blood sugar monitor and requires refills. A prescription for the Dexcom monitor has been sent to [facility] on [address].    4. Cloudy urine.  Orders for blood work and urinalysis have been placed to investigate the cause of her symptoms. She will be contacted with the results and informed if any treatment is necessary.      1. Poorly controlled type 1 diabetes mellitus (HCC)  -     CBC with Auto Differential; Future  -     Comprehensive Metabolic Panel; Future  -     Urinalysis with Reflex to Culture  -     Albumin/Creatinine Ratio, Urine; Future  -     Hemoglobin A1C; Future  2. Epilepsy, focal (HCC)  3. Seizure disorder (HCC)  -     clonazePAM (KLONOPIN) 2 MG tablet; Take 1 tablet by mouth 2 times daily for 90 days. Max Daily Amount: 4 mg, Disp-180 tablet, R-0Normal  4. Type 1 diabetes mellitus with complication (HCC)  -     Continuous Glucose Sensor (DEXCOM G7 SENSOR) MISC; Apply every 10 days for blood glucose

## 2025-02-10 ENCOUNTER — PATIENT MESSAGE (OUTPATIENT)
Dept: FAMILY MEDICINE CLINIC | Age: 35
End: 2025-02-10

## 2025-02-10 DIAGNOSIS — G40.109 EPILEPSY, FOCAL (HCC): Primary | ICD-10-CM

## 2025-02-24 ENCOUNTER — HOSPITAL ENCOUNTER (EMERGENCY)
Age: 35
Discharge: HOME | End: 2025-02-25
Attending: EMERGENCY MEDICINE
Payer: COMMERCIAL

## 2025-02-24 DIAGNOSIS — G40.909 SEIZURE DISORDER (HCC): Primary | ICD-10-CM

## 2025-02-24 LAB
ANION GAP SERPL CALCULATED.3IONS-SCNC: 14 MMOL/L (ref 3–16)
BASOPHILS # BLD: 0.4 K/UL (ref 0–0.2)
BASOPHILS NFR BLD: 4.1 %
BUN SERPL-MCNC: 20 MG/DL (ref 7–20)
CALCIUM SERPL-MCNC: 9.6 MG/DL (ref 8.3–10.6)
CHLORIDE SERPL-SCNC: 99 MMOL/L (ref 99–110)
CO2 SERPL-SCNC: 24 MMOL/L (ref 21–32)
CREAT SERPL-MCNC: 0.9 MG/DL (ref 0.6–1.1)
DEPRECATED RDW RBC AUTO: 15 % (ref 12.4–15.4)
EOSINOPHIL # BLD: 0.1 K/UL (ref 0–0.6)
EOSINOPHIL NFR BLD: 1.3 %
GFR SERPLBLD CREATININE-BSD FMLA CKD-EPI: 85 ML/MIN/{1.73_M2}
GLUCOSE BLD-MCNC: 121 MG/DL (ref 70–99)
GLUCOSE SERPL-MCNC: 131 MG/DL (ref 70–99)
HCG SERPL QL: NEGATIVE
HCT VFR BLD AUTO: 36.6 % (ref 36–48)
HGB BLD-MCNC: 11.8 G/DL (ref 12–16)
LYMPHOCYTES # BLD: 3 K/UL (ref 1–5.1)
LYMPHOCYTES NFR BLD: 30.5 %
MAGNESIUM SERPL-MCNC: 2.08 MG/DL (ref 1.8–2.4)
MCH RBC QN AUTO: 26.9 PG (ref 26–34)
MCHC RBC AUTO-ENTMCNC: 32.3 G/DL (ref 31–36)
MCV RBC AUTO: 83.4 FL (ref 80–100)
MONOCYTES # BLD: 0.3 K/UL (ref 0–1.3)
MONOCYTES NFR BLD: 3.5 %
NEUTROPHILS # BLD: 6 K/UL (ref 1.7–7.7)
NEUTROPHILS NFR BLD: 60.6 %
PERFORMED ON: ABNORMAL
PHOSPHATE SERPL-MCNC: 2.9 MG/DL (ref 2.5–4.9)
PLATELET # BLD AUTO: 363 K/UL (ref 135–450)
PMV BLD AUTO: 7.4 FL (ref 5–10.5)
POTASSIUM SERPL-SCNC: 4.4 MMOL/L (ref 3.5–5.1)
RBC # BLD AUTO: 4.39 M/UL (ref 4–5.2)
SODIUM SERPL-SCNC: 137 MMOL/L (ref 136–145)
WBC # BLD AUTO: 9.8 K/UL (ref 4–11)

## 2025-02-24 PROCEDURE — 6360000002 HC RX W HCPCS: Performed by: EMERGENCY MEDICINE

## 2025-02-24 PROCEDURE — 80048 BASIC METABOLIC PNL TOTAL CA: CPT

## 2025-02-24 PROCEDURE — 80177 DRUG SCRN QUAN LEVETIRACETAM: CPT

## 2025-02-24 PROCEDURE — 83735 ASSAY OF MAGNESIUM: CPT

## 2025-02-24 PROCEDURE — 96374 THER/PROPH/DIAG INJ IV PUSH: CPT

## 2025-02-24 PROCEDURE — 84100 ASSAY OF PHOSPHORUS: CPT

## 2025-02-24 PROCEDURE — 84703 CHORIONIC GONADOTROPIN ASSAY: CPT

## 2025-02-24 PROCEDURE — 2580000003 HC RX 258: Performed by: EMERGENCY MEDICINE

## 2025-02-24 PROCEDURE — 85025 COMPLETE CBC W/AUTO DIFF WBC: CPT

## 2025-02-24 PROCEDURE — 99284 EMERGENCY DEPT VISIT MOD MDM: CPT

## 2025-02-24 PROCEDURE — 96361 HYDRATE IV INFUSION ADD-ON: CPT

## 2025-02-24 PROCEDURE — 6370000000 HC RX 637 (ALT 250 FOR IP): Performed by: EMERGENCY MEDICINE

## 2025-02-24 RX ORDER — 0.9 % SODIUM CHLORIDE 0.9 %
1000 INTRAVENOUS SOLUTION INTRAVENOUS ONCE
Status: COMPLETED | OUTPATIENT
Start: 2025-02-24 | End: 2025-02-25

## 2025-02-24 RX ORDER — GABAPENTIN 300 MG/1
300 CAPSULE ORAL ONCE
Status: COMPLETED | OUTPATIENT
Start: 2025-02-24 | End: 2025-02-24

## 2025-02-24 RX ORDER — CLONAZEPAM 0.5 MG/1
2 TABLET ORAL ONCE
Status: COMPLETED | OUTPATIENT
Start: 2025-02-24 | End: 2025-02-24

## 2025-02-24 RX ORDER — LEVETIRACETAM 500 MG/5ML
1000 INJECTION, SOLUTION, CONCENTRATE INTRAVENOUS ONCE
Status: COMPLETED | OUTPATIENT
Start: 2025-02-24 | End: 2025-02-24

## 2025-02-24 RX ADMIN — CLONAZEPAM 2 MG: 0.5 TABLET ORAL at 23:03

## 2025-02-24 RX ADMIN — SODIUM CHLORIDE 1000 ML: 9 INJECTION, SOLUTION INTRAVENOUS at 23:11

## 2025-02-24 RX ADMIN — LEVETIRACETAM 1000 MG: 100 INJECTION INTRAVENOUS at 23:03

## 2025-02-24 RX ADMIN — GABAPENTIN 300 MG: 300 CAPSULE ORAL at 23:03

## 2025-02-25 VITALS
HEART RATE: 87 BPM | SYSTOLIC BLOOD PRESSURE: 111 MMHG | DIASTOLIC BLOOD PRESSURE: 71 MMHG | RESPIRATION RATE: 15 BRPM | OXYGEN SATURATION: 97 % | TEMPERATURE: 97.8 F

## 2025-02-25 LAB
LEVETIRACETAM SERPL-MCNC: 5.7 UG/ML (ref 6–46)
MEDICATION DOSE-MCNC: ABNORMAL

## 2025-02-25 ASSESSMENT — ENCOUNTER SYMPTOMS
ABDOMINAL PAIN: 0
VOMITING: 0
SHORTNESS OF BREATH: 0
NAUSEA: 0
PHOTOPHOBIA: 0

## 2025-02-25 NOTE — ED PROVIDER NOTES
[facility] EMERGENCY DEPARTMENT    Name: Maribeth Loera : 1990 MRN: [id number] Date of Service: 2025    Initial VS: BP: 128/87, Temp: 97.8 °F (36.6 °C), Pulse: (!) 120, Respirations: 15, SpO2: 100 %     CC: seizures    HPI: this patient is a 34 y.o. female presenting to the ED from home. Ms. Loera reports that over the course of the last 14 hours her family members have witnessed her have two seizures - one occurred fairly early in the day and the latter occurred late this evening. After the second seizure her family members became concerned and called [phone] for assistance. EMS personnel arrived to her home and found her to be slightly somnolent but otherwise not acutely ill-appearing. They brought her here to be evaluated. On arrival here she states that she is feeling fairly fatigued.  She has [id number] recollection of either of her recent seizures.  She denies other current complaints and has not appreciated other changes from her usual state of health.  _____________________________________________________________________    Past Medical History:   Diagnosis Date    Chronic prescription benzodiazepine use     Mood disorder     MRSA bacteremia 2022    Poorly controlled type 1 diabetes mellitus (HCC)     Seizure disorder (HCC)       Past Surgical History:   Procedure Laterality Date     SECTION      TUBAL LIGATION       Family History   Problem Relation Age of Onset    Asthma Mother     Diabetes Type 1  Father     Diabetes Type 1  Maternal Grandfather     Diabetes Type 1  Maternal Aunt     Diabetes Type 1  Maternal Uncle     Diabetes Type 1  Maternal Uncle     Diabetes Type 1  Cousin     Diabetes Type 1  Cousin      Social History     Tobacco Use    Smoking status: Never    Smokeless tobacco: Never   Vaping Use    Vaping status: Never Used   Substance Use Topics    Alcohol use: No    Drug use: No      _____________________________________________________________________    Review of Systems   Constitutional:  Positive for fatigue. Negative for chills and fever.   HENT:  Negative for hearing loss and tinnitus.    Eyes:  Negative for photophobia and visual disturbance.   Respiratory:  Negative for shortness of breath.    Cardiovascular:  Negative for chest pain.   Gastrointestinal:  Negative for abdominal pain, nausea and vomiting.   Genitourinary:  Negative for decreased urine volume.   Musculoskeletal:  Negative for gait problem, neck pain and neck stiffness.   Skin:  Negative for rash.   Neurological:  Positive for seizures. Negative for dizziness, weakness, numbness and headaches.   Psychiatric/Behavioral:  Negative for confusion.        Physical Exam  Constitutional:       General: She is awake. She is not in acute distress.     Appearance: She is not ill-appearing, toxic-appearing or diaphoretic.   HENT:      Head: Normocephalic and atraumatic.   Eyes:      Conjunctiva/sclera: Conjunctivae normal.   Neck:      Vascular: No JVD.   Cardiovascular:      Rate and Rhythm: Regular rhythm. Tachycardia present.      Pulses:           Radial pulses are 2+ on the right side and 2+ on the left side.      Heart sounds: Normal heart sounds. No murmur heard.  Pulmonary:      Effort: Pulmonary effort is normal. No accessory muscle usage.      Breath sounds: Normal breath sounds.   Abdominal:      General: Bowel sounds are normal. There is no distension.      Palpations: Abdomen is soft.      Tenderness: There is no abdominal tenderness. There is no guarding or rebound.   Skin:     General: Skin is warm and dry.      Coloration: Skin is not cyanotic, mottled or pale.   Neurological:      Mental Status: She is alert and oriented to person, place, and time.      Motor: No seizure activity.      Comments:   Pupils equal, round, reactive to light and accommodation  Extraocular movements intact  Visual fields full on

## 2025-02-25 NOTE — ED NOTES
Discharge instructions provided. Educated patient on tests completed, examination findings, follow up instructions and reasons to return to the ER. Patient verbalizes understanding at this time. Denies questions. Patient attempting to contact family for ride home at this time. Will inform this RN when ride is arranged.

## 2025-02-25 NOTE — ED NOTES
Patient resting in bed, states is waiting for her  to drop the kids off at school and then he would be here to pick her up, Charge RN made aware.

## 2025-02-25 NOTE — DISCHARGE INSTRUCTIONS
Continue your usual home medications    Be sure to contact the clinic listed in your paperwork (or another local neurology office) to arrange for a follow up visit.    In the meantime please call your primary care provider ASAP and ask if she would like for you to begin taking a second anti-seizure medication.    If you have any new or worsening issues after going home don't hesitate to return here for reevaluation at any time 24/7!

## 2025-02-27 DIAGNOSIS — E10.8 TYPE 1 DIABETES MELLITUS WITH COMPLICATION (HCC): ICD-10-CM

## 2025-02-27 DIAGNOSIS — E10.65 POORLY CONTROLLED TYPE 1 DIABETES MELLITUS (HCC): ICD-10-CM

## 2025-02-27 DIAGNOSIS — E10.8 TYPE 1 DIABETES MELLITUS WITH COMPLICATION (HCC): Primary | ICD-10-CM

## 2025-03-06 DIAGNOSIS — E11.42 DIABETIC PERIPHERAL NEUROPATHY (HCC): ICD-10-CM

## 2025-03-06 RX ORDER — IBUPROFEN 800 MG/1
TABLET, FILM COATED ORAL
Qty: 90 TABLET | Refills: 0 | OUTPATIENT
Start: 2025-03-06

## 2025-03-06 RX ORDER — GABAPENTIN 300 MG/1
CAPSULE ORAL
Qty: 270 CAPSULE | Refills: 0 | OUTPATIENT
Start: 2025-03-06

## 2025-03-11 ENCOUNTER — OFFICE VISIT (OUTPATIENT)
Dept: FAMILY MEDICINE CLINIC | Age: 35
End: 2025-03-11

## 2025-03-11 VITALS
SYSTOLIC BLOOD PRESSURE: 110 MMHG | BODY MASS INDEX: 23.12 KG/M2 | OXYGEN SATURATION: 97 % | TEMPERATURE: 98.7 F | HEART RATE: 138 BPM | DIASTOLIC BLOOD PRESSURE: 62 MMHG | WEIGHT: 110.6 LBS

## 2025-03-11 DIAGNOSIS — E10.65 POORLY CONTROLLED TYPE 1 DIABETES MELLITUS (HCC): Primary | ICD-10-CM

## 2025-03-11 DIAGNOSIS — G40.909 SEIZURE DISORDER (HCC): ICD-10-CM

## 2025-03-11 DIAGNOSIS — E10.8 TYPE 1 DIABETES MELLITUS WITH COMPLICATION (HCC): ICD-10-CM

## 2025-03-11 DIAGNOSIS — E11.42 DIABETIC PERIPHERAL NEUROPATHY (HCC): ICD-10-CM

## 2025-03-11 DIAGNOSIS — Z23 NEED FOR PNEUMOCOCCAL VACCINE: ICD-10-CM

## 2025-03-11 LAB — HBA1C MFR BLD: 7.8 %

## 2025-03-11 RX ORDER — GABAPENTIN 300 MG/1
CAPSULE ORAL
Qty: 270 CAPSULE | Refills: 0 | Status: SHIPPED | OUTPATIENT
Start: 2025-03-11 | End: 2025-04-10

## 2025-03-11 RX ORDER — INSULIN PUMP CONTROLLER
EACH MISCELLANEOUS
Qty: 10 EACH | Refills: 0 | Status: SHIPPED | OUTPATIENT
Start: 2025-03-11

## 2025-03-11 RX ORDER — CLONAZEPAM 2 MG/1
2 TABLET ORAL 2 TIMES DAILY
Qty: 180 TABLET | Refills: 0 | Status: SHIPPED | OUTPATIENT
Start: 2025-03-11 | End: 2025-06-09

## 2025-03-11 RX ORDER — INSULIN ASPART 100 [IU]/ML
INJECTION, SOLUTION INTRAVENOUS; SUBCUTANEOUS
Qty: 30 ML | Refills: 0 | Status: SHIPPED | OUTPATIENT
Start: 2025-03-11

## 2025-03-11 RX ORDER — OMEPRAZOLE 20 MG/1
20 CAPSULE, DELAYED RELEASE ORAL DAILY
Qty: 90 CAPSULE | Refills: 1 | Status: SHIPPED | OUTPATIENT
Start: 2025-03-11

## 2025-03-11 RX ORDER — LANOLIN ALCOHOL/MO/W.PET/CERES
1000 CREAM (GRAM) TOPICAL DAILY
Qty: 90 TABLET | Refills: 1 | Status: SHIPPED | OUTPATIENT
Start: 2025-03-11

## 2025-03-11 RX ORDER — MAGNESIUM OXIDE 400 MG/1
400 TABLET ORAL DAILY
Qty: 90 TABLET | Refills: 1 | Status: SHIPPED | OUTPATIENT
Start: 2025-03-11

## 2025-03-11 RX ORDER — VENLAFAXINE HYDROCHLORIDE 150 MG/1
150 CAPSULE, EXTENDED RELEASE ORAL DAILY
Qty: 90 CAPSULE | Refills: 1 | Status: SHIPPED | OUTPATIENT
Start: 2025-03-11

## 2025-03-11 RX ORDER — LEVETIRACETAM 500 MG/1
1000 TABLET ORAL 2 TIMES DAILY
Qty: 360 TABLET | Refills: 0 | Status: SHIPPED | OUTPATIENT
Start: 2025-03-11

## 2025-03-11 RX ORDER — ONDANSETRON 8 MG/1
4 TABLET, FILM COATED ORAL EVERY 8 HOURS PRN
Qty: 30 TABLET | Refills: 2 | Status: SHIPPED | OUTPATIENT
Start: 2025-03-11

## 2025-03-11 NOTE — ASSESSMENT & PLAN NOTE
Chronic, worsening (exacerbation), continue current treatment plan    Orders:    gabapentin (NEURONTIN) 300 MG capsule; TAKE 3 CAPSULES BY MOUTH THREE TIMES DAILY

## 2025-03-11 NOTE — ASSESSMENT & PLAN NOTE
Chronic, worsening (exacerbation), continue current treatment plan  Past ED visit reviewed.  Encouraged her to make f/u appt with neurology for uncontrolled seizures. Phone called placed to [facility] to escalate appt scheduling. They have been trying to reach her to make an appt and not getting answered.  Orders:    clonazePAM (KLONOPIN) 2 MG tablet; Take 1 tablet by mouth 2 times daily for 90 days. Max Daily Amount: 4 mg

## 2025-03-11 NOTE — PROGRESS NOTES
Maribeth Loera (:  1990) is a 34 y.o. female,Established patient, here for evaluation of the following chief complaint(s):  Follow-up (Issues with neuropathy feet, epilepsy , kidney pain)         Assessment & Plan  Poorly controlled type 1 diabetes mellitus (HCC)   Chronic, not at goal (unstable), continue current treatment plan    Orders:    POCT glycosylated hemoglobin (Hb A1C)    Albumin/Creatinine Ratio, Urine; Future     DIABETES FOOT EXAM    Type 1 diabetes mellitus with complication (HCC)   Chronic, not at goal (unstable), continue current treatment plan  Lab Results   Component Value Date    LABA1C 7.8 2025    LABA1C 9.8 2024    LABA1C 10.5 2024    LABA1C 8.4 06/10/2024    LABA1C 7.9 2024       Orders:    NOVOLOG FLEXPEN 100 UNIT/ML injection pen; INJECT 4UNITS SUBCUTANEOUSLY WITH MEALS/SNACKS UP TO FIVE TIMES DAILY. Also use sliding scale insulin IN ADDITION to  scheduled dose as follows Corrective Low Dose Algorithm** Glucose:Dose:  No Insulin, 200-249 1 Unit , 250-299 2 Units, 300-349 3 Units, Over 349 4 Units and call you doctor    Insulin Disposable Pump (OMNIPOD DASH PODS, GEN 4,) MISC; CONTINOUS PUMP, MAX DOSE OF 60 UNITS    Diabetic peripheral neuropathy (HCC)   Chronic, worsening (exacerbation), continue current treatment plan    Orders:    gabapentin (NEURONTIN) 300 MG capsule; TAKE 3 CAPSULES BY MOUTH THREE TIMES DAILY    Seizure disorder (HCC)   Chronic, worsening (exacerbation), continue current treatment plan  Past ED visit reviewed.  Encouraged her to make f/u appt with neurology for uncontrolled seizures. Phone called placed to [facility] to escalate appt scheduling. They have been trying to reach her to make an appt and not getting answered.  Orders:    clonazePAM (KLONOPIN) 2 MG tablet; Take 1 tablet by mouth 2 times daily for 90 days. Max Daily Amount: 4 mg    Need for pneumococcal vaccine       Orders:    Pneumococcal, PCV20, PREVNAR 20, (age

## 2025-03-11 NOTE — ASSESSMENT & PLAN NOTE
Chronic, not at goal (unstable), continue current treatment plan  Lab Results   Component Value Date    LABA1C 7.8 03/11/2025    LABA1C 9.8 12/31/2024    LABA1C 10.5 12/28/2024    LABA1C 8.4 06/10/2024    LABA1C 7.9 05/28/2024       Orders:    NOVOLOG FLEXPEN 100 UNIT/ML injection pen; INJECT 4UNITS SUBCUTANEOUSLY WITH MEALS/SNACKS UP TO FIVE TIMES DAILY. Also use sliding scale insulin IN ADDITION to  scheduled dose as follows Corrective Low Dose Algorithm** Glucose:Dose:  No Insulin, 200-249 1 Unit , 250-299 2 Units, 300-349 3 Units, Over 349 4 Units and call you doctor    Insulin Disposable Pump (OMNIPOD DASH PODS, GEN 4,) MISC; CONTINOUS PUMP, MAX DOSE OF 60 UNITS

## 2025-03-11 NOTE — ASSESSMENT & PLAN NOTE
Chronic, not at goal (unstable), continue current treatment plan    Orders:    POCT glycosylated hemoglobin (Hb A1C)    Albumin/Creatinine Ratio, Urine; Future     DIABETES FOOT EXAM

## 2025-03-12 LAB
CREAT UR-MCNC: 37 MG/DL (ref 28–259)
MICROALBUMIN UR DL<=1MG/L-MCNC: 4.91 MG/DL
MICROALBUMIN/CREAT UR: 132.7 MG/G (ref 0–30)

## 2025-03-13 ENCOUNTER — TELEPHONE (OUTPATIENT)
Dept: FAMILY MEDICINE CLINIC | Age: 35
End: 2025-03-13

## 2025-03-13 DIAGNOSIS — E10.10 TYPE 1 DIABETES MELLITUS WITH KETOACIDOSIS WITHOUT COMA (HCC): ICD-10-CM

## 2025-03-13 DIAGNOSIS — E10.8 TYPE 1 DIABETES MELLITUS WITH COMPLICATION (HCC): ICD-10-CM

## 2025-03-13 RX ORDER — IBUPROFEN 800 MG/1
TABLET, FILM COATED ORAL
Qty: 90 TABLET | Refills: 0 | Status: SHIPPED | OUTPATIENT
Start: 2025-03-13

## 2025-03-13 RX ORDER — ACYCLOVIR 400 MG/1
TABLET ORAL
Qty: 9 EACH | Refills: 0 | Status: SHIPPED | OUTPATIENT
Start: 2025-03-13

## 2025-03-13 RX ORDER — GLUCOSAMINE HCL/CHONDROITIN SU 500-400 MG
CAPSULE ORAL
Qty: 500 STRIP | Refills: 0 | Status: SHIPPED | OUTPATIENT
Start: 2025-03-13

## 2025-03-13 RX ORDER — IBUPROFEN 800 MG/1
800 TABLET, FILM COATED ORAL 3 TIMES DAILY PRN
Qty: 90 TABLET | Refills: 0 | OUTPATIENT
Start: 2025-03-13

## 2025-03-13 NOTE — TELEPHONE ENCOUNTER
the patient called to request refills on her 800 ibuprofen, one touch test strips and the dexcom.     [address] - [address] [facility] - P [phone] - F [phone] [address] [facility] [address]  Phone: [phone]  Fax: [phone]

## 2025-03-20 ENCOUNTER — TELEPHONE (OUTPATIENT)
Dept: FAMILY MEDICINE CLINIC | Age: 35
End: 2025-03-20

## 2025-03-20 NOTE — TELEPHONE ENCOUNTER
Pt called in because she is experiencing UTI symptoms. Urin is cloudy, she is having frequency she states she was in on the 12 th with the same symptoms. She is using [facility] on [address].

## 2025-03-20 NOTE — TELEPHONE ENCOUNTER
Pt called back because test strips are being denied because they are written as need instead of with an amount of how many times a day for her to test.

## 2025-04-06 DIAGNOSIS — E10.8 TYPE 1 DIABETES MELLITUS WITH COMPLICATION (HCC): ICD-10-CM

## 2025-04-07 DIAGNOSIS — E10.8 TYPE 1 DIABETES MELLITUS WITH COMPLICATION (HCC): ICD-10-CM

## 2025-04-07 DIAGNOSIS — E11.42 DIABETIC PERIPHERAL NEUROPATHY (HCC): ICD-10-CM

## 2025-04-07 RX ORDER — ACYCLOVIR 400 MG/1
TABLET ORAL
Qty: 3 EACH | Refills: 0 | Status: SHIPPED | OUTPATIENT
Start: 2025-04-07

## 2025-04-07 RX ORDER — INSULIN PUMP CONTROLLER
EACH MISCELLANEOUS
Qty: 10 EACH | Refills: 0 | Status: SHIPPED | OUTPATIENT
Start: 2025-04-07

## 2025-04-07 RX ORDER — GABAPENTIN 300 MG/1
CAPSULE ORAL
Qty: 270 CAPSULE | Refills: 3 | Status: SHIPPED | OUTPATIENT
Start: 2025-04-07 | End: 2025-04-09 | Stop reason: SDUPTHER

## 2025-04-07 NOTE — TELEPHONE ENCOUNTER
Medication:   Requested Prescriptions     Pending Prescriptions Disp Refills    gabapentin (NEURONTIN) 300 MG capsule [Pharmacy Med Name: Gabapentin 300 MG Oral Capsule] 270 capsule 0     Sig: TAKE 3 CAPSULES BY MOUTH THREE TIMES DAILY    Continuous Glucose Sensor (DEXCOM G7 SENSOR) MISC [Pharmacy Med Name: DEXCOM G7 SENSOR    MIS] 3 each 0     Sig: CHANGE EVERY 10 DAYS FOR BLOOD GLUCOSE MONITORING     Last Filled:  #9 dexcom sensors on 3/13/25 and #270 neurontin on 3/11/25    Last appt: 3/11/2025   Next appt: Visit date not found    Last OARRS:       2/17/2021     6:35 PM   RX Monitoring   Periodic Controlled Substance Monitoring No signs of potential drug abuse or diversion identified.

## 2025-04-09 DIAGNOSIS — E10.8 TYPE 1 DIABETES MELLITUS WITH COMPLICATION (HCC): ICD-10-CM

## 2025-04-09 DIAGNOSIS — E11.42 DIABETIC PERIPHERAL NEUROPATHY (HCC): ICD-10-CM

## 2025-04-09 RX ORDER — ERGOCALCIFEROL 1.25 MG/1
50000 CAPSULE, LIQUID FILLED ORAL WEEKLY
Qty: 12 CAPSULE | Refills: 1 | Status: SHIPPED | OUTPATIENT
Start: 2025-04-09

## 2025-04-09 RX ORDER — INSULIN ASPART 100 [IU]/ML
INJECTION, SOLUTION INTRAVENOUS; SUBCUTANEOUS
Qty: 30 ML | Refills: 0 | Status: SHIPPED | OUTPATIENT
Start: 2025-04-09

## 2025-04-09 RX ORDER — GABAPENTIN 300 MG/1
CAPSULE ORAL
Qty: 270 CAPSULE | Refills: 3 | Status: SHIPPED | OUTPATIENT
Start: 2025-04-09 | End: 2025-05-09

## 2025-04-09 RX ORDER — IBUPROFEN 800 MG/1
TABLET, FILM COATED ORAL
Qty: 90 TABLET | Refills: 0 | Status: SHIPPED | OUTPATIENT
Start: 2025-04-09

## 2025-04-26 DIAGNOSIS — E10.8 TYPE 1 DIABETES MELLITUS WITH COMPLICATION (HCC): ICD-10-CM

## 2025-04-28 RX ORDER — INSULIN PUMP CONTROLLER
EACH MISCELLANEOUS
Qty: 10 EACH | Refills: 0 | Status: SHIPPED | OUTPATIENT
Start: 2025-04-28

## 2025-05-01 DIAGNOSIS — G40.909 SEIZURE DISORDER (HCC): ICD-10-CM

## 2025-05-01 DIAGNOSIS — E10.8 TYPE 1 DIABETES MELLITUS WITH COMPLICATION (HCC): ICD-10-CM

## 2025-05-01 NOTE — TELEPHONE ENCOUNTER
Medication:   Requested Prescriptions     Pending Prescriptions Disp Refills    clonazePAM (KLONOPIN) 2 MG tablet 180 tablet 0     Sig: Take 1 tablet by mouth 2 times daily for 90 days. Max Daily Amount: 4 mg    Continuous Glucose Sensor (DEXCOM G7 SENSOR) MISC 3 each 0     Sig: CHANGE EVERY 10 DAYS FOR BLOOD GLUCOSE MONITORING    blood glucose test strips (ASCENSIA AUTODISC VI;ONE TOUCH ULTRA TEST VI) strip 100 each 3     Si each by In Vitro route 4 times daily (before meals and nightly) As needed.    Insulin Disposable Pump (OMNIPOD DASH PODS, GEN 4,) MISC 10 each 0     Sig: CONTINUOUS PUMP, USE AS DIRECTED, MAX DOSE OF 60 UNITS.     Last Filled:      Last appt: 3/11/2025   Next appt: Visit date not found

## 2025-05-03 DIAGNOSIS — E10.8 TYPE 1 DIABETES MELLITUS WITH COMPLICATION (HCC): ICD-10-CM

## 2025-05-05 RX ORDER — INSULIN PUMP CONTROLLER
EACH MISCELLANEOUS
Qty: 10 EACH | Refills: 0 | Status: SHIPPED | OUTPATIENT
Start: 2025-05-05

## 2025-05-05 RX ORDER — CLONAZEPAM 2 MG/1
2 TABLET ORAL 2 TIMES DAILY
Qty: 180 TABLET | Refills: 0 | Status: SHIPPED | OUTPATIENT
Start: 2025-05-05 | End: 2025-08-03

## 2025-05-05 RX ORDER — ACYCLOVIR 400 MG/1
TABLET ORAL
Qty: 3 EACH | Refills: 0 | OUTPATIENT
Start: 2025-05-05

## 2025-05-05 RX ORDER — ACYCLOVIR 400 MG/1
TABLET ORAL
Qty: 3 EACH | Refills: 0 | Status: SHIPPED | OUTPATIENT
Start: 2025-05-05

## 2025-05-27 RX ORDER — IBUPROFEN 800 MG/1
800 TABLET, FILM COATED ORAL 3 TIMES DAILY PRN
Qty: 90 TABLET | Refills: 0 | Status: SHIPPED | OUTPATIENT
Start: 2025-05-27

## 2025-06-24 ENCOUNTER — HOSPITAL ENCOUNTER (EMERGENCY)
Age: 35
Discharge: HOME | End: 2025-06-24
Payer: COMMERCIAL

## 2025-06-24 VITALS
BODY MASS INDEX: 25.34 KG/M2 | RESPIRATION RATE: 11 BRPM | SYSTOLIC BLOOD PRESSURE: 111 MMHG | OXYGEN SATURATION: 100 % | TEMPERATURE: 97.9 F | WEIGHT: 121.25 LBS | HEART RATE: 86 BPM | DIASTOLIC BLOOD PRESSURE: 77 MMHG

## 2025-06-24 DIAGNOSIS — R73.9 HYPERGLYCEMIA: ICD-10-CM

## 2025-06-24 DIAGNOSIS — R11.2 NAUSEA AND VOMITING, UNSPECIFIED VOMITING TYPE: Primary | ICD-10-CM

## 2025-06-24 DIAGNOSIS — N30.00 ACUTE CYSTITIS WITHOUT HEMATURIA: ICD-10-CM

## 2025-06-24 LAB
ALBUMIN SERPL-MCNC: 4.3 G/DL (ref 3.4–5)
ALBUMIN/GLOB SERPL: 1.3 {RATIO} (ref 1.1–2.2)
ALP SERPL-CCNC: 117 U/L (ref 40–129)
ALT SERPL-CCNC: 92 U/L (ref 10–40)
ANION GAP SERPL CALCULATED.3IONS-SCNC: 12 MMOL/L (ref 3–16)
ANION GAP SERPL CALCULATED.3IONS-SCNC: 12 MMOL/L (ref 3–16)
AST SERPL-CCNC: 67 U/L (ref 15–37)
BACTERIA URNS QL MICRO: ABNORMAL /HPF
BASE EXCESS BLDV CALC-SCNC: -2.9 MMOL/L
BASOPHILS # BLD: 0.1 K/UL (ref 0–0.2)
BASOPHILS NFR BLD: 1.1 %
BETA-HYDROXYBUTYRATE: 1.59 MMOL/L (ref 0–0.27)
BILIRUB SERPL-MCNC: 0.3 MG/DL (ref 0–1)
BILIRUB UR QL STRIP.AUTO: NEGATIVE
BUN SERPL-MCNC: 15 MG/DL (ref 7–20)
BUN SERPL-MCNC: 15 MG/DL (ref 7–20)
CALCIUM SERPL-MCNC: 7.8 MG/DL (ref 8.3–10.6)
CALCIUM SERPL-MCNC: 9 MG/DL (ref 8.3–10.6)
CHLORIDE SERPL-SCNC: 105 MMOL/L (ref 99–110)
CHLORIDE SERPL-SCNC: 94 MMOL/L (ref 99–110)
CLARITY UR: CLEAR
CO2 BLDV-SCNC: 24 MMOL/L
CO2 SERPL-SCNC: 17 MMOL/L (ref 21–32)
CO2 SERPL-SCNC: 20 MMOL/L (ref 21–32)
COHGB MFR BLDV: 1 %
COLOR UR: YELLOW
CREAT SERPL-MCNC: 0.9 MG/DL (ref 0.6–1.1)
CREAT SERPL-MCNC: 1 MG/DL (ref 0.6–1.1)
DEPRECATED RDW RBC AUTO: 17.5 % (ref 12.4–15.4)
EOSINOPHIL # BLD: 0.1 K/UL (ref 0–0.6)
EOSINOPHIL NFR BLD: 0.9 %
EPI CELLS #/AREA URNS AUTO: 2 /HPF (ref 0–5)
GFR SERPLBLD CREATININE-BSD FMLA CKD-EPI: 75 ML/MIN/{1.73_M2}
GFR SERPLBLD CREATININE-BSD FMLA CKD-EPI: 85 ML/MIN/{1.73_M2}
GLUCOSE BLD-MCNC: 403 MG/DL (ref 70–99)
GLUCOSE BLD-MCNC: >600 MG/DL (ref 70–99)
GLUCOSE SERPL-MCNC: 385 MG/DL (ref 70–99)
GLUCOSE SERPL-MCNC: 687 MG/DL (ref 70–99)
GLUCOSE UR STRIP.AUTO-MCNC: >=1000 MG/DL
HCG SERPL QL: NEGATIVE
HCO3 BLDV-SCNC: 23 MMOL/L (ref 23–29)
HCT VFR BLD AUTO: 36.3 % (ref 36–48)
HGB BLD-MCNC: 11.8 G/DL (ref 12–16)
HGB UR QL STRIP.AUTO: NEGATIVE
HYALINE CASTS #/AREA URNS AUTO: 1 /LPF (ref 0–8)
KETONES UR STRIP.AUTO-MCNC: 15 MG/DL
LEUKOCYTE ESTERASE UR QL STRIP.AUTO: ABNORMAL
LIPASE SERPL-CCNC: 17 U/L (ref 13–60)
LYMPHOCYTES # BLD: 1.9 K/UL (ref 1–5.1)
LYMPHOCYTES NFR BLD: 22.7 %
MAGNESIUM SERPL-MCNC: 2.01 MG/DL (ref 1.8–2.4)
MCH RBC QN AUTO: 27.8 PG (ref 26–34)
MCHC RBC AUTO-ENTMCNC: 32.5 G/DL (ref 31–36)
MCV RBC AUTO: 85.7 FL (ref 80–100)
METHGB MFR BLDV: 1.2 %
MONOCYTES # BLD: 0.5 K/UL (ref 0–1.3)
MONOCYTES NFR BLD: 5.8 %
NEUTROPHILS # BLD: 5.7 K/UL (ref 1.7–7.7)
NEUTROPHILS NFR BLD: 69.5 %
NITRITE UR QL STRIP.AUTO: NEGATIVE
O2 THERAPY: ABNORMAL
PCO2 BLDV: 44.1 MMHG (ref 40–50)
PERFORMED ON: ABNORMAL
PERFORMED ON: ABNORMAL
PH BLDV: 7.33 [PH] (ref 7.35–7.45)
PH UR STRIP.AUTO: 6.5 [PH] (ref 5–8)
PLATELET # BLD AUTO: 346 K/UL (ref 135–450)
PMV BLD AUTO: 8 FL (ref 5–10.5)
PO2 BLDV: <30 MMHG
POTASSIUM SERPL-SCNC: 3.7 MMOL/L (ref 3.5–5.1)
POTASSIUM SERPL-SCNC: 5.3 MMOL/L (ref 3.5–5.1)
PROT SERPL-MCNC: 7.7 G/DL (ref 6.4–8.2)
PROT UR STRIP.AUTO-MCNC: NEGATIVE MG/DL
RBC # BLD AUTO: 4.23 M/UL (ref 4–5.2)
RBC CLUMPS #/AREA URNS AUTO: 0 /HPF (ref 0–4)
SAO2 % BLDV: 26 %
SODIUM SERPL-SCNC: 126 MMOL/L (ref 136–145)
SODIUM SERPL-SCNC: 134 MMOL/L (ref 136–145)
SP GR UR STRIP.AUTO: 1.02 (ref 1–1.03)
UA COMPLETE W REFLEX CULTURE PNL UR: YES
UA DIPSTICK W REFLEX MICRO PNL UR: YES
URN SPEC COLLECT METH UR: ABNORMAL
UROBILINOGEN UR STRIP-ACNC: 0.2 E.U./DL
WBC # BLD AUTO: 8.1 K/UL (ref 4–11)
WBC #/AREA URNS AUTO: 19 /HPF (ref 0–5)

## 2025-06-24 PROCEDURE — 6370000000 HC RX 637 (ALT 250 FOR IP): Performed by: PHYSICIAN ASSISTANT

## 2025-06-24 PROCEDURE — 2580000003 HC RX 258: Performed by: PHYSICIAN ASSISTANT

## 2025-06-24 PROCEDURE — 87186 SC STD MICRODIL/AGAR DIL: CPT

## 2025-06-24 PROCEDURE — 82010 KETONE BODYS QUAN: CPT

## 2025-06-24 PROCEDURE — 81001 URINALYSIS AUTO W/SCOPE: CPT

## 2025-06-24 PROCEDURE — 6360000002 HC RX W HCPCS: Performed by: PHYSICIAN ASSISTANT

## 2025-06-24 PROCEDURE — 85025 COMPLETE CBC W/AUTO DIFF WBC: CPT

## 2025-06-24 PROCEDURE — 80053 COMPREHEN METABOLIC PANEL: CPT

## 2025-06-24 PROCEDURE — 96361 HYDRATE IV INFUSION ADD-ON: CPT

## 2025-06-24 PROCEDURE — 87077 CULTURE AEROBIC IDENTIFY: CPT

## 2025-06-24 PROCEDURE — 87086 URINE CULTURE/COLONY COUNT: CPT

## 2025-06-24 PROCEDURE — 83690 ASSAY OF LIPASE: CPT

## 2025-06-24 PROCEDURE — 83735 ASSAY OF MAGNESIUM: CPT

## 2025-06-24 PROCEDURE — 36415 COLL VENOUS BLD VENIPUNCTURE: CPT

## 2025-06-24 PROCEDURE — 84703 CHORIONIC GONADOTROPIN ASSAY: CPT

## 2025-06-24 PROCEDURE — 99284 EMERGENCY DEPT VISIT MOD MDM: CPT

## 2025-06-24 PROCEDURE — 96374 THER/PROPH/DIAG INJ IV PUSH: CPT

## 2025-06-24 PROCEDURE — 96375 TX/PRO/DX INJ NEW DRUG ADDON: CPT

## 2025-06-24 PROCEDURE — 82803 BLOOD GASES ANY COMBINATION: CPT

## 2025-06-24 RX ORDER — ONDANSETRON 4 MG/1
4-8 TABLET, ORALLY DISINTEGRATING ORAL EVERY 12 HOURS PRN
Qty: 20 TABLET | Refills: 0 | Status: SHIPPED | OUTPATIENT
Start: 2025-06-24

## 2025-06-24 RX ORDER — 0.9 % SODIUM CHLORIDE 0.9 %
2000 INTRAVENOUS SOLUTION INTRAVENOUS ONCE
Status: COMPLETED | OUTPATIENT
Start: 2025-06-24 | End: 2025-06-24

## 2025-06-24 RX ORDER — DIPHENHYDRAMINE HYDROCHLORIDE 50 MG/ML
25 INJECTION, SOLUTION INTRAMUSCULAR; INTRAVENOUS ONCE
Status: COMPLETED | OUTPATIENT
Start: 2025-06-24 | End: 2025-06-24

## 2025-06-24 RX ORDER — METOCLOPRAMIDE HYDROCHLORIDE 5 MG/ML
10 INJECTION INTRAMUSCULAR; INTRAVENOUS ONCE
Status: COMPLETED | OUTPATIENT
Start: 2025-06-24 | End: 2025-06-24

## 2025-06-24 RX ORDER — CEPHALEXIN 500 MG/1
500 CAPSULE ORAL ONCE
Status: COMPLETED | OUTPATIENT
Start: 2025-06-24 | End: 2025-06-24

## 2025-06-24 RX ORDER — CEPHALEXIN 500 MG/1
500 CAPSULE ORAL 2 TIMES DAILY
Qty: 10 CAPSULE | Refills: 0 | Status: SHIPPED | OUTPATIENT
Start: 2025-06-24 | End: 2025-06-29

## 2025-06-24 RX ADMIN — DIPHENHYDRAMINE HYDROCHLORIDE 25 MG: 50 INJECTION INTRAMUSCULAR; INTRAVENOUS at 19:50

## 2025-06-24 RX ADMIN — SODIUM CHLORIDE 2000 ML: 0.9 INJECTION, SOLUTION INTRAVENOUS at 20:09

## 2025-06-24 RX ADMIN — METOCLOPRAMIDE 10 MG: 5 INJECTION, SOLUTION INTRAMUSCULAR; INTRAVENOUS at 19:49

## 2025-06-24 RX ADMIN — INSULIN HUMAN 10 UNITS: 100 INJECTION, SOLUTION PARENTERAL at 19:46

## 2025-06-24 RX ADMIN — CEPHALEXIN 500 MG: 500 CAPSULE ORAL at 22:43

## 2025-06-24 ASSESSMENT — PAIN DESCRIPTION - LOCATION: LOCATION: HEAD

## 2025-06-24 ASSESSMENT — PAIN SCALES - GENERAL: PAINLEVEL_OUTOF10: 10

## 2025-06-24 ASSESSMENT — PAIN DESCRIPTION - DESCRIPTORS: DESCRIPTORS: ACHING

## 2025-06-24 ASSESSMENT — PAIN DESCRIPTION - FREQUENCY: FREQUENCY: CONTINUOUS

## 2025-06-24 ASSESSMENT — PAIN DESCRIPTION - ORIENTATION: ORIENTATION: ANTERIOR

## 2025-06-24 ASSESSMENT — PAIN DESCRIPTION - PAIN TYPE: TYPE: ACUTE PAIN

## 2025-06-24 ASSESSMENT — PAIN DESCRIPTION - ONSET: ONSET: ON-GOING

## 2025-06-24 NOTE — ED PROVIDER NOTES
[facility] EMERGENCY DEPARTMENT  EMERGENCY DEPARTMENT ENCOUNTER        Pt Name: Maribeth Loera  MRN: [id number]  Birthdate 1990  Date of evaluation: 2025  Provider: Andrew Carballo PA-C  PCP: Kathy Davis APRN - CNP  Note Started: 7:57 PM EDT 25      MAGDALENA. I have evaluated this patient.        CHIEF COMPLAINT       Chief Complaint   Patient presents with    Abnormal Lab     Pt reports getting blood drawn \"2 weeks ago\" and was told to come in recently for \"high creatinine levels\". Pt also endorses high blood sugar at home. Pt alert and oriented at this time with no signs of distress noted. Pt endoress headache rating it as a 10/10.       HISTORY OF PRESENT ILLNESS: 1 or more Elements     History From: patient  Limitations to history : None    Maribeth Loera is a 35 y.o. female who presents to the emergency department with a chief complaint of some intermittent nausea and vomiting, muscle cramping, abdominal discomfort and generally not feeling well.  She has history of epilepsy and type 1 diabetes.  She states she has been compliant with her medication.  She was seen about 2 weeks ago and had blood work drawn was told that time that she needed to come to the emergency department due to a high creatinine but was unable to get a ride here she does not drive.  Was finally able to come here today.  She states she has some occasional dysuria but denies hematuria, diarrhea, bloody stool, cough, fevers, chest pain, shortness of breath or any other symptoms.    Nursing Notes were all reviewed and agreed with or any disagreements were addressed in the HPI.    REVIEW OF SYSTEMS :      Review of Systems    Positives and Pertinent negatives as per HPI.     SURGICAL HISTORY     Past Surgical History:   Procedure Laterality Date     SECTION      TUBAL LIGATION         CURRENTMEDICATIONS       Discharge Medication List as of 2025 10:16 PM        CONTINUE these medications

## 2025-06-26 ENCOUNTER — RESULTS FOLLOW-UP (OUTPATIENT)
Dept: EMERGENCY DEPT | Age: 35
End: 2025-06-26

## 2025-06-26 LAB
BACTERIA UR CULT: ABNORMAL
ORGANISM: ABNORMAL

## 2025-06-27 DIAGNOSIS — E10.8 TYPE 1 DIABETES MELLITUS WITH COMPLICATION (HCC): ICD-10-CM

## 2025-06-27 RX ORDER — INSULIN PUMP CONTROLLER
EACH MISCELLANEOUS
Qty: 10 EACH | Refills: 0 | Status: SHIPPED | OUTPATIENT
Start: 2025-06-27

## 2025-07-09 ENCOUNTER — TELEPHONE (OUTPATIENT)
Dept: FAMILY MEDICINE CLINIC | Age: 35
End: 2025-07-09

## 2025-07-09 DIAGNOSIS — E10.8 TYPE 1 DIABETES MELLITUS WITH COMPLICATION (HCC): ICD-10-CM

## 2025-07-09 NOTE — TELEPHONE ENCOUNTER
Patient called [facility] [address] [facility] - P [phone] - F [phone] is requesting prior auth for Insulin Disposable Pump

## 2025-07-10 RX ORDER — INSULIN PUMP CONTROLLER
EACH MISCELLANEOUS
Qty: 10 EACH | Refills: 0 | Status: SHIPPED | OUTPATIENT
Start: 2025-07-10

## 2025-07-11 ENCOUNTER — PATIENT MESSAGE (OUTPATIENT)
Dept: FAMILY MEDICINE CLINIC | Age: 35
End: 2025-07-11

## 2025-07-11 DIAGNOSIS — E10.8 TYPE 1 DIABETES MELLITUS WITH COMPLICATION (HCC): ICD-10-CM

## 2025-07-11 RX ORDER — INSULIN ASPART 100 [IU]/ML
INJECTION, SOLUTION INTRAVENOUS; SUBCUTANEOUS
Qty: 30 ML | Refills: 0 | Status: SHIPPED | OUTPATIENT
Start: 2025-07-11

## 2025-07-11 RX ORDER — LANCETS
EACH MISCELLANEOUS
Qty: 100 EACH | Refills: 3 | Status: SHIPPED | OUTPATIENT
Start: 2025-07-11

## 2025-07-11 NOTE — TELEPHONE ENCOUNTER
Medication has been Approved, but needs additonal refills.   Medication pending to be signed.      Also her insurance ONLY pays for Accu-Chek Device and supplies.   These items are pending to be signed to [facility] for the patient.

## 2025-07-11 NOTE — TELEPHONE ENCOUNTER
Started PA for Omnipod DASH Pods (Gen 4).  [place] Medicaid wants to know if the patient has completed a comprehensive diabetes education program within the previous 365 days, and if so, what was the date of the program?    Please respond to the pool ( P MHCX PSC MEDICATION PRE-AUTH).      Thank you!

## 2025-07-14 ENCOUNTER — TELEPHONE (OUTPATIENT)
Dept: FAMILY MEDICINE CLINIC | Age: 35
End: 2025-07-14

## 2025-07-14 RX ORDER — ACYCLOVIR 400 MG/1
TABLET ORAL
Qty: 3 EACH | Refills: 0 | Status: SHIPPED | OUTPATIENT
Start: 2025-07-14

## 2025-07-14 RX ORDER — IBUPROFEN 800 MG/1
800 TABLET, FILM COATED ORAL 3 TIMES DAILY PRN
Qty: 90 TABLET | Refills: 0 | Status: SHIPPED | OUTPATIENT
Start: 2025-07-14

## 2025-07-15 ENCOUNTER — OFFICE VISIT (OUTPATIENT)
Dept: FAMILY MEDICINE CLINIC | Age: 35
End: 2025-07-15
Payer: COMMERCIAL

## 2025-07-15 VITALS
BODY MASS INDEX: 26.13 KG/M2 | HEART RATE: 107 BPM | OXYGEN SATURATION: 98 % | DIASTOLIC BLOOD PRESSURE: 60 MMHG | WEIGHT: 125 LBS | SYSTOLIC BLOOD PRESSURE: 106 MMHG

## 2025-07-15 DIAGNOSIS — R30.0 DYSURIA: ICD-10-CM

## 2025-07-15 DIAGNOSIS — G40.909 SEIZURE DISORDER (HCC): ICD-10-CM

## 2025-07-15 DIAGNOSIS — E10.8 TYPE 1 DIABETES MELLITUS WITH COMPLICATION (HCC): ICD-10-CM

## 2025-07-15 LAB
BILIRUBIN, URINE, POC: NEGATIVE
BLOOD URINE, POC: NORMAL
GLUCOSE URINE, POC: 1000
HBA1C MFR BLD: 8.3 %
KETONES, URINE, POC: NEGATIVE
LEUKOCYTE ESTERASE, URINE, POC: NORMAL
NITRITE, URINE, POC: POSITIVE
PH, URINE, POC: 6.5 (ref 4.6–8)
PROTEIN,URINE, POC: NEGATIVE
SPECIFIC GRAVITY, URINE, POC: 1.02 (ref 1–1.03)
URINALYSIS CLARITY, POC: NORMAL
URINALYSIS COLOR, POC: NORMAL
UROBILINOGEN, POC: NORMAL

## 2025-07-15 PROCEDURE — 3052F HG A1C>EQUAL 8.0%<EQUAL 9.0%: CPT | Performed by: NURSE PRACTITIONER

## 2025-07-15 PROCEDURE — 99214 OFFICE O/P EST MOD 30 MIN: CPT | Performed by: NURSE PRACTITIONER

## 2025-07-15 PROCEDURE — G8419 CALC BMI OUT NRM PARAM NOF/U: HCPCS | Performed by: NURSE PRACTITIONER

## 2025-07-15 PROCEDURE — G8428 CUR MEDS NOT DOCUMENT: HCPCS | Performed by: NURSE PRACTITIONER

## 2025-07-15 PROCEDURE — 83036 HEMOGLOBIN GLYCOSYLATED A1C: CPT | Performed by: NURSE PRACTITIONER

## 2025-07-15 PROCEDURE — 81003 URINALYSIS AUTO W/O SCOPE: CPT | Performed by: NURSE PRACTITIONER

## 2025-07-15 PROCEDURE — 2022F DILAT RTA XM EVC RTNOPTHY: CPT | Performed by: NURSE PRACTITIONER

## 2025-07-15 PROCEDURE — 1036F TOBACCO NON-USER: CPT | Performed by: NURSE PRACTITIONER

## 2025-07-15 RX ORDER — MAGNESIUM OXIDE TAB 400 MG (241.3 MG ELEMENTAL MG) 400 (241.3 MG) MG
400 TAB ORAL DAILY
COMMUNITY
Start: 2025-07-06 | End: 2025-07-15

## 2025-07-15 RX ORDER — BRIVARACETAM 100 MG/1
100 TABLET, FILM COATED ORAL 2 TIMES DAILY
COMMUNITY

## 2025-07-15 RX ORDER — LACOSAMIDE 150 MG/1
150 CAPSULE, EXTENDED RELEASE ORAL NIGHTLY
COMMUNITY
Start: 2025-06-24

## 2025-07-15 RX ORDER — SULFAMETHOXAZOLE AND TRIMETHOPRIM 800; 160 MG/1; MG/1
1 TABLET ORAL 2 TIMES DAILY
Qty: 14 TABLET | Refills: 0 | Status: SHIPPED | OUTPATIENT
Start: 2025-07-15 | End: 2025-07-22

## 2025-07-15 RX ORDER — INSULIN ASPART 100 [IU]/ML
INJECTION, SOLUTION INTRAVENOUS; SUBCUTANEOUS
Qty: 10 ML | Refills: 3 | Status: SHIPPED | OUTPATIENT
Start: 2025-07-15

## 2025-07-15 NOTE — TELEPHONE ENCOUNTER
[employer] already submitted the PA for Omnipod DASH Pods (Gen 4) and it was DENIED.  No letter generated.  Please see note below.    Coverage is provided when the member meets ALL the following requirements: Attestation that the patient has an improvement in the control of diabetes relative to baseline.      If this requires a response please respond to the pool ( P MHCX PSC MEDICATION PRE-AUTH).      Thank you please advise patient.

## 2025-07-15 NOTE — TELEPHONE ENCOUNTER
Submitted PA for Glucagon Emergency 1MG kit   Via CMM Key: [id number]  STATUS: Electronic prior authorization Not Supported as NDC not valid.     I called [facility] and spoke to Cathy CARR who provided me with the following NDCs that are covered: [id number], [id number], [id number].    Please advise the pharmacy of these covered NDCs.  Thank you!    Also, there's a PA Approval for NovoLOG FlexPen 100UNIT/ML pen-injectors in [facility] that was submitted by [employer].  The approval is valid from 06/21/25 to 10/06/25.

## 2025-07-15 NOTE — PROGRESS NOTES
Maribeth Loera (:  1990) is a 35 y.o. female,Established patient, here for evaluation of the following chief complaint(s):  3 Month Follow-Up and Diabetes      ASSESSMENT/PLAN:  Assessment & Plan  1. Type 1 Diabetes Mellitus.  -   Lab Results   Component Value Date    LABA1C 8.3 07/15/2025    LABA1C 7.8 2025    LABA1C 9.8 2024    LABA1C 10.5 2024    LABA1C 8.4 06/10/2024       - Blood glucose levels have shown significant improvement while using Omnipod and Dexcom.  - A1c will be checked today.- 8.3 Improving  - Prescription for NovoLog 100 units per mL solution in a 10 mL vial with three refills provided.  - Advised to refill OmniPod with 50 units every three days as needed. Two Dexcom devices given today.    2. Epilepsy.  - Epilepsy is not well controlled despite being on multiple medications.  - Currently taking Briviact 100 mg and Polyc  mg.  - Advised to continue current medication regimen and follow up with neurologist at .  - Sleep study scheduled for the end of the month to further investigate seizures.  - Enc spouse to help pt set up mediset weekly    3. Suspected Urinary Tract Infection.  - Reports symptoms of burning during urination.  - Urine sample will be collected today to confirm diagnosis.  - If UTI is confirmed, appropriate antibiotics will be prescribed.  - Bactrim x7 days    1. Seizure disorder (HCC)  2. Type 1 diabetes mellitus with complication (HCC)  -     insulin aspart (NOVOLOG) 100 UNIT/ML injection vial; Refill omnipod 50u every 3 days as needed, Disp-10 mL, R-3Normal  -     POCT glycosylated hemoglobin (Hb A1C)  3. Dysuria  -     AMB POC URINALYSIS DIP STICK AUTO W/O MICRO  -     sulfamethoxazole-trimethoprim (BACTRIM DS;SEPTRA DS) 800-160 MG per tablet; Take 1 tablet by mouth 2 times daily for 7 days, Disp-14 tablet, R-0Normal    Return in about 3 months (around 10/15/2025).    SUBJECTIVE/OBJECTIVE:    History of Present Illness  The patient presents for

## 2025-07-22 ENCOUNTER — TELEPHONE (OUTPATIENT)
Dept: FAMILY MEDICINE CLINIC | Age: 35
End: 2025-07-22

## 2025-07-24 DIAGNOSIS — E11.42 DIABETIC PERIPHERAL NEUROPATHY (HCC): ICD-10-CM

## 2025-07-24 DIAGNOSIS — E10.8 TYPE 1 DIABETES MELLITUS WITH COMPLICATION (HCC): ICD-10-CM

## 2025-07-24 DIAGNOSIS — G40.909 SEIZURE DISORDER (HCC): ICD-10-CM

## 2025-07-28 RX ORDER — INSULIN ASPART 100 [IU]/ML
INJECTION, SOLUTION INTRAVENOUS; SUBCUTANEOUS
Qty: 30 ML | Refills: 0 | Status: SHIPPED | OUTPATIENT
Start: 2025-07-28

## 2025-07-28 NOTE — TELEPHONE ENCOUNTER
Patient comment: Dear , the [facility] pharmacy is asking for you to change the test strip prescription to testing up to 7xs a day instead of 9xs. And they won't refill it until it gets changed to 7xs a day.   LOV: 7/15/25  FOV: 10/14/25

## 2025-07-29 ENCOUNTER — TELEPHONE (OUTPATIENT)
Dept: FAMILY MEDICINE CLINIC | Age: 35
End: 2025-07-29

## 2025-07-29 RX ORDER — IBUPROFEN 800 MG/1
800 TABLET, FILM COATED ORAL 3 TIMES DAILY PRN
Qty: 90 TABLET | Refills: 0 | Status: SHIPPED | OUTPATIENT
Start: 2025-07-29

## 2025-07-29 RX ORDER — CLONAZEPAM 2 MG/1
2 TABLET ORAL 2 TIMES DAILY
Qty: 180 TABLET | Refills: 0 | Status: SHIPPED | OUTPATIENT
Start: 2025-07-29 | End: 2025-10-27

## 2025-07-29 RX ORDER — GABAPENTIN 300 MG/1
CAPSULE ORAL
Qty: 270 CAPSULE | Refills: 3 | Status: SHIPPED | OUTPATIENT
Start: 2025-07-29 | End: 2025-08-23

## 2025-07-29 RX ORDER — INSULIN PUMP CONTROLLER
EACH MISCELLANEOUS
Qty: 10 EACH | Refills: 0 | Status: SHIPPED | OUTPATIENT
Start: 2025-07-29

## 2025-07-29 RX ORDER — ONDANSETRON 8 MG/1
4 TABLET, FILM COATED ORAL EVERY 8 HOURS PRN
Qty: 30 TABLET | Refills: 2 | Status: SHIPPED | OUTPATIENT
Start: 2025-07-29

## 2025-07-29 RX ORDER — LANOLIN ALCOHOL/MO/W.PET/CERES
1000 CREAM (GRAM) TOPICAL DAILY
Qty: 90 TABLET | Refills: 1 | Status: SHIPPED | OUTPATIENT
Start: 2025-07-29

## 2025-07-29 RX ORDER — OMEPRAZOLE 20 MG/1
20 CAPSULE, DELAYED RELEASE ORAL DAILY
Qty: 90 CAPSULE | Refills: 1 | Status: SHIPPED | OUTPATIENT
Start: 2025-07-29

## 2025-07-29 RX ORDER — ERGOCALCIFEROL 1.25 MG/1
50000 CAPSULE, LIQUID FILLED ORAL WEEKLY
Qty: 12 CAPSULE | Refills: 1 | Status: SHIPPED | OUTPATIENT
Start: 2025-07-29

## 2025-07-29 RX ORDER — ACYCLOVIR 400 MG/1
TABLET ORAL
Qty: 3 EACH | Refills: 0 | Status: SHIPPED | OUTPATIENT
Start: 2025-07-29